# Patient Record
Sex: MALE | Race: WHITE | Employment: OTHER | URBAN - METROPOLITAN AREA
[De-identification: names, ages, dates, MRNs, and addresses within clinical notes are randomized per-mention and may not be internally consistent; named-entity substitution may affect disease eponyms.]

---

## 2017-12-19 RX ORDER — DIPHENHYDRAMINE HYDROCHLORIDE 50 MG/ML
INJECTION INTRAMUSCULAR; INTRAVENOUS
Status: DISPENSED
Start: 2017-12-19 | End: 2017-12-20

## 2017-12-19 RX ORDER — SODIUM CHLORIDE 0.9 % (FLUSH) 0.9 %
SYRINGE (ML) INJECTION
Status: DISPENSED
Start: 2017-12-19 | End: 2017-12-20

## 2018-03-03 PROBLEM — J15.9 COMMUNITY ACQUIRED BACTERIAL PNEUMONIA: Status: ACTIVE | Noted: 2018-03-03

## 2018-03-06 ENCOUNTER — HOSPITAL ENCOUNTER (INPATIENT)
Dept: PEDIATRICS UNIT | Age: 53
LOS: 7 days | Discharge: HOME | DRG: 871 | End: 2018-03-13
Attending: EMERGENCY MEDICINE | Admitting: HOSPITALIST
Payer: MEDICARE

## 2018-03-06 DIAGNOSIS — J18.9 COMMUNITY ACQUIRED PNEUMONIA, UNSPECIFIED LATERALITY: Primary | ICD-10-CM

## 2018-03-06 PROBLEM — J16.0 CAP (COMMUNITY ACQUIRED PNEUMONIA) DUE TO CHLAMYDIA SPECIES: Status: ACTIVE | Noted: 2018-03-06

## 2018-03-06 LAB
ACETAMINOPHEN LEVEL: <15 MCG/ML (ref 10–30)
ALBUMIN SERPL-MCNC: 3.6 G/DL (ref 3.5–5.2)
ALP BLD-CCNC: 102 U/L (ref 40–129)
ALT SERPL-CCNC: 31 U/L (ref 0–40)
AMPHETAMINE SCREEN, URINE: NOT DETECTED
ANION GAP SERPL CALCULATED.3IONS-SCNC: 14 MMOL/L (ref 7–16)
AST SERPL-CCNC: 34 U/L (ref 0–39)
BARBITURATE SCREEN URINE: NOT DETECTED
BASOPHILS ABSOLUTE: 0 E9/L (ref 0–0.2)
BASOPHILS RELATIVE PERCENT: 0.2 % (ref 0–2)
BENZODIAZEPINE SCREEN, URINE: NOT DETECTED
BILIRUB SERPL-MCNC: 0.3 MG/DL (ref 0–1.2)
BUN BLDV-MCNC: 13 MG/DL (ref 6–20)
CALCIUM SERPL-MCNC: 9 MG/DL (ref 8.6–10.2)
CANNABINOID SCREEN URINE: NOT DETECTED
CHLORIDE BLD-SCNC: 102 MMOL/L (ref 98–107)
CK MB: 2.4 NG/ML (ref 0–7.7)
CO2: 22 MMOL/L (ref 22–29)
COCAINE METABOLITE SCREEN URINE: NOT DETECTED
CREAT SERPL-MCNC: 0.8 MG/DL (ref 0.7–1.2)
EOSINOPHILS ABSOLUTE: 0 E9/L (ref 0.05–0.5)
EOSINOPHILS RELATIVE PERCENT: 0 % (ref 0–6)
ETHANOL: <10 MG/DL (ref 0–0.08)
FILM ARRAY ADENOVIRUS: NORMAL
FILM ARRAY BORDETELLA PERTUSSIS: NORMAL
FILM ARRAY CHLAMYDOPHILIA PNEUMONIAE: NORMAL
FILM ARRAY CORONAVIRUS 229E: NORMAL
FILM ARRAY CORONAVIRUS HKU1: NORMAL
FILM ARRAY CORONAVIRUS NL63: NORMAL
FILM ARRAY CORONAVIRUS OC43: NORMAL
FILM ARRAY INFLUENZA A VIRUS 09H1: NORMAL
FILM ARRAY INFLUENZA A VIRUS H1: NORMAL
FILM ARRAY INFLUENZA A VIRUS H3: NORMAL
FILM ARRAY INFLUENZA A VIRUS: NORMAL
FILM ARRAY INFLUENZA B: NORMAL
FILM ARRAY METAPNEUMOVIRUS: NORMAL
FILM ARRAY MYCOPLASMA PNEUMONIAE: NORMAL
FILM ARRAY PARAINFLUENZA VIRUS 1: NORMAL
FILM ARRAY PARAINFLUENZA VIRUS 2: NORMAL
FILM ARRAY PARAINFLUENZA VIRUS 3: NORMAL
FILM ARRAY PARAINFLUENZA VIRUS 4: NORMAL
FILM ARRAY RESPIRATORY SYNCITIAL VIRUS: NORMAL
FILM ARRAY RHINOVIRUS/ENTEROVIRUS: NORMAL
GFR AFRICAN AMERICAN: >60
GFR NON-AFRICAN AMERICAN: >60 ML/MIN/1.73
GLUCOSE BLD-MCNC: 112 MG/DL (ref 74–109)
HCT VFR BLD CALC: 39.3 % (ref 37–54)
HEMOGLOBIN: 13.6 G/DL (ref 12.5–16.5)
LACTIC ACID: 1.1 MMOL/L (ref 0.5–2.2)
LACTIC ACID: 1.5 MMOL/L (ref 0.5–2.2)
LIPASE: 15 U/L (ref 13–60)
LYMPHOCYTES ABSOLUTE: 2.18 E9/L (ref 1.5–4)
LYMPHOCYTES RELATIVE PERCENT: 10.4 % (ref 20–42)
MCH RBC QN AUTO: 32.2 PG (ref 26–35)
MCHC RBC AUTO-ENTMCNC: 34.6 % (ref 32–34.5)
MCV RBC AUTO: 92.9 FL (ref 80–99.9)
METER GLUCOSE: 101 MG/DL (ref 70–110)
METHADONE SCREEN, URINE: NOT DETECTED
MONOCYTES ABSOLUTE: 1.53 E9/L (ref 0.1–0.95)
MONOCYTES RELATIVE PERCENT: 7 % (ref 2–12)
NEUTROPHILS ABSOLUTE: 17.88 E9/L (ref 1.8–7.3)
NEUTROPHILS RELATIVE PERCENT: 81.7 % (ref 43–80)
OPIATE SCREEN URINE: NOT DETECTED
PDW BLD-RTO: 14.1 FL (ref 11.5–15)
PHENCYCLIDINE SCREEN URINE: NOT DETECTED
PLATELET # BLD: 214 E9/L (ref 130–450)
PMV BLD AUTO: 10.7 FL (ref 7–12)
POLYCHROMASIA: ABNORMAL
POTASSIUM SERPL-SCNC: 3.6 MMOL/L (ref 3.5–5)
PROPOXYPHENE SCREEN: NOT DETECTED
RBC # BLD: 4.23 E12/L (ref 3.8–5.8)
SALICYLATE, SERUM: <0.3 MG/DL (ref 0–30)
SODIUM BLD-SCNC: 138 MMOL/L (ref 132–146)
TOTAL CK: 94 U/L (ref 20–200)
TOTAL PROTEIN: 8 G/DL (ref 6.4–8.3)
TRICYCLIC ANTIDEPRESSANTS SCREEN SERUM: NEGATIVE NG/ML
TROPONIN: <0.01 NG/ML (ref 0–0.03)
TROPONIN: <0.01 NG/ML (ref 0–0.03)
WBC # BLD: 21.8 E9/L (ref 4.5–11.5)

## 2018-03-06 PROCEDURE — 9990 CHARGE CONVERSION

## 2018-03-06 PROCEDURE — 94640 AIRWAY INHALATION TREATMENT: CPT

## 2018-03-06 PROCEDURE — 93005 ELECTROCARDIOGRAM TRACING: CPT

## 2018-03-06 PROCEDURE — 83605 ASSAY OF LACTIC ACID: CPT

## 2018-03-06 PROCEDURE — 94761 N-INVAS EAR/PLS OXIMETRY MLT: CPT

## 2018-03-06 PROCEDURE — 71275 CT ANGIOGRAPHY CHEST: CPT

## 2018-03-06 PROCEDURE — 82553 CREATINE MB FRACTION: CPT

## 2018-03-06 PROCEDURE — 87502 INFLUENZA DNA AMP PROBE: CPT

## 2018-03-06 PROCEDURE — 74177 CT ABD & PELVIS W/CONTRAST: CPT

## 2018-03-06 PROCEDURE — 83690 ASSAY OF LIPASE: CPT

## 2018-03-06 PROCEDURE — 80307 DRUG TEST PRSMV CHEM ANLYZR: CPT

## 2018-03-06 PROCEDURE — 82962 GLUCOSE BLOOD TEST: CPT

## 2018-03-06 PROCEDURE — 80053 COMPREHEN METABOLIC PANEL: CPT

## 2018-03-06 PROCEDURE — 85025 COMPLETE CBC W/AUTO DIFF WBC: CPT

## 2018-03-06 PROCEDURE — 87450 CHARGE CONVERSION: CPT

## 2018-03-06 PROCEDURE — 96374 THER/PROPH/DIAG INJ IV PUSH: CPT

## 2018-03-06 PROCEDURE — 87486 CHLMYD PNEUM DNA AMP PROBE: CPT

## 2018-03-06 PROCEDURE — 82550 ASSAY OF CK (CPK): CPT

## 2018-03-06 PROCEDURE — 96375 TX/PRO/DX INJ NEW DRUG ADDON: CPT

## 2018-03-06 PROCEDURE — 87798 DETECT AGENT NOS DNA AMP: CPT

## 2018-03-06 PROCEDURE — 87581 M.PNEUMON DNA AMP PROBE: CPT

## 2018-03-06 PROCEDURE — 71045 X-RAY EXAM CHEST 1 VIEW: CPT

## 2018-03-06 PROCEDURE — 36415 COLL VENOUS BLD VENIPUNCTURE: CPT

## 2018-03-06 PROCEDURE — 87503 INFLUENZA DNA AMP PROB ADDL: CPT

## 2018-03-06 PROCEDURE — 87040 BLOOD CULTURE FOR BACTERIA: CPT

## 2018-03-06 PROCEDURE — 84484 ASSAY OF TROPONIN QUANT: CPT

## 2018-03-06 PROCEDURE — 99285 EMERGENCY DEPT VISIT HI MDM: CPT

## 2018-03-06 PROCEDURE — 99223 1ST HOSP IP/OBS HIGH 75: CPT | Performed by: INTERNAL MEDICINE

## 2018-03-06 PROCEDURE — 86703 HIV-1/HIV-2 1 RESULT ANTBDY: CPT

## 2018-03-06 RX ORDER — HYDROCHLOROTHIAZIDE 12.5 MG/1
25 TABLET ORAL DAILY
Status: DISCONTINUED | OUTPATIENT
Start: 2018-03-06 | End: 2018-03-13 | Stop reason: HOSPADM

## 2018-03-06 RX ORDER — AMLODIPINE BESYLATE 10 MG/1
10 TABLET ORAL DAILY
Status: DISCONTINUED | OUTPATIENT
Start: 2018-03-06 | End: 2018-03-13 | Stop reason: HOSPADM

## 2018-03-06 RX ORDER — MIDAZOLAM HYDROCHLORIDE 1 MG/ML
INJECTION INTRAMUSCULAR; INTRAVENOUS
Status: DISPENSED
Start: 2018-03-06 | End: 2018-03-07

## 2018-03-06 RX ORDER — TRAMADOL HYDROCHLORIDE 50 MG/1
50 TABLET ORAL EVERY 6 HOURS PRN
Status: DISCONTINUED | OUTPATIENT
Start: 2018-03-06 | End: 2018-03-13 | Stop reason: HOSPADM

## 2018-03-06 RX ORDER — FINASTERIDE 5 MG/1
5 TABLET, FILM COATED ORAL DAILY
Status: DISCONTINUED | OUTPATIENT
Start: 2018-03-06 | End: 2018-03-13 | Stop reason: HOSPADM

## 2018-03-06 RX ORDER — GABAPENTIN 300 MG/1
300 CAPSULE ORAL 3 TIMES DAILY
Status: DISCONTINUED | OUTPATIENT
Start: 2018-03-06 | End: 2018-03-13 | Stop reason: HOSPADM

## 2018-03-06 RX ORDER — KETOROLAC TROMETHAMINE 10 MG/1
15 TABLET, FILM COATED ORAL EVERY 6 HOURS PRN
Status: DISCONTINUED | OUTPATIENT
Start: 2018-03-06 | End: 2018-03-06

## 2018-03-06 RX ORDER — KETOROLAC TROMETHAMINE 30 MG/ML
30 INJECTION, SOLUTION INTRAMUSCULAR; INTRAVENOUS ONCE
Status: COMPLETED | OUTPATIENT
Start: 2018-03-06 | End: 2018-03-06

## 2018-03-06 RX ORDER — LABETALOL HYDROCHLORIDE 5 MG/ML
10 INJECTION, SOLUTION INTRAVENOUS EVERY 6 HOURS PRN
Status: DISCONTINUED | OUTPATIENT
Start: 2018-03-06 | End: 2018-03-13 | Stop reason: HOSPADM

## 2018-03-06 RX ORDER — LINEZOLID 600 MG/1
600 TABLET, FILM COATED ORAL EVERY 12 HOURS SCHEDULED
Status: DISCONTINUED | OUTPATIENT
Start: 2018-03-06 | End: 2018-03-13 | Stop reason: HOSPADM

## 2018-03-06 RX ORDER — KETOROLAC TROMETHAMINE 30 MG/ML
15 INJECTION, SOLUTION INTRAMUSCULAR; INTRAVENOUS EVERY 6 HOURS PRN
Status: DISPENSED | OUTPATIENT
Start: 2018-03-06 | End: 2018-03-11

## 2018-03-06 RX ORDER — IPRATROPIUM BROMIDE AND ALBUTEROL SULFATE 2.5; .5 MG/3ML; MG/3ML
1 SOLUTION RESPIRATORY (INHALATION)
Status: DISCONTINUED | OUTPATIENT
Start: 2018-03-06 | End: 2018-03-13 | Stop reason: HOSPADM

## 2018-03-06 RX ORDER — ONDANSETRON 2 MG/ML
4 INJECTION INTRAMUSCULAR; INTRAVENOUS EVERY 6 HOURS PRN
Status: DISCONTINUED | OUTPATIENT
Start: 2018-03-06 | End: 2018-03-13 | Stop reason: HOSPADM

## 2018-03-06 RX ORDER — ASPIRIN 325 MG
325 TABLET ORAL ONCE
Status: COMPLETED | OUTPATIENT
Start: 2018-03-06 | End: 2018-03-06

## 2018-03-06 RX ORDER — BUDESONIDE AND FORMOTEROL FUMARATE DIHYDRATE 160; 4.5 UG/1; UG/1
2 AEROSOL RESPIRATORY (INHALATION) 2 TIMES DAILY
Status: DISCONTINUED | OUTPATIENT
Start: 2018-03-06 | End: 2018-03-06

## 2018-03-06 RX ORDER — LISINOPRIL 10 MG/1
40 TABLET ORAL DAILY
Status: DISCONTINUED | OUTPATIENT
Start: 2018-03-06 | End: 2018-03-13 | Stop reason: HOSPADM

## 2018-03-06 RX ORDER — LEVOFLOXACIN 5 MG/ML
750 INJECTION, SOLUTION INTRAVENOUS ONCE
Status: DISCONTINUED | OUTPATIENT
Start: 2018-03-06 | End: 2018-03-06

## 2018-03-06 RX ORDER — FENTANYL CITRATE 50 UG/ML
80 INJECTION, SOLUTION INTRAMUSCULAR; INTRAVENOUS ONCE
Status: COMPLETED | OUTPATIENT
Start: 2018-03-06 | End: 2018-03-06

## 2018-03-06 RX ORDER — NICOTINE 21 MG/24HR
1 PATCH, TRANSDERMAL 24 HOURS TRANSDERMAL DAILY
Status: DISCONTINUED | OUTPATIENT
Start: 2018-03-06 | End: 2018-03-13 | Stop reason: HOSPADM

## 2018-03-06 RX ORDER — ALBUTEROL SULFATE 2.5 MG/3ML
2.5 SOLUTION RESPIRATORY (INHALATION)
Status: DISCONTINUED | OUTPATIENT
Start: 2018-03-06 | End: 2018-03-06

## 2018-03-06 RX ORDER — SODIUM CHLORIDE 0.9 % (FLUSH) 0.9 %
10 SYRINGE (ML) INJECTION PRN
Status: DISCONTINUED | OUTPATIENT
Start: 2018-03-06 | End: 2018-03-13 | Stop reason: HOSPADM

## 2018-03-06 RX ORDER — 0.9 % SODIUM CHLORIDE 0.9 %
1000 INTRAVENOUS SOLUTION INTRAVENOUS ONCE
Status: COMPLETED | OUTPATIENT
Start: 2018-03-06 | End: 2018-03-06

## 2018-03-06 RX ORDER — SODIUM CHLORIDE 0.9 % (FLUSH) 0.9 %
10 SYRINGE (ML) INJECTION EVERY 12 HOURS SCHEDULED
Status: DISCONTINUED | OUTPATIENT
Start: 2018-03-06 | End: 2018-03-13 | Stop reason: HOSPADM

## 2018-03-06 RX ORDER — ACETAMINOPHEN 325 MG/1
650 TABLET ORAL EVERY 4 HOURS PRN
Status: DISCONTINUED | OUTPATIENT
Start: 2018-03-06 | End: 2018-03-13 | Stop reason: HOSPADM

## 2018-03-06 RX ORDER — SODIUM CHLORIDE, SODIUM LACTATE, POTASSIUM CHLORIDE, AND CALCIUM CHLORIDE .6; .31; .03; .02 G/100ML; G/100ML; G/100ML; G/100ML
500 INJECTION, SOLUTION INTRAVENOUS ONCE
Status: COMPLETED | OUTPATIENT
Start: 2018-03-06 | End: 2018-03-06

## 2018-03-06 RX ORDER — ASPIRIN 325 MG
TABLET ORAL
Status: COMPLETED
Start: 2018-03-06 | End: 2018-03-06

## 2018-03-06 RX ORDER — FLUTICASONE FUROATE AND VILANTEROL 200; 25 UG/1; UG/1
1 POWDER RESPIRATORY (INHALATION) DAILY
Status: DISCONTINUED | OUTPATIENT
Start: 2018-03-06 | End: 2018-03-07

## 2018-03-06 RX ORDER — SODIUM CHLORIDE 0.9 % (FLUSH) 0.9 %
10 SYRINGE (ML) INJECTION PRN
Status: COMPLETED | OUTPATIENT
Start: 2018-03-06 | End: 2018-03-06

## 2018-03-06 RX ORDER — KETOROLAC TROMETHAMINE 30 MG/ML
30 INJECTION, SOLUTION INTRAMUSCULAR; INTRAVENOUS EVERY 6 HOURS PRN
Status: DISCONTINUED | OUTPATIENT
Start: 2018-03-06 | End: 2018-03-06 | Stop reason: SDUPTHER

## 2018-03-06 RX ORDER — FAMOTIDINE 20 MG/1
40 TABLET, FILM COATED ORAL EVERY EVENING
Status: DISCONTINUED | OUTPATIENT
Start: 2018-03-06 | End: 2018-03-13 | Stop reason: HOSPADM

## 2018-03-06 RX ADMIN — ENOXAPARIN SODIUM 40 MG: 40 INJECTION SUBCUTANEOUS at 10:31

## 2018-03-06 RX ADMIN — IPRATROPIUM BROMIDE AND ALBUTEROL SULFATE 1 AMPULE: 2.5; .5 SOLUTION RESPIRATORY (INHALATION) at 07:30

## 2018-03-06 RX ADMIN — Medication 10 ML: at 17:25

## 2018-03-06 RX ADMIN — LINEZOLID 600 MG: 600 TABLET, FILM COATED ORAL at 22:20

## 2018-03-06 RX ADMIN — HYDROCHLOROTHIAZIDE 25 MG: 12.5 TABLET ORAL at 11:27

## 2018-03-06 RX ADMIN — FINASTERIDE 5 MG: 5 TABLET, FILM COATED ORAL at 11:27

## 2018-03-06 RX ADMIN — Medication 325 MG: at 16:45

## 2018-03-06 RX ADMIN — TRAMADOL HYDROCHLORIDE 50 MG: 50 TABLET ORAL at 16:06

## 2018-03-06 RX ADMIN — Medication 10 ML: at 17:09

## 2018-03-06 RX ADMIN — Medication 10 ML: at 05:36

## 2018-03-06 RX ADMIN — ACETAMINOPHEN 650 MG: 325 TABLET, FILM COATED ORAL at 15:52

## 2018-03-06 RX ADMIN — KETOROLAC TROMETHAMINE 30 MG: 30 INJECTION, SOLUTION INTRAMUSCULAR; INTRAVENOUS at 03:59

## 2018-03-06 RX ADMIN — GABAPENTIN 300 MG: 300 CAPSULE ORAL at 17:34

## 2018-03-06 RX ADMIN — IPRATROPIUM BROMIDE AND ALBUTEROL SULFATE 1 AMPULE: 2.5; .5 SOLUTION RESPIRATORY (INHALATION) at 20:56

## 2018-03-06 RX ADMIN — KETOROLAC TROMETHAMINE 30 MG: 30 INJECTION, SOLUTION INTRAMUSCULAR; INTRAVENOUS at 10:31

## 2018-03-06 RX ADMIN — FAMOTIDINE 40 MG: 20 TABLET ORAL at 17:33

## 2018-03-06 RX ADMIN — SODIUM CHLORIDE, SODIUM LACTATE, POTASSIUM CHLORIDE, AND CALCIUM CHLORIDE 500 ML: .6; .31; .03; .02 INJECTION, SOLUTION INTRAVENOUS at 17:25

## 2018-03-06 RX ADMIN — FENTANYL CITRATE 80 MCG: 50 INJECTION, SOLUTION INTRAMUSCULAR; INTRAVENOUS at 03:59

## 2018-03-06 RX ADMIN — Medication 1000 ML: at 06:26

## 2018-03-06 RX ADMIN — LISINOPRIL 40 MG: 10 TABLET ORAL at 10:31

## 2018-03-06 RX ADMIN — GABAPENTIN 300 MG: 300 CAPSULE ORAL at 23:11

## 2018-03-06 RX ADMIN — LABETALOL HYDROCHLORIDE 10 MG: 5 INJECTION, SOLUTION INTRAVENOUS at 17:07

## 2018-03-06 RX ADMIN — AMLODIPINE BESYLATE 10 MG: 10 TABLET ORAL at 10:30

## 2018-03-06 RX ADMIN — Medication 10 ML: at 10:30

## 2018-03-06 RX ADMIN — KETOROLAC TROMETHAMINE 15 MG: 10 TABLET, FILM COATED ORAL at 17:28

## 2018-03-06 RX ADMIN — GABAPENTIN 300 MG: 300 CAPSULE ORAL at 10:30

## 2018-03-06 RX ADMIN — IPRATROPIUM BROMIDE AND ALBUTEROL SULFATE 1 AMPULE: 2.5; .5 SOLUTION RESPIRATORY (INHALATION) at 12:55

## 2018-03-06 ASSESSMENT — PAIN DESCRIPTION - LOCATION
LOCATION: RIB CAGE
LOCATION: RIB CAGE
LOCATION: CHEST
LOCATION: RIB CAGE

## 2018-03-06 ASSESSMENT — PAIN SCALES - GENERAL
PAINLEVEL_OUTOF10: 10
PAINLEVEL_OUTOF10: 10
PAINLEVEL_OUTOF10: 0
PAINLEVEL_OUTOF10: 10

## 2018-03-06 ASSESSMENT — PAIN DESCRIPTION - ORIENTATION
ORIENTATION: LEFT

## 2018-03-06 ASSESSMENT — PAIN DESCRIPTION - FREQUENCY
FREQUENCY: CONTINUOUS

## 2018-03-06 ASSESSMENT — PAIN DESCRIPTION - DESCRIPTORS
DESCRIPTORS: SHOOTING;SHARP;DISCOMFORT
DESCRIPTORS: SHARP;SHOOTING
DESCRIPTORS: CONSTANT

## 2018-03-06 ASSESSMENT — ENCOUNTER SYMPTOMS
WHEEZING: 0
ABDOMINAL PAIN: 0
SHORTNESS OF BREATH: 1
VOMITING: 0
NAUSEA: 0
COUGH: 0
EYE PAIN: 0
BACK PAIN: 0
DIARRHEA: 0
SORE THROAT: 0
EYE DISCHARGE: 0
EYE REDNESS: 0
SINUS PRESSURE: 0

## 2018-03-06 ASSESSMENT — PAIN DESCRIPTION - PAIN TYPE
TYPE: ACUTE PAIN

## 2018-03-06 ASSESSMENT — PAIN DESCRIPTION - ONSET
ONSET: ON-GOING

## 2018-03-06 NOTE — H&P
MG tablet Take 1 tablet by mouth daily 18   Pratik Nick, DO   ranitidine (ZANTAC) 300 MG tablet Take 1 tablet by mouth nightly 18   [facility] , DO   SYMBICORT 160-4.5 MCG/ACT AERO Inhale 2 puffs into the lungs 2 times daily 10/24/17   Pratik Nick, DO   Fluticasone Furoate-Vilanterol (BREO ELLIPTA) 200-25 MCG/INH AEPB Inhale 1 puff into the lungs daily 17   Pratik Colonia, DO   albuterol sulfate HFA (PROVENTIL HFA) 108 (90 Base) MCG/ACT inhaler Inhale 2 puffs into the lungs every 4 hours as needed for Wheezing 17  [facility] , DO   hydrochlorothiazide (MICROZIDE) 12.5 MG capsule Take 1 capsule by mouth daily 17   Pratik Nick, DO   lisinopril (PRINIVIL;ZESTRIL) 40 MG tablet take 1 tablet by mouth once daily 17   Pratik Nick, DO   nicotine (NICODERM CQ) 21 MG/24HR Place 1 patch onto the skin daily 16   Cayla Johnson NP       Allergies:  Patient has no known allergies. Social History:      The patient currently lives home     TOBACCO:   reports that he has been smoking Cigarettes. He has a 38.00 pack-year smoking history. He has never used smokeless tobacco.  ETOH:   reports that he drinks alcohol. Family History:       Reviewed in detail and negative for DM, CAD, Cancer, CVA. Positive as follows:        Problem Relation Age of Onset    Arthritis Mother     Other Mother      glucoma    Heart Disease Father     High Blood Pressure Brother     Other Brother      sleep apnea    High Blood Pressure Brother     Other Brother      sleep apnea    High Blood Pressure Brother     Other Brother      sleep apnea    High Blood Pressure Brother     Other Brother      sleep apnea    Cancer Brother      esophogeal -  at 62       REVIEW OF SYSTEMS:   Pertinent positives as noted in the HPI. All other systems reviewed and negative.     PHYSICAL EXAM:    BP (!) 180/128 Comment: RONALD AMES notified  Pulse 101   Temp 97.8 °F (36.6 °C) (Oral) Resp 22   Ht 5' 7\" (1.702 m)   Wt 198 lb (89.8 kg)   SpO2 96%   BMI 31.01 kg/m²     General appearance:  Appears stated age and cooperative. tachypneic   HEENT:  Normal cephalic, atraumatic without obvious deformity. Pupils equal, round, and reactive to light. Extra ocular muscles intact. Conjunctivae/corneas clear. Neck: Supple, with full range of motion. No jugular venous distention. Trachea midline. Respiratory:  Normal respiratory effort. Wheezes noted scattered   Cardiovascular:  Regular rate and rhythm with normal S1/S2 without murmurs, rubs or gallops. Abdomen: Soft, non-tender, non-distended with normal bowel sounds. Musculoskeletal:  No clubbing, cyanosis or edema bilaterally. Full range of motion without deformity. Skin: Skin color, texture, turgor normal.    Neurologic:  Neurovascularly intact without any focal sensory/motor deficits. Psychiatric:  Alert and oriented, thought content appropriate, normal insight  Capillary Refill: Brisk,< 3 seconds   Peripheral Pulses: +2 palpable, equal bilaterally       CXR:    Impression   Since examination dated 03/03/2018, interval development   of patchy bilateral lower lobe airspace opacities, left worse than   right. Recommend clinical correlation. EKG:  SR     Labs:     Recent Labs      03/04/18   1115  03/06/18   0350   WBC  20.2*  21.8*   HGB  12.4*  13.6   HCT  34.5*  39.3   PLT  176  214     Recent Labs      03/04/18   1115  03/06/18   0350   NA  139  138   K  3.7  3.6   CL  104  102   CO2  17*  22   BUN  18  13   CREATININE  0.9  0.8   CALCIUM  8.7  9.0     Recent Labs      03/06/18   0350   AST  34   ALT  31   BILITOT  0.3   ALKPHOS  102     No results for input(s): INR in the last 72 hours.   Recent Labs      03/03/18   1313  03/06/18   0350   TROPONINI  <0.01  <0.01       Urinalysis:      Lab Results   Component Value Date    NITRU Negative 03/03/2018    WBCUA 0-1 02/10/2016    BACTERIA NONE 02/10/2016    RBCUA NONE 02/10/2016    BLOODU Negative 03/03/2018    SPECGRAV <=1.005 03/03/2018    GLUCOSEU Negative 03/03/2018         ASSESSMENT:    Active Hospital Problems    Diagnosis Date Noted    CAP (community acquired pneumonia) due to Chlamydia species [J16.0] 03/06/2018     Worsening bilateral pneumonia  - Patient was recently admitted and discharged one day ago from [facility] after being treated with Augmentin and Unasyn for bilateral pneumonia and suspected aspiration. He was also previously treated with Tamiflu for positive influenza prior to pneumonia. - Chest x-ray and CT scan of the chest showed worsening pneumonia today in the emergency room    Pleural effusion   - noted on left side     Left-sided chest pain- likely musculoskeletal and pleuritic pain  - Patient complains of left pain under nipple line to mid axillary line with palpation, movement, cough and deep breathing. He states that his pain was relieved with fentanyl and Toradol  - negative trop     Possible Sepsis   - increased pneumonia, tachycardia, elevated WBC- but on steroids- and tachypnea   - lactic acid 1.5     HTN  - uncontrolled   - BP meds resumed     Hx of illicit drug use   - cocaine and Marijuana regularly last year; states he quit     Alcohol abuse   - 6-12 pack every other day per patient   - no DTs noted       PLAN:    Admit to tele   Consult ID and pulmonology   IV antibiotics   Urine for legionella and strep pneumo   Resume home meds   Duonebs   IV Toradol for pain for now   Heating pad to left side ? Musculoskeletal pain in addition to pleuritic pain       DVT Prophylaxis: lovenox   Diet: DIET CARDIAC;  Code Status: Full Code    PT/OT Eval Status: na     Dispo - tele      Bianca Bob CNP    Thank you Oliverio Mleara DO for the opportunity to be involved in this patient's care.  If you have any questions or concerns please feel free to contact me at [phone]

## 2018-03-06 NOTE — CONSULTS
mL IVPB (mini-bag)  3.375 g Intravenous Q8H Norma Mauro CNP        ketorolac (TORADOL) injection 30 mg  30 mg Intravenous Q6H PRN Norma Mauro CNP         Current Outpatient Prescriptions   Medication Sig Dispense Refill    azithromycin (ZITHROMAX) 250 MG tablet Take 1 tablet by mouth daily for 5 days 5 tablet 0    cyclobenzaprine (FLEXERIL) 10 MG tablet Take 1 tablet by mouth 3 times daily as needed for Muscle spasms 9 tablet 0    predniSONE (DELTASONE) 20 MG tablet Take 2 tablets by mouth daily for 5 days 10 tablet 0    amoxicillin-clavulanate (AUGMENTIN) 875-125 MG per tablet Take 1 tablet by mouth every 12 hours for 5 days 10 tablet 0    tamsulosin (FLOMAX) 0.4 MG capsule Take 0.4 mg by mouth daily      ibuprofen (ADVIL;MOTRIN) 800 MG tablet Take 1 tablet by mouth every 6 hours as needed for Pain 30 tablet 0    amLODIPine (NORVASC) 10 MG tablet Take 1 tablet by mouth daily 30 tablet 2    gabapentin (NEURONTIN) 300 MG capsule Take 1 capsule by mouth 3 times daily for 30 days. (Patient taking differently: Take 300 mg by mouth 3 times daily as needed .) 90 capsule 5    finasteride (PROSCAR) 5 MG tablet Take 1 tablet by mouth daily 30 tablet 5    ranitidine (ZANTAC) 300 MG tablet Take 1 tablet by mouth nightly 30 tablet 5    Fluticasone Furoate-Vilanterol (BREO ELLIPTA) 200-25 MCG/INH AEPB Inhale 1 puff into the lungs daily 1 each 5    albuterol sulfate HFA (PROVENTIL HFA) 108 (90 Base) MCG/ACT inhaler Inhale 2 puffs into the lungs every 4 hours as needed for Wheezing 1 Inhaler 1    hydrochlorothiazide (MICROZIDE) 12.5 MG capsule Take 1 capsule by mouth daily 30 capsule 5    lisinopril (PRINIVIL;ZESTRIL) 40 MG tablet take 1 tablet by mouth once daily 30 tablet 2    nicotine (NICODERM CQ) 21 MG/24HR Place 1 patch onto the skin daily 14 patch 0       Allergies:  Patient has no known allergies.     Social History:      Social History     Social History    Marital status: Legally

## 2018-03-06 NOTE — ED PROVIDER NOTES
68-year-old male presenting to the emergency Department with primary complaint of chest pain and shortness of breath. Patient states symptoms began last Friday onset was gradual. He was seen and evaluated at the emergency department and [facility] where he was admitted and diagnosed with bilateral pneumonia. The patient was discharged with antibiotics and steroids, and Flexeril and instructions follow-up with his PCP today. He presented to this department emergency department stating that his pain has gotten worse and he still feels like he cannot breathe. He describes the pain as sharp in nature located in his left lower chest and abdomen as well as midepigastric area. The pain is constant, better with nothing and worsened with smoking. He denies fevers, chills, nausea, vomiting. He recently tested positive for influenza B on 2/25/2018. Admits to tobacco use, as well as alcohol use. The history is provided by the patient. Chest Pain   Pain location:  L chest  Pain quality: sharp    Pain radiates to:  Mid back  Pain severity:  Severe  Onset quality:  Gradual  Duration:  3 days  Timing:  Constant  Progression:  Waxing and waning  Chronicity:  New  Context: breathing    Relieved by:  Nothing  Worsened by:  Deep breathing  Ineffective treatments: SOB. Associated symptoms: shortness of breath    Associated symptoms: no abdominal pain, no back pain, no cough, no fever, no headache, no nausea, no vomiting and no weakness    Risk factors: male sex and smoking        Review of Systems   Constitutional: Negative for chills and fever. HENT: Negative for ear pain, sinus pressure and sore throat. Eyes: Negative for pain, discharge and redness. Respiratory: Positive for shortness of breath. Negative for cough and wheezing. Cardiovascular: Positive for chest pain. Gastrointestinal: Negative for abdominal pain, diarrhea, nausea and vomiting. Genitourinary: Negative for dysuria and frequency. Musculoskeletal: Negative for arthralgias and back pain. Skin: Negative for rash and wound. Neurological: Negative for weakness and headaches. Hematological: Negative for adenopathy. All other systems reviewed and are negative. Physical Exam   Constitutional: He is oriented to person, place, and time. He appears well-developed and well-nourished. Patient is sitting upright at time of evaluation, he is breathing shallow and appears to be in a considerable amount of discomfort. HENT:   Head: Normocephalic and atraumatic. Eyes: Pupils are equal, round, and reactive to light. Neck: Normal range of motion. Neck supple. Cardiovascular: Regular rhythm and normal heart sounds. No murmur heard. Tachycardia   Pulmonary/Chest: Effort normal and breath sounds normal. No respiratory distress. He has no wheezes. He has no rales. Respirations are rapid and shallow   Abdominal: Soft. Bowel sounds are normal. There is tenderness. There is no rebound and no guarding. Tenderness to palpation in the left upper quadrant   Musculoskeletal: He exhibits no edema. Neurological: He is alert and oriented to person, place, and time. No cranial nerve deficit. Coordination normal.   Skin: Skin is warm and dry. Nursing note and vitals reviewed. Procedures    MDM  Number of Diagnoses or Management Options  Community acquired pneumonia, unspecified laterality:   Diagnosis management comments: 68-year-old male presenting with chest pain, shortness of breath being Friday. Evaluated at [facility] emergency department and discharged with diagnosis of bilateral pneumonia. Patient was given antibiotics and steroids, and Flexeril. He was discharged today from the [facility]. He returns stating he still doesn't feel like he can't catch his breath and his pain is still severe. CTA of chest, and CT of abdomen and pelvis to evaluate for pain possible PE. Cardiac workup.  Fentanyl for 146 mmol/L    Potassium 3.6 3.5 - 5.0 mmol/L    Chloride 102 98 - 107 mmol/L    CO2 22 22 - 29 mmol/L    Anion Gap 14 7 - 16 mmol/L    Glucose 112 (H) 74 - 109 mg/dL    BUN 13 6 - 20 mg/dL    CREATININE 0.8 0.7 - 1.2 mg/dL    GFR Non-African American >60 >=60 mL/min/1.73    GFR African American >60     Calcium 9.0 8.6 - 10.2 mg/dL    Total Protein 8.0 6.4 - 8.3 g/dL    Alb 3.6 3.5 - 5.2 g/dL    Total Bilirubin 0.3 0.0 - 1.2 mg/dL    Alkaline Phosphatase 102 40 - 129 U/L    ALT 31 0 - 40 U/L    AST 34 0 - 39 U/L   Troponin   Result Value Ref Range    Troponin <0.01 0.00 - 0.03 ng/mL   Lipase   Result Value Ref Range    Lipase 15 13 - 60 U/L   Serum Drug Screen   Result Value Ref Range    Ethanol Lvl <10 mg/dL    Acetaminophen Level <15.0 10.0 - 16.2 mcg/mL    Salicylate, Serum <8.6 0.0 - 30.0 mg/dL    TCA Scrn NEGATIVE Cutoff:300 ng/mL       RADIOLOGY:  Xr Chest Standard (2 Vw)    Result Date: 2018  Patient MRN:  [id number] : 1965 Age: 45 years Gender: Male Order Date:  2018 4:55 PM EXAM: XR CHEST (2 VW) NUMBER OF IMAGES:  2 INDICATION:  cough  COMPARISON: 2016 TECHNIQUE: Frontal and lateral views of the chest. FINDINGS: Cardiomediastinal silhouette and pulmonary vasculature are normal. No focal opacity, pleural effusion or pneumothorax seen. Degenerative changes noted in the spine. No acute cardiopulmonary abnormality     Xr Shoulder Right (min 2 Views)    Result Date: 2018  Reading location:  100 CLINICAL STATEMENT: Right shoulder pain. COMPARISON: August 15, 2016. FINDINGS: Three views of the right shoulder reveal mild cortical irregularity at the insertion of the rotator cuff tendon secondary to chronic insertional stress of the rotator cuff tendon but this is unchanged since the  examination. The acromioclavicular and glenohumeral joints are intact. No calcific tendinitis or bursitis. No acute or significant abnormality in the right shoulder girdle.     Ct Head Wo

## 2018-03-06 NOTE — CONSULTS
[facility]  Division of Pulmonary, Jennifer Beckham               Patient: Bhavin Fuentes  MRN: [id number]  : 1965      Date of Admission: .3/6/2018  6:02 AM    Consulting Physician:Dr Samuels          Reason for Consultation:  CC : Chest pain ,SOB  HPI:   Bhavin Fuentes is a 45y.o. year old  With history of COPD  And smoking as he started smoking at the early age , came in to [facility] emergency with chief complaint of left-sided chest pain along with shortness of breath that has been going on since Friday      the patient stated that he is coughing but not able to bring any sputum , he cannot take deep breath as his chest pain will get worse     The patient was admitted to [facility] 3/3/2018 and diagnosed with bilateral pneumonia he was giving Unasyn at that time and treated with Tamiflu for influenza as well , he went home but he did not feel well and then he came back to [facility] yesterday night   he had a CT scan of the chest , that shows bilateral infiltrate and worsening haziness opacity in the lower lobes both lungs as well as the lingula and right middle lobe which could be suggestive of pneumonia    Also has history of obstructive sleep apnea but he never did the CPAP.     PAST MEDICAL HISTORY:   Past Medical History:   Diagnosis Date    Alcohol abuse     COPD (chronic obstructive pulmonary disease) ([facility] [address]     History of cocaine use     Hyperlipidemia     Hypertension     Marijuana use     quit 2017     alberto        PAST SURGICAL HISTORY:   Past Surgical History:   Procedure Laterality Date    CERVICAL FUSION  11/18/15    cervical laminectomy & fusion c4-c6, with rods & screws    POLYSOMNOGRAPHY  2018    AHI=29.8    WRIST SURGERY         FAMILY HISTORY:   Family History   Problem Relation Age of Onset    Arthritis Mother     Other Mother      glucoma    Heart Disease °F (36.9 °C) (Oral)   02/25/18 100.7 °F (38.2 °C) (Oral)     TMAX:  BP Readings from Last 3 Encounters:   03/06/18 (!) 168/112   03/05/18 (!) 174/106   02/25/18 (!) 162/91     Pulse Readings from Last 3 Encounters:   03/06/18 106   03/05/18 86   02/25/18 102           INTAKE/OUTPUTS:  I/O last 3 completed shifts:   In: 10 [I.V.:10]  Out: -     Intake/Output Summary (Last 24 hours) at 03/06/18 1500  Last data filed at 03/06/18 1230   Gross per 24 hour   Intake               10 ml   Output              950 ml   Net             -940 ml       General Appearance: alert and oriented to person, place and time, well-developed and   well-nourished, in no acute distress   Eyes: pupils equal, round, and reactive to light, extraocular eye movements intact, conjunctivae normal and sclera anicteric   Neck: neck supple and non tender without mass, no thyromegaly, no thyroid nodules and no cervical adenopathy   Pulmonary/Chest: rhonchi bilaterally decreased breath sound in the left side  Cardiovascular: normal rate, regular rhythm, normal S1 and S2, no murmurs, rubs, clicks or gallops, distal pulses intact, no carotid bruits, no murmurs, no gallops, no carotid bruits and no JVD   Abdomen: obese, soft, non-tender, non-distended, normal bowel sounds, no masses or organomegaly   Extremities: no edema no cyanosis  Musculoskeletal: normal range of motion, no joint swelling, deformity or tenderness   Neurologic: reflexes normal and symmetric, no cranial nerve deficit noted    LABS/IMAGING:    CBC:  Lab Results   Component Value Date    WBC 21.8 (H) 03/06/2018    HGB 13.6 03/06/2018    HCT 39.3 03/06/2018    MCV 92.9 03/06/2018     03/06/2018    LYMPHOPCT 10.4 (L) 03/06/2018    RBC 4.23 03/06/2018    MCH 32.2 03/06/2018    MCHC 34.6 (H) 03/06/2018    RDW 14.1 03/06/2018    NEUTOPHILPCT 81.7 (H) 03/06/2018    MONOPCT 7.0 03/06/2018    BASOPCT 0.2 03/06/2018    NEUTROABS 17.88 (H) 03/06/2018    LYMPHSABS 2.18 03/06/2018    MONOSABS (community acquired pneumonia) due to Chlamydia species               ASSESSMENT:  1.)  Community acquired pneumonia  2.)  COPD , unknown which was class  3.)  Obstructive sleep apnea  4.) recent history of  Flu  5.) nicotine dependence      PLAN:  *- patient was seen by ID he was started on Zyvox and Zosyn  *- I agree with HIV test , if positive we will check CD4  *- if clinical condition does not improve in the next 24 hours I will proceed with bronchoscopy /BAL  *- Legionella, strep pneumo, sputum culture   Viral panel has been negative  *_ sed rate , CRP, pro calcitonin   Start bronchodilator DuoNeb 1 unit q.4 hours   we need PFT when more stable to assist with COPD   Respiratory panel came back negative   Consider small dose of steroids in 24 hours if his breath or chest pain continue     Recommend that the patient also has workup for his chest pain by primary team to make sure it's not coronary artery disease hidden by his pneumonia      Thank you Dr Arturo Alvarado for allowing me to participate in the care of this pleasant patient , should you have any questions ,please do not hesitate to contact me    NOTE: This report was transcribed using voice recognition software. Every effort was made to ensure accuracy; however, inadvertent computerized transcription errors may be present.      [address] [facility]

## 2018-03-06 NOTE — PAYOR INFORMATION
Patient Maisha Garg:  [de-identified]  Primary AUTH/CERT:  [id number]  [facility] Name:   [facility]  Primary Insurance Plan Name:  Lalit Vasquez [facility]  Primary Insurance Group Number:  [id number]  Primary Insurance Plan Type: C  Primary Insurance Policy Number:  [id number]    Secondary AUTH/CERT:    [address] [facility] Name:   [address] [facility]  Secondary Insurance Plan Name:  [id number]  Secondary Insurance Group Number:    Secondary Insurance Plan Type: X  Secondary Insurance Policy Number:  [id number]

## 2018-03-07 LAB
ANION GAP SERPL CALCULATED.3IONS-SCNC: 13 MMOL/L (ref 7–16)
BUN BLDV-MCNC: 14 MG/DL (ref 6–20)
CALCIUM SERPL-MCNC: 8.4 MG/DL (ref 8.6–10.2)
CHLORIDE BLD-SCNC: 94 MMOL/L (ref 98–107)
CO2: 24 MMOL/L (ref 22–29)
CREAT SERPL-MCNC: 0.9 MG/DL (ref 0.7–1.2)
EKG ATRIAL RATE: 133 BPM
EKG P AXIS: 29 DEGREES
EKG P-R INTERVAL: 116 MS
EKG Q-T INTERVAL: 296 MS
EKG QRS DURATION: 82 MS
EKG QTC CALCULATION (BAZETT): 440 MS
EKG R AXIS: 0 DEGREES
EKG T AXIS: 5 DEGREES
EKG VENTRICULAR RATE: 133 BPM
GFR AFRICAN AMERICAN: >60
GFR NON-AFRICAN AMERICAN: >60 ML/MIN/1.73
GLUCOSE BLD-MCNC: 96 MG/DL (ref 74–109)
HCT VFR BLD CALC: 38.2 % (ref 37–54)
HEMOGLOBIN: 13.3 G/DL (ref 12.5–16.5)
HIV-1 AND HIV-2 ANTIBODIES: NORMAL
LACTIC ACID: 1 MMOL/L (ref 0.5–2.2)
MCH RBC QN AUTO: 32 PG (ref 26–35)
MCHC RBC AUTO-ENTMCNC: 34.8 % (ref 32–34.5)
MCV RBC AUTO: 92 FL (ref 80–99.9)
PDW BLD-RTO: 13.5 FL (ref 11.5–15)
PLATELET # BLD: 234 E9/L (ref 130–450)
PMV BLD AUTO: 10.2 FL (ref 7–12)
POTASSIUM REFLEX MAGNESIUM: 4.1 MMOL/L (ref 3.5–5)
RBC # BLD: 4.15 E12/L (ref 3.8–5.8)
SODIUM BLD-SCNC: 131 MMOL/L (ref 132–146)
WBC # BLD: 19.2 E9/L (ref 4.5–11.5)

## 2018-03-07 PROCEDURE — 87450 CHARGE CONVERSION: CPT

## 2018-03-07 PROCEDURE — 85027 COMPLETE CBC AUTOMATED: CPT

## 2018-03-07 PROCEDURE — 87186 SC STD MICRODIL/AGAR DIL: CPT

## 2018-03-07 PROCEDURE — 9990 CHARGE CONVERSION

## 2018-03-07 PROCEDURE — 87070 CULTURE OTHR SPECIMN AEROBIC: CPT

## 2018-03-07 PROCEDURE — 87205 SMEAR GRAM STAIN: CPT

## 2018-03-07 PROCEDURE — 94640 AIRWAY INHALATION TREATMENT: CPT

## 2018-03-07 PROCEDURE — 36415 COLL VENOUS BLD VENIPUNCTURE: CPT

## 2018-03-07 PROCEDURE — 80048 BASIC METABOLIC PNL TOTAL CA: CPT

## 2018-03-07 PROCEDURE — 83605 ASSAY OF LACTIC ACID: CPT

## 2018-03-07 PROCEDURE — 99233 SBSQ HOSP IP/OBS HIGH 50: CPT | Performed by: INTERNAL MEDICINE

## 2018-03-07 RX ORDER — PREDNISONE 20 MG/1
40 TABLET ORAL DAILY
Status: DISCONTINUED | OUTPATIENT
Start: 2018-03-07 | End: 2018-03-10

## 2018-03-07 RX ADMIN — IPRATROPIUM BROMIDE AND ALBUTEROL SULFATE 1 AMPULE: 2.5; .5 SOLUTION RESPIRATORY (INHALATION) at 16:57

## 2018-03-07 RX ADMIN — ACETAMINOPHEN 650 MG: 325 TABLET, FILM COATED ORAL at 04:28

## 2018-03-07 RX ADMIN — Medication 10 ML: at 19:47

## 2018-03-07 RX ADMIN — LINEZOLID 600 MG: 600 TABLET, FILM COATED ORAL at 20:39

## 2018-03-07 RX ADMIN — HYDROCHLOROTHIAZIDE 25 MG: 12.5 TABLET ORAL at 08:32

## 2018-03-07 RX ADMIN — ACETAMINOPHEN 650 MG: 325 TABLET, FILM COATED ORAL at 16:00

## 2018-03-07 RX ADMIN — FAMOTIDINE 40 MG: 20 TABLET ORAL at 18:16

## 2018-03-07 RX ADMIN — KETOROLAC TROMETHAMINE 15 MG: 30 INJECTION, SOLUTION INTRAMUSCULAR at 19:48

## 2018-03-07 RX ADMIN — KETOROLAC TROMETHAMINE 15 MG: 30 INJECTION, SOLUTION INTRAMUSCULAR at 13:27

## 2018-03-07 RX ADMIN — KETOROLAC TROMETHAMINE 15 MG: 30 INJECTION, SOLUTION INTRAMUSCULAR at 06:28

## 2018-03-07 RX ADMIN — LISINOPRIL 40 MG: 10 TABLET ORAL at 08:34

## 2018-03-07 RX ADMIN — LINEZOLID 600 MG: 600 TABLET, FILM COATED ORAL at 08:32

## 2018-03-07 RX ADMIN — FINASTERIDE 5 MG: 5 TABLET, FILM COATED ORAL at 08:32

## 2018-03-07 RX ADMIN — IPRATROPIUM BROMIDE AND ALBUTEROL SULFATE 1 AMPULE: 2.5; .5 SOLUTION RESPIRATORY (INHALATION) at 12:24

## 2018-03-07 RX ADMIN — ACETAMINOPHEN 650 MG: 325 TABLET, FILM COATED ORAL at 20:39

## 2018-03-07 RX ADMIN — ENOXAPARIN SODIUM 40 MG: 40 INJECTION SUBCUTANEOUS at 11:06

## 2018-03-07 RX ADMIN — ACETAMINOPHEN 650 MG: 325 TABLET, FILM COATED ORAL at 10:18

## 2018-03-07 RX ADMIN — GABAPENTIN 300 MG: 300 CAPSULE ORAL at 14:13

## 2018-03-07 RX ADMIN — GABAPENTIN 300 MG: 300 CAPSULE ORAL at 20:39

## 2018-03-07 RX ADMIN — KETOROLAC TROMETHAMINE 15 MG: 30 INJECTION, SOLUTION INTRAMUSCULAR at 00:03

## 2018-03-07 RX ADMIN — PREDNISONE 40 MG: 20 TABLET ORAL at 11:45

## 2018-03-07 RX ADMIN — GABAPENTIN 300 MG: 300 CAPSULE ORAL at 08:32

## 2018-03-07 RX ADMIN — IPRATROPIUM BROMIDE AND ALBUTEROL SULFATE 1 AMPULE: 2.5; .5 SOLUTION RESPIRATORY (INHALATION) at 22:12

## 2018-03-07 RX ADMIN — IPRATROPIUM BROMIDE AND ALBUTEROL SULFATE 1 AMPULE: 2.5; .5 SOLUTION RESPIRATORY (INHALATION) at 08:43

## 2018-03-07 RX ADMIN — Medication 10 ML: at 08:33

## 2018-03-07 RX ADMIN — AMLODIPINE BESYLATE 10 MG: 10 TABLET ORAL at 08:32

## 2018-03-07 ASSESSMENT — PAIN DESCRIPTION - ONSET
ONSET: ON-GOING
ONSET: ON-GOING
ONSET: AWAKENED FROM SLEEP
ONSET: ON-GOING

## 2018-03-07 ASSESSMENT — PAIN DESCRIPTION - PAIN TYPE
TYPE: ACUTE PAIN

## 2018-03-07 ASSESSMENT — PAIN DESCRIPTION - ORIENTATION
ORIENTATION: LEFT
ORIENTATION: LEFT
ORIENTATION: RIGHT
ORIENTATION: LEFT
ORIENTATION: RIGHT;LEFT

## 2018-03-07 ASSESSMENT — PAIN DESCRIPTION - FREQUENCY
FREQUENCY: CONTINUOUS
FREQUENCY: INTERMITTENT
FREQUENCY: CONTINUOUS

## 2018-03-07 ASSESSMENT — PAIN DESCRIPTION - DESCRIPTORS
DESCRIPTORS: ACHING;DISCOMFORT;SHARP
DESCRIPTORS: SHARP
DESCRIPTORS: HEADACHE
DESCRIPTORS: SHARP
DESCRIPTORS: SHARP

## 2018-03-07 ASSESSMENT — PAIN SCALES - GENERAL
PAINLEVEL_OUTOF10: 3
PAINLEVEL_OUTOF10: 10
PAINLEVEL_OUTOF10: 0
PAINLEVEL_OUTOF10: 8
PAINLEVEL_OUTOF10: 8
PAINLEVEL_OUTOF10: 6
PAINLEVEL_OUTOF10: 6
PAINLEVEL_OUTOF10: 3
PAINLEVEL_OUTOF10: 7
PAINLEVEL_OUTOF10: 7
PAINLEVEL_OUTOF10: 6
PAINLEVEL_OUTOF10: 3

## 2018-03-07 ASSESSMENT — PAIN DESCRIPTION - LOCATION
LOCATION: CHEST;RIB CAGE
LOCATION: HEAD
LOCATION: RIB CAGE
LOCATION: CHEST;RIB CAGE
LOCATION: RIB CAGE
LOCATION: CHEST;RIB CAGE
LOCATION: RIB CAGE

## 2018-03-07 ASSESSMENT — PAIN DESCRIPTION - PROGRESSION
CLINICAL_PROGRESSION: GRADUALLY IMPROVING

## 2018-03-07 NOTE — PROGRESS NOTES
in the left side  Cardiovascular: normal rate, regular rhythm, normal S1 and S2, no murmurs, rubs, clicks or gallops, distal pulses intact, no carotid bruits, no murmurs, no gallops, no carotid bruits and no JVD   Abdomen: obese, soft, non-tender, non-distended, normal bowel sounds, no masses or organomegaly   Extremities: no edema no cyanosis  Musculoskeletal: normal range of motion, no joint swelling, deformity or tenderness   Neurologic: reflexes normal and symmetric, no cranial nerve deficit noted    LABS/IMAGING:    CBC:  Lab Results   Component Value Date    WBC 19.2 (H) 03/07/2018    HGB 13.3 03/07/2018    HCT 38.2 03/07/2018    MCV 92.0 03/07/2018     03/07/2018    LYMPHOPCT 10.4 (L) 03/06/2018    RBC 4.15 03/07/2018    MCH 32.0 03/07/2018    MCHC 34.8 (H) 03/07/2018    RDW 13.5 03/07/2018    NEUTOPHILPCT 81.7 (H) 03/06/2018    MONOPCT 7.0 03/06/2018    BASOPCT 0.2 03/06/2018    NEUTROABS 17.88 (H) 03/06/2018    LYMPHSABS 2.18 03/06/2018    MONOSABS 1.53 (H) 03/06/2018    EOSABS 0.00 (L) 03/06/2018    BASOSABS 0.00 03/06/2018       Recent Labs      03/07/18   0459  03/06/18   0350  03/04/18   1115   WBC  19.2*  21.8*  20.2*   HGB  13.3  13.6  12.4*   HCT  38.2  39.3  34.5*   MCV  92.0  92.9  91.0   PLT  234  214  176       BMP:   Recent Labs      03/04/18   1115  03/06/18   0350  03/07/18   0459   NA  139  138  131*   K  3.7  3.6  4.1   CL  104  102  94*   CO2  17*  22  24   BUN  18  13  14   CREATININE  0.9  0.8  0.9       MG:   Lab Results   Component Value Date    MG 1.9 03/03/2018     Ca/Phos:   Lab Results   Component Value Date    CALCIUM 8.4 (L) 03/07/2018    PHOS 4.6 (H) 02/07/2016     Amylase: No results found for: AMYLASE  Lipase:   Lab Results   Component Value Date    LIPASE 15 03/06/2018     LIVER PROFILE:   Recent Labs      03/06/18   0350   AST  34   ALT  31   LIPASE  15   BILITOT  0.3   ALKPHOS  102       PT/INR: No results for input(s): PROTIME, INR in the last 72 hours.   APTT: No results for input(s): APTT in the last 72 hours. Cardiac Enzymes:  Lab Results   Component Value Date    CKTOTAL 94 03/06/2018    CKMB 2.4 03/06/2018    TROPONINI <0.01 03/06/2018       Hgb A1C:   Lab Results   Component Value Date    LABA1C 5.0 01/02/2018     No results found for: EAG  WANDA: No results found for: WANDA  ESR:   Lab Results   Component Value Date    SEDRATE 120 (H) 02/12/2016     CRP:   Lab Results   Component Value Date    CRP 14.5 (H) 02/12/2016     D Dimer:   Lab Results   Component Value Date    DDIMER 234 03/03/2018       Thyroid Studies:  Lab Results   Component Value Date    TSH 2.430 01/02/2018           MICROBIOLOGY:      EKG: reviewed     CXR: reviewed        CT Chest: 2.   Worsening infiltrate bilateral mostly in the left lower lobe and right middle lobe    PROBLEM LIST:  Patient Active Problem List   Diagnosis    CTS (carpal tunnel syndrome)    Cervical arthritis ([address]    Essential hypertension    Hyperlipidemia    INOCENCIA on CPAP    Community acquired bacterial pneumonia    CAP (community acquired pneumonia) due to Chlamydia species               ASSESSMENT:  1.)  Community acquired pneumonia  2.)  COPD , unknown which was class  3.)  Obstructive sleep apnea  4.) recent history of  Flu  5.) nicotine dependence      PLAN:  *- patient was seen by ID he was started on Zyvox and Zosyn  *- pending HIV test , if positive we will check CD4  *- if clinical condition does not improve in the next 24 hours I will proceed with bronchoscopy /BAL  *- Legionella, strep pneumo, sputum culture   Viral panel has been negative  *_ wbc better    Start bronchodilator DuoNeb 1 unit q.4 hours   we need PFT when more stable to assist with COPD   Respiratory panel came back negative    Need follow up CT as he has mediastinal adenopathy and make sure they resolved after treating pneumonia   Start  small dose of steroids in 24 hours if his breath or chest pain continue             Martín Abreu

## 2018-03-08 LAB
ANION GAP SERPL CALCULATED.3IONS-SCNC: 13 MMOL/L (ref 7–16)
BUN BLDV-MCNC: 18 MG/DL (ref 6–20)
CALCIUM SERPL-MCNC: 8.9 MG/DL (ref 8.6–10.2)
CHLORIDE BLD-SCNC: 100 MMOL/L (ref 98–107)
CO2: 23 MMOL/L (ref 22–29)
CREAT SERPL-MCNC: 0.9 MG/DL (ref 0.7–1.2)
GFR AFRICAN AMERICAN: >60
GFR NON-AFRICAN AMERICAN: >60 ML/MIN/1.73
GLUCOSE BLD-MCNC: 97 MG/DL (ref 74–109)
HCT VFR BLD CALC: 36.7 % (ref 37–54)
HEMOGLOBIN: 13 G/DL (ref 12.5–16.5)
L. PNEUMOPHILA SEROGP 1 UR AG: NORMAL
MCH RBC QN AUTO: 32.9 PG (ref 26–35)
MCHC RBC AUTO-ENTMCNC: 35.4 % (ref 32–34.5)
MCV RBC AUTO: 92.9 FL (ref 80–99.9)
PDW BLD-RTO: 13.2 FL (ref 11.5–15)
PLATELET # BLD: 243 E9/L (ref 130–450)
PMV BLD AUTO: 10.2 FL (ref 7–12)
POTASSIUM SERPL-SCNC: 4 MMOL/L (ref 3.5–5)
RBC # BLD: 3.95 E12/L (ref 3.8–5.8)
SODIUM BLD-SCNC: 136 MMOL/L (ref 132–146)
STREP PNEUMONIAE ANTIGEN, URINE: NORMAL
WBC # BLD: 19.5 E9/L (ref 4.5–11.5)

## 2018-03-08 PROCEDURE — 36415 COLL VENOUS BLD VENIPUNCTURE: CPT

## 2018-03-08 PROCEDURE — 94640 AIRWAY INHALATION TREATMENT: CPT

## 2018-03-08 PROCEDURE — 85027 COMPLETE CBC AUTOMATED: CPT

## 2018-03-08 PROCEDURE — 99233 SBSQ HOSP IP/OBS HIGH 50: CPT | Performed by: INTERNAL MEDICINE

## 2018-03-08 PROCEDURE — 9990 CHARGE CONVERSION

## 2018-03-08 PROCEDURE — A9500 TC99M SESTAMIBI: HCPCS

## 2018-03-08 PROCEDURE — 80048 BASIC METABOLIC PNL TOTAL CA: CPT

## 2018-03-08 PROCEDURE — 78452 HT MUSCLE IMAGE SPECT MULT: CPT

## 2018-03-08 RX ADMIN — IPRATROPIUM BROMIDE AND ALBUTEROL SULFATE 1 AMPULE: 2.5; .5 SOLUTION RESPIRATORY (INHALATION) at 19:47

## 2018-03-08 RX ADMIN — LINEZOLID 600 MG: 600 TABLET, FILM COATED ORAL at 11:27

## 2018-03-08 RX ADMIN — FINASTERIDE 5 MG: 5 TABLET, FILM COATED ORAL at 11:28

## 2018-03-08 RX ADMIN — LINEZOLID 600 MG: 600 TABLET, FILM COATED ORAL at 20:46

## 2018-03-08 RX ADMIN — GABAPENTIN 300 MG: 300 CAPSULE ORAL at 14:21

## 2018-03-08 RX ADMIN — KETOROLAC TROMETHAMINE 15 MG: 30 INJECTION, SOLUTION INTRAMUSCULAR at 06:23

## 2018-03-08 RX ADMIN — IPRATROPIUM BROMIDE AND ALBUTEROL SULFATE 1 AMPULE: 2.5; .5 SOLUTION RESPIRATORY (INHALATION) at 13:51

## 2018-03-08 RX ADMIN — GABAPENTIN 300 MG: 300 CAPSULE ORAL at 20:46

## 2018-03-08 RX ADMIN — KETOROLAC TROMETHAMINE 15 MG: 30 INJECTION, SOLUTION INTRAMUSCULAR at 14:19

## 2018-03-08 RX ADMIN — LISINOPRIL 40 MG: 10 TABLET ORAL at 11:27

## 2018-03-08 RX ADMIN — ACETAMINOPHEN 650 MG: 325 TABLET, FILM COATED ORAL at 04:37

## 2018-03-08 RX ADMIN — HYDROCHLOROTHIAZIDE 25 MG: 12.5 TABLET ORAL at 11:28

## 2018-03-08 RX ADMIN — TRAMADOL HYDROCHLORIDE 50 MG: 50 TABLET ORAL at 20:46

## 2018-03-08 RX ADMIN — AMLODIPINE BESYLATE 10 MG: 10 TABLET ORAL at 11:28

## 2018-03-08 RX ADMIN — Medication 11.8 MILLICURIE: at 08:08

## 2018-03-08 RX ADMIN — TRAMADOL HYDROCHLORIDE 50 MG: 50 TABLET ORAL at 12:59

## 2018-03-08 RX ADMIN — PREDNISONE 40 MG: 20 TABLET ORAL at 11:27

## 2018-03-08 RX ADMIN — Medication 10 ML: at 20:46

## 2018-03-08 RX ADMIN — FAMOTIDINE 40 MG: 20 TABLET ORAL at 18:45

## 2018-03-08 ASSESSMENT — PAIN DESCRIPTION - FREQUENCY
FREQUENCY: CONTINUOUS
FREQUENCY: CONTINUOUS

## 2018-03-08 ASSESSMENT — PAIN SCALES - GENERAL
PAINLEVEL_OUTOF10: 9
PAINLEVEL_OUTOF10: 3
PAINLEVEL_OUTOF10: 4
PAINLEVEL_OUTOF10: 3
PAINLEVEL_OUTOF10: 6
PAINLEVEL_OUTOF10: 7
PAINLEVEL_OUTOF10: 5
PAINLEVEL_OUTOF10: 10

## 2018-03-08 ASSESSMENT — PAIN DESCRIPTION - ONSET
ONSET: ON-GOING
ONSET: ON-GOING

## 2018-03-08 ASSESSMENT — PAIN DESCRIPTION - PROGRESSION
CLINICAL_PROGRESSION: GRADUALLY IMPROVING

## 2018-03-08 ASSESSMENT — PAIN DESCRIPTION - PAIN TYPE
TYPE: ACUTE PAIN

## 2018-03-08 ASSESSMENT — PAIN DESCRIPTION - DESCRIPTORS
DESCRIPTORS: ACHING;DISCOMFORT;NAGGING
DESCRIPTORS: ACHING;DISCOMFORT;NAGGING

## 2018-03-08 ASSESSMENT — PAIN DESCRIPTION - ORIENTATION
ORIENTATION: LEFT
ORIENTATION: LEFT

## 2018-03-08 ASSESSMENT — PAIN DESCRIPTION - LOCATION
LOCATION: CHEST;RIB CAGE
LOCATION: RIB CAGE
LOCATION: RIB CAGE
LOCATION: CHEST;RIB CAGE

## 2018-03-08 NOTE — PROGRESS NOTES
oral thrush   Neck:   Supple, no lymphadenopathy   Back:     no CVA tenderness   Lungs:     Bilateral wheeze and crackles    Heart:    Regular rate and rhythm, no murmur, rub or gallop   Abdomen:     Soft, non tender, bowel sounds present    Extremities:   No edema, no cyanosis ,no open wound,no erythema, no     tenderness   Pulses:   Dorsalis pedis palpable    Skin:   no rashes or lesions          CBC with Differential:      Lab Results   Component Value Date    WBC 19.2 03/07/2018    RBC 4.15 03/07/2018    HGB 13.3 03/07/2018    HCT 38.2 03/07/2018     03/07/2018    MCV 92.0 03/07/2018    MCH 32.0 03/07/2018    MCHC 34.8 03/07/2018    RDW 13.5 03/07/2018    METASPCT 2 02/10/2016    LYMPHOPCT 10.4 03/06/2018    MONOPCT 7.0 03/06/2018    MYELOPCT 1 02/10/2016    BASOPCT 0.2 03/06/2018    MONOSABS 1.53 03/06/2018    LYMPHSABS 2.18 03/06/2018    EOSABS 0.00 03/06/2018    BASOSABS 0.00 03/06/2018       CMP:    Lab Results   Component Value Date     03/07/2018    K 4.1 03/07/2018    CL 94 03/07/2018    CO2 24 03/07/2018    BUN 14 03/07/2018    CREATININE 0.9 03/07/2018    GFRAA >60 03/07/2018    LABGLOM >60 03/07/2018    GLUCOSE 96 03/07/2018    PROT 8.0 03/06/2018    LABALBU 3.6 03/06/2018    CALCIUM 8.4 03/07/2018    BILITOT 0.3 03/06/2018    ALKPHOS 102 03/06/2018    AST 34 03/06/2018    ALT 31 03/06/2018       Hepatic Function Panel:    Lab Results   Component Value Date    ALKPHOS 102 03/06/2018    ALT 31 03/06/2018    AST 34 03/06/2018    PROT 8.0 03/06/2018    BILITOT 0.3 03/06/2018    LABALBU 3.6 03/06/2018     Blood cx - neg    Specimen Source: Sputum Expectorated     CULTURE, RESPIRATORY Oral Pharyngeal Veena absent (A)    Smear, Respiratory --    Group 5: >25 PMN's/LPF and <10 Epithelial cells/LPF   Abundant Polymorphonuclear leukocytes   Rare Epithelial cells   Rare yeast     Organism Staphylococcus aureus (A)    CULTURE, RESPIRATORY --    Rare growth   Sensitivity to follow    Narrative:

## 2018-03-08 NOTE — PROGRESS NOTES
Hospitalist Progress Note      PCP: Chirag Boyce DO    Date of Admission: 3/6/2018    Chief Complaint: chest pain     Hospital Course: Admitted for CP following dc on 3/5 from [place] for pneumonia after influenza B. CP seems musculoskeletal in nature, pulm consulted for small effusion. ID consulted for antibiotic management. Ordered stress test for today to rule out cardiac etiology. Subjective: Continues to complain of chest wall pain. Medications:  Reviewed    Infusion Medications   Scheduled Medications    mometasone-formoterol  2 puff Inhalation BID    predniSONE  40 mg Oral Daily    ipratropium-albuterol  1 ampule Inhalation Q4H WA    lisinopril  40 mg Oral Daily    amLODIPine  10 mg Oral Daily    finasteride  5 mg Oral Daily    gabapentin  300 mg Oral TID    hydrochlorothiazide  25 mg Oral Daily    nicotine  1 patch Transdermal Daily    famotidine  40 mg Oral QPM    sodium chloride flush  10 mL Intravenous 2 times per day    enoxaparin  40 mg Subcutaneous Daily    linezolid  600 mg Oral 2 times per day     PRN Meds: technetium sestamibi, regadenoson, sodium chloride flush, acetaminophen, magnesium hydroxide, ondansetron, traMADol, labetalol, ketorolac      Intake/Output Summary (Last 24 hours) at 03/08/18 1540  Last data filed at 03/08/18 0417   Gross per 24 hour   Intake              480 ml   Output             1850 ml   Net            -1370 ml       Exam:    BP (!) 162/110   Pulse 100   Temp 99.9 °F (37.7 °C) (Temporal)   Resp 22   Ht 5' 7\" (1.702 m)   Wt 204 lb 8 oz (92.8 kg)   SpO2 98%   BMI 32.03 kg/m²     General appearance:  Appears stated age and cooperative. tachypneic   HEENT:  Normal cephalic, atraumatic without obvious deformity. Pupils equal, round, and reactive to light. Extra ocular muscles intact. Conjunctivae/corneas clear. Neck: Supple, with full range of motion. No jugular venous distention. Trachea midline.   Respiratory:  Normal respiratory Status: Full Code    PT/OT Eval Status: na    Dispo - inpatient     Ashish Nieves MD

## 2018-03-09 LAB
ANION GAP SERPL CALCULATED.3IONS-SCNC: 14 MMOL/L (ref 7–16)
BUN BLDV-MCNC: 22 MG/DL (ref 6–20)
CALCIUM SERPL-MCNC: 9.5 MG/DL (ref 8.6–10.2)
CHLORIDE BLD-SCNC: 96 MMOL/L (ref 98–107)
CO2: 25 MMOL/L (ref 22–29)
CREAT SERPL-MCNC: 0.9 MG/DL (ref 0.7–1.2)
CULTURE, RESPIRATORY: ABNORMAL
GFR AFRICAN AMERICAN: >60
GFR NON-AFRICAN AMERICAN: >60 ML/MIN/1.73
GLUCOSE BLD-MCNC: 109 MG/DL (ref 74–109)
HCT VFR BLD CALC: 37.8 % (ref 37–54)
HEMOGLOBIN: 12.8 G/DL (ref 12.5–16.5)
LV EF: 55 %
LVEF MODALITY: NORMAL
MCH RBC QN AUTO: 31.9 PG (ref 26–35)
MCHC RBC AUTO-ENTMCNC: 33.9 % (ref 32–34.5)
MCV RBC AUTO: 94.3 FL (ref 80–99.9)
ORGANISM: ABNORMAL
ORGANISM: ABNORMAL
PDW BLD-RTO: 13.3 FL (ref 11.5–15)
PLATELET # BLD: 222 E9/L (ref 130–450)
PMV BLD AUTO: 10.4 FL (ref 7–12)
POTASSIUM SERPL-SCNC: 4.2 MMOL/L (ref 3.5–5)
RBC # BLD: 4.01 E12/L (ref 3.8–5.8)
SMEAR, RESPIRATORY: ABNORMAL
SODIUM BLD-SCNC: 135 MMOL/L (ref 132–146)
WBC # BLD: 17.7 E9/L (ref 4.5–11.5)

## 2018-03-09 PROCEDURE — 71045 X-RAY EXAM CHEST 1 VIEW: CPT

## 2018-03-09 PROCEDURE — 93017 CV STRESS TEST TRACING ONLY: CPT

## 2018-03-09 PROCEDURE — 85027 COMPLETE CBC AUTOMATED: CPT

## 2018-03-09 PROCEDURE — 32555 ASPIRATE PLEURA W/ IMAGING: CPT

## 2018-03-09 PROCEDURE — 9990 CHARGE CONVERSION

## 2018-03-09 PROCEDURE — 94640 AIRWAY INHALATION TREATMENT: CPT

## 2018-03-09 PROCEDURE — 32555 ASPIRATE PLEURA W/ IMAGING: CPT | Performed by: INTERNAL MEDICINE

## 2018-03-09 PROCEDURE — 36415 COLL VENOUS BLD VENIPUNCTURE: CPT

## 2018-03-09 PROCEDURE — 80048 BASIC METABOLIC PNL TOTAL CA: CPT

## 2018-03-09 RX ADMIN — ACETAMINOPHEN 650 MG: 325 TABLET, FILM COATED ORAL at 18:17

## 2018-03-09 RX ADMIN — Medication 10 ML: at 13:21

## 2018-03-09 RX ADMIN — Medication 35 MILLICURIE: at 10:09

## 2018-03-09 RX ADMIN — FAMOTIDINE 40 MG: 20 TABLET ORAL at 18:17

## 2018-03-09 RX ADMIN — GABAPENTIN 300 MG: 300 CAPSULE ORAL at 13:17

## 2018-03-09 RX ADMIN — PREDNISONE 40 MG: 20 TABLET ORAL at 13:21

## 2018-03-09 RX ADMIN — IPRATROPIUM BROMIDE AND ALBUTEROL SULFATE 1 AMPULE: 2.5; .5 SOLUTION RESPIRATORY (INHALATION) at 12:55

## 2018-03-09 RX ADMIN — KETOROLAC TROMETHAMINE 15 MG: 30 INJECTION, SOLUTION INTRAMUSCULAR at 01:03

## 2018-03-09 RX ADMIN — IPRATROPIUM BROMIDE AND ALBUTEROL SULFATE 1 AMPULE: 2.5; .5 SOLUTION RESPIRATORY (INHALATION) at 20:57

## 2018-03-09 RX ADMIN — LINEZOLID 600 MG: 600 TABLET, FILM COATED ORAL at 13:17

## 2018-03-09 RX ADMIN — IPRATROPIUM BROMIDE AND ALBUTEROL SULFATE 1 AMPULE: 2.5; .5 SOLUTION RESPIRATORY (INHALATION) at 16:33

## 2018-03-09 RX ADMIN — HYDROCHLOROTHIAZIDE 25 MG: 12.5 TABLET ORAL at 13:17

## 2018-03-09 RX ADMIN — Medication 10 ML: at 20:37

## 2018-03-09 RX ADMIN — GABAPENTIN 300 MG: 300 CAPSULE ORAL at 20:37

## 2018-03-09 RX ADMIN — FINASTERIDE 5 MG: 5 TABLET, FILM COATED ORAL at 13:17

## 2018-03-09 RX ADMIN — LISINOPRIL 40 MG: 10 TABLET ORAL at 13:16

## 2018-03-09 RX ADMIN — TRAMADOL HYDROCHLORIDE 50 MG: 50 TABLET ORAL at 20:47

## 2018-03-09 RX ADMIN — ACETAMINOPHEN 650 MG: 325 TABLET, FILM COATED ORAL at 08:00

## 2018-03-09 RX ADMIN — LINEZOLID 600 MG: 600 TABLET, FILM COATED ORAL at 20:39

## 2018-03-09 RX ADMIN — AMLODIPINE BESYLATE 10 MG: 10 TABLET ORAL at 13:17

## 2018-03-09 ASSESSMENT — PAIN DESCRIPTION - ONSET: ONSET: ON-GOING

## 2018-03-09 ASSESSMENT — PAIN DESCRIPTION - PAIN TYPE
TYPE: ACUTE PAIN

## 2018-03-09 ASSESSMENT — PAIN DESCRIPTION - LOCATION
LOCATION: HEAD
LOCATION: HEAD
LOCATION: RIB CAGE
LOCATION: HEAD
LOCATION: HEAD

## 2018-03-09 ASSESSMENT — PAIN SCALES - GENERAL
PAINLEVEL_OUTOF10: 0
PAINLEVEL_OUTOF10: 6
PAINLEVEL_OUTOF10: 0
PAINLEVEL_OUTOF10: 10
PAINLEVEL_OUTOF10: 10
PAINLEVEL_OUTOF10: 6
PAINLEVEL_OUTOF10: 6
PAINLEVEL_OUTOF10: 10
PAINLEVEL_OUTOF10: 6

## 2018-03-09 ASSESSMENT — PAIN DESCRIPTION - FREQUENCY
FREQUENCY: CONTINUOUS
FREQUENCY: INTERMITTENT

## 2018-03-09 ASSESSMENT — PAIN DESCRIPTION - ORIENTATION: ORIENTATION: LEFT

## 2018-03-09 ASSESSMENT — PAIN DESCRIPTION - DESCRIPTORS
DESCRIPTORS: DISCOMFORT
DESCRIPTORS: SHARP;ACHING;DISCOMFORT
DESCRIPTORS: ACHING;SHARP

## 2018-03-09 ASSESSMENT — PAIN DESCRIPTION - PROGRESSION
CLINICAL_PROGRESSION: GRADUALLY IMPROVING
CLINICAL_PROGRESSION: GRADUALLY WORSENING
CLINICAL_PROGRESSION: GRADUALLY IMPROVING
CLINICAL_PROGRESSION: GRADUALLY IMPROVING

## 2018-03-09 NOTE — PROGRESS NOTES
lymphadenopathy   Back:     no CVA tenderness   Lungs:     Bilateral wheeze- less    Heart:    Regular rate and rhythm, no murmur, rub or gallop   Abdomen:     Soft, non tender, bowel sounds present    Extremities:   No edema, no cyanosis ,no open wound,no erythema, no     tenderness   Pulses:   Dorsalis pedis palpable    Skin:   no rashes or lesions          CBC with Differential:      Lab Results   Component Value Date    WBC 19.5 03/08/2018    RBC 3.95 03/08/2018    HGB 13.0 03/08/2018    HCT 36.7 03/08/2018     03/08/2018    MCV 92.9 03/08/2018    MCH 32.9 03/08/2018    MCHC 35.4 03/08/2018    RDW 13.2 03/08/2018    METASPCT 2 02/10/2016    LYMPHOPCT 10.4 03/06/2018    MONOPCT 7.0 03/06/2018    MYELOPCT 1 02/10/2016    BASOPCT 0.2 03/06/2018    MONOSABS 1.53 03/06/2018    LYMPHSABS 2.18 03/06/2018    EOSABS 0.00 03/06/2018    BASOSABS 0.00 03/06/2018       CMP:    Lab Results   Component Value Date     03/08/2018    K 4.0 03/08/2018    K 4.1 03/07/2018     03/08/2018    CO2 23 03/08/2018    BUN 18 03/08/2018    CREATININE 0.9 03/08/2018    GFRAA >60 03/08/2018    LABGLOM >60 03/08/2018    GLUCOSE 97 03/08/2018    PROT 8.0 03/06/2018    LABALBU 3.6 03/06/2018    CALCIUM 8.9 03/08/2018    BILITOT 0.3 03/06/2018    ALKPHOS 102 03/06/2018    AST 34 03/06/2018    ALT 31 03/06/2018       Hepatic Function Panel:    Lab Results   Component Value Date    ALKPHOS 102 03/06/2018    ALT 31 03/06/2018    AST 34 03/06/2018    PROT 8.0 03/06/2018    BILITOT 0.3 03/06/2018    LABALBU 3.6 03/06/2018     Blood cx - neg    Specimen Source: Sputum Expectorated     CULTURE, RESPIRATORY Oral Pharyngeal Veena absent (A)    Smear, Respiratory --    Group 5: >25 PMN's/LPF and <10 Epithelial cells/LPF   Abundant Polymorphonuclear leukocytes   Rare Epithelial cells   Rare yeast     Organism Staphylococcus aureus (A)    CULTURE, RESPIRATORY --    Rare growth   Sensitivity to follow    Narrative:             2/25/2018  5:16 PM - [facility], [facility] Incoming Lab Results      Component Results      Component Value Ref Range & Units Status Collected Lab   Influenza A by PCR Not Detected  Not Detected Final 02/25/2018  4:42 PM [address] [place] [address] Lab   Influenza B by PCR DETECTED   Not Detected Final 02/25/2018  4:42 PM [address] [facility] [place]   Testing Performed By          HIV test negative      Radiology :     Chest X ray     CTA scan of chest -     1.  There is no evidence for pulmonary embolic disease.    2.  Worsening bilateral infiltrates versus atelectasis    3.  New small left pleural effusion     CT abdomen and pelvis -       No acute findings.        IMPRESSION:      1. Post influenza pneumonia ( Staph aureus )   2 . Leukocytosis   3. COPDE      RECOMMENDATIONS:       1. Zyvox 600 mg po q 12 hrs   2. Breathing treatment, nicotine patch   3.  CBC with diff            7:43 AM      3/9/2018

## 2018-03-09 NOTE — PROGRESS NOTES
Lexiscan Stress Test:    Lexiscan stress completed. No chest pain, no ischemia or arrhythmia on ECG. Nuclear SPECT images pending.        Electronically signed by Tatiana Sarmiento MD on 3/9/2018 at 11:22 AM

## 2018-03-09 NOTE — PLAN OF CARE
Problem: Pain:  Goal: Pain level will decrease  Pain level will decrease   Outcome: Ongoing    Goal: Control of acute pain  Control of acute pain   Outcome: Ongoing      Problem: Falls - Risk of  Goal: Absence of falls  Outcome: Met This Shift      Problem: Ineffective Breathing Pattern  Goal: Ability to maintain adequate oxygenation will improve  Ability to maintain adequate oxygenation will improve     Outcome: Met This Shift    Goal: Able to breathe comfortably  Able to breathe comfortably     Outcome: Met This Shift      Problem:  Activity:  Goal: Ability to tolerate increased activity will improve  Ability to tolerate increased activity will improve  Outcome: Met This Shift

## 2018-03-09 NOTE — PROGRESS NOTES
regular rhythm, normal S1 and S2, no murmurs, rubs, clicks or gallops, distal pulses intact, no carotid bruits, no murmurs, no gallops, no carotid bruits and no JVD   Abdomen: obese, soft, non-tender, non-distended, normal bowel sounds, no masses or organomegaly   Extremities: no edema no cyanosis  Musculoskeletal: normal range of motion, no joint swelling, deformity or tenderness   Neurologic: reflexes normal and symmetric, no cranial nerve deficit noted    LABS/IMAGING:    CBC:  Lab Results   Component Value Date    WBC 17.7 (H) 03/09/2018    HGB 12.8 03/09/2018    HCT 37.8 03/09/2018    MCV 94.3 03/09/2018     03/09/2018    LYMPHOPCT 10.4 (L) 03/06/2018    RBC 4.01 03/09/2018    MCH 31.9 03/09/2018    MCHC 33.9 03/09/2018    RDW 13.3 03/09/2018    NEUTOPHILPCT 81.7 (H) 03/06/2018    MONOPCT 7.0 03/06/2018    BASOPCT 0.2 03/06/2018    NEUTROABS 17.88 (H) 03/06/2018    LYMPHSABS 2.18 03/06/2018    MONOSABS 1.53 (H) 03/06/2018    EOSABS 0.00 (L) 03/06/2018    BASOSABS 0.00 03/06/2018       Recent Labs      03/09/18   0700  03/08/18   1050  03/07/18   0459   WBC  17.7*  19.5*  19.2*   HGB  12.8  13.0  13.3   HCT  37.8  36.7*  38.2   MCV  94.3  92.9  92.0   PLT  222  243  234       BMP:   Recent Labs      03/07/18   0459  03/08/18   1050  03/09/18   0700   NA  131*  136  135   K  4.1  4.0  4.2   CL  94*  100  96*   CO2  24  23  25   BUN  14  18  22*   CREATININE  0.9  0.9  0.9       MG:   Lab Results   Component Value Date    MG 1.9 03/03/2018     Ca/Phos:   Lab Results   Component Value Date    CALCIUM 9.5 03/09/2018    PHOS 4.6 (H) 02/07/2016     Amylase: No results found for: AMYLASE  Lipase:   Lab Results   Component Value Date    LIPASE 15 03/06/2018     LIVER PROFILE:   No results for input(s): AST, ALT, LIPASE, BILIDIR, BILITOT, ALKPHOS in the last 72 hours. Invalid input(s): AMYLASE,  ALB    PT/INR: No results for input(s): PROTIME, INR in the last 72 hours. APTT: No results for input(s):  APTT in

## 2018-03-09 NOTE — PROGRESS NOTES
hours. Cardiac Enzymes:  Lab Results   Component Value Date    CKTOTAL 94 03/06/2018    CKMB 2.4 03/06/2018    TROPONINI <0.01 03/06/2018       Hgb A1C:   Lab Results   Component Value Date    LABA1C 5.0 01/02/2018     No results found for: EAG  WANDA: No results found for: WANDA  ESR:   Lab Results   Component Value Date    SEDRATE 120 (H) 02/12/2016     CRP:   Lab Results   Component Value Date    CRP 14.5 (H) 02/12/2016     D Dimer:   Lab Results   Component Value Date    DDIMER 234 03/03/2018       Thyroid Studies:  Lab Results   Component Value Date    TSH 2.430 01/02/2018           MICROBIOLOGY:      EKG: reviewed     CXR: reviewed        CT Chest: 2.   Worsening infiltrate bilateral mostly in the left lower lobe and right middle lobe    PROBLEM LIST:  Patient Active Problem List   Diagnosis    CTS (carpal tunnel syndrome)    [facility]ervical arthritis ([facility] [address]    Essential hypertension    Hyperlipidemia    INOCENCIA on CPAP    Community acquired bacterial pneumonia    CAP (community acquired pneumonia) due to Chlamydia species               ASSESSMENT:  1.)  Staph Aureus pneumonia  2.)  COPD , unknown which was class  3.)  Obstructive sleep apnea  4.) recent history of  Flu  5.) nicotine dependence      PLAN:  *- patient was seen by ID he was started on Zyvox and     Zosyn  *-  HIV test , if positive we will check CD4  *-Since we have growth staph ,I think we will hold on Bronch ,but I will look with ultrasound to check for pleural fluid and make sure no empyema/parapneumonic effusion  *- Legionella, strep pneumo negative , sputum culture staph aureus   Viral panel has been negative   Start bronchodilator DuoNeb 1 unit q.4 hours    Need follow up CT as he has mediastinal adenopathy and make sure they resolved after treating pneumonia   Start  small dose of steroids in 24 hours if his breath or chest pain continue             Martín Perez  Pulmonary/Critical care [address] [facility]

## 2018-03-09 NOTE — CARE COORDINATION
MET WITH PATIENT IN ROOM, INTRODUCED SELF AND ROLE. PATIENT PLANS TO DISCHARGE HOME AND STATES NO NEEDS AT THIS TIME.

## 2018-03-09 NOTE — PROGRESS NOTES
After Midnight Exceptions are: Sips with Meds  Code Status: Full Code    PT/OT Eval Status: na    Dispo - inpatient     Deanne Woods MD

## 2018-03-10 LAB
ANION GAP SERPL CALCULATED.3IONS-SCNC: 21 MMOL/L (ref 7–16)
BUN BLDV-MCNC: 24 MG/DL (ref 6–20)
CALCIUM SERPL-MCNC: 9.2 MG/DL (ref 8.6–10.2)
CHLORIDE BLD-SCNC: 99 MMOL/L (ref 98–107)
CO2: 21 MMOL/L (ref 22–29)
CREAT SERPL-MCNC: 0.9 MG/DL (ref 0.7–1.2)
GFR AFRICAN AMERICAN: >60
GFR NON-AFRICAN AMERICAN: >60 ML/MIN/1.73
GLUCOSE BLD-MCNC: 108 MG/DL (ref 74–109)
HCT VFR BLD CALC: 36.9 % (ref 37–54)
HEMOGLOBIN: 12.3 G/DL (ref 12.5–16.5)
MCH RBC QN AUTO: 31.3 PG (ref 26–35)
MCHC RBC AUTO-ENTMCNC: 33.3 % (ref 32–34.5)
MCV RBC AUTO: 93.9 FL (ref 80–99.9)
PDW BLD-RTO: 13.1 FL (ref 11.5–15)
PLATELET # BLD: 274 E9/L (ref 130–450)
PMV BLD AUTO: 10.5 FL (ref 7–12)
POTASSIUM SERPL-SCNC: 3.9 MMOL/L (ref 3.5–5)
RBC # BLD: 3.93 E12/L (ref 3.8–5.8)
SODIUM BLD-SCNC: 141 MMOL/L (ref 132–146)
WBC # BLD: 15.1 E9/L (ref 4.5–11.5)

## 2018-03-10 PROCEDURE — 6370000000 HC RX 637 (ALT 250 FOR IP): Performed by: INTERNAL MEDICINE

## 2018-03-10 PROCEDURE — 2700000000 HC OXYGEN THERAPY PER DAY

## 2018-03-10 PROCEDURE — 99233 SBSQ HOSP IP/OBS HIGH 50: CPT | Performed by: INTERNAL MEDICINE

## 2018-03-10 PROCEDURE — 6370000000 HC RX 637 (ALT 250 FOR IP): Performed by: NURSE PRACTITIONER

## 2018-03-10 PROCEDURE — 2500000003 HC RX 250 WO HCPCS: Performed by: INTERNAL MEDICINE

## 2018-03-10 PROCEDURE — 94640 AIRWAY INHALATION TREATMENT: CPT

## 2018-03-10 PROCEDURE — 2060000000 HC ICU INTERMEDIATE R&B

## 2018-03-10 PROCEDURE — 6360000002 HC RX W HCPCS: Performed by: INTERNAL MEDICINE

## 2018-03-10 PROCEDURE — 2580000003 HC RX 258: Performed by: NURSE PRACTITIONER

## 2018-03-10 PROCEDURE — 6360000002 HC RX W HCPCS: Performed by: NURSE PRACTITIONER

## 2018-03-10 RX ORDER — PREDNISONE 20 MG/1
20 TABLET ORAL DAILY
Status: DISCONTINUED | OUTPATIENT
Start: 2018-03-11 | End: 2018-03-11

## 2018-03-10 RX ORDER — ACETAMINOPHEN 325 MG/1
TABLET ORAL
Status: DISPENSED
Start: 2018-03-10 | End: 2018-03-10

## 2018-03-10 RX ADMIN — FINASTERIDE 5 MG: 5 TABLET, FILM COATED ORAL at 08:43

## 2018-03-10 RX ADMIN — PREDNISONE 40 MG: 20 TABLET ORAL at 08:43

## 2018-03-10 RX ADMIN — KETOROLAC TROMETHAMINE 15 MG: 30 INJECTION, SOLUTION INTRAMUSCULAR at 23:39

## 2018-03-10 RX ADMIN — Medication 10 ML: at 20:28

## 2018-03-10 RX ADMIN — FAMOTIDINE 40 MG: 20 TABLET ORAL at 19:34

## 2018-03-10 RX ADMIN — ACETAMINOPHEN 650 MG: 325 TABLET, FILM COATED ORAL at 03:00

## 2018-03-10 RX ADMIN — LABETALOL HYDROCHLORIDE 10 MG: 5 INJECTION, SOLUTION INTRAVENOUS at 23:39

## 2018-03-10 RX ADMIN — LINEZOLID 600 MG: 600 TABLET, FILM COATED ORAL at 20:28

## 2018-03-10 RX ADMIN — ENOXAPARIN SODIUM 40 MG: 40 INJECTION SUBCUTANEOUS at 08:44

## 2018-03-10 RX ADMIN — AMLODIPINE BESYLATE 10 MG: 10 TABLET ORAL at 08:43

## 2018-03-10 RX ADMIN — GABAPENTIN 300 MG: 300 CAPSULE ORAL at 14:00

## 2018-03-10 RX ADMIN — IPRATROPIUM BROMIDE AND ALBUTEROL SULFATE 1 AMPULE: 2.5; .5 SOLUTION RESPIRATORY (INHALATION) at 13:14

## 2018-03-10 RX ADMIN — GABAPENTIN 300 MG: 300 CAPSULE ORAL at 20:27

## 2018-03-10 RX ADMIN — Medication 10 ML: at 17:05

## 2018-03-10 RX ADMIN — HYDROCHLOROTHIAZIDE 25 MG: 12.5 TABLET ORAL at 08:43

## 2018-03-10 RX ADMIN — LINEZOLID 600 MG: 600 TABLET, FILM COATED ORAL at 08:43

## 2018-03-10 RX ADMIN — Medication 30 ML: at 17:02

## 2018-03-10 RX ADMIN — Medication 10 ML: at 08:44

## 2018-03-10 RX ADMIN — LISINOPRIL 40 MG: 10 TABLET ORAL at 08:43

## 2018-03-10 RX ADMIN — Medication 10 ML: at 23:40

## 2018-03-10 RX ADMIN — IPRATROPIUM BROMIDE AND ALBUTEROL SULFATE 1 AMPULE: 2.5; .5 SOLUTION RESPIRATORY (INHALATION) at 10:05

## 2018-03-10 RX ADMIN — IPRATROPIUM BROMIDE AND ALBUTEROL SULFATE 1 AMPULE: 2.5; .5 SOLUTION RESPIRATORY (INHALATION) at 16:39

## 2018-03-10 RX ADMIN — KETOROLAC TROMETHAMINE 15 MG: 30 INJECTION, SOLUTION INTRAMUSCULAR at 17:05

## 2018-03-10 RX ADMIN — MAGNESIUM HYDROXIDE 30 ML: 400 SUSPENSION ORAL at 12:43

## 2018-03-10 RX ADMIN — GABAPENTIN 300 MG: 300 CAPSULE ORAL at 08:43

## 2018-03-10 ASSESSMENT — PAIN SCALES - GENERAL
PAINLEVEL_OUTOF10: 4
PAINLEVEL_OUTOF10: 8
PAINLEVEL_OUTOF10: 5
PAINLEVEL_OUTOF10: 7
PAINLEVEL_OUTOF10: 3
PAINLEVEL_OUTOF10: 3
PAINLEVEL_OUTOF10: 8
PAINLEVEL_OUTOF10: 3
PAINLEVEL_OUTOF10: 2

## 2018-03-10 ASSESSMENT — PAIN DESCRIPTION - PAIN TYPE
TYPE: ACUTE PAIN
TYPE: CHRONIC PAIN
TYPE: CHRONIC PAIN

## 2018-03-10 ASSESSMENT — PAIN DESCRIPTION - LOCATION: LOCATION: BACK

## 2018-03-10 ASSESSMENT — PAIN DESCRIPTION - ORIENTATION: ORIENTATION: LEFT

## 2018-03-10 NOTE — PROGRESS NOTES
[facility]  Division of Pulmonary, Critical Care Medicine  Pulmonary [address] [facility]         Patient: Toamsa Laureano  MRN: [id number]  : 1965    Date of Admission: .3/6/2018  6:02 AM    Consulting Physician:Dr Samuels        Reason for Consultation:  CC : Chest pain ,SOB      SUBJECTIVE:     Doing well today   States that his SOB and chest pain has improved  For thoracentesis,diagnostic   No fever or chills ,no chest pain   Grow staph       PHYSICAL EXAMINATION:     VITAL SIGNS:  BP (!) 142/86 Comment: manual  Pulse 82   Temp 98 °F (36.7 °C) (Oral)   Resp 18   Ht 5' 7\" (1.702 m)   Wt 202 lb 9 oz (91.9 kg)   SpO2 96%   BMI 31.73 kg/m²   Wt Readings from Last 3 Encounters:   03/10/18 202 lb 9 oz (91.9 kg)   18 216 lb 1.6 oz (98 kg)   18 198 lb (89.8 kg)     Temp Readings from Last 3 Encounters:   03/10/18 98 °F (36.7 °C) (Oral)   18 98.4 °F (36.9 °C) (Oral)   18 100.7 °F (38.2 °C) (Oral)     TMAX:  BP Readings from Last 3 Encounters:   03/10/18 (!) 142/86   18 (!) 174/106   18 (!) 162/91     Pulse Readings from Last 3 Encounters:   03/10/18 82   18 86   18 102           INTAKE/OUTPUTS:  I/O last 3 completed shifts:   In: 190 [P.O.:180; I.V.:10]  Out: -     Intake/Output Summary (Last 24 hours) at 03/10/18 1414  Last data filed at 03/10/18 1407   Gross per 24 hour   Intake              670 ml   Output                0 ml   Net              670 ml       General Appearance: alert and oriented to person, place and time, well-developed and   well-nourished, in no acute distress   Eyes: pupils equal, round, and reactive to light, extraocular eye movements intact, conjunctivae normal and sclera anicteric   Neck: neck supple and non tender without mass, no thyromegaly, no thyroid nodules and no cervical adenopathy   Pulmonary/Chest: rhonchi bilaterally decreased breath sound in the left side  Cardiovascular: normal rate,

## 2018-03-10 NOTE — PROGRESS NOTES
Talked to Mark Ellington in Lab about LDH ,  They will run here now as I do not want the sentout one in case we can do it here  Fluid seem exudate   If LDH is high,will need pigtail pleural cathter

## 2018-03-10 NOTE — PLAN OF CARE
Problem: Pain:  Goal: Pain level will decrease  Pain level will decrease   Outcome: Not Met This Shift      Problem: Falls - Risk of  Goal: Absence of falls  Outcome: Met This Shift      Problem: Ineffective Breathing Pattern  Goal: Ability to maintain adequate oxygenation will improve  Ability to maintain adequate oxygenation will improve     Outcome: Not Met This Shift

## 2018-03-11 LAB
ANION GAP SERPL CALCULATED.3IONS-SCNC: 15 MMOL/L (ref 7–16)
BLOOD CULTURE, ROUTINE: NORMAL
BUN BLDV-MCNC: 24 MG/DL (ref 6–20)
C DIFFICILE TOXIN, EIA: NORMAL
CALCIUM SERPL-MCNC: 9.2 MG/DL (ref 8.6–10.2)
CHLORIDE BLD-SCNC: 98 MMOL/L (ref 98–107)
CO2: 23 MMOL/L (ref 22–29)
CREAT SERPL-MCNC: 0.9 MG/DL (ref 0.7–1.2)
CULTURE, BLOOD 2: NORMAL
GFR AFRICAN AMERICAN: >60
GFR NON-AFRICAN AMERICAN: >60 ML/MIN/1.73
GLUCOSE BLD-MCNC: 90 MG/DL (ref 74–109)
HCT VFR BLD CALC: 38 % (ref 37–54)
HEMOGLOBIN: 13.2 G/DL (ref 12.5–16.5)
MCH RBC QN AUTO: 32.1 PG (ref 26–35)
MCHC RBC AUTO-ENTMCNC: 34.7 % (ref 32–34.5)
MCV RBC AUTO: 92.5 FL (ref 80–99.9)
PDW BLD-RTO: 13.2 FL (ref 11.5–15)
PLATELET # BLD: 311 E9/L (ref 130–450)
PMV BLD AUTO: 9.8 FL (ref 7–12)
POTASSIUM SERPL-SCNC: 4 MMOL/L (ref 3.5–5)
RBC # BLD: 4.11 E12/L (ref 3.8–5.8)
SODIUM BLD-SCNC: 136 MMOL/L (ref 132–146)
WBC # BLD: 15.1 E9/L (ref 4.5–11.5)

## 2018-03-11 PROCEDURE — 87324 CLOSTRIDIUM AG IA: CPT

## 2018-03-11 PROCEDURE — 6370000000 HC RX 637 (ALT 250 FOR IP): Performed by: INTERNAL MEDICINE

## 2018-03-11 PROCEDURE — 6360000002 HC RX W HCPCS: Performed by: NURSE PRACTITIONER

## 2018-03-11 PROCEDURE — 36415 COLL VENOUS BLD VENIPUNCTURE: CPT

## 2018-03-11 PROCEDURE — 99232 SBSQ HOSP IP/OBS MODERATE 35: CPT | Performed by: INTERNAL MEDICINE

## 2018-03-11 PROCEDURE — 6360000002 HC RX W HCPCS: Performed by: INTERNAL MEDICINE

## 2018-03-11 PROCEDURE — 2060000000 HC ICU INTERMEDIATE R&B

## 2018-03-11 PROCEDURE — 80048 BASIC METABOLIC PNL TOTAL CA: CPT

## 2018-03-11 PROCEDURE — 94640 AIRWAY INHALATION TREATMENT: CPT

## 2018-03-11 PROCEDURE — 6370000000 HC RX 637 (ALT 250 FOR IP): Performed by: NURSE PRACTITIONER

## 2018-03-11 PROCEDURE — 2580000003 HC RX 258: Performed by: NURSE PRACTITIONER

## 2018-03-11 PROCEDURE — 85027 COMPLETE CBC AUTOMATED: CPT

## 2018-03-11 RX ORDER — PREDNISONE 10 MG/1
10 TABLET ORAL DAILY
Status: DISCONTINUED | OUTPATIENT
Start: 2018-03-12 | End: 2018-03-13 | Stop reason: HOSPADM

## 2018-03-11 RX ORDER — FAMOTIDINE 20 MG/1
40 TABLET, FILM COATED ORAL NIGHTLY
Status: DISCONTINUED | OUTPATIENT
Start: 2018-03-11 | End: 2018-03-11 | Stop reason: CLARIF

## 2018-03-11 RX ORDER — LINEZOLID 600 MG/1
600 TABLET, FILM COATED ORAL EVERY 12 HOURS SCHEDULED
Qty: 18 TABLET | Refills: 0 | Status: SHIPPED | OUTPATIENT
Start: 2018-03-11 | End: 2018-03-20

## 2018-03-11 RX ADMIN — Medication 10 ML: at 09:07

## 2018-03-11 RX ADMIN — ENOXAPARIN SODIUM 40 MG: 40 INJECTION SUBCUTANEOUS at 07:58

## 2018-03-11 RX ADMIN — LISINOPRIL 40 MG: 10 TABLET ORAL at 07:58

## 2018-03-11 RX ADMIN — KETOROLAC TROMETHAMINE 15 MG: 30 INJECTION, SOLUTION INTRAMUSCULAR at 09:06

## 2018-03-11 RX ADMIN — IPRATROPIUM BROMIDE AND ALBUTEROL SULFATE 1 AMPULE: 2.5; .5 SOLUTION RESPIRATORY (INHALATION) at 19:34

## 2018-03-11 RX ADMIN — FINASTERIDE 5 MG: 5 TABLET, FILM COATED ORAL at 07:58

## 2018-03-11 RX ADMIN — AMLODIPINE BESYLATE 10 MG: 10 TABLET ORAL at 07:59

## 2018-03-11 RX ADMIN — IPRATROPIUM BROMIDE AND ALBUTEROL SULFATE 1 AMPULE: 2.5; .5 SOLUTION RESPIRATORY (INHALATION) at 09:50

## 2018-03-11 RX ADMIN — PREDNISONE 20 MG: 20 TABLET ORAL at 07:59

## 2018-03-11 RX ADMIN — LINEZOLID 600 MG: 600 TABLET, FILM COATED ORAL at 20:42

## 2018-03-11 RX ADMIN — GABAPENTIN 300 MG: 300 CAPSULE ORAL at 14:58

## 2018-03-11 RX ADMIN — GABAPENTIN 300 MG: 300 CAPSULE ORAL at 20:42

## 2018-03-11 RX ADMIN — Medication 10 ML: at 07:59

## 2018-03-11 RX ADMIN — Medication 30 ML: at 17:22

## 2018-03-11 RX ADMIN — Medication 10 ML: at 20:43

## 2018-03-11 RX ADMIN — LINEZOLID 600 MG: 600 TABLET, FILM COATED ORAL at 07:59

## 2018-03-11 RX ADMIN — FAMOTIDINE 40 MG: 20 TABLET ORAL at 17:25

## 2018-03-11 RX ADMIN — KETOROLAC TROMETHAMINE 15 MG: 30 INJECTION, SOLUTION INTRAMUSCULAR at 15:57

## 2018-03-11 RX ADMIN — HYDROCHLOROTHIAZIDE 25 MG: 12.5 TABLET ORAL at 07:58

## 2018-03-11 RX ADMIN — GABAPENTIN 300 MG: 300 CAPSULE ORAL at 07:59

## 2018-03-11 ASSESSMENT — PAIN DESCRIPTION - LOCATION
LOCATION: BACK
LOCATION: RIB CAGE
LOCATION: HEAD

## 2018-03-11 ASSESSMENT — PAIN SCALES - GENERAL
PAINLEVEL_OUTOF10: 7
PAINLEVEL_OUTOF10: 4
PAINLEVEL_OUTOF10: 5
PAINLEVEL_OUTOF10: 7

## 2018-03-11 ASSESSMENT — PAIN DESCRIPTION - PAIN TYPE
TYPE: ACUTE PAIN

## 2018-03-11 ASSESSMENT — PAIN DESCRIPTION - DESCRIPTORS
DESCRIPTORS: ACHING;CRAMPING;PRESSURE
DESCRIPTORS: ACHING;CONSTANT;DISCOMFORT

## 2018-03-11 ASSESSMENT — PAIN DESCRIPTION - ORIENTATION
ORIENTATION: LEFT
ORIENTATION: LEFT

## 2018-03-11 NOTE — PROGRESS NOTES
Hospitalist Progress Note      PCP: Nemesio Ch DO    Date of Admission: 3/6/2018    Chief Complaint: chest pain     Hospital Course: Admitted for CP following dc on 3/5 from [facility] for pneumonia after influenza B. CP seems musculoskeletal in nature, pulm consulted for small effusion. ID consulted for antibiotic management. Ordered stress test for today to rule out cardiac etiology. Subjective: Continues to complain of chest wall pain    Medications:  Reviewed    Infusion Medications   Scheduled Medications    [START ON 3/12/2018] predniSONE  10 mg Oral Daily    mometasone-formoterol  2 puff Inhalation BID    ipratropium-albuterol  1 ampule Inhalation Q4H WA    lisinopril  40 mg Oral Daily    amLODIPine  10 mg Oral Daily    finasteride  5 mg Oral Daily    gabapentin  300 mg Oral TID    hydrochlorothiazide  25 mg Oral Daily    nicotine  1 patch Transdermal Daily    famotidine  40 mg Oral QPM    sodium chloride flush  10 mL Intravenous 2 times per day    enoxaparin  40 mg Subcutaneous Daily    linezolid  600 mg Oral 2 times per day     PRN Meds: sodium chloride flush, acetaminophen, magnesium hydroxide, ondansetron, traMADol, labetalol, ketorolac      Intake/Output Summary (Last 24 hours) at 03/11/18 1834  Last data filed at 03/11/18 1407   Gross per 24 hour   Intake             1060 ml   Output                0 ml   Net             1060 ml       Exam:    BP (!) 142/90   Pulse 105   Temp 98.9 °F (37.2 °C) (Temporal)   Resp 18   Ht 5' 7\" (1.702 m)   Wt 202 lb 9 oz (91.9 kg)   SpO2 97%   BMI 31.73 kg/m²     General appearance:  Appears stated age and cooperative. tachypneic   HEENT:  Normal cephalic, atraumatic without obvious deformity. Pupils equal, round, and reactive to light. Extra ocular muscles intact. Conjunctivae/corneas clear. Neck: Supple, with full range of motion. No jugular venous distention. Trachea midline. Respiratory:  Normal respiratory effort.  Wheezes noted Prophylaxis: lovenox   Diet: DIET CARDIAC;  Code Status: Full Code    PT/OT Eval Status: na    Dispo - inpatient     Simba Mirza MD

## 2018-03-11 NOTE — PROGRESS NOTES
Lungs:     Scattered wheeze    Heart:    Regular rate and rhythm, no murmur, rub or gallop   Abdomen:     Soft, non tender, bowel sounds present    Extremities:   No edema, no cyanosis ,no open wound,no erythema, no     tenderness   Pulses:   Dorsalis pedis palpable    Skin:   no rashes or lesions          CBC with Differential:      Lab Results   Component Value Date    WBC 15.1 03/11/2018    RBC 4.11 03/11/2018    HGB 13.2 03/11/2018    HCT 38.0 03/11/2018     03/11/2018    MCV 92.5 03/11/2018    MCH 32.1 03/11/2018    MCHC 34.7 03/11/2018    RDW 13.2 03/11/2018    METASPCT 2 02/10/2016    LYMPHOPCT 10.4 03/06/2018    MONOPCT 7.0 03/06/2018    MYELOPCT 1 02/10/2016    BASOPCT 0.2 03/06/2018    MONOSABS 1.53 03/06/2018    LYMPHSABS 2.18 03/06/2018    EOSABS 0.00 03/06/2018    BASOSABS 0.00 03/06/2018       CMP:    Lab Results   Component Value Date     03/11/2018    K 4.0 03/11/2018    K 4.1 03/07/2018    CL 98 03/11/2018    CO2 23 03/11/2018    BUN 24 03/11/2018    CREATININE 0.9 03/11/2018    GFRAA >60 03/11/2018    LABGLOM >60 03/11/2018    GLUCOSE 90 03/11/2018    PROT 8.0 03/06/2018    LABALBU 3.6 03/06/2018    CALCIUM 9.2 03/11/2018    BILITOT 0.3 03/06/2018    ALKPHOS 102 03/06/2018    AST 34 03/06/2018    ALT 31 03/06/2018       Hepatic Function Panel:    Lab Results   Component Value Date    ALKPHOS 102 03/06/2018    ALT 31 03/06/2018    AST 34 03/06/2018    PROT 8.0 03/06/2018    BILITOT 0.3 03/06/2018    LABALBU 3.6 03/06/2018     Blood cx - neg      Culture & Susceptibility     STAPHYLOCOCCUS AUREUS     Antibiotic Interpretation MAUREEN Unit   clindamycin Sensitive =^0.25 mcg/mL   doxycycline Sensitive <=^0.5 mcg/mL   erythromycin Resistant >=^8 mcg/mL   gentamicin Sensitive <=^0.5 mcg/mL   oxacillin Resistant >=^4 mcg/mL   tigecycline Sensitive <=^0.12 mcg/mL   trimethoprim-sulfamethoxazole Sensitive <=^10 mcg/mL   vancomycin Sensitive <=^0.5 mcg/mL         Lab and Collection          HIV

## 2018-03-12 ENCOUNTER — APPOINTMENT (OUTPATIENT)
Dept: ULTRASOUND IMAGING | Age: 53
DRG: 871 | End: 2018-03-12
Payer: MEDICARE

## 2018-03-12 ENCOUNTER — APPOINTMENT (OUTPATIENT)
Dept: CT IMAGING | Age: 53
DRG: 871 | End: 2018-03-12
Payer: MEDICARE

## 2018-03-12 ENCOUNTER — APPOINTMENT (OUTPATIENT)
Dept: GENERAL RADIOLOGY | Age: 53
DRG: 871 | End: 2018-03-12
Payer: MEDICARE

## 2018-03-12 LAB
ANION GAP SERPL CALCULATED.3IONS-SCNC: 18 MMOL/L (ref 7–16)
BUN BLDV-MCNC: 24 MG/DL (ref 6–20)
CALCIUM SERPL-MCNC: 8.8 MG/DL (ref 8.6–10.2)
CHLORIDE BLD-SCNC: 100 MMOL/L (ref 98–107)
CO2: 22 MMOL/L (ref 22–29)
CREAT SERPL-MCNC: 1 MG/DL (ref 0.7–1.2)
GFR AFRICAN AMERICAN: >60
GFR NON-AFRICAN AMERICAN: >60 ML/MIN/1.73
GLUCOSE BLD-MCNC: 93 MG/DL (ref 74–109)
HCT VFR BLD CALC: 37.4 % (ref 37–54)
HEMOGLOBIN: 12.8 G/DL (ref 12.5–16.5)
MCH RBC QN AUTO: 32 PG (ref 26–35)
MCHC RBC AUTO-ENTMCNC: 34.2 % (ref 32–34.5)
MCV RBC AUTO: 93.5 FL (ref 80–99.9)
PDW BLD-RTO: 13 FL (ref 11.5–15)
PLATELET # BLD: 307 E9/L (ref 130–450)
PMV BLD AUTO: 9.5 FL (ref 7–12)
POTASSIUM SERPL-SCNC: 4.1 MMOL/L (ref 3.5–5)
RBC # BLD: 4 E12/L (ref 3.8–5.8)
SODIUM BLD-SCNC: 140 MMOL/L (ref 132–146)
WBC # BLD: 14.7 E9/L (ref 4.5–11.5)

## 2018-03-12 PROCEDURE — 36415 COLL VENOUS BLD VENIPUNCTURE: CPT

## 2018-03-12 PROCEDURE — 80048 BASIC METABOLIC PNL TOTAL CA: CPT

## 2018-03-12 PROCEDURE — 94640 AIRWAY INHALATION TREATMENT: CPT

## 2018-03-12 PROCEDURE — 6360000002 HC RX W HCPCS: Performed by: NURSE PRACTITIONER

## 2018-03-12 PROCEDURE — 0W9B3ZZ DRAINAGE OF LEFT PLEURAL CAVITY, PERCUTANEOUS APPROACH: ICD-10-PCS | Performed by: RADIOLOGY

## 2018-03-12 PROCEDURE — 32555 ASPIRATE PLEURA W/ IMAGING: CPT

## 2018-03-12 PROCEDURE — 71045 X-RAY EXAM CHEST 1 VIEW: CPT

## 2018-03-12 PROCEDURE — 2060000000 HC ICU INTERMEDIATE R&B

## 2018-03-12 PROCEDURE — 2580000003 HC RX 258: Performed by: NURSE PRACTITIONER

## 2018-03-12 PROCEDURE — 6370000000 HC RX 637 (ALT 250 FOR IP): Performed by: NURSE PRACTITIONER

## 2018-03-12 PROCEDURE — 99232 SBSQ HOSP IP/OBS MODERATE 35: CPT | Performed by: INTERNAL MEDICINE

## 2018-03-12 PROCEDURE — 85027 COMPLETE CBC AUTOMATED: CPT

## 2018-03-12 PROCEDURE — 2500000003 HC RX 250 WO HCPCS: Performed by: INTERNAL MEDICINE

## 2018-03-12 PROCEDURE — 6370000000 HC RX 637 (ALT 250 FOR IP): Performed by: FAMILY MEDICINE

## 2018-03-12 PROCEDURE — 6370000000 HC RX 637 (ALT 250 FOR IP): Performed by: INTERNAL MEDICINE

## 2018-03-12 PROCEDURE — 71250 CT THORAX DX C-: CPT

## 2018-03-12 RX ORDER — IBUPROFEN 200 MG
600 TABLET ORAL EVERY 6 HOURS PRN
Status: COMPLETED | OUTPATIENT
Start: 2018-03-12 | End: 2018-03-13

## 2018-03-12 RX ADMIN — ACETAMINOPHEN 650 MG: 325 TABLET, FILM COATED ORAL at 10:34

## 2018-03-12 RX ADMIN — GABAPENTIN 300 MG: 300 CAPSULE ORAL at 20:19

## 2018-03-12 RX ADMIN — AMLODIPINE BESYLATE 10 MG: 10 TABLET ORAL at 09:29

## 2018-03-12 RX ADMIN — LINEZOLID 600 MG: 600 TABLET, FILM COATED ORAL at 09:29

## 2018-03-12 RX ADMIN — FINASTERIDE 5 MG: 5 TABLET, FILM COATED ORAL at 09:30

## 2018-03-12 RX ADMIN — IPRATROPIUM BROMIDE AND ALBUTEROL SULFATE 1 AMPULE: 2.5; .5 SOLUTION RESPIRATORY (INHALATION) at 05:13

## 2018-03-12 RX ADMIN — LABETALOL HYDROCHLORIDE 10 MG: 5 INJECTION, SOLUTION INTRAVENOUS at 20:20

## 2018-03-12 RX ADMIN — GABAPENTIN 300 MG: 300 CAPSULE ORAL at 14:50

## 2018-03-12 RX ADMIN — IPRATROPIUM BROMIDE AND ALBUTEROL SULFATE 1 AMPULE: 2.5; .5 SOLUTION RESPIRATORY (INHALATION) at 20:30

## 2018-03-12 RX ADMIN — Medication 10 ML: at 20:20

## 2018-03-12 RX ADMIN — IBUPROFEN 600 MG: 200 TABLET, FILM COATED ORAL at 20:19

## 2018-03-12 RX ADMIN — ENOXAPARIN SODIUM 40 MG: 40 INJECTION SUBCUTANEOUS at 10:35

## 2018-03-12 RX ADMIN — GABAPENTIN 300 MG: 300 CAPSULE ORAL at 09:30

## 2018-03-12 RX ADMIN — IPRATROPIUM BROMIDE AND ALBUTEROL SULFATE 1 AMPULE: 2.5; .5 SOLUTION RESPIRATORY (INHALATION) at 09:45

## 2018-03-12 RX ADMIN — IPRATROPIUM BROMIDE AND ALBUTEROL SULFATE 1 AMPULE: 2.5; .5 SOLUTION RESPIRATORY (INHALATION) at 13:57

## 2018-03-12 RX ADMIN — LINEZOLID 600 MG: 600 TABLET, FILM COATED ORAL at 20:19

## 2018-03-12 RX ADMIN — HYDROCHLOROTHIAZIDE 25 MG: 12.5 TABLET ORAL at 09:30

## 2018-03-12 RX ADMIN — FAMOTIDINE 40 MG: 20 TABLET ORAL at 05:25

## 2018-03-12 RX ADMIN — FAMOTIDINE 40 MG: 20 TABLET ORAL at 18:43

## 2018-03-12 RX ADMIN — Medication 10 ML: at 09:33

## 2018-03-12 RX ADMIN — PREDNISONE 10 MG: 10 TABLET ORAL at 09:29

## 2018-03-12 RX ADMIN — LISINOPRIL 40 MG: 10 TABLET ORAL at 09:29

## 2018-03-12 RX ADMIN — ACETAMINOPHEN 650 MG: 325 TABLET, FILM COATED ORAL at 00:12

## 2018-03-12 RX ADMIN — MAGNESIUM HYDROXIDE 30 ML: 400 SUSPENSION ORAL at 18:44

## 2018-03-12 ASSESSMENT — PAIN SCALES - GENERAL
PAINLEVEL_OUTOF10: 4
PAINLEVEL_OUTOF10: 6
PAINLEVEL_OUTOF10: 6
PAINLEVEL_OUTOF10: 0
PAINLEVEL_OUTOF10: 7
PAINLEVEL_OUTOF10: 7

## 2018-03-12 ASSESSMENT — PAIN DESCRIPTION - ORIENTATION: ORIENTATION: LEFT

## 2018-03-12 ASSESSMENT — PAIN DESCRIPTION - LOCATION: LOCATION: RIB CAGE

## 2018-03-12 ASSESSMENT — PAIN DESCRIPTION - DESCRIPTORS: DESCRIPTORS: ACHING;CRAMPING;DISCOMFORT

## 2018-03-12 ASSESSMENT — PAIN DESCRIPTION - PAIN TYPE: TYPE: ACUTE PAIN

## 2018-03-12 NOTE — CARE COORDINATION
Called placed to [facility] pharmacy [phone] regarding co-pay for Zyvox. Per pharmacist  [facility], co pay will be $1.25.

## 2018-03-12 NOTE — PROGRESS NOTES
Final report was electronically signed by Johanny Quick MD on 6 Mar 2018 6:16 AM EDT. CTA Chest W WO Contrast   Final Result     1. There is no evidence for pulmonary embolic disease. 2.  Worsening bilateral infiltrates versus atelectasis     3. New small left pleural effusion         This Final report was electronically signed by Johanny Quick MD on 6 Mar 2018 6:14 AM EDT. Pharmacological Stress Test    (Results Pending)   XR CHEST PORTABLE    (Results Pending)             Active Hospital Problems    Diagnosis Date Noted    CAP (community acquired pneumonia) due to Chlamydia species [J16.0] 03/06/2018       Assessment  1. Sepsis  resolved   2. Staph aureus Pneumonia  multilobar consolidation post influenza infection  3. Acute COPD exacerbation  4. Atypical chest pain  stress test was negative for pharmacologically-induced perfusion defect  5. Pleural effusion  6. Hypertension  7. INOCENCIA  8.   Tobacco abuse    Plan:  Continue with Zyvox by infectious diseases consultant  Scheduled for chest ultrasound, CT chest today  Cont with Prednisone, LABA, Bronchodilators  On HCTZ, Norvasc, Lisinopril,    DVT Prophylaxis: Lovenox   Diet: DIET CARDIAC;  Code Status: Full Code    PT/OT Eval Status: N/A    Dispo - home once stable    Ibeth Godfrey MD 3/12/2018 10:20 AM

## 2018-03-13 VITALS
DIASTOLIC BLOOD PRESSURE: 92 MMHG | HEIGHT: 67 IN | RESPIRATION RATE: 20 BRPM | HEART RATE: 88 BPM | BODY MASS INDEX: 31.55 KG/M2 | SYSTOLIC BLOOD PRESSURE: 134 MMHG | OXYGEN SATURATION: 94 % | TEMPERATURE: 98 F | WEIGHT: 201 LBS

## 2018-03-13 LAB
ANION GAP SERPL CALCULATED.3IONS-SCNC: 15 MMOL/L (ref 7–16)
BUN BLDV-MCNC: 23 MG/DL (ref 6–20)
CALCIUM SERPL-MCNC: 9.2 MG/DL (ref 8.6–10.2)
CHLORIDE BLD-SCNC: 96 MMOL/L (ref 98–107)
CO2: 24 MMOL/L (ref 22–29)
CREAT SERPL-MCNC: 0.9 MG/DL (ref 0.7–1.2)
GFR AFRICAN AMERICAN: >60
GFR NON-AFRICAN AMERICAN: >60 ML/MIN/1.73
GLUCOSE BLD-MCNC: 113 MG/DL (ref 74–109)
HCT VFR BLD CALC: 40.3 % (ref 37–54)
HEMOGLOBIN: 13.4 G/DL (ref 12.5–16.5)
MCH RBC QN AUTO: 31.3 PG (ref 26–35)
MCHC RBC AUTO-ENTMCNC: 33.3 % (ref 32–34.5)
MCV RBC AUTO: 94.2 FL (ref 80–99.9)
PDW BLD-RTO: 13.1 FL (ref 11.5–15)
PLATELET # BLD: 345 E9/L (ref 130–450)
PMV BLD AUTO: 8.8 FL (ref 7–12)
POTASSIUM SERPL-SCNC: 4 MMOL/L (ref 3.5–5)
RBC # BLD: 4.28 E12/L (ref 3.8–5.8)
SODIUM BLD-SCNC: 135 MMOL/L (ref 132–146)
WBC # BLD: 14.3 E9/L (ref 4.5–11.5)

## 2018-03-13 PROCEDURE — 6370000000 HC RX 637 (ALT 250 FOR IP): Performed by: FAMILY MEDICINE

## 2018-03-13 PROCEDURE — 85027 COMPLETE CBC AUTOMATED: CPT

## 2018-03-13 PROCEDURE — 6370000000 HC RX 637 (ALT 250 FOR IP): Performed by: INTERNAL MEDICINE

## 2018-03-13 PROCEDURE — 2580000003 HC RX 258: Performed by: NURSE PRACTITIONER

## 2018-03-13 PROCEDURE — 6360000002 HC RX W HCPCS: Performed by: NURSE PRACTITIONER

## 2018-03-13 PROCEDURE — 94640 AIRWAY INHALATION TREATMENT: CPT

## 2018-03-13 PROCEDURE — 36415 COLL VENOUS BLD VENIPUNCTURE: CPT

## 2018-03-13 PROCEDURE — 6370000000 HC RX 637 (ALT 250 FOR IP): Performed by: NURSE PRACTITIONER

## 2018-03-13 PROCEDURE — 80048 BASIC METABOLIC PNL TOTAL CA: CPT

## 2018-03-13 RX ORDER — METHYLPREDNISOLONE 4 MG/1
TABLET ORAL
Qty: 1 KIT | Refills: 0 | Status: SHIPPED | OUTPATIENT
Start: 2018-03-13 | End: 2018-03-19

## 2018-03-13 RX ADMIN — LISINOPRIL 40 MG: 10 TABLET ORAL at 08:46

## 2018-03-13 RX ADMIN — FINASTERIDE 5 MG: 5 TABLET, FILM COATED ORAL at 08:46

## 2018-03-13 RX ADMIN — LINEZOLID 600 MG: 600 TABLET, FILM COATED ORAL at 08:46

## 2018-03-13 RX ADMIN — IPRATROPIUM BROMIDE AND ALBUTEROL SULFATE 1 AMPULE: 2.5; .5 SOLUTION RESPIRATORY (INHALATION) at 08:18

## 2018-03-13 RX ADMIN — HYDROCHLOROTHIAZIDE 25 MG: 12.5 TABLET ORAL at 08:46

## 2018-03-13 RX ADMIN — GABAPENTIN 300 MG: 300 CAPSULE ORAL at 08:46

## 2018-03-13 RX ADMIN — IPRATROPIUM BROMIDE AND ALBUTEROL SULFATE 1 AMPULE: 2.5; .5 SOLUTION RESPIRATORY (INHALATION) at 12:37

## 2018-03-13 RX ADMIN — Medication 10 ML: at 08:47

## 2018-03-13 RX ADMIN — AMLODIPINE BESYLATE 10 MG: 10 TABLET ORAL at 08:46

## 2018-03-13 RX ADMIN — ENOXAPARIN SODIUM 40 MG: 40 INJECTION SUBCUTANEOUS at 08:47

## 2018-03-13 RX ADMIN — PREDNISONE 10 MG: 10 TABLET ORAL at 08:47

## 2018-03-13 RX ADMIN — IBUPROFEN 600 MG: 200 TABLET, FILM COATED ORAL at 11:29

## 2018-03-13 ASSESSMENT — PAIN SCALES - GENERAL
PAINLEVEL_OUTOF10: 4
PAINLEVEL_OUTOF10: 0
PAINLEVEL_OUTOF10: 0
PAINLEVEL_OUTOF10: 6
PAINLEVEL_OUTOF10: 0
PAINLEVEL_OUTOF10: 4
PAINLEVEL_OUTOF10: 0

## 2018-03-13 NOTE — DISCHARGE SUMMARY
13.4 03/13/2018    HCT 40.3 03/13/2018     03/13/2018       Renal:    Lab Results   Component Value Date     03/13/2018    K 4.0 03/13/2018    K 4.1 03/07/2018    CL 96 03/13/2018    CO2 24 03/13/2018    BUN 23 03/13/2018    CREATININE 0.9 03/13/2018    CALCIUM 9.2 03/13/2018    PHOS 4.6 02/07/2016       Discharge Medications:     Current Discharge Medication List      START taking these medications    Details   methylPREDNISolone (MEDROL, ИВАН,) 4 MG tablet By mouth.   Qty: 1 kit, Refills: 0      linezolid (ZYVOX) 600 MG tablet Take 1 tablet by mouth every 12 hours for 9 days  Qty: 18 tablet, Refills: 0         CONTINUE these medications which have NOT CHANGED    Details   tamsulosin (FLOMAX) 0.4 MG capsule Take 0.4 mg by mouth daily      ibuprofen (ADVIL;MOTRIN) 800 MG tablet Take 1 tablet by mouth every 6 hours as needed for Pain  Qty: 30 tablet, Refills: 0      amLODIPine (NORVASC) 10 MG tablet Take 1 tablet by mouth daily  Qty: 30 tablet, Refills: 2    Associated Diagnoses: Benign essential HTN      finasteride (PROSCAR) 5 MG tablet Take 1 tablet by mouth daily  Qty: 30 tablet, Refills: 5    Associated Diagnoses: Benign prostatic hyperplasia, unspecified whether lower urinary tract symptoms present      ranitidine (ZANTAC) 300 MG tablet Take 1 tablet by mouth nightly  Qty: 30 tablet, Refills: 5    Associated Diagnoses: Gastroesophageal reflux disease without esophagitis      Fluticasone Furoate-Vilanterol (BREO ELLIPTA) 200-25 MCG/INH AEPB Inhale 1 puff into the lungs daily  Qty: 1 each, Refills: 5    Associated Diagnoses: Gastroesophageal reflux disease without esophagitis      albuterol sulfate HFA (PROVENTIL HFA) 108 (90 Base) MCG/ACT inhaler Inhale 2 puffs into the lungs every 4 hours as needed for Wheezing  Qty: 1 Inhaler, Refills: 1    Associated Diagnoses: Mild intermittent asthma without complication      hydrochlorothiazide (MICROZIDE) 12.5 MG capsule Take 1 capsule by mouth daily  Qty: 30

## 2018-03-13 NOTE — PROGRESS NOTES
C difficile Toxin, EIA --    Result: C Difficile Toxins A and B not detected         Radiology :     Chest X ray     CTA scan of chest -     1.  There is no evidence for pulmonary embolic disease.    2.  Worsening bilateral infiltrates versus atelectasis    3.  New small left pleural effusion     CT abdomen and pelvis -       No acute findings.        IMPRESSION:      1. Post influenza pneumonia ( MRSA )   2 . Leukocytosis   3. COPDE      RECOMMENDATIONS:       1. Zyvox 600 mg po q 12 hrs ~ 8 days   2.  Follow up in 7-10 days        1:35 PM      3/13/2018

## 2018-03-13 NOTE — CARE COORDINATION
MET WITH PATIENT BRIEFLY IN ROOM, PLAN IS HOME TODAY. NO HHC NEEDED. ZYVOX WAS ORDERED AT [facility], PATIENT AWARE AND CM CALLED TO CHECK COST.  NO OTHER NEEDS NOTED AT THIS TIME.

## 2018-03-14 ENCOUNTER — TELEPHONE (OUTPATIENT)
Dept: FAMILY MEDICINE CLINIC | Age: 53
End: 2018-03-14

## 2018-03-14 DIAGNOSIS — I10 BENIGN ESSENTIAL HTN: ICD-10-CM

## 2018-03-14 DIAGNOSIS — I10 ESSENTIAL HYPERTENSION: ICD-10-CM

## 2018-03-14 RX ORDER — NICOTINE 21 MG/24HR
1 PATCH, TRANSDERMAL 24 HOURS TRANSDERMAL DAILY
Qty: 14 PATCH | Refills: 0 | Status: SHIPPED | OUTPATIENT
Start: 2018-03-14 | End: 2018-03-15 | Stop reason: CLARIF

## 2018-03-14 RX ORDER — LISINOPRIL 40 MG/1
TABLET ORAL
Qty: 30 TABLET | Refills: 2 | Status: SHIPPED | OUTPATIENT
Start: 2018-03-14 | End: 2018-07-22

## 2018-03-14 RX ORDER — AMLODIPINE BESYLATE 10 MG/1
10 TABLET ORAL DAILY
Qty: 30 TABLET | Refills: 2 | Status: SHIPPED | OUTPATIENT
Start: 2018-03-14 | End: 2018-05-09 | Stop reason: SDUPTHER

## 2018-03-14 NOTE — TELEPHONE ENCOUNTER
I just received a fax from [facility] today stating that pt would prefer to do an APAP in the home. A referral form for auto titration in home form through a [employer] company that is accepted by Medicare was signed and faxed back to [facility] [address] so they can proceed with APAP. I called pt and let him know we never heard about this as the fax I received came over just today at 11:11 am. I told pt form was filled out and faxed back to [facility].

## 2018-03-14 NOTE — TELEPHONE ENCOUNTER
Patient called into office stating that he called [facility] and that he was told they are waiting on prior authorization for the CPAP machine. Pt is requesting status of the prior auth. Patient can be reached at [phone]. OK to leave a message. ( Amaya Durand,  P.S. Fabio Tobin says Hi, we need to ride.  This weather has to disappear. )

## 2018-03-15 ENCOUNTER — OFFICE VISIT (OUTPATIENT)
Dept: FAMILY MEDICINE CLINIC | Age: 53
End: 2018-03-15
Payer: MEDICARE

## 2018-03-15 VITALS
HEIGHT: 67 IN | RESPIRATION RATE: 16 BRPM | WEIGHT: 201 LBS | BODY MASS INDEX: 31.55 KG/M2 | HEART RATE: 96 BPM | TEMPERATURE: 98.5 F | OXYGEN SATURATION: 97 %

## 2018-03-15 DIAGNOSIS — R35.0 BENIGN PROSTATIC HYPERPLASIA WITH URINARY FREQUENCY: ICD-10-CM

## 2018-03-15 DIAGNOSIS — M47.812 CERVICAL ARTHRITIS: ICD-10-CM

## 2018-03-15 DIAGNOSIS — J15.9 COMMUNITY ACQUIRED BACTERIAL PNEUMONIA: Primary | ICD-10-CM

## 2018-03-15 DIAGNOSIS — N40.1 BENIGN PROSTATIC HYPERPLASIA WITH URINARY FREQUENCY: ICD-10-CM

## 2018-03-15 DIAGNOSIS — F17.211 CIGARETTE NICOTINE DEPENDENCE IN REMISSION: ICD-10-CM

## 2018-03-15 DIAGNOSIS — K21.9 GASTROESOPHAGEAL REFLUX DISEASE WITHOUT ESOPHAGITIS: ICD-10-CM

## 2018-03-15 PROCEDURE — G8427 DOCREV CUR MEDS BY ELIG CLIN: HCPCS | Performed by: FAMILY MEDICINE

## 2018-03-15 PROCEDURE — G8482 FLU IMMUNIZE ORDER/ADMIN: HCPCS | Performed by: FAMILY MEDICINE

## 2018-03-15 PROCEDURE — 99214 OFFICE O/P EST MOD 30 MIN: CPT | Performed by: FAMILY MEDICINE

## 2018-03-15 PROCEDURE — 3017F COLORECTAL CA SCREEN DOC REV: CPT | Performed by: FAMILY MEDICINE

## 2018-03-15 PROCEDURE — G8417 CALC BMI ABV UP PARAM F/U: HCPCS | Performed by: FAMILY MEDICINE

## 2018-03-15 PROCEDURE — 1111F DSCHRG MED/CURRENT MED MERGE: CPT | Performed by: FAMILY MEDICINE

## 2018-03-15 PROCEDURE — 4004F PT TOBACCO SCREEN RCVD TLK: CPT | Performed by: FAMILY MEDICINE

## 2018-03-15 RX ORDER — TAMSULOSIN HYDROCHLORIDE 0.4 MG/1
0.4 CAPSULE ORAL DAILY
Qty: 30 CAPSULE | Refills: 5 | Status: SHIPPED | OUTPATIENT
Start: 2018-03-15 | End: 2019-04-15

## 2018-03-15 RX ORDER — GABAPENTIN 300 MG/1
300 CAPSULE ORAL 3 TIMES DAILY
COMMUNITY
End: 2018-04-06 | Stop reason: SDUPTHER

## 2018-03-15 RX ORDER — TRAMADOL HYDROCHLORIDE 50 MG/1
TABLET ORAL
Refills: 0 | COMMUNITY
Start: 2018-03-13 | End: 2018-04-06 | Stop reason: SDUPTHER

## 2018-03-15 RX ORDER — ALBUTEROL SULFATE 2.5 MG/3ML
2.5 SOLUTION RESPIRATORY (INHALATION) EVERY 6 HOURS PRN
Qty: 120 EACH | Refills: 5 | Status: SHIPPED | OUTPATIENT
Start: 2018-03-15 | End: 2019-04-15

## 2018-03-15 RX ORDER — FLUTICASONE FUROATE AND VILANTEROL 200; 25 UG/1; UG/1
1 POWDER RESPIRATORY (INHALATION) DAILY
Qty: 1 EACH | Refills: 5 | Status: SHIPPED | OUTPATIENT
Start: 2018-03-15 | End: 2019-04-15

## 2018-03-15 ASSESSMENT — ENCOUNTER SYMPTOMS
COLOR CHANGE: 0
DIARRHEA: 0
RHINORRHEA: 1
ANAL BLEEDING: 0
VOMITING: 0
TROUBLE SWALLOWING: 0
ABDOMINAL PAIN: 0
COUGH: 1
BLOOD IN STOOL: 0
CONSTIPATION: 0
PHOTOPHOBIA: 0
VOICE CHANGE: 0
ABDOMINAL DISTENTION: 0
FACIAL SWELLING: 0
APNEA: 0
EYE REDNESS: 0
CHOKING: 0
SHORTNESS OF BREATH: 0
EYE PAIN: 0
WHEEZING: 1
CHEST TIGHTNESS: 0
EYE DISCHARGE: 0
SINUS PRESSURE: 0
STRIDOR: 0
RECTAL PAIN: 0
SORE THROAT: 0
BACK PAIN: 0
EYE ITCHING: 0
NAUSEA: 0

## 2018-03-15 ASSESSMENT — PATIENT HEALTH QUESTIONNAIRE - PHQ9
SUM OF ALL RESPONSES TO PHQ QUESTIONS 1-9: 0
SUM OF ALL RESPONSES TO PHQ9 QUESTIONS 1 & 2: 0
2. FEELING DOWN, DEPRESSED OR HOPELESS: 0
2. FEELING DOWN, DEPRESSED OR HOPELESS: 0
SUM OF ALL RESPONSES TO PHQ9 QUESTIONS 1 & 2: 0
1. LITTLE INTEREST OR PLEASURE IN DOING THINGS: 0
SUM OF ALL RESPONSES TO PHQ QUESTIONS 1-9: 0
SUM OF ALL RESPONSES TO PHQ QUESTIONS 1-9: 0
SUM OF ALL RESPONSES TO PHQ9 QUESTIONS 1 & 2: 0
SUM OF ALL RESPONSES TO PHQ9 QUESTIONS 1 & 2: 0
2. FEELING DOWN, DEPRESSED OR HOPELESS: 0
1. LITTLE INTEREST OR PLEASURE IN DOING THINGS: 0
2. FEELING DOWN, DEPRESSED OR HOPELESS: 0
SUM OF ALL RESPONSES TO PHQ QUESTIONS 1-9: 0

## 2018-03-15 NOTE — PROGRESS NOTES
Hematological: Negative for adenopathy. Does not bruise/bleed easily. Psychiatric/Behavioral: Negative for agitation, behavioral problems, confusion, decreased concentration, dysphoric mood, hallucinations, self-injury, sleep disturbance and suicidal ideas. The patient is not nervous/anxious and is not hyperactive. Past Medical History:   Diagnosis Date    Alcohol abuse     COPD (chronic obstructive pulmonary disease) ([address]     History of cocaine use     Hyperlipidemia     Hypertension     Marijuana use     quit 2017     alberto        Social History   Substance Use Topics    Smoking status: Former Smoker     Packs/day: 1.00     Years: 38.00     Types: Cigarettes     Quit date: 3/3/2018    Smokeless tobacco: Never Used      Comment: using electric cigarette, no alcohol or smoking 24 hours preop    Alcohol use 0.0 oz/week      Comment: 6-12 beers every other day       Family History   Problem Relation Age of Onset    Arthritis Mother     Other Mother      glucoma    Heart Disease Father     High Blood Pressure Brother     Other Brother      sleep apnea    High Blood Pressure Brother     Other Brother      sleep apnea    High Blood Pressure Brother     Other Brother      sleep apnea    High Blood Pressure Brother     Other Brother      sleep apnea    Cancer Brother      esophogeal -  at 62       Current Outpatient Prescriptions   Medication Sig Dispense Refill    gabapentin (NEURONTIN) 300 MG capsule Take 300 mg by mouth 3 times daily.       tamsulosin (FLOMAX) 0.4 MG capsule Take 1 capsule by mouth daily 30 capsule 5    Fluticasone Furoate-Vilanterol (BREO ELLIPTA) 200-25 MCG/INH AEPB Inhale 1 puff into the lungs daily 1 each 5    albuterol (PROVENTIL) (2.5 MG/3ML) 0.083% nebulizer solution Take 3 mLs by nebulization every 6 hours as needed for Wheezing 120 each 5    amLODIPine (NORVASC) 10 MG tablet Take 1 tablet by mouth daily 30 tablet 2    lisinopril (PRINIVIL;ZESTRIL) present. Cardiovascular: Normal rate, regular rhythm, normal heart sounds and intact distal pulses. Exam reveals no gallop and no friction rub. No murmur heard. Pulmonary/Chest: Effort normal. No stridor. No respiratory distress. He has wheezes. He has no rales. He exhibits no tenderness. Abdominal: Soft. Bowel sounds are normal. He exhibits no distension and no mass. There is no tenderness. There is no rebound and no guarding. Genitourinary:   Genitourinary Comments: Deferred by patient   Musculoskeletal: Normal range of motion. He exhibits no edema or tenderness. Lymphadenopathy:     He has no cervical adenopathy. Neurological: He is alert and oriented to person, place, and time. He has normal reflexes. No cranial nerve deficit. He exhibits normal muscle tone. Coordination normal.   Skin: Skin is warm and dry. No rash noted. He is not diaphoretic. No erythema. No pallor. Psychiatric: He has a normal mood and affect. His behavior is normal. Judgment and thought content normal.   Nursing note and vitals reviewed. Assessment / Plan:      Jakub Mcgill was seen today for follow-up from hospital and nicotine dependence. Diagnoses and all orders for this visit:    Community acquired bacterial pneumonia  -     Internal Referral to Smoking Cessation Program  -     albuterol (PROVENTIL) (2.5 MG/3ML) 0.083% nebulizer solution; Take 3 mLs by nebulization every 6 hours as needed for Wheezing    Cigarette nicotine dependence in remission  -     Internal Referral to Smoking Cessation Program  -     albuterol (PROVENTIL) (2.5 MG/3ML) 0.083% nebulizer solution; Take 3 mLs by nebulization every 6 hours as needed for Wheezing    Benign prostatic hyperplasia with urinary frequency  -     [facility]- Dana Small MD ([facility])  -     tamsulosin (FLOMAX) 0.4 MG capsule; Take 1 capsule by mouth daily  -     albuterol (PROVENTIL) (2.5 MG/3ML) 0.083% nebulizer solution;  Take 3 mLs by nebulization every 6 hours as

## 2018-03-17 LAB
EKG ATRIAL RATE: 115 BPM
EKG P AXIS: 31 DEGREES
EKG P-R INTERVAL: 112 MS
EKG Q-T INTERVAL: 324 MS
EKG QRS DURATION: 80 MS
EKG QTC CALCULATION (BAZETT): 448 MS
EKG R AXIS: -2 DEGREES
EKG T AXIS: 12 DEGREES
EKG VENTRICULAR RATE: 115 BPM

## 2018-04-06 DIAGNOSIS — R35.0 BENIGN PROSTATIC HYPERPLASIA WITH URINARY FREQUENCY: ICD-10-CM

## 2018-04-06 DIAGNOSIS — K21.9 GASTROESOPHAGEAL REFLUX DISEASE WITHOUT ESOPHAGITIS: ICD-10-CM

## 2018-04-06 DIAGNOSIS — J15.9 COMMUNITY ACQUIRED BACTERIAL PNEUMONIA: ICD-10-CM

## 2018-04-06 DIAGNOSIS — N40.1 BENIGN PROSTATIC HYPERPLASIA WITH URINARY FREQUENCY: ICD-10-CM

## 2018-04-06 DIAGNOSIS — F17.211 CIGARETTE NICOTINE DEPENDENCE IN REMISSION: ICD-10-CM

## 2018-04-06 RX ORDER — TRAMADOL HYDROCHLORIDE 50 MG/1
50 TABLET ORAL EVERY 6 HOURS PRN
Qty: 90 TABLET | Refills: 0 | Status: SHIPPED | OUTPATIENT
Start: 2018-04-06 | End: 2018-05-11 | Stop reason: SDUPTHER

## 2018-04-06 RX ORDER — GABAPENTIN 300 MG/1
300 CAPSULE ORAL 3 TIMES DAILY
Qty: 90 CAPSULE | Refills: 1 | Status: SHIPPED | OUTPATIENT
Start: 2018-04-06 | End: 2018-05-09 | Stop reason: SDUPTHER

## 2018-05-09 DIAGNOSIS — F17.211 CIGARETTE NICOTINE DEPENDENCE IN REMISSION: ICD-10-CM

## 2018-05-09 DIAGNOSIS — J15.9 COMMUNITY ACQUIRED BACTERIAL PNEUMONIA: ICD-10-CM

## 2018-05-09 DIAGNOSIS — I10 BENIGN ESSENTIAL HTN: ICD-10-CM

## 2018-05-09 DIAGNOSIS — R35.0 BENIGN PROSTATIC HYPERPLASIA WITH URINARY FREQUENCY: ICD-10-CM

## 2018-05-09 DIAGNOSIS — K21.9 GASTROESOPHAGEAL REFLUX DISEASE WITHOUT ESOPHAGITIS: ICD-10-CM

## 2018-05-09 DIAGNOSIS — N40.1 BENIGN PROSTATIC HYPERPLASIA WITH URINARY FREQUENCY: ICD-10-CM

## 2018-05-09 RX ORDER — GABAPENTIN 300 MG/1
300 CAPSULE ORAL 3 TIMES DAILY
Qty: 90 CAPSULE | Refills: 1 | Status: ON HOLD | OUTPATIENT
Start: 2018-05-09 | End: 2019-10-02 | Stop reason: SDUPTHER

## 2018-05-09 RX ORDER — AMLODIPINE BESYLATE 10 MG/1
10 TABLET ORAL DAILY
Qty: 30 TABLET | Refills: 2 | Status: SHIPPED | OUTPATIENT
Start: 2018-05-09 | End: 2018-07-22

## 2018-05-09 RX ORDER — HYDROCHLOROTHIAZIDE 12.5 MG/1
12.5 CAPSULE, GELATIN COATED ORAL DAILY
Qty: 30 CAPSULE | Refills: 5 | Status: SHIPPED | OUTPATIENT
Start: 2018-05-09 | End: 2018-07-22

## 2018-05-11 DIAGNOSIS — R35.0 BENIGN PROSTATIC HYPERPLASIA WITH URINARY FREQUENCY: ICD-10-CM

## 2018-05-11 DIAGNOSIS — N40.1 BENIGN PROSTATIC HYPERPLASIA WITH URINARY FREQUENCY: ICD-10-CM

## 2018-05-11 DIAGNOSIS — F17.211 CIGARETTE NICOTINE DEPENDENCE IN REMISSION: ICD-10-CM

## 2018-05-11 DIAGNOSIS — K21.9 GASTROESOPHAGEAL REFLUX DISEASE WITHOUT ESOPHAGITIS: ICD-10-CM

## 2018-05-11 DIAGNOSIS — J15.9 COMMUNITY ACQUIRED BACTERIAL PNEUMONIA: ICD-10-CM

## 2018-05-11 RX ORDER — TRAMADOL HYDROCHLORIDE 50 MG/1
50 TABLET ORAL EVERY 6 HOURS PRN
Qty: 90 TABLET | Refills: 0 | Status: SHIPPED | OUTPATIENT
Start: 2018-05-11 | End: 2018-06-15 | Stop reason: SDUPTHER

## 2018-05-24 ENCOUNTER — TELEPHONE (OUTPATIENT)
Dept: PULMONOLOGY | Age: 53
End: 2018-05-24

## 2018-05-24 DIAGNOSIS — J16.0 CAP (COMMUNITY ACQUIRED PNEUMONIA) DUE TO CHLAMYDIA SPECIES: Primary | ICD-10-CM

## 2018-05-25 ENCOUNTER — HOSPITAL ENCOUNTER (OUTPATIENT)
Dept: GENERAL RADIOLOGY | Age: 53
Discharge: HOME | End: 2018-05-27
Payer: MEDICARE

## 2018-05-25 ENCOUNTER — HOSPITAL ENCOUNTER (OUTPATIENT)
Age: 53
Discharge: HOME | End: 2018-05-27
Payer: MEDICARE

## 2018-05-25 ENCOUNTER — OFFICE VISIT (OUTPATIENT)
Dept: PULMONOLOGY | Age: 53
End: 2018-05-25
Payer: MEDICARE

## 2018-05-25 VITALS
TEMPERATURE: 98.2 F | WEIGHT: 206 LBS | OXYGEN SATURATION: 95 % | BODY MASS INDEX: 32.33 KG/M2 | HEART RATE: 80 BPM | SYSTOLIC BLOOD PRESSURE: 180 MMHG | HEIGHT: 67 IN | DIASTOLIC BLOOD PRESSURE: 92 MMHG

## 2018-05-25 DIAGNOSIS — Z99.89 OSA ON CPAP: ICD-10-CM

## 2018-05-25 DIAGNOSIS — G47.33 OSA ON CPAP: ICD-10-CM

## 2018-05-25 DIAGNOSIS — J16.0 CAP (COMMUNITY ACQUIRED PNEUMONIA) DUE TO CHLAMYDIA SPECIES: ICD-10-CM

## 2018-05-25 DIAGNOSIS — J16.0 CAP (COMMUNITY ACQUIRED PNEUMONIA) DUE TO CHLAMYDIA SPECIES: Primary | ICD-10-CM

## 2018-05-25 LAB
DLCO %PRED: NORMAL
DLCO/VA %PRED: NORMAL
DLCO: NORMAL ML/MMHG SEC
FEF 25-75% PRE PRED: 120
FEF 25-75%-PRE: 3.68
FEV1 %PRED-PRE: 88
FEV1/FVC PRED: 104
FEV1/FVC: 83 %
FEV1: 3.03 LITERS
FEV3 PRE: 3.41
FVC %PRED-PRE: 84
FVC: 3.66 LITERS
MEP: NORMAL
MIP: NORMAL
PEF %PRED-PRE: NORMAL
PEF-PRE: 576.9 L/SEC
TLC %PRED: NORMAL
TLC: NORMAL LITERS

## 2018-05-25 PROCEDURE — 99203 OFFICE O/P NEW LOW 30 MIN: CPT | Performed by: INTERNAL MEDICINE

## 2018-05-25 PROCEDURE — 3017F COLORECTAL CA SCREEN DOC REV: CPT | Performed by: INTERNAL MEDICINE

## 2018-05-25 PROCEDURE — 4004F PT TOBACCO SCREEN RCVD TLK: CPT | Performed by: INTERNAL MEDICINE

## 2018-05-25 PROCEDURE — 99214 OFFICE O/P EST MOD 30 MIN: CPT | Performed by: INTERNAL MEDICINE

## 2018-05-25 PROCEDURE — G8417 CALC BMI ABV UP PARAM F/U: HCPCS | Performed by: INTERNAL MEDICINE

## 2018-05-25 PROCEDURE — 71046 X-RAY EXAM CHEST 2 VIEWS: CPT

## 2018-05-25 PROCEDURE — 94010 BREATHING CAPACITY TEST: CPT | Performed by: INTERNAL MEDICINE

## 2018-05-25 PROCEDURE — G8427 DOCREV CUR MEDS BY ELIG CLIN: HCPCS | Performed by: INTERNAL MEDICINE

## 2018-05-25 ASSESSMENT — PULMONARY FUNCTION TESTS
FVC: 3.66
FEV1: 3.03
FEV1/FVC_PREDICTED: 104
FEV1/FVC: 83
FEV1_PERCENT_PREDICTED_PRE: 88
FVC_PERCENT_PREDICTED_PRE: 84

## 2018-06-15 ENCOUNTER — OFFICE VISIT (OUTPATIENT)
Dept: FAMILY MEDICINE CLINIC | Age: 53
End: 2018-06-15
Payer: MEDICARE

## 2018-06-15 VITALS
WEIGHT: 204 LBS | DIASTOLIC BLOOD PRESSURE: 88 MMHG | HEART RATE: 76 BPM | SYSTOLIC BLOOD PRESSURE: 138 MMHG | BODY MASS INDEX: 31.95 KG/M2 | OXYGEN SATURATION: 96 %

## 2018-06-15 DIAGNOSIS — K21.9 GASTROESOPHAGEAL REFLUX DISEASE WITHOUT ESOPHAGITIS: ICD-10-CM

## 2018-06-15 DIAGNOSIS — J15.9 COMMUNITY ACQUIRED BACTERIAL PNEUMONIA: ICD-10-CM

## 2018-06-15 DIAGNOSIS — F17.211 CIGARETTE NICOTINE DEPENDENCE IN REMISSION: ICD-10-CM

## 2018-06-15 DIAGNOSIS — R35.0 BENIGN PROSTATIC HYPERPLASIA WITH URINARY FREQUENCY: ICD-10-CM

## 2018-06-15 DIAGNOSIS — N40.1 BENIGN PROSTATIC HYPERPLASIA WITH URINARY FREQUENCY: ICD-10-CM

## 2018-06-15 PROCEDURE — 1036F TOBACCO NON-USER: CPT | Performed by: FAMILY MEDICINE

## 2018-06-15 PROCEDURE — 3017F COLORECTAL CA SCREEN DOC REV: CPT | Performed by: FAMILY MEDICINE

## 2018-06-15 PROCEDURE — 99213 OFFICE O/P EST LOW 20 MIN: CPT | Performed by: FAMILY MEDICINE

## 2018-06-15 PROCEDURE — G8417 CALC BMI ABV UP PARAM F/U: HCPCS | Performed by: FAMILY MEDICINE

## 2018-06-15 PROCEDURE — G8427 DOCREV CUR MEDS BY ELIG CLIN: HCPCS | Performed by: FAMILY MEDICINE

## 2018-06-15 RX ORDER — TRAMADOL HYDROCHLORIDE 50 MG/1
50 TABLET ORAL EVERY 6 HOURS PRN
Qty: 90 TABLET | Refills: 2 | Status: SHIPPED | OUTPATIENT
Start: 2018-06-15 | End: 2018-07-15

## 2018-06-15 ASSESSMENT — ENCOUNTER SYMPTOMS
PHOTOPHOBIA: 0
APNEA: 0
TROUBLE SWALLOWING: 0
CHEST TIGHTNESS: 0
EYE DISCHARGE: 0
CHOKING: 0
BLOOD IN STOOL: 0
SINUS PRESSURE: 0
VOICE CHANGE: 0
STRIDOR: 0
EYE REDNESS: 0
EYE PAIN: 0
FACIAL SWELLING: 0
COLOR CHANGE: 0
ABDOMINAL DISTENTION: 0
EYE ITCHING: 0
ANAL BLEEDING: 0
HEARTBURN: 1
BACK PAIN: 0
RECTAL PAIN: 0

## 2018-07-22 ENCOUNTER — APPOINTMENT (OUTPATIENT)
Dept: GENERAL RADIOLOGY | Age: 53
End: 2018-07-22
Payer: MEDICARE

## 2018-07-22 ENCOUNTER — HOSPITAL ENCOUNTER (EMERGENCY)
Age: 53
Discharge: HOME | End: 2018-07-22
Payer: MEDICARE

## 2018-07-22 VITALS
SYSTOLIC BLOOD PRESSURE: 170 MMHG | WEIGHT: 204 LBS | HEART RATE: 86 BPM | HEIGHT: 67 IN | RESPIRATION RATE: 16 BRPM | TEMPERATURE: 97.5 F | OXYGEN SATURATION: 97 % | BODY MASS INDEX: 32.02 KG/M2 | DIASTOLIC BLOOD PRESSURE: 100 MMHG

## 2018-07-22 DIAGNOSIS — I10 BENIGN ESSENTIAL HTN: ICD-10-CM

## 2018-07-22 DIAGNOSIS — S93.402A SPRAIN OF LEFT ANKLE, UNSPECIFIED LIGAMENT, INITIAL ENCOUNTER: ICD-10-CM

## 2018-07-22 DIAGNOSIS — R03.0 ELEVATED BLOOD PRESSURE READING: Primary | ICD-10-CM

## 2018-07-22 DIAGNOSIS — Z91.14 NON COMPLIANCE W MEDICATION REGIMEN: ICD-10-CM

## 2018-07-22 PROCEDURE — 96372 THER/PROPH/DIAG INJ SC/IM: CPT

## 2018-07-22 PROCEDURE — 99283 EMERGENCY DEPT VISIT LOW MDM: CPT

## 2018-07-22 PROCEDURE — 73610 X-RAY EXAM OF ANKLE: CPT

## 2018-07-22 PROCEDURE — 73590 X-RAY EXAM OF LOWER LEG: CPT

## 2018-07-22 PROCEDURE — 6370000000 HC RX 637 (ALT 250 FOR IP): Performed by: PHYSICIAN ASSISTANT

## 2018-07-22 PROCEDURE — 6360000002 HC RX W HCPCS: Performed by: PHYSICIAN ASSISTANT

## 2018-07-22 RX ORDER — CLONIDINE HYDROCHLORIDE 0.1 MG/1
0.2 TABLET ORAL ONCE
Status: COMPLETED | OUTPATIENT
Start: 2018-07-22 | End: 2018-07-22

## 2018-07-22 RX ORDER — TRAMADOL HYDROCHLORIDE 50 MG/1
50 TABLET ORAL ONCE
Status: COMPLETED | OUTPATIENT
Start: 2018-07-22 | End: 2018-07-22

## 2018-07-22 RX ORDER — HYDROCHLOROTHIAZIDE 12.5 MG/1
12.5 CAPSULE, GELATIN COATED ORAL DAILY
Qty: 30 CAPSULE | Refills: 3 | Status: ON HOLD | OUTPATIENT
Start: 2018-07-22 | End: 2019-08-23 | Stop reason: HOSPADM

## 2018-07-22 RX ORDER — AMLODIPINE BESYLATE 10 MG/1
10 TABLET ORAL DAILY
Qty: 30 TABLET | Refills: 0 | Status: ON HOLD | OUTPATIENT
Start: 2018-07-22 | End: 2019-02-23 | Stop reason: HOSPADM

## 2018-07-22 RX ORDER — LISINOPRIL 40 MG/1
40 TABLET ORAL DAILY
Qty: 30 TABLET | Refills: 0 | Status: ON HOLD | OUTPATIENT
Start: 2018-07-22 | End: 2020-11-05 | Stop reason: HOSPADM

## 2018-07-22 RX ORDER — FUROSEMIDE 20 MG/1
20 TABLET ORAL ONCE
Status: COMPLETED | OUTPATIENT
Start: 2018-07-22 | End: 2018-07-22

## 2018-07-22 RX ORDER — HYDRALAZINE HYDROCHLORIDE 20 MG/ML
10 INJECTION INTRAMUSCULAR; INTRAVENOUS ONCE
Status: COMPLETED | OUTPATIENT
Start: 2018-07-22 | End: 2018-07-22

## 2018-07-22 RX ADMIN — FUROSEMIDE 20 MG: 20 TABLET ORAL at 19:35

## 2018-07-22 RX ADMIN — TRAMADOL HYDROCHLORIDE 50 MG: 50 TABLET, FILM COATED ORAL at 18:24

## 2018-07-22 RX ADMIN — HYDRALAZINE HYDROCHLORIDE 10 MG: 20 INJECTION INTRAMUSCULAR; INTRAVENOUS at 19:34

## 2018-07-22 RX ADMIN — CLONIDINE HYDROCHLORIDE 0.2 MG: 0.1 TABLET ORAL at 18:24

## 2018-07-22 ASSESSMENT — PAIN DESCRIPTION - LOCATION
LOCATION: ANKLE
LOCATION: ANKLE

## 2018-07-22 ASSESSMENT — PAIN DESCRIPTION - PAIN TYPE
TYPE: ACUTE PAIN
TYPE: ACUTE PAIN

## 2018-07-22 ASSESSMENT — PAIN DESCRIPTION - ORIENTATION
ORIENTATION: LEFT
ORIENTATION: LEFT

## 2018-07-22 ASSESSMENT — PAIN DESCRIPTION - DESCRIPTORS: DESCRIPTORS: ACHING

## 2018-07-22 ASSESSMENT — PAIN SCALES - GENERAL
PAINLEVEL_OUTOF10: 3
PAINLEVEL_OUTOF10: 7
PAINLEVEL_OUTOF10: 8

## 2018-07-22 ASSESSMENT — PAIN DESCRIPTION - FREQUENCY: FREQUENCY: CONTINUOUS

## 2018-07-22 NOTE — ED PROVIDER NOTES
Independent:      HPI:  7/22/18, Time: 6:14PM.       Amira Ellis is a 52 y.o. male presenting to the ED for evaluation of left ankle and lower leg pain, beginning around 4am today. The patient was walking down the stairs at home when he twisted his left ankle. He had difficult swelling to his left ankle which has since improved slightly. He is able to ambulate, but with much discomfort. He did not strike his head. The complaint has been persistent, moderate to severe in severity, and worsened by movement and local palpation as well as ambulation. He was also noted to have an elevated blood pressure on exam. He states he has been out of his blood pressure for a little while. He states he was unable to afford his medications. He is in the process of changing family physicians. He denies any chest pain or SOB. ROS:   Pertinent positives and negatives are stated within the HPI, all other systems reviewed and are negative.    --------------------------------------------- PAST HISTORY ---------------------------------------------  Past Medical History:  has a past medical history of Alcohol abuse; COPD (chronic obstructive pulmonary disease) ([facility] [address] History of cocaine use; Hyperlipidemia; Hypertension; Marijuana use; and alberto. Past Surgical History:  has a past surgical history that includes Wrist surgery; cervical fusion (11/18/15); and polysomnography (01/29/2018). Social History:  reports that he quit smoking about 4 months ago. His smoking use included Cigarettes. He has a 38.00 pack-year smoking history. He has never used smokeless tobacco. He reports that he drinks alcohol. He reports that he uses drugs, including Marijuana and Cocaine. Family History: family history includes Arthritis in his mother; Cancer in his brother; Heart Disease in his father; High Blood Pressure in his brother, brother, brother, and brother; Other in his brother, brother, brother, brother, and mother.      The patients home medications have been reviewed. Allergies: Patient has no known allergies. -------------------------------------------------- RESULTS -------------------------------------------------  All laboratory and radiology results have been personally reviewed by myself   LABS:  No results found for this visit on 07/22/18. RADIOLOGY:  Interpreted by Radiologist.  XR TIBIA FIBULA LEFT (2 VIEWS)   Final Result   Probable ankle sprain. No evidence of lower leg fracture or   displacement. XR ANKLE LEFT (MIN 3 VIEWS)   Final Result   No evidence of fracture or displacement. Probable ankle sprain.          ------------------------- NURSING NOTES AND VITALS REVIEWED ---------------------------   The nursing notes within the ED encounter and vital signs as below have been reviewed. BP (!) 170/100   Pulse 86   Temp 97.5 °F (36.4 °C)   Resp 16   Ht 5' 7\" (1.702 m)   Wt 204 lb (92.5 kg)   SpO2 97%   BMI 31.95 kg/m²   Oxygen Saturation Interpretation: Normal    ---------------------------------------------------PHYSICAL EXAM--------------------------------------      Constitutional/General: Alert and oriented x3, well appearing, non toxic in NAD  Head: NC/AT  Eyes: PERRL, EOMI  Ears and Nose: no discharge or bleeding. Mouth: Oropharynx clear, handling secretions, no trismus  Neck: Supple, full ROM, non tender. Pulmonary: Lungs clear to auscultation bilaterally, no wheezes, rales, or rhonchi. Not in respiratory distress  Cardiovascular:  Regular rate and rhythm. . 2+ distal pulses  Extremities: Moves all extremities x 4. Local soft tissue swelling noted to left medial and lateral ankle, mild bruising noted along left inferior lateral ankle. No local tenderness to dorsal aspect of left foot. Distal pulses DP and PT intact and strong. Achilles intact and nontender. Range of motion of left knee, no obvious swelling or local tenderness.  Mild local tenderness along mid to lower aspect of the anterior left tibial region. Warm and well perfused. Skin: warm and dry without rash  Neurologic: GCS 15,  Psych: Normal Affect    ------------------------------ ED COURSE/MEDICAL DECISION MAKING----------------------  Medications   cloNIDine (CATAPRES) tablet 0.2 mg (0.2 mg Oral Given 7/22/18 1824)   traMADol (ULTRAM) tablet 50 mg (50 mg Oral Given 7/22/18 1824)   hydrALAZINE (APRESOLINE) injection 10 mg (10 mg Intramuscular Given 7/22/18 1934)   furosemide (LASIX) tablet 20 mg (20 mg Oral Given 7/22/18 1935)       Medical Decision Making:    Patient to ER with complaints of left ankle pain after twisted at home around 4AM.    Patient advised of need to check x-rays of his left ankle. Patient given dose of oral tramadol as well as dose of clonidine for his blood pressure as he has been out of his medications. Recheck blood pressure done by myself was 180/130 manually to his right arm. IM hydralazine and oral dose of Lasix is given. We will observe patient and recheck blood pressure. He reports no current complaints of chest discomfort or severe headache. Patient observed in ER. Patient BP improved to 170/100. Patient Advised that I will allow him to be discharged home. I will refill his prescriptions for his blood pressure medications until he is able to get into his new PCP. He was advised to rest with his ankle and to ice and elevate it and take off the air cast and do some gentle range of motion's. He is aware that I cannot provide him with any pain medication for home as the tramadol is a controlled substance and he is okay with that. He will keep his appointment as scheduled. He is advised that he would develop any worsening signs of severe worsening headache of his life, chest pain or any worsening changes to return to ER immediately. Counseling:    The emergency provider has spoken with the patient and discussed todays results, in addition to providing specific details for the plan of care and counseling

## 2018-08-13 ENCOUNTER — HOSPITAL ENCOUNTER (EMERGENCY)
Age: 53
Discharge: HOME | End: 2018-08-13
Attending: EMERGENCY MEDICINE
Payer: MEDICARE

## 2018-08-13 VITALS
BODY MASS INDEX: 31.55 KG/M2 | DIASTOLIC BLOOD PRESSURE: 87 MMHG | WEIGHT: 201 LBS | HEIGHT: 67 IN | RESPIRATION RATE: 14 BRPM | SYSTOLIC BLOOD PRESSURE: 154 MMHG | HEART RATE: 111 BPM | OXYGEN SATURATION: 94 % | TEMPERATURE: 98.1 F

## 2018-08-13 DIAGNOSIS — M71.062 ABSCESS OF BURSA OF LEFT KNEE: Primary | ICD-10-CM

## 2018-08-13 DIAGNOSIS — M71.061 ABSCESS OF BURSA OF RIGHT KNEE: ICD-10-CM

## 2018-08-13 PROCEDURE — 6370000000 HC RX 637 (ALT 250 FOR IP): Performed by: EMERGENCY MEDICINE

## 2018-08-13 PROCEDURE — 90471 IMMUNIZATION ADMIN: CPT | Performed by: EMERGENCY MEDICINE

## 2018-08-13 PROCEDURE — 6360000002 HC RX W HCPCS: Performed by: EMERGENCY MEDICINE

## 2018-08-13 PROCEDURE — 27301 DRAIN THIGH/KNEE LESION: CPT

## 2018-08-13 PROCEDURE — 90715 TDAP VACCINE 7 YRS/> IM: CPT | Performed by: EMERGENCY MEDICINE

## 2018-08-13 PROCEDURE — 99282 EMERGENCY DEPT VISIT SF MDM: CPT

## 2018-08-13 RX ORDER — OXYCODONE HYDROCHLORIDE AND ACETAMINOPHEN 5; 325 MG/1; MG/1
1 TABLET ORAL EVERY 4 HOURS PRN
Qty: 6 TABLET | Refills: 0 | Status: SHIPPED | OUTPATIENT
Start: 2018-08-13 | End: 2018-08-15

## 2018-08-13 RX ORDER — IBUPROFEN 800 MG/1
800 TABLET ORAL EVERY 8 HOURS PRN
Qty: 21 TABLET | Refills: 0 | Status: SHIPPED | OUTPATIENT
Start: 2018-08-13 | End: 2019-04-15

## 2018-08-13 RX ORDER — LIDOCAINE HYDROCHLORIDE 10 MG/ML
5 INJECTION, SOLUTION INFILTRATION; PERINEURAL ONCE
Status: DISCONTINUED | OUTPATIENT
Start: 2018-08-13 | End: 2018-08-13 | Stop reason: HOSPADM

## 2018-08-13 RX ORDER — CEPHALEXIN 500 MG/1
500 CAPSULE ORAL ONCE
Status: COMPLETED | OUTPATIENT
Start: 2018-08-13 | End: 2018-08-13

## 2018-08-13 RX ORDER — CEPHALEXIN 500 MG/1
500 CAPSULE ORAL 4 TIMES DAILY
Qty: 40 CAPSULE | Refills: 0 | Status: SHIPPED | OUTPATIENT
Start: 2018-08-13 | End: 2018-08-23

## 2018-08-13 RX ADMIN — TETANUS TOXOID, REDUCED DIPHTHERIA TOXOID AND ACELLULAR PERTUSSIS VACCINE, ADSORBED 0.5 ML: 5; 2.5; 8; 8; 2.5 SUSPENSION INTRAMUSCULAR at 13:02

## 2018-08-13 RX ADMIN — CEPHALEXIN 500 MG: 500 CAPSULE ORAL at 13:01

## 2018-08-13 ASSESSMENT — ENCOUNTER SYMPTOMS
BACK PAIN: 0
SHORTNESS OF BREATH: 0
ABDOMINAL PAIN: 0
VOMITING: 0
DIARRHEA: 0
EYE DISCHARGE: 0
EYE REDNESS: 0
NAUSEA: 0
EYE PAIN: 0
WHEEZING: 0
SINUS PRESSURE: 0
SORE THROAT: 0
COUGH: 0

## 2018-08-13 ASSESSMENT — PAIN SCALES - GENERAL: PAINLEVEL_OUTOF10: 10

## 2018-08-13 ASSESSMENT — PAIN DESCRIPTION - LOCATION: LOCATION: KNEE

## 2018-08-13 ASSESSMENT — PAIN DESCRIPTION - ORIENTATION: ORIENTATION: RIGHT;LEFT

## 2018-08-13 ASSESSMENT — PAIN DESCRIPTION - PAIN TYPE: TYPE: ACUTE PAIN

## 2018-08-13 NOTE — ED PROVIDER NOTES
52-year-old male presents emergency Department with bilateral knee pain and swelling. 2 days ago the patient was working on his roof wearing a thin pair of jeans. He had his knees planted on for many hours while he was working. She woke up next morning with bilateral knee pain and swelling and had some yellow drainage from the knees. He states his continued did not get better and is concerned because of the swelling and drainage. He denies fevers chills nausea vomiting or any other infectious symptoms      The history is provided by the patient. Abscess   Location:  Leg  Leg abscess location:  L knee and R knee  Abscess quality: draining, fluctuance, induration, painful and redness    Red streaking: no    Duration:  2 days  Progression:  Worsening  Pain details:     Quality:  Hot    Severity:  Moderate    Timing:  Constant    Progression:  Waxing and waning  Chronicity:  New  Relieved by:  Nothing  Worsened by:  Nothing  Ineffective treatments:  None tried  Associated symptoms: no fever, no headaches, no nausea and no vomiting        Review of Systems   Constitutional: Negative for chills and fever. HENT: Negative for ear pain, sinus pressure and sore throat. Eyes: Negative for pain, discharge and redness. Respiratory: Negative for cough, shortness of breath and wheezing. Cardiovascular: Negative for chest pain. Gastrointestinal: Negative for abdominal pain, diarrhea, nausea and vomiting. Genitourinary: Negative for dysuria and frequency. Musculoskeletal: Negative for arthralgias and back pain. Skin: Positive for wound. Negative for rash. Neurological: Negative for weakness and headaches. Hematological: Negative for adenopathy. All other systems reviewed and are negative. Physical Exam   Constitutional: He is oriented to person, place, and time. He appears well-developed and well-nourished. He appears distressed. HENT:   Head: Normocephalic and atraumatic.    Eyes: Pupils are equal, round, and reactive to light. EOM are normal.   Neck: Normal range of motion. Neck supple. Cardiovascular: Normal rate, regular rhythm and normal heart sounds. Pulmonary/Chest: Effort normal and breath sounds normal.   Abdominal: Soft. Bowel sounds are normal.   Musculoskeletal:        Legs:  Patient able to bend bilateral knees without any difficulty other than superficial pain at the site of abscesses   Neurological: He is alert and oriented to person, place, and time. Procedures    [facility]  --------------------------------------------- PAST HISTORY ---------------------------------------------  Past Medical History:  has a past medical history of Alcohol abuse; COPD (chronic obstructive pulmonary disease) ([facility] [address] History of cocaine use; Hyperlipidemia; Hypertension; Marijuana use; and alberto. Past Surgical History:  has a past surgical history that includes Wrist surgery; cervical fusion (11/18/15); and polysomnography (01/29/2018). Social History:  reports that he has been smoking Cigarettes. He has a 57.00 pack-year smoking history. He has never used smokeless tobacco. He reports that he drinks alcohol. He reports that he uses drugs, including Marijuana. Family History: family history includes Arthritis in his mother; Cancer in his brother; Heart Disease in his father; High Blood Pressure in his brother, brother, brother, and brother; Other in his brother, brother, brother, brother, and mother. The patients home medications have been reviewed. Allergies: Patient has no known allergies. -------------------------------------------------- RESULTS -------------------------------------------------  Labs:  No results found for this visit on 08/13/18.     Radiology:  No orders to display       ------------------------- NURSING NOTES AND VITALS REVIEWED ---------------------------  Date / Time Roomed:  8/13/2018 12:04 PM  ED Bed Assignment:  17/17    The nursing notes within the ED

## 2019-02-20 ENCOUNTER — HOSPITAL ENCOUNTER (INPATIENT)
Age: 54
LOS: 3 days | Discharge: HOME | DRG: 193 | End: 2019-02-23
Attending: EMERGENCY MEDICINE | Admitting: GENERAL PRACTICE
Payer: MEDICARE

## 2019-02-20 ENCOUNTER — APPOINTMENT (OUTPATIENT)
Dept: GENERAL RADIOLOGY | Age: 54
DRG: 193 | End: 2019-02-20
Payer: MEDICARE

## 2019-02-20 ENCOUNTER — APPOINTMENT (OUTPATIENT)
Dept: CT IMAGING | Age: 54
DRG: 193 | End: 2019-02-20
Payer: MEDICARE

## 2019-02-20 DIAGNOSIS — J10.1 INFLUENZA A: Primary | ICD-10-CM

## 2019-02-20 DIAGNOSIS — I10 ESSENTIAL HYPERTENSION: ICD-10-CM

## 2019-02-20 DIAGNOSIS — F14.10 COCAINE ABUSE (HCC): ICD-10-CM

## 2019-02-20 DIAGNOSIS — R93.89 ABNORMAL CT SCAN, CHEST: ICD-10-CM

## 2019-02-20 DIAGNOSIS — E86.0 DEHYDRATION: ICD-10-CM

## 2019-02-20 DIAGNOSIS — J18.9 PNEUMONIA DUE TO ORGANISM: ICD-10-CM

## 2019-02-20 LAB
ACETAMINOPHEN LEVEL: <5 MCG/ML (ref 10–30)
ALBUMIN SERPL-MCNC: 4.2 G/DL (ref 3.5–5.2)
ALP BLD-CCNC: 65 U/L (ref 40–129)
ALT SERPL-CCNC: 18 U/L (ref 0–40)
AMPHETAMINE SCREEN, URINE: NOT DETECTED
ANION GAP SERPL CALCULATED.3IONS-SCNC: 13 MMOL/L (ref 7–16)
AST SERPL-CCNC: 30 U/L (ref 0–39)
B.E.: -2 MMOL/L (ref -3–3)
BACTERIA: NORMAL /HPF
BARBITURATE SCREEN URINE: NOT DETECTED
BASOPHILS ABSOLUTE: 0.03 E9/L (ref 0–0.2)
BASOPHILS RELATIVE PERCENT: 0.4 % (ref 0–2)
BENZODIAZEPINE SCREEN, URINE: NOT DETECTED
BILIRUB SERPL-MCNC: 0.4 MG/DL (ref 0–1.2)
BILIRUBIN URINE: NEGATIVE
BLOOD, URINE: ABNORMAL
BUN BLDV-MCNC: 14 MG/DL (ref 6–20)
CALCIUM SERPL-MCNC: 8.6 MG/DL (ref 8.6–10.2)
CANNABINOID SCREEN URINE: NOT DETECTED
CHLORIDE BLD-SCNC: 98 MMOL/L (ref 98–107)
CHP ED QC CHECK: NORMAL
CLARITY: CLEAR
CO2: 22 MMOL/L (ref 22–29)
COCAINE METABOLITE SCREEN URINE: POSITIVE
COHB: 1.1 % (ref 0–1.5)
COLOR: YELLOW
CREAT SERPL-MCNC: 1.5 MG/DL (ref 0.7–1.2)
CRITICAL: ABNORMAL
DATE ANALYZED: ABNORMAL
DATE OF COLLECTION: ABNORMAL
EOSINOPHILS ABSOLUTE: 0.03 E9/L (ref 0.05–0.5)
EOSINOPHILS RELATIVE PERCENT: 0.4 % (ref 0–6)
ETHANOL: <10 MG/DL (ref 0–0.08)
GFR AFRICAN AMERICAN: 59
GFR NON-AFRICAN AMERICAN: 48 ML/MIN/1.73
GLUCOSE BLD-MCNC: 98 MG/DL
GLUCOSE BLD-MCNC: 99 MG/DL (ref 74–99)
GLUCOSE URINE: NEGATIVE MG/DL
HCO3: 18.7 MMOL/L (ref 22–26)
HCT VFR BLD CALC: 43.2 % (ref 37–54)
HEMOGLOBIN: 14.7 G/DL (ref 12.5–16.5)
HHB: 6.2 % (ref 0–5)
IMMATURE GRANULOCYTES #: 0.02 E9/L
IMMATURE GRANULOCYTES %: 0.3 % (ref 0–5)
INFLUENZA A BY PCR: DETECTED
INFLUENZA B BY PCR: NOT DETECTED
KETONES, URINE: NEGATIVE MG/DL
LAB: ABNORMAL
LACTIC ACID: 1.2 MMOL/L (ref 0.5–2.2)
LEUKOCYTE ESTERASE, URINE: NEGATIVE
LYMPHOCYTES ABSOLUTE: 0.69 E9/L (ref 1.5–4)
LYMPHOCYTES RELATIVE PERCENT: 9.6 % (ref 20–42)
Lab: ABNORMAL
MCH RBC QN AUTO: 32.1 PG (ref 26–35)
MCHC RBC AUTO-ENTMCNC: 34 % (ref 32–34.5)
MCV RBC AUTO: 94.3 FL (ref 80–99.9)
METER GLUCOSE: 98 MG/DL (ref 74–99)
METHADONE SCREEN, URINE: NOT DETECTED
METHB: 0.2 % (ref 0–1.5)
MODE: ABNORMAL
MONOCYTES ABSOLUTE: 0.88 E9/L (ref 0.1–0.95)
MONOCYTES RELATIVE PERCENT: 12.2 % (ref 2–12)
NEUTROPHILS ABSOLUTE: 5.55 E9/L (ref 1.8–7.3)
NEUTROPHILS RELATIVE PERCENT: 77.1 % (ref 43–80)
NITRITE, URINE: NEGATIVE
O2 CONTENT: 20.5 ML/DL
O2 SATURATION: 93.7 % (ref 92–98.5)
O2HB: 92.5 % (ref 94–97)
OPERATOR ID: 3186
OPIATE SCREEN URINE: NOT DETECTED
PATIENT TEMP: 37 C
PCO2: 23.7 MMHG (ref 35–45)
PDW BLD-RTO: 13.5 FL (ref 11.5–15)
PH BLOOD GAS: 7.52 (ref 7.35–7.45)
PH UA: 7 (ref 5–9)
PHENCYCLIDINE SCREEN URINE: NOT DETECTED
PLATELET # BLD: 181 E9/L (ref 130–450)
PMV BLD AUTO: 9.8 FL (ref 7–12)
PO2: 62 MMHG (ref 60–100)
POTASSIUM SERPL-SCNC: 3.9 MMOL/L (ref 3.5–5)
PROPOXYPHENE SCREEN: NOT DETECTED
PROTEIN UA: 30 MG/DL
RBC # BLD: 4.58 E12/L (ref 3.8–5.8)
RBC UA: NORMAL /HPF (ref 0–2)
SALICYLATE, SERUM: <0.3 MG/DL (ref 0–30)
SODIUM BLD-SCNC: 133 MMOL/L (ref 132–146)
SOURCE, BLOOD GAS: ABNORMAL
SPECIFIC GRAVITY UA: 1.01 (ref 1–1.03)
STREP GRP A PCR: NEGATIVE
THB: 15.8 G/DL (ref 11.5–16.5)
TIME ANALYZED: 2036
TOTAL PROTEIN: 7.9 G/DL (ref 6.4–8.3)
TRICYCLIC ANTIDEPRESSANTS SCREEN SERUM: NEGATIVE NG/ML
UROBILINOGEN, URINE: 0.2 E.U./DL
WBC # BLD: 7.2 E9/L (ref 4.5–11.5)
WBC UA: NORMAL /HPF (ref 0–5)

## 2019-02-20 PROCEDURE — G0480 DRUG TEST DEF 1-7 CLASSES: HCPCS

## 2019-02-20 PROCEDURE — 84145 PROCALCITONIN (PCT): CPT

## 2019-02-20 PROCEDURE — 87581 M.PNEUMON DNA AMP PROBE: CPT

## 2019-02-20 PROCEDURE — 80307 DRUG TEST PRSMV CHEM ANLYZR: CPT

## 2019-02-20 PROCEDURE — 96375 TX/PRO/DX INJ NEW DRUG ADDON: CPT

## 2019-02-20 PROCEDURE — 6370000000 HC RX 637 (ALT 250 FOR IP): Performed by: GENERAL PRACTICE

## 2019-02-20 PROCEDURE — 87088 URINE BACTERIA CULTURE: CPT

## 2019-02-20 PROCEDURE — 81001 URINALYSIS AUTO W/SCOPE: CPT

## 2019-02-20 PROCEDURE — 6360000002 HC RX W HCPCS: Performed by: GENERAL PRACTICE

## 2019-02-20 PROCEDURE — 87502 INFLUENZA DNA AMP PROBE: CPT

## 2019-02-20 PROCEDURE — 85025 COMPLETE CBC W/AUTO DIFF WBC: CPT

## 2019-02-20 PROCEDURE — 87798 DETECT AGENT NOS DNA AMP: CPT

## 2019-02-20 PROCEDURE — 6370000000 HC RX 637 (ALT 250 FOR IP): Performed by: EMERGENCY MEDICINE

## 2019-02-20 PROCEDURE — 94664 DEMO&/EVAL PT USE INHALER: CPT

## 2019-02-20 PROCEDURE — 2000000000 HC ICU R&B

## 2019-02-20 PROCEDURE — 82962 GLUCOSE BLOOD TEST: CPT

## 2019-02-20 PROCEDURE — 96374 THER/PROPH/DIAG INJ IV PUSH: CPT

## 2019-02-20 PROCEDURE — 87633 RESP VIRUS 12-25 TARGETS: CPT

## 2019-02-20 PROCEDURE — 80053 COMPREHEN METABOLIC PANEL: CPT

## 2019-02-20 PROCEDURE — 71045 X-RAY EXAM CHEST 1 VIEW: CPT

## 2019-02-20 PROCEDURE — 36600 WITHDRAWAL OF ARTERIAL BLOOD: CPT

## 2019-02-20 PROCEDURE — 99284 EMERGENCY DEPT VISIT MOD MDM: CPT

## 2019-02-20 PROCEDURE — 2500000003 HC RX 250 WO HCPCS: Performed by: PHYSICIAN ASSISTANT

## 2019-02-20 PROCEDURE — 87040 BLOOD CULTURE FOR BACTERIA: CPT

## 2019-02-20 PROCEDURE — 83605 ASSAY OF LACTIC ACID: CPT

## 2019-02-20 PROCEDURE — 87486 CHLMYD PNEUM DNA AMP PROBE: CPT

## 2019-02-20 PROCEDURE — 2580000003 HC RX 258: Performed by: PHYSICIAN ASSISTANT

## 2019-02-20 PROCEDURE — 2580000003 HC RX 258: Performed by: GENERAL PRACTICE

## 2019-02-20 PROCEDURE — 6370000000 HC RX 637 (ALT 250 FOR IP): Performed by: PHYSICIAN ASSISTANT

## 2019-02-20 PROCEDURE — 6360000002 HC RX W HCPCS: Performed by: PHYSICIAN ASSISTANT

## 2019-02-20 PROCEDURE — 94640 AIRWAY INHALATION TREATMENT: CPT

## 2019-02-20 PROCEDURE — 87880 STREP A ASSAY W/OPTIC: CPT

## 2019-02-20 PROCEDURE — 71260 CT THORAX DX C+: CPT

## 2019-02-20 PROCEDURE — 82805 BLOOD GASES W/O2 SATURATION: CPT

## 2019-02-20 PROCEDURE — 70450 CT HEAD/BRAIN W/O DYE: CPT

## 2019-02-20 PROCEDURE — 36415 COLL VENOUS BLD VENIPUNCTURE: CPT

## 2019-02-20 PROCEDURE — 6360000002 HC RX W HCPCS: Performed by: INTERNAL MEDICINE

## 2019-02-20 PROCEDURE — 2580000003 HC RX 258: Performed by: RADIOLOGY

## 2019-02-20 PROCEDURE — 6360000004 HC RX CONTRAST MEDICATION: Performed by: RADIOLOGY

## 2019-02-20 RX ORDER — ACETAMINOPHEN 325 MG/1
650 TABLET ORAL EVERY 4 HOURS PRN
Status: DISCONTINUED | OUTPATIENT
Start: 2019-02-20 | End: 2019-02-21

## 2019-02-20 RX ORDER — FAMOTIDINE 20 MG/1
40 TABLET, FILM COATED ORAL NIGHTLY
Status: DISCONTINUED | OUTPATIENT
Start: 2019-02-20 | End: 2019-02-23 | Stop reason: HOSPADM

## 2019-02-20 RX ORDER — HYDRALAZINE HYDROCHLORIDE 10 MG/1
10 TABLET, FILM COATED ORAL ONCE
Status: DISCONTINUED | OUTPATIENT
Start: 2019-02-20 | End: 2019-02-20

## 2019-02-20 RX ORDER — IBUPROFEN 800 MG/1
800 TABLET ORAL ONCE
Status: COMPLETED | OUTPATIENT
Start: 2019-02-20 | End: 2019-02-20

## 2019-02-20 RX ORDER — KETOROLAC TROMETHAMINE 30 MG/ML
30 INJECTION, SOLUTION INTRAMUSCULAR; INTRAVENOUS ONCE
Status: COMPLETED | OUTPATIENT
Start: 2019-02-20 | End: 2019-02-20

## 2019-02-20 RX ORDER — OSELTAMIVIR PHOSPHATE 75 MG/1
75 CAPSULE ORAL ONCE
Status: COMPLETED | OUTPATIENT
Start: 2019-02-20 | End: 2019-02-20

## 2019-02-20 RX ORDER — LORAZEPAM 1 MG/1
1 TABLET ORAL EVERY 4 HOURS PRN
Status: DISCONTINUED | OUTPATIENT
Start: 2019-02-20 | End: 2019-02-23 | Stop reason: HOSPADM

## 2019-02-20 RX ORDER — LISINOPRIL 20 MG/1
40 TABLET ORAL DAILY
Status: DISCONTINUED | OUTPATIENT
Start: 2019-02-20 | End: 2019-02-21

## 2019-02-20 RX ORDER — IPRATROPIUM BROMIDE AND ALBUTEROL SULFATE 2.5; .5 MG/3ML; MG/3ML
1 SOLUTION RESPIRATORY (INHALATION) EVERY 4 HOURS
Status: DISCONTINUED | OUTPATIENT
Start: 2019-02-20 | End: 2019-02-23 | Stop reason: HOSPADM

## 2019-02-20 RX ORDER — DOXYCYCLINE HYCLATE 100 MG/1
100 CAPSULE ORAL EVERY 12 HOURS SCHEDULED
Status: DISCONTINUED | OUTPATIENT
Start: 2019-02-20 | End: 2019-02-20

## 2019-02-20 RX ORDER — TAMSULOSIN HYDROCHLORIDE 0.4 MG/1
0.4 CAPSULE ORAL DAILY
Status: DISCONTINUED | OUTPATIENT
Start: 2019-02-20 | End: 2019-02-23 | Stop reason: HOSPADM

## 2019-02-20 RX ORDER — ALBUTEROL SULFATE 2.5 MG/3ML
2.5 SOLUTION RESPIRATORY (INHALATION) EVERY 6 HOURS PRN
Status: DISCONTINUED | OUTPATIENT
Start: 2019-02-20 | End: 2019-02-23 | Stop reason: HOSPADM

## 2019-02-20 RX ORDER — HYDRALAZINE HYDROCHLORIDE 20 MG/ML
10 INJECTION INTRAMUSCULAR; INTRAVENOUS ONCE
Status: COMPLETED | OUTPATIENT
Start: 2019-02-20 | End: 2019-02-20

## 2019-02-20 RX ORDER — GABAPENTIN 300 MG/1
300 CAPSULE ORAL 3 TIMES DAILY
Status: DISCONTINUED | OUTPATIENT
Start: 2019-02-20 | End: 2019-02-21

## 2019-02-20 RX ORDER — SODIUM CHLORIDE 9 MG/ML
INJECTION, SOLUTION INTRAVENOUS CONTINUOUS
Status: DISCONTINUED | OUTPATIENT
Start: 2019-02-20 | End: 2019-02-21

## 2019-02-20 RX ORDER — BUDESONIDE 0.25 MG/2ML
1000 INHALANT ORAL 2 TIMES DAILY
Status: DISCONTINUED | OUTPATIENT
Start: 2019-02-21 | End: 2019-02-23 | Stop reason: HOSPADM

## 2019-02-20 RX ORDER — RANITIDINE 150 MG/1
300 TABLET ORAL NIGHTLY
Status: DISCONTINUED | OUTPATIENT
Start: 2019-02-20 | End: 2019-02-20 | Stop reason: CLARIF

## 2019-02-20 RX ORDER — 0.9 % SODIUM CHLORIDE 0.9 %
30 INTRAVENOUS SOLUTION INTRAVENOUS ONCE
Status: COMPLETED | OUTPATIENT
Start: 2019-02-20 | End: 2019-02-20

## 2019-02-20 RX ORDER — LEVOFLOXACIN 5 MG/ML
500 INJECTION, SOLUTION INTRAVENOUS EVERY 24 HOURS
Status: DISCONTINUED | OUTPATIENT
Start: 2019-02-20 | End: 2019-02-23 | Stop reason: HOSPADM

## 2019-02-20 RX ORDER — FINASTERIDE 5 MG/1
5 TABLET, FILM COATED ORAL DAILY
Status: DISCONTINUED | OUTPATIENT
Start: 2019-02-20 | End: 2019-02-23 | Stop reason: HOSPADM

## 2019-02-20 RX ORDER — FORMOTEROL FUMARATE 20 UG/2ML
20 SOLUTION RESPIRATORY (INHALATION) 2 TIMES DAILY
Status: DISCONTINUED | OUTPATIENT
Start: 2019-02-20 | End: 2019-02-23 | Stop reason: HOSPADM

## 2019-02-20 RX ORDER — HYDROCHLOROTHIAZIDE 12.5 MG/1
12.5 TABLET ORAL DAILY
Status: DISCONTINUED | OUTPATIENT
Start: 2019-02-20 | End: 2019-02-21

## 2019-02-20 RX ORDER — ACETAMINOPHEN 500 MG
1000 TABLET ORAL ONCE
Status: COMPLETED | OUTPATIENT
Start: 2019-02-20 | End: 2019-02-20

## 2019-02-20 RX ORDER — AMLODIPINE BESYLATE 10 MG/1
10 TABLET ORAL DAILY
Status: DISCONTINUED | OUTPATIENT
Start: 2019-02-20 | End: 2019-02-21

## 2019-02-20 RX ORDER — OSELTAMIVIR PHOSPHATE 75 MG/1
75 CAPSULE ORAL 2 TIMES DAILY
Status: DISCONTINUED | OUTPATIENT
Start: 2019-02-20 | End: 2019-02-23 | Stop reason: HOSPADM

## 2019-02-20 RX ORDER — SODIUM CHLORIDE 0.9 % (FLUSH) 0.9 %
10 SYRINGE (ML) INJECTION PRN
Status: DISCONTINUED | OUTPATIENT
Start: 2019-02-20 | End: 2019-02-23 | Stop reason: HOSPADM

## 2019-02-20 RX ORDER — BUDESONIDE 0.25 MG/2ML
250 INHALANT ORAL 2 TIMES DAILY
Status: DISCONTINUED | OUTPATIENT
Start: 2019-02-20 | End: 2019-02-20

## 2019-02-20 RX ADMIN — DOXYCYCLINE HYCLATE 100 MG: 100 CAPSULE ORAL at 21:14

## 2019-02-20 RX ADMIN — ENOXAPARIN SODIUM 40 MG: 40 INJECTION SUBCUTANEOUS at 18:51

## 2019-02-20 RX ADMIN — ACETAMINOPHEN 650 MG: 325 TABLET ORAL at 19:36

## 2019-02-20 RX ADMIN — GABAPENTIN 300 MG: 300 CAPSULE ORAL at 21:14

## 2019-02-20 RX ADMIN — HYDRALAZINE HYDROCHLORIDE 10 MG: 20 INJECTION INTRAMUSCULAR; INTRAVENOUS at 17:39

## 2019-02-20 RX ADMIN — FAMOTIDINE 40 MG: 20 TABLET, FILM COATED ORAL at 21:14

## 2019-02-20 RX ADMIN — AMLODIPINE BESYLATE 10 MG: 10 TABLET ORAL at 18:51

## 2019-02-20 RX ADMIN — KETOROLAC TROMETHAMINE 30 MG: 30 INJECTION, SOLUTION INTRAMUSCULAR; INTRAVENOUS at 18:30

## 2019-02-20 RX ADMIN — IBUPROFEN 800 MG: 800 TABLET, FILM COATED ORAL at 12:22

## 2019-02-20 RX ADMIN — LEVOFLOXACIN 500 MG: 5 INJECTION, SOLUTION INTRAVENOUS at 21:53

## 2019-02-20 RX ADMIN — OSELTAMIVIR PHOSPHATE 75 MG: 75 CAPSULE ORAL at 14:30

## 2019-02-20 RX ADMIN — FINASTERIDE 5 MG: 5 TABLET, FILM COATED ORAL at 18:51

## 2019-02-20 RX ADMIN — LISINOPRIL 40 MG: 20 TABLET ORAL at 18:51

## 2019-02-20 RX ADMIN — Medication 10 ML: at 11:22

## 2019-02-20 RX ADMIN — SODIUM CHLORIDE 2517 ML: 9 INJECTION, SOLUTION INTRAVENOUS at 09:57

## 2019-02-20 RX ADMIN — DOXYCYCLINE 100 MG: 100 INJECTION, POWDER, LYOPHILIZED, FOR SOLUTION INTRAVENOUS at 14:14

## 2019-02-20 RX ADMIN — ACETAMINOPHEN 1000 MG: 500 TABLET ORAL at 09:45

## 2019-02-20 RX ADMIN — LORAZEPAM 1 MG: 1 TABLET ORAL at 17:39

## 2019-02-20 RX ADMIN — BUDESONIDE 250 MCG: 0.25 SUSPENSION RESPIRATORY (INHALATION) at 20:32

## 2019-02-20 RX ADMIN — CEFTRIAXONE 1 G: 1 INJECTION, POWDER, FOR SOLUTION INTRAMUSCULAR; INTRAVENOUS at 13:00

## 2019-02-20 RX ADMIN — IOPAMIDOL 80 ML: 755 INJECTION, SOLUTION INTRAVENOUS at 11:22

## 2019-02-20 RX ADMIN — HYDRALAZINE HYDROCHLORIDE 10 MG: 20 INJECTION INTRAMUSCULAR; INTRAVENOUS at 09:54

## 2019-02-20 RX ADMIN — FORMOTEROL FUMARATE DIHYDRATE 20 MCG: 20 SOLUTION RESPIRATORY (INHALATION) at 20:32

## 2019-02-20 RX ADMIN — SODIUM CHLORIDE: 9 INJECTION, SOLUTION INTRAVENOUS at 17:39

## 2019-02-20 RX ADMIN — HYDROCHLOROTHIAZIDE 12.5 MG: 12.5 TABLET ORAL at 18:51

## 2019-02-20 RX ADMIN — OSELTAMIVIR PHOSPHATE 75 MG: 75 CAPSULE ORAL at 21:15

## 2019-02-20 RX ADMIN — TAMSULOSIN HYDROCHLORIDE 0.4 MG: 0.4 CAPSULE ORAL at 18:51

## 2019-02-20 ASSESSMENT — PAIN SCALES - GENERAL
PAINLEVEL_OUTOF10: 0

## 2019-02-21 LAB
ANION GAP SERPL CALCULATED.3IONS-SCNC: 10 MMOL/L (ref 7–16)
BASOPHILS ABSOLUTE: 0.01 E9/L (ref 0–0.2)
BASOPHILS RELATIVE PERCENT: 0.2 % (ref 0–2)
BUN BLDV-MCNC: 14 MG/DL (ref 6–20)
CALCIUM SERPL-MCNC: 8 MG/DL (ref 8.6–10.2)
CHLORIDE BLD-SCNC: 103 MMOL/L (ref 98–107)
CO2: 22 MMOL/L (ref 22–29)
CREAT SERPL-MCNC: 1.3 MG/DL (ref 0.7–1.2)
EOSINOPHILS ABSOLUTE: 0.01 E9/L (ref 0.05–0.5)
EOSINOPHILS RELATIVE PERCENT: 0.2 % (ref 0–6)
FILM ARRAY ADENOVIRUS: ABNORMAL
FILM ARRAY BORDETELLA PERTUSSIS: ABNORMAL
FILM ARRAY CHLAMYDOPHILIA PNEUMONIAE: ABNORMAL
FILM ARRAY CORONAVIRUS 229E: ABNORMAL
FILM ARRAY CORONAVIRUS HKU1: ABNORMAL
FILM ARRAY CORONAVIRUS NL63: ABNORMAL
FILM ARRAY CORONAVIRUS OC43: ABNORMAL
FILM ARRAY INFLUENZA A VIRUS H3: ABNORMAL
FILM ARRAY INFLUENZA B: ABNORMAL
FILM ARRAY METAPNEUMOVIRUS: ABNORMAL
FILM ARRAY MYCOPLASMA PNEUMONIAE: ABNORMAL
FILM ARRAY PARAINFLUENZA VIRUS 1: ABNORMAL
FILM ARRAY PARAINFLUENZA VIRUS 2: ABNORMAL
FILM ARRAY PARAINFLUENZA VIRUS 3: ABNORMAL
FILM ARRAY PARAINFLUENZA VIRUS 4: ABNORMAL
FILM ARRAY RESPIRATORY SYNCITIAL VIRUS: ABNORMAL
FILM ARRAY RHINOVIRUS/ENTEROVIRUS: ABNORMAL
GFR AFRICAN AMERICAN: >60
GFR NON-AFRICAN AMERICAN: 57 ML/MIN/1.73
GLUCOSE BLD-MCNC: 110 MG/DL (ref 74–99)
HCT VFR BLD CALC: 40.8 % (ref 37–54)
HEMOGLOBIN: 13.6 G/DL (ref 12.5–16.5)
IMMATURE GRANULOCYTES #: 0.02 E9/L
IMMATURE GRANULOCYTES %: 0.4 % (ref 0–5)
LYMPHOCYTES ABSOLUTE: 0.92 E9/L (ref 1.5–4)
LYMPHOCYTES RELATIVE PERCENT: 16.9 % (ref 20–42)
MCH RBC QN AUTO: 31.7 PG (ref 26–35)
MCHC RBC AUTO-ENTMCNC: 33.3 % (ref 32–34.5)
MCV RBC AUTO: 95.1 FL (ref 80–99.9)
MONOCYTES ABSOLUTE: 0.62 E9/L (ref 0.1–0.95)
MONOCYTES RELATIVE PERCENT: 11.4 % (ref 2–12)
NEUTROPHILS ABSOLUTE: 3.86 E9/L (ref 1.8–7.3)
NEUTROPHILS RELATIVE PERCENT: 70.9 % (ref 43–80)
ORGANISM: ABNORMAL
PDW BLD-RTO: 13.4 FL (ref 11.5–15)
PLATELET # BLD: 155 E9/L (ref 130–450)
PMV BLD AUTO: 10.2 FL (ref 7–12)
POTASSIUM SERPL-SCNC: 3.3 MMOL/L (ref 3.5–5)
PROCALCITONIN: 0.09 NG/ML (ref 0–0.08)
RBC # BLD: 4.29 E12/L (ref 3.8–5.8)
SODIUM BLD-SCNC: 135 MMOL/L (ref 132–146)
WBC # BLD: 5.4 E9/L (ref 4.5–11.5)

## 2019-02-21 PROCEDURE — 85025 COMPLETE CBC W/AUTO DIFF WBC: CPT

## 2019-02-21 PROCEDURE — 6360000002 HC RX W HCPCS: Performed by: INTERNAL MEDICINE

## 2019-02-21 PROCEDURE — 2060000000 HC ICU INTERMEDIATE R&B

## 2019-02-21 PROCEDURE — 6370000000 HC RX 637 (ALT 250 FOR IP): Performed by: GENERAL PRACTICE

## 2019-02-21 PROCEDURE — 2700000000 HC OXYGEN THERAPY PER DAY

## 2019-02-21 PROCEDURE — 87081 CULTURE SCREEN ONLY: CPT

## 2019-02-21 PROCEDURE — 6360000002 HC RX W HCPCS: Performed by: GENERAL PRACTICE

## 2019-02-21 PROCEDURE — 6370000000 HC RX 637 (ALT 250 FOR IP): Performed by: INTERNAL MEDICINE

## 2019-02-21 PROCEDURE — 36415 COLL VENOUS BLD VENIPUNCTURE: CPT

## 2019-02-21 PROCEDURE — 80048 BASIC METABOLIC PNL TOTAL CA: CPT

## 2019-02-21 PROCEDURE — 94640 AIRWAY INHALATION TREATMENT: CPT

## 2019-02-21 RX ORDER — AMLODIPINE BESYLATE 5 MG/1
5 TABLET ORAL DAILY
Status: DISCONTINUED | OUTPATIENT
Start: 2019-02-21 | End: 2019-02-23

## 2019-02-21 RX ORDER — NICOTINE 21 MG/24HR
1 PATCH, TRANSDERMAL 24 HOURS TRANSDERMAL DAILY
Status: DISCONTINUED | OUTPATIENT
Start: 2019-02-21 | End: 2019-02-23 | Stop reason: HOSPADM

## 2019-02-21 RX ORDER — POTASSIUM CHLORIDE 20 MEQ/1
40 TABLET, EXTENDED RELEASE ORAL ONCE
Status: COMPLETED | OUTPATIENT
Start: 2019-02-21 | End: 2019-02-21

## 2019-02-21 RX ORDER — ACETAMINOPHEN 325 MG/1
650 TABLET ORAL EVERY 4 HOURS PRN
Status: DISCONTINUED | OUTPATIENT
Start: 2019-02-21 | End: 2019-02-23 | Stop reason: HOSPADM

## 2019-02-21 RX ORDER — METHYLPREDNISOLONE SODIUM SUCCINATE 40 MG/ML
40 INJECTION, POWDER, LYOPHILIZED, FOR SOLUTION INTRAMUSCULAR; INTRAVENOUS EVERY 12 HOURS
Status: DISCONTINUED | OUTPATIENT
Start: 2019-02-21 | End: 2019-02-23 | Stop reason: HOSPADM

## 2019-02-21 RX ADMIN — FINASTERIDE 5 MG: 5 TABLET, FILM COATED ORAL at 11:00

## 2019-02-21 RX ADMIN — ACETAMINOPHEN 650 MG: 325 TABLET ORAL at 03:23

## 2019-02-21 RX ADMIN — METHYLPREDNISOLONE SODIUM SUCCINATE 40 MG: 40 INJECTION, POWDER, FOR SOLUTION INTRAMUSCULAR; INTRAVENOUS at 22:24

## 2019-02-21 RX ADMIN — LEVOFLOXACIN 500 MG: 5 INJECTION, SOLUTION INTRAVENOUS at 22:24

## 2019-02-21 RX ADMIN — METHYLPREDNISOLONE SODIUM SUCCINATE 40 MG: 40 INJECTION, POWDER, FOR SOLUTION INTRAMUSCULAR; INTRAVENOUS at 11:01

## 2019-02-21 RX ADMIN — FORMOTEROL FUMARATE DIHYDRATE 20 MCG: 20 SOLUTION RESPIRATORY (INHALATION) at 19:14

## 2019-02-21 RX ADMIN — BUDESONIDE 1000 MCG: 0.25 SUSPENSION RESPIRATORY (INHALATION) at 07:28

## 2019-02-21 RX ADMIN — ENOXAPARIN SODIUM 40 MG: 40 INJECTION SUBCUTANEOUS at 20:22

## 2019-02-21 RX ADMIN — FORMOTEROL FUMARATE DIHYDRATE 20 MCG: 20 SOLUTION RESPIRATORY (INHALATION) at 07:28

## 2019-02-21 RX ADMIN — OSELTAMIVIR PHOSPHATE 75 MG: 75 CAPSULE ORAL at 09:19

## 2019-02-21 RX ADMIN — POTASSIUM CHLORIDE 40 MEQ: 20 TABLET, EXTENDED RELEASE ORAL at 09:27

## 2019-02-21 RX ADMIN — ACETAMINOPHEN 650 MG: 325 TABLET ORAL at 22:34

## 2019-02-21 RX ADMIN — IPRATROPIUM BROMIDE AND ALBUTEROL SULFATE 1 AMPULE: .5; 3 SOLUTION RESPIRATORY (INHALATION) at 19:14

## 2019-02-21 RX ADMIN — BUDESONIDE 1000 MCG: 0.25 SUSPENSION RESPIRATORY (INHALATION) at 19:17

## 2019-02-21 RX ADMIN — LORAZEPAM 1 MG: 1 TABLET ORAL at 03:23

## 2019-02-21 RX ADMIN — AMLODIPINE BESYLATE 5 MG: 5 TABLET ORAL at 10:59

## 2019-02-21 RX ADMIN — IPRATROPIUM BROMIDE AND ALBUTEROL SULFATE 1 AMPULE: .5; 3 SOLUTION RESPIRATORY (INHALATION) at 11:33

## 2019-02-21 RX ADMIN — ACETAMINOPHEN 650 MG: 325 TABLET ORAL at 10:53

## 2019-02-21 RX ADMIN — OSELTAMIVIR PHOSPHATE 75 MG: 75 CAPSULE ORAL at 20:22

## 2019-02-21 RX ADMIN — FAMOTIDINE 40 MG: 20 TABLET, FILM COATED ORAL at 20:22

## 2019-02-21 RX ADMIN — TAMSULOSIN HYDROCHLORIDE 0.4 MG: 0.4 CAPSULE ORAL at 11:01

## 2019-02-21 ASSESSMENT — PAIN DESCRIPTION - FREQUENCY: FREQUENCY: CONTINUOUS

## 2019-02-21 ASSESSMENT — PAIN DESCRIPTION - LOCATION: LOCATION: HEAD

## 2019-02-21 ASSESSMENT — PAIN SCALES - GENERAL
PAINLEVEL_OUTOF10: 0
PAINLEVEL_OUTOF10: 3
PAINLEVEL_OUTOF10: 0
PAINLEVEL_OUTOF10: 0
PAINLEVEL_OUTOF10: 5
PAINLEVEL_OUTOF10: 0
PAINLEVEL_OUTOF10: 0

## 2019-02-21 ASSESSMENT — PAIN DESCRIPTION - ONSET: ONSET: GRADUAL

## 2019-02-21 ASSESSMENT — PAIN DESCRIPTION - PROGRESSION: CLINICAL_PROGRESSION: GRADUALLY WORSENING

## 2019-02-21 ASSESSMENT — PAIN DESCRIPTION - DESCRIPTORS: DESCRIPTORS: CONSTANT;DISCOMFORT;HEADACHE

## 2019-02-21 ASSESSMENT — PAIN DESCRIPTION - ORIENTATION: ORIENTATION: ANTERIOR

## 2019-02-21 ASSESSMENT — PAIN - FUNCTIONAL ASSESSMENT: PAIN_FUNCTIONAL_ASSESSMENT: ACTIVITIES ARE NOT PREVENTED

## 2019-02-21 ASSESSMENT — PAIN DESCRIPTION - PAIN TYPE: TYPE: ACUTE PAIN

## 2019-02-22 LAB
ORGANISM: ABNORMAL
URINE CULTURE, ROUTINE: NORMAL

## 2019-02-22 PROCEDURE — 6360000002 HC RX W HCPCS: Performed by: INTERNAL MEDICINE

## 2019-02-22 PROCEDURE — 6370000000 HC RX 637 (ALT 250 FOR IP): Performed by: GENERAL PRACTICE

## 2019-02-22 PROCEDURE — 6360000002 HC RX W HCPCS: Performed by: GENERAL PRACTICE

## 2019-02-22 PROCEDURE — 6370000000 HC RX 637 (ALT 250 FOR IP): Performed by: INTERNAL MEDICINE

## 2019-02-22 PROCEDURE — 94640 AIRWAY INHALATION TREATMENT: CPT

## 2019-02-22 PROCEDURE — 2700000000 HC OXYGEN THERAPY PER DAY

## 2019-02-22 PROCEDURE — 2060000000 HC ICU INTERMEDIATE R&B

## 2019-02-22 PROCEDURE — 2580000003 HC RX 258: Performed by: RADIOLOGY

## 2019-02-22 RX ORDER — DOXYCYCLINE HYCLATE 100 MG/1
100 CAPSULE ORAL EVERY 12 HOURS SCHEDULED
Status: DISCONTINUED | OUTPATIENT
Start: 2019-02-22 | End: 2019-02-23 | Stop reason: HOSPADM

## 2019-02-22 RX ADMIN — IPRATROPIUM BROMIDE AND ALBUTEROL SULFATE 1 AMPULE: .5; 3 SOLUTION RESPIRATORY (INHALATION) at 12:25

## 2019-02-22 RX ADMIN — Medication 10 ML: at 11:01

## 2019-02-22 RX ADMIN — FAMOTIDINE 40 MG: 20 TABLET, FILM COATED ORAL at 21:34

## 2019-02-22 RX ADMIN — METHYLPREDNISOLONE SODIUM SUCCINATE 40 MG: 40 INJECTION, POWDER, FOR SOLUTION INTRAMUSCULAR; INTRAVENOUS at 23:17

## 2019-02-22 RX ADMIN — AMLODIPINE BESYLATE 5 MG: 5 TABLET ORAL at 09:01

## 2019-02-22 RX ADMIN — LEVOFLOXACIN 500 MG: 5 INJECTION, SOLUTION INTRAVENOUS at 22:18

## 2019-02-22 RX ADMIN — FORMOTEROL FUMARATE DIHYDRATE 20 MCG: 20 SOLUTION RESPIRATORY (INHALATION) at 08:28

## 2019-02-22 RX ADMIN — IPRATROPIUM BROMIDE AND ALBUTEROL SULFATE 1 AMPULE: .5; 3 SOLUTION RESPIRATORY (INHALATION) at 08:27

## 2019-02-22 RX ADMIN — FORMOTEROL FUMARATE DIHYDRATE 20 MCG: 20 SOLUTION RESPIRATORY (INHALATION) at 20:17

## 2019-02-22 RX ADMIN — OSELTAMIVIR PHOSPHATE 75 MG: 75 CAPSULE ORAL at 21:25

## 2019-02-22 RX ADMIN — OSELTAMIVIR PHOSPHATE 75 MG: 75 CAPSULE ORAL at 09:01

## 2019-02-22 RX ADMIN — IPRATROPIUM BROMIDE AND ALBUTEROL SULFATE 1 AMPULE: .5; 3 SOLUTION RESPIRATORY (INHALATION) at 20:17

## 2019-02-22 RX ADMIN — ENOXAPARIN SODIUM 40 MG: 40 INJECTION SUBCUTANEOUS at 18:37

## 2019-02-22 RX ADMIN — DOXYCYCLINE HYCLATE 100 MG: 100 CAPSULE ORAL at 22:19

## 2019-02-22 RX ADMIN — BUDESONIDE 1000 MCG: 0.25 SUSPENSION RESPIRATORY (INHALATION) at 20:19

## 2019-02-22 RX ADMIN — TAMSULOSIN HYDROCHLORIDE 0.4 MG: 0.4 CAPSULE ORAL at 09:00

## 2019-02-22 RX ADMIN — METHYLPREDNISOLONE SODIUM SUCCINATE 40 MG: 40 INJECTION, POWDER, FOR SOLUTION INTRAMUSCULAR; INTRAVENOUS at 11:00

## 2019-02-22 RX ADMIN — FINASTERIDE 5 MG: 5 TABLET, FILM COATED ORAL at 09:01

## 2019-02-22 RX ADMIN — BUDESONIDE 1000 MCG: 0.25 SUSPENSION RESPIRATORY (INHALATION) at 08:28

## 2019-02-22 RX ADMIN — LORAZEPAM 1 MG: 1 TABLET ORAL at 21:34

## 2019-02-22 RX ADMIN — Medication 10 ML: at 09:00

## 2019-02-22 ASSESSMENT — PAIN SCALES - GENERAL
PAINLEVEL_OUTOF10: 0

## 2019-02-23 VITALS
RESPIRATION RATE: 20 BRPM | DIASTOLIC BLOOD PRESSURE: 99 MMHG | TEMPERATURE: 97.9 F | OXYGEN SATURATION: 98 % | HEART RATE: 91 BPM | HEIGHT: 67 IN | SYSTOLIC BLOOD PRESSURE: 177 MMHG | BODY MASS INDEX: 31.19 KG/M2 | WEIGHT: 198.7 LBS

## 2019-02-23 PROCEDURE — 6360000002 HC RX W HCPCS: Performed by: INTERNAL MEDICINE

## 2019-02-23 PROCEDURE — 6360000002 HC RX W HCPCS: Performed by: GENERAL PRACTICE

## 2019-02-23 PROCEDURE — 6370000000 HC RX 637 (ALT 250 FOR IP): Performed by: GENERAL PRACTICE

## 2019-02-23 PROCEDURE — 94640 AIRWAY INHALATION TREATMENT: CPT

## 2019-02-23 PROCEDURE — 6370000000 HC RX 637 (ALT 250 FOR IP): Performed by: INTERNAL MEDICINE

## 2019-02-23 RX ORDER — OSELTAMIVIR PHOSPHATE 75 MG/1
75 CAPSULE ORAL 2 TIMES DAILY
Qty: 4 CAPSULE | Refills: 0 | Status: SHIPPED | OUTPATIENT
Start: 2019-02-23 | End: 2019-02-25

## 2019-02-23 RX ORDER — PREDNISONE 10 MG/1
TABLET ORAL
Qty: 18 TABLET | Refills: 0 | Status: SHIPPED | OUTPATIENT
Start: 2019-02-23 | End: 2019-04-15

## 2019-02-23 RX ORDER — DOXYCYCLINE HYCLATE 100 MG/1
100 CAPSULE ORAL EVERY 12 HOURS SCHEDULED
Qty: 20 CAPSULE | Refills: 0 | Status: SHIPPED | OUTPATIENT
Start: 2019-02-23 | End: 2019-03-05

## 2019-02-23 RX ORDER — NICOTINE 21 MG/24HR
1 PATCH, TRANSDERMAL 24 HOURS TRANSDERMAL DAILY
Qty: 30 PATCH | Refills: 3 | Status: SHIPPED | OUTPATIENT
Start: 2019-02-24 | End: 2019-04-15

## 2019-02-23 RX ADMIN — FORMOTEROL FUMARATE DIHYDRATE 20 MCG: 20 SOLUTION RESPIRATORY (INHALATION) at 07:28

## 2019-02-23 RX ADMIN — FINASTERIDE 5 MG: 5 TABLET, FILM COATED ORAL at 09:41

## 2019-02-23 RX ADMIN — AMLODIPINE BESYLATE 5 MG: 5 TABLET ORAL at 09:41

## 2019-02-23 RX ADMIN — BUDESONIDE 1000 MCG: 0.25 SUSPENSION RESPIRATORY (INHALATION) at 07:28

## 2019-02-23 RX ADMIN — OSELTAMIVIR PHOSPHATE 75 MG: 75 CAPSULE ORAL at 09:41

## 2019-02-23 RX ADMIN — IPRATROPIUM BROMIDE AND ALBUTEROL SULFATE 1 AMPULE: .5; 3 SOLUTION RESPIRATORY (INHALATION) at 07:27

## 2019-02-23 RX ADMIN — DOXYCYCLINE HYCLATE 100 MG: 100 CAPSULE ORAL at 09:41

## 2019-02-23 RX ADMIN — IPRATROPIUM BROMIDE AND ALBUTEROL SULFATE 1 AMPULE: .5; 3 SOLUTION RESPIRATORY (INHALATION) at 11:19

## 2019-02-23 RX ADMIN — LORAZEPAM 1 MG: 1 TABLET ORAL at 01:53

## 2019-02-23 RX ADMIN — TAMSULOSIN HYDROCHLORIDE 0.4 MG: 0.4 CAPSULE ORAL at 09:41

## 2019-02-23 ASSESSMENT — PAIN SCALES - GENERAL: PAINLEVEL_OUTOF10: 0

## 2019-02-25 LAB
BLOOD CULTURE, ROUTINE: NORMAL
CULTURE, BLOOD 2: NORMAL

## 2019-02-27 LAB — COCAINE, CONFIRM, URINE: >1000 NG/ML

## 2019-03-02 LAB
EKG ATRIAL RATE: 118 BPM
EKG P AXIS: 44 DEGREES
EKG P-R INTERVAL: 116 MS
EKG Q-T INTERVAL: 352 MS
EKG QRS DURATION: 80 MS
EKG QTC CALCULATION (BAZETT): 493 MS
EKG R AXIS: 16 DEGREES
EKG T AXIS: 58 DEGREES
EKG VENTRICULAR RATE: 118 BPM

## 2019-03-22 PROBLEM — J10.1 INFLUENZA A: Status: RESOLVED | Noted: 2019-02-20 | Resolved: 2019-03-22

## 2019-04-15 ENCOUNTER — APPOINTMENT (OUTPATIENT)
Dept: GENERAL RADIOLOGY | Age: 54
DRG: 190 | End: 2019-04-15
Payer: MEDICARE

## 2019-04-15 ENCOUNTER — HOSPITAL ENCOUNTER (INPATIENT)
Age: 54
LOS: 7 days | Discharge: HOME | DRG: 190 | End: 2019-04-23
Attending: EMERGENCY MEDICINE | Admitting: GENERAL PRACTICE
Payer: MEDICARE

## 2019-04-15 DIAGNOSIS — N40.1 BENIGN PROSTATIC HYPERPLASIA WITH URINARY FREQUENCY: ICD-10-CM

## 2019-04-15 DIAGNOSIS — J15.9 COMMUNITY ACQUIRED BACTERIAL PNEUMONIA: ICD-10-CM

## 2019-04-15 DIAGNOSIS — J96.01 ACUTE RESPIRATORY FAILURE WITH HYPOXIA (HCC): Primary | ICD-10-CM

## 2019-04-15 DIAGNOSIS — N28.9 RENAL INSUFFICIENCY: ICD-10-CM

## 2019-04-15 DIAGNOSIS — K21.9 GASTROESOPHAGEAL REFLUX DISEASE WITHOUT ESOPHAGITIS: ICD-10-CM

## 2019-04-15 DIAGNOSIS — R35.0 BENIGN PROSTATIC HYPERPLASIA WITH URINARY FREQUENCY: ICD-10-CM

## 2019-04-15 DIAGNOSIS — I10 HYPERTENSION, UNSPECIFIED TYPE: ICD-10-CM

## 2019-04-15 DIAGNOSIS — F17.211 CIGARETTE NICOTINE DEPENDENCE IN REMISSION: ICD-10-CM

## 2019-04-15 LAB
ALBUMIN SERPL-MCNC: 4 G/DL (ref 3.5–5.2)
ALP BLD-CCNC: 74 U/L (ref 40–129)
ALT SERPL-CCNC: 19 U/L (ref 0–40)
ANION GAP SERPL CALCULATED.3IONS-SCNC: 11 MMOL/L (ref 7–16)
AST SERPL-CCNC: 22 U/L (ref 0–39)
BASOPHILS ABSOLUTE: 0.07 E9/L (ref 0–0.2)
BASOPHILS RELATIVE PERCENT: 0.7 % (ref 0–2)
BILIRUB SERPL-MCNC: 0.2 MG/DL (ref 0–1.2)
BUN BLDV-MCNC: 23 MG/DL (ref 6–20)
CALCIUM SERPL-MCNC: 9.3 MG/DL (ref 8.6–10.2)
CHLORIDE BLD-SCNC: 104 MMOL/L (ref 98–107)
CO2: 24 MMOL/L (ref 22–29)
CREAT SERPL-MCNC: 1.7 MG/DL (ref 0.7–1.2)
EOSINOPHILS ABSOLUTE: 0.34 E9/L (ref 0.05–0.5)
EOSINOPHILS RELATIVE PERCENT: 3.5 % (ref 0–6)
GFR AFRICAN AMERICAN: 51
GFR NON-AFRICAN AMERICAN: 42 ML/MIN/1.73
GLUCOSE BLD-MCNC: 101 MG/DL (ref 74–99)
HCT VFR BLD CALC: 42.2 % (ref 37–54)
HEMOGLOBIN: 14.5 G/DL (ref 12.5–16.5)
IMMATURE GRANULOCYTES #: 0.05 E9/L
IMMATURE GRANULOCYTES %: 0.5 % (ref 0–5)
INFLUENZA A BY PCR: NOT DETECTED
INFLUENZA B BY PCR: NOT DETECTED
LACTIC ACID: 1.3 MMOL/L (ref 0.5–2.2)
LYMPHOCYTES ABSOLUTE: 2.71 E9/L (ref 1.5–4)
LYMPHOCYTES RELATIVE PERCENT: 28.2 % (ref 20–42)
MAGNESIUM: 2.1 MG/DL (ref 1.6–2.6)
MCH RBC QN AUTO: 32.7 PG (ref 26–35)
MCHC RBC AUTO-ENTMCNC: 34.4 % (ref 32–34.5)
MCV RBC AUTO: 95 FL (ref 80–99.9)
MONOCYTES ABSOLUTE: 0.98 E9/L (ref 0.1–0.95)
MONOCYTES RELATIVE PERCENT: 10.2 % (ref 2–12)
NEUTROPHILS ABSOLUTE: 5.45 E9/L (ref 1.8–7.3)
NEUTROPHILS RELATIVE PERCENT: 56.9 % (ref 43–80)
PDW BLD-RTO: 13.8 FL (ref 11.5–15)
PLATELET # BLD: 275 E9/L (ref 130–450)
PMV BLD AUTO: 10.5 FL (ref 7–12)
POTASSIUM SERPL-SCNC: 3.9 MMOL/L (ref 3.5–5)
RBC # BLD: 4.44 E12/L (ref 3.8–5.8)
SODIUM BLD-SCNC: 139 MMOL/L (ref 132–146)
TOTAL PROTEIN: 7.4 G/DL (ref 6.4–8.3)
TROPONIN: <0.01 NG/ML (ref 0–0.03)
WBC # BLD: 9.6 E9/L (ref 4.5–11.5)

## 2019-04-15 PROCEDURE — 87502 INFLUENZA DNA AMP PROBE: CPT

## 2019-04-15 PROCEDURE — 36415 COLL VENOUS BLD VENIPUNCTURE: CPT

## 2019-04-15 PROCEDURE — 71045 X-RAY EXAM CHEST 1 VIEW: CPT

## 2019-04-15 PROCEDURE — 87040 BLOOD CULTURE FOR BACTERIA: CPT

## 2019-04-15 PROCEDURE — 80053 COMPREHEN METABOLIC PANEL: CPT

## 2019-04-15 PROCEDURE — 83735 ASSAY OF MAGNESIUM: CPT

## 2019-04-15 PROCEDURE — 94664 DEMO&/EVAL PT USE INHALER: CPT

## 2019-04-15 PROCEDURE — 99285 EMERGENCY DEPT VISIT HI MDM: CPT

## 2019-04-15 PROCEDURE — 83605 ASSAY OF LACTIC ACID: CPT

## 2019-04-15 PROCEDURE — 96374 THER/PROPH/DIAG INJ IV PUSH: CPT

## 2019-04-15 PROCEDURE — 6370000000 HC RX 637 (ALT 250 FOR IP): Performed by: EMERGENCY MEDICINE

## 2019-04-15 PROCEDURE — 94640 AIRWAY INHALATION TREATMENT: CPT

## 2019-04-15 PROCEDURE — 87798 DETECT AGENT NOS DNA AMP: CPT

## 2019-04-15 PROCEDURE — 93005 ELECTROCARDIOGRAM TRACING: CPT | Performed by: EMERGENCY MEDICINE

## 2019-04-15 PROCEDURE — 87633 RESP VIRUS 12-25 TARGETS: CPT

## 2019-04-15 PROCEDURE — 87581 M.PNEUMON DNA AMP PROBE: CPT

## 2019-04-15 PROCEDURE — 84484 ASSAY OF TROPONIN QUANT: CPT

## 2019-04-15 PROCEDURE — 85025 COMPLETE CBC W/AUTO DIFF WBC: CPT

## 2019-04-15 PROCEDURE — 83880 ASSAY OF NATRIURETIC PEPTIDE: CPT

## 2019-04-15 PROCEDURE — 6360000002 HC RX W HCPCS: Performed by: EMERGENCY MEDICINE

## 2019-04-15 PROCEDURE — 87486 CHLMYD PNEUM DNA AMP PROBE: CPT

## 2019-04-15 RX ORDER — METOPROLOL SUCCINATE 25 MG/1
1 TABLET, EXTENDED RELEASE ORAL DAILY
Refills: 0 | Status: ON HOLD | COMMUNITY
Start: 2019-03-23 | End: 2019-04-23 | Stop reason: HOSPADM

## 2019-04-15 RX ORDER — TRAMADOL HYDROCHLORIDE 50 MG/1
1 TABLET ORAL 4 TIMES DAILY
Refills: 0 | Status: ON HOLD | COMMUNITY
Start: 2019-04-08 | End: 2019-10-01

## 2019-04-15 RX ORDER — IPRATROPIUM BROMIDE AND ALBUTEROL SULFATE 2.5; .5 MG/3ML; MG/3ML
3 SOLUTION RESPIRATORY (INHALATION) ONCE
Status: COMPLETED | OUTPATIENT
Start: 2019-04-15 | End: 2019-04-15

## 2019-04-15 RX ORDER — METHYLPREDNISOLONE SODIUM SUCCINATE 125 MG/2ML
125 INJECTION, POWDER, LYOPHILIZED, FOR SOLUTION INTRAMUSCULAR; INTRAVENOUS ONCE
Status: COMPLETED | OUTPATIENT
Start: 2019-04-15 | End: 2019-04-15

## 2019-04-15 RX ADMIN — METHYLPREDNISOLONE SODIUM SUCCINATE 125 MG: 125 INJECTION, POWDER, FOR SOLUTION INTRAMUSCULAR; INTRAVENOUS at 21:19

## 2019-04-15 RX ADMIN — IPRATROPIUM BROMIDE AND ALBUTEROL SULFATE 3 AMPULE: .5; 3 SOLUTION RESPIRATORY (INHALATION) at 21:39

## 2019-04-15 ASSESSMENT — ENCOUNTER SYMPTOMS
COUGH: 1
NAUSEA: 0
ABDOMINAL PAIN: 0
BACK PAIN: 0
SHORTNESS OF BREATH: 1

## 2019-04-16 PROBLEM — J44.1 COPD EXACERBATION (HCC): Status: ACTIVE | Noted: 2019-04-16

## 2019-04-16 PROBLEM — J96.01 ACUTE RESPIRATORY FAILURE WITH HYPOXIA (HCC): Status: ACTIVE | Noted: 2019-04-16

## 2019-04-16 LAB
ALBUMIN SERPL-MCNC: 4.1 G/DL (ref 3.5–5.2)
ALP BLD-CCNC: 65 U/L (ref 40–129)
ALT SERPL-CCNC: 18 U/L (ref 0–40)
ANION GAP SERPL CALCULATED.3IONS-SCNC: 15 MMOL/L (ref 7–16)
AST SERPL-CCNC: 18 U/L (ref 0–39)
BILIRUB SERPL-MCNC: 0.2 MG/DL (ref 0–1.2)
BUN BLDV-MCNC: 28 MG/DL (ref 6–20)
CALCIUM SERPL-MCNC: 9.7 MG/DL (ref 8.6–10.2)
CHLORIDE BLD-SCNC: 104 MMOL/L (ref 98–107)
CO2: 19 MMOL/L (ref 22–29)
CREAT SERPL-MCNC: 1.3 MG/DL (ref 0.7–1.2)
D DIMER: <200 NG/ML DDU
GFR AFRICAN AMERICAN: >60
GFR NON-AFRICAN AMERICAN: 57 ML/MIN/1.73
GLUCOSE BLD-MCNC: 127 MG/DL (ref 74–99)
HCT VFR BLD CALC: 42.3 % (ref 37–54)
HEMOGLOBIN: 14.6 G/DL (ref 12.5–16.5)
LV EF: 57 %
LVEF MODALITY: NORMAL
MCH RBC QN AUTO: 32.6 PG (ref 26–35)
MCHC RBC AUTO-ENTMCNC: 34.5 % (ref 32–34.5)
MCV RBC AUTO: 94.4 FL (ref 80–99.9)
PDW BLD-RTO: 13.9 FL (ref 11.5–15)
PLATELET # BLD: 297 E9/L (ref 130–450)
PMV BLD AUTO: 10.2 FL (ref 7–12)
POTASSIUM SERPL-SCNC: 4 MMOL/L (ref 3.5–5)
PRO-BNP: 4547 PG/ML (ref 0–125)
PROCALCITONIN: 0.05 NG/ML (ref 0–0.08)
RBC # BLD: 4.48 E12/L (ref 3.8–5.8)
SODIUM BLD-SCNC: 138 MMOL/L (ref 132–146)
STREP GRP A PCR: NEGATIVE
TOTAL PROTEIN: 7.8 G/DL (ref 6.4–8.3)
TROPONIN: <0.01 NG/ML (ref 0–0.03)
WBC # BLD: 16.1 E9/L (ref 4.5–11.5)

## 2019-04-16 PROCEDURE — 93306 TTE W/DOPPLER COMPLETE: CPT

## 2019-04-16 PROCEDURE — 84145 PROCALCITONIN (PCT): CPT

## 2019-04-16 PROCEDURE — 94640 AIRWAY INHALATION TREATMENT: CPT

## 2019-04-16 PROCEDURE — 6370000000 HC RX 637 (ALT 250 FOR IP): Performed by: GENERAL PRACTICE

## 2019-04-16 PROCEDURE — 6360000002 HC RX W HCPCS: Performed by: EMERGENCY MEDICINE

## 2019-04-16 PROCEDURE — 6360000002 HC RX W HCPCS: Performed by: GENERAL PRACTICE

## 2019-04-16 PROCEDURE — 87880 STREP A ASSAY W/OPTIC: CPT

## 2019-04-16 PROCEDURE — 36415 COLL VENOUS BLD VENIPUNCTURE: CPT

## 2019-04-16 PROCEDURE — 1200000000 HC SEMI PRIVATE

## 2019-04-16 PROCEDURE — 85378 FIBRIN DEGRADE SEMIQUANT: CPT

## 2019-04-16 PROCEDURE — 85027 COMPLETE CBC AUTOMATED: CPT

## 2019-04-16 PROCEDURE — 84484 ASSAY OF TROPONIN QUANT: CPT

## 2019-04-16 PROCEDURE — 80053 COMPREHEN METABOLIC PANEL: CPT

## 2019-04-16 PROCEDURE — 94664 DEMO&/EVAL PT USE INHALER: CPT

## 2019-04-16 RX ORDER — NICOTINE 21 MG/24HR
1 PATCH, TRANSDERMAL 24 HOURS TRANSDERMAL DAILY
Status: DISCONTINUED | OUTPATIENT
Start: 2019-04-16 | End: 2019-04-23 | Stop reason: HOSPADM

## 2019-04-16 RX ORDER — GABAPENTIN 300 MG/1
300 CAPSULE ORAL 3 TIMES DAILY
Status: DISCONTINUED | OUTPATIENT
Start: 2019-04-16 | End: 2019-04-23 | Stop reason: HOSPADM

## 2019-04-16 RX ORDER — FUROSEMIDE 10 MG/ML
40 INJECTION INTRAMUSCULAR; INTRAVENOUS DAILY
Status: DISCONTINUED | OUTPATIENT
Start: 2019-04-16 | End: 2019-04-19

## 2019-04-16 RX ORDER — METOPROLOL SUCCINATE 50 MG/1
50 TABLET, EXTENDED RELEASE ORAL DAILY
Status: DISCONTINUED | OUTPATIENT
Start: 2019-04-17 | End: 2019-04-23 | Stop reason: HOSPADM

## 2019-04-16 RX ORDER — DOXYCYCLINE HYCLATE 100 MG/1
100 CAPSULE ORAL EVERY 12 HOURS SCHEDULED
Status: DISCONTINUED | OUTPATIENT
Start: 2019-04-16 | End: 2019-04-23

## 2019-04-16 RX ORDER — METOPROLOL SUCCINATE 25 MG/1
25 TABLET, EXTENDED RELEASE ORAL DAILY
Status: DISCONTINUED | OUTPATIENT
Start: 2019-04-16 | End: 2019-04-16

## 2019-04-16 RX ORDER — FLUTICASONE FUROATE AND VILANTEROL 200; 25 UG/1; UG/1
1 POWDER RESPIRATORY (INHALATION) DAILY
Status: DISCONTINUED | OUTPATIENT
Start: 2019-04-16 | End: 2019-04-16 | Stop reason: CLARIF

## 2019-04-16 RX ORDER — HYDRALAZINE HYDROCHLORIDE 20 MG/ML
10 INJECTION INTRAMUSCULAR; INTRAVENOUS ONCE
Status: COMPLETED | OUTPATIENT
Start: 2019-04-16 | End: 2019-04-16

## 2019-04-16 RX ORDER — FAMOTIDINE 20 MG/1
40 TABLET, FILM COATED ORAL EVERY EVENING
Status: DISCONTINUED | OUTPATIENT
Start: 2019-04-16 | End: 2019-04-23

## 2019-04-16 RX ORDER — FUROSEMIDE 10 MG/ML
20 INJECTION INTRAMUSCULAR; INTRAVENOUS ONCE
Status: COMPLETED | OUTPATIENT
Start: 2019-04-16 | End: 2019-04-16

## 2019-04-16 RX ORDER — ALBUTEROL SULFATE 2.5 MG/3ML
2.5 SOLUTION RESPIRATORY (INHALATION) EVERY 6 HOURS PRN
Status: DISCONTINUED | OUTPATIENT
Start: 2019-04-16 | End: 2019-04-23 | Stop reason: HOSPADM

## 2019-04-16 RX ORDER — FINASTERIDE 5 MG/1
5 TABLET, FILM COATED ORAL DAILY
Status: DISCONTINUED | OUTPATIENT
Start: 2019-04-16 | End: 2019-04-23 | Stop reason: HOSPADM

## 2019-04-16 RX ORDER — DIPHENHYDRAMINE HCL 25 MG
25 TABLET ORAL NIGHTLY PRN
Status: DISCONTINUED | OUTPATIENT
Start: 2019-04-16 | End: 2019-04-23 | Stop reason: HOSPADM

## 2019-04-16 RX ORDER — METHYLPREDNISOLONE SODIUM SUCCINATE 40 MG/ML
40 INJECTION, POWDER, LYOPHILIZED, FOR SOLUTION INTRAMUSCULAR; INTRAVENOUS EVERY 8 HOURS
Status: DISCONTINUED | OUTPATIENT
Start: 2019-04-16 | End: 2019-04-21

## 2019-04-16 RX ORDER — TRAMADOL HYDROCHLORIDE 50 MG/1
50 TABLET ORAL 4 TIMES DAILY
Status: DISCONTINUED | OUTPATIENT
Start: 2019-04-16 | End: 2019-04-23 | Stop reason: HOSPADM

## 2019-04-16 RX ORDER — LISINOPRIL 20 MG/1
40 TABLET ORAL DAILY
Status: DISCONTINUED | OUTPATIENT
Start: 2019-04-16 | End: 2019-04-23 | Stop reason: HOSPADM

## 2019-04-16 RX ADMIN — HYDRALAZINE HYDROCHLORIDE 10 MG: 20 INJECTION INTRAMUSCULAR; INTRAVENOUS at 22:02

## 2019-04-16 RX ADMIN — FUROSEMIDE 40 MG: 10 INJECTION, SOLUTION INTRAMUSCULAR; INTRAVENOUS at 14:05

## 2019-04-16 RX ADMIN — ALBUTEROL SULFATE 2.5 MG: 2.5 SOLUTION RESPIRATORY (INHALATION) at 20:42

## 2019-04-16 RX ADMIN — LISINOPRIL 40 MG: 20 TABLET ORAL at 14:14

## 2019-04-16 RX ADMIN — FAMOTIDINE 40 MG: 20 TABLET ORAL at 17:48

## 2019-04-16 RX ADMIN — METOPROLOL SUCCINATE 25 MG: 25 TABLET, EXTENDED RELEASE ORAL at 14:14

## 2019-04-16 RX ADMIN — MOMETASONE FUROATE AND FORMOTEROL FUMARATE DIHYDRATE 2 PUFF: 200; 5 AEROSOL RESPIRATORY (INHALATION) at 20:42

## 2019-04-16 RX ADMIN — GABAPENTIN 300 MG: 300 CAPSULE ORAL at 14:14

## 2019-04-16 RX ADMIN — HYDRALAZINE HYDROCHLORIDE 10 MG: 20 INJECTION INTRAMUSCULAR; INTRAVENOUS at 02:19

## 2019-04-16 RX ADMIN — GABAPENTIN 300 MG: 300 CAPSULE ORAL at 22:02

## 2019-04-16 RX ADMIN — METHYLPREDNISOLONE SODIUM SUCCINATE 40 MG: 40 INJECTION, POWDER, LYOPHILIZED, FOR SOLUTION INTRAMUSCULAR; INTRAVENOUS at 18:30

## 2019-04-16 RX ADMIN — DOXYCYCLINE HYCLATE 100 MG: 100 CAPSULE ORAL at 22:02

## 2019-04-16 RX ADMIN — TRAMADOL HYDROCHLORIDE 50 MG: 50 TABLET, FILM COATED ORAL at 17:48

## 2019-04-16 RX ADMIN — TRAMADOL HYDROCHLORIDE 50 MG: 50 TABLET, FILM COATED ORAL at 22:02

## 2019-04-16 RX ADMIN — FUROSEMIDE 20 MG: 10 INJECTION, SOLUTION INTRAVENOUS at 01:13

## 2019-04-16 ASSESSMENT — PAIN DESCRIPTION - LOCATION
LOCATION: HEAD
LOCATION: HEAD

## 2019-04-16 ASSESSMENT — PAIN DESCRIPTION - FREQUENCY
FREQUENCY: INTERMITTENT
FREQUENCY: INTERMITTENT

## 2019-04-16 ASSESSMENT — PAIN DESCRIPTION - PAIN TYPE
TYPE: ACUTE PAIN
TYPE: ACUTE PAIN

## 2019-04-16 ASSESSMENT — PAIN - FUNCTIONAL ASSESSMENT: PAIN_FUNCTIONAL_ASSESSMENT: PREVENTS OR INTERFERES SOME ACTIVE ACTIVITIES AND ADLS

## 2019-04-16 ASSESSMENT — PAIN SCALES - GENERAL
PAINLEVEL_OUTOF10: 0
PAINLEVEL_OUTOF10: 9
PAINLEVEL_OUTOF10: 0
PAINLEVEL_OUTOF10: 5

## 2019-04-16 ASSESSMENT — PAIN DESCRIPTION - DESCRIPTORS
DESCRIPTORS: HEADACHE;DISCOMFORT;DULL
DESCRIPTORS: HEADACHE

## 2019-04-16 NOTE — ED NOTES
Pulse ox was 100% on room air at rest.   Ambulated patient on room air approx 100'. Oxygen saturation was 85 - 97% on room air while ambulating; pt's SPO2 desaturation was only during coughing episodes. Recovery pulse ox was 94% on room air at rest upon return to bed. O2 at 2L applied due to pt SOB with HORNE and coughing. SPO2 100% at rest at this time on O2 at 2L via NC.        Caitlin Escamilla RN  04/16/19 [id number]

## 2019-04-16 NOTE — PROGRESS NOTES
Patient seen feels sob. Breathing very shallow. bnp up troponin negative. Echo pending.  Pulmonary and cv consult pending

## 2019-04-16 NOTE — ED NOTES
FLAVIA faxed to [facility] ; receipt confirmed with Fanta and fax confirmation.      Guerline Clarke RN  04/16/19 0748

## 2019-04-16 NOTE — ED NOTES
Assumed care of patient. Pt lying in bed in no apparent distress. C/o \"stomachache\". No visitors at bedside.      Cali Sanchez RN  04/15/19 [id number]

## 2019-04-16 NOTE — CARE COORDINATION
Social Work / Discharge Planning : SW met with patient and explained role as discharge planner/ transition of care. Patient resides  in an apartment with his family. Patient has a cane. Patient currently on 02 and does NOT have at home. Patient also does NOT have a nebulizer. Patient stated he was approved through [facility] a year ago for a C-pap but he never heard anything. Patient admits to smoking but only 5 cigarettes a day. PCP is Dr. Jayson Merritt and pharmacy is [facility] on [address] [place] [address] . SW to follow for respiratory needs at discharge : home 02 and nebulizer. SW to follow.  Electronically signed by PETRA Razo on 4/16/19 at 3:05 PM

## 2019-04-16 NOTE — ED PROVIDER NOTES
This is a 75-year-old male with a past medical history of COPD, hypertension and hyperlipidemia who presents to the ED for evaluation of shortness of breath. Patient remarks that for the past 2 week she's been having increasing shortness of breath and a productive cough. He states he has not been on any recent steroids or antibiotics. The patient states that he does not use home oxygen and notes that her shortness of breath is worse when he exerts himself. He denies any leg pain leg swelling recent travel recent surgeries. Patient has no fevers chills or cough. He remarks that he still smokes cigarettes. The history is provided by the patient. No  was used. Review of Systems   Constitutional: Positive for activity change. Negative for fever. HENT: Negative for congestion. Eyes: Negative for visual disturbance. Respiratory: Positive for cough and shortness of breath. Cardiovascular: Positive for chest pain. Gastrointestinal: Negative for abdominal pain and nausea. Endocrine: Negative for polyuria. Genitourinary: Negative for dysuria. Musculoskeletal: Negative for back pain. Skin: Negative for rash. Allergic/Immunologic: Negative for immunocompromised state. Neurological: Negative for headaches. Hematological: Does not bruise/bleed easily. Psychiatric/Behavioral: Negative for confusion. Physical Exam   Constitutional: He is oriented to person, place, and time. He appears well-developed and well-nourished. No distress. HENT:   Head: Normocephalic and atraumatic. Mouth/Throat: Oropharynx is clear and moist.   Eyes: Pupils are equal, round, and reactive to light. EOM are normal.   Neck: Normal range of motion. Neck supple. No JVD present. Cardiovascular: Normal rate and regular rhythm. Pulmonary/Chest:   Poor air movement, wheezing at bases, no signs of respiratory distress   Abdominal: Soft. He exhibits no mass. There is no tenderness.  There is no rebound and no guarding. Musculoskeletal: He exhibits no edema. Neurological: He is alert and oriented to person, place, and time. No cranial nerve deficit. Skin: Skin is warm and dry. Psychiatric: He has a normal mood and affect. His behavior is normal.   Nursing note and vitals reviewed. Procedures    MDM  Number of Diagnoses or Management Options  Acute respiratory failure with hypoxia [facility]):   Renal insufficiency:   Diagnosis management comments: This is a 49-year-old male who presented to the ED for evaluation of shortness of breath and cough been ongoing for the past 2 weeks. Here in the ED the patient of course breath sounds bilaterally and was treated with DuoNeb's and steroids which did seem to improve his oxygenation. Patient also was toxic and was placed on nasal cannula as he stated he does not use oxygen at home. The patient was found to have likely overload on chest x-ray and elevated proBNP. She was treated with Lasix here in the ED I discussed the case with the patient's primary care provider Dr. Nagi Boss who agreed to admit the patient. While I did not order a CTA of the chest to rule out PE, it may be plausible to get a V/Q given his hypoxia which was unavaible here at night.              --------------------------------------------- PAST HISTORY ---------------------------------------------  Past Medical History:  has a past medical history of Alcohol abuse, COPD (chronic obstructive pulmonary disease) ([facility] [address] History of cocaine use, Hyperlipidemia, Hypertension, Marijuana use, and alberto. Past Surgical History:  has a past surgical history that includes Wrist surgery; cervical fusion (11/18/15); and polysomnography (01/29/2018). Social History:  reports that he has been smoking cigarettes. He has a 57.00 pack-year smoking history. He has never used smokeless tobacco. He reports that he drinks alcohol. He reports that he has current or past drug history.  Drugs: Marijuana and Cocaine. Family History: family history includes Arthritis in his mother; Cancer in his brother; Heart Disease in his father; High Blood Pressure in his brother, brother, brother, and brother; Other in his brother, brother, brother, brother, and mother. The patients home medications have been reviewed. Allergies: Patient has no known allergies.     -------------------------------------------------- RESULTS -------------------------------------------------  Labs:  Results for orders placed or performed during the hospital encounter of 04/15/19   Rapid influenza A/B antigens   Result Value Ref Range    Influenza A by PCR Not Detected Not Detected    Influenza B by PCR Not Detected Not Detected   Comprehensive Metabolic Panel   Result Value Ref Range    Sodium 139 132 - 146 mmol/L    Potassium 3.9 3.5 - 5.0 mmol/L    Chloride 104 98 - 107 mmol/L    CO2 24 22 - 29 mmol/L    Anion Gap 11 7 - 16 mmol/L    Glucose 101 (H) 74 - 99 mg/dL    BUN 23 (H) 6 - 20 mg/dL    CREATININE 1.7 (H) 0.7 - 1.2 mg/dL    GFR Non-African American 42 >=60 mL/min/1.73    GFR African American 51     Calcium 9.3 8.6 - 10.2 mg/dL    Total Protein 7.4 6.4 - 8.3 g/dL    Alb 4.0 3.5 - 5.2 g/dL    Total Bilirubin 0.2 0.0 - 1.2 mg/dL    Alkaline Phosphatase 74 40 - 129 U/L    ALT 19 0 - 40 U/L    AST 22 0 - 39 U/L   CBC Auto Differential   Result Value Ref Range    WBC 9.6 4.5 - 11.5 E9/L    RBC 4.44 3.80 - 5.80 E12/L    Hemoglobin 14.5 12.5 - 16.5 g/dL    Hematocrit 42.2 37.0 - 54.0 %    MCV 95.0 80.0 - 99.9 fL    MCH 32.7 26.0 - 35.0 pg    MCHC 34.4 32.0 - 34.5 %    RDW 13.8 11.5 - 15.0 fL    Platelets 953 382 - 874 E9/L    MPV 10.5 7.0 - 12.0 fL    Neutrophils % 56.9 43.0 - 80.0 %    Immature Granulocytes % 0.5 0.0 - 5.0 %    Lymphocytes % 28.2 20.0 - 42.0 %    Monocytes % 10.2 2.0 - 12.0 %    Eosinophils % 3.5 0.0 - 6.0 %    Basophils % 0.7 0.0 - 2.0 %    Neutrophils # 5.45 1.80 - 7.30 E9/L    Immature Granulocytes # 0.05 E9/L polysomnography (01/29/2018). Social History:  reports that he has been smoking cigarettes. He has a 57.00 pack-year smoking history. He has never used smokeless tobacco. He reports that he drinks alcohol. He reports that he has current or past drug history. Drugs: Marijuana and Cocaine. Family History: family history includes Arthritis in his mother; Cancer in his brother; Heart Disease in his father; High Blood Pressure in his brother, brother, brother, and brother; Other in his brother, brother, brother, brother, and mother. The patients home medications have been reviewed. Allergies: Patient has no known allergies.     -------------------------------------------------- RESULTS -------------------------------------------------    LABS:  Results for orders placed or performed during the hospital encounter of 04/15/19   Rapid influenza A/B antigens   Result Value Ref Range    Influenza A by PCR Not Detected Not Detected    Influenza B by PCR Not Detected Not Detected   Comprehensive Metabolic Panel   Result Value Ref Range    Sodium 139 132 - 146 mmol/L    Potassium 3.9 3.5 - 5.0 mmol/L    Chloride 104 98 - 107 mmol/L    CO2 24 22 - 29 mmol/L    Anion Gap 11 7 - 16 mmol/L    Glucose 101 (H) 74 - 99 mg/dL    BUN 23 (H) 6 - 20 mg/dL    CREATININE 1.7 (H) 0.7 - 1.2 mg/dL    GFR Non-African American 42 >=60 mL/min/1.73    GFR African American 51     Calcium 9.3 8.6 - 10.2 mg/dL    Total Protein 7.4 6.4 - 8.3 g/dL    Alb 4.0 3.5 - 5.2 g/dL    Total Bilirubin 0.2 0.0 - 1.2 mg/dL    Alkaline Phosphatase 74 40 - 129 U/L    ALT 19 0 - 40 U/L    AST 22 0 - 39 U/L   CBC Auto Differential   Result Value Ref Range    WBC 9.6 4.5 - 11.5 E9/L    RBC 4.44 3.80 - 5.80 E12/L    Hemoglobin 14.5 12.5 - 16.5 g/dL    Hematocrit 42.2 37.0 - 54.0 %    MCV 95.0 80.0 - 99.9 fL    MCH 32.7 26.0 - 35.0 pg    MCHC 34.4 32.0 - 34.5 %    RDW 13.8 11.5 - 15.0 fL    Platelets 262 986 - 737 E9/L    MPV 10.5 7.0 - 12.0 fL Neutrophils % 56.9 43.0 - 80.0 %    Immature Granulocytes % 0.5 0.0 - 5.0 %    Lymphocytes % 28.2 20.0 - 42.0 %    Monocytes % 10.2 2.0 - 12.0 %    Eosinophils % 3.5 0.0 - 6.0 %    Basophils % 0.7 0.0 - 2.0 %    Neutrophils # 5.45 1.80 - 7.30 E9/L    Immature Granulocytes # 0.05 E9/L    Lymphocytes # 2.71 1.50 - 4.00 E9/L    Monocytes # 0.98 (H) 0.10 - 0.95 E9/L    Eosinophils # 0.34 0.05 - 0.50 E9/L    Basophils # 0.07 0.00 - 0.20 E9/L   Troponin   Result Value Ref Range    Troponin <0.01 0.00 - 0.03 ng/mL   Lactic Acid, Plasma   Result Value Ref Range    Lactic Acid 1.3 0.5 - 2.2 mmol/L   Magnesium   Result Value Ref Range    Magnesium 2.1 1.6 - 2.6 mg/dL   Brain Natriuretic Peptide   Result Value Ref Range    Pro-BNP 4,547 (H) 0 - 125 pg/mL   EKG 12 Lead   Result Value Ref Range    Ventricular Rate 88 BPM    Atrial Rate 88 BPM    P-R Interval 132 ms    QRS Duration 88 ms    Q-T Interval 436 ms    QTc Calculation (Bazett) 527 ms    P Axis 24 degrees    R Axis 4 degrees    T Axis 16 degrees       RADIOLOGY:  XR CHEST PORTABLE   Final Result   Indistinct bronchovascular prominence in the lungs likely represents   pulmonary edema, and much less likely prominent airway inflammatory   change. There is no evidence of septal edema or effusion in the lower chest.   There is no evidence of consolidative pneumonia.              ------------------------- NURSING NOTES AND VITALS REVIEWED ---------------------------  Date / Time Roomed:  4/15/2019  8:17 PM  ED Bed Assignment:  [id number]/[id number]    The nursing notes within the ED encounter and vital signs as below have been reviewed.      Patient Vitals for the past 24 hrs:   BP Temp Temp src Pulse Resp SpO2 Height Weight   04/16/19 0353 -- -- -- -- -- -- -- 196 lb 1.6 oz (89 kg)   04/16/19 0330 (!) 158/102 -- -- -- -- -- -- --   04/16/19 0146 (!) 190/126 97.3 °F (36.3 °C) Oral 102 20 94 % -- --   04/16/19 0125 (!) 182/100 97.7 °F (36.5 °C) Oral 108 20 100 % -- --   04/16/19 Interpretation. EKG: This EKG is signed and interpreted by me.     Rate: 88  Rhythm: Sinus  Interpretation: prolonged QT interval  Comparison: changes compared to previous EKG       Sameera Lozano DO  Resident  04/16/19 0688

## 2019-04-16 NOTE — PROGRESS NOTES
[facility] Quality Flow/Interdisciplinary Rounds Progress Note        Quality Flow Rounds held on April 16, 2019    Disciplines Attending:  Bedside Nurse, ,  and Nursing Unit Leadership    Heather Pond was admitted on 4/15/2019  8:17 PM    Anticipated Discharge Date:  Expected Discharge Date: 04/19/19    Disposition:    Erick Score:  Erick Scale Score: 23    Readmission Risk              Risk of Unplanned Readmission:        9           Discussed patient goal for the day, patient clinical progression, and barriers to discharge.   The following Goal(s) of the Day/Commitment(s) have been identified:  Other  need orders       Venessa Lundborg  April 16, 2019

## 2019-04-17 ENCOUNTER — APPOINTMENT (OUTPATIENT)
Dept: GENERAL RADIOLOGY | Age: 54
DRG: 190 | End: 2019-04-17
Payer: MEDICARE

## 2019-04-17 LAB
EKG ATRIAL RATE: 88 BPM
EKG P AXIS: 24 DEGREES
EKG P-R INTERVAL: 132 MS
EKG Q-T INTERVAL: 436 MS
EKG QRS DURATION: 88 MS
EKG QTC CALCULATION (BAZETT): 527 MS
EKG R AXIS: 4 DEGREES
EKG T AXIS: 16 DEGREES
EKG VENTRICULAR RATE: 88 BPM
FILM ARRAY ADENOVIRUS: NORMAL
FILM ARRAY BORDETELLA PERTUSSIS: NORMAL
FILM ARRAY CHLAMYDOPHILIA PNEUMONIAE: NORMAL
FILM ARRAY CORONAVIRUS 229E: NORMAL
FILM ARRAY CORONAVIRUS HKU1: NORMAL
FILM ARRAY CORONAVIRUS NL63: NORMAL
FILM ARRAY CORONAVIRUS OC43: NORMAL
FILM ARRAY INFLUENZA A VIRUS 09H1: NORMAL
FILM ARRAY INFLUENZA A VIRUS H1: NORMAL
FILM ARRAY INFLUENZA A VIRUS H3: NORMAL
FILM ARRAY INFLUENZA A VIRUS: NORMAL
FILM ARRAY INFLUENZA B: NORMAL
FILM ARRAY METAPNEUMOVIRUS: NORMAL
FILM ARRAY MYCOPLASMA PNEUMONIAE: NORMAL
FILM ARRAY PARAINFLUENZA VIRUS 1: NORMAL
FILM ARRAY PARAINFLUENZA VIRUS 2: NORMAL
FILM ARRAY PARAINFLUENZA VIRUS 3: NORMAL
FILM ARRAY PARAINFLUENZA VIRUS 4: NORMAL
FILM ARRAY RESPIRATORY SYNCITIAL VIRUS: NORMAL
FILM ARRAY RHINOVIRUS/ENTEROVIRUS: NORMAL
L. PNEUMOPHILA SEROGP 1 UR AG: NORMAL
SEDIMENTATION RATE, ERYTHROCYTE: 16 MM/HR (ref 0–15)
STREP PNEUMONIAE ANTIGEN, URINE: NORMAL

## 2019-04-17 PROCEDURE — 6370000000 HC RX 637 (ALT 250 FOR IP): Performed by: INTERNAL MEDICINE

## 2019-04-17 PROCEDURE — 85651 RBC SED RATE NONAUTOMATED: CPT

## 2019-04-17 PROCEDURE — 6360000002 HC RX W HCPCS: Performed by: INTERNAL MEDICINE

## 2019-04-17 PROCEDURE — 94640 AIRWAY INHALATION TREATMENT: CPT

## 2019-04-17 PROCEDURE — 1200000000 HC SEMI PRIVATE

## 2019-04-17 PROCEDURE — 6370000000 HC RX 637 (ALT 250 FOR IP): Performed by: GENERAL PRACTICE

## 2019-04-17 PROCEDURE — 36415 COLL VENOUS BLD VENIPUNCTURE: CPT

## 2019-04-17 PROCEDURE — 2580000003 HC RX 258

## 2019-04-17 PROCEDURE — 6360000002 HC RX W HCPCS: Performed by: GENERAL PRACTICE

## 2019-04-17 PROCEDURE — 99406 BEHAV CHNG SMOKING 3-10 MIN: CPT

## 2019-04-17 PROCEDURE — 71046 X-RAY EXAM CHEST 2 VIEWS: CPT

## 2019-04-17 PROCEDURE — 87450 HC DIRECT STREP B ANTIGEN: CPT

## 2019-04-17 RX ORDER — SPIRONOLACTONE 25 MG/1
25 TABLET ORAL DAILY
Status: DISCONTINUED | OUTPATIENT
Start: 2019-04-17 | End: 2019-04-23 | Stop reason: HOSPADM

## 2019-04-17 RX ORDER — FORMOTEROL FUMARATE 20 UG/2ML
20 SOLUTION RESPIRATORY (INHALATION) 2 TIMES DAILY
Status: DISCONTINUED | OUTPATIENT
Start: 2019-04-17 | End: 2019-04-23

## 2019-04-17 RX ORDER — SODIUM CHLORIDE 0.9 % (FLUSH) 0.9 %
SYRINGE (ML) INJECTION
Status: COMPLETED
Start: 2019-04-17 | End: 2019-04-17

## 2019-04-17 RX ORDER — BUDESONIDE 0.5 MG/2ML
1000 INHALANT ORAL 2 TIMES DAILY
Status: DISCONTINUED | OUTPATIENT
Start: 2019-04-17 | End: 2019-04-21

## 2019-04-17 RX ORDER — IPRATROPIUM BROMIDE AND ALBUTEROL SULFATE 2.5; .5 MG/3ML; MG/3ML
1 SOLUTION RESPIRATORY (INHALATION) EVERY 4 HOURS
Status: DISCONTINUED | OUTPATIENT
Start: 2019-04-17 | End: 2019-04-23 | Stop reason: HOSPADM

## 2019-04-17 RX ADMIN — DOXYCYCLINE HYCLATE 100 MG: 100 CAPSULE ORAL at 08:43

## 2019-04-17 RX ADMIN — IPRATROPIUM BROMIDE AND ALBUTEROL SULFATE 1 AMPULE: .5; 3 SOLUTION RESPIRATORY (INHALATION) at 15:23

## 2019-04-17 RX ADMIN — ALBUTEROL SULFATE 2.5 MG: 2.5 SOLUTION RESPIRATORY (INHALATION) at 01:26

## 2019-04-17 RX ADMIN — TRAMADOL HYDROCHLORIDE 50 MG: 50 TABLET, FILM COATED ORAL at 17:18

## 2019-04-17 RX ADMIN — GABAPENTIN 300 MG: 300 CAPSULE ORAL at 21:59

## 2019-04-17 RX ADMIN — METHYLPREDNISOLONE SODIUM SUCCINATE 40 MG: 40 INJECTION, POWDER, LYOPHILIZED, FOR SOLUTION INTRAMUSCULAR; INTRAVENOUS at 03:18

## 2019-04-17 RX ADMIN — FAMOTIDINE 40 MG: 20 TABLET ORAL at 17:18

## 2019-04-17 RX ADMIN — TRAMADOL HYDROCHLORIDE 50 MG: 50 TABLET, FILM COATED ORAL at 08:43

## 2019-04-17 RX ADMIN — FUROSEMIDE 40 MG: 10 INJECTION, SOLUTION INTRAMUSCULAR; INTRAVENOUS at 08:43

## 2019-04-17 RX ADMIN — LISINOPRIL 40 MG: 20 TABLET ORAL at 08:43

## 2019-04-17 RX ADMIN — DOXYCYCLINE HYCLATE 100 MG: 100 CAPSULE ORAL at 21:59

## 2019-04-17 RX ADMIN — TRAMADOL HYDROCHLORIDE 50 MG: 50 TABLET, FILM COATED ORAL at 21:59

## 2019-04-17 RX ADMIN — Medication 10 ML: at 10:56

## 2019-04-17 RX ADMIN — GABAPENTIN 300 MG: 300 CAPSULE ORAL at 08:43

## 2019-04-17 RX ADMIN — MOMETASONE FUROATE AND FORMOTEROL FUMARATE DIHYDRATE 2 PUFF: 200; 5 AEROSOL RESPIRATORY (INHALATION) at 08:23

## 2019-04-17 RX ADMIN — METHYLPREDNISOLONE SODIUM SUCCINATE 40 MG: 40 INJECTION, POWDER, LYOPHILIZED, FOR SOLUTION INTRAMUSCULAR; INTRAVENOUS at 10:56

## 2019-04-17 RX ADMIN — FORMOTEROL FUMARATE DIHYDRATE 20 MCG: 20 SOLUTION RESPIRATORY (INHALATION) at 19:01

## 2019-04-17 RX ADMIN — METOPROLOL SUCCINATE 50 MG: 50 TABLET, EXTENDED RELEASE ORAL at 08:43

## 2019-04-17 RX ADMIN — SPIRONOLACTONE 25 MG: 25 TABLET ORAL at 10:56

## 2019-04-17 RX ADMIN — TRAMADOL HYDROCHLORIDE 50 MG: 50 TABLET, FILM COATED ORAL at 13:24

## 2019-04-17 RX ADMIN — GABAPENTIN 300 MG: 300 CAPSULE ORAL at 13:24

## 2019-04-17 RX ADMIN — ALBUTEROL SULFATE 2.5 MG: 2.5 SOLUTION RESPIRATORY (INHALATION) at 12:30

## 2019-04-17 RX ADMIN — FINASTERIDE 5 MG: 5 TABLET, FILM COATED ORAL at 08:43

## 2019-04-17 RX ADMIN — BUDESONIDE 1000 MCG: 0.5 SUSPENSION RESPIRATORY (INHALATION) at 19:01

## 2019-04-17 RX ADMIN — IPRATROPIUM BROMIDE AND ALBUTEROL SULFATE 1 AMPULE: .5; 3 SOLUTION RESPIRATORY (INHALATION) at 19:01

## 2019-04-17 RX ADMIN — METHYLPREDNISOLONE SODIUM SUCCINATE 40 MG: 40 INJECTION, POWDER, LYOPHILIZED, FOR SOLUTION INTRAMUSCULAR; INTRAVENOUS at 17:19

## 2019-04-17 RX ADMIN — ALBUTEROL SULFATE 2.5 MG: 2.5 SOLUTION RESPIRATORY (INHALATION) at 08:25

## 2019-04-17 ASSESSMENT — PAIN SCALES - GENERAL
PAINLEVEL_OUTOF10: 7
PAINLEVEL_OUTOF10: 5
PAINLEVEL_OUTOF10: 4
PAINLEVEL_OUTOF10: 10
PAINLEVEL_OUTOF10: 0
PAINLEVEL_OUTOF10: 8
PAINLEVEL_OUTOF10: 4
PAINLEVEL_OUTOF10: 9

## 2019-04-17 ASSESSMENT — PAIN DESCRIPTION - PROGRESSION: CLINICAL_PROGRESSION: GRADUALLY WORSENING

## 2019-04-17 ASSESSMENT — PAIN DESCRIPTION - PAIN TYPE
TYPE: CHRONIC PAIN
TYPE: ACUTE PAIN

## 2019-04-17 ASSESSMENT — PAIN - FUNCTIONAL ASSESSMENT
PAIN_FUNCTIONAL_ASSESSMENT: ACTIVITIES ARE NOT PREVENTED
PAIN_FUNCTIONAL_ASSESSMENT: ACTIVITIES ARE NOT PREVENTED

## 2019-04-17 ASSESSMENT — PAIN DESCRIPTION - ORIENTATION
ORIENTATION: ANTERIOR
ORIENTATION: MID

## 2019-04-17 ASSESSMENT — PAIN DESCRIPTION - DESCRIPTORS
DESCRIPTORS: ACHING;HEADACHE;DULL
DESCRIPTORS: HEADACHE
DESCRIPTORS: HEADACHE;ACHING;DULL

## 2019-04-17 ASSESSMENT — PAIN DESCRIPTION - LOCATION
LOCATION: HEAD
LOCATION: NECK

## 2019-04-17 ASSESSMENT — PAIN DESCRIPTION - ONSET: ONSET: ON-GOING

## 2019-04-17 ASSESSMENT — PAIN DESCRIPTION - FREQUENCY
FREQUENCY: INTERMITTENT
FREQUENCY: CONTINUOUS

## 2019-04-17 NOTE — PROGRESS NOTES
Dr Zak Sarabia office notified of consult again per Dr. Sims Links request. Bayron Bear with Thadde[facility]ERT[facility] 4/17/2019 9:44 AM

## 2019-04-17 NOTE — H&P
[address] [address]                              HISTORY AND PHYSICAL    PATIENT NAME: Fabrizio Carmona                  :        1965  MED REC NO:   [id number]                            ROOM:       9192  ACCOUNT NO:   [de-identified]                           ADMIT DATE: 04/15/2019  PROVIDER:     Storm Carter DO    HISTORY OF PRESENT ILLNESS:  This is a 52-year-old white male who  presented to the Emergency Department with a history and chief complaint  of shortness of breath, going on for about two weeks. He does have  history of COPD. He has been coughing. The cough is productive. His  sputum has changed. He is short of breath on minimal exertion. He  denies any chest pain, hemoptysis, or pleurisy. There is no orthopnea  or swelling of his legs. He is a heavy smoker, continues to do so  despite numerous measures to get him to quit. ALLERGIES:  He has no known allergies. PAST MEDICAL HISTORY:  Significant for community-acquired pneumonia,  chlamydia, cervical spine arthritis, carpal tunnel syndrome, essential  hypertension, hyperlipidemia, obstructive sleep apnea, on CPAP, and  acute respiratory failure. MEDICATIONS:  His current outpatient medication list includes tramadol,  metoprolol, lisinopril, hydrochlorothiazide, gabapentin, and albuterol. He has been diagnosed also with hepatitis C and is currently being  worked up for active hep C with viral loads and to be visited by  Gastroenterology for same. PREVIOUS SURGICAL HISTORY:  Wrist surgery, cervical fusion, and  polysomnography. FAMILY MEDICAL HISTORY:  Noted and discussed. REVIEW OF SYSTEMS:  Negative for vertigo, cephalgia, visual  disturbances, difficulty hearing, ringing in the ears, bloody noses,  difficulty swallowing, hoarseness in his voice.   He does not describe  any chest pain or angina, myocardial infarction, to get a little bit better. He  is going to be admitted for Pulmonary and Cardiovascular consultation as  his ProBNP was elevated. Chest x-ray was interpreted as cardiomegaly  with patchy infiltrates bilaterally, either consistent with fluid or  pneumonia. Influenza panel was negative. We are going to go ahead and  check strep, urine antigens, do a procalcitonin, start him on some  antibiotics, steroids, and short-acting beta-agonist.  Further orders  will be written for as the clinical course dictates.         Emma Cash DO    D: 04/16/2019 20:02:19       T: 04/16/2019 20:04:04     [email or web address]  Job#: [id number]     Doc#: [id number]    CC:

## 2019-04-17 NOTE — CONSULTS
Pulmonary Consultation    Admit Date: 4/15/2019  Requesting Physician: Laura Sanchez DO    CC:  · COPD Exacerbation  HPI:  · This is a 52years old male with history of COPD and active tobacco use comes in to the hospital with worsening shortness of breath and cough that started about two weeks ago. The patient states that he is bringing up clear phlegm with the cough and he is short of breath at rest that gets worst with exertion. The patient also has history of INOCENCIA and should be on cPAP but has not had one. The patient states that he is tired all day at work and very sleepy. The patient also states that he snorted cocaine the past weekend. The patient states he has been getting fevers but no chills and also chest pain. The patient describes chest pain as pressure pain. · The patient had a CXR that showed findings consistent with pulmonary edema but no consolidations. Respiratory panel including influenza came back negative. Although patient was admitted to hospital a few months ago with influenza. The patient is admitted due to COPD exacerbation. PMH:    Past Medical History:   Diagnosis Date    Alcohol abuse     COPD [facility]ch[facility] obstructive pulmonary disease) ([facility] [address]     History of cocaine use     Hyperlipidemia     Hypertension     Marijuana use     quit November 2017     inocencia      PSH:     Past Surgical History:   Procedure Laterality Date    CERVICAL FUSION  11/18/15    cervical laminectomy & fusion c4-c6, with rods & screws    POLYSOMNOGRAPHY  01/29/2018    AHI=29.8    WRIST SURGERY         Review of Systems:      · Constitutional: As noted in the HPI. Reports fevers Denies chills. · Eyes: No visual changes or diplopia. No scleral icterus. · ENT: reports headaches, Denies hearing loss or vertigo. No nasal congestion, or sore throat. · Cardiovascular: Reports chest pain, dyspnea on exertion, No palpitations. · Respiratory: No cough, SOB, pleuritic chest pain, or wheezing.  No 0801  Last data filed at 2019 0542  Gross per 24 hour   Intake 480 ml   Output 1850 ml   Net -1370 ml     CURRENT PULSE OXIMETRY:  SpO2: 96 %  24HR PULSE OXIMETRY RANGE:  SpO2  Av.5 %  Min: 95 %  Max: 96 %    EXAM:  General: No distress. Alert. Eyes: PERRL. No sclera icterus. No conjunctival injection. ENT: No discharge. Pharynx clear. Neck: Trachea midline. Normal thyroid. No jvd, no hjr. Resp: No wheezing. No accessory muscle use. No rales. No rhonchi. CV: Regular rate. Regular rhythm. No murmur No rub. Abd: Non-tender. Non-distended. No masses. No organmegaly. Normal bowel sounds. Skin: Warm and dry. No nodule on exposed extremities. No rash on exposed extremities. Lymph: No cervical LAD. No supraclavicular LAD. Ext: No joint deformity. No clubbing. No cyanosis. No edema  Neuro: Awake. Follows commands. Positive pupils/gag/corneals. Normal pain response. Lab Results:  CBC:   Recent Labs     04/15/19  2113 04/16/19  1408   WBC 9.6 16.1*   HGB 14.5 14.6   HCT 42.2 42.3   MCV 95.0 94.4    297       BMP:  Recent Labs     04/15/19  2113 04/16/19  1408    138   K 3.9 4.0    104   CO2 24 19*   BUN 23* 28*   CREATININE 1.7* 1.3*    ALB:3,BILIDIR:3,BILITOT:3,ALKPHOS:3)@    PT/INR: No results for input(s): PROTIME, INR in the last 72 hours. Cultures:  Sputum: not available  Blood: not available    ABG:   No results for input(s): PH, PO2, PCO2, HCO3, BE, O2SAT, METHB, O2HB, COHB, O2CON, HHB, THB in the last 72 hours. Films:     XR CHEST PORTABLE   Final Result   Indistinct bronchovascular prominence in the lungs likely represents   pulmonary edema, and much less likely prominent airway inflammatory   change. There is no evidence of septal edema or effusion in the lower chest.   There is no evidence of consolidative pneumonia. .        Assessment:  1. Acute Respiratory Failure with Hypoxia. 2. COPD Exacerbation  3. Active tobacco use  4.  Chest pain: Seems musculoskeletal.      Plan:  1. Treated for an exacerbation of COPD        Thanks for letting us see this patient in consultation. Please contact us with any questions. Office [phone] or after hours through [employer], x [phone]. Please note that voice recognition technology was used in the preparation of this note and make therefore it may contain inadvertent transcription errors    Ryland Urbina M.D., F.C.C.P.     [facility], [address] [place] [address], [address] [place] [address]

## 2019-04-17 NOTE — PROGRESS NOTES
P Quality Flow/Interdisciplinary Rounds Progress Note        Quality Flow Rounds held on April 17, 2019    Disciplines Attending:  Bedside Nurse, ,  and Nursing Unit Leadership    Elidia Li was admitted on 4/15/2019  8:17 PM    Anticipated Discharge Date:  Expected Discharge Date: 04/19/19    Disposition:    Erick Score:  Erick Scale Score: 22    Readmission Risk              Risk of Unplanned Readmission:        13           Discussed patient goal for the day, patient clinical progression, and barriers to discharge.   The following Goal(s) of the Day/Commitment(s) have been identified:  monitor I/O      Leann Barker  April 17, 2019

## 2019-04-18 ENCOUNTER — APPOINTMENT (OUTPATIENT)
Dept: ULTRASOUND IMAGING | Age: 54
DRG: 190 | End: 2019-04-18
Payer: MEDICARE

## 2019-04-18 LAB
ALBUMIN SERPL-MCNC: 4.2 G/DL (ref 3.5–5.2)
ALP BLD-CCNC: 62 U/L (ref 40–129)
ALT SERPL-CCNC: 14 U/L (ref 0–40)
ANION GAP SERPL CALCULATED.3IONS-SCNC: 15 MMOL/L (ref 7–16)
AST SERPL-CCNC: 12 U/L (ref 0–39)
BILIRUB SERPL-MCNC: <0.2 MG/DL (ref 0–1.2)
BUN BLDV-MCNC: 24 MG/DL (ref 6–20)
CALCIUM SERPL-MCNC: 9.2 MG/DL (ref 8.6–10.2)
CEA: 3 NG/ML (ref 0–5.2)
CHLORIDE BLD-SCNC: 101 MMOL/L (ref 98–107)
CO2: 23 MMOL/L (ref 22–29)
CREAT SERPL-MCNC: 1.2 MG/DL (ref 0.7–1.2)
GFR AFRICAN AMERICAN: >60
GFR NON-AFRICAN AMERICAN: >60 ML/MIN/1.73
GLUCOSE BLD-MCNC: 132 MG/DL (ref 74–99)
HCT VFR BLD CALC: 43 % (ref 37–54)
HEMOGLOBIN: 14.4 G/DL (ref 12.5–16.5)
INR BLD: 0.9
IRON SATURATION: 29 % (ref 20–55)
IRON: 107 MCG/DL (ref 59–158)
MCH RBC QN AUTO: 32.7 PG (ref 26–35)
MCHC RBC AUTO-ENTMCNC: 33.5 % (ref 32–34.5)
MCV RBC AUTO: 97.5 FL (ref 80–99.9)
PDW BLD-RTO: 14.5 FL (ref 11.5–15)
PLATELET # BLD: 304 E9/L (ref 130–450)
PMV BLD AUTO: 9.9 FL (ref 7–12)
POTASSIUM SERPL-SCNC: 4.3 MMOL/L (ref 3.5–5)
PROTHROMBIN TIME: 10.6 SEC (ref 9.3–12.4)
RBC # BLD: 4.41 E12/L (ref 3.8–5.8)
SODIUM BLD-SCNC: 139 MMOL/L (ref 132–146)
T4 TOTAL: 6 MCG/DL (ref 4.5–11.7)
TOTAL IRON BINDING CAPACITY: 375 MCG/DL (ref 250–450)
TOTAL PROTEIN: 7.5 G/DL (ref 6.4–8.3)
TSH SERPL DL<=0.05 MIU/L-ACNC: 0.81 UIU/ML (ref 0.27–4.2)
WBC # BLD: 19.2 E9/L (ref 4.5–11.5)

## 2019-04-18 PROCEDURE — 86255 FLUORESCENT ANTIBODY SCREEN: CPT

## 2019-04-18 PROCEDURE — 82787 IGG 1 2 3 OR 4 EACH: CPT

## 2019-04-18 PROCEDURE — 36415 COLL VENOUS BLD VENIPUNCTURE: CPT

## 2019-04-18 PROCEDURE — 80053 COMPREHEN METABOLIC PANEL: CPT

## 2019-04-18 PROCEDURE — 86331 IMMUNODIFFUSION OUCHTERLONY: CPT

## 2019-04-18 PROCEDURE — 2700000000 HC OXYGEN THERAPY PER DAY

## 2019-04-18 PROCEDURE — 6360000002 HC RX W HCPCS: Performed by: INTERNAL MEDICINE

## 2019-04-18 PROCEDURE — 86606 ASPERGILLUS ANTIBODY: CPT

## 2019-04-18 PROCEDURE — 82378 CARCINOEMBRYONIC ANTIGEN: CPT

## 2019-04-18 PROCEDURE — 6370000000 HC RX 637 (ALT 250 FOR IP): Performed by: GENERAL PRACTICE

## 2019-04-18 PROCEDURE — 82105 ALPHA-FETOPROTEIN SERUM: CPT

## 2019-04-18 PROCEDURE — 83540 ASSAY OF IRON: CPT

## 2019-04-18 PROCEDURE — 76705 ECHO EXAM OF ABDOMEN: CPT

## 2019-04-18 PROCEDURE — 82784 ASSAY IGA/IGD/IGG/IGM EACH: CPT

## 2019-04-18 PROCEDURE — 2580000003 HC RX 258: Performed by: GENERAL PRACTICE

## 2019-04-18 PROCEDURE — 86038 ANTINUCLEAR ANTIBODIES: CPT

## 2019-04-18 PROCEDURE — 94640 AIRWAY INHALATION TREATMENT: CPT

## 2019-04-18 PROCEDURE — 6360000002 HC RX W HCPCS: Performed by: GENERAL PRACTICE

## 2019-04-18 PROCEDURE — 6370000000 HC RX 637 (ALT 250 FOR IP): Performed by: INTERNAL MEDICINE

## 2019-04-18 PROCEDURE — 1200000000 HC SEMI PRIVATE

## 2019-04-18 PROCEDURE — 82103 ALPHA-1-ANTITRYPSIN TOTAL: CPT

## 2019-04-18 PROCEDURE — 85027 COMPLETE CBC AUTOMATED: CPT

## 2019-04-18 PROCEDURE — 85610 PROTHROMBIN TIME: CPT

## 2019-04-18 PROCEDURE — 80074 ACUTE HEPATITIS PANEL: CPT

## 2019-04-18 PROCEDURE — 84443 ASSAY THYROID STIM HORMONE: CPT

## 2019-04-18 PROCEDURE — 83550 IRON BINDING TEST: CPT

## 2019-04-18 PROCEDURE — 84436 ASSAY OF TOTAL THYROXINE: CPT

## 2019-04-18 RX ORDER — HYDRALAZINE HYDROCHLORIDE 20 MG/ML
10 INJECTION INTRAMUSCULAR; INTRAVENOUS ONCE
Status: COMPLETED | OUTPATIENT
Start: 2019-04-18 | End: 2019-04-18

## 2019-04-18 RX ORDER — SODIUM CHLORIDE 0.9 % (FLUSH) 0.9 %
10 SYRINGE (ML) INJECTION PRN
Status: DISCONTINUED | OUTPATIENT
Start: 2019-04-18 | End: 2019-04-23 | Stop reason: HOSPADM

## 2019-04-18 RX ORDER — SODIUM CHLORIDE 0.9 % (FLUSH) 0.9 %
10 SYRINGE (ML) INJECTION 2 TIMES DAILY
Status: DISCONTINUED | OUTPATIENT
Start: 2019-04-18 | End: 2019-04-23 | Stop reason: HOSPADM

## 2019-04-18 RX ORDER — ACETAMINOPHEN 325 MG/1
650 TABLET ORAL EVERY 6 HOURS PRN
Status: DISCONTINUED | OUTPATIENT
Start: 2019-04-18 | End: 2019-04-23 | Stop reason: HOSPADM

## 2019-04-18 RX ORDER — PANTOPRAZOLE SODIUM 40 MG/1
40 TABLET, DELAYED RELEASE ORAL ONCE
Status: COMPLETED | OUTPATIENT
Start: 2019-04-18 | End: 2019-04-18

## 2019-04-18 RX ORDER — MAGNESIUM HYDROXIDE/ALUMINUM HYDROXICE/SIMETHICONE 120; 1200; 1200 MG/30ML; MG/30ML; MG/30ML
30 SUSPENSION ORAL ONCE
Status: COMPLETED | OUTPATIENT
Start: 2019-04-18 | End: 2019-04-18

## 2019-04-18 RX ADMIN — IPRATROPIUM BROMIDE AND ALBUTEROL SULFATE 1 AMPULE: .5; 3 SOLUTION RESPIRATORY (INHALATION) at 15:35

## 2019-04-18 RX ADMIN — ACETAMINOPHEN 650 MG: 325 TABLET ORAL at 21:08

## 2019-04-18 RX ADMIN — GABAPENTIN 300 MG: 300 CAPSULE ORAL at 08:35

## 2019-04-18 RX ADMIN — Medication 10 ML: at 17:24

## 2019-04-18 RX ADMIN — FUROSEMIDE 40 MG: 10 INJECTION, SOLUTION INTRAMUSCULAR; INTRAVENOUS at 08:35

## 2019-04-18 RX ADMIN — Medication 10 ML: at 20:01

## 2019-04-18 RX ADMIN — IPRATROPIUM BROMIDE AND ALBUTEROL SULFATE 1 AMPULE: .5; 3 SOLUTION RESPIRATORY (INHALATION) at 23:33

## 2019-04-18 RX ADMIN — METHYLPREDNISOLONE SODIUM SUCCINATE 40 MG: 40 INJECTION, POWDER, LYOPHILIZED, FOR SOLUTION INTRAMUSCULAR; INTRAVENOUS at 10:48

## 2019-04-18 RX ADMIN — FORMOTEROL FUMARATE DIHYDRATE 20 MCG: 20 SOLUTION RESPIRATORY (INHALATION) at 08:52

## 2019-04-18 RX ADMIN — PANTOPRAZOLE SODIUM 40 MG: 40 TABLET, DELAYED RELEASE ORAL at 21:08

## 2019-04-18 RX ADMIN — IPRATROPIUM BROMIDE AND ALBUTEROL SULFATE 1 AMPULE: .5; 3 SOLUTION RESPIRATORY (INHALATION) at 05:09

## 2019-04-18 RX ADMIN — IPRATROPIUM BROMIDE AND ALBUTEROL SULFATE 1 AMPULE: .5; 3 SOLUTION RESPIRATORY (INHALATION) at 20:45

## 2019-04-18 RX ADMIN — DOXYCYCLINE HYCLATE 100 MG: 100 CAPSULE ORAL at 08:35

## 2019-04-18 RX ADMIN — TRAMADOL HYDROCHLORIDE 50 MG: 50 TABLET, FILM COATED ORAL at 08:35

## 2019-04-18 RX ADMIN — HYDRALAZINE HYDROCHLORIDE 10 MG: 20 INJECTION INTRAMUSCULAR; INTRAVENOUS at 21:09

## 2019-04-18 RX ADMIN — LISINOPRIL 40 MG: 20 TABLET ORAL at 08:35

## 2019-04-18 RX ADMIN — FORMOTEROL FUMARATE DIHYDRATE 20 MCG: 20 SOLUTION RESPIRATORY (INHALATION) at 20:45

## 2019-04-18 RX ADMIN — METOPROLOL SUCCINATE 50 MG: 50 TABLET, EXTENDED RELEASE ORAL at 08:35

## 2019-04-18 RX ADMIN — Medication 10 ML: at 21:10

## 2019-04-18 RX ADMIN — TRAMADOL HYDROCHLORIDE 50 MG: 50 TABLET, FILM COATED ORAL at 20:00

## 2019-04-18 RX ADMIN — DOXYCYCLINE HYCLATE 100 MG: 100 CAPSULE ORAL at 20:00

## 2019-04-18 RX ADMIN — Medication 10 ML: at 02:28

## 2019-04-18 RX ADMIN — FAMOTIDINE 40 MG: 20 TABLET ORAL at 17:24

## 2019-04-18 RX ADMIN — GABAPENTIN 300 MG: 300 CAPSULE ORAL at 20:00

## 2019-04-18 RX ADMIN — SPIRONOLACTONE 25 MG: 25 TABLET ORAL at 08:35

## 2019-04-18 RX ADMIN — IPRATROPIUM BROMIDE AND ALBUTEROL SULFATE 1 AMPULE: .5; 3 SOLUTION RESPIRATORY (INHALATION) at 12:35

## 2019-04-18 RX ADMIN — ALUMINUM HYDROXIDE, MAGNESIUM HYDROXIDE, AND SIMETHICONE 30 ML: 200; 200; 20 SUSPENSION ORAL at 21:08

## 2019-04-18 RX ADMIN — TRAMADOL HYDROCHLORIDE 50 MG: 50 TABLET, FILM COATED ORAL at 17:24

## 2019-04-18 RX ADMIN — BUDESONIDE 1000 MCG: 0.5 SUSPENSION RESPIRATORY (INHALATION) at 08:52

## 2019-04-18 RX ADMIN — BUDESONIDE 1000 MCG: 0.5 SUSPENSION RESPIRATORY (INHALATION) at 20:45

## 2019-04-18 RX ADMIN — IPRATROPIUM BROMIDE AND ALBUTEROL SULFATE 1 AMPULE: .5; 3 SOLUTION RESPIRATORY (INHALATION) at 08:52

## 2019-04-18 RX ADMIN — IPRATROPIUM BROMIDE AND ALBUTEROL SULFATE 1 AMPULE: .5; 3 SOLUTION RESPIRATORY (INHALATION) at 01:04

## 2019-04-18 RX ADMIN — TRAMADOL HYDROCHLORIDE 50 MG: 50 TABLET, FILM COATED ORAL at 13:15

## 2019-04-18 RX ADMIN — GABAPENTIN 300 MG: 300 CAPSULE ORAL at 13:15

## 2019-04-18 RX ADMIN — METHYLPREDNISOLONE SODIUM SUCCINATE 40 MG: 40 INJECTION, POWDER, LYOPHILIZED, FOR SOLUTION INTRAMUSCULAR; INTRAVENOUS at 02:28

## 2019-04-18 RX ADMIN — METHYLPREDNISOLONE SODIUM SUCCINATE 40 MG: 40 INJECTION, POWDER, LYOPHILIZED, FOR SOLUTION INTRAMUSCULAR; INTRAVENOUS at 17:24

## 2019-04-18 RX ADMIN — FINASTERIDE 5 MG: 5 TABLET, FILM COATED ORAL at 08:35

## 2019-04-18 ASSESSMENT — PAIN SCALES - GENERAL
PAINLEVEL_OUTOF10: 7
PAINLEVEL_OUTOF10: 8
PAINLEVEL_OUTOF10: 8
PAINLEVEL_OUTOF10: 7
PAINLEVEL_OUTOF10: 7
PAINLEVEL_OUTOF10: 4
PAINLEVEL_OUTOF10: 8
PAINLEVEL_OUTOF10: 6
PAINLEVEL_OUTOF10: 4

## 2019-04-18 ASSESSMENT — PAIN DESCRIPTION - ONSET
ONSET: ON-GOING

## 2019-04-18 ASSESSMENT — PAIN DESCRIPTION - PROGRESSION
CLINICAL_PROGRESSION: NOT CHANGED

## 2019-04-18 ASSESSMENT — PAIN DESCRIPTION - PAIN TYPE
TYPE: ACUTE PAIN
TYPE: ACUTE PAIN
TYPE: CHRONIC PAIN
TYPE: CHRONIC PAIN

## 2019-04-18 ASSESSMENT — PAIN DESCRIPTION - LOCATION
LOCATION: NECK
LOCATION: HEAD
LOCATION: NECK
LOCATION: HEAD

## 2019-04-18 ASSESSMENT — PAIN DESCRIPTION - DESCRIPTORS
DESCRIPTORS: ACHING;DISCOMFORT;THROBBING
DESCRIPTORS: ACHING;DISCOMFORT;THROBBING
DESCRIPTORS: ACHING;DISCOMFORT;CONSTANT
DESCRIPTORS: ACHING;CONSTANT;DISCOMFORT

## 2019-04-18 ASSESSMENT — PAIN - FUNCTIONAL ASSESSMENT
PAIN_FUNCTIONAL_ASSESSMENT: ACTIVITIES ARE NOT PREVENTED

## 2019-04-18 ASSESSMENT — PAIN DESCRIPTION - FREQUENCY
FREQUENCY: CONTINUOUS

## 2019-04-18 ASSESSMENT — PAIN DESCRIPTION - ORIENTATION
ORIENTATION: ANTERIOR
ORIENTATION: ANTERIOR

## 2019-04-18 NOTE — PROGRESS NOTES
muscle use. No rales. No wheezing. No rhonchi. CV: Regular rate. Regular rhythm. No murmur or rub. Abd: Non-tender. Non-distended. No masses. No organomegaly. Normal bowel sounds. Skin: Warm and dry. No nodule on exposed extremities. No rash on exposed extremities. Ext: No cyanosis, clubbing, edema  Lymph: No cervical LAD. No supraclavicular LAD. M/S: No cyanosis. No joint deformity. No clubbing. Neuro: Awake. Follows commands. Positive pupils/gag/corneals. Normal pain response. Results:  CBC:   Recent Labs     04/15/19  2113 04/16/19  1408   WBC 9.6 16.1*   HGB 14.5 14.6   HCT 42.2 42.3   MCV 95.0 94.4    297     BMP:   Recent Labs     04/15/19  2113 04/16/19  1408    138   K 3.9 4.0    104   CO2 24 19*   BUN 23* 28*   CREATININE 1.7* 1.3*     LIVER PROFILE:   Recent Labs     04/15/19  2113 04/16/19  1408   AST 22 18   ALT 19 18   BILITOT 0.2 0.2   ALKPHOS 74 65     PT/INR: No results for input(s): PROTIME, INR in the last 72 hours. APTT: No results for input(s): APTT in the last 72 hours. Pathology:  1. N/A      Microbiology:  1. None    Recent ABG:   No results for input(s): PH, PO2, PCO2, HCO3, BE, O2SAT, METHB, O2HB, COHB, O2CON, HHB, THB in the last 72 hours. Recent Films:  XR CHEST STANDARD (2 VW)   Final Result   1. Bibasilar airspace disease concerning for infiltrate/pneumonia. 2. Stable, enlarged cardiomediastinal silhouette with thoracic aortic   vascular calcifications. XR CHEST PORTABLE   Final Result   Indistinct bronchovascular prominence in the lungs likely represents   pulmonary edema, and much less likely prominent airway inflammatory   change. There is no evidence of septal edema or effusion in the lower chest.   There is no evidence of consolidative pneumonia. Assessment:  1. Acute Respiratory Failure with Hypoxia.    2. Possible underlying coronary artery disease especially in view of history of drug abuse and family

## 2019-04-18 NOTE — CONSULTS
Consults   Gastroenterology Consult Note   Meghan RANDALL NP-C with Satinder Castanon M.D. Consult Note        Date of Service: 4/18/2019  Reason for Consult: Hepatitis C  Requesting Physician: Lizette Pendleton    CHIEF COMPLAINT:  Shortness of breath    History Obtained From:  patient, electronic medical record    HISTORY OF PRESENT ILLNESS:       Radha Ordaz is a 52 y.o. male with significant past medical history of marijuana use, INOCENCIA, HTN, HLD, HX of cocaine use, COPD, and alcohol use admitted via ED for shortness of breath. Pt reports to worsening shortness of breath over the last several weeks. 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 He was inpatient several months ago for \"pneumonia\". Is nauseated at times, but denies any vomiting episodes. Reports the nausea comes and goes, nothing constant. Report he uses Zofran at times for the nausea, effective \"maybe once a week\". Denies any abdominal pain. Normal bowel pattern for him \"every other day\" described as \"large, formed, brown\" stool. States he drinks milk for aid with BMS. Denies melena, hematochezia, or hematemesis. Has a good appetite d/t \"all the steroids they have me on, I could eat a horse\". Denies any recent weight loss. Has been around daughter & granddaughter whom recently have been sick with \"cold symptoms\". Reports to being jaundiced in the [de-identified], felt that may when he was diagnosed with the Hepatitis C. Last EGD last year with Dr. Michelle Henry, \"my esophagus was stretched, and showed redness\". Has never had a colonoscopy. Patient is unfamiliar with his [facility]. Admission labs BUN 23, creat 1.7, , BNP 4547, abso mono 0.98. Consultation for Hepatitis C. CXR: Indistinct bronchovascular prominence in the lungs likely represents pulmonary edema, and much less likely prominent airway inflammatory change.  There is no evidence of septal edema or effusion in the lower chest. There is no evidence of consolidative pneumonia. Currently, pt reports the breathing is \"just as bad\". States he is tolerating his diet, and last BM was yesterday described as \"normal\". Labs today WBC 19.2.     Past Medical History:        Diagnosis Date    Alcohol abuse     COPD (chronic obstructive pulmonary disease) ([facility] [address]     History of cocaine use     Hyperlipidemia     Hypertension     Marijuana use     quit November 2017     alberto      Past Surgical History:        Procedure Laterality Date    CERVICAL FUSION  11/18/15    cervical laminectomy & fusion c4-c6, with rods & screws    POLYSOMNOGRAPHY  01/29/2018    AHI=29.8    WRIST SURGERY       Current Medications:    Current Facility-Administered Medications: sodium chloride flush 0.9 % injection 10 mL, 10 mL, Intravenous, BID  sodium chloride flush 0.9 % injection 10 mL, 10 mL, Intravenous, PRN  spironolactone (ALDACTONE) tablet 25 mg, 25 mg, Oral, Daily  ipratropium-albuterol (DUONEB) nebulizer solution 1 ampule, 1 ampule, Inhalation, Q4H  formoterol (PERFOROMIST) nebulizer solution 20 mcg, 20 mcg, Nebulization, BID  budesonide (PULMICORT) nebulizer suspension 1,000 mcg, 1,000 mcg, Nebulization, BID  diphenhydrAMINE (BENADRYL) tablet 25 mg, 25 mg, Oral, Nightly PRN  albuterol (PROVENTIL) nebulizer solution 2.5 mg, 2.5 mg, Nebulization, Q6H PRN  finasteride (PROSCAR) tablet 5 mg, 5 mg, Oral, Daily  gabapentin (NEURONTIN) capsule 300 mg, 300 mg, Oral, TID  lisinopril (PRINIVIL;ZESTRIL) tablet 40 mg, 40 mg, Oral, Daily  nicotine (NICODERM CQ) 21 MG/24HR 1 patch, 1 patch, Transdermal, Daily  famotidine (PEPCID) tablet 40 mg, 40 mg, Oral, QPM  traMADol (ULTRAM) tablet 50 mg, 50 mg, Oral, 4x Daily  perflutren lipid microspheres (DEFINITY) injection 1.65 mg, 1.5 mL, Intravenous, ONCE PRN  furosemide (LASIX) injection 40 mg, 40 mg, Intravenous, Daily  metoprolol succinate (TOPROL XL) extended release tablet 50 mg, 50 mg, Oral, Daily  methylPREDNISolone sodium (SOLU-MEDROL) injection 40 mg, 40 mg, Intravenous, Q8H  doxycycline hyclate (VIBRAMYCIN) capsule 100 mg, 100 mg, Oral, 2 times per day    Allergies:  Patient has no known allergies. Social History:    Tobacco:  Pt reports 2 PPD for 40+ years, trying to cut back/ quit most recent. States he did start vaping. Alcohol:  Pt reports \"plastered\" on the weekends. 7-8 shots whiskey/ 24 cans beer  Illicit Drugs: Pt reports used 3 lines of cocaine several weekends ago; IV drug use in the [de-identified]    Family History:   Family History   Problem Relation Age of Onset    Arthritis Mother     Other Mother         glucoma    Heart Disease Father     High Blood Pressure Brother     Other Brother         sleep apnea    High Blood Pressure Brother     Other Brother         sleep apnea    High Blood Pressure Brother     Other Brother         sleep apnea    High Blood Pressure Brother     Other Brother         sleep apnea    Cancer Brother         gitaogeal -  at 62     Did not update; patient reports estrangement from family members    REVIEW OF SYSTEMS:    Aside from what was mentioned in the PMH and HPI, essentially unremarkable, all others negative. PHYSICAL EXAM:      Vitals:    BP (!) 164/103   Pulse 88   Temp 98.4 °F (36.9 °C) (Oral)   Resp 16   Ht 5' 7\" (1.702 m)   Wt 200 lb 8 oz (90.9 kg)   SpO2 94%   BMI 31.40 kg/m²       CONSTITUTIONAL:  awake, alert, cooperative, no apparent distress, and appears older than stated age  EYES:  pupils equal, round and reactive to light, sclera anicteric and conjunctiva normal  ENT:  normocephalic, oral pharynx with dry mucous membranes  NECK:  supple   LUNGS:   Clear/ diminshed to auscultation bilaterally.  O2 per NC  CARDIOVASCULAR:   regular rate and rhythm, no murmur noted; 2+ pulses; trace BLE edema  ABDOMEN:   normal bowel sounds, softly distended, non-tender, no masses palpated, no hepatosplenomegally  MUSCULOSKELETAL:  full range of motion noted  motor strength is 5 out of 5 all extremities bilaterally  NEUROLOGIC:  Mental Status Exam:  Level of Alertness:   awake  Orientation:   person, place, time  Motor Exam:  Motor exam is symmetrical 5 out of 5 all extremities bilaterally  SKIN:  normal skin color, texture, turgor    DATA:    CBC with Differential:    Lab Results   Component Value Date    WBC 19.2 04/18/2019    RBC 4.41 04/18/2019    HGB 14.4 04/18/2019    HCT 43.0 04/18/2019     04/18/2019    MCV 97.5 04/18/2019    MCH 32.7 04/18/2019    MCHC 33.5 04/18/2019    RDW 14.5 04/18/2019    METASPCT 2 02/10/2016    LYMPHOPCT 28.2 04/15/2019    MONOPCT 10.2 04/15/2019    MYELOPCT 1 02/10/2016    BASOPCT 0.7 04/15/2019    MONOSABS 0.98 04/15/2019    LYMPHSABS 2.71 04/15/2019    EOSABS 0.34 04/15/2019    BASOSABS 0.07 04/15/2019     CMP:    Lab Results   Component Value Date     04/18/2019    K 4.3 04/18/2019    K 4.1 03/07/2018     04/18/2019    CO2 23 04/18/2019    BUN 24 04/18/2019    CREATININE 1.2 04/18/2019    GFRAA >60 04/18/2019    LABGLOM >60 04/18/2019    GLUCOSE 132 04/18/2019    PROT 7.5 04/18/2019    LABALBU 4.2 04/18/2019    CALCIUM 9.2 04/18/2019    BILITOT <0.2 04/18/2019    ALKPHOS 62 04/18/2019    AST 12 04/18/2019    ALT 14 04/18/2019     Hepatic Function Panel:    Lab Results   Component Value Date    ALKPHOS 62 04/18/2019    ALT 14 04/18/2019    AST 12 04/18/2019    PROT 7.5 04/18/2019    BILITOT <0.2 04/18/2019    LABALBU 4.2 04/18/2019     PT/INR:    Lab Results   Component Value Date    PROTIME 10.6 04/18/2019    INR 0.9 04/18/2019     PTT:    Lab Results   Component Value Date    APTT 22.7 03/03/2018   [APTT}  Last 3 Troponin:    Lab Results   Component Value Date    TROPONINI <0.01 04/16/2019    TROPONINI <0.01 04/15/2019    TROPONINI <0.01 03/06/2018     TSH:    Lab Results   Component Value Date    TSH 0.809 04/18/2019     VITAMIN B12:   Lab Results   Component Value Date    KBYUIKIL49 456 2017     FOLATE:    Lab Results   Component Value Date    FOLATE 13.8 2017     No components found for: CHLPL    Lab Results   Component Value Date    TRIG 217 (H) 2018    TRIG 172 (H) 2016    TRIG 251 (H) 2016       Lab Results   Component Value Date    HDL 58 2018    HDL 55 2016    HDL 11 2016       Lab Results   Component Value Date    LDLCALC 144 (H) 2018    LDLCALC 146 (H) 2016    LDLCALC 84 2016       Lab Results   Component Value Date    LABVLDL 43 2018    LABVLDL 34 2016    LABVLDL 50 2016        Xr Chest Portable    Result Date: 4/15/2019  Patient MRN: [id number] : 1965 Age:  52 years Gender: Male Order Date: 4/15/2019 8:30 PM Exam: XR CHEST PORTABLE Number of Images: 1 view Indication:  Shortness of breath Comparison: Chest CT study 2019 and anterior upright chest of the same day Findings: The lungs are symmetrically expanded, and show hazy increased vascularity and diffuse interstitial density throughout the chest, consistent with edema. Cardiovascular shadows are normal in appearance. There is no evidence of cardiac enlargement or decompensation. Skeletal structures show no evidence of acute pathology. At the upper margin of the study, fixation hardware is noted in the posterior cervical spine. Overlying EKG leads are present. Indistinct bronchovascular prominence in the lungs likely represents pulmonary edema, and much less likely prominent airway inflammatory change. There is no evidence of septal edema or effusion in the lower chest. There is no evidence of consolidative pneumonia.       IMPRESSION:    · Hepatitis C  · + drug use; +marijuana, cocaine  · Cough   · Black mold exposure- defer to Pulmonary  · COPD- defer to Pulmonary  · Nausea    · ETOH abuse    RECOMMENDATIONS:      · Liver serology today  · CBC, CMP today & daily  · PT/INR today & daily  · ABD US assess liver  · OK to resume diet after US  · Continue Aldactone as ordered  · Pepcid 40 mg daily  · Continue to monitor DTs  · Continue antibiotics per primary service  · Continue to monitor CBC, CMP, Pt/INR daily  · Will follow    Thank you very much for your consultation. We will follow closely with you.     Discussed with Dr. Ethan Nyhan developed by Dr. Oren Obrien, NP-C 4/18/2019 1:49 PM for Dr. Norah Pabon

## 2019-04-18 NOTE — PROGRESS NOTES
Patient seen feels lousy. Pressure in chest and cough. cxr same. Echo okay. May need bronch.  Will discuss with pulmonary

## 2019-04-18 NOTE — PROGRESS NOTES
Attempted to notify Dr Liborio Bañuelos of pt development of inverted T-wave. Office is not open at this time. Answering service did not answer for 3 minutes. Will try again.

## 2019-04-19 LAB
ALBUMIN SERPL-MCNC: 4.1 G/DL (ref 3.5–5.2)
ALP BLD-CCNC: 76 U/L (ref 40–129)
ALT SERPL-CCNC: 17 U/L (ref 0–40)
AMMONIA: 31.5 UMOL/L (ref 16–60)
ANION GAP SERPL CALCULATED.3IONS-SCNC: 13 MMOL/L (ref 7–16)
ANTI-MITOCHONDRIAL AB, IFA: NEGATIVE
ANTI-NUCLEAR ANTIBODY (ANA): NEGATIVE
AST SERPL-CCNC: 18 U/L (ref 0–39)
BILIRUB SERPL-MCNC: <0.2 MG/DL (ref 0–1.2)
BUN BLDV-MCNC: 31 MG/DL (ref 6–20)
CALCIUM SERPL-MCNC: 9.2 MG/DL (ref 8.6–10.2)
CHLORIDE BLD-SCNC: 104 MMOL/L (ref 98–107)
CO2: 24 MMOL/L (ref 22–29)
CREAT SERPL-MCNC: 1.2 MG/DL (ref 0.7–1.2)
GFR AFRICAN AMERICAN: >60
GFR NON-AFRICAN AMERICAN: >60 ML/MIN/1.73
GLUCOSE BLD-MCNC: 130 MG/DL (ref 74–99)
HAV IGM SER IA-ACNC: ABNORMAL
HCT VFR BLD CALC: 43.8 % (ref 37–54)
HEMOGLOBIN: 14.6 G/DL (ref 12.5–16.5)
HEPATITIS B CORE IGM ANTIBODY: ABNORMAL
HEPATITIS B SURFACE ANTIGEN INTERPRETATION: ABNORMAL
HEPATITIS C ANTIBODY INTERPRETATION: REACTIVE
IGG: 1312 MG/DL (ref 700–1600)
IGM: 53 MG/DL (ref 40–230)
INR BLD: 0.9
MCH RBC QN AUTO: 32.7 PG (ref 26–35)
MCHC RBC AUTO-ENTMCNC: 33.3 % (ref 32–34.5)
MCV RBC AUTO: 98 FL (ref 80–99.9)
PDW BLD-RTO: 14.3 FL (ref 11.5–15)
PLATELET # BLD: 305 E9/L (ref 130–450)
PMV BLD AUTO: 10.3 FL (ref 7–12)
POTASSIUM SERPL-SCNC: 4.4 MMOL/L (ref 3.5–5)
PROTHROMBIN TIME: 10.5 SEC (ref 9.3–12.4)
RBC # BLD: 4.47 E12/L (ref 3.8–5.8)
SMOOTH MUSCLE ANTIBODY: NEGATIVE
SODIUM BLD-SCNC: 141 MMOL/L (ref 132–146)
TOTAL PROTEIN: 7.4 G/DL (ref 6.4–8.3)
WBC # BLD: 17.6 E9/L (ref 4.5–11.5)

## 2019-04-19 PROCEDURE — 85027 COMPLETE CBC AUTOMATED: CPT

## 2019-04-19 PROCEDURE — 6370000000 HC RX 637 (ALT 250 FOR IP): Performed by: INTERNAL MEDICINE

## 2019-04-19 PROCEDURE — 36415 COLL VENOUS BLD VENIPUNCTURE: CPT

## 2019-04-19 PROCEDURE — 6370000000 HC RX 637 (ALT 250 FOR IP): Performed by: GENERAL PRACTICE

## 2019-04-19 PROCEDURE — 86331 IMMUNODIFFUSION OUCHTERLONY: CPT

## 2019-04-19 PROCEDURE — 86606 ASPERGILLUS ANTIBODY: CPT

## 2019-04-19 PROCEDURE — 6360000002 HC RX W HCPCS: Performed by: GENERAL PRACTICE

## 2019-04-19 PROCEDURE — 6360000002 HC RX W HCPCS: Performed by: INTERNAL MEDICINE

## 2019-04-19 PROCEDURE — 94640 AIRWAY INHALATION TREATMENT: CPT

## 2019-04-19 PROCEDURE — 1200000000 HC SEMI PRIVATE

## 2019-04-19 PROCEDURE — 2580000003 HC RX 258: Performed by: GENERAL PRACTICE

## 2019-04-19 PROCEDURE — 80053 COMPREHEN METABOLIC PANEL: CPT

## 2019-04-19 PROCEDURE — 85610 PROTHROMBIN TIME: CPT

## 2019-04-19 PROCEDURE — 82140 ASSAY OF AMMONIA: CPT

## 2019-04-19 RX ORDER — AMLODIPINE BESYLATE 10 MG/1
10 TABLET ORAL DAILY
Status: DISCONTINUED | OUTPATIENT
Start: 2019-04-19 | End: 2019-04-23 | Stop reason: HOSPADM

## 2019-04-19 RX ORDER — TORSEMIDE 10 MG/1
10 TABLET ORAL
Status: DISCONTINUED | OUTPATIENT
Start: 2019-04-19 | End: 2019-04-23 | Stop reason: HOSPADM

## 2019-04-19 RX ADMIN — LISINOPRIL 40 MG: 20 TABLET ORAL at 08:50

## 2019-04-19 RX ADMIN — Medication 10 ML: at 08:50

## 2019-04-19 RX ADMIN — FAMOTIDINE 40 MG: 20 TABLET ORAL at 17:16

## 2019-04-19 RX ADMIN — FUROSEMIDE 40 MG: 10 INJECTION, SOLUTION INTRAMUSCULAR; INTRAVENOUS at 08:49

## 2019-04-19 RX ADMIN — METHYLPREDNISOLONE SODIUM SUCCINATE 40 MG: 40 INJECTION, POWDER, LYOPHILIZED, FOR SOLUTION INTRAMUSCULAR; INTRAVENOUS at 02:51

## 2019-04-19 RX ADMIN — GABAPENTIN 300 MG: 300 CAPSULE ORAL at 20:45

## 2019-04-19 RX ADMIN — FINASTERIDE 5 MG: 5 TABLET, FILM COATED ORAL at 08:50

## 2019-04-19 RX ADMIN — TRAMADOL HYDROCHLORIDE 50 MG: 50 TABLET, FILM COATED ORAL at 20:45

## 2019-04-19 RX ADMIN — TRAMADOL HYDROCHLORIDE 50 MG: 50 TABLET, FILM COATED ORAL at 13:27

## 2019-04-19 RX ADMIN — METHYLPREDNISOLONE SODIUM SUCCINATE 40 MG: 40 INJECTION, POWDER, LYOPHILIZED, FOR SOLUTION INTRAMUSCULAR; INTRAVENOUS at 17:16

## 2019-04-19 RX ADMIN — IPRATROPIUM BROMIDE AND ALBUTEROL SULFATE 1 AMPULE: .5; 3 SOLUTION RESPIRATORY (INHALATION) at 12:42

## 2019-04-19 RX ADMIN — DOXYCYCLINE HYCLATE 100 MG: 100 CAPSULE ORAL at 08:50

## 2019-04-19 RX ADMIN — DOXYCYCLINE HYCLATE 100 MG: 100 CAPSULE ORAL at 20:45

## 2019-04-19 RX ADMIN — METOPROLOL SUCCINATE 50 MG: 50 TABLET, EXTENDED RELEASE ORAL at 08:49

## 2019-04-19 RX ADMIN — IPRATROPIUM BROMIDE AND ALBUTEROL SULFATE 1 AMPULE: .5; 3 SOLUTION RESPIRATORY (INHALATION) at 05:00

## 2019-04-19 RX ADMIN — SPIRONOLACTONE 25 MG: 25 TABLET ORAL at 08:49

## 2019-04-19 RX ADMIN — IPRATROPIUM BROMIDE AND ALBUTEROL SULFATE 1 AMPULE: .5; 3 SOLUTION RESPIRATORY (INHALATION) at 19:47

## 2019-04-19 RX ADMIN — BUDESONIDE 1000 MCG: 0.5 SUSPENSION RESPIRATORY (INHALATION) at 19:47

## 2019-04-19 RX ADMIN — GABAPENTIN 300 MG: 300 CAPSULE ORAL at 08:50

## 2019-04-19 RX ADMIN — BUDESONIDE 1000 MCG: 0.5 SUSPENSION RESPIRATORY (INHALATION) at 09:37

## 2019-04-19 RX ADMIN — FORMOTEROL FUMARATE DIHYDRATE 20 MCG: 20 SOLUTION RESPIRATORY (INHALATION) at 19:47

## 2019-04-19 RX ADMIN — AMLODIPINE BESYLATE 10 MG: 10 TABLET ORAL at 10:25

## 2019-04-19 RX ADMIN — TRAMADOL HYDROCHLORIDE 50 MG: 50 TABLET, FILM COATED ORAL at 17:16

## 2019-04-19 RX ADMIN — FORMOTEROL FUMARATE DIHYDRATE 20 MCG: 20 SOLUTION RESPIRATORY (INHALATION) at 09:37

## 2019-04-19 RX ADMIN — TRAMADOL HYDROCHLORIDE 50 MG: 50 TABLET, FILM COATED ORAL at 08:49

## 2019-04-19 RX ADMIN — METHYLPREDNISOLONE SODIUM SUCCINATE 40 MG: 40 INJECTION, POWDER, LYOPHILIZED, FOR SOLUTION INTRAMUSCULAR; INTRAVENOUS at 08:49

## 2019-04-19 RX ADMIN — IPRATROPIUM BROMIDE AND ALBUTEROL SULFATE 1 AMPULE: .5; 3 SOLUTION RESPIRATORY (INHALATION) at 09:37

## 2019-04-19 RX ADMIN — IPRATROPIUM BROMIDE AND ALBUTEROL SULFATE 1 AMPULE: .5; 3 SOLUTION RESPIRATORY (INHALATION) at 15:41

## 2019-04-19 RX ADMIN — GABAPENTIN 300 MG: 300 CAPSULE ORAL at 13:27

## 2019-04-19 RX ADMIN — Medication 10 ML: at 20:45

## 2019-04-19 ASSESSMENT — PAIN SCALES - GENERAL
PAINLEVEL_OUTOF10: 10
PAINLEVEL_OUTOF10: 9
PAINLEVEL_OUTOF10: 10
PAINLEVEL_OUTOF10: 8

## 2019-04-19 ASSESSMENT — PAIN DESCRIPTION - FREQUENCY: FREQUENCY: INTERMITTENT

## 2019-04-19 ASSESSMENT — PAIN DESCRIPTION - LOCATION
LOCATION: BACK;HEAD
LOCATION: HEAD
LOCATION: BACK;HEAD

## 2019-04-19 ASSESSMENT — PAIN DESCRIPTION - ORIENTATION: ORIENTATION: MID

## 2019-04-19 ASSESSMENT — PAIN DESCRIPTION - DESCRIPTORS
DESCRIPTORS: ACHING;DISCOMFORT;SORE
DESCRIPTORS: ACHING;CONSTANT;DISCOMFORT
DESCRIPTORS: ACHING

## 2019-04-19 ASSESSMENT — PAIN DESCRIPTION - PAIN TYPE
TYPE: ACUTE PAIN

## 2019-04-19 ASSESSMENT — PAIN - FUNCTIONAL ASSESSMENT: PAIN_FUNCTIONAL_ASSESSMENT: PREVENTS OR INTERFERES SOME ACTIVE ACTIVITIES AND ADLS

## 2019-04-19 NOTE — PROGRESS NOTES
PROGRESS NOTE    Patient Presents with/Seen in Consultation For      *Reason for Consult: Hepatitis C     CHIEF COMPLAINT:  Shortness of breath    Subjective:     Patient seen Nanette Blower in bed, having chest discomfort. Nursing aware of discomfort. States he is having a echo tomorrow. States he tolerated his breakfast with no difficulties. Denies any nausea or vomiting. Reports to mild indigestion. Had large brown BM yesterday, nothing today. +flatus. POC reviewed with patient, all questions answered. Review of Systems  Aside from what was mentioned in the PMH and HPI, essentially unremarkable, all others negative. Objective:     Patient Vitals for the past 8 hrs:   BP Temp Temp src Pulse Resp SpO2 Weight   04/19/19 0942 -- -- -- -- 16 95 % --   04/19/19 0941 -- -- -- -- 16 95 % --   04/19/19 0939 (!) 178/120 -- -- -- -- -- --   04/19/19 0846 (!) 171/106 98.1 °F (36.7 °C) Oral 88 16 92 % --   04/19/19 0409 -- -- -- -- -- -- 199 lb 14.4 oz (90.7 kg)       General appearance: alert, awake, laying in bed, and cooperative  Eyes: conjunctivae/corneas clear. PERRL.   Lungs: clear to auscultation bilaterally  Heart: regular rate and rhythm, no murmur, 2+ pulses; trace BLE edema  Abdomen: soft, mid- abdominal tenderness to palpitation, no guarding or rebound; bowel sounds normal; no masses,  no organomegaly  Extremities: trace BLE  Pulses: 2+ and symmetric  Skin: Skin color dusky, texture dry, turgor normal.   Neurologic: Grossly normal      torsemide (DEMADEX) tablet 10 mg Once per day on Mon Wed Fri   amLODIPine (NORVASC) tablet 10 mg Daily   sodium chloride flush 0.9 % injection 10 mL BID   sodium chloride flush 0.9 % injection 10 mL PRN   acetaminophen (TYLENOL) tablet 650 mg Q6H PRN   spironolactone (ALDACTONE) tablet 25 mg Daily   ipratropium-albuterol (DUONEB) nebulizer solution 1 ampule Q4H   formoterol (PERFOROMIST) nebulizer solution 20 mcg BID   budesonide (PULMICORT) nebulizer suspension 1,000 mcg BID diphenhydrAMINE (BENADRYL) tablet 25 mg Nightly PRN   albuterol (PROVENTIL) nebulizer solution 2.5 mg Q6H PRN   finasteride (PROSCAR) tablet 5 mg Daily   gabapentin (NEURONTIN) capsule 300 mg TID   lisinopril (PRINIVIL;ZESTRIL) tablet 40 mg Daily   nicotine (NICODERM CQ) 21 MG/24HR 1 patch Daily   famotidine (PEPCID) tablet 40 mg QPM   traMADol (ULTRAM) tablet 50 mg 4x Daily   perflutren lipid microspheres (DEFINITY) injection 1.65 mg ONCE PRN   metoprolol succinate (TOPROL XL) extended release tablet 50 mg Daily   methylPREDNISolone sodium (SOLU-MEDROL) injection 40 mg Q8H   doxycycline hyclate (VIBRAMYCIN) capsule 100 mg 2 times per day        Data Review  CBC: Lab Results   Component Value Date    WBC 17.6 04/19/2019    RBC 4.47 04/19/2019    HGB 14.6 04/19/2019    HCT 43.8 04/19/2019    MCV 98.0 04/19/2019    MCH 32.7 04/19/2019    MCHC 33.3 04/19/2019    RDW 14.3 04/19/2019     04/19/2019    MPV 10.3 04/19/2019     CMP:  Lab Results   Component Value Date     04/19/2019    K 4.4 04/19/2019    K 4.1 03/07/2018     04/19/2019    CO2 24 04/19/2019    BUN 31 04/19/2019    CREATININE 1.2 04/19/2019    GFRAA >60 04/19/2019    LABGLOM >60 04/19/2019    GLUCOSE 130 04/19/2019    PROT 7.4 04/19/2019    LABALBU 4.1 04/19/2019    CALCIUM 9.2 04/19/2019    BILITOT <0.2 04/19/2019    ALKPHOS 76 04/19/2019    AST 18 04/19/2019    ALT 17 04/19/2019     Hepatic Function Panel:  Lab Results   Component Value Date    ALKPHOS 76 04/19/2019    ALT 17 04/19/2019    AST 18 04/19/2019    PROT 7.4 04/19/2019    BILITOT <0.2 04/19/2019    LABALBU 4.1 04/19/2019     No components found for: CHLPL  Lab Results   Component Value Date    TRIG 217 (H) 01/02/2018    TRIG 172 (H) 12/12/2016    TRIG 251 (H) 02/07/2016     Lab Results   Component Value Date    HDL 58 01/02/2018    HDL 55 12/12/2016    HDL 11 02/07/2016     Lab Results   Component Value Date    LDLCALC 144 (H) 01/02/2018    LDLCALC 146 (H) 12/12/2016

## 2019-04-19 NOTE — PROGRESS NOTES
Patient seen still with elevated bp and chest pain. And sob.  Likely will need stress test for evaluation of chest pain

## 2019-04-19 NOTE — PROGRESS NOTES
[facility] Quality Flow/Interdisciplinary Rounds Progress Note        Quality Flow Rounds held on April 19, 2019    Disciplines Attending:  Bedside Nurse, ,  and Nursing Unit Leadership    Gio Doe was admitted on 4/15/2019  8:17 PM    Anticipated Discharge Date:  Expected Discharge Date: 04/19/19    Disposition:    Erick Score:  Erick Scale Score: 23    Readmission Risk              Risk of Unplanned Readmission:        13           Discussed patient goal for the day, patient clinical progression, and barriers to discharge.   The following Goal(s) of the Day/Commitment(s) have been identified:  steroids      Dary Legions  April 19, 2019

## 2019-04-19 NOTE — PROGRESS NOTES
Pulmonary Progress Note    Admit Date: 4/15/2019  Hospital day                               PCP: Kimmy Pozo DO    Chief Complaint (s):  Patient Active Problem List   Diagnosis    CTS (carpal tunnel syndrome)    Cervical arthritis    Essential hypertension    Hyperlipidemia    INOCENCIA on CPAP    Community acquired bacterial pneumonia    CAP (community acquired pneumonia) due to Chlamydia species    Acute respiratory failure with hypoxia ([facility] [address]    COPD exacerbation ([facility] [address]       Subjective:  · Still with chest pressure. Vitals:  VITALS:  BP (!) 157/103   Pulse 88   Temp 98.1 °F (36.7 °C) (Oral)   Resp 16   Ht 5' 7\" (1.702 m)   Wt 199 lb 14.4 oz (90.7 kg)   SpO2 95%   BMI 31.31 kg/m²     24HR INTAKE/OUTPUT:      Intake/Output Summary (Last 24 hours) at 2019 1347  Last data filed at 2019 1344  Gross per 24 hour   Intake 1020 ml   Output 2375 ml   Net -1355 ml       24HR PULSE OXIMETRY RANGE:    SpO2  Av %  Min: 92 %  Max: 97 %    Medications:  IV:      Scheduled Meds:   torsemide  10 mg Oral Once per day on     amLODIPine  10 mg Oral Daily    sodium chloride flush  10 mL Intravenous BID    spironolactone  25 mg Oral Daily    ipratropium-albuterol  1 ampule Inhalation Q4H    formoterol  20 mcg Nebulization BID    budesonide  1,000 mcg Nebulization BID    finasteride  5 mg Oral Daily    gabapentin  300 mg Oral TID    lisinopril  40 mg Oral Daily    nicotine  1 patch Transdermal Daily    famotidine  40 mg Oral QPM    traMADol  50 mg Oral 4x Daily    metoprolol succinate  50 mg Oral Daily    methylPREDNISolone  40 mg Intravenous Q8H    doxycycline hyclate  100 mg Oral 2 times per day       Diet:   DIET LOW SODIUM 2 GM; Low Fat     EXAM:  General: No distress. Alert. Eyes: PERRL. No sclera icterus. No conjunctival injection. ENT: No discharge. Pharynx clear. Neck: Trachea midline. Normal thyroid. Resp: No accessory muscle use. No rales.  No wheezing. No rhonchi. CV: Regular rate. Regular rhythm. No murmur or rub. Abd: Non-tender. Non-distended. No masses. No organomegaly. Normal bowel sounds. Skin: Warm and dry. No nodule on exposed extremities. No rash on exposed extremities. Ext: No cyanosis, clubbing, edema  Lymph: No cervical LAD. No supraclavicular LAD. M/S: No cyanosis. No joint deformity. No clubbing. Neuro: Awake. Follows commands. Positive pupils/gag/corneals. Normal pain response. Results:  CBC:   Recent Labs     04/16/19  1408 04/18/19  1304 04/19/19  0415   WBC 16.1* 19.2* 17.6*   HGB 14.6 14.4 14.6   HCT 42.3 43.0 43.8   MCV 94.4 97.5 98.0    304 305     BMP:   Recent Labs     04/16/19  1408 04/18/19  1304 04/19/19  0415    139 141   K 4.0 4.3 4.4    101 104   CO2 19* 23 24   BUN 28* 24* 31*   CREATININE 1.3* 1.2 1.2     LIVER PROFILE:   Recent Labs     04/16/19  1408 04/18/19  1304 04/19/19  0415   AST 18 12 18   ALT 18 14 17   BILITOT 0.2 <0.2 <0.2   ALKPHOS 65 62 76     PT/INR:   Recent Labs     04/18/19  1304 04/19/19  0415   PROTIME 10.6 10.5   INR 0.9 0.9     APTT: No results for input(s): APTT in the last 72 hours. Pathology:  1. N/A      Microbiology:  1. None    Recent ABG:   No results for input(s): PH, PO2, PCO2, HCO3, BE, O2SAT, METHB, O2HB, COHB, O2CON, HHB, THB in the last 72 hours. Recent Films:  US ABDOMEN LIMITED   Final Result   Negative right upper quadrant sonogram.   .                              XR CHEST STANDARD (2 VW)   Final Result   1. Bibasilar airspace disease concerning for infiltrate/pneumonia. 2. Stable, enlarged cardiomediastinal silhouette with thoracic aortic   vascular calcifications. XR CHEST PORTABLE   Final Result   Indistinct bronchovascular prominence in the lungs likely represents   pulmonary edema, and much less likely prominent airway inflammatory   change.       There is no evidence of septal edema or effusion in the lower chest.   There is

## 2019-04-20 LAB
ALBUMIN SERPL-MCNC: 3.9 G/DL (ref 3.5–5.2)
ALP BLD-CCNC: 79 U/L (ref 40–129)
ALT SERPL-CCNC: 48 U/L (ref 0–40)
AMMONIA: 39.5 UMOL/L (ref 16–60)
ANION GAP SERPL CALCULATED.3IONS-SCNC: 14 MMOL/L (ref 7–16)
AST SERPL-CCNC: 58 U/L (ref 0–39)
BILIRUB SERPL-MCNC: 1.2 MG/DL (ref 0–1.2)
BUN BLDV-MCNC: 35 MG/DL (ref 6–20)
CALCIUM SERPL-MCNC: 8.9 MG/DL (ref 8.6–10.2)
CHLORIDE BLD-SCNC: 101 MMOL/L (ref 98–107)
CO2: 22 MMOL/L (ref 22–29)
CREAT SERPL-MCNC: 1.1 MG/DL (ref 0.7–1.2)
GFR AFRICAN AMERICAN: >60
GFR NON-AFRICAN AMERICAN: >60 ML/MIN/1.73
GLUCOSE BLD-MCNC: 177 MG/DL (ref 74–99)
HCT VFR BLD CALC: 43.1 % (ref 37–54)
HEMOGLOBIN: 14.3 G/DL (ref 12.5–16.5)
INR BLD: 0.9
MCH RBC QN AUTO: 32.4 PG (ref 26–35)
MCHC RBC AUTO-ENTMCNC: 33.2 % (ref 32–34.5)
MCV RBC AUTO: 97.7 FL (ref 80–99.9)
PDW BLD-RTO: 14.2 FL (ref 11.5–15)
PLATELET # BLD: 234 E9/L (ref 130–450)
PMV BLD AUTO: 10.8 FL (ref 7–12)
POTASSIUM SERPL-SCNC: 4 MMOL/L (ref 3.5–5)
PROTHROMBIN TIME: 10.6 SEC (ref 9.3–12.4)
RBC # BLD: 4.41 E12/L (ref 3.8–5.8)
SODIUM BLD-SCNC: 137 MMOL/L (ref 132–146)
TOTAL PROTEIN: 7.1 G/DL (ref 6.4–8.3)
WBC # BLD: 16.8 E9/L (ref 4.5–11.5)

## 2019-04-20 PROCEDURE — 80053 COMPREHEN METABOLIC PANEL: CPT

## 2019-04-20 PROCEDURE — 6360000002 HC RX W HCPCS: Performed by: INTERNAL MEDICINE

## 2019-04-20 PROCEDURE — 6370000000 HC RX 637 (ALT 250 FOR IP): Performed by: GENERAL PRACTICE

## 2019-04-20 PROCEDURE — 85027 COMPLETE CBC AUTOMATED: CPT

## 2019-04-20 PROCEDURE — 6370000000 HC RX 637 (ALT 250 FOR IP): Performed by: INTERNAL MEDICINE

## 2019-04-20 PROCEDURE — 82140 ASSAY OF AMMONIA: CPT

## 2019-04-20 PROCEDURE — 1200000000 HC SEMI PRIVATE

## 2019-04-20 PROCEDURE — 94640 AIRWAY INHALATION TREATMENT: CPT

## 2019-04-20 PROCEDURE — 94760 N-INVAS EAR/PLS OXIMETRY 1: CPT

## 2019-04-20 PROCEDURE — 6360000002 HC RX W HCPCS: Performed by: GENERAL PRACTICE

## 2019-04-20 PROCEDURE — 36415 COLL VENOUS BLD VENIPUNCTURE: CPT

## 2019-04-20 PROCEDURE — 85610 PROTHROMBIN TIME: CPT

## 2019-04-20 PROCEDURE — 2580000003 HC RX 258: Performed by: GENERAL PRACTICE

## 2019-04-20 RX ORDER — CLONIDINE HYDROCHLORIDE 0.1 MG/1
0.1 TABLET ORAL 2 TIMES DAILY
Status: DISCONTINUED | OUTPATIENT
Start: 2019-04-20 | End: 2019-04-21

## 2019-04-20 RX ADMIN — FINASTERIDE 5 MG: 5 TABLET, FILM COATED ORAL at 08:18

## 2019-04-20 RX ADMIN — TRAMADOL HYDROCHLORIDE 50 MG: 50 TABLET, FILM COATED ORAL at 17:30

## 2019-04-20 RX ADMIN — TRAMADOL HYDROCHLORIDE 50 MG: 50 TABLET, FILM COATED ORAL at 09:09

## 2019-04-20 RX ADMIN — IPRATROPIUM BROMIDE AND ALBUTEROL SULFATE 1 AMPULE: .5; 3 SOLUTION RESPIRATORY (INHALATION) at 20:01

## 2019-04-20 RX ADMIN — IPRATROPIUM BROMIDE AND ALBUTEROL SULFATE 1 AMPULE: .5; 3 SOLUTION RESPIRATORY (INHALATION) at 04:10

## 2019-04-20 RX ADMIN — LISINOPRIL 40 MG: 20 TABLET ORAL at 08:17

## 2019-04-20 RX ADMIN — FORMOTEROL FUMARATE DIHYDRATE 20 MCG: 20 SOLUTION RESPIRATORY (INHALATION) at 09:38

## 2019-04-20 RX ADMIN — BUDESONIDE 1000 MCG: 0.5 SUSPENSION RESPIRATORY (INHALATION) at 09:38

## 2019-04-20 RX ADMIN — IPRATROPIUM BROMIDE AND ALBUTEROL SULFATE 1 AMPULE: .5; 3 SOLUTION RESPIRATORY (INHALATION) at 16:08

## 2019-04-20 RX ADMIN — AMLODIPINE BESYLATE 10 MG: 10 TABLET ORAL at 08:18

## 2019-04-20 RX ADMIN — TRAMADOL HYDROCHLORIDE 50 MG: 50 TABLET, FILM COATED ORAL at 13:30

## 2019-04-20 RX ADMIN — IPRATROPIUM BROMIDE AND ALBUTEROL SULFATE 1 AMPULE: .5; 3 SOLUTION RESPIRATORY (INHALATION) at 12:58

## 2019-04-20 RX ADMIN — DOXYCYCLINE HYCLATE 100 MG: 100 CAPSULE ORAL at 09:09

## 2019-04-20 RX ADMIN — FORMOTEROL FUMARATE DIHYDRATE 20 MCG: 20 SOLUTION RESPIRATORY (INHALATION) at 20:01

## 2019-04-20 RX ADMIN — Medication 10 ML: at 08:18

## 2019-04-20 RX ADMIN — METOPROLOL SUCCINATE 50 MG: 50 TABLET, EXTENDED RELEASE ORAL at 08:18

## 2019-04-20 RX ADMIN — GABAPENTIN 300 MG: 300 CAPSULE ORAL at 20:37

## 2019-04-20 RX ADMIN — SPIRONOLACTONE 25 MG: 25 TABLET ORAL at 08:17

## 2019-04-20 RX ADMIN — BUDESONIDE 1000 MCG: 0.5 SUSPENSION RESPIRATORY (INHALATION) at 20:01

## 2019-04-20 RX ADMIN — FAMOTIDINE 40 MG: 20 TABLET ORAL at 17:46

## 2019-04-20 RX ADMIN — IPRATROPIUM BROMIDE AND ALBUTEROL SULFATE 1 AMPULE: .5; 3 SOLUTION RESPIRATORY (INHALATION) at 00:00

## 2019-04-20 RX ADMIN — BENZOCAINE AND MENTHOL, UNSPECIFIED FORM 1 LOZENGE: 15; 20 LOZENGE ORAL at 18:13

## 2019-04-20 RX ADMIN — TRAMADOL HYDROCHLORIDE 50 MG: 50 TABLET, FILM COATED ORAL at 20:37

## 2019-04-20 RX ADMIN — GABAPENTIN 300 MG: 300 CAPSULE ORAL at 14:30

## 2019-04-20 RX ADMIN — Medication 10 ML: at 20:38

## 2019-04-20 RX ADMIN — METHYLPREDNISOLONE SODIUM SUCCINATE 40 MG: 40 INJECTION, POWDER, LYOPHILIZED, FOR SOLUTION INTRAMUSCULAR; INTRAVENOUS at 17:46

## 2019-04-20 RX ADMIN — GABAPENTIN 300 MG: 300 CAPSULE ORAL at 09:09

## 2019-04-20 RX ADMIN — CLONIDINE HYDROCHLORIDE 0.1 MG: 0.1 TABLET ORAL at 21:11

## 2019-04-20 RX ADMIN — DOXYCYCLINE HYCLATE 100 MG: 100 CAPSULE ORAL at 20:37

## 2019-04-20 RX ADMIN — METHYLPREDNISOLONE SODIUM SUCCINATE 40 MG: 40 INJECTION, POWDER, LYOPHILIZED, FOR SOLUTION INTRAMUSCULAR; INTRAVENOUS at 03:27

## 2019-04-20 RX ADMIN — IPRATROPIUM BROMIDE AND ALBUTEROL SULFATE 1 AMPULE: .5; 3 SOLUTION RESPIRATORY (INHALATION) at 09:38

## 2019-04-20 RX ADMIN — METHYLPREDNISOLONE SODIUM SUCCINATE 40 MG: 40 INJECTION, POWDER, LYOPHILIZED, FOR SOLUTION INTRAMUSCULAR; INTRAVENOUS at 09:46

## 2019-04-20 ASSESSMENT — PAIN SCALES - GENERAL
PAINLEVEL_OUTOF10: 0
PAINLEVEL_OUTOF10: 8
PAINLEVEL_OUTOF10: 0
PAINLEVEL_OUTOF10: 4
PAINLEVEL_OUTOF10: 0
PAINLEVEL_OUTOF10: 8
PAINLEVEL_OUTOF10: 0

## 2019-04-20 ASSESSMENT — PAIN DESCRIPTION - ORIENTATION: ORIENTATION: RIGHT

## 2019-04-20 ASSESSMENT — PAIN - FUNCTIONAL ASSESSMENT: PAIN_FUNCTIONAL_ASSESSMENT: PREVENTS OR INTERFERES SOME ACTIVE ACTIVITIES AND ADLS

## 2019-04-20 ASSESSMENT — PAIN DESCRIPTION - DESCRIPTORS: DESCRIPTORS: SHOOTING;ACHING

## 2019-04-20 ASSESSMENT — PAIN DESCRIPTION - PAIN TYPE: TYPE: ACUTE PAIN

## 2019-04-20 ASSESSMENT — PAIN DESCRIPTION - LOCATION: LOCATION: ARM

## 2019-04-20 ASSESSMENT — PAIN DESCRIPTION - PROGRESSION: CLINICAL_PROGRESSION: GRADUALLY WORSENING

## 2019-04-20 ASSESSMENT — PAIN DESCRIPTION - FREQUENCY: FREQUENCY: INTERMITTENT

## 2019-04-20 NOTE — PROGRESS NOTES
lisinopril (PRINIVIL;ZESTRIL) tablet 40 mg Daily   nicotine (NICODERM CQ) 21 MG/24HR 1 patch Daily   famotidine (PEPCID) tablet 40 mg QPM   traMADol (ULTRAM) tablet 50 mg 4x Daily   perflutren lipid microspheres (DEFINITY) injection 1.65 mg ONCE PRN   metoprolol succinate (TOPROL XL) extended release tablet 50 mg Daily   methylPREDNISolone sodium (SOLU-MEDROL) injection 40 mg Q8H   doxycycline hyclate (VIBRAMYCIN) capsule 100 mg 2 times per day        Data Review  CBC:   Lab Results   Component Value Date    WBC 16.8 04/20/2019    RBC 4.41 04/20/2019    HGB 14.3 04/20/2019    HCT 43.1 04/20/2019    MCV 97.7 04/20/2019    MCH 32.4 04/20/2019    MCHC 33.2 04/20/2019    RDW 14.2 04/20/2019     04/20/2019    MPV 10.8 04/20/2019     CMP:    Lab Results   Component Value Date     04/20/2019    K 4.0 04/20/2019    K 4.1 03/07/2018     04/20/2019    CO2 22 04/20/2019    BUN 35 04/20/2019    CREATININE 1.1 04/20/2019    GFRAA >60 04/20/2019    LABGLOM >60 04/20/2019    GLUCOSE 177 04/20/2019    PROT 7.1 04/20/2019    LABALBU 3.9 04/20/2019    CALCIUM 8.9 04/20/2019    BILITOT 1.2 04/20/2019    ALKPHOS 79 04/20/2019    AST 58 04/20/2019    ALT 48 04/20/2019     Hepatic Function Panel:    Lab Results   Component Value Date    ALKPHOS 79 04/20/2019    ALT 48 04/20/2019    AST 58 04/20/2019    PROT 7.1 04/20/2019    BILITOT 1.2 04/20/2019    LABALBU 3.9 04/20/2019     No components found for: CHLPL    Lab Results   Component Value Date    TRIG 217 (H) 01/02/2018    TRIG 172 (H) 12/12/2016    TRIG 251 (H) 02/07/2016       Lab Results   Component Value Date    HDL 58 01/02/2018    HDL 55 12/12/2016    HDL 11 02/07/2016       Lab Results   Component Value Date    LDLCALC 144 (H) 01/02/2018    LDLCALC 146 (H) 12/12/2016    LDLCALC 84 02/07/2016       Lab Results   Component Value Date    LABVLDL 43 01/02/2018    LABVLDL 34 12/12/2016    LABVLDL 50 02/07/2016      PT/INR:    Lab Results   Component Value Date PROTIME 10.6 04/20/2019    INR 0.9 04/20/2019     IRON:    Lab Results   Component Value Date    IRON 107 04/18/2019         Assessment:     Active Problems:  · Hepatitis C  · + drug use; +marijuana, cocaine  · Cough   · Black mold exposure- defer to Pulmonary  · COPD- defer to Pulmonary  · Nausea    · ETOH abuse  · Leukocytosis     Plan:     · Liver serology- negative thus far; others still pending  · Continue Aldactone as ordered  · Pepcid 40 mg daily  · Low fat diet, as tolerated   · Alcohol & drug abstinence- patient agrees  · Continue antibiotics per primary service  · OP FU in 1 month  · Will sign off, call if needed      Discussed with Dr. Enriqueta Fletcher per Dr. Arlene Hernadez, NP-C 4/20/2019 11:45 AM For Dr. Zuleyka Sepulveda

## 2019-04-21 ENCOUNTER — APPOINTMENT (OUTPATIENT)
Dept: ULTRASOUND IMAGING | Age: 54
DRG: 190 | End: 2019-04-21
Payer: MEDICARE

## 2019-04-21 ENCOUNTER — APPOINTMENT (OUTPATIENT)
Dept: NUCLEAR MEDICINE | Age: 54
DRG: 190 | End: 2019-04-21
Payer: MEDICARE

## 2019-04-21 LAB
AFP-TUMOR MARKER: 2 NG/ML (ref 0–9)
ALBUMIN SERPL-MCNC: 3.7 G/DL (ref 3.5–5.2)
ALP BLD-CCNC: 67 U/L (ref 40–129)
ALPHA-1 ANTITRYPSIN: 155 MG/DL (ref 90–200)
ALT SERPL-CCNC: 96 U/L (ref 0–40)
AMMONIA: 39.1 UMOL/L (ref 16–60)
ANION GAP SERPL CALCULATED.3IONS-SCNC: 11 MMOL/L (ref 7–16)
AST SERPL-CCNC: 80 U/L (ref 0–39)
BILIRUB SERPL-MCNC: 0.2 MG/DL (ref 0–1.2)
BLOOD CULTURE, ROUTINE: NORMAL
BUN BLDV-MCNC: 34 MG/DL (ref 6–20)
CALCIUM SERPL-MCNC: 8.8 MG/DL (ref 8.6–10.2)
CHLORIDE BLD-SCNC: 101 MMOL/L (ref 98–107)
CO2: 24 MMOL/L (ref 22–29)
CREAT SERPL-MCNC: 1 MG/DL (ref 0.7–1.2)
CULTURE, BLOOD 2: NORMAL
GFR AFRICAN AMERICAN: >60
GFR NON-AFRICAN AMERICAN: >60 ML/MIN/1.73
GLUCOSE BLD-MCNC: 118 MG/DL (ref 74–99)
HCT VFR BLD CALC: 41.2 % (ref 37–54)
HEMOGLOBIN: 13.7 G/DL (ref 12.5–16.5)
IGG 1: 963 MG/DL (ref 240–1118)
IGG 2: 151 MG/DL (ref 124–549)
IGG 3: 71 MG/DL (ref 21–134)
IGG 4: 78 MG/DL (ref 1–123)
INR BLD: 0.9
LV EF: 45 %
LVEF MODALITY: NORMAL
MCH RBC QN AUTO: 32.3 PG (ref 26–35)
MCHC RBC AUTO-ENTMCNC: 33.3 % (ref 32–34.5)
MCV RBC AUTO: 97.2 FL (ref 80–99.9)
PDW BLD-RTO: 13.9 FL (ref 11.5–15)
PLATELET # BLD: 224 E9/L (ref 130–450)
PMV BLD AUTO: 10.8 FL (ref 7–12)
POTASSIUM SERPL-SCNC: 4.9 MMOL/L (ref 3.5–5)
PROTHROMBIN TIME: 10.7 SEC (ref 9.3–12.4)
RBC # BLD: 4.24 E12/L (ref 3.8–5.8)
SODIUM BLD-SCNC: 136 MMOL/L (ref 132–146)
TOTAL PROTEIN: 6.6 G/DL (ref 6.4–8.3)
WBC # BLD: 15.9 E9/L (ref 4.5–11.5)

## 2019-04-21 PROCEDURE — 6370000000 HC RX 637 (ALT 250 FOR IP): Performed by: GENERAL PRACTICE

## 2019-04-21 PROCEDURE — 1200000000 HC SEMI PRIVATE

## 2019-04-21 PROCEDURE — A9500 TC99M SESTAMIBI: HCPCS | Performed by: RADIOLOGY

## 2019-04-21 PROCEDURE — 6370000000 HC RX 637 (ALT 250 FOR IP): Performed by: INTERNAL MEDICINE

## 2019-04-21 PROCEDURE — 2580000003 HC RX 258: Performed by: GENERAL PRACTICE

## 2019-04-21 PROCEDURE — 78452 HT MUSCLE IMAGE SPECT MULT: CPT

## 2019-04-21 PROCEDURE — 80053 COMPREHEN METABOLIC PANEL: CPT

## 2019-04-21 PROCEDURE — 85027 COMPLETE CBC AUTOMATED: CPT

## 2019-04-21 PROCEDURE — 36415 COLL VENOUS BLD VENIPUNCTURE: CPT

## 2019-04-21 PROCEDURE — 6360000002 HC RX W HCPCS: Performed by: INTERNAL MEDICINE

## 2019-04-21 PROCEDURE — 85610 PROTHROMBIN TIME: CPT

## 2019-04-21 PROCEDURE — 82384 ASSAY THREE CATECHOLAMINES: CPT

## 2019-04-21 PROCEDURE — 3430000000 HC RX DIAGNOSTIC RADIOPHARMACEUTICAL: Performed by: RADIOLOGY

## 2019-04-21 PROCEDURE — 94640 AIRWAY INHALATION TREATMENT: CPT

## 2019-04-21 PROCEDURE — 76770 US EXAM ABDO BACK WALL COMP: CPT

## 2019-04-21 PROCEDURE — 6360000002 HC RX W HCPCS: Performed by: GENERAL PRACTICE

## 2019-04-21 PROCEDURE — 82140 ASSAY OF AMMONIA: CPT

## 2019-04-21 PROCEDURE — 93975 VASCULAR STUDY: CPT

## 2019-04-21 PROCEDURE — 93017 CV STRESS TEST TRACING ONLY: CPT

## 2019-04-21 RX ORDER — CLONIDINE HYDROCHLORIDE 0.1 MG/1
0.1 TABLET ORAL ONCE
Status: COMPLETED | OUTPATIENT
Start: 2019-04-21 | End: 2019-04-21

## 2019-04-21 RX ORDER — HYDRALAZINE HYDROCHLORIDE 25 MG/1
25 TABLET, FILM COATED ORAL EVERY 8 HOURS SCHEDULED
Status: DISCONTINUED | OUTPATIENT
Start: 2019-04-21 | End: 2019-04-23 | Stop reason: HOSPADM

## 2019-04-21 RX ORDER — CLONIDINE HYDROCHLORIDE 0.1 MG/1
0.1 TABLET ORAL 3 TIMES DAILY
Status: DISCONTINUED | OUTPATIENT
Start: 2019-04-21 | End: 2019-04-23 | Stop reason: HOSPADM

## 2019-04-21 RX ADMIN — TRAMADOL HYDROCHLORIDE 50 MG: 50 TABLET, FILM COATED ORAL at 21:28

## 2019-04-21 RX ADMIN — GABAPENTIN 300 MG: 300 CAPSULE ORAL at 21:28

## 2019-04-21 RX ADMIN — FORMOTEROL FUMARATE DIHYDRATE 20 MCG: 20 SOLUTION RESPIRATORY (INHALATION) at 19:47

## 2019-04-21 RX ADMIN — TRAMADOL HYDROCHLORIDE 50 MG: 50 TABLET, FILM COATED ORAL at 12:51

## 2019-04-21 RX ADMIN — Medication 10 ML: at 10:58

## 2019-04-21 RX ADMIN — GABAPENTIN 300 MG: 300 CAPSULE ORAL at 14:35

## 2019-04-21 RX ADMIN — SPIRONOLACTONE 25 MG: 25 TABLET ORAL at 10:57

## 2019-04-21 RX ADMIN — ACETAMINOPHEN 650 MG: 325 TABLET ORAL at 11:04

## 2019-04-21 RX ADMIN — FINASTERIDE 5 MG: 5 TABLET, FILM COATED ORAL at 10:56

## 2019-04-21 RX ADMIN — CLONIDINE HYDROCHLORIDE 0.1 MG: 0.1 TABLET ORAL at 14:35

## 2019-04-21 RX ADMIN — HYDRALAZINE HYDROCHLORIDE 25 MG: 25 TABLET, FILM COATED ORAL at 22:25

## 2019-04-21 RX ADMIN — FAMOTIDINE 40 MG: 20 TABLET ORAL at 17:11

## 2019-04-21 RX ADMIN — IPRATROPIUM BROMIDE AND ALBUTEROL SULFATE 1 AMPULE: .5; 3 SOLUTION RESPIRATORY (INHALATION) at 00:22

## 2019-04-21 RX ADMIN — Medication 10 ML: at 21:29

## 2019-04-21 RX ADMIN — CLONIDINE HYDROCHLORIDE 0.1 MG: 0.1 TABLET ORAL at 21:28

## 2019-04-21 RX ADMIN — Medication 10 MILLICURIE: at 08:03

## 2019-04-21 RX ADMIN — LISINOPRIL 40 MG: 20 TABLET ORAL at 10:57

## 2019-04-21 RX ADMIN — DOXYCYCLINE HYCLATE 100 MG: 100 CAPSULE ORAL at 21:28

## 2019-04-21 RX ADMIN — REGADENOSON 0.4 MG: 0.08 INJECTION, SOLUTION INTRAVENOUS at 09:25

## 2019-04-21 RX ADMIN — METHYLPREDNISOLONE SODIUM SUCCINATE 40 MG: 40 INJECTION, POWDER, LYOPHILIZED, FOR SOLUTION INTRAMUSCULAR; INTRAVENOUS at 10:57

## 2019-04-21 RX ADMIN — DOXYCYCLINE HYCLATE 100 MG: 100 CAPSULE ORAL at 10:58

## 2019-04-21 RX ADMIN — IPRATROPIUM BROMIDE AND ALBUTEROL SULFATE 1 AMPULE: .5; 3 SOLUTION RESPIRATORY (INHALATION) at 12:33

## 2019-04-21 RX ADMIN — CLONIDINE HYDROCHLORIDE 0.1 MG: 0.1 TABLET ORAL at 00:33

## 2019-04-21 RX ADMIN — IPRATROPIUM BROMIDE AND ALBUTEROL SULFATE 1 AMPULE: .5; 3 SOLUTION RESPIRATORY (INHALATION) at 16:17

## 2019-04-21 RX ADMIN — HYDRALAZINE HYDROCHLORIDE 25 MG: 25 TABLET, FILM COATED ORAL at 14:35

## 2019-04-21 RX ADMIN — IPRATROPIUM BROMIDE AND ALBUTEROL SULFATE 1 AMPULE: .5; 3 SOLUTION RESPIRATORY (INHALATION) at 19:47

## 2019-04-21 RX ADMIN — AMLODIPINE BESYLATE 10 MG: 10 TABLET ORAL at 10:56

## 2019-04-21 RX ADMIN — Medication 30 MILLICURIE: at 08:04

## 2019-04-21 RX ADMIN — BENZOCAINE AND MENTHOL, UNSPECIFIED FORM 1 LOZENGE: 15; 20 LOZENGE ORAL at 19:16

## 2019-04-21 RX ADMIN — METHYLPREDNISOLONE SODIUM SUCCINATE 40 MG: 40 INJECTION, POWDER, LYOPHILIZED, FOR SOLUTION INTRAMUSCULAR; INTRAVENOUS at 02:40

## 2019-04-21 ASSESSMENT — PAIN SCALES - GENERAL
PAINLEVEL_OUTOF10: 7
PAINLEVEL_OUTOF10: 8
PAINLEVEL_OUTOF10: 7
PAINLEVEL_OUTOF10: 0
PAINLEVEL_OUTOF10: 7
PAINLEVEL_OUTOF10: 0
PAINLEVEL_OUTOF10: 5

## 2019-04-21 ASSESSMENT — PAIN DESCRIPTION - ONSET: ONSET: ON-GOING

## 2019-04-21 ASSESSMENT — PAIN DESCRIPTION - PAIN TYPE
TYPE: ACUTE PAIN

## 2019-04-21 ASSESSMENT — PAIN DESCRIPTION - DESCRIPTORS
DESCRIPTORS: HEADACHE
DESCRIPTORS: BURNING;SHOOTING
DESCRIPTORS: HEADACHE

## 2019-04-21 ASSESSMENT — PAIN DESCRIPTION - LOCATION
LOCATION: ARM
LOCATION: HEAD
LOCATION: HEAD

## 2019-04-21 ASSESSMENT — PAIN DESCRIPTION - FREQUENCY: FREQUENCY: INTERMITTENT

## 2019-04-21 ASSESSMENT — PAIN DESCRIPTION - ORIENTATION: ORIENTATION: RIGHT

## 2019-04-21 ASSESSMENT — PAIN DESCRIPTION - PROGRESSION: CLINICAL_PROGRESSION: GRADUALLY WORSENING

## 2019-04-21 NOTE — PROGRESS NOTES
Trachea midline. Normal thyroid. Resp: No accessory muscle use. No rales. No wheezing. No rhonchi. CV: Regular rate. Regular rhythm. No murmur or rub. Abd: Non-tender. Non-distended. No masses. No organomegaly. Normal bowel sounds. Skin: Warm and dry. No nodule on exposed extremities. No rash on exposed extremities. Ext: No cyanosis, clubbing, edema  Lymph: No cervical LAD. No supraclavicular LAD. M/S: No cyanosis. No joint deformity. No clubbing. Neuro: Awake. Follows commands. Positive pupils/gag/corneals. Normal pain response. Results:  CBC:   Recent Labs     04/19/19 0415 04/20/19 0349 04/21/19  0403   WBC 17.6* 16.8* 15.9*   HGB 14.6 14.3 13.7   HCT 43.8 43.1 41.2   MCV 98.0 97.7 97.2    234 224     BMP:   Recent Labs     04/19/19 0415 04/20/19 0349 04/21/19  0403    137 136   K 4.4 4.0 4.9    101 101   CO2 24 22 24   BUN 31* 35* 34*   CREATININE 1.2 1.1 1.0     LIVER PROFILE:   Recent Labs     04/19/19 0415 04/20/19 0349 04/21/19  0403   AST 18 58* 80*   ALT 17 48* 96*   BILITOT <0.2 1.2 0.2   ALKPHOS 76 79 67     PT/INR:   Recent Labs     04/19/19 0415 04/20/19 0349 04/21/19  0403   PROTIME 10.5 10.6 10.7   INR 0.9 0.9 0.9     APTT: No results for input(s): APTT in the last 72 hours. Pathology:  1. N/A      Microbiology:  1. None    Recent ABG:   No results for input(s): PH, PO2, PCO2, HCO3, BE, O2SAT, METHB, O2HB, COHB, O2CON, HHB, THB in the last 72 hours. Recent Films:  NM Cardiac Stress Test Nuclear Imaging   Final Result   1. No reversible perfusion defect. 2. Ejection fraction is 45 %. 3. Mild global hypokinesis. No dyskinetic segment. 4. Findings suggest mild dilated cardiomyopathy. US ABDOMEN LIMITED   Final Result   Negative right upper quadrant sonogram.   .                              XR CHEST STANDARD (2 VW)   Final Result   1. Bibasilar airspace disease concerning for infiltrate/pneumonia.    2. Stable, enlarged

## 2019-04-21 NOTE — PROCEDURES
Procedure: [facility] stress test     Ordering physician: Nolan Fuller  Referring physician: Soy Maza    Indication Chest Pain   Pretest evaluation no chest pain, no shortness of breath  Resting EKG showed: sinus rhythm with nonspecific T wave changes     Protocol: Patient was given 0.4mg of Lexiscan followed by Cardiolite injection    Heart rate response:   Resting heart rate: 82 BPM   Stress heart rate: 98 BPM     Blood pressure response:   Resting blood pressure:153/109 mmHg   Stress blood pressure: 158/106 mmHg     Symptoms and signs: shortness of breath     EKG changes:  Resting EKG: nonspecific T wave changes  Stress EKG : nonspecific T wave changes    Impression:   Clinical: nonischemic   EKG: nonspecific T wave changes    Cardiolite injected and nuclear images are pending

## 2019-04-21 NOTE — PROGRESS NOTES
Patient is seen in follow-up for chest pain    Subjective:     Mr. Ricardo Valadez  had chest pain radiating to right arm yesterday, shortness of breath is at baseline, denies any lightheadedness or dizziness  Sitting up in bed no apparent distress    ROS:  CONSTITUTIONAL:  negative for  fevers, chills  HEENT:  negative for earaches, nasal congestion and epistaxis  RESPIRATORY:  negative for  dry cough, cough with sputum,wheezing and hemoptysis  GASTROINTESTINAL:  negative for nausea, vomiting  MUSCULOSKELETAL:  negative for  myalgias, arthralgias  NEUROLOGICAL:  negative for visual disturbance, dysphagia    Medication side effects: None    Scheduled Meds:   cloNIDine  0.1 mg Oral BID    torsemide  10 mg Oral Once per day on Mon Wed Fri    amLODIPine  10 mg Oral Daily    sodium chloride flush  10 mL Intravenous BID    spironolactone  25 mg Oral Daily    ipratropium-albuterol  1 ampule Inhalation Q4H    formoterol  20 mcg Nebulization BID    budesonide  1,000 mcg Nebulization BID    finasteride  5 mg Oral Daily    gabapentin  300 mg Oral TID    lisinopril  40 mg Oral Daily    nicotine  1 patch Transdermal Daily    famotidine  40 mg Oral QPM    traMADol  50 mg Oral 4x Daily    metoprolol succinate  50 mg Oral Daily    methylPREDNISolone  40 mg Intravenous Q8H    doxycycline hyclate  100 mg Oral 2 times per day     Continuous Infusions:  PRN Meds:benzocaine-Menthol, regadenoson, sodium chloride flush, acetaminophen, diphenhydrAMINE, albuterol, perflutren lipid microspheres      Objective:      Physical Exam:   BP (!) 158/96   Pulse 93   Temp 98.3 °F (36.8 °C) (Oral)   Resp 18   Ht 5' 7\" (1.702 m)   Wt 206 lb 6.4 oz (93.6 kg)   SpO2 94%   BMI 32.33 kg/m²   CONSTITUTIONAL:  awake, alert, cooperative, mild apparent distress, and appears stated age  HEAD:  normocepalic, without obvious abnormality, atraumatic  NECK:  Supple, symmetrical, trachea midline, no adenopathy, thyroid symmetric, not enlarged and no tenderness, skin normal  LUNGS:  Mildly increased work of breathing, good air exchange, no rales, or wheezing  CARDIOVASCULAR:  Normal apical impulse, regular rate and rhythm, normal S1 and S2, no S3 or S4, and no murmur noted, no edema, no JVD, no carotid bruit. ABDOMEN:  Soft, nontender, no masses, no hepatomegaly, no splenomegaly, BS+  MUSCULOSKELETAL:  No clubbing no cyanosis. there is no redness, warmth, or swelling of the joints  full range of motion noted  NEUROLOGIC:  Alert, awake,oriented x3  SKIN:  no bruising or bleeding, normal skin color, texture, turgor and no redness, warmth, or swelling    Cardiographics  I personally reviewed the telemetry monitor strips with the following interpretation: Sinus rhythm    Echocardiogram: 4/16/2019,Summary   Left ventricle grossly normal in size.   Mild left ventricular concentric hypertrophy noted.   Normal LV segmental wall motion.   Estimated left ventricular ejection fraction is 57±5%.   <50% criteria for diastolic dysfunction.   The LAESV Index is <34ml/m2.   Normal right ventricular size and function   Physiologic and/or trace tricuspid regurgitation.  RVSP is 26 mmHg.   There is a small loculated pericardial effusion.   Technically fair quality study.   No comparison study available.   Suggest clinical correlation. Imaging  US ABDOMEN LIMITED   Final Result   Negative right upper quadrant sonogram.   .                              XR CHEST STANDARD (2 VW)   Final Result   1. Bibasilar airspace disease concerning for infiltrate/pneumonia. 2. Stable, enlarged cardiomediastinal silhouette with thoracic aortic   vascular calcifications. XR CHEST PORTABLE   Final Result   Indistinct bronchovascular prominence in the lungs likely represents   pulmonary edema, and much less likely prominent airway inflammatory   change. There is no evidence of septal edema or effusion in the lower chest.   There is no evidence of consolidative pneumonia.       NM Cardiac Stress Test Nuclear Imaging    (Results Pending)       Lab Review   Lab Results   Component Value Date     04/21/2019    K 4.9 04/21/2019    K 4.1 03/07/2018     04/21/2019    CO2 24 04/21/2019    BUN 34 04/21/2019    CREATININE 1.0 04/21/2019    GLUCOSE 118 04/21/2019    CALCIUM 8.8 04/21/2019     Lab Results   Component Value Date    WBC 15.9 04/21/2019    HGB 13.7 04/21/2019    HCT 41.2 04/21/2019    MCV 97.2 04/21/2019     04/21/2019     I have personally reviewed the laboratory, cardiac diagnostic and radiographic testing as outlined above:    Assessment:     1. Chest pain: Atypical, nonspecific EKG changes, significant family history for early CAD, for stress test today. 2. Acute hypoxic respiratory failure: Etiology?, Will follow with pulmonary  3. Hypertension: Not controlled, we will adjust medications  4. History of hyperlipidemia  5. Tobacco abuse: Patient was counseled regarding smoking cessation  6. Family history for early CAD: Father had his 1st heart attack in his mid 45s, he lost 2 brothers for heart disease in their late 46s      Recommendations:     1. Lexiscan stress test  2. Clonidine 0.1 mg every 8 hours  3. Continue the rest of medications  4. Further cardiac recommendations will be forthcoming pending his clinical course and diagnostic tests findings  Discussed with patient    Dr. Paz Mendez covering Dr. Leann Schumacher  Electronically signed by Viji Deluca MD on 4/21/2019 at 9:24 AM  NOTE: This report was transcribed using voice recognition software.  Every effort was made to ensure accuracy; however, inadvertent computerized transcription errors may be present

## 2019-04-22 LAB
ALBUMIN SERPL-MCNC: 3.6 G/DL (ref 3.5–5.2)
ALP BLD-CCNC: 74 U/L (ref 40–129)
ALT SERPL-CCNC: 81 U/L (ref 0–40)
AMMONIA: 36.1 UMOL/L (ref 16–60)
ANION GAP SERPL CALCULATED.3IONS-SCNC: 15 MMOL/L (ref 7–16)
AST SERPL-CCNC: 35 U/L (ref 0–39)
BILIRUB SERPL-MCNC: <0.2 MG/DL (ref 0–1.2)
BUN BLDV-MCNC: 38 MG/DL (ref 6–20)
CALCIUM SERPL-MCNC: 8.7 MG/DL (ref 8.6–10.2)
CHLORIDE BLD-SCNC: 97 MMOL/L (ref 98–107)
CO2: 22 MMOL/L (ref 22–29)
CREAT SERPL-MCNC: 1.1 MG/DL (ref 0.7–1.2)
GFR AFRICAN AMERICAN: >60
GFR NON-AFRICAN AMERICAN: >60 ML/MIN/1.73
GLUCOSE BLD-MCNC: 118 MG/DL (ref 74–99)
HCT VFR BLD CALC: 41.3 % (ref 37–54)
HEMOGLOBIN: 13.7 G/DL (ref 12.5–16.5)
INR BLD: 0.9
MCH RBC QN AUTO: 32.2 PG (ref 26–35)
MCHC RBC AUTO-ENTMCNC: 33.2 % (ref 32–34.5)
MCV RBC AUTO: 97.2 FL (ref 80–99.9)
PDW BLD-RTO: 14 FL (ref 11.5–15)
PLATELET # BLD: 250 E9/L (ref 130–450)
PMV BLD AUTO: 10.5 FL (ref 7–12)
POTASSIUM SERPL-SCNC: 4.7 MMOL/L (ref 3.5–5)
PROTHROMBIN TIME: 10.3 SEC (ref 9.3–12.4)
RBC # BLD: 4.25 E12/L (ref 3.8–5.8)
SODIUM BLD-SCNC: 134 MMOL/L (ref 132–146)
TOTAL PROTEIN: 6.5 G/DL (ref 6.4–8.3)
WBC # BLD: 19 E9/L (ref 4.5–11.5)

## 2019-04-22 PROCEDURE — 6370000000 HC RX 637 (ALT 250 FOR IP): Performed by: INTERNAL MEDICINE

## 2019-04-22 PROCEDURE — 2580000003 HC RX 258: Performed by: GENERAL PRACTICE

## 2019-04-22 PROCEDURE — 85610 PROTHROMBIN TIME: CPT

## 2019-04-22 PROCEDURE — 85027 COMPLETE CBC AUTOMATED: CPT

## 2019-04-22 PROCEDURE — 6370000000 HC RX 637 (ALT 250 FOR IP): Performed by: GENERAL PRACTICE

## 2019-04-22 PROCEDURE — 6360000002 HC RX W HCPCS: Performed by: INTERNAL MEDICINE

## 2019-04-22 PROCEDURE — 1200000000 HC SEMI PRIVATE

## 2019-04-22 PROCEDURE — 80053 COMPREHEN METABOLIC PANEL: CPT

## 2019-04-22 PROCEDURE — 36415 COLL VENOUS BLD VENIPUNCTURE: CPT

## 2019-04-22 PROCEDURE — 94640 AIRWAY INHALATION TREATMENT: CPT

## 2019-04-22 PROCEDURE — 82140 ASSAY OF AMMONIA: CPT

## 2019-04-22 RX ORDER — PANTOPRAZOLE SODIUM 40 MG/1
40 TABLET, DELAYED RELEASE ORAL ONCE
Status: COMPLETED | OUTPATIENT
Start: 2019-04-22 | End: 2019-04-22

## 2019-04-22 RX ORDER — MAGNESIUM HYDROXIDE/ALUMINUM HYDROXICE/SIMETHICONE 120; 1200; 1200 MG/30ML; MG/30ML; MG/30ML
30 SUSPENSION ORAL ONCE
Status: COMPLETED | OUTPATIENT
Start: 2019-04-22 | End: 2019-04-22

## 2019-04-22 RX ADMIN — AMLODIPINE BESYLATE 10 MG: 10 TABLET ORAL at 08:24

## 2019-04-22 RX ADMIN — Medication 10 ML: at 08:25

## 2019-04-22 RX ADMIN — IPRATROPIUM BROMIDE AND ALBUTEROL SULFATE 1 AMPULE: .5; 3 SOLUTION RESPIRATORY (INHALATION) at 15:43

## 2019-04-22 RX ADMIN — HYDRALAZINE HYDROCHLORIDE 25 MG: 25 TABLET, FILM COATED ORAL at 13:41

## 2019-04-22 RX ADMIN — SPIRONOLACTONE 25 MG: 25 TABLET ORAL at 08:24

## 2019-04-22 RX ADMIN — TRAMADOL HYDROCHLORIDE 50 MG: 50 TABLET, FILM COATED ORAL at 17:41

## 2019-04-22 RX ADMIN — FINASTERIDE 5 MG: 5 TABLET, FILM COATED ORAL at 08:24

## 2019-04-22 RX ADMIN — CLONIDINE HYDROCHLORIDE 0.1 MG: 0.1 TABLET ORAL at 08:24

## 2019-04-22 RX ADMIN — DOXYCYCLINE HYCLATE 100 MG: 100 CAPSULE ORAL at 08:23

## 2019-04-22 RX ADMIN — PANTOPRAZOLE SODIUM 40 MG: 40 TABLET, DELAYED RELEASE ORAL at 22:57

## 2019-04-22 RX ADMIN — GABAPENTIN 300 MG: 300 CAPSULE ORAL at 13:41

## 2019-04-22 RX ADMIN — Medication 10 ML: at 21:17

## 2019-04-22 RX ADMIN — IPRATROPIUM BROMIDE AND ALBUTEROL SULFATE 1 AMPULE: .5; 3 SOLUTION RESPIRATORY (INHALATION) at 11:57

## 2019-04-22 RX ADMIN — HYDRALAZINE HYDROCHLORIDE 25 MG: 25 TABLET, FILM COATED ORAL at 06:05

## 2019-04-22 RX ADMIN — HYDRALAZINE HYDROCHLORIDE 25 MG: 25 TABLET, FILM COATED ORAL at 21:17

## 2019-04-22 RX ADMIN — METOPROLOL SUCCINATE 50 MG: 50 TABLET, EXTENDED RELEASE ORAL at 08:24

## 2019-04-22 RX ADMIN — FORMOTEROL FUMARATE DIHYDRATE 20 MCG: 20 SOLUTION RESPIRATORY (INHALATION) at 19:37

## 2019-04-22 RX ADMIN — IPRATROPIUM BROMIDE AND ALBUTEROL SULFATE 1 AMPULE: .5; 3 SOLUTION RESPIRATORY (INHALATION) at 08:01

## 2019-04-22 RX ADMIN — IPRATROPIUM BROMIDE AND ALBUTEROL SULFATE 1 AMPULE: .5; 3 SOLUTION RESPIRATORY (INHALATION) at 19:37

## 2019-04-22 RX ADMIN — CLONIDINE HYDROCHLORIDE 0.1 MG: 0.1 TABLET ORAL at 21:17

## 2019-04-22 RX ADMIN — GABAPENTIN 300 MG: 300 CAPSULE ORAL at 08:24

## 2019-04-22 RX ADMIN — GABAPENTIN 300 MG: 300 CAPSULE ORAL at 21:17

## 2019-04-22 RX ADMIN — FAMOTIDINE 40 MG: 20 TABLET ORAL at 17:41

## 2019-04-22 RX ADMIN — CLONIDINE HYDROCHLORIDE 0.1 MG: 0.1 TABLET ORAL at 13:41

## 2019-04-22 RX ADMIN — ALUMINUM HYDROXIDE, MAGNESIUM HYDROXIDE, AND SIMETHICONE 30 ML: 200; 200; 20 SUSPENSION ORAL at 22:57

## 2019-04-22 RX ADMIN — TRAMADOL HYDROCHLORIDE 50 MG: 50 TABLET, FILM COATED ORAL at 08:23

## 2019-04-22 RX ADMIN — LISINOPRIL 40 MG: 20 TABLET ORAL at 08:23

## 2019-04-22 RX ADMIN — BENZOCAINE AND MENTHOL, UNSPECIFIED FORM 1 LOZENGE: 15; 20 LOZENGE ORAL at 21:17

## 2019-04-22 RX ADMIN — DOXYCYCLINE HYCLATE 100 MG: 100 CAPSULE ORAL at 21:17

## 2019-04-22 RX ADMIN — TRAMADOL HYDROCHLORIDE 50 MG: 50 TABLET, FILM COATED ORAL at 21:17

## 2019-04-22 RX ADMIN — TORSEMIDE 10 MG: 10 TABLET ORAL at 08:24

## 2019-04-22 RX ADMIN — FORMOTEROL FUMARATE DIHYDRATE 20 MCG: 20 SOLUTION RESPIRATORY (INHALATION) at 08:01

## 2019-04-22 RX ADMIN — TRAMADOL HYDROCHLORIDE 50 MG: 50 TABLET, FILM COATED ORAL at 13:41

## 2019-04-22 ASSESSMENT — PAIN SCALES - GENERAL
PAINLEVEL_OUTOF10: 8
PAINLEVEL_OUTOF10: 7
PAINLEVEL_OUTOF10: 8
PAINLEVEL_OUTOF10: 9
PAINLEVEL_OUTOF10: 0

## 2019-04-22 ASSESSMENT — PAIN DESCRIPTION - FREQUENCY: FREQUENCY: INTERMITTENT

## 2019-04-22 ASSESSMENT — PAIN DESCRIPTION - ORIENTATION
ORIENTATION: RIGHT;LEFT

## 2019-04-22 ASSESSMENT — PAIN DESCRIPTION - PAIN TYPE
TYPE: CHRONIC PAIN

## 2019-04-22 ASSESSMENT — PAIN DESCRIPTION - LOCATION
LOCATION: LEG
LOCATION: LEG
LOCATION: LEG;THROAT
LOCATION: LEG;HEAD

## 2019-04-22 ASSESSMENT — PAIN DESCRIPTION - DESCRIPTORS
DESCRIPTORS: ACHING;DISCOMFORT

## 2019-04-22 ASSESSMENT — PAIN DESCRIPTION - ONSET: ONSET: GRADUAL

## 2019-04-22 ASSESSMENT — PAIN DESCRIPTION - PROGRESSION: CLINICAL_PROGRESSION: GRADUALLY WORSENING

## 2019-04-22 ASSESSMENT — PAIN - FUNCTIONAL ASSESSMENT: PAIN_FUNCTIONAL_ASSESSMENT: PREVENTS OR INTERFERES SOME ACTIVE ACTIVITIES AND ADLS

## 2019-04-22 NOTE — CARE COORDINATION
Met with patient at bedside, introduced myself and explained my role as RN case manager. Discussed patient's plan or care and treatment plan (consults, medications, labs, testing/procedures, discharge plans, etc.). Pt verbalized understanding. Pt stated that he is feeling better, however is nervous about his breathing and becoming short off breath due to his COPD upon discharge. Educated patient on the process of testing and qualifying for home oxygen. Pt verbalized understanding. Pt denies any further questions or concerns. Informed patient that case management and social work will continue to follow to assist with the transition of care.

## 2019-04-22 NOTE — PROGRESS NOTES
Patient seen. Ultrasound for neelima negative. bp better. Await urine for metanephrines. Still with sob. Lungs clear.  Wbc elevated no fever

## 2019-04-22 NOTE — PROGRESS NOTES
Pulmonary Progress Note    Admit Date: 4/15/2019  Hospital day                               PCP: Yuliya Reed DO    Chief Complaint (s):  Patient Active Problem List   Diagnosis    CTS (carpal tunnel syndrome)    Cervical arthritis    Essential hypertension    Hyperlipidemia    INOCENCIA on CPAP    Community acquired bacterial pneumonia    CAP (community acquired pneumonia) due to Chlamydia species    Acute respiratory failure with hypoxia ([facility] [address]    COPD exacerbation (HCC)       Subjective:  · Sitting up, watching TV this a.m. Vitals:  VITALS:  BP (!) 145/94   Pulse 91   Temp 97.6 °F (36.4 °C) (Oral)   Resp 18   Ht 5' 7\" (1.702 m)   Wt 208 lb 11.2 oz (94.7 kg)   SpO2 96%   BMI 32.69 kg/m²     24HR INTAKE/OUTPUT:      Intake/Output Summary (Last 24 hours) at 2019 1155  Last data filed at 2019 1151  Gross per 24 hour   Intake 560 ml   Output 3775 ml   Net -3215 ml       24HR PULSE OXIMETRY RANGE:    SpO2  Av.6 %  Min: 95 %  Max: 100 %    Medications:  IV:      Scheduled Meds:   cloNIDine  0.1 mg Oral TID    hydrALAZINE  25 mg Oral 3 times per day    torsemide  10 mg Oral Once per day on     amLODIPine  10 mg Oral Daily    sodium chloride flush  10 mL Intravenous BID    spironolactone  25 mg Oral Daily    ipratropium-albuterol  1 ampule Inhalation Q4H    formoterol  20 mcg Nebulization BID    finasteride  5 mg Oral Daily    gabapentin  300 mg Oral TID    lisinopril  40 mg Oral Daily    nicotine  1 patch Transdermal Daily    famotidine  40 mg Oral QPM    traMADol  50 mg Oral 4x Daily    metoprolol succinate  50 mg Oral Daily    doxycycline hyclate  100 mg Oral 2 times per day       Diet:   DIET LOW SODIUM 2 GM; Low Fat     EXAM:  General: No distress. Alert. Eyes: PERRL. No sclera icterus. No conjunctival injection. ENT: No discharge. Pharynx clear. Neck: Trachea midline. Normal thyroid. Resp: No accessory muscle use. No rales. No wheezing. No rhonchi. CV: Regular rate. Regular rhythm. No murmur or rub. Abd: Non-tender. Non-distended. No masses. No organomegaly. Normal bowel sounds. Skin: Warm and dry. No nodule on exposed extremities. No rash on exposed extremities. Ext: No cyanosis, clubbing, edema  Lymph: No cervical LAD. No supraclavicular LAD. M/S: No cyanosis. No joint deformity. No clubbing. Neuro: Awake. Follows commands. Positive pupils/gag/corneals. Normal pain response. Results:  CBC:   Recent Labs     04/20/19 0349 04/21/19  0403 04/22/19  0355   WBC 16.8* 15.9* 19.0*   HGB 14.3 13.7 13.7   HCT 43.1 41.2 41.3   MCV 97.7 97.2 97.2    224 250     BMP:   Recent Labs     04/20/19 0349 04/21/19  0403 04/22/19  0355    136 134   K 4.0 4.9 4.7    101 97*   CO2 22 24 22   BUN 35* 34* 38*   CREATININE 1.1 1.0 1.1     LIVER PROFILE:   Recent Labs     04/20/19 0349 04/21/19  0403 04/22/19  0355   AST 58* 80* 35   ALT 48* 96* 81*   BILITOT 1.2 0.2 <0.2   ALKPHOS 79 67 74     PT/INR:   Recent Labs     04/20/19 0349 04/21/19  0403 04/22/19  0355   PROTIME 10.6 10.7 10.3   INR 0.9 0.9 0.9     APTT: No results for input(s): APTT in the last 72 hours. Pathology:  1. N/A      Microbiology:  1. None    Recent ABG:   No results for input(s): PH, PO2, PCO2, HCO3, BE, O2SAT, METHB, O2HB, COHB, O2CON, HHB, THB in the last 72 hours. Recent Films:  US RETROPERITONEAL REGAN   Final Result   1. No evidence for hemodynamically significant renal artery stenosis. US DUP ABD PEL RETRO SCROT COMPLETE   Final Result   1. No evidence for hemodynamically significant renal artery stenosis. NM Cardiac Stress Test Nuclear Imaging   Final Result   1. No reversible perfusion defect. 2. Ejection fraction is 45 %. 3. Mild global hypokinesis. No dyskinetic segment. 4. Findings suggest mild dilated cardiomyopathy. US ABDOMEN LIMITED   Final Result   Negative right upper quadrant sonogram.   . XR CHEST STANDARD (2 VW)   Final Result   1. Bibasilar airspace disease concerning for infiltrate/pneumonia. 2. Stable, enlarged cardiomediastinal silhouette with thoracic aortic   vascular calcifications. XR CHEST PORTABLE   Final Result   Indistinct bronchovascular prominence in the lungs likely represents   pulmonary edema, and much less likely prominent airway inflammatory   change. There is no evidence of septal edema or effusion in the lower chest.   There is no evidence of consolidative pneumonia. Assessment:  1. Acute Respiratory Failure with Hypoxia. 2. Possible underlying coronary artery disease especially in view of history of drug abuse and family history  3. COPD Exacerbation  4. Active tobacco use        Plan:  1. Treated for an exacerbation of COPD  2. Await stress test results  3. Check hypersensitivity pneumonitis profile given recent black mold exposure  4. Await workup of hypertension.           Care reviewed with the staff and the patient's family as available. Please note that voice recognition technology was used in the preparation of this note and make therefore it may contain inadvertent transcription errors. Rk Lozoya M.D., F.C.C.P.     [facility], [address] [place] [address], [address] [place] [address]

## 2019-04-22 NOTE — PROGRESS NOTES
Spoke with Dr. Marcia Correa regarding Dr. Alix Bosworth and Dr. Kings doll for discharge. Dr. Marcia Correa would like to keep pt overnight and discharge tomorrow.

## 2019-04-22 NOTE — PROGRESS NOTES
P Quality Flow/Interdisciplinary Rounds Progress Note        Quality Flow Rounds held on April 22, 2019    Disciplines Attending:  Bedside Nurse, ,  and Nursing Unit Leadership    Fawn Mays was admitted on 4/15/2019  8:17 PM    Anticipated Discharge Date:  Expected Discharge Date: 04/19/19    Disposition:    Erick Score:  Erick Scale Score: 22    Readmission Risk              Risk of Unplanned Readmission:        13           Discussed patient goal for the day, patient clinical progression, and barriers to discharge.   The following Goal(s) of the Day/Commitment(s) have been identified:  24 hour urine      Xochitl Zayas  April 22, 2019

## 2019-04-22 NOTE — PROGRESS NOTES
Pulmonary Progress Note    Admit Date: 4/15/2019  Hospital day                               PCP: Shani North DO    Chief Complaint (s):  Patient Active Problem List   Diagnosis    CTS (carpal tunnel syndrome)    Cervical arthritis    Essential hypertension    Hyperlipidemia    INOCENCIA on CPAP    Community acquired bacterial pneumonia    CAP (community acquired pneumonia) due to Chlamydia species    Acute respiratory failure with hypoxia ([facility] [address]    COPD exacerbation (HCC)       Subjective:  · Sitting on side of bed with moist harsh cough noted       Vitals:  VITALS:  BP (!) 145/94   Pulse 91   Temp 97.6 °F (36.4 °C) (Oral)   Resp 18   Ht 5' 7\" (1.702 m)   Wt 208 lb 11.2 oz (94.7 kg)   SpO2 96%   BMI 32.69 kg/m²     24HR INTAKE/OUTPUT:      Intake/Output Summary (Last 24 hours) at 2019 1127  Last data filed at 2019 4575  Gross per 24 hour   Intake 560 ml   Output 2175 ml   Net -1615 ml       24HR PULSE OXIMETRY RANGE:    SpO2  Av.6 %  Min: 95 %  Max: 100 %    Medications:  IV:      Scheduled Meds:   cloNIDine  0.1 mg Oral TID    hydrALAZINE  25 mg Oral 3 times per day    torsemide  10 mg Oral Once per day on     amLODIPine  10 mg Oral Daily    sodium chloride flush  10 mL Intravenous BID    spironolactone  25 mg Oral Daily    ipratropium-albuterol  1 ampule Inhalation Q4H    formoterol  20 mcg Nebulization BID    finasteride  5 mg Oral Daily    gabapentin  300 mg Oral TID    lisinopril  40 mg Oral Daily    nicotine  1 patch Transdermal Daily    famotidine  40 mg Oral QPM    traMADol  50 mg Oral 4x Daily    metoprolol succinate  50 mg Oral Daily    doxycycline hyclate  100 mg Oral 2 times per day       Diet:   DIET LOW SODIUM 2 GM; Low Fat     EXAM:  General: No distress. Alert. Eyes: PERRL. No sclera icterus. No conjunctival injection. ENT: No discharge. Pharynx clear. Neck: Trachea midline. Normal thyroid. Resp: No accessory muscle use.  No rales. No wheezing. No rhonchi. CV: Regular rate. Regular rhythm. No murmur or rub. Abd: Non-tender. Non-distended. No masses. No organomegaly. Normal bowel sounds. Skin: Warm and dry. No nodule on exposed extremities. No rash on exposed extremities. Ext: No cyanosis, clubbing, edema  Lymph: No cervical LAD. No supraclavicular LAD. M/S: No cyanosis. No joint deformity. No clubbing. Neuro: Awake. Follows commands. Positive pupils/gag/corneals. Normal pain response. Results:  CBC:   Recent Labs     04/20/19 0349 04/21/19  0403 04/22/19  0355   WBC 16.8* 15.9* 19.0*   HGB 14.3 13.7 13.7   HCT 43.1 41.2 41.3   MCV 97.7 97.2 97.2    224 250     BMP:   Recent Labs     04/20/19 0349 04/21/19  0403 04/22/19  0355    136 134   K 4.0 4.9 4.7    101 97*   CO2 22 24 22   BUN 35* 34* 38*   CREATININE 1.1 1.0 1.1     LIVER PROFILE:   Recent Labs     04/20/19 0349 04/21/19  0403 04/22/19  0355   AST 58* 80* 35   ALT 48* 96* 81*   BILITOT 1.2 0.2 <0.2   ALKPHOS 79 67 74     PT/INR:   Recent Labs     04/20/19 0349 04/21/19  0403 04/22/19  0355   PROTIME 10.6 10.7 10.3   INR 0.9 0.9 0.9     APTT: No results for input(s): APTT in the last 72 hours. Pathology:  1. N/A      Microbiology:  1. None    Recent ABG:   No results for input(s): PH, PO2, PCO2, HCO3, BE, O2SAT, METHB, O2HB, COHB, O2CON, HHB, THB in the last 72 hours. Recent Films:  US RETROPERITONEAL REGAN   Final Result   1. No evidence for hemodynamically significant renal artery stenosis. US DUP ABD PEL RETRO SCROT COMPLETE   Final Result   1. No evidence for hemodynamically significant renal artery stenosis. NM Cardiac Stress Test Nuclear Imaging   Final Result   1. No reversible perfusion defect. 2. Ejection fraction is 45 %. 3. Mild global hypokinesis. No dyskinetic segment. 4. Findings suggest mild dilated cardiomyopathy.          US ABDOMEN LIMITED   Final Result   Negative right upper quadrant sonogram.   .                              XR CHEST STANDARD (2 VW)   Final Result   1. Bibasilar airspace disease concerning for infiltrate/pneumonia. 2. Stable, enlarged cardiomediastinal silhouette with thoracic aortic   vascular calcifications. XR CHEST PORTABLE   Final Result   Indistinct bronchovascular prominence in the lungs likely represents   pulmonary edema, and much less likely prominent airway inflammatory   change. There is no evidence of septal edema or effusion in the lower chest.   There is no evidence of consolidative pneumonia. Assessment:  1. Acute Respiratory Failure with Hypoxia. 2. Possible underlying coronary artery disease especially in view of history of drug abuse and family history  3. COPD Exacerbation  4. Active tobacco use          Plan:  1. Okay to discharge soon    Care reviewed with the staff and the patient's family as available. Please note that voice recognition technology was used in the preparation of this note and make therefore it may contain inadvertent transcription errors. Ugo Rosenbaum M.D., F.C.C.P.     [facility], [address] [place] [address], [address], [facility]

## 2019-04-23 VITALS
WEIGHT: 202.5 LBS | HEART RATE: 64 BPM | BODY MASS INDEX: 31.78 KG/M2 | HEIGHT: 67 IN | TEMPERATURE: 97.3 F | RESPIRATION RATE: 16 BRPM | SYSTOLIC BLOOD PRESSURE: 132 MMHG | DIASTOLIC BLOOD PRESSURE: 78 MMHG | OXYGEN SATURATION: 96 %

## 2019-04-23 LAB
ALBUMIN SERPL-MCNC: 3.6 G/DL (ref 3.5–5.2)
ALP BLD-CCNC: 77 U/L (ref 40–129)
ALT SERPL-CCNC: 165 U/L (ref 0–40)
AMMONIA: 40.5 UMOL/L (ref 16–60)
ANION GAP SERPL CALCULATED.3IONS-SCNC: 10 MMOL/L (ref 7–16)
AST SERPL-CCNC: 98 U/L (ref 0–39)
BILIRUB SERPL-MCNC: 0.3 MG/DL (ref 0–1.2)
BUN BLDV-MCNC: 37 MG/DL (ref 6–20)
CALCIUM SERPL-MCNC: 8.7 MG/DL (ref 8.6–10.2)
CHLORIDE BLD-SCNC: 97 MMOL/L (ref 98–107)
CO2: 27 MMOL/L (ref 22–29)
CREAT SERPL-MCNC: 1.1 MG/DL (ref 0.7–1.2)
GFR AFRICAN AMERICAN: >60
GFR NON-AFRICAN AMERICAN: >60 ML/MIN/1.73
GLUCOSE BLD-MCNC: 84 MG/DL (ref 74–99)
HCT VFR BLD CALC: 44.2 % (ref 37–54)
HEMOGLOBIN: 14.6 G/DL (ref 12.5–16.5)
INR BLD: 0.9
MCH RBC QN AUTO: 32.2 PG (ref 26–35)
MCHC RBC AUTO-ENTMCNC: 33 % (ref 32–34.5)
MCV RBC AUTO: 97.4 FL (ref 80–99.9)
PDW BLD-RTO: 14 FL (ref 11.5–15)
PLATELET # BLD: 263 E9/L (ref 130–450)
PMV BLD AUTO: 10.4 FL (ref 7–12)
POTASSIUM SERPL-SCNC: 4.8 MMOL/L (ref 3.5–5)
PROTHROMBIN TIME: 10.1 SEC (ref 9.3–12.4)
RBC # BLD: 4.54 E12/L (ref 3.8–5.8)
SODIUM BLD-SCNC: 134 MMOL/L (ref 132–146)
TOTAL PROTEIN: 6.4 G/DL (ref 6.4–8.3)
WBC # BLD: 19.1 E9/L (ref 4.5–11.5)

## 2019-04-23 PROCEDURE — 6370000000 HC RX 637 (ALT 250 FOR IP): Performed by: INTERNAL MEDICINE

## 2019-04-23 PROCEDURE — 94640 AIRWAY INHALATION TREATMENT: CPT

## 2019-04-23 PROCEDURE — 2580000003 HC RX 258: Performed by: GENERAL PRACTICE

## 2019-04-23 PROCEDURE — 6360000002 HC RX W HCPCS: Performed by: INTERNAL MEDICINE

## 2019-04-23 PROCEDURE — 85027 COMPLETE CBC AUTOMATED: CPT

## 2019-04-23 PROCEDURE — 6370000000 HC RX 637 (ALT 250 FOR IP): Performed by: GENERAL PRACTICE

## 2019-04-23 PROCEDURE — 82140 ASSAY OF AMMONIA: CPT

## 2019-04-23 PROCEDURE — 85610 PROTHROMBIN TIME: CPT

## 2019-04-23 PROCEDURE — 80053 COMPREHEN METABOLIC PANEL: CPT

## 2019-04-23 PROCEDURE — 36415 COLL VENOUS BLD VENIPUNCTURE: CPT

## 2019-04-23 RX ORDER — TORSEMIDE 10 MG/1
10 TABLET ORAL
Qty: 30 TABLET | Refills: 3 | Status: ON HOLD | OUTPATIENT
Start: 2019-04-24 | End: 2019-08-18

## 2019-04-23 RX ORDER — AMLODIPINE BESYLATE 10 MG/1
10 TABLET ORAL DAILY
Qty: 30 TABLET | Refills: 3 | Status: ON HOLD | OUTPATIENT
Start: 2019-04-24 | End: 2020-11-20 | Stop reason: HOSPADM

## 2019-04-23 RX ORDER — RANITIDINE 300 MG/1
300 TABLET ORAL NIGHTLY
Qty: 30 TABLET | Refills: 5 | Status: ON HOLD | OUTPATIENT
Start: 2019-04-23 | End: 2020-02-05 | Stop reason: ALTCHOICE

## 2019-04-23 RX ORDER — FLUTICASONE FUROATE AND VILANTEROL 200; 25 UG/1; UG/1
1 POWDER RESPIRATORY (INHALATION) DAILY
Qty: 1 EACH | Refills: 5 | Status: ON HOLD | OUTPATIENT
Start: 2019-04-23 | End: 2019-10-04 | Stop reason: SDUPTHER

## 2019-04-23 RX ORDER — CLONIDINE HYDROCHLORIDE 0.1 MG/1
0.1 TABLET ORAL 3 TIMES DAILY
Qty: 60 TABLET | Refills: 3 | Status: ON HOLD | OUTPATIENT
Start: 2019-04-23 | End: 2020-11-05 | Stop reason: HOSPADM

## 2019-04-23 RX ORDER — SPIRONOLACTONE 25 MG/1
25 TABLET ORAL DAILY
Qty: 30 TABLET | Refills: 3 | Status: ON HOLD | OUTPATIENT
Start: 2019-04-24 | End: 2019-08-18

## 2019-04-23 RX ORDER — FINASTERIDE 5 MG/1
5 TABLET, FILM COATED ORAL DAILY
Qty: 30 TABLET | Refills: 3 | Status: ON HOLD | OUTPATIENT
Start: 2019-04-24 | End: 2019-08-23 | Stop reason: HOSPADM

## 2019-04-23 RX ORDER — HYDRALAZINE HYDROCHLORIDE 25 MG/1
25 TABLET, FILM COATED ORAL EVERY 8 HOURS SCHEDULED
Qty: 90 TABLET | Refills: 3 | Status: ON HOLD | OUTPATIENT
Start: 2019-04-23 | End: 2019-10-02

## 2019-04-23 RX ORDER — IPRATROPIUM BROMIDE AND ALBUTEROL SULFATE 2.5; .5 MG/3ML; MG/3ML
3 SOLUTION RESPIRATORY (INHALATION) EVERY 4 HOURS
Qty: 360 ML | Refills: 2 | Status: ON HOLD | OUTPATIENT
Start: 2019-04-23 | End: 2019-10-04 | Stop reason: SDUPTHER

## 2019-04-23 RX ORDER — NICOTINE 21 MG/24HR
1 PATCH, TRANSDERMAL 24 HOURS TRANSDERMAL DAILY
Qty: 30 PATCH | Refills: 3 | Status: ON HOLD | OUTPATIENT
Start: 2019-04-24 | End: 2019-08-23 | Stop reason: HOSPADM

## 2019-04-23 RX ORDER — PANTOPRAZOLE SODIUM 40 MG/1
40 TABLET, DELAYED RELEASE ORAL DAILY
Status: DISCONTINUED | OUTPATIENT
Start: 2019-04-23 | End: 2019-04-23 | Stop reason: HOSPADM

## 2019-04-23 RX ORDER — METOPROLOL SUCCINATE 50 MG/1
50 TABLET, EXTENDED RELEASE ORAL DAILY
Qty: 30 TABLET | Refills: 3 | Status: ON HOLD | OUTPATIENT
Start: 2019-04-24 | End: 2019-08-23 | Stop reason: HOSPADM

## 2019-04-23 RX ORDER — ALBUTEROL SULFATE 2.5 MG/3ML
2.5 SOLUTION RESPIRATORY (INHALATION) EVERY 6 HOURS PRN
Qty: 120 EACH | Refills: 5 | Status: ON HOLD | OUTPATIENT
Start: 2019-04-23 | End: 2019-10-02

## 2019-04-23 RX ADMIN — FORMOTEROL FUMARATE DIHYDRATE 20 MCG: 20 SOLUTION RESPIRATORY (INHALATION) at 08:27

## 2019-04-23 RX ADMIN — GABAPENTIN 300 MG: 300 CAPSULE ORAL at 08:38

## 2019-04-23 RX ADMIN — Medication 10 ML: at 08:38

## 2019-04-23 RX ADMIN — CLONIDINE HYDROCHLORIDE 0.1 MG: 0.1 TABLET ORAL at 08:37

## 2019-04-23 RX ADMIN — METOPROLOL SUCCINATE 50 MG: 50 TABLET, EXTENDED RELEASE ORAL at 08:38

## 2019-04-23 RX ADMIN — SPIRONOLACTONE 25 MG: 25 TABLET ORAL at 08:38

## 2019-04-23 RX ADMIN — HYDRALAZINE HYDROCHLORIDE 25 MG: 25 TABLET, FILM COATED ORAL at 06:00

## 2019-04-23 RX ADMIN — DOXYCYCLINE HYCLATE 100 MG: 100 CAPSULE ORAL at 08:37

## 2019-04-23 RX ADMIN — IPRATROPIUM BROMIDE AND ALBUTEROL SULFATE 1 AMPULE: .5; 3 SOLUTION RESPIRATORY (INHALATION) at 08:27

## 2019-04-23 RX ADMIN — TRAMADOL HYDROCHLORIDE 50 MG: 50 TABLET, FILM COATED ORAL at 08:37

## 2019-04-23 RX ADMIN — LISINOPRIL 40 MG: 20 TABLET ORAL at 08:37

## 2019-04-23 RX ADMIN — FINASTERIDE 5 MG: 5 TABLET, FILM COATED ORAL at 08:38

## 2019-04-23 RX ADMIN — AMLODIPINE BESYLATE 10 MG: 10 TABLET ORAL at 08:38

## 2019-04-23 ASSESSMENT — PAIN DESCRIPTION - PROGRESSION: CLINICAL_PROGRESSION: GRADUALLY WORSENING

## 2019-04-23 ASSESSMENT — PAIN DESCRIPTION - ONSET: ONSET: GRADUAL

## 2019-04-23 ASSESSMENT — PAIN DESCRIPTION - LOCATION: LOCATION: LEG

## 2019-04-23 ASSESSMENT — PAIN - FUNCTIONAL ASSESSMENT: PAIN_FUNCTIONAL_ASSESSMENT: ACTIVITIES ARE NOT PREVENTED

## 2019-04-23 ASSESSMENT — PAIN SCALES - GENERAL: PAINLEVEL_OUTOF10: 2

## 2019-04-23 ASSESSMENT — PAIN DESCRIPTION - ORIENTATION: ORIENTATION: RIGHT;LEFT;LOWER

## 2019-04-23 ASSESSMENT — PAIN DESCRIPTION - FREQUENCY: FREQUENCY: INTERMITTENT

## 2019-04-23 ASSESSMENT — PAIN DESCRIPTION - DESCRIPTORS: DESCRIPTORS: ACHING;DISCOMFORT;HEAVINESS

## 2019-04-23 ASSESSMENT — PAIN DESCRIPTION - PAIN TYPE: TYPE: CHRONIC PAIN

## 2019-04-23 NOTE — PROGRESS NOTES
PROGRESS NOTE       PATIENT PROBLEM LIST:  Active Problems:    Acute respiratory failure with hypoxia (HCC)    COPD exacerbation (HCC)  Resolved Problems:    * No resolved hospital problems. *      SUBJECTIVE:  Heather Pond states he feels somewhat better but now is suffering from recurrent severe acid reflux symptoms. His breathing has improved and less chest discomfort. REVIEW OF SYSTEMS:  General ROS: positive for - fatigue, malaise,  weight loss  Psychological ROS: positive for - anxiety. Ophthalmic ROS: negative for - decreased vision or visual distortion. ENT ROS: negative  Allergy and Immunology ROS: negative  Hematological and Lymphatic ROS: negative  Endocrine: no heat or cold intolerance and no polyphagia, polydipsia, or polyuria  Respiratory ROS: positive for - cough, pleuritic pain and shortness of breath  Cardiovascular ROS: positive for - chest pain, dyspnea on exertion and shortness of breath. Gastrointestinal ROS: no abdominal pain, change in bowel habits, or black or bloody stools  Genito-Urinary ROS: no nocturia, dysuria, trouble voiding, frequency or hematuria  Musculoskeletal ROS: negative for- myalgias, arthralgias, or claudication  Neurological ROS: no TIA or stroke symptoms otherwise no significant change in symptoms or problems since yesterday as documented in previous progress notes.     SCHEDULED MEDICATIONS:   nystatin  5 mL Oral 4x Daily    pantoprazole  40 mg Oral Daily    cloNIDine  0.1 mg Oral TID    hydrALAZINE  25 mg Oral 3 times per day    torsemide  10 mg Oral Once per day on Mon Wed Fri    amLODIPine  10 mg Oral Daily    sodium chloride flush  10 mL Intravenous BID    spironolactone  25 mg Oral Daily    ipratropium-albuterol  1 ampule Inhalation Q4H    formoterol  20 mcg Nebulization BID    finasteride  5 mg Oral Daily    gabapentin  300 mg Oral TID    lisinopril  40 mg Oral Daily    nicotine  1 patch Transdermal Daily    traMADol  50 mg Oral 4x Daily    metoprolol succinate  50 mg Oral Daily    doxycycline hyclate  100 mg Oral 2 times per day       VITAL SIGNS:                                                                                                                          /78   Pulse 64   Temp 97.3 °F (36.3 °C) (Axillary)   Resp 16   Ht 5' 7\" (1.702 m)   Wt 202 lb 8 oz (91.9 kg)   SpO2 96%   BMI 31.72 kg/m²   Patient Vitals for the past 96 hrs (Last 3 readings):   Weight   04/23/19 0023 202 lb 8 oz (91.9 kg)   04/22/19 0354 208 lb 11.2 oz (94.7 kg)   04/21/19 0403 206 lb 6.4 oz (93.6 kg)     OBJECTIVE:    HEENT: PERRL, EOM  Intact; sclera non-icteric, conjunctiva pink. Carotids are brisk in upstroke with normal contour. No carotid bruits. Normal jugular venous pulsation at 45°. No palpable cervical nor supraclavicular nodes. Thyroid not palpable. Trachea midline. Chest: Even excursion  Lungs: CTA B, no expiratory wheezes or rhonchi, no decreased tactile fremitus without inspiratory rales. Heart: Regular  rhythm; S1 > S2, no gallop or murmur. No clicks, rub, palpable thrills   or heaves. PMI nondisplaced, 5th intercostal space MCL. Abdomen: Soft, nontender, nondistended,  moderately protuberant, no masses or organomegaly. Bowel sounds active. Extremities: Without clubbing, cyanosis or edema. Pulses present 3+ upper extermities bilaterally; present 1+ DP and present 1+ PT bilaterally.      Data:   Scheduled Meds: Reviewed  Continuous Infusions:     Intake/Output Summary (Last 24 hours) at 4/23/2019 1115  Last data filed at 4/23/2019 0618  Gross per 24 hour   Intake 960 ml   Output 4050 ml   Net -3090 ml     CBC:   Recent Labs     04/21/19  0403 04/22/19  0355 04/23/19  0410   WBC 15.9* 19.0* 19.1*   HGB 13.7 13.7 14.6   HCT 41.2 41.3 44.2    250 263     BMP:  Recent Labs     04/21/19  0403 04/22/19  0355 04/23/19  0410    134 134   K 4.9 4.7 4.8    97* 97*   CO2 24 22 27   BUN 34* 38* 37*   CREATININE 1.0 1.1 1.1   LABGLOM >60 >60 >60     ABGs:   Lab Results   Component Value Date    PH 7.516 2019    PO2 62.0 2019    PCO2 23.7 2019     INR:   Recent Labs     19  0403 19  0355 19  0410   INR 0.9 0.9 0.9     PRO-BNP:   Lab Results   Component Value Date    PROBNP 4,547 (H) 04/15/2019    PROBNP 547 (H) 2018      TSH:   Lab Results   Component Value Date    TSH 0.809 2019      Cardiac Injury Profile: No results for input(s): CKTOTAL, CKMB, TROPONINI in the last 72 hours. Lipid Profile:   Lab Results   Component Value Date    TRIG 217 2018    HDL 58 2018    LDLCALC 144 2018    CHOL 245 2018      Hemoglobin A1C: No components found for: HGBA1C     RAD:   Xr Chest Standard (2 Vw)    Result Date: 2019  Patient MRN: [id number] : 1965 Age:  52 years Gender: Male Order Date: 2019 9:45 AM Exam: XR CHEST (2 VW) Number of Views: 2 Indication:   Shortness of breath and cough Comparison: 4/15/2019 chest x-ray. CT chest 2019. Findings: There is a stable, enlarged cardiomediastinal silhouette with bibasilar airspace disease with thoracic aortic vascular calcifications. No pneumothorax. .     1. Bibasilar airspace disease concerning for infiltrate/pneumonia. 2. Stable, enlarged cardiomediastinal silhouette with thoracic aortic vascular calcifications. Xr Chest Portable    Result Date: 4/15/2019  Patient MRN: [id number] : 1965 Age:  52 years Gender: Male Order Date: 4/15/2019 8:30 PM Exam: XR CHEST PORTABLE Number of Images: 1 view Indication:  Shortness of breath Comparison: Chest CT study 2019 and anterior upright chest of the same day Findings: The lungs are symmetrically expanded, and show hazy increased vascularity and diffuse interstitial density throughout the chest, consistent with edema. Cardiovascular shadows are normal in appearance. There is no evidence of cardiac enlargement or decompensation.  Skeletal structures show no evidence of acute pathology. At the upper margin of the study, fixation hardware is noted in the posterior cervical spine. Overlying EKG leads are present. Indistinct bronchovascular prominence in the lungs likely represents pulmonary edema, and much less likely prominent airway inflammatory change. There is no evidence of septal edema or effusion in the lower chest. There is no evidence of consolidative pneumonia. Us Abdomen Limited    Result Date: 2019  Patient MRN:  [id number] : 1965 Age: 52 years Gender: Male Order Date:  2019 12:45 PM EXAM: US ABDOMEN LIMITED NUMBER OF IMAGES:  62 INDICATION:  Assess liver Assess liver COMPARISON: None The pancreas appears unremarkable. The liver appears unremarkable. The proximal aorta and the proximal IVC appear   to be unremarkable. The right kidney appears to be unremarkable. The common duct is within normal limits; it measures 5 mm in caliber at the alcides hepatis level. The gallbladder wall appears unremarkable. The left kidney and spleen are not imaged  No definite ascites is seen     Negative right upper quadrant sonogram. .     Us Dup Abd Pel Retro Scrot Complete    Result Date: 2019  Patient MRN:  [id number] : 1965 Age: 52 years Gender: Male Order Date:  2019 10:00 PM EXAM: US DUP ABD PEL RETRO SCROT COMPLETE NUMBER OF IMAGES:  66 INDICATION: I10 Hypertension, unspecified type  COMPARISON: None The right kidney 11.1 x 4.6 x 5.9 cm The left kidney  10.6 x 6.0 x 5.2 cm There is no mass identified. There is no hydronephrosis identified. There is no calculus identified. The bladder is unremarkable. Velocity within the aorta measure 66.1 cm/sec. Velocities in the right renal artery measure 102.5 cm/sec,  renal aortic ratio is 1.6. Velocities within the left renal artery measure 94.8 cm/sec, renal aortic ratio on the left is 1.4.     1.  No evidence for hemodynamically significant renal artery stenosis.     Us Retroperitoneal Ry    Result Date: resolved hospital problems. *      RECOMMENDATIONS:  Will add pantoprazole as famotidine has not seemed to help his symptomatology presently. I'm still quite concerned that the likelihood of underlying coronary artery disease exists considering his family history, siblings and father. I have instructed him to follow-up closely with Dr. Mariama Moncada and have repeat electrolytes obtained as an outpatient. I will see him immediately upon his request.  He has also been counseled on smoking cessation and hopefully will follow-up at this point. I have spent more than 26 minutes face to face with Lidia Le and reviewing notes and laboratory data, with greater than 50% of this time instructing and counseling the patient face to face regarding my findings and recommendations and I have answered all questions as posed to me by  Moises Bonner. Tanja Aase, DO FACP,FACC,[facility]      NOTE:  This report was transcribed using voice recognition software.   Every effort was made to ensure accuracy; however, inadvertent computerized transcription errors may be present

## 2019-04-23 NOTE — PROGRESS NOTES
Pulmonary Progress Note    Admit Date: 4/15/2019  Hospital day                               PCP: Emily Jefferson DO    Chief Complaint (s):  Patient Active Problem List   Diagnosis    CTS (carpal tunnel syndrome)    Cervical arthritis    Essential hypertension    Hyperlipidemia    INOCENCIA on CPAP    Community acquired bacterial pneumonia    CAP (community acquired pneumonia) due to Chlamydia species    Acute respiratory failure with hypoxia ([facility] [address]    COPD exacerbation (HCC)       Subjective:  · Awake and alert, ready for discharge. Vitals:  VITALS:  /78   Pulse 64   Temp 97.3 °F (36.3 °C) (Axillary)   Resp 16   Ht 5' 7\" (1.702 m)   Wt 202 lb 8 oz (91.9 kg)   SpO2 96%   BMI 31.72 kg/m²     24HR INTAKE/OUTPUT:      Intake/Output Summary (Last 24 hours) at 2019 1215  Last data filed at 2019 7139  Gross per 24 hour   Intake 960 ml   Output 2450 ml   Net -1490 ml       24HR PULSE OXIMETRY RANGE:    SpO2  Av %  Min: 94 %  Max: 98 %    Medications:  IV:      Scheduled Meds:   nystatin  5 mL Oral 4x Daily    pantoprazole  40 mg Oral Daily    cloNIDine  0.1 mg Oral TID    hydrALAZINE  25 mg Oral 3 times per day    torsemide  10 mg Oral Once per day on     amLODIPine  10 mg Oral Daily    sodium chloride flush  10 mL Intravenous BID    spironolactone  25 mg Oral Daily    ipratropium-albuterol  1 ampule Inhalation Q4H    formoterol  20 mcg Nebulization BID    finasteride  5 mg Oral Daily    gabapentin  300 mg Oral TID    lisinopril  40 mg Oral Daily    nicotine  1 patch Transdermal Daily    traMADol  50 mg Oral 4x Daily    metoprolol succinate  50 mg Oral Daily    doxycycline hyclate  100 mg Oral 2 times per day       Diet:   DIET LOW SODIUM 2 GM; Low Fat     EXAM:  General: No distress. Alert. Eyes: PERRL. No sclera icterus. No conjunctival injection. ENT: No discharge. Pharynx clear. Neck: Trachea midline. Normal thyroid.   Resp: No accessory muscle use. No rales. No wheezing. No rhonchi. CV: Regular rate. Regular rhythm. No murmur or rub. Abd: Non-tender. Non-distended. No masses. No organomegaly. Normal bowel sounds. Skin: Warm and dry. No nodule on exposed extremities. No rash on exposed extremities. Ext: No cyanosis, clubbing, edema  Lymph: No cervical LAD. No supraclavicular LAD. M/S: No cyanosis. No joint deformity. No clubbing. Neuro: Awake. Follows commands. Positive pupils/gag/corneals. Normal pain response. Results:  CBC:   Recent Labs     04/21/19 0403 04/22/19  0355 04/23/19  0410   WBC 15.9* 19.0* 19.1*   HGB 13.7 13.7 14.6   HCT 41.2 41.3 44.2   MCV 97.2 97.2 97.4    250 263     BMP:   Recent Labs     04/21/19  0403 04/22/19  0355 04/23/19  0410    134 134   K 4.9 4.7 4.8    97* 97*   CO2 24 22 27   BUN 34* 38* 37*   CREATININE 1.0 1.1 1.1     LIVER PROFILE:   Recent Labs     04/21/19 0403 04/22/19  0355 04/23/19  0410   AST 80* 35 98*   ALT 96* 81* 165*   BILITOT 0.2 <0.2 0.3   ALKPHOS 67 74 77     PT/INR:   Recent Labs     04/21/19  0403 04/22/19  0355 04/23/19  0410   PROTIME 10.7 10.3 10.1   INR 0.9 0.9 0.9     APTT: No results for input(s): APTT in the last 72 hours. Pathology:  1. N/A      Microbiology:  1. None    Recent ABG:   No results for input(s): PH, PO2, PCO2, HCO3, BE, O2SAT, METHB, O2HB, COHB, O2CON, HHB, THB in the last 72 hours. Recent Films:  US RETROPERITONEAL REGAN   Final Result   1. No evidence for hemodynamically significant renal artery stenosis. US DUP ABD PEL RETRO SCROT COMPLETE   Final Result   1. No evidence for hemodynamically significant renal artery stenosis. NM Cardiac Stress Test Nuclear Imaging   Final Result   1. No reversible perfusion defect. 2. Ejection fraction is 45 %. 3. Mild global hypokinesis. No dyskinetic segment. 4. Findings suggest mild dilated cardiomyopathy.          US ABDOMEN LIMITED   Final Result   Negative right upper

## 2019-04-23 NOTE — PLAN OF CARE
Problem: Breathing Pattern - Ineffective:  Goal: Ability to achieve and maintain a regular respiratory rate will improve  Description  Ability to achieve and maintain a regular respiratory rate will improve  4/18/2019 0151 by Hernan Hutton RN  Outcome: Met This Shift  4/17/2019 1522 by Maranda Cano RN  Outcome: Met This Shift     Problem: Pain:  Description  Pain management should include both nonpharmacologic and pharmacologic interventions.   Goal: Pain level will decrease  Description  Pain level will decrease  Outcome: Met This Shift  Goal: Control of acute pain  Description  Control of acute pain  4/18/2019 0151 by Hernan Hutton RN  Outcome: Met This Shift  4/17/2019 1522 by Maranda Cano RN  Outcome: Met This Shift  Goal: Control of chronic pain  Description  Control of chronic pain  4/18/2019 0151 by Hernan Hutton RN  Outcome: Met This Shift  4/17/2019 1522 by Maranda Cano RN  Outcome: Met This Shift     Problem: Altered Mood, Manic Behavior:  Goal: Able to sleep  Description  Able to sleep  Outcome: Met This Shift  Goal: Able to verbalize decrease in frequency and intensity of racing thoughts  Description  Able to verbalize decrease in frequency and intensity of racing thoughts  Outcome: Met This Shift  Goal: Ability to disclose and discuss suicidal ideas will improve  Description  Ability to disclose and discuss suicidal ideas will improve  Outcome: Met This Shift  Goal: Absence of self-harm  Description  Absence of self-harm  Outcome: Met This Shift  Goal: Ability to achieve adequate nutritional intake will improve  Description  Ability to achieve adequate nutritional intake will improve  Outcome: Met This Shift  Goal: Ability to interact with others will improve  Description  Ability to interact with others will improve  Outcome: Met This Shift  Goal: Ability to demonstrate self-control will improve  Description  Ability to demonstrate self-control will
Problem: Breathing Pattern - Ineffective:  Goal: Ability to achieve and maintain a regular respiratory rate will improve  Description  Ability to achieve and maintain a regular respiratory rate will improve  4/20/2019 1555 by Jo-Ann Milligan RN  Outcome: Met This Shift  4/20/2019 0649 by Crystal Chou RN  Outcome: Ongoing     Problem: Pain:  Goal: Pain level will decrease  Description  Pain level will decrease  4/20/2019 1555 by Jo-Ann Milligan RN  Outcome: Met This Shift  4/20/2019 0649 by Crystal Chou RN  Outcome: Ongoing  Goal: Control of acute pain  Description  Control of acute pain  4/20/2019 1555 by Jo-Ann Milligan RN  Outcome: Met This Shift  4/20/2019 0649 by Crystal Chou RN  Outcome: Ongoing  Goal: Control of chronic pain  Description  Control of chronic pain  Outcome: Met This Shift     Problem: Falls - Risk of:  Goal: Will remain free from falls  Description  Will remain free from falls  4/20/2019 1555 by Jo-Ann Milligan RN  Outcome: Met This Shift  4/20/2019 0649 by Crystal Chou RN  Outcome: Ongoing  Goal: Absence of physical injury  Description  Absence of physical injury  Outcome: Met This Shift
Problem: Breathing Pattern - Ineffective:  Goal: Ability to achieve and maintain a regular respiratory rate will improve  Description  Ability to achieve and maintain a regular respiratory rate will improve  4/23/2019 0256 by Lynda Jennings RN  Outcome: Met This Shift  4/22/2019 1837 by Ha Galan RN  Outcome: Met This Shift     Problem: Pain:  Goal: Pain level will decrease  Description  Pain level will decrease  Outcome: Met This Shift  Goal: Control of acute pain  Description  Control of acute pain  Outcome: Met This Shift  Goal: Control of chronic pain  Description  Control of chronic pain  Outcome: Met This Shift     Problem: Falls - Risk of:  Goal: Will remain free from falls  Description  Will remain free from falls  4/23/2019 0256 by Lynda Jennings RN  Outcome: Met This Shift  4/22/2019 1837 by Ha Galan RN  Outcome: Met This Shift  Goal: Absence of physical injury  Description  Absence of physical injury  4/23/2019 0256 by Lynda Jennings RN  Outcome: Met This Shift  4/22/2019 1837 by Ha Galan RN  Outcome: Met This Shift     Problem:  Activity:  Goal: Ability to tolerate increased activity will improve  Description  Ability to tolerate increased activity will improve  4/23/2019 0256 by Lynda Jennings RN  Outcome: Met This Shift  4/22/2019 1837 by Ha Galan RN  Outcome: Met This Shift
Problem: Breathing Pattern - Ineffective:  Goal: Ability to achieve and maintain a regular respiratory rate will improve  Description  Ability to achieve and maintain a regular respiratory rate will improve  4/23/2019 1053 by Rip Aguilera RN  Outcome: Met This Shift  Respiratory rate WNL  4/23/2019 0256 by Jonas Gutierrez RN  Outcome: Met This Shift     Problem: Pain:  Goal: Pain level will decrease  Description  Pain level will decrease  4/23/2019 1053 by Rip Aguilera RN  Outcome: Met This Shift  Pt reports pain is tolerable, managed with ordered analgesics  4/23/2019 0256 by Jonas Gutierrez RN  Outcome: Met This Shift     Problem: Falls - Risk of:  Goal: Will remain free from falls  Description  Will remain free from falls  4/23/2019 1053 by Rip Aguilera RN  Outcome: Met This Shift  No fall  4/23/2019 0256 by Jonas Gutierrez RN  Outcome: Met This Shift
Problem: Breathing Pattern - Ineffective:  Goal: Ability to achieve and maintain a regular respiratory rate will improve  Description  Ability to achieve and maintain a regular respiratory rate will improve  Outcome: Met This Shift     Problem: Falls - Risk of:  Goal: Will remain free from falls  Description  Will remain free from falls  Outcome: Met This Shift  Goal: Absence of physical injury  Description  Absence of physical injury  Outcome: Met This Shift     Problem:  Activity:  Goal: Ability to tolerate increased activity will improve  Description  Ability to tolerate increased activity will improve  Outcome: Met This Shift
Problem: Breathing Pattern - Ineffective:  Goal: Ability to achieve and maintain a regular respiratory rate will improve  Description  Ability to achieve and maintain a regular respiratory rate will improve  Outcome: Met This Shift     Problem: Pain:  Goal: Control of acute pain  Description  Control of acute pain  Outcome: Met This Shift  Goal: Control of chronic pain  Description  Control of chronic pain  Outcome: Met This Shift
Problem: Breathing Pattern - Ineffective:  Goal: Ability to achieve and maintain a regular respiratory rate will improve  Description  Ability to achieve and maintain a regular respiratory rate will improve  Outcome: Met This Shift     Problem: Pain:  Goal: Pain level will decrease  Description  Pain level will decrease  4/22/2019 0146 by Guanaco Lau RN  Outcome: Met This Shift  4/21/2019 1842 by Obdulia Richmond RN  Outcome: Met This Shift     Problem: Falls - Risk of:  Goal: Will remain free from falls  Description  Will remain free from falls  4/22/2019 0146 by Guanaco Lau RN  Outcome: Met This Shift  4/21/2019 1842 by Obdulia Rihcmond RN  Outcome: Met This Shift  Goal: Absence of physical injury  Description  Absence of physical injury  4/21/2019 1842 by Obdulia Richmond RN  Outcome: Met This Shift     Problem: Falls - Risk of:  Goal: Absence of physical injury  Description  Absence of physical injury  4/21/2019 1842 by Obdulia Richmond RN  Outcome: Met This Shift     Problem:  Activity:  Goal: Ability to tolerate increased activity will improve  Description  Ability to tolerate increased activity will improve  Outcome: Met This Shift
Problem: Breathing Pattern - Ineffective:  Goal: Ability to achieve and maintain a regular respiratory rate will improve  Description  Ability to achieve and maintain a regular respiratory rate will improve  Outcome: Met This Shift     Problem: Pain:  Goal: Pain level will decrease  Description  Pain level will decrease  Outcome: Met This Shift     Problem: Altered Mood, Manic Behavior:  Goal: Able to sleep  Description  Able to sleep  Outcome: Met This Shift  Goal: Ability to interact with others will improve  Description  Ability to interact with others will improve  Outcome: Met This Shift  Goal: Mood stable  Description  Mood stable  Outcome: Met This Shift
Problem: Breathing Pattern - Ineffective:  Goal: Ability to achieve and maintain a regular respiratory rate will improve  Outcome: Ongoing     Problem: Pain:  Goal: Pain level will decrease  Outcome: Ongoing  Goal: Control of acute pain  Outcome: Ongoing     Problem: Falls - Risk of:  Goal: Will remain free from falls  Outcome: Ongoing
Problem: Falls - Risk of:  Goal: Will remain free from falls  Description  Will remain free from falls  4/20/2019 1555 by Lalit Cyr RN  Outcome: Met This Shift  Goal: Absence of physical injury  Description  Absence of physical injury  4/21/2019 0349 by Alejandrina Cleveland RN  Outcome: Met This Shift  4/20/2019 1555 by Lalit Cyr RN  Outcome: Met This Shift
Problem: Pain:  Goal: Pain level will decrease  Description  Pain level will decrease  Outcome: Met This Shift     Problem: Falls - Risk of:  Goal: Will remain free from falls  Description  Will remain free from falls  Outcome: Met This Shift  Goal: Absence of physical injury  Description  Absence of physical injury  Outcome: Met This Shift
goal  Outcome: Completed
to demonstrate self-control will improve  Description  Ability to demonstrate self-control will improve  4/18/2019 0151 by Megan Parham RN  Outcome: Met This Shift  Goal: Mood stable  Description  Mood stable  4/18/2019 0151 by Megan Parham RN  Outcome: Met This Shift  Goal: Patient specific goal  Description  Patient specific goal  4/18/2019 0151 by Megan Parham RN  Outcome: Met This Shift     Problem: Falls - Risk of:  Goal: Will remain free from falls  Description  Will remain free from falls  Outcome: Met This Shift  Goal: Absence of physical injury  Description  Absence of physical injury  Outcome: Met This Shift

## 2019-04-23 NOTE — PROGRESS NOTES
Dr Ishmael Cotter called per patient request for something for heartburn. Pt states \"it's killing me & I can't sleep because of it\". See new orders.

## 2019-04-23 NOTE — PROGRESS NOTES
4/23/2019  11:35 AM           Nutrition Therapy      Type and Reason for Visit: ZORAN Nutrition Re-Screen    Nutrition Screen:   · Have you recently lost weight without trying? - 0 to 1 pound (0 points)   · Have you been eating poorly because of a decreased appetite? - No (0 points)   · Malnutrition Screening Tool Score - 0    Dietitian Assessment of Nutrition Re-Screen: Pt Low/No Nutrition Risk-No significant wt changes. No nonhealing wounds. Pt eating % at meals.         Electronically signed by Saloni Rodriguez RD, CNSC, LD on 4/23/19 at 11:36 AM    Contact Number: [phone]

## 2019-04-23 NOTE — PROGRESS NOTES
P Quality Flow/Interdisciplinary Rounds Progress Note        Quality Flow Rounds held on April 23, 2019    Disciplines Attending:  Bedside Nurse, ,  and Nursing Unit Leadership    Keon Jacobs was admitted on 4/15/2019  8:17 PM    Anticipated Discharge Date:  Expected Discharge Date: 04/24/19    Disposition:    Erick Score:  Erick Scale Score: 22    Readmission Risk              Risk of Unplanned Readmission:        13           Discussed patient goal for the day, patient clinical progression, and barriers to discharge.   The following Goal(s) of the Day/Commitment(s) have been identified:  discharge      Kartik Gold  April 23, 2019

## 2019-04-23 NOTE — CARE COORDINATION
Social Work:    Referral called to [facility]ME for a nebulizer. They initially accepted the referral but later phoned back advising that they do not contract with the insurance. Marcelina Eubanks had already discharge home. Social work made a referral to [employer] at [facility].       Electronically signed by PETRA Kinsey on 4/23/2019 at 4:12 PM

## 2019-04-23 NOTE — PROGRESS NOTES
PROGRESS NOTE       PATIENT PROBLEM LIST:  Active Problems:    Acute respiratory failure with hypoxia (HCC)    COPD exacerbation (HCC)  Resolved Problems:    * No resolved hospital problems. *      SUBJECTIVE:  Bonny Jc states he feels somewhat better but now is suffering from recurrent severe acid reflux symptoms. His breathing has improved and less chest discomfort. REVIEW OF SYSTEMS:  General ROS: positive for - fatigue, malaise,  weight loss  Psychological ROS: positive for - anxiety. Ophthalmic ROS: negative for - decreased vision or visual distortion. ENT ROS: negative  Allergy and Immunology ROS: negative  Hematological and Lymphatic ROS: negative  Endocrine: no heat or cold intolerance and no polyphagia, polydipsia, or polyuria  Respiratory ROS: positive for - cough, pleuritic pain and shortness of breath  Cardiovascular ROS: positive for - chest pain, dyspnea on exertion and shortness of breath. Gastrointestinal ROS: no abdominal pain, change in bowel habits, or black or bloody stools  Genito-Urinary ROS: no nocturia, dysuria, trouble voiding, frequency or hematuria  Musculoskeletal ROS: negative for- myalgias, arthralgias, or claudication  Neurological ROS: no TIA or stroke symptoms otherwise no significant change in symptoms or problems since yesterday as documented in previous progress notes.     SCHEDULED MEDICATIONS:   nystatin  5 mL Oral 4x Daily    pantoprazole  40 mg Oral Daily    cloNIDine  0.1 mg Oral TID    hydrALAZINE  25 mg Oral 3 times per day    torsemide  10 mg Oral Once per day on Mon Wed Fri    amLODIPine  10 mg Oral Daily    sodium chloride flush  10 mL Intravenous BID    spironolactone  25 mg Oral Daily    ipratropium-albuterol  1 ampule Inhalation Q4H    formoterol  20 mcg Nebulization BID    finasteride  5 mg Oral Daily    gabapentin  300 mg Oral TID    lisinopril  40 mg Oral Daily    nicotine  1 patch Transdermal Daily    traMADol  50 mg Oral 4x Daily    metoprolol succinate  50 mg Oral Daily    doxycycline hyclate  100 mg Oral 2 times per day       VITAL SIGNS:                                                                                                                          /78   Pulse 64   Temp 97.3 °F (36.3 °C) (Axillary)   Resp 16   Ht 5' 7\" (1.702 m)   Wt 202 lb 8 oz (91.9 kg)   SpO2 96%   BMI 31.72 kg/m²   Patient Vitals for the past 96 hrs (Last 3 readings):   Weight   04/23/19 0023 202 lb 8 oz (91.9 kg)   04/22/19 0354 208 lb 11.2 oz (94.7 kg)   04/21/19 0403 206 lb 6.4 oz (93.6 kg)     OBJECTIVE:    HEENT: PERRL, EOM  Intact; sclera non-icteric, conjunctiva pink. Carotids are brisk in upstroke with normal contour. No carotid bruits. Normal jugular venous pulsation at 45°. No palpable cervical nor supraclavicular nodes. Thyroid not palpable. Trachea midline. Chest: Even excursion  Lungs: CTA B, no expiratory wheezes or rhonchi, no decreased tactile fremitus without inspiratory rales. Heart: Regular  rhythm; S1 > S2, no gallop or murmur. No clicks, rub, palpable thrills   or heaves. PMI nondisplaced, 5th intercostal space MCL. Abdomen: Soft, nontender, nondistended,  moderately protuberant, no masses or organomegaly. Bowel sounds active. Extremities: Without clubbing, cyanosis or edema. Pulses present 3+ upper extermities bilaterally; present 1+ DP and present 1+ PT bilaterally.      Data:   Scheduled Meds: Reviewed  Continuous Infusions:     Intake/Output Summary (Last 24 hours) at 4/23/2019 1114  Last data filed at 4/23/2019 0618  Gross per 24 hour   Intake 960 ml   Output 4050 ml   Net -3090 ml     CBC:   Recent Labs     04/21/19  0403 04/22/19  0355 04/23/19  0410   WBC 15.9* 19.0* 19.1*   HGB 13.7 13.7 14.6   HCT 41.2 41.3 44.2    250 263     BMP:  Recent Labs     04/21/19  0403 04/22/19  0355 04/23/19  0410    134 134   K 4.9 4.7 4.8    97* 97*   CO2 24 22 27   BUN 34* 38* 37*   CREATININE 1.0 1.1 1.1   LABGLOM >60 >60 >60     ABGs:   Lab Results   Component Value Date    PH 7.516 2019    PO2 62.0 2019    PCO2 23.7 2019     INR:   Recent Labs     19  0403 19  0355 19  0410   INR 0.9 0.9 0.9     PRO-BNP:   Lab Results   Component Value Date    PROBNP 4,547 (H) 04/15/2019    PROBNP 547 (H) 2018      TSH:   Lab Results   Component Value Date    TSH 0.809 2019      Cardiac Injury Profile: No results for input(s): CKTOTAL, CKMB, TROPONINI in the last 72 hours. Lipid Profile:   Lab Results   Component Value Date    TRIG 217 2018    HDL 58 2018    LDLCALC 144 2018    CHOL 245 2018      Hemoglobin A1C: No components found for: HGBA1C     RAD:   Xr Chest Standard (2 Vw)    Result Date: 2019  Patient MRN: [id number] : 1965 Age:  52 years Gender: Male Order Date: 2019 9:45 AM Exam: XR CHEST (2 VW) Number of Views: 2 Indication:   Shortness of breath and cough Comparison: 4/15/2019 chest x-ray. CT chest 2019. Findings: There is a stable, enlarged cardiomediastinal silhouette with bibasilar airspace disease with thoracic aortic vascular calcifications. No pneumothorax. .     1. Bibasilar airspace disease concerning for infiltrate/pneumonia. 2. Stable, enlarged cardiomediastinal silhouette with thoracic aortic vascular calcifications. Xr Chest Portable    Result Date: 4/15/2019  Patient MRN: [id number] : 1965 Age:  52 years Gender: Male Order Date: 4/15/2019 8:30 PM Exam: XR CHEST PORTABLE Number of Images: 1 view Indication:  Shortness of breath Comparison: Chest CT study 2019 and anterior upright chest of the same day Findings: The lungs are symmetrically expanded, and show hazy increased vascularity and diffuse interstitial density throughout the chest, consistent with edema. Cardiovascular shadows are normal in appearance. There is no evidence of cardiac enlargement or decompensation.  Skeletal structures show no evidence of acute pathology. At the upper margin of the study, fixation hardware is noted in the posterior cervical spine. Overlying EKG leads are present. Indistinct bronchovascular prominence in the lungs likely represents pulmonary edema, and much less likely prominent airway inflammatory change. There is no evidence of septal edema or effusion in the lower chest. There is no evidence of consolidative pneumonia. Us Abdomen Limited    Result Date: 2019  Patient MRN:  [id number] : 1965 Age: 52 years Gender: Male Order Date:  2019 12:45 PM EXAM: US ABDOMEN LIMITED NUMBER OF IMAGES:  62 INDICATION:  Assess liver Assess liver COMPARISON: None The pancreas appears unremarkable. The liver appears unremarkable. The proximal aorta and the proximal IVC appear   to be unremarkable. The right kidney appears to be unremarkable. The common duct is within normal limits; it measures 5 mm in caliber at the alcides hepatis level. The gallbladder wall appears unremarkable. The left kidney and spleen are not imaged  No definite ascites is seen     Negative right upper quadrant sonogram. .     Us Dup Abd Pel Retro Scrot Complete    Result Date: 2019  Patient MRN:  [id number] : 1965 Age: 52 years Gender: Male Order Date:  2019 10:00 PM EXAM: US DUP ABD PEL RETRO SCROT COMPLETE NUMBER OF IMAGES:  66 INDICATION: I10 Hypertension, unspecified type  COMPARISON: None The right kidney 11.1 x 4.6 x 5.9 cm The left kidney  10.6 x 6.0 x 5.2 cm There is no mass identified. There is no hydronephrosis identified. There is no calculus identified. The bladder is unremarkable. Velocity within the aorta measure 66.1 cm/sec. Velocities in the right renal artery measure 102.5 cm/sec,  renal aortic ratio is 1.6. Velocities within the left renal artery measure 94.8 cm/sec, renal aortic ratio on the left is 1.4.     1.  No evidence for hemodynamically significant renal artery stenosis.     Us Retroperitoneal Ry    Result Date: 2019  Patient MRN:  [id number] : 1965 Age: 52 years Gender: Male Order Date:  2019 12:15 PM EXAM: US RETROPERITONEAL REGAN NUMBER OF IMAGES:  60 INDICATION:  renal artey stenosis  COMPARISON: None The right kidney 11.1 x 4.6 x 5.97 centimeter The left kidney  10.6 x 6.0 x 5.2 cm There is no mass identified. There is no hydronephrosis identified. There is no calculus identified. The bladder is unremarkable. Velocity within the aorta measure 66.1 cm/sec. Velocities in the right renal artery measure 102.5 cm/sec,  renal aortic ratio is 1.6. Velocities within the left renal artery measure 94.8 cm/sec, renal aortic ratio on the left is 1.4.     1.  No evidence for hemodynamically significant renal artery stenosis. Nm Cardiac Stress Test Nuclear Imaging    Result Date: 2019  Patient MRN:  [id number] : 1965 Age: 52 years Gender: Male Order Date:  2019 8:30 AM EXAM: NM MYOCARDIAL SPECT REST EXERCISE OR RX Number of Images: 10 views INDICATION:  Chest pain Reason for Exam?->Chest pain Procedure Type->Rx COMPARISON: 2018. TECHNIQUE: 10.3 mCi of Tc-99m MIBI was injected intravenously at rest and cardiac SPECT images were performed. In addition 30.5 mCi of Tc-99m MIBI was injected intravenously at maximum stress by using Lexiscan. Stress SPECT images and gated study were performed. FINDINGS: Perfusion images demonstrate no reversible perfusion defect. Wall motion is within normal limits. The end diastolic volume is 885 ml. The end systolic volume is 739 ml. The estimated ejection fraction is 45 %. (Previous estimated ejection fraction was 55%.)      1. No reversible perfusion defect. 2. Ejection fraction is 45 %. 3. Mild global hypokinesis. No dyskinetic segment. 4. Findings suggest mild dilated cardiomyopathy.        EKG: See Report  Echo: See Report      IMPRESSIONS:  Active Problems:    Acute respiratory failure with hypoxia (HCC)    COPD exacerbation (HCC)  Resolved Problems:    * No resolved hospital problems. *      RECOMMENDATIONS:  Will add pantoprazole as famotidine has not seemed to help his symptomatology presently. I'm still quite concerned that the likelihood of underlying coronary artery disease exists considering his family history, siblings and father. I have instructed him to follow-up closely with Dr. Everett Bender and have repeat electrolytes obtained as an outpatient. I will see him immediately upon his request.  He has also been counseled on smoking cessation and hopefully will follow-up at this point. I have spent more than 26 minutes face to face with Chayo Van and reviewing notes and laboratory data, with greater than 50% of this time instructing and counseling the patient face to face regarding my findings and recommendations and I have answered all questions as posed to me by Mr. Lore Franklin. Kyrie Seay, DO FACP,FACC,[facility]      NOTE:  This report was transcribed using voice recognition software.   Every effort was made to ensure accuracy; however, inadvertent computerized transcription errors may be present

## 2019-04-23 NOTE — PROGRESS NOTES
PROGRESS NOTE       PATIENT PROBLEM LIST:  Active Problems:    Acute respiratory failure with hypoxia (HCC)    COPD exacerbation (HCC)  Resolved Problems:    * No resolved hospital problems. *      SUBJECTIVE:  Kevin Vargas states he feels somewhat better but now is suffering from recurrent severe acid reflux symptoms. His breathing has improved and less chest discomfort. REVIEW OF SYSTEMS:  General ROS: positive for - fatigue, malaise,  weight loss  Psychological ROS: positive for - anxiety. Ophthalmic ROS: negative for - decreased vision or visual distortion. ENT ROS: negative  Allergy and Immunology ROS: negative  Hematological and Lymphatic ROS: negative  Endocrine: no heat or cold intolerance and no polyphagia, polydipsia, or polyuria  Respiratory ROS: positive for - cough, pleuritic pain and shortness of breath  Cardiovascular ROS: positive for - chest pain, dyspnea on exertion and shortness of breath. Gastrointestinal ROS: no abdominal pain, change in bowel habits, or black or bloody stools  Genito-Urinary ROS: no nocturia, dysuria, trouble voiding, frequency or hematuria  Musculoskeletal ROS: negative for- myalgias, arthralgias, or claudication  Neurological ROS: no TIA or stroke symptoms otherwise no significant change in symptoms or problems since yesterday as documented in previous progress notes.     SCHEDULED MEDICATIONS:   nystatin  5 mL Oral 4x Daily    cloNIDine  0.1 mg Oral TID    hydrALAZINE  25 mg Oral 3 times per day    torsemide  10 mg Oral Once per day on Mon Wed Fri    amLODIPine  10 mg Oral Daily    sodium chloride flush  10 mL Intravenous BID    spironolactone  25 mg Oral Daily    ipratropium-albuterol  1 ampule Inhalation Q4H    formoterol  20 mcg Nebulization BID    finasteride  5 mg Oral Daily    gabapentin  300 mg Oral TID    lisinopril  40 mg Oral Daily    nicotine  1 patch Transdermal Daily    famotidine  40 mg Oral QPM    traMADol  50 mg Oral 4x Daily    metoprolol succinate  50 mg Oral Daily    doxycycline hyclate  100 mg Oral 2 times per day       VITAL SIGNS:                                                                                                                          /78   Pulse 64   Temp 97.3 °F (36.3 °C) (Axillary)   Resp 16   Ht 5' 7\" (1.702 m)   Wt 202 lb 8 oz (91.9 kg)   SpO2 96%   BMI 31.72 kg/m²   Patient Vitals for the past 96 hrs (Last 3 readings):   Weight   04/23/19 0023 202 lb 8 oz (91.9 kg)   04/22/19 0354 208 lb 11.2 oz (94.7 kg)   04/21/19 0403 206 lb 6.4 oz (93.6 kg)     OBJECTIVE:    HEENT: PERRL, EOM  Intact; sclera non-icteric, conjunctiva pink. Carotids are brisk in upstroke with normal contour. No carotid bruits. Normal jugular venous pulsation at 45°. No palpable cervical nor supraclavicular nodes. Thyroid not palpable. Trachea midline. Chest: Even excursion  Lungs: CTA B, no expiratory wheezes or rhonchi, no decreased tactile fremitus without inspiratory rales. Heart: Regular  rhythm; S1 > S2, no gallop or murmur. No clicks, rub, palpable thrills   or heaves. PMI nondisplaced, 5th intercostal space MCL. Abdomen: Soft, nontender, nondistended,  moderately protuberant, no masses or organomegaly. Bowel sounds active. Extremities: Without clubbing, cyanosis or edema. Pulses present 3+ upper extermities bilaterally; present 1+ DP and present 1+ PT bilaterally.      Data:   Scheduled Meds: Reviewed  Continuous Infusions:     Intake/Output Summary (Last 24 hours) at 4/23/2019 1109  Last data filed at 4/23/2019 0618  Gross per 24 hour   Intake 960 ml   Output 4050 ml   Net -3090 ml     CBC:   Recent Labs     04/21/19  0403 04/22/19  0355 04/23/19  0410   WBC 15.9* 19.0* 19.1*   HGB 13.7 13.7 14.6   HCT 41.2 41.3 44.2    250 263     BMP:  Recent Labs     04/21/19  0403 04/22/19  0355 04/23/19  0410    134 134   K 4.9 4.7 4.8    97* 97*   CO2 24 22 27   BUN 34* 38* 37*   CREATININE 1.0 1.1 1.1   LABGLOM >60 >60 >60     ABGs:   Lab Results   Component Value Date    PH 7.516 2019    PO2 62.0 2019    PCO2 23.7 2019     INR:   Recent Labs     19  0403 19  0355 19  0410   INR 0.9 0.9 0.9     PRO-BNP:   Lab Results   Component Value Date    PROBNP 4,547 (H) 04/15/2019    PROBNP 547 (H) 2018      TSH:   Lab Results   Component Value Date    TSH 0.809 2019      Cardiac Injury Profile: No results for input(s): CKTOTAL, CKMB, TROPONINI in the last 72 hours. Lipid Profile:   Lab Results   Component Value Date    TRIG 217 2018    HDL 58 2018    LDLCALC 144 2018    CHOL 245 2018      Hemoglobin A1C: No components found for: HGBA1C     RAD:   Xr Chest Standard (2 Vw)    Result Date: 2019  Patient MRN: [id number] : 1965 Age:  52 years Gender: Male Order Date: 2019 9:45 AM Exam: XR CHEST (2 VW) Number of Views: 2 Indication:   Shortness of breath and cough Comparison: 4/15/2019 chest x-ray. CT chest 2019. Findings: There is a stable, enlarged cardiomediastinal silhouette with bibasilar airspace disease with thoracic aortic vascular calcifications. No pneumothorax. .     1. Bibasilar airspace disease concerning for infiltrate/pneumonia. 2. Stable, enlarged cardiomediastinal silhouette with thoracic aortic vascular calcifications. Xr Chest Portable    Result Date: 4/15/2019  Patient MRN: [id number] : 1965 Age:  52 years Gender: Male Order Date: 4/15/2019 8:30 PM Exam: XR CHEST PORTABLE Number of Images: 1 view Indication:  Shortness of breath Comparison: Chest CT study 2019 and anterior upright chest of the same day Findings: The lungs are symmetrically expanded, and show hazy increased vascularity and diffuse interstitial density throughout the chest, consistent with edema. Cardiovascular shadows are normal in appearance. There is no evidence of cardiac enlargement or decompensation.  Skeletal structures show no evidence of acute pathology. At the upper margin of the study, fixation hardware is noted in the posterior cervical spine. Overlying EKG leads are present. Indistinct bronchovascular prominence in the lungs likely represents pulmonary edema, and much less likely prominent airway inflammatory change. There is no evidence of septal edema or effusion in the lower chest. There is no evidence of consolidative pneumonia. Us Abdomen Limited    Result Date: 2019  Patient MRN:  [id number] : 1965 Age: 52 years Gender: Male Order Date:  2019 12:45 PM EXAM: US ABDOMEN LIMITED NUMBER OF IMAGES:  62 INDICATION:  Assess liver Assess liver COMPARISON: None The pancreas appears unremarkable. The liver appears unremarkable. The proximal aorta and the proximal IVC appear   to be unremarkable. The right kidney appears to be unremarkable. The common duct is within normal limits; it measures 5 mm in caliber at the alcides hepatis level. The gallbladder wall appears unremarkable. The left kidney and spleen are not imaged  No definite ascites is seen     Negative right upper quadrant sonogram. .     Us Dup Abd Pel Retro Scrot Complete    Result Date: 2019  Patient MRN:  [id number] : 1965 Age: 52 years Gender: Male Order Date:  2019 10:00 PM EXAM: US DUP ABD PEL RETRO SCROT COMPLETE NUMBER OF IMAGES:  66 INDICATION: I10 Hypertension, unspecified type  COMPARISON: None The right kidney 11.1 x 4.6 x 5.9 cm The left kidney  10.6 x 6.0 x 5.2 cm There is no mass identified. There is no hydronephrosis identified. There is no calculus identified. The bladder is unremarkable. Velocity within the aorta measure 66.1 cm/sec. Velocities in the right renal artery measure 102.5 cm/sec,  renal aortic ratio is 1.6. Velocities within the left renal artery measure 94.8 cm/sec, renal aortic ratio on the left is 1.4.     1.  No evidence for hemodynamically significant renal artery stenosis.     Us Retroperitoneal Ry    Result Date: 2019  Patient MRN:  [id number] : 1965 Age: 52 years Gender: Male Order Date:  2019 12:15 PM EXAM: US RETROPERITONEAL REGAN NUMBER OF IMAGES:  60 INDICATION:  renal artey stenosis  COMPARISON: None The right kidney 11.1 x 4.6 x 5.97 centimeter The left kidney  10.6 x 6.0 x 5.2 cm There is no mass identified. There is no hydronephrosis identified. There is no calculus identified. The bladder is unremarkable. Velocity within the aorta measure 66.1 cm/sec. Velocities in the right renal artery measure 102.5 cm/sec,  renal aortic ratio is 1.6. Velocities within the left renal artery measure 94.8 cm/sec, renal aortic ratio on the left is 1.4.     1.  No evidence for hemodynamically significant renal artery stenosis. Nm Cardiac Stress Test Nuclear Imaging    Result Date: 2019  Patient MRN:  [id number] : 1965 Age: 52 years Gender: Male Order Date:  2019 8:30 AM EXAM: NM MYOCARDIAL SPECT REST EXERCISE OR RX Number of Images: 10 views INDICATION:  Chest pain Reason for Exam?->Chest pain Procedure Type->Rx COMPARISON: 2018. TECHNIQUE: 10.3 mCi of Tc-99m MIBI was injected intravenously at rest and cardiac SPECT images were performed. In addition 30.5 mCi of Tc-99m MIBI was injected intravenously at maximum stress by using Lexiscan. Stress SPECT images and gated study were performed. FINDINGS: Perfusion images demonstrate no reversible perfusion defect. Wall motion is within normal limits. The end diastolic volume is 885 ml. The end systolic volume is 317 ml. The estimated ejection fraction is 45 %. (Previous estimated ejection fraction was 55%.)      1. No reversible perfusion defect. 2. Ejection fraction is 45 %. 3. Mild global hypokinesis. No dyskinetic segment. 4. Findings suggest mild dilated cardiomyopathy.        EKG: See Report  Echo: See Report      IMPRESSIONS:  Active Problems:    Acute respiratory failure with hypoxia (HCC)    COPD exacerbation (HCC)  Resolved Problems:    * No

## 2019-04-24 LAB
ASPERGILLUS FUMIGATUS #1: NORMAL
ASPERGILLUS FUMIGATUS #6: NORMAL
AUREOBASIDIUM PULLULANS: NORMAL
MICROPOLYSPORA FAENI: NORMAL
PIGEON SERUM ABS: NORMAL
THERMOACTINOMYCES VULGARIS #1: NORMAL

## 2019-04-26 LAB
ASPERGILLUS FUMIGATUS #1: NORMAL
ASPERGILLUS FUMIGATUS #6: NORMAL
AUREOBASIDIUM PULLULANS: NORMAL
CATE U INT: NORMAL
CREATININE 24 HOUR URINE: NORMAL MG/D (ref 800–2100)
CREATININE URINE: 35 MG/DL
DOPAMINE (G CRT): 177 UG/G CRT (ref 0–250)
DOPAMINE 24 HOUR URINE: NORMAL UG/D (ref 77–324)
DOPAMINE, (UG/L): 62 UG/L
EPINEPHRINE (G CRT): <3 UG/G CRT (ref 0–20)
EPINEPHRINE 24 HOUR URINE: NORMAL UG/D (ref 1–7)
EPINEPHRINE, (UG/L): <1 UG/L
HOURS COLLECTED: 24 HR
MICROPOLYSPORA FAENI: NORMAL
NOREPINEPH (G CRT): 23 UG/G CRT (ref 0–45)
NOREPINEPHRINE 24 HOUR URINE: NORMAL UG/D (ref 16–71)
NOREPINEPHRINE, (UG/L): 8 UG/L
PIGEON SERUM ABS: NORMAL
THERMOACTINOMYCES VULGARIS #1: NORMAL
URINE TOTAL VOLUME: 5100 ML

## 2019-05-29 NOTE — DISCHARGE SUMMARY
essential hypertension. He  was also seen by Dr. Tanna Pantoja besides Dr. Yanet Celestin and stress testing was  performed and failed to demonstrate any ischemic changes. He has a  known history of hepatitis C, so we went ahead and had Gastroenterology  see him and they made arrangements for him to be checked as an  outpatient, thought possibly that his atypical chest pain may be related  to GERD and placed him on some pantoprazole. He has been encouraged to  quit smoking. He was able to be tapered off of the IV steroids and the  antibiotics were subsequently stopped as well. He had some mild kidney  injury when he came in with a BUN of 23 and creatinine of 1.7. It  started to trend back down to normal.  Procalcitonin level was normal at  0.05. Blood glucose slightly elevated. Alpha-1 antitrypsin normal.   Liver function tests normal.  Hypersensitivity pneumonitis panel seemed  to be negative. The alpha-fetoprotein markers negative. CEA levels  negative. WANDA, antimitochondrial and smooth muscle antibodies negative. Immunoglobulins normal.  Hepatitis C reactive. White count up most of  the time during his admission, but most likely related to steroids. He  did have a little bump in his liver enzymes while he was here, but  nothing of major significance. Overall, he improved and he was stable  enough to be discharged. He is to quit smoking, take his medications as  prescribed, followup with Pulmonary, and also followup with Cardiology. He has a strong family history of underlying coronary artery disease and  if his symptoms should be worsened by any exacerbation of any source, he  is to have a heart catheterization and he understands that. Overall his  condition improved. His prognosis is better. We are going to go ahead  and let him go and follow him up as an outpatient.         Diego Dao DO    D: 05/28/2019 17:00:37       T: 05/28/2019 17:06:32     [id number]  Job#: [id number]     Doc#: [id number]    CC:

## 2019-08-17 ENCOUNTER — APPOINTMENT (OUTPATIENT)
Dept: CT IMAGING | Age: 54
DRG: 194 | End: 2019-08-17
Payer: MEDICARE

## 2019-08-17 ENCOUNTER — HOSPITAL ENCOUNTER (INPATIENT)
Age: 54
LOS: 4 days | Discharge: HOME | DRG: 194 | End: 2019-08-23
Attending: EMERGENCY MEDICINE | Admitting: GENERAL PRACTICE
Payer: MEDICARE

## 2019-08-17 ENCOUNTER — APPOINTMENT (OUTPATIENT)
Dept: GENERAL RADIOLOGY | Age: 54
DRG: 194 | End: 2019-08-17
Payer: MEDICARE

## 2019-08-17 DIAGNOSIS — R07.2 PRECORDIAL CHEST PAIN: Primary | ICD-10-CM

## 2019-08-17 DIAGNOSIS — N40.1 BENIGN PROSTATIC HYPERPLASIA WITH URINARY FREQUENCY: ICD-10-CM

## 2019-08-17 DIAGNOSIS — R94.31 ABNORMAL EKG: ICD-10-CM

## 2019-08-17 DIAGNOSIS — F17.211 CIGARETTE NICOTINE DEPENDENCE IN REMISSION: ICD-10-CM

## 2019-08-17 DIAGNOSIS — F14.91 HISTORY OF COCAINE USE: ICD-10-CM

## 2019-08-17 DIAGNOSIS — J15.9 COMMUNITY ACQUIRED BACTERIAL PNEUMONIA: ICD-10-CM

## 2019-08-17 DIAGNOSIS — J18.9 PNEUMONIA DUE TO ORGANISM: ICD-10-CM

## 2019-08-17 DIAGNOSIS — R03.0 ELEVATED BLOOD PRESSURE READING: ICD-10-CM

## 2019-08-17 DIAGNOSIS — K21.9 GASTROESOPHAGEAL REFLUX DISEASE WITHOUT ESOPHAGITIS: ICD-10-CM

## 2019-08-17 DIAGNOSIS — I50.23 ACUTE ON CHRONIC SYSTOLIC CONGESTIVE HEART FAILURE (HCC): ICD-10-CM

## 2019-08-17 DIAGNOSIS — R35.0 BENIGN PROSTATIC HYPERPLASIA WITH URINARY FREQUENCY: ICD-10-CM

## 2019-08-17 DIAGNOSIS — J44.1 COPD EXACERBATION (HCC): ICD-10-CM

## 2019-08-17 LAB
ALBUMIN SERPL-MCNC: 3.9 G/DL (ref 3.5–5.2)
ALP BLD-CCNC: 68 U/L (ref 40–129)
ALT SERPL-CCNC: 16 U/L (ref 0–40)
ANION GAP SERPL CALCULATED.3IONS-SCNC: 12 MMOL/L (ref 7–16)
AST SERPL-CCNC: 24 U/L (ref 0–39)
BASOPHILS ABSOLUTE: 0.05 E9/L (ref 0–0.2)
BASOPHILS RELATIVE PERCENT: 0.4 % (ref 0–2)
BILIRUB SERPL-MCNC: 0.4 MG/DL (ref 0–1.2)
BILIRUBIN DIRECT: <0.2 MG/DL (ref 0–0.3)
BILIRUBIN, INDIRECT: NORMAL MG/DL (ref 0–1)
BUN BLDV-MCNC: 20 MG/DL (ref 6–20)
CALCIUM SERPL-MCNC: 9 MG/DL (ref 8.6–10.2)
CHLORIDE BLD-SCNC: 103 MMOL/L (ref 98–107)
CO2: 19 MMOL/L (ref 22–29)
CREAT SERPL-MCNC: 1.4 MG/DL (ref 0.7–1.2)
D DIMER: 292 NG/ML DDU
EOSINOPHILS ABSOLUTE: 0.07 E9/L (ref 0.05–0.5)
EOSINOPHILS RELATIVE PERCENT: 0.6 % (ref 0–6)
GFR AFRICAN AMERICAN: >60
GFR NON-AFRICAN AMERICAN: 52 ML/MIN/1.73
GLUCOSE BLD-MCNC: 115 MG/DL (ref 74–99)
HCT VFR BLD CALC: 42.4 % (ref 37–54)
HEMOGLOBIN: 15 G/DL (ref 12.5–16.5)
IMMATURE GRANULOCYTES #: 0.04 E9/L
IMMATURE GRANULOCYTES %: 0.3 % (ref 0–5)
LACTIC ACID: 1.2 MMOL/L (ref 0.5–2.2)
LYMPHOCYTES ABSOLUTE: 1.36 E9/L (ref 1.5–4)
LYMPHOCYTES RELATIVE PERCENT: 11.7 % (ref 20–42)
MCH RBC QN AUTO: 32.7 PG (ref 26–35)
MCHC RBC AUTO-ENTMCNC: 35.4 % (ref 32–34.5)
MCV RBC AUTO: 92.4 FL (ref 80–99.9)
MONOCYTES ABSOLUTE: 0.92 E9/L (ref 0.1–0.95)
MONOCYTES RELATIVE PERCENT: 7.9 % (ref 2–12)
NEUTROPHILS ABSOLUTE: 9.2 E9/L (ref 1.8–7.3)
NEUTROPHILS RELATIVE PERCENT: 79.1 % (ref 43–80)
PDW BLD-RTO: 12.5 FL (ref 11.5–15)
PLATELET # BLD: 226 E9/L (ref 130–450)
PMV BLD AUTO: 10 FL (ref 7–12)
POTASSIUM SERPL-SCNC: 4.2 MMOL/L (ref 3.5–5)
PRO-BNP: 8989 PG/ML (ref 0–125)
RBC # BLD: 4.59 E12/L (ref 3.8–5.8)
REASON FOR REJECTION: NORMAL
REJECTED TEST: NORMAL
SODIUM BLD-SCNC: 134 MMOL/L (ref 132–146)
TOTAL PROTEIN: 7.4 G/DL (ref 6.4–8.3)
TROPONIN: <0.01 NG/ML (ref 0–0.03)
WBC # BLD: 11.6 E9/L (ref 4.5–11.5)

## 2019-08-17 PROCEDURE — 6370000000 HC RX 637 (ALT 250 FOR IP): Performed by: EMERGENCY MEDICINE

## 2019-08-17 PROCEDURE — 94664 DEMO&/EVAL PT USE INHALER: CPT

## 2019-08-17 PROCEDURE — 6360000004 HC RX CONTRAST MEDICATION: Performed by: RADIOLOGY

## 2019-08-17 PROCEDURE — 85025 COMPLETE CBC W/AUTO DIFF WBC: CPT

## 2019-08-17 PROCEDURE — G0378 HOSPITAL OBSERVATION PER HR: HCPCS

## 2019-08-17 PROCEDURE — 85378 FIBRIN DEGRADE SEMIQUANT: CPT

## 2019-08-17 PROCEDURE — 80048 BASIC METABOLIC PNL TOTAL CA: CPT

## 2019-08-17 PROCEDURE — 6360000002 HC RX W HCPCS: Performed by: EMERGENCY MEDICINE

## 2019-08-17 PROCEDURE — 83605 ASSAY OF LACTIC ACID: CPT

## 2019-08-17 PROCEDURE — 87040 BLOOD CULTURE FOR BACTERIA: CPT

## 2019-08-17 PROCEDURE — 80076 HEPATIC FUNCTION PANEL: CPT

## 2019-08-17 PROCEDURE — 2500000003 HC RX 250 WO HCPCS: Performed by: EMERGENCY MEDICINE

## 2019-08-17 PROCEDURE — 2580000003 HC RX 258: Performed by: EMERGENCY MEDICINE

## 2019-08-17 PROCEDURE — 96365 THER/PROPH/DIAG IV INF INIT: CPT

## 2019-08-17 PROCEDURE — 71045 X-RAY EXAM CHEST 1 VIEW: CPT

## 2019-08-17 PROCEDURE — 71275 CT ANGIOGRAPHY CHEST: CPT

## 2019-08-17 PROCEDURE — 84484 ASSAY OF TROPONIN QUANT: CPT

## 2019-08-17 PROCEDURE — 96375 TX/PRO/DX INJ NEW DRUG ADDON: CPT

## 2019-08-17 PROCEDURE — 93005 ELECTROCARDIOGRAM TRACING: CPT | Performed by: EMERGENCY MEDICINE

## 2019-08-17 PROCEDURE — 99285 EMERGENCY DEPT VISIT HI MDM: CPT

## 2019-08-17 PROCEDURE — 83880 ASSAY OF NATRIURETIC PEPTIDE: CPT

## 2019-08-17 PROCEDURE — 84145 PROCALCITONIN (PCT): CPT

## 2019-08-17 PROCEDURE — 96367 TX/PROPH/DG ADDL SEQ IV INF: CPT

## 2019-08-17 RX ORDER — MORPHINE SULFATE 4 MG/ML
4 INJECTION, SOLUTION INTRAMUSCULAR; INTRAVENOUS ONCE
Status: COMPLETED | OUTPATIENT
Start: 2019-08-17 | End: 2019-08-17

## 2019-08-17 RX ORDER — LORAZEPAM 2 MG/ML
1 INJECTION INTRAMUSCULAR ONCE
Status: COMPLETED | OUTPATIENT
Start: 2019-08-17 | End: 2019-08-17

## 2019-08-17 RX ORDER — METHYLPREDNISOLONE SODIUM SUCCINATE 125 MG/2ML
125 INJECTION, POWDER, LYOPHILIZED, FOR SOLUTION INTRAMUSCULAR; INTRAVENOUS ONCE
Status: COMPLETED | OUTPATIENT
Start: 2019-08-17 | End: 2019-08-17

## 2019-08-17 RX ORDER — ASPIRIN 81 MG/1
324 TABLET, CHEWABLE ORAL ONCE
Status: COMPLETED | OUTPATIENT
Start: 2019-08-17 | End: 2019-08-17

## 2019-08-17 RX ORDER — ONDANSETRON 2 MG/ML
4 INJECTION INTRAMUSCULAR; INTRAVENOUS ONCE
Status: COMPLETED | OUTPATIENT
Start: 2019-08-17 | End: 2019-08-17

## 2019-08-17 RX ORDER — IPRATROPIUM BROMIDE AND ALBUTEROL SULFATE 2.5; .5 MG/3ML; MG/3ML
1 SOLUTION RESPIRATORY (INHALATION) ONCE
Status: COMPLETED | OUTPATIENT
Start: 2019-08-17 | End: 2019-08-17

## 2019-08-17 RX ORDER — 0.9 % SODIUM CHLORIDE 0.9 %
500 INTRAVENOUS SOLUTION INTRAVENOUS ONCE
Status: COMPLETED | OUTPATIENT
Start: 2019-08-17 | End: 2019-08-17

## 2019-08-17 RX ADMIN — METHYLPREDNISOLONE SODIUM SUCCINATE 125 MG: 125 INJECTION, POWDER, FOR SOLUTION INTRAMUSCULAR; INTRAVENOUS at 20:25

## 2019-08-17 RX ADMIN — ASPIRIN 81 MG 324 MG: 81 TABLET ORAL at 20:42

## 2019-08-17 RX ADMIN — CEFTRIAXONE 1 G: 1 INJECTION, POWDER, FOR SOLUTION INTRAMUSCULAR; INTRAVENOUS at 21:43

## 2019-08-17 RX ADMIN — DOXYCYCLINE 100 MG: 100 INJECTION, POWDER, LYOPHILIZED, FOR SOLUTION INTRAVENOUS at 22:10

## 2019-08-17 RX ADMIN — IPRATROPIUM BROMIDE AND ALBUTEROL SULFATE 1 AMPULE: .5; 3 SOLUTION RESPIRATORY (INHALATION) at 20:29

## 2019-08-17 RX ADMIN — ONDANSETRON 4 MG: 2 INJECTION INTRAMUSCULAR; INTRAVENOUS at 21:42

## 2019-08-17 RX ADMIN — SODIUM CHLORIDE 500 ML: 9 INJECTION, SOLUTION INTRAVENOUS at 20:28

## 2019-08-17 RX ADMIN — MORPHINE SULFATE 4 MG: 4 INJECTION, SOLUTION INTRAMUSCULAR; INTRAVENOUS at 21:42

## 2019-08-17 RX ADMIN — IOPAMIDOL 80 ML: 755 INJECTION, SOLUTION INTRAVENOUS at 21:25

## 2019-08-17 RX ADMIN — LORAZEPAM 1 MG: 2 INJECTION INTRAMUSCULAR; INTRAVENOUS at 22:32

## 2019-08-17 ASSESSMENT — PAIN SCALES - GENERAL
PAINLEVEL_OUTOF10: 9
PAINLEVEL_OUTOF10: 5
PAINLEVEL_OUTOF10: 0

## 2019-08-17 ASSESSMENT — HEART SCORE: ECG: 1

## 2019-08-17 ASSESSMENT — PAIN DESCRIPTION - LOCATION: LOCATION: HEAD

## 2019-08-17 ASSESSMENT — PAIN DESCRIPTION - PAIN TYPE: TYPE: ACUTE PAIN

## 2019-08-18 LAB
AMPHETAMINE SCREEN, URINE: NOT DETECTED
BARBITURATE SCREEN URINE: NOT DETECTED
BENZODIAZEPINE SCREEN, URINE: NOT DETECTED
CANNABINOID SCREEN URINE: NOT DETECTED
COCAINE METABOLITE SCREEN URINE: POSITIVE
EKG ATRIAL RATE: 113 BPM
EKG P AXIS: 48 DEGREES
EKG P-R INTERVAL: 120 MS
EKG Q-T INTERVAL: 378 MS
EKG QRS DURATION: 86 MS
EKG QTC CALCULATION (BAZETT): 518 MS
EKG R AXIS: 19 DEGREES
EKG T AXIS: 77 DEGREES
EKG VENTRICULAR RATE: 113 BPM
FILM ARRAY ADENOVIRUS: NORMAL
FILM ARRAY BORDETELLA PERTUSSIS: NORMAL
FILM ARRAY CHLAMYDOPHILIA PNEUMONIAE: NORMAL
FILM ARRAY CORONAVIRUS 229E: NORMAL
FILM ARRAY CORONAVIRUS HKU1: NORMAL
FILM ARRAY CORONAVIRUS NL63: NORMAL
FILM ARRAY CORONAVIRUS OC43: NORMAL
FILM ARRAY INFLUENZA A VIRUS 09H1: NORMAL
FILM ARRAY INFLUENZA A VIRUS H1: NORMAL
FILM ARRAY INFLUENZA A VIRUS H3: NORMAL
FILM ARRAY INFLUENZA A VIRUS: NORMAL
FILM ARRAY INFLUENZA B: NORMAL
FILM ARRAY METAPNEUMOVIRUS: NORMAL
FILM ARRAY MYCOPLASMA PNEUMONIAE: NORMAL
FILM ARRAY PARAINFLUENZA VIRUS 1: NORMAL
FILM ARRAY PARAINFLUENZA VIRUS 2: NORMAL
FILM ARRAY PARAINFLUENZA VIRUS 3: NORMAL
FILM ARRAY PARAINFLUENZA VIRUS 4: NORMAL
FILM ARRAY RESPIRATORY SYNCITIAL VIRUS: NORMAL
FILM ARRAY RHINOVIRUS/ENTEROVIRUS: NORMAL
Lab: ABNORMAL
METHADONE SCREEN, URINE: NOT DETECTED
OPIATE SCREEN URINE: POSITIVE
PHENCYCLIDINE SCREEN URINE: NOT DETECTED
PROCALCITONIN: 0.22 NG/ML (ref 0–0.08)
PROPOXYPHENE SCREEN: NOT DETECTED
TROPONIN: <0.01 NG/ML (ref 0–0.03)
TROPONIN: <0.01 NG/ML (ref 0–0.03)

## 2019-08-18 PROCEDURE — 2580000003 HC RX 258: Performed by: GENERAL PRACTICE

## 2019-08-18 PROCEDURE — 87798 DETECT AGENT NOS DNA AMP: CPT

## 2019-08-18 PROCEDURE — 84484 ASSAY OF TROPONIN QUANT: CPT

## 2019-08-18 PROCEDURE — 80307 DRUG TEST PRSMV CHEM ANLYZR: CPT

## 2019-08-18 PROCEDURE — 87450 HC DIRECT STREP B ANTIGEN: CPT

## 2019-08-18 PROCEDURE — 87633 RESP VIRUS 12-25 TARGETS: CPT

## 2019-08-18 PROCEDURE — G0378 HOSPITAL OBSERVATION PER HR: HCPCS

## 2019-08-18 PROCEDURE — 6360000002 HC RX W HCPCS: Performed by: GENERAL PRACTICE

## 2019-08-18 PROCEDURE — G0480 DRUG TEST DEF 1-7 CLASSES: HCPCS

## 2019-08-18 PROCEDURE — 96366 THER/PROPH/DIAG IV INF ADDON: CPT

## 2019-08-18 PROCEDURE — 6370000000 HC RX 637 (ALT 250 FOR IP): Performed by: GENERAL PRACTICE

## 2019-08-18 PROCEDURE — 93010 ELECTROCARDIOGRAM REPORT: CPT | Performed by: INTERNAL MEDICINE

## 2019-08-18 PROCEDURE — 94640 AIRWAY INHALATION TREATMENT: CPT

## 2019-08-18 PROCEDURE — 87486 CHLMYD PNEUM DNA AMP PROBE: CPT

## 2019-08-18 PROCEDURE — 96367 TX/PROPH/DG ADDL SEQ IV INF: CPT

## 2019-08-18 PROCEDURE — 36415 COLL VENOUS BLD VENIPUNCTURE: CPT

## 2019-08-18 PROCEDURE — 96372 THER/PROPH/DIAG INJ SC/IM: CPT

## 2019-08-18 PROCEDURE — 6370000000 HC RX 637 (ALT 250 FOR IP): Performed by: INTERNAL MEDICINE

## 2019-08-18 PROCEDURE — 2700000000 HC OXYGEN THERAPY PER DAY

## 2019-08-18 PROCEDURE — 87581 M.PNEUMON DNA AMP PROBE: CPT

## 2019-08-18 PROCEDURE — 6360000002 HC RX W HCPCS: Performed by: INTERNAL MEDICINE

## 2019-08-18 RX ORDER — LISINOPRIL 20 MG/1
40 TABLET ORAL DAILY
Status: DISCONTINUED | OUTPATIENT
Start: 2019-08-18 | End: 2019-08-24 | Stop reason: HOSPADM

## 2019-08-18 RX ORDER — NICOTINE 21 MG/24HR
1 PATCH, TRANSDERMAL 24 HOURS TRANSDERMAL DAILY
Status: DISCONTINUED | OUTPATIENT
Start: 2019-08-18 | End: 2019-08-24 | Stop reason: HOSPADM

## 2019-08-18 RX ORDER — SODIUM CHLORIDE 0.9 % (FLUSH) 0.9 %
10 SYRINGE (ML) INJECTION EVERY 12 HOURS SCHEDULED
Status: DISCONTINUED | OUTPATIENT
Start: 2019-08-18 | End: 2019-08-24 | Stop reason: HOSPADM

## 2019-08-18 RX ORDER — GABAPENTIN 300 MG/1
300 CAPSULE ORAL 3 TIMES DAILY
Status: DISCONTINUED | OUTPATIENT
Start: 2019-08-18 | End: 2019-08-24 | Stop reason: HOSPADM

## 2019-08-18 RX ORDER — SODIUM CHLORIDE 0.9 % (FLUSH) 0.9 %
10 SYRINGE (ML) INJECTION PRN
Status: DISCONTINUED | OUTPATIENT
Start: 2019-08-18 | End: 2019-08-24 | Stop reason: HOSPADM

## 2019-08-18 RX ORDER — SODIUM CHLORIDE 9 MG/ML
INJECTION, SOLUTION INTRAVENOUS CONTINUOUS
Status: DISCONTINUED | OUTPATIENT
Start: 2019-08-18 | End: 2019-08-24 | Stop reason: HOSPADM

## 2019-08-18 RX ORDER — IPRATROPIUM BROMIDE AND ALBUTEROL SULFATE 2.5; .5 MG/3ML; MG/3ML
3 SOLUTION RESPIRATORY (INHALATION) EVERY 4 HOURS
Status: DISCONTINUED | OUTPATIENT
Start: 2019-08-18 | End: 2019-08-24 | Stop reason: HOSPADM

## 2019-08-18 RX ORDER — TORSEMIDE 10 MG/1
10 TABLET ORAL
Status: DISCONTINUED | OUTPATIENT
Start: 2019-08-19 | End: 2019-08-24 | Stop reason: HOSPADM

## 2019-08-18 RX ORDER — GUAIFENESIN/DEXTROMETHORPHAN 100-10MG/5
5 SYRUP ORAL EVERY 6 HOURS PRN
Status: DISCONTINUED | OUTPATIENT
Start: 2019-08-18 | End: 2019-08-24 | Stop reason: HOSPADM

## 2019-08-18 RX ORDER — LEVOFLOXACIN 5 MG/ML
500 INJECTION, SOLUTION INTRAVENOUS EVERY 24 HOURS
Status: DISCONTINUED | OUTPATIENT
Start: 2019-08-18 | End: 2019-08-24 | Stop reason: HOSPADM

## 2019-08-18 RX ORDER — ACETAMINOPHEN 325 MG/1
650 TABLET ORAL EVERY 4 HOURS PRN
Status: DISCONTINUED | OUTPATIENT
Start: 2019-08-18 | End: 2019-08-24 | Stop reason: HOSPADM

## 2019-08-18 RX ORDER — FAMOTIDINE 20 MG/1
40 TABLET, FILM COATED ORAL NIGHTLY
Status: DISCONTINUED | OUTPATIENT
Start: 2019-08-18 | End: 2019-08-24 | Stop reason: HOSPADM

## 2019-08-18 RX ORDER — HYDROCHLOROTHIAZIDE 12.5 MG/1
12.5 TABLET ORAL DAILY
Status: DISCONTINUED | OUTPATIENT
Start: 2019-08-18 | End: 2019-08-24 | Stop reason: HOSPADM

## 2019-08-18 RX ORDER — SPIRONOLACTONE 25 MG/1
25 TABLET ORAL DAILY
Status: DISCONTINUED | OUTPATIENT
Start: 2019-08-18 | End: 2019-08-24 | Stop reason: HOSPADM

## 2019-08-18 RX ORDER — HYDRALAZINE HYDROCHLORIDE 25 MG/1
25 TABLET, FILM COATED ORAL EVERY 8 HOURS SCHEDULED
Status: DISCONTINUED | OUTPATIENT
Start: 2019-08-18 | End: 2019-08-24 | Stop reason: HOSPADM

## 2019-08-18 RX ORDER — RANITIDINE 150 MG/1
300 TABLET ORAL NIGHTLY
Status: DISCONTINUED | OUTPATIENT
Start: 2019-08-18 | End: 2019-08-18 | Stop reason: CLARIF

## 2019-08-18 RX ORDER — CLONIDINE HYDROCHLORIDE 0.1 MG/1
0.1 TABLET ORAL 3 TIMES DAILY
Status: DISCONTINUED | OUTPATIENT
Start: 2019-08-18 | End: 2019-08-24 | Stop reason: HOSPADM

## 2019-08-18 RX ORDER — BUDESONIDE 0.5 MG/2ML
500 INHALANT ORAL 2 TIMES DAILY
Status: DISCONTINUED | OUTPATIENT
Start: 2019-08-18 | End: 2019-08-24 | Stop reason: HOSPADM

## 2019-08-18 RX ORDER — METOPROLOL SUCCINATE 50 MG/1
50 TABLET, EXTENDED RELEASE ORAL DAILY
Status: DISCONTINUED | OUTPATIENT
Start: 2019-08-18 | End: 2019-08-22

## 2019-08-18 RX ORDER — AMLODIPINE BESYLATE 10 MG/1
10 TABLET ORAL DAILY
Status: DISCONTINUED | OUTPATIENT
Start: 2019-08-18 | End: 2019-08-24 | Stop reason: HOSPADM

## 2019-08-18 RX ORDER — FINASTERIDE 5 MG/1
5 TABLET, FILM COATED ORAL DAILY
Status: DISCONTINUED | OUTPATIENT
Start: 2019-08-18 | End: 2019-08-24 | Stop reason: HOSPADM

## 2019-08-18 RX ORDER — FORMOTEROL FUMARATE 20 UG/2ML
20 SOLUTION RESPIRATORY (INHALATION) 2 TIMES DAILY
Status: DISCONTINUED | OUTPATIENT
Start: 2019-08-18 | End: 2019-08-24 | Stop reason: HOSPADM

## 2019-08-18 RX ORDER — BUDESONIDE 0.25 MG/2ML
250 INHALANT ORAL 2 TIMES DAILY
Status: DISCONTINUED | OUTPATIENT
Start: 2019-08-18 | End: 2019-08-18

## 2019-08-18 RX ORDER — TRAMADOL HYDROCHLORIDE 50 MG/1
50 TABLET ORAL EVERY 6 HOURS PRN
Status: DISCONTINUED | OUTPATIENT
Start: 2019-08-18 | End: 2019-08-24 | Stop reason: HOSPADM

## 2019-08-18 RX ADMIN — HYDRALAZINE HYDROCHLORIDE 25 MG: 25 TABLET, FILM COATED ORAL at 21:23

## 2019-08-18 RX ADMIN — CEFTRIAXONE 1 G: 1 INJECTION, POWDER, FOR SOLUTION INTRAMUSCULAR; INTRAVENOUS at 21:23

## 2019-08-18 RX ADMIN — IPRATROPIUM BROMIDE AND ALBUTEROL SULFATE 3 ML: .5; 3 SOLUTION RESPIRATORY (INHALATION) at 07:59

## 2019-08-18 RX ADMIN — LEVOFLOXACIN 500 MG: 5 INJECTION, SOLUTION INTRAVENOUS at 11:16

## 2019-08-18 RX ADMIN — METOPROLOL SUCCINATE 50 MG: 50 TABLET, EXTENDED RELEASE ORAL at 08:36

## 2019-08-18 RX ADMIN — IPRATROPIUM BROMIDE AND ALBUTEROL SULFATE 3 ML: .5; 3 SOLUTION RESPIRATORY (INHALATION) at 11:27

## 2019-08-18 RX ADMIN — ENOXAPARIN SODIUM 40 MG: 40 INJECTION SUBCUTANEOUS at 08:36

## 2019-08-18 RX ADMIN — CLONIDINE HYDROCHLORIDE 0.1 MG: 0.1 TABLET ORAL at 08:36

## 2019-08-18 RX ADMIN — LISINOPRIL 40 MG: 20 TABLET ORAL at 08:36

## 2019-08-18 RX ADMIN — SALINE NASAL SPRAY 1 SPRAY: 1.5 SOLUTION NASAL at 17:15

## 2019-08-18 RX ADMIN — BUDESONIDE 250 MCG: 0.25 SUSPENSION RESPIRATORY (INHALATION) at 08:00

## 2019-08-18 RX ADMIN — SODIUM CHLORIDE: 9 INJECTION, SOLUTION INTRAVENOUS at 08:21

## 2019-08-18 RX ADMIN — GABAPENTIN 300 MG: 300 CAPSULE ORAL at 21:23

## 2019-08-18 RX ADMIN — FORMOTEROL FUMARATE DIHYDRATE 20 MCG: 20 SOLUTION RESPIRATORY (INHALATION) at 07:59

## 2019-08-18 RX ADMIN — AMLODIPINE BESYLATE 10 MG: 10 TABLET ORAL at 08:36

## 2019-08-18 RX ADMIN — HYDRALAZINE HYDROCHLORIDE 25 MG: 25 TABLET, FILM COATED ORAL at 13:57

## 2019-08-18 RX ADMIN — CLONIDINE HYDROCHLORIDE 0.1 MG: 0.1 TABLET ORAL at 13:57

## 2019-08-18 RX ADMIN — HYDROCHLOROTHIAZIDE 12.5 MG: 12.5 TABLET ORAL at 08:36

## 2019-08-18 RX ADMIN — SALINE NASAL SPRAY 1 SPRAY: 1.5 SOLUTION NASAL at 11:15

## 2019-08-18 RX ADMIN — GABAPENTIN 300 MG: 300 CAPSULE ORAL at 13:57

## 2019-08-18 RX ADMIN — IPRATROPIUM BROMIDE AND ALBUTEROL SULFATE 3 ML: .5; 3 SOLUTION RESPIRATORY (INHALATION) at 15:47

## 2019-08-18 RX ADMIN — FAMOTIDINE 40 MG: 20 TABLET ORAL at 21:23

## 2019-08-18 RX ADMIN — HYDRALAZINE HYDROCHLORIDE 25 MG: 25 TABLET, FILM COATED ORAL at 08:20

## 2019-08-18 RX ADMIN — FINASTERIDE 5 MG: 5 TABLET, FILM COATED ORAL at 11:15

## 2019-08-18 RX ADMIN — IPRATROPIUM BROMIDE AND ALBUTEROL SULFATE 3 ML: .5; 3 SOLUTION RESPIRATORY (INHALATION) at 20:05

## 2019-08-18 RX ADMIN — BUDESONIDE 500 MCG: 0.5 SUSPENSION RESPIRATORY (INHALATION) at 20:05

## 2019-08-18 RX ADMIN — FORMOTEROL FUMARATE DIHYDRATE 20 MCG: 20 SOLUTION RESPIRATORY (INHALATION) at 20:05

## 2019-08-18 RX ADMIN — Medication 10 ML: at 08:21

## 2019-08-18 RX ADMIN — GABAPENTIN 300 MG: 300 CAPSULE ORAL at 08:36

## 2019-08-18 ASSESSMENT — PAIN DESCRIPTION - FREQUENCY: FREQUENCY: INTERMITTENT

## 2019-08-18 ASSESSMENT — PAIN SCALES - GENERAL
PAINLEVEL_OUTOF10: 6
PAINLEVEL_OUTOF10: 0
PAINLEVEL_OUTOF10: 0
PAINLEVEL_OUTOF10: 5

## 2019-08-18 ASSESSMENT — PAIN DESCRIPTION - DESCRIPTORS: DESCRIPTORS: ACHING

## 2019-08-18 ASSESSMENT — PAIN DESCRIPTION - ONSET: ONSET: GRADUAL

## 2019-08-18 ASSESSMENT — PAIN DESCRIPTION - PROGRESSION: CLINICAL_PROGRESSION: NOT CHANGED

## 2019-08-18 ASSESSMENT — PAIN DESCRIPTION - PAIN TYPE: TYPE: CHRONIC PAIN

## 2019-08-18 ASSESSMENT — PAIN DESCRIPTION - LOCATION: LOCATION: ABDOMEN

## 2019-08-18 ASSESSMENT — PAIN - FUNCTIONAL ASSESSMENT: PAIN_FUNCTIONAL_ASSESSMENT: ACTIVITIES ARE NOT PREVENTED

## 2019-08-18 ASSESSMENT — PAIN DESCRIPTION - ORIENTATION: ORIENTATION: LOWER

## 2019-08-18 NOTE — PROGRESS NOTES
[facility] Quality Flow/Interdisciplinary Rounds Progress Note        Quality Flow Rounds held on August 18, 2019    Disciplines Attending:  Bedside Nurse, ,  and Nursing Unit Leadership    Carol Harris was admitted on 8/17/2019  7:59 PM    Anticipated Discharge Date:  Expected Discharge Date: 08/20/19    Disposition:    Erick Score:  Erick Scale Score: 19    Readmission Risk              Risk of Unplanned Readmission:        17           Discussed patient goal for the day, patient clinical progression, and barriers to discharge. The following Goal(s) of the Day/Commitment(s) have been identified:  await pulm plan.       Star Doctor  August 18, 2019

## 2019-08-18 NOTE — CONSULTS
[facility]  [address], [address]  [facility], [address] [place] [address]    Pulmonary Consultation      Reason for Consult:  Sob and hemoptysis    Requesting Physician:  Michelet Arteaga DO    CHIEF COMPLAINT:  Sob and hemoptysis    History Obtained From:  patient    HISTORY OF PRESENT ILLNESS:                The patient is a 62 y.o. male with significant past medical history of COPD who presents with increased sob and hemoptysis for the past 3 weeks. Claims had been gradually getting worse, mentions running out of medications for the past 2-3 weeks (ssues with mail order and getting through pharmacy, mentions a red and blue MDI, suppose to be on Breo as per home medication list). Mentions occ blood admixed with sputum or specks/streaks, (?) increasing frequency and amount, last coughed up blood this morning he said. Denies nose bleeds or oxygen use as out-pt.     Past Medical History:        Diagnosis Date    Alcohol abuse     COPD (chronic obstructive pulmonary disease) ([facility] [address]     History of cocaine use     Hyperlipidemia     Hypertension     Marijuana use     quit November 2017     alberto        Past Surgical History:        Procedure Laterality Date    CERVICAL FUSION  11/18/15    cervical laminectomy & fusion c4-c6, with rods & screws    POLYSOMNOGRAPHY  01/29/2018    AHI=29.8    WRIST SURGERY         Current Medications:    Current Facility-Administered Medications: sodium chloride flush 0.9 % injection 10 mL, 10 mL, Intravenous, 2 times per day  sodium chloride flush 0.9 % injection 10 mL, 10 mL, Intravenous, PRN  acetaminophen (TYLENOL) tablet 650 mg, 650 mg, Oral, Q4H PRN  amLODIPine (NORVASC) tablet 10 mg, 10 mg, Oral, Daily  cloNIDine (CATAPRES) tablet 0.1 mg, 0.1 mg, Oral, TID  finasteride (PROSCAR) tablet 5 mg, 5 mg, Oral, Daily  gabapentin (NEURONTIN) capsule 300 mg, 300 mg, Oral, TID  hydrochlorothiazide (HYDRODIURIL) tablet 12.5 mg, 12.5 mg, REVIEW OF SYSTEMS:    RESPIRATORY:  Sob and hemoptysis  Remainder of complete ROS is negative. PHYSICAL EXAM:      Vitals:    BP (!) 150/108   Pulse 77   Temp 97.8 °F (36.6 °C) (Oral)   Resp 18   Ht 5' 7\" (1.702 m)   Wt 198 lb (89.8 kg)   SpO2 90%   BMI 31.01 kg/m²     EYES:  Lids and lashes normal, pupils equal, round and reactive to light, extra ocular muscles intact, sclera clear, conjunctiva normal  ENT:  Normocephalic, without obvious abnormality, atraumatic, sinuses nontender on palpation, external ears without lesions, oral pharynx with moist mucus membranes, tonsils without erythema or exudates, gums normal and good dentition. NECK:  Supple, symmetrical, trachea midline, no adenopathy, thyroid symmetric, not enlarged and no tenderness, skin normal  LUNGS:  Bilateral ronchi and wheezing with cough  CARDIOVASCULAR:  Normal apical impulse, regular rate and rhythm, normal S1 and S2, no S3 or S4, and no murmur noted  ABDOMEN:  No scars, normal bowel sounds, soft, non-distended, non-tender, no masses palpated, no hepatosplenomegally  MUSCULOSKELETAL:  There is no redness, warmth, or swelling of the joints. Full range of motion noted. Motor strength is 5 out of 5 all extremities bilaterally. Tone is normal.  NEUROLOGIC:  Awake, alert, oriented to name, place and time. Cranial nerves II-XII are grossly intact. DATA:    CBC:   Recent Labs     08/17/19 2010   WBC 11.6*   HGB 15.0   HCT 42.4   MCV 92.4          BMP:  Recent Labs     08/17/19 2010      K 4.2      CO2 19*   BUN 20   CREATININE 1.4*    ALB:3,BILIDIR:3,BILITOT:3,ALKPHOS:3)@    PT/INR: No results for input(s): PROTIME, INR in the last 72 hours. ABG:   No results for input(s): PH, PO2, PCO2, HCO3, BE, O2SAT, METHB, O2HB, COHB, O2CON, HHB, THB in the last 72 hours.           Radiology Review:  CTA chest reviewed with (-) PE, patchy opacity right upper lobe, left lower lobe peripheral nodule looking

## 2019-08-18 NOTE — PLAN OF CARE
Problem: Cardiac Output - Decreased:  Goal: Hemodynamic stability will improve  Description  Hemodynamic stability will improve  Outcome: Met This Shift     Problem: Pain:  Goal: Pain level will decrease  Description  Pain level will decrease  Outcome: Met This Shift  Goal: Control of acute pain  Description  Control of acute pain  Outcome: Met This Shift     Problem: Breathing Pattern - Ineffective:  Goal: Ability to achieve and maintain a regular respiratory rate will improve  Description  Ability to achieve and maintain a regular respiratory rate will improve  Outcome: Met This Shift

## 2019-08-18 NOTE — ED PROVIDER NOTES
HPI:  8/17/19, Time: 8:17 PM        Sina Moralez is a 62 y.o. male presenting to the ED for shortness of breath, chest pains, yellow sputum production mixed with blood, beginning 2 days ago. The complaint has been intermittent, severe in severity, and worsened by moderate exertion, cough. Patient has history of coronary disease in the past per his report he does also have a history of ongoing intermittent cocaine use and also tobacco use. He also has history of COPD states he ran out of his DuoNeb and has had shortness of breath with sputum production varying from yellow to green with some blood-tinged sputum also reported. He said chest heaviness and tightness which she states is related to his breathing; no radiation of chest discomfort to the neck/jaw nor to the left arm no sharp stabbing character pain no radiation to the back. No syncope no near syncopal episodes associated. No nausea/vomiting, no diaphoresis no increase in leg swelling nor any calf pain reported. Review of Systems:   Pertinent positives and negatives are stated within HPI, all other systems reviewed and are negative.    --------------------------------------------- PAST HISTORY ---------------------------------------------  Past Medical History:  has a past medical history of Alcohol abuse, COPD (chronic obstructive pulmonary disease) ([facility] [address] History of cocaine use, Hyperlipidemia, Hypertension, Marijuana use, and alberto. Past Surgical History:  has a past surgical history that includes Wrist surgery; cervical fusion (11/18/15); and polysomnography (01/29/2018). Social History:  reports that he has been smoking cigarettes. He has a 57.00 pack-year smoking history. He has never used smokeless tobacco. He reports that he drinks alcohol. He reports that he has current or past drug history. Drugs: Marijuana and Cocaine.     Family History: family history includes Arthritis in his mother; Cancer in his brother; Heart Disease in his father; High Blood Pressure in his brother, brother, brother, and brother; Other in his brother, brother, brother, brother, and mother. The patients home medications have been reviewed. Allergies: Patient has no known allergies.     -------------------------------------------------- RESULTS -------------------------------------------------  All laboratory and radiology results have been personally reviewed by myself   LABS:  Results for orders placed or performed during the hospital encounter of 08/17/19   CBC Auto Differential   Result Value Ref Range    WBC 11.6 (H) 4.5 - 11.5 E9/L    RBC 4.59 3.80 - 5.80 E12/L    Hemoglobin 15.0 12.5 - 16.5 g/dL    Hematocrit 42.4 37.0 - 54.0 %    MCV 92.4 80.0 - 99.9 fL    MCH 32.7 26.0 - 35.0 pg    MCHC 35.4 (H) 32.0 - 34.5 %    RDW 12.5 11.5 - 15.0 fL    Platelets 107 229 - 842 E9/L    MPV 10.0 7.0 - 12.0 fL    Neutrophils % 79.1 43.0 - 80.0 %    Immature Granulocytes % 0.3 0.0 - 5.0 %    Lymphocytes % 11.7 (L) 20.0 - 42.0 %    Monocytes % 7.9 2.0 - 12.0 %    Eosinophils % 0.6 0.0 - 6.0 %    Basophils % 0.4 0.0 - 2.0 %    Neutrophils # 9.20 (H) 1.80 - 7.30 E9/L    Immature Granulocytes # 0.04 E9/L    Lymphocytes # 1.36 (L) 1.50 - 4.00 E9/L    Monocytes # 0.92 0.10 - 0.95 E9/L    Eosinophils # 0.07 0.05 - 0.50 E9/L    Basophils # 0.05 0.00 - 0.20 R6/V   Basic Metabolic Panel   Result Value Ref Range    Sodium 134 132 - 146 mmol/L    Potassium 4.2 3.5 - 5.0 mmol/L    Chloride 103 98 - 107 mmol/L    CO2 19 (L) 22 - 29 mmol/L    Anion Gap 12 7 - 16 mmol/L    Glucose 115 (H) 74 - 99 mg/dL    BUN 20 6 - 20 mg/dL    CREATININE 1.4 (H) 0.7 - 1.2 mg/dL    GFR Non-African American 52 >=60 mL/min/1.73    GFR African American >60     Calcium 9.0 8.6 - 10.2 mg/dL   Hepatic Function Panel   Result Value Ref Range    Total Protein 7.4 6.4 - 8.3 g/dL    Alb 3.9 3.5 - 5.2 g/dL    Alkaline Phosphatase 68 40 - 129 U/L    ALT 16 0 - 40 U/L    AST 24 0 - 39 U/L    Total Bilirubin 0.4 0.0 - 1.2 mg/dL    Bilirubin, Direct <0.2 0.0 - 0.3 mg/dL    Bilirubin, Indirect see below 0.0 - 1.0 mg/dL   Brain Natriuretic Peptide   Result Value Ref Range    Pro-BNP 8,989 (H) 0 - 125 pg/mL   Troponin   Result Value Ref Range    Troponin <0.01 0.00 - 0.03 ng/mL   Lactic Acid, Plasma   Result Value Ref Range    Lactic Acid 1.2 0.5 - 2.2 mmol/L   SPECIMEN REJECTION   Result Value Ref Range    Rejected Test DIMER     Reason for Rejection see below    D-dimer, quantitative   Result Value Ref Range    D-Dimer, Quant 292 ng/mL DDU   EKG 12 Lead   Result Value Ref Range    Ventricular Rate 113 BPM    Atrial Rate 113 BPM    P-R Interval 120 ms    QRS Duration 86 ms    Q-T Interval 378 ms    QTc Calculation (Bazett) 518 ms    P Axis 48 degrees    R Axis 19 degrees    T Axis 77 degrees       RADIOLOGY:  Interpreted by Radiologist.  CTA PULMONARY W CONTRAST   Final Result      1. No evidence of acute pulmonary embolic disease. 2. Right upper lobe pneumonia. 3. 12 mm pleural-based nodule at the left lower lobe. Consider   correlation with PET/CT. 4. Mediastinal and bilateral hilar adenopathy of indeterminate   etiology. If this fails to resolve spontaneously, tissue sampling   should be considered. XR CHEST PORTABLE   Final Result   Patchy right upper lobe/apical infiltrate concerning for developing   pneumonia. Surveillance is recommended. ------------------------- NURSING NOTES AND VITALS REVIEWED ---------------------------    The nursing notes within the ED encounter and vital signs as below have been reviewed.    BP (!) 158/101   Pulse 114   Temp 99 °F (37.2 °C) (Oral)   Resp 20   Ht 5' 7\" (1.702 m)   Wt 196 lb (88.9 kg)   SpO2 95%   BMI 30.70 kg/m²   Oxygen Saturation Interpretation: Normal    ---------------------------------------------------PHYSICAL EXAM--------------------------------------    Constitutional/General: Alert and oriented x3, well appearing, non toxic in moderate distress  Head: Normocephalic and atraumatic  Eyes: PERRL, EOMI  Mouth: Oropharynx clear, handling secretions, no trismus  Neck: Supple, full ROM, no JVD. Trachea midline  Pulmonary: Lungs --+expiratory wheezes, + rales R Lung fields, inspiratory rhonchi. Mild respiratory distress  Cardiovascular: Tachycardic rate and rhythm, no murmurs, gallops, or rubs. 2+ distal pulses; PMI nondisplaced  Abdomen: Soft, non tender, non distended, no organomegaly, no masses, no rebound tenderness no guarding no rigidity. Normal bowel sounds  Extremities: Moves all extremities x 4. Warm and well perfused; no calf tenderness, no cords.   No clinical signs of DVT  Skin: warm and dry without rash  Neurologic: GCS 15, cranial nerves II through XII grossly intact with no focal deficits no meningeal signs  Psych: Normal Affect      ------------------------------ ED COURSE/MEDICAL DECISION MAKING----------------------  Medications   doxycycline (VIBRAMYCIN) 100 mg in dextrose 5 % 100 mL IVPB (100 mg Intravenous New Bag 8/17/19 2210)   0.9 % sodium chloride bolus (0 mLs Intravenous Stopped 8/17/19 2205)   methylPREDNISolone sodium (SOLU-MEDROL) injection 125 mg (125 mg Intravenous Given 8/17/19 2025)   ipratropium-albuterol (DUONEB) nebulizer solution 1 ampule (1 ampule Inhalation Given 8/17/19 2029)   aspirin chewable tablet 324 mg (324 mg Oral Given 8/17/19 2042)   morphine sulfate (PF) injection 4 mg (4 mg Intravenous Given 8/17/19 2142)   ondansetron (ZOFRAN) injection 4 mg (4 mg Intravenous Given 8/17/19 2142)   cefTRIAXone (ROCEPHIN) 1 g in dextrose 5 % 50 mL IVPB (vial-mate) (0 g Intravenous Stopped 8/17/19 2211)   iopamidol (ISOVUE-370) 76 % injection 80 mL (80 mLs Intravenous Given 8/17/19 2125)   LORazepam (ATIVAN) injection 1 mg (1 mg Intravenous Given 8/17/19 2232)     ED COURSE:     Medical Decision Making:   Differential Diagnoses:  MI, PE, Pneumonia, SIRS, Sepsis, Pneumothorax, Chest Wall Strain, GERD, COPD Exacerbation, Aortic Dissection, to name a few. Pt's HEART Score = 6 points, Moderate-High Risk Score  Pt's Well's Score for PE = 1.5 points, Low Risk Score;  Pt's D-dimer was elevated slightly but a CTA Pulmonary study was felt indicated due to patient's tachycardia but did not show any pulmonary emboli nor Aortic Dissection. CTA study confirmed patient's RUL pneumonia; no signs of pneumothorax nor other pulmonary pathology. EKG #1:  Interpreted by emergency department physician unless otherwise noted. Time:  19:54    Rate: 115  Rhythm: Sinus. Interpretation: nonspecific ST and T waves findings, sinus tachycardia. TWave inversions Lateral leads. Changed from 4/15/19 EKG    SIRS CRITERIA:    Temp >38 C (100.4 F) or <36 C (96.8 F)   NO     Heart Rate >90   YES  +1     Resp. Rate >20 or PaCO2 < 32 mmHg   YES  +1     Altered Mental Status   NO     WBC <4K or >12K  or >10% Bands   NO     Total:   2   * Two or more above criteria met? Yes*  * Infection Source determined? Yes*    SEPTIC SHOCK CRITERIA:  SBP <90 or 40 reduction from baseline      despite adequate fluid resuscitation. No    MODS CRITERIA:  Altered organ function in an acutely ill  person that requires intervention. Yes  Systems affected:    Renal    LACTIC ACID    Initial                Normal  Follow - up if initial abnormal             Not indicated  FLUIDS  Saline 30 ml/kg given              No  (Septic SHOCK or lactic > 4)                        No (BNP > 8, 900)    CENTRAL LINE INSERTED? No         3-Hour Sepsis Re-examination  8/17/19   8:54 PM          Vital Signs:   Vitals:    08/17/19 1947 08/17/19 1957 08/17/19 2105   BP:  (!) 158/103 (!) 158/101   Pulse: 130 115 114   Resp:  22 20   Temp:  99 °F (37.2 °C)    TempSrc:  Oral    SpO2: 97% 95% 95%   Weight:  196 lb (88.9 kg)    Height:  5' 7\" (1.702 m)      Card/Pulm:  Rhythm: tachycardic rate. Heart Sounds: no murmurs, gallops, or rubs.  unlabored breathing, rales and wheezing. Capillary Refill: normal.  Radial Pulse:  present 2+. Skin:  Dry. Counseling: The emergency provider has spoken with the patient and discussed todays results, in addition to providing specific details for the plan of care and counseling regarding the diagnosis and prognosis. Questions are answered at this time and they are agreeable with the plan. Consults:  Spoke w/ Dr. Angelia Lopez @ 22:04 and he accepted the patient ADM to Telemetry    --------------------------------- IMPRESSION AND DISPOSITION ---------------------------------    IMPRESSION  1. Precordial chest pain    2. COPD exacerbation ([address]    3. Pneumonia due to organism    4. Acute on chronic systolic congestive heart failure (HCC)    5. Elevated blood pressure reading    6. History of cocaine use    7. Abnormal EKG        DISPOSITION  Disposition: [facility]  Patient condition is stable      NOTE: This report was transcribed using voice recognition software.  Every effort was made to ensure accuracy; however, inadvertent computerized transcription errors may be present                 Mikaela Jay MD  08/17/19 5750

## 2019-08-19 LAB
L. PNEUMOPHILA SEROGP 1 UR AG: NORMAL
STREP PNEUMONIAE ANTIGEN, URINE: NORMAL

## 2019-08-19 PROCEDURE — 6370000000 HC RX 637 (ALT 250 FOR IP): Performed by: GENERAL PRACTICE

## 2019-08-19 PROCEDURE — 2580000003 HC RX 258: Performed by: GENERAL PRACTICE

## 2019-08-19 PROCEDURE — 6360000002 HC RX W HCPCS: Performed by: GENERAL PRACTICE

## 2019-08-19 PROCEDURE — 94640 AIRWAY INHALATION TREATMENT: CPT

## 2019-08-19 PROCEDURE — 94760 N-INVAS EAR/PLS OXIMETRY 1: CPT

## 2019-08-19 PROCEDURE — 6360000002 HC RX W HCPCS: Performed by: INTERNAL MEDICINE

## 2019-08-19 PROCEDURE — 2060000000 HC ICU INTERMEDIATE R&B

## 2019-08-19 PROCEDURE — 2700000000 HC OXYGEN THERAPY PER DAY

## 2019-08-19 RX ORDER — DOCUSATE SODIUM 100 MG/1
100 CAPSULE, LIQUID FILLED ORAL DAILY
Status: DISCONTINUED | OUTPATIENT
Start: 2019-08-19 | End: 2019-08-24 | Stop reason: HOSPADM

## 2019-08-19 RX ADMIN — LISINOPRIL 40 MG: 20 TABLET ORAL at 08:34

## 2019-08-19 RX ADMIN — SALINE NASAL SPRAY 1 SPRAY: 1.5 SOLUTION NASAL at 15:34

## 2019-08-19 RX ADMIN — AMLODIPINE BESYLATE 10 MG: 10 TABLET ORAL at 08:35

## 2019-08-19 RX ADMIN — BUDESONIDE 500 MCG: 0.5 SUSPENSION RESPIRATORY (INHALATION) at 21:13

## 2019-08-19 RX ADMIN — GABAPENTIN 300 MG: 300 CAPSULE ORAL at 21:39

## 2019-08-19 RX ADMIN — HYDRALAZINE HYDROCHLORIDE 25 MG: 25 TABLET, FILM COATED ORAL at 21:39

## 2019-08-19 RX ADMIN — Medication 10 ML: at 20:33

## 2019-08-19 RX ADMIN — METOPROLOL SUCCINATE 50 MG: 50 TABLET, EXTENDED RELEASE ORAL at 08:34

## 2019-08-19 RX ADMIN — IPRATROPIUM BROMIDE AND ALBUTEROL SULFATE 3 ML: .5; 3 SOLUTION RESPIRATORY (INHALATION) at 03:02

## 2019-08-19 RX ADMIN — SPIRONOLACTONE 25 MG: 25 TABLET ORAL at 08:38

## 2019-08-19 RX ADMIN — SODIUM CHLORIDE: 9 INJECTION, SOLUTION INTRAVENOUS at 05:09

## 2019-08-19 RX ADMIN — IPRATROPIUM BROMIDE AND ALBUTEROL SULFATE 3 ML: .5; 3 SOLUTION RESPIRATORY (INHALATION) at 08:45

## 2019-08-19 RX ADMIN — HYDRALAZINE HYDROCHLORIDE 25 MG: 25 TABLET, FILM COATED ORAL at 15:33

## 2019-08-19 RX ADMIN — TRAMADOL HYDROCHLORIDE 50 MG: 50 TABLET, FILM COATED ORAL at 20:31

## 2019-08-19 RX ADMIN — FAMOTIDINE 40 MG: 20 TABLET ORAL at 21:39

## 2019-08-19 RX ADMIN — HYDROCHLOROTHIAZIDE 12.5 MG: 12.5 TABLET ORAL at 08:34

## 2019-08-19 RX ADMIN — ENOXAPARIN SODIUM 40 MG: 40 INJECTION SUBCUTANEOUS at 08:33

## 2019-08-19 RX ADMIN — FINASTERIDE 5 MG: 5 TABLET, FILM COATED ORAL at 08:34

## 2019-08-19 RX ADMIN — IPRATROPIUM BROMIDE AND ALBUTEROL SULFATE 3 ML: .5; 3 SOLUTION RESPIRATORY (INHALATION) at 21:13

## 2019-08-19 RX ADMIN — GABAPENTIN 300 MG: 300 CAPSULE ORAL at 15:33

## 2019-08-19 RX ADMIN — GABAPENTIN 300 MG: 300 CAPSULE ORAL at 08:34

## 2019-08-19 RX ADMIN — CLONIDINE HYDROCHLORIDE 0.1 MG: 0.1 TABLET ORAL at 15:33

## 2019-08-19 RX ADMIN — CEFTRIAXONE 1 G: 1 INJECTION, POWDER, FOR SOLUTION INTRAMUSCULAR; INTRAVENOUS at 21:43

## 2019-08-19 RX ADMIN — HYDRALAZINE HYDROCHLORIDE 25 MG: 25 TABLET, FILM COATED ORAL at 06:40

## 2019-08-19 RX ADMIN — CLONIDINE HYDROCHLORIDE 0.1 MG: 0.1 TABLET ORAL at 21:39

## 2019-08-19 RX ADMIN — CLONIDINE HYDROCHLORIDE 0.1 MG: 0.1 TABLET ORAL at 08:34

## 2019-08-19 RX ADMIN — LEVOFLOXACIN 500 MG: 5 INJECTION, SOLUTION INTRAVENOUS at 11:46

## 2019-08-19 RX ADMIN — IPRATROPIUM BROMIDE AND ALBUTEROL SULFATE 3 ML: .5; 3 SOLUTION RESPIRATORY (INHALATION) at 17:53

## 2019-08-19 RX ADMIN — SALINE NASAL SPRAY 1 SPRAY: 1.5 SOLUTION NASAL at 08:33

## 2019-08-19 RX ADMIN — IPRATROPIUM BROMIDE AND ALBUTEROL SULFATE 3 ML: .5; 3 SOLUTION RESPIRATORY (INHALATION) at 11:25

## 2019-08-19 RX ADMIN — BUDESONIDE 500 MCG: 0.5 SUSPENSION RESPIRATORY (INHALATION) at 08:45

## 2019-08-19 RX ADMIN — TORSEMIDE 10 MG: 10 TABLET ORAL at 08:34

## 2019-08-19 RX ADMIN — SODIUM CHLORIDE: 9 INJECTION, SOLUTION INTRAVENOUS at 21:39

## 2019-08-19 RX ADMIN — FORMOTEROL FUMARATE DIHYDRATE 20 MCG: 20 SOLUTION RESPIRATORY (INHALATION) at 21:13

## 2019-08-19 RX ADMIN — TRAMADOL HYDROCHLORIDE 50 MG: 50 TABLET, FILM COATED ORAL at 08:43

## 2019-08-19 RX ADMIN — FORMOTEROL FUMARATE DIHYDRATE 20 MCG: 20 SOLUTION RESPIRATORY (INHALATION) at 08:45

## 2019-08-19 ASSESSMENT — PAIN DESCRIPTION - ORIENTATION
ORIENTATION: MID
ORIENTATION: LOWER

## 2019-08-19 ASSESSMENT — PAIN DESCRIPTION - PAIN TYPE
TYPE: ACUTE PAIN
TYPE: CHRONIC PAIN

## 2019-08-19 ASSESSMENT — PAIN SCALES - GENERAL
PAINLEVEL_OUTOF10: 0
PAINLEVEL_OUTOF10: 8
PAINLEVEL_OUTOF10: 0
PAINLEVEL_OUTOF10: 7

## 2019-08-19 ASSESSMENT — PAIN DESCRIPTION - LOCATION
LOCATION: ABDOMEN
LOCATION: CHEST

## 2019-08-19 ASSESSMENT — PAIN DESCRIPTION - DESCRIPTORS
DESCRIPTORS: ACHING;DISCOMFORT
DESCRIPTORS: ACHING;CONSTANT;DISCOMFORT

## 2019-08-19 ASSESSMENT — PAIN - FUNCTIONAL ASSESSMENT: PAIN_FUNCTIONAL_ASSESSMENT: ACTIVITIES ARE NOT PREVENTED

## 2019-08-19 ASSESSMENT — PAIN DESCRIPTION - FREQUENCY: FREQUENCY: INTERMITTENT

## 2019-08-19 NOTE — PROGRESS NOTES
Pulmonary Progress Note    Admit Date: 2019  Hospital day                               PCP: Tammy Rainey DO    Chief Complaint (s):  Patient Active Problem List   Diagnosis    CTS (carpal tunnel syndrome)    Cervical arthritis    Essential hypertension    Hyperlipidemia    INOCENCIA on CPAP    Community acquired bacterial pneumonia    CAP (community acquired pneumonia) due to Chlamydia species    Acute respiratory failure with hypoxia ([facility] [address]    COPD exacerbation ([facility] [address]    Precordial chest pain       Subjective:  · Awake and alert, still with some hemoptysis. Procalcitonin is noted to be elevated. Besides the cocaine, its likelihood of bacterial infection is causing his hemoptysis.       Vitals:  VITALS:  BP (!) 144/102   Pulse 74   Temp 97.7 °F (36.5 °C) (Oral)   Resp 22   Ht 5' 7\" (1.702 m)   Wt 198 lb (89.8 kg)   SpO2 98%   BMI 31.01 kg/m²     24HR INTAKE/OUTPUT:      Intake/Output Summary (Last 24 hours) at 2019 1250  Last data filed at 2019 1123  Gross per 24 hour   Intake 1515 ml   Output 1000 ml   Net 515 ml       24HR PULSE OXIMETRY RANGE:    SpO2  Av.4 %  Min: 94 %  Max: 98 %    Medications:  IV:   sodium chloride 75 mL/hr at 19 4225       Scheduled Meds:   docusate sodium  100 mg Oral Daily    sodium chloride flush  10 mL Intravenous 2 times per day    amLODIPine  10 mg Oral Daily    cloNIDine  0.1 mg Oral TID    finasteride  5 mg Oral Daily    gabapentin  300 mg Oral TID    hydrochlorothiazide  12.5 mg Oral Daily    ipratropium-albuterol  3 mL Inhalation Q4H    lisinopril  40 mg Oral Daily    metoprolol succinate  50 mg Oral Daily    nicotine  1 patch Transdermal Daily    spironolactone  25 mg Oral Daily    torsemide  10 mg Oral Once per day on     hydrALAZINE  25 mg Oral 3 times per day    enoxaparin  40 mg Subcutaneous Daily    cefTRIAXone (ROCEPHIN) IV  1 g Intravenous Q24H    formoterol  20 mcg Nebulization BID    famotidine  40 mg Oral Nightly    budesonide  500 mcg Nebulization BID    levofloxacin  500 mg Intravenous Q24H    sodium chloride  1 spray Each Nostril BID       Diet:   DIET CARDIAC;     EXAM:  General: No distress. Alert. Eyes: PERRL. No sclera icterus. No conjunctival injection. ENT: No discharge. Pharynx clear. Neck: Trachea midline. Normal thyroid. Resp: No accessory muscle use. No rales. Bilateral wheezing. Scattered rhonchi. CV: Regular rate. Regular rhythm. No murmur or rub. Abd: Non-tender. Non-distended. No masses. No organomegaly. Normal bowel sounds. Skin: Warm and dry. No nodule on exposed extremities. No rash on exposed extremities. Ext: No cyanosis, clubbing, edema  Lymph: No cervical LAD. No supraclavicular LAD. M/S: No cyanosis. No joint deformity. No clubbing. Neuro: Awake. Follows commands. Positive pupils/gag/corneals. Normal pain response. Results:  CBC:   Recent Labs     08/17/19 2010   WBC 11.6*   HGB 15.0   HCT 42.4   MCV 92.4        BMP:   Recent Labs     08/17/19 2010      K 4.2      CO2 19*   BUN 20   CREATININE 1.4*     LIVER PROFILE:   Recent Labs     08/17/19 2010   AST 24   ALT 16   BILIDIR <0.2   BILITOT 0.4   ALKPHOS 68     PT/INR: No results for input(s): PROTIME, INR in the last 72 hours. APTT: No results for input(s): APTT in the last 72 hours. Pathology:  1. N/A      Microbiology:  1. None    Recent ABG:   No results for input(s): PH, PO2, PCO2, HCO3, BE, O2SAT, METHB, O2HB, COHB, O2CON, HHB, THB in the last 72 hours. Recent Films:  CTA PULMONARY W CONTRAST   Final Result      1. No evidence of acute pulmonary embolic disease. 2. Right upper lobe pneumonia. 3. 12 mm pleural-based nodule at the left lower lobe. Consider   correlation with PET/CT. 4. Mediastinal and bilateral hilar adenopathy of indeterminate   etiology. If this fails to resolve spontaneously, tissue sampling   should be considered.          XR

## 2019-08-19 NOTE — H&P
[id number] [address]                              HISTORY AND PHYSICAL    PATIENT NAME: Annette Roberts                  :        1965  MED REC NO:   [id number]                            ROOM:       [id number]  ACCOUNT NO:   [de-identified]                           ADMIT DATE: 2019  PROVIDER:     Beatriz Burnett DO    HISTORY OF PRESENT ILLNESS:  This is a 28-year-old white male, who  presented to the Emergency Department with a history and chief complaint  of cough, extreme shortness of breath, hemoptysis, and difficulty  breathing. He states it has been ongoing for a couple of days. Seems  to be getting worse, finding it very difficult to get much of a breath  at all. The patient does have a known history of coronary artery  disease and ongoing tobacco abuse as well as COPD and to complicate  matters worse, he has hepatitis C, frequently uses cocaine and opioids. A long discussion was held with him about the dangers of this practice  and how it can ultimately lead to his death if he does not stop. He  will be admitted after having a CT of his chest demonstrated right upper  lobe pneumonia for definitive treatment and Pulmonary consultation. MEDICATIONS:  Recent and current medications included albuterol,  gabapentin, hydrochlorothiazide, Zestril, tramadol, Zantac, nicotine  patch, Proscar, amlodipine, metoprolol, Apresoline, Catapres, DuoNebs,  Breo Ellipta, and albuterol. PAST MEDICAL HISTORY:  Significant for acute respiratory failure with  hypoxia, community acquired pneumonia, cervical spine degenerative joint  disease and degenerative disk disease, COPD exacerbation, carpal tunnel  syndrome, essential hypertension, hyperlipidemia, obstructive sleep  apnea, and hepatitis C and polysubstance abuse.     PAST SURGICAL HISTORY:  Consistent with _____ surgery, cervical fusion,  and polysomnography. SOCIAL HISTORY:  The patient is a smoker, quite heavy. Denies alcohol. Admits to cocaine and opioid use. FAMILY MEDICAL HISTORY:  Was noted and discussed. REVIEW OF SYSTEMS:  Negative for cephalgia, vertigo, ringing in the  ears, hearing loss, difficulty swallowing, hoarseness in his voice, or  bloody noses. He has some intermittent chest pains, palpitations, and  rapid heart rate. Positive for shortness of breath. Positive for  cough. Positive for hemoptysis. Positive for productive sputum. No  night sweats or chills. There is no nausea, vomiting, diarrhea,  constipation, or blood in the stool. He does have known hepatitis C. There is no urgency, frequency, dysuria, or hematuria. The  musculoskeletal systems is positive for chronic myofascial pain syndrome  and degenerative joint and degenerative disc disease. Skin is without  rash, eruptions, urticaria, or pruritus. He has multiple tattoos. Psychiatric system positive for anxiety and negative for depression. Neurologic system negative for CVA, seizures, tremors, tics, or TIAs. PHYSICAL EXAMINATION:  GENERAL:  Exam shows this to be a 26-year-old white male in moderate  distress with the above complaints. HEENT:  Head, eyes, ears, nose, and throat examination shows the head to  be normocephalic and atraumatic. Pupils are equal and reactive to light  and accommodation. Extraocular eye muscles are intact. Tympanic  membranes are clear. Ear canals are patent. Oral mucosa is pink and  moist.  NECK:  Veins are flat and nondistended. HEART:  Had a regular rate and rhythm. It is somewhat tachycardic. No  murmur, gallops, or friction rubs. LUNGS:  Showed diminished breath sounds, increased end-expiratory  wheezes, and increased end-expiratory phase of expiration. Rhonchi and  rales in right upper lung field. ABDOMEN:  Soft, nontender, and nondistended. Bowel sounds are present  in all four quadrants.   EXTERNAL GENITALIA:  Intact. MUSCULOSKELETAL:  Peripheral pulses are intact. Legs without edema. BACK:  Spine shows physiologic curve. SKIN:  Warm and dry. ASSESSMENT AND PLAN:  Initial impression at this time is acute hypoxic  respiratory failure, secondary to right upper lobe pneumonia, etiology  to be determined. Pulmonology consult asked for. Elevated proBNP,  doubt it is due to congestive heart failure. Cardiac enzymes otherwise  negative. The patient will be started on antibiotic after appropriate  cultures have been taken along with short and long-acting beta-agonists. Further orders will be written for him as his clinical course dictates.         Itz Berry DO    D: 08/19/2019 8:40:16       T: 08/19/2019 8:43:34     [email or web address]  Job#: [id number]     Doc#: [id number]    CC:

## 2019-08-19 NOTE — CARE COORDINATION
Social work:     Discussed order for auto titration for needed CPAP vs BPAP with Dr. Jazzmine Arciniega who agreed and signed the order. Social work faxed the order back to Meghan at [id number] [facility] sleep lab and advised that they will forward to the DME which will in turn contact the patient directly.   Social work still following for possible [address].     Electronically signed by PETRA Tinajero on 8/19/2019 at 12:46 PM

## 2019-08-19 NOTE — PROGRESS NOTES
P Quality Flow/Interdisciplinary Rounds Progress Note        Quality Flow Rounds held on August 19, 2019    Disciplines Attending:  Bedside Nurse, ,  and Nursing Unit Leadership    Meaghan Montanez was admitted on 8/17/2019  7:59 PM    Anticipated Discharge Date:  Expected Discharge Date: 08/20/19    Disposition:    Erick Score:  Erick Scale Score: 19    Readmission Risk              Risk of Unplanned Readmission:        17           Discussed patient goal for the day, patient clinical progression, and barriers to discharge.   The following Goal(s) of the Day/Commitment(s) have been identified:  check pulmonary plan, wean oxygen as pt tolerates, IV ATB, IVF      [facility]  August 19, 2019

## 2019-08-19 NOTE — CARE COORDINATION
Met with patient. Discussed change in status from observation to inpatient and POC. IM letter signed. Previously screened by SW. Questions answered. Plan is home. Will follow.

## 2019-08-19 NOTE — CARE COORDINATION
Social Work:    Social service met with Carley Sandoval, explained social work role, and discussed discharge planning. Carley Sandoval is on disability due to complications/surgery with on is neck. He resides in a split level home with a couple friends and has been seeking housing for himself. Carley Sandoval advised social work that he is in need a CPAP/BPAP (?) stating he feels he has narcolepsy. Carley Sandoval states that he had a sleep study under Dr. Rich Nava in [facility] a couple years ago but never was given the DME. Carley Sandoval does have a nebulizer but no home 02. He acknowledged having an addiction to alcohol for many years and has gone into [employer] in-patient rehab approximately 5 years ago and still attends regular meetings. Carley Sandoval has hopes of obtaining medication for HEP C and therefore, acknowledged not drinking x 6 wks. Carley Sandoval acknowledge the use of opiates & cocaine, as shown on lab-work, but denied this behavior as being his normal everyday routine, instead explaining that he used it to relieve pain so he could earn extra money working on occasion. We discussed possible other rehab options and Carley Sandoval states he has actively gone to meetings since [place]. Social work will provide resources for housing & addiction, should Carley Sandoval have a need for more support. Carley Sandoval is receptive to resources. Social work called [employer] ([phone]) and spoke with Meghan who was able to locate [facility] sleep study which was completed 1-31-18. Meghan faxed over an order for an auto  that Dr. Alexsander Bridges can sign if he prefers to follow the care of the CPAP/BIPAP (?). Meghan believes that patient's insurance will still honor this study but will require auto titration. Social work to follow up for possible home 02, CPAP/BIPAP(?), possible HHC.        Electronically signed by PETRA Aldana on 8/19/2019 at 11:23 AM

## 2019-08-20 PROCEDURE — 6360000002 HC RX W HCPCS: Performed by: INTERNAL MEDICINE

## 2019-08-20 PROCEDURE — 2580000003 HC RX 258: Performed by: GENERAL PRACTICE

## 2019-08-20 PROCEDURE — 6360000002 HC RX W HCPCS: Performed by: GENERAL PRACTICE

## 2019-08-20 PROCEDURE — 6370000000 HC RX 637 (ALT 250 FOR IP): Performed by: GENERAL PRACTICE

## 2019-08-20 PROCEDURE — 87070 CULTURE OTHR SPECIMN AEROBIC: CPT

## 2019-08-20 PROCEDURE — 2060000000 HC ICU INTERMEDIATE R&B

## 2019-08-20 PROCEDURE — 94640 AIRWAY INHALATION TREATMENT: CPT

## 2019-08-20 RX ADMIN — HYDRALAZINE HYDROCHLORIDE 25 MG: 25 TABLET, FILM COATED ORAL at 05:58

## 2019-08-20 RX ADMIN — HYDRALAZINE HYDROCHLORIDE 25 MG: 25 TABLET, FILM COATED ORAL at 14:27

## 2019-08-20 RX ADMIN — IPRATROPIUM BROMIDE AND ALBUTEROL SULFATE 3 ML: .5; 3 SOLUTION RESPIRATORY (INHALATION) at 20:26

## 2019-08-20 RX ADMIN — TRAMADOL HYDROCHLORIDE 50 MG: 50 TABLET, FILM COATED ORAL at 14:30

## 2019-08-20 RX ADMIN — SPIRONOLACTONE 25 MG: 25 TABLET ORAL at 08:37

## 2019-08-20 RX ADMIN — FAMOTIDINE 40 MG: 20 TABLET ORAL at 20:45

## 2019-08-20 RX ADMIN — HYDROCHLOROTHIAZIDE 12.5 MG: 12.5 TABLET ORAL at 08:36

## 2019-08-20 RX ADMIN — BUDESONIDE 500 MCG: 0.5 SUSPENSION RESPIRATORY (INHALATION) at 20:28

## 2019-08-20 RX ADMIN — BUDESONIDE 500 MCG: 0.5 SUSPENSION RESPIRATORY (INHALATION) at 08:59

## 2019-08-20 RX ADMIN — FINASTERIDE 5 MG: 5 TABLET, FILM COATED ORAL at 08:37

## 2019-08-20 RX ADMIN — HYDRALAZINE HYDROCHLORIDE 25 MG: 25 TABLET, FILM COATED ORAL at 22:36

## 2019-08-20 RX ADMIN — IPRATROPIUM BROMIDE AND ALBUTEROL SULFATE 3 ML: .5; 3 SOLUTION RESPIRATORY (INHALATION) at 12:24

## 2019-08-20 RX ADMIN — LISINOPRIL 40 MG: 20 TABLET ORAL at 08:37

## 2019-08-20 RX ADMIN — TRAMADOL HYDROCHLORIDE 50 MG: 50 TABLET, FILM COATED ORAL at 06:02

## 2019-08-20 RX ADMIN — TRAMADOL HYDROCHLORIDE 50 MG: 50 TABLET, FILM COATED ORAL at 20:48

## 2019-08-20 RX ADMIN — LEVOFLOXACIN 500 MG: 5 INJECTION, SOLUTION INTRAVENOUS at 11:55

## 2019-08-20 RX ADMIN — CLONIDINE HYDROCHLORIDE 0.1 MG: 0.1 TABLET ORAL at 14:27

## 2019-08-20 RX ADMIN — AMLODIPINE BESYLATE 10 MG: 10 TABLET ORAL at 08:36

## 2019-08-20 RX ADMIN — FORMOTEROL FUMARATE DIHYDRATE 20 MCG: 20 SOLUTION RESPIRATORY (INHALATION) at 20:26

## 2019-08-20 RX ADMIN — CLONIDINE HYDROCHLORIDE 0.1 MG: 0.1 TABLET ORAL at 20:45

## 2019-08-20 RX ADMIN — IPRATROPIUM BROMIDE AND ALBUTEROL SULFATE 3 ML: .5; 3 SOLUTION RESPIRATORY (INHALATION) at 03:43

## 2019-08-20 RX ADMIN — CEFTRIAXONE 1 G: 1 INJECTION, POWDER, FOR SOLUTION INTRAMUSCULAR; INTRAVENOUS at 22:36

## 2019-08-20 RX ADMIN — ENOXAPARIN SODIUM 40 MG: 40 INJECTION SUBCUTANEOUS at 08:36

## 2019-08-20 RX ADMIN — CLONIDINE HYDROCHLORIDE 0.1 MG: 0.1 TABLET ORAL at 08:37

## 2019-08-20 RX ADMIN — GABAPENTIN 300 MG: 300 CAPSULE ORAL at 20:45

## 2019-08-20 RX ADMIN — IPRATROPIUM BROMIDE AND ALBUTEROL SULFATE 3 ML: .5; 3 SOLUTION RESPIRATORY (INHALATION) at 08:59

## 2019-08-20 RX ADMIN — IPRATROPIUM BROMIDE AND ALBUTEROL SULFATE 3 ML: .5; 3 SOLUTION RESPIRATORY (INHALATION) at 16:20

## 2019-08-20 RX ADMIN — SALINE NASAL SPRAY 1 SPRAY: 1.5 SOLUTION NASAL at 08:37

## 2019-08-20 RX ADMIN — FORMOTEROL FUMARATE DIHYDRATE 20 MCG: 20 SOLUTION RESPIRATORY (INHALATION) at 08:59

## 2019-08-20 RX ADMIN — GUAIFENESIN AND DEXTROMETHORPHAN 5 ML: 100; 10 SYRUP ORAL at 06:02

## 2019-08-20 RX ADMIN — SALINE NASAL SPRAY 1 SPRAY: 1.5 SOLUTION NASAL at 18:47

## 2019-08-20 RX ADMIN — IPRATROPIUM BROMIDE AND ALBUTEROL SULFATE 3 ML: .5; 3 SOLUTION RESPIRATORY (INHALATION) at 23:57

## 2019-08-20 RX ADMIN — GABAPENTIN 300 MG: 300 CAPSULE ORAL at 14:27

## 2019-08-20 RX ADMIN — SODIUM CHLORIDE: 9 INJECTION, SOLUTION INTRAVENOUS at 11:55

## 2019-08-20 RX ADMIN — GABAPENTIN 300 MG: 300 CAPSULE ORAL at 08:37

## 2019-08-20 RX ADMIN — IPRATROPIUM BROMIDE AND ALBUTEROL SULFATE 3 ML: .5; 3 SOLUTION RESPIRATORY (INHALATION) at 00:38

## 2019-08-20 RX ADMIN — METOPROLOL SUCCINATE 50 MG: 50 TABLET, EXTENDED RELEASE ORAL at 08:36

## 2019-08-20 ASSESSMENT — PAIN DESCRIPTION - PROGRESSION
CLINICAL_PROGRESSION: NOT CHANGED
CLINICAL_PROGRESSION: GRADUALLY WORSENING

## 2019-08-20 ASSESSMENT — PAIN - FUNCTIONAL ASSESSMENT
PAIN_FUNCTIONAL_ASSESSMENT: PREVENTS OR INTERFERES SOME ACTIVE ACTIVITIES AND ADLS
PAIN_FUNCTIONAL_ASSESSMENT: ACTIVITIES ARE NOT PREVENTED

## 2019-08-20 ASSESSMENT — PAIN DESCRIPTION - LOCATION
LOCATION: ABDOMEN;CHEST

## 2019-08-20 ASSESSMENT — PAIN DESCRIPTION - ORIENTATION
ORIENTATION: MID
ORIENTATION: MID

## 2019-08-20 ASSESSMENT — PAIN DESCRIPTION - DESCRIPTORS
DESCRIPTORS: ACHING;DISCOMFORT

## 2019-08-20 ASSESSMENT — PAIN DESCRIPTION - PAIN TYPE
TYPE: CHRONIC PAIN

## 2019-08-20 ASSESSMENT — PAIN SCALES - GENERAL
PAINLEVEL_OUTOF10: 7
PAINLEVEL_OUTOF10: 5
PAINLEVEL_OUTOF10: 8
PAINLEVEL_OUTOF10: 7
PAINLEVEL_OUTOF10: 5
PAINLEVEL_OUTOF10: 0

## 2019-08-20 ASSESSMENT — PAIN DESCRIPTION - FREQUENCY
FREQUENCY: INTERMITTENT
FREQUENCY: INTERMITTENT

## 2019-08-20 ASSESSMENT — PAIN DESCRIPTION - ONSET
ONSET: GRADUAL
ONSET: ON-GOING

## 2019-08-20 NOTE — PROGRESS NOTES
Nurse to nurse report called to 6W. Patient notified of inpatient room assignment to room 604. Transport arranged.

## 2019-08-20 NOTE — PROGRESS NOTES
[facility] Quality Flow/Interdisciplinary Rounds Progress Note        Quality Flow Rounds held on August 20, 2019    Disciplines Attending:  Bedside Nurse, ,  and Nursing Unit Leadership    Luis Carlos Huddleston was admitted on 8/17/2019  7:59 PM    Anticipated Discharge Date:  Expected Discharge Date: 08/20/19    Disposition:    Erick Score:  Erick Scale Score: 20    Readmission Risk              Risk of Unplanned Readmission:        17           Discussed patient goal for the day, patient clinical progression, and barriers to discharge.   The following Goal(s) of the Day/Commitment(s) have been identified:  Check length of IV richard Almonte  August 20, 2019

## 2019-08-20 NOTE — CARE COORDINATION
8/20/2019  Social Work Discharge Planning:  SW discussed DME preferences for possible home o2, CPAP/BIPAP needs. Pt has no preference for provider. SW made a referral to Jennifer Garcia with  DME.  Electronically signed by PETRA Swann on 8/20/2019 at 1:52 PM

## 2019-08-20 NOTE — PROGRESS NOTES
Patient seen still coughing with chest pain. Hemoptysis present. May need bronch.  Continue abs  For noew with aerosols and 02

## 2019-08-20 NOTE — PROGRESS NOTES
Pulmonary Progress Note    Admit Date: 2019  Hospital day                               PCP: Gigi Eckert DO    Chief Complaint (s):  Patient Active Problem List   Diagnosis    CTS (carpal tunnel syndrome)    Cervical arthritis    Essential hypertension    Hyperlipidemia    INOCNECIA on CPAP    Community acquired bacterial pneumonia    CAP (community acquired pneumonia) due to Chlamydia species    Acute respiratory failure with hypoxia ([facility]    COPD exacerbation ([facility]    Precordial chest pain       Subjective:  · Still having trouble. Somewhat short of breath this p.m. No further fevers.     Vitals:  VITALS:  /83   Pulse 76   Temp 98 °F (36.7 °C) (Oral)   Resp 20   Ht 5' 7\" (1.702 m)   Wt 201 lb 8 oz (91.4 kg)   SpO2 98%   BMI 31.56 kg/m²     24HR INTAKE/OUTPUT:      Intake/Output Summary (Last 24 hours) at 2019 1622  Last data filed at 2019 0834  Gross per 24 hour   Intake 180 ml   Output 2400 ml   Net -2220 ml       24HR PULSE OXIMETRY RANGE:    SpO2  Av.7 %  Min: 95 %  Max: 98 %    Medications:  IV:   sodium chloride 75 mL/hr at 19 1155       Scheduled Meds:   docusate sodium  100 mg Oral Daily    sodium chloride flush  10 mL Intravenous 2 times per day    amLODIPine  10 mg Oral Daily    cloNIDine  0.1 mg Oral TID    finasteride  5 mg Oral Daily    gabapentin  300 mg Oral TID    hydrochlorothiazide  12.5 mg Oral Daily    ipratropium-albuterol  3 mL Inhalation Q4H    lisinopril  40 mg Oral Daily    metoprolol succinate  50 mg Oral Daily    nicotine  1 patch Transdermal Daily    spironolactone  25 mg Oral Daily    torsemide  10 mg Oral Once per day on     hydrALAZINE  25 mg Oral 3 times per day    enoxaparin  40 mg Subcutaneous Daily    cefTRIAXone (ROCEPHIN) IV  1 g Intravenous Q24H    formoterol  20 mcg Nebulization BID    famotidine  40 mg Oral Nightly    budesonide  500 mcg Nebulization BID    levofloxacin  500 mg Intravenous Q24H    sodium chloride  1 spray Each Nostril BID       Diet:   DIET CARDIAC;     EXAM:  General: No distress. Alert. Eyes: PERRL. No sclera icterus. No conjunctival injection. ENT: No discharge. Pharynx clear. Neck: Trachea midline. Normal thyroid. Resp: No accessory muscle use. No rales. Bilateral wheezing. Scattered rhonchi. CV: Regular rate. Regular rhythm. No murmur or rub. Abd: Non-tender. Non-distended. No masses. No organomegaly. Normal bowel sounds. Skin: Warm and dry. No nodule on exposed extremities. No rash on exposed extremities. Ext: No cyanosis, clubbing, edema  Lymph: No cervical LAD. No supraclavicular LAD. M/S: No cyanosis. No joint deformity. No clubbing. Neuro: Awake. Follows commands. Positive pupils/gag/corneals. Normal pain response. Results:  CBC:   Recent Labs     08/17/19 2010   WBC 11.6*   HGB 15.0   HCT 42.4   MCV 92.4        BMP:   Recent Labs     08/17/19 2010      K 4.2      CO2 19*   BUN 20   CREATININE 1.4*     LIVER PROFILE:   Recent Labs     08/17/19 2010   AST 24   ALT 16   BILIDIR <0.2   BILITOT 0.4   ALKPHOS 68     PT/INR: No results for input(s): PROTIME, INR in the last 72 hours. APTT: No results for input(s): APTT in the last 72 hours. Pathology:  1. N/A      Microbiology:  1. None    Recent ABG:   No results for input(s): PH, PO2, PCO2, HCO3, BE, O2SAT, METHB, O2HB, COHB, O2CON, HHB, THB in the last 72 hours. Recent Films:  CTA PULMONARY W CONTRAST   Final Result      1. No evidence of acute pulmonary embolic disease. 2. Right upper lobe pneumonia. 3. 12 mm pleural-based nodule at the left lower lobe. Consider   correlation with PET/CT. 4. Mediastinal and bilateral hilar adenopathy of indeterminate   etiology. If this fails to resolve spontaneously, tissue sampling   should be considered.          XR CHEST PORTABLE   Final Result   Patchy right upper lobe/apical infiltrate concerning for developing   pneumonia. Surveillance is recommended. Assessment:  1. Right upper lobe pneumonia with reactive adenopathy  2. Left lower lung nodule: Unchanged as seen above    Plan:  1. Continue Levaquin  2. Continue aerosol therapies  3. Repeat procalcitonin and recheck chest film  4. Bronchoscopy if any worsening    Care reviewed with the staff and the patient's family as available. Please note that voice recognition technology was used in the preparation of this note and make therefore it may contain inadvertent transcription errors. Deana Shepard M.D., F.C.C.P.     [facility], [address] [place] [address], [address] [place] [address]

## 2019-08-21 ENCOUNTER — APPOINTMENT (OUTPATIENT)
Dept: GENERAL RADIOLOGY | Age: 54
DRG: 194 | End: 2019-08-21
Payer: MEDICARE

## 2019-08-21 LAB — PROCALCITONIN: 0.07 NG/ML (ref 0–0.08)

## 2019-08-21 PROCEDURE — 2060000000 HC ICU INTERMEDIATE R&B

## 2019-08-21 PROCEDURE — 6360000002 HC RX W HCPCS: Performed by: INTERNAL MEDICINE

## 2019-08-21 PROCEDURE — 94640 AIRWAY INHALATION TREATMENT: CPT

## 2019-08-21 PROCEDURE — 6360000002 HC RX W HCPCS: Performed by: GENERAL PRACTICE

## 2019-08-21 PROCEDURE — 36415 COLL VENOUS BLD VENIPUNCTURE: CPT

## 2019-08-21 PROCEDURE — 71046 X-RAY EXAM CHEST 2 VIEWS: CPT

## 2019-08-21 PROCEDURE — 84145 PROCALCITONIN (PCT): CPT

## 2019-08-21 PROCEDURE — 6370000000 HC RX 637 (ALT 250 FOR IP): Performed by: GENERAL PRACTICE

## 2019-08-21 PROCEDURE — 2580000003 HC RX 258: Performed by: GENERAL PRACTICE

## 2019-08-21 RX ADMIN — HYDROCHLOROTHIAZIDE 12.5 MG: 12.5 TABLET ORAL at 10:17

## 2019-08-21 RX ADMIN — Medication 10 ML: at 21:31

## 2019-08-21 RX ADMIN — FORMOTEROL FUMARATE DIHYDRATE 20 MCG: 20 SOLUTION RESPIRATORY (INHALATION) at 20:46

## 2019-08-21 RX ADMIN — FINASTERIDE 5 MG: 5 TABLET, FILM COATED ORAL at 10:19

## 2019-08-21 RX ADMIN — TORSEMIDE 10 MG: 10 TABLET ORAL at 10:17

## 2019-08-21 RX ADMIN — SPIRONOLACTONE 25 MG: 25 TABLET ORAL at 10:17

## 2019-08-21 RX ADMIN — ENOXAPARIN SODIUM 40 MG: 40 INJECTION SUBCUTANEOUS at 10:18

## 2019-08-21 RX ADMIN — IPRATROPIUM BROMIDE AND ALBUTEROL SULFATE 3 ML: .5; 3 SOLUTION RESPIRATORY (INHALATION) at 12:58

## 2019-08-21 RX ADMIN — BUDESONIDE 500 MCG: 0.5 SUSPENSION RESPIRATORY (INHALATION) at 20:45

## 2019-08-21 RX ADMIN — GABAPENTIN 300 MG: 300 CAPSULE ORAL at 10:17

## 2019-08-21 RX ADMIN — SODIUM CHLORIDE: 9 INJECTION, SOLUTION INTRAVENOUS at 17:11

## 2019-08-21 RX ADMIN — SALINE NASAL SPRAY 1 SPRAY: 1.5 SOLUTION NASAL at 10:17

## 2019-08-21 RX ADMIN — FAMOTIDINE 40 MG: 20 TABLET ORAL at 21:30

## 2019-08-21 RX ADMIN — AMLODIPINE BESYLATE 10 MG: 10 TABLET ORAL at 10:18

## 2019-08-21 RX ADMIN — IPRATROPIUM BROMIDE AND ALBUTEROL SULFATE 3 ML: .5; 3 SOLUTION RESPIRATORY (INHALATION) at 03:48

## 2019-08-21 RX ADMIN — HYDRALAZINE HYDROCHLORIDE 25 MG: 25 TABLET, FILM COATED ORAL at 14:36

## 2019-08-21 RX ADMIN — CLONIDINE HYDROCHLORIDE 0.1 MG: 0.1 TABLET ORAL at 10:17

## 2019-08-21 RX ADMIN — TRAMADOL HYDROCHLORIDE 50 MG: 50 TABLET, FILM COATED ORAL at 17:13

## 2019-08-21 RX ADMIN — LISINOPRIL 40 MG: 20 TABLET ORAL at 10:17

## 2019-08-21 RX ADMIN — IPRATROPIUM BROMIDE AND ALBUTEROL SULFATE 3 ML: .5; 3 SOLUTION RESPIRATORY (INHALATION) at 16:26

## 2019-08-21 RX ADMIN — CEFTRIAXONE 1 G: 1 INJECTION, POWDER, FOR SOLUTION INTRAMUSCULAR; INTRAVENOUS at 21:33

## 2019-08-21 RX ADMIN — CLONIDINE HYDROCHLORIDE 0.1 MG: 0.1 TABLET ORAL at 21:33

## 2019-08-21 RX ADMIN — DOCUSATE SODIUM 100 MG: 100 CAPSULE, LIQUID FILLED ORAL at 10:17

## 2019-08-21 RX ADMIN — HYDRALAZINE HYDROCHLORIDE 25 MG: 25 TABLET, FILM COATED ORAL at 06:12

## 2019-08-21 RX ADMIN — BUDESONIDE 500 MCG: 0.5 SUSPENSION RESPIRATORY (INHALATION) at 09:27

## 2019-08-21 RX ADMIN — IPRATROPIUM BROMIDE AND ALBUTEROL SULFATE 3 ML: .5; 3 SOLUTION RESPIRATORY (INHALATION) at 20:46

## 2019-08-21 RX ADMIN — IPRATROPIUM BROMIDE AND ALBUTEROL SULFATE 3 ML: .5; 3 SOLUTION RESPIRATORY (INHALATION) at 09:28

## 2019-08-21 RX ADMIN — TRAMADOL HYDROCHLORIDE 50 MG: 50 TABLET, FILM COATED ORAL at 10:37

## 2019-08-21 RX ADMIN — HYDRALAZINE HYDROCHLORIDE 25 MG: 25 TABLET, FILM COATED ORAL at 21:30

## 2019-08-21 RX ADMIN — Medication 10 ML: at 10:19

## 2019-08-21 RX ADMIN — GABAPENTIN 300 MG: 300 CAPSULE ORAL at 21:30

## 2019-08-21 RX ADMIN — SALINE NASAL SPRAY 1 SPRAY: 1.5 SOLUTION NASAL at 17:11

## 2019-08-21 RX ADMIN — FORMOTEROL FUMARATE DIHYDRATE 20 MCG: 20 SOLUTION RESPIRATORY (INHALATION) at 09:28

## 2019-08-21 RX ADMIN — METOPROLOL SUCCINATE 50 MG: 50 TABLET, EXTENDED RELEASE ORAL at 10:18

## 2019-08-21 RX ADMIN — GABAPENTIN 300 MG: 300 CAPSULE ORAL at 14:34

## 2019-08-21 RX ADMIN — LEVOFLOXACIN 500 MG: 5 INJECTION, SOLUTION INTRAVENOUS at 10:19

## 2019-08-21 RX ADMIN — CLONIDINE HYDROCHLORIDE 0.1 MG: 0.1 TABLET ORAL at 14:33

## 2019-08-21 ASSESSMENT — PAIN SCALES - GENERAL
PAINLEVEL_OUTOF10: 8
PAINLEVEL_OUTOF10: 2
PAINLEVEL_OUTOF10: 0
PAINLEVEL_OUTOF10: 8
PAINLEVEL_OUTOF10: 0

## 2019-08-21 ASSESSMENT — PAIN DESCRIPTION - PROGRESSION
CLINICAL_PROGRESSION: GRADUALLY WORSENING

## 2019-08-21 ASSESSMENT — PAIN DESCRIPTION - DESCRIPTORS
DESCRIPTORS: ACHING;DISCOMFORT
DESCRIPTORS: ACHING;DISCOMFORT

## 2019-08-21 ASSESSMENT — PAIN DESCRIPTION - ONSET
ONSET: ON-GOING
ONSET: GRADUAL

## 2019-08-21 ASSESSMENT — PAIN DESCRIPTION - LOCATION
LOCATION: BACK
LOCATION: BACK

## 2019-08-21 ASSESSMENT — PAIN DESCRIPTION - PAIN TYPE
TYPE: CHRONIC PAIN
TYPE: CHRONIC PAIN

## 2019-08-21 ASSESSMENT — PAIN DESCRIPTION - ORIENTATION
ORIENTATION: LOWER
ORIENTATION: LOWER

## 2019-08-21 ASSESSMENT — PAIN - FUNCTIONAL ASSESSMENT
PAIN_FUNCTIONAL_ASSESSMENT: PREVENTS OR INTERFERES SOME ACTIVE ACTIVITIES AND ADLS
PAIN_FUNCTIONAL_ASSESSMENT: PREVENTS OR INTERFERES SOME ACTIVE ACTIVITIES AND ADLS

## 2019-08-21 ASSESSMENT — PAIN DESCRIPTION - FREQUENCY
FREQUENCY: INTERMITTENT
FREQUENCY: INTERMITTENT

## 2019-08-21 NOTE — PROGRESS NOTES
P Quality Flow/Interdisciplinary Rounds Progress Note        Quality Flow Rounds held on August 21, 2019    Disciplines Attending:  Bedside Nurse, ,  and Nursing Unit Leadership    Chaparro Torres was admitted on 8/17/2019  7:59 PM    Anticipated Discharge Date:  Expected Discharge Date: 08/22/19    Disposition:    Erick Score:  Erick Scale Score: 20    Readmission Risk              Risk of Unplanned Readmission:        16           Discussed patient goal for the day, patient clinical progression, and barriers to discharge. The following Goal(s) of the Day/Commitment(s) have been identified:  Check Pulmonary plan, may need Bronch-transition to PO ATB's.       Stephany Rodriguez  August 21, 2019

## 2019-08-21 NOTE — PROGRESS NOTES
Pulmonary Progress Note    Admit Date: 2019  Hospital day                               PCP: Zaki Farley DO    Chief Complaint (s):  Patient Active Problem List   Diagnosis    CTS (carpal tunnel syndrome)    Cervical arthritis    Essential hypertension    Hyperlipidemia    INOCENCIA on CPAP    Community acquired bacterial pneumonia    CAP (community acquired pneumonia) due to Chlamydia species    Acute respiratory failure with hypoxia ([address]    COPD exacerbation (H[facility])    Precordial chest pain       Subjective:  · Chest radiograph with ongoing infiltrate in the right upper lung. It appears grossly unchanged. Procalcitonin is trending downward however.     Vitals:  VITALS:  BP (!) 140/98   Pulse 83   Temp 97.8 °F (36.6 °C) (Oral)   Resp 18   Ht 5' 7\" (1.702 m)   Wt 199 lb 11.2 oz (90.6 kg)   SpO2 95%   BMI 31.28 kg/m²     24HR INTAKE/OUTPUT:      Intake/Output Summary (Last 24 hours) at 2019 1425  Last data filed at 2019 1151  Gross per 24 hour   Intake 640 ml   Output --   Net 640 ml       24HR PULSE OXIMETRY RANGE:    SpO2  Av.8 %  Min: 95 %  Max: 99 %    Medications:  IV:   sodium chloride 75 mL/hr at 19 1155       Scheduled Meds:   docusate sodium  100 mg Oral Daily    sodium chloride flush  10 mL Intravenous 2 times per day    amLODIPine  10 mg Oral Daily    cloNIDine  0.1 mg Oral TID    finasteride  5 mg Oral Daily    gabapentin  300 mg Oral TID    hydrochlorothiazide  12.5 mg Oral Daily    ipratropium-albuterol  3 mL Inhalation Q4H    lisinopril  40 mg Oral Daily    metoprolol succinate  50 mg Oral Daily    nicotine  1 patch Transdermal Daily    spironolactone  25 mg Oral Daily    torsemide  10 mg Oral Once per day on     hydrALAZINE  25 mg Oral 3 times per day    enoxaparin  40 mg Subcutaneous Daily    cefTRIAXone (ROCEPHIN) IV  1 g Intravenous Q24H    formoterol  20 mcg Nebulization BID    famotidine  40 mg Oral Nightly    budesonide  500 mcg Nebulization BID    levofloxacin  500 mg Intravenous Q24H    sodium chloride  1 spray Each Nostril BID       Diet:   DIET CARDIAC;     EXAM:  General: No distress. Alert. Eyes: PERRL. No sclera icterus. No conjunctival injection. ENT: No discharge. Pharynx clear. Neck: Trachea midline. Normal thyroid. Resp: No accessory muscle use. No rales. Bilateral wheezing. Scattered rhonchi. CV: Regular rate. Regular rhythm. No murmur or rub. Abd: Non-tender. Non-distended. No masses. No organomegaly. Normal bowel sounds. Skin: Warm and dry. No nodule on exposed extremities. No rash on exposed extremities. Ext: No cyanosis, clubbing, edema  Lymph: No cervical LAD. No supraclavicular LAD. M/S: No cyanosis. No joint deformity. No clubbing. Neuro: Awake. Follows commands. Positive pupils/gag/corneals. Normal pain response. Results:  CBC:   No results for input(s): WBC, HGB, HCT, MCV, PLT in the last 72 hours. BMP:   No results for input(s): NA, K, CL, CO2, PHOS, BUN, CREATININE in the last 72 hours. Invalid input(s): CA  LIVER PROFILE:   No results for input(s): AST, ALT, LIPASE, BILIDIR, BILITOT, ALKPHOS in the last 72 hours. Invalid input(s): AMYLASE,  ALB  PT/INR: No results for input(s): PROTIME, INR in the last 72 hours. APTT: No results for input(s): APTT in the last 72 hours. Pathology:  1. N/A      Microbiology:  1. None    Recent ABG:   No results for input(s): PH, PO2, PCO2, HCO3, BE, O2SAT, METHB, O2HB, COHB, O2CON, HHB, THB in the last 72 hours. Recent Films:  XR CHEST STANDARD (2 VW)   Final Result   Infiltrate right upper lobe is unchanged. Follow-up to complete   resolution is recommended               CTA PULMONARY W CONTRAST   Final Result      1. No evidence of acute pulmonary embolic disease. 2. Right upper lobe pneumonia. 3. 12 mm pleural-based nodule at the left lower lobe. Consider   correlation with PET/CT.     4. Mediastinal and bilateral hilar adenopathy of indeterminate   etiology. If this fails to resolve spontaneously, tissue sampling   should be considered. XR CHEST PORTABLE   Final Result   Patchy right upper lobe/apical infiltrate concerning for developing   pneumonia. Surveillance is recommended. Assessment:  1. Right upper lobe pneumonia with reactive adenopathy  2. Left lower lung nodule: Unchanged as seen above    Plan:  1. Continue Levaquin  2. Continue aerosol therapies  3. Hold off on bronchoscopy for now    Care reviewed with the staff and the patient's family as available. Please note that voice recognition technology was used in the preparation of this note and make therefore it may contain inadvertent transcription errors. Clearance GREGORY Marr., F.C.C.P.     [facility], [address] [facility], [address] [place] [address]

## 2019-08-21 NOTE — PROGRESS NOTES
Patient seen doing about the same. Cough , wheeze ans sob. Down for repeat cxr to follow pneumonia. Labs stable . 02 sats jane.  continue with current treatment

## 2019-08-21 NOTE — PROGRESS NOTES
SpO2 at rest on room air 99%. SpO2 with ambulation on room air 95%.     Electronically signed by Reg Worley RN on 8/21/2019 at 12:03 PM

## 2019-08-22 LAB
ALBUMIN SERPL-MCNC: 3.7 G/DL (ref 3.5–5.2)
ALP BLD-CCNC: 66 U/L (ref 40–129)
ALT SERPL-CCNC: 22 U/L (ref 0–40)
AMYLASE: 59 U/L (ref 20–100)
ANION GAP SERPL CALCULATED.3IONS-SCNC: 12 MMOL/L (ref 7–16)
AST SERPL-CCNC: 24 U/L (ref 0–39)
BASOPHILS ABSOLUTE: 0.04 E9/L (ref 0–0.2)
BASOPHILS RELATIVE PERCENT: 0.4 % (ref 0–2)
BILIRUB SERPL-MCNC: <0.2 MG/DL (ref 0–1.2)
BUN BLDV-MCNC: 18 MG/DL (ref 6–20)
CALCIUM SERPL-MCNC: 9.2 MG/DL (ref 8.6–10.2)
CHLORIDE BLD-SCNC: 104 MMOL/L (ref 98–107)
CO2: 24 MMOL/L (ref 22–29)
COCAINE, CONFIRM, URINE: >1000 NG/ML
CREAT SERPL-MCNC: 1.1 MG/DL (ref 0.7–1.2)
EOSINOPHILS ABSOLUTE: 0.21 E9/L (ref 0.05–0.5)
EOSINOPHILS RELATIVE PERCENT: 2.3 % (ref 0–6)
GFR AFRICAN AMERICAN: >60
GFR NON-AFRICAN AMERICAN: >60 ML/MIN/1.73
GLUCOSE BLD-MCNC: 101 MG/DL (ref 74–99)
HCT VFR BLD CALC: 38.2 % (ref 37–54)
HEMOGLOBIN: 13.3 G/DL (ref 12.5–16.5)
IMMATURE GRANULOCYTES #: 0.11 E9/L
IMMATURE GRANULOCYTES %: 1.2 % (ref 0–5)
INR BLD: 1
LIPASE: 14 U/L (ref 13–60)
LYMPHOCYTES ABSOLUTE: 2.01 E9/L (ref 1.5–4)
LYMPHOCYTES RELATIVE PERCENT: 21.7 % (ref 20–42)
MCH RBC QN AUTO: 32.8 PG (ref 26–35)
MCHC RBC AUTO-ENTMCNC: 34.8 % (ref 32–34.5)
MCV RBC AUTO: 94.1 FL (ref 80–99.9)
MONOCYTES ABSOLUTE: 1.03 E9/L (ref 0.1–0.95)
MONOCYTES RELATIVE PERCENT: 11.1 % (ref 2–12)
NEUTROPHILS ABSOLUTE: 5.87 E9/L (ref 1.8–7.3)
NEUTROPHILS RELATIVE PERCENT: 63.3 % (ref 43–80)
PDW BLD-RTO: 12.3 FL (ref 11.5–15)
PLATELET # BLD: 240 E9/L (ref 130–450)
PMV BLD AUTO: 10.1 FL (ref 7–12)
POTASSIUM SERPL-SCNC: 4 MMOL/L (ref 3.5–5)
PROTHROMBIN TIME: 11.2 SEC (ref 9.3–12.4)
RBC # BLD: 4.06 E12/L (ref 3.8–5.8)
SODIUM BLD-SCNC: 140 MMOL/L (ref 132–146)
TOTAL PROTEIN: 6.9 G/DL (ref 6.4–8.3)
WBC # BLD: 9.3 E9/L (ref 4.5–11.5)

## 2019-08-22 PROCEDURE — 36415 COLL VENOUS BLD VENIPUNCTURE: CPT

## 2019-08-22 PROCEDURE — 87522 HEPATITIS C REVRS TRNSCRPJ: CPT

## 2019-08-22 PROCEDURE — 85610 PROTHROMBIN TIME: CPT

## 2019-08-22 PROCEDURE — 6370000000 HC RX 637 (ALT 250 FOR IP): Performed by: GENERAL PRACTICE

## 2019-08-22 PROCEDURE — 6360000002 HC RX W HCPCS: Performed by: GENERAL PRACTICE

## 2019-08-22 PROCEDURE — 80053 COMPREHEN METABOLIC PANEL: CPT

## 2019-08-22 PROCEDURE — 83690 ASSAY OF LIPASE: CPT

## 2019-08-22 PROCEDURE — 94664 DEMO&/EVAL PT USE INHALER: CPT

## 2019-08-22 PROCEDURE — 82150 ASSAY OF AMYLASE: CPT

## 2019-08-22 PROCEDURE — 85025 COMPLETE CBC W/AUTO DIFF WBC: CPT

## 2019-08-22 PROCEDURE — 2580000003 HC RX 258: Performed by: GENERAL PRACTICE

## 2019-08-22 PROCEDURE — 94640 AIRWAY INHALATION TREATMENT: CPT

## 2019-08-22 PROCEDURE — 6360000002 HC RX W HCPCS: Performed by: INTERNAL MEDICINE

## 2019-08-22 PROCEDURE — 2060000000 HC ICU INTERMEDIATE R&B

## 2019-08-22 RX ORDER — METOPROLOL SUCCINATE 100 MG/1
100 TABLET, EXTENDED RELEASE ORAL DAILY
Status: DISCONTINUED | OUTPATIENT
Start: 2019-08-23 | End: 2019-08-24 | Stop reason: HOSPADM

## 2019-08-22 RX ADMIN — FORMOTEROL FUMARATE DIHYDRATE 20 MCG: 20 SOLUTION RESPIRATORY (INHALATION) at 20:19

## 2019-08-22 RX ADMIN — GABAPENTIN 300 MG: 300 CAPSULE ORAL at 20:45

## 2019-08-22 RX ADMIN — FORMOTEROL FUMARATE DIHYDRATE 20 MCG: 20 SOLUTION RESPIRATORY (INHALATION) at 08:53

## 2019-08-22 RX ADMIN — IPRATROPIUM BROMIDE AND ALBUTEROL SULFATE 3 ML: .5; 3 SOLUTION RESPIRATORY (INHALATION) at 08:53

## 2019-08-22 RX ADMIN — ENOXAPARIN SODIUM 40 MG: 40 INJECTION SUBCUTANEOUS at 09:39

## 2019-08-22 RX ADMIN — TRAMADOL HYDROCHLORIDE 50 MG: 50 TABLET, FILM COATED ORAL at 14:00

## 2019-08-22 RX ADMIN — FAMOTIDINE 40 MG: 20 TABLET ORAL at 20:45

## 2019-08-22 RX ADMIN — LISINOPRIL 40 MG: 20 TABLET ORAL at 09:38

## 2019-08-22 RX ADMIN — SODIUM CHLORIDE: 9 INJECTION, SOLUTION INTRAVENOUS at 20:47

## 2019-08-22 RX ADMIN — AMLODIPINE BESYLATE 10 MG: 10 TABLET ORAL at 09:38

## 2019-08-22 RX ADMIN — BUDESONIDE 500 MCG: 0.5 SUSPENSION RESPIRATORY (INHALATION) at 20:19

## 2019-08-22 RX ADMIN — GABAPENTIN 300 MG: 300 CAPSULE ORAL at 09:39

## 2019-08-22 RX ADMIN — CLONIDINE HYDROCHLORIDE 0.1 MG: 0.1 TABLET ORAL at 20:45

## 2019-08-22 RX ADMIN — BUDESONIDE 500 MCG: 0.5 SUSPENSION RESPIRATORY (INHALATION) at 08:52

## 2019-08-22 RX ADMIN — HYDRALAZINE HYDROCHLORIDE 25 MG: 25 TABLET, FILM COATED ORAL at 07:07

## 2019-08-22 RX ADMIN — LEVOFLOXACIN 500 MG: 5 INJECTION, SOLUTION INTRAVENOUS at 11:43

## 2019-08-22 RX ADMIN — CLONIDINE HYDROCHLORIDE 0.1 MG: 0.1 TABLET ORAL at 14:00

## 2019-08-22 RX ADMIN — SALINE NASAL SPRAY 1 SPRAY: 1.5 SOLUTION NASAL at 16:53

## 2019-08-22 RX ADMIN — HYDRALAZINE HYDROCHLORIDE 25 MG: 25 TABLET, FILM COATED ORAL at 14:00

## 2019-08-22 RX ADMIN — METOPROLOL SUCCINATE 50 MG: 50 TABLET, EXTENDED RELEASE ORAL at 09:39

## 2019-08-22 RX ADMIN — SPIRONOLACTONE 25 MG: 25 TABLET ORAL at 09:38

## 2019-08-22 RX ADMIN — TRAMADOL HYDROCHLORIDE 50 MG: 50 TABLET, FILM COATED ORAL at 20:45

## 2019-08-22 RX ADMIN — Medication 10 ML: at 09:42

## 2019-08-22 RX ADMIN — HYDROCHLOROTHIAZIDE 12.5 MG: 12.5 TABLET ORAL at 09:38

## 2019-08-22 RX ADMIN — SALINE NASAL SPRAY 1 SPRAY: 1.5 SOLUTION NASAL at 09:32

## 2019-08-22 RX ADMIN — IPRATROPIUM BROMIDE AND ALBUTEROL SULFATE 3 ML: .5; 3 SOLUTION RESPIRATORY (INHALATION) at 20:19

## 2019-08-22 RX ADMIN — DOCUSATE SODIUM 100 MG: 100 CAPSULE, LIQUID FILLED ORAL at 09:38

## 2019-08-22 RX ADMIN — FINASTERIDE 5 MG: 5 TABLET, FILM COATED ORAL at 09:39

## 2019-08-22 RX ADMIN — IPRATROPIUM BROMIDE AND ALBUTEROL SULFATE 3 ML: .5; 3 SOLUTION RESPIRATORY (INHALATION) at 12:32

## 2019-08-22 RX ADMIN — HYDRALAZINE HYDROCHLORIDE 25 MG: 25 TABLET, FILM COATED ORAL at 20:45

## 2019-08-22 RX ADMIN — CLONIDINE HYDROCHLORIDE 0.1 MG: 0.1 TABLET ORAL at 09:38

## 2019-08-22 RX ADMIN — GABAPENTIN 300 MG: 300 CAPSULE ORAL at 14:00

## 2019-08-22 RX ADMIN — IPRATROPIUM BROMIDE AND ALBUTEROL SULFATE 3 ML: .5; 3 SOLUTION RESPIRATORY (INHALATION) at 16:09

## 2019-08-22 RX ADMIN — TRAMADOL HYDROCHLORIDE 50 MG: 50 TABLET, FILM COATED ORAL at 06:54

## 2019-08-22 ASSESSMENT — PAIN SCALES - GENERAL
PAINLEVEL_OUTOF10: 2
PAINLEVEL_OUTOF10: 1
PAINLEVEL_OUTOF10: 7
PAINLEVEL_OUTOF10: 7
PAINLEVEL_OUTOF10: 4
PAINLEVEL_OUTOF10: 8
PAINLEVEL_OUTOF10: 4

## 2019-08-22 ASSESSMENT — PAIN DESCRIPTION - PROGRESSION
CLINICAL_PROGRESSION: GRADUALLY WORSENING
CLINICAL_PROGRESSION: NOT CHANGED
CLINICAL_PROGRESSION: GRADUALLY WORSENING

## 2019-08-22 ASSESSMENT — PAIN DESCRIPTION - ONSET
ONSET: GRADUAL
ONSET: ON-GOING
ONSET: GRADUAL

## 2019-08-22 ASSESSMENT — PAIN DESCRIPTION - PAIN TYPE
TYPE: ACUTE PAIN

## 2019-08-22 ASSESSMENT — PAIN - FUNCTIONAL ASSESSMENT
PAIN_FUNCTIONAL_ASSESSMENT: ACTIVITIES ARE NOT PREVENTED

## 2019-08-22 ASSESSMENT — PAIN DESCRIPTION - DESCRIPTORS
DESCRIPTORS: SORE;ACHING;DISCOMFORT
DESCRIPTORS: ACHING;DISCOMFORT;SORE
DESCRIPTORS: STABBING

## 2019-08-22 ASSESSMENT — PAIN DESCRIPTION - LOCATION
LOCATION: ABDOMEN
LOCATION: CHEST;ABDOMEN
LOCATION: ABDOMEN;CHEST

## 2019-08-22 ASSESSMENT — PAIN DESCRIPTION - FREQUENCY
FREQUENCY: INTERMITTENT

## 2019-08-22 ASSESSMENT — PAIN DESCRIPTION - ORIENTATION
ORIENTATION: MID

## 2019-08-22 NOTE — PROGRESS NOTES
[facility] Quality Flow/Interdisciplinary Rounds Progress Note        Quality Flow Rounds held on August 22, 2019    Disciplines Attending:  Bedside Nurse, ,  and Nursing Unit Leadership    Dharmesh Suazo was admitted on 8/17/2019  7:59 PM    Anticipated Discharge Date:  Expected Discharge Date: 08/22/19    Disposition:    Erick Score:  Erick Scale Score: 21    Readmission Risk              Risk of Unplanned Readmission:        16           Discussed patient goal for the day, patient clinical progression, and barriers to discharge. The following Goal(s) of the Day/Commitment(s) have been identified:  Discharge.       Ami Case  August 22, 2019

## 2019-08-22 NOTE — CONSULTS
Gastroenterology Consult Note   Jalil Gonzales [facility] with Odell Jensen M.D. Consult Note        Date of Service: 8/22/2019  Reason for Consult: hep c  Requesting Physician: Dr Freeman Varma:  Shortness of breath, chest pain, productive cough, and hemoptysis    History Obtained From:  patient, electronic medical record    HISTORY OF PRESENT ILLNESS:       Aramis Spaulding is a 62 y.o. male with significant past medical history of Hepatitis C, Drug Abuse, Alcohol Abuse, INOCENCIA, HTN, HLD, and COPD admitted via ED 8/17/19 for shortness of breath, chest pain, productive cough with yellow sputum, and hemoptysis. Pt reports he had severe shortness of breath for 2 days with chest tightness, yellow sputum, and hemoptysis. Pt admitted, pulmonary consulted and following. Pt states he had his insurance changed and is inquiring about hep c tx. Pt reports intermittent cramping pain unrelated to food or defecation that lasts seconds up to 5 minutes. Pt states he has had loss of appetite and 4# wt loss in the past month, unintentional. Pt states he has daily brown BMs but once in a while mild constipation. Pt denies n/v, diarrhea, hematochezia, melena, or hematemesis. Last EGD last year with Dr. María Elena Jolley, \"my esophagus was stretched, and showed redness\". Pt states he last used Cocaine last Thursday, he admits to using Adderall to help his fatigue. Pt states he is alcohol free x 1 month. He states he quit all tobacco (cigarettes and vaping) 1 month ago. Admission labs: CO2 19; Cr 1.4; ; procalcitonin 0.22; Pro-BNP 8.989; WBC 11.6; MCHC 35.4; lymph 11.7%; abs neut 9.20; abs lymph 1.36. Tox + opiates and propoxyphene. Consultation for hep c. Pt is  known to Dr. Roger Munoz, last seen inpatient 4/2019, he was lost to follow up (2 no-show appts). Currently, pt reports he has diffuse abd pain aching in nature rated 8/10 with palpation. Pt denies n/v. D/W pt will plan to tx hep c outpatient once tox screen negative.  Pt reports BM yesterday, brown. Labs today: MCHC 34.8; abs mono 1.03.     Past Medical History:        Diagnosis Date    Alcohol abuse     COPD (chronic obstructive pulmonary disease) ([facility] [address]     History of cocaine use     Hyperlipidemia     Hypertension     Marijuana use     quit November 2017     alberto      Past Surgical History:        Procedure Laterality Date    CERVICAL FUSION  11/18/15    cervical laminectomy & fusion c4-c6, with rods & screws    POLYSOMNOGRAPHY  01/29/2018    AHI=29.8    WRIST SURGERY       Current Medications:    Current Facility-Administered Medications: [START ON 8/23/2019] metoprolol succinate (TOPROL XL) extended release tablet 100 mg, 100 mg, Oral, Daily  docusate sodium (COLACE) capsule 100 mg, 100 mg, Oral, Daily  sodium chloride flush 0.9 % injection 10 mL, 10 mL, Intravenous, 2 times per day  sodium chloride flush 0.9 % injection 10 mL, 10 mL, Intravenous, PRN  acetaminophen (TYLENOL) tablet 650 mg, 650 mg, Oral, Q4H PRN  amLODIPine (NORVASC) tablet 10 mg, 10 mg, Oral, Daily  cloNIDine (CATAPRES) tablet 0.1 mg, 0.1 mg, Oral, TID  finasteride (PROSCAR) tablet 5 mg, 5 mg, Oral, Daily  gabapentin (NEURONTIN) capsule 300 mg, 300 mg, Oral, TID  hydrochlorothiazide (HYDRODIURIL) tablet 12.5 mg, 12.5 mg, Oral, Daily  ipratropium-albuterol (DUONEB) nebulizer solution 3 mL, 3 mL, Inhalation, Q4H  lisinopril (PRINIVIL;ZESTRIL) tablet 40 mg, 40 mg, Oral, Daily  nicotine (NICODERM CQ) 21 MG/24HR 1 patch, 1 patch, Transdermal, Daily  spironolactone (ALDACTONE) tablet 25 mg, 25 mg, Oral, Daily  torsemide (DEMADEX) tablet 10 mg, 10 mg, Oral, Once per day on Mon Wed Fri  traMADol (ULTRAM) tablet 50 mg, 50 mg, Oral, Q6H PRN  hydrALAZINE (APRESOLINE) tablet 25 mg, 25 mg, Oral, 3 times per day  0.9 % sodium chloride infusion, , Intravenous, Continuous  enoxaparin (LOVENOX) injection 40 mg, 40 mg, Subcutaneous, Daily  guaiFENesin-dextromethorphan (ROBITUSSIN DM) 100-10 MG/5ML syrup 5 mL, 5 mL, Oral, Q6H PRN  formoterol (PERFOROMIST) nebulizer solution 20 mcg, 20 mcg, Nebulization, BID  famotidine (PEPCID) tablet 40 mg, 40 mg, Oral, Nightly  budesonide (PULMICORT) nebulizer suspension 500 mcg, 500 mcg, Nebulization, BID  levofloxacin (LEVAQUIN) 500 MG/100ML infusion 500 mg, 500 mg, Intravenous, Q24H  sodium chloride (OCEAN, BABY AYR) 0.65 % nasal spray 1 spray, 1 spray, Each Nostril, BID    Allergies:  Patient has no known allergies. Social History:    Tobacco:  Pt reports 2 PPD for 40+ years, he used vape e cigarettes  but quit all x 1 month. Alcohol:  Pt reports \"plastered\" on the weekends. 7-8 shots whiskey/ 24 cans beer generally for yrs, none x 1 month. Illicit Drugs: Pt reports use cocaine and adderall for his fatigue, last used last Thurs; IV drug use in the [de-identified]    Family History:   Family History   Problem Relation Age of Onset    Arthritis Mother     Other Mother         glucoma    Heart Disease Father     High Blood Pressure Brother     Other Brother         sleep apnea    High Blood Pressure Brother     Other Brother         sleep apnea    High Blood Pressure Brother     Other Brother         sleep apnea    High Blood Pressure Brother     Other Brother         sleep apnea    Cancer Brother         esophogeal -  at 62       REVIEW OF SYSTEMS:    Aside from what was mentioned in the PMH and HPI, essentially unremarkable, all others negative.     PHYSICAL EXAM:      Vitals:    BP (!) 146/100   Pulse 95   Temp 98.5 °F (36.9 °C) (Oral)   Resp 18   Ht 5' 7\" (1.702 m)   Wt 198 lb 4.8 oz (89.9 kg)   SpO2 98%   BMI 31.06 kg/m²       CONSTITUTIONAL:  awake, alert, cooperative, fatigue appearing, and appears stated age  EYES:  pupils equal, round and reactive to light, sclera anicteric and conjunctiva normal  ENT:  normocephalic, oral pharynx with moist mucous membranes  NECK:  supple   HEMATOLOGIC/LYMPHATICS:  no cervical or supraclavicular lymphadenopathy noted  LUNGS:  Diminished with diffuse fine wheezing to auscultation bilaterally.   CARDIOVASCULAR:  regular rate and rhythm, no murmur noted; 2+ pulses; no edema  ABDOMEN:  Normal bowel sounds, softly distended, diffusely tender to palpation  RUQ with + rebound, no guarding, no masses palpated, no hepatosplenomegaly  MUSCULOSKELETAL:  full range of motion noted  motor strength is 5 out of 5 all extremities bilaterally  NEUROLOGIC:  Mental Status Exam:  Level of Alertness:   awake  Orientation:   person, place, time  Motor Exam:  Motor exam is symmetrical 5 out of 5 all extremities bilaterally  SKIN:  normal skin color, texture, turgor    DATA:    CBC with Differential:    Lab Results   Component Value Date    WBC 11.6 08/17/2019    RBC 4.59 08/17/2019    HGB 15.0 08/17/2019    HCT 42.4 08/17/2019     08/17/2019    MCV 92.4 08/17/2019    MCH 32.7 08/17/2019    MCHC 35.4 08/17/2019    RDW 12.5 08/17/2019    METASPCT 2 02/10/2016    LYMPHOPCT 11.7 08/17/2019    MONOPCT 7.9 08/17/2019    MYELOPCT 1 02/10/2016    BASOPCT 0.4 08/17/2019    MONOSABS 0.92 08/17/2019    LYMPHSABS 1.36 08/17/2019    EOSABS 0.07 08/17/2019    BASOSABS 0.05 08/17/2019     CMP:    Lab Results   Component Value Date     08/17/2019    K 4.2 08/17/2019    K 4.1 03/07/2018     08/17/2019    CO2 19 08/17/2019    BUN 20 08/17/2019    CREATININE 1.4 08/17/2019    GFRAA >60 08/17/2019    LABGLOM 52 08/17/2019    GLUCOSE 115 08/17/2019    PROT 7.4 08/17/2019    LABALBU 3.9 08/17/2019    CALCIUM 9.0 08/17/2019    BILITOT 0.4 08/17/2019    ALKPHOS 68 08/17/2019    AST 24 08/17/2019    ALT 16 08/17/2019     Hepatic Function Panel:    Lab Results   Component Value Date    ALKPHOS 68 08/17/2019    ALT 16 08/17/2019    AST 24 08/17/2019    PROT 7.4 08/17/2019    BILITOT 0.4 08/17/2019    BILIDIR <0.2 08/17/2019    IBILI see below 08/17/2019    LABALBU 3.9 08/17/2019     PT/INR:    Lab Results   Component Value Date    PROTIME 10.1 04/23/2019    INR 0.9 04/23/2019 PTT:    Lab Results   Component Value Date    APTT 22.7 2018   [APTT}  Last 3 Troponin:    Lab Results   Component Value Date    TROPONINI <0.01 2019    TROPONINI <0.01 2019    TROPONINI <0.01 2019     TSH:    Lab Results   Component Value Date    TSH 0.809 2019     VITAMIN B12:   Lab Results   Component Value Date    RRRQFQRH51 522 2017     FOLATE:    Lab Results   Component Value Date    FOLATE 13.8 2017     IRON:    Lab Results   Component Value Date    IRON 107 2019     Iron Saturation:    Lab Results   Component Value Date    LABIRON 29 2019     TIBC:    Lab Results   Component Value Date    TIBC 375 2019     FERRITIN:  No results found for: FERRITIN  HIV:  No results found for: HIV  WANDA:    Lab Results   Component Value Date    WANDA NEGATIVE 2019     No components found for: CHLPL  Lab Results   Component Value Date    TRIG 217 (H) 2018    TRIG 172 (H) 2016    TRIG 251 (H) 2016     Lab Results   Component Value Date    HDL 58 2018    HDL 55 2016    HDL 11 2016     Lab Results   Component Value Date    LDLCALC 144 (H) 2018    LDLCALC 146 (H) 2016    LDLCALC 84 2016     Lab Results   Component Value Date    LABVLDL 43 2018    LABVLDL 34 2016    LABVLDL 50 2016        Xr Chest Standard (2 Vw)    Result Date: 2019  Patient MRN:  [id number] : 1965 Age: 62 years Gender: Male Order Date:  2019 12:00 AM EXAM: XR CHEST (2 VW) INDICATION:  lung infiltrate lung infiltrate COMPARISON: 2019 FINDINGS:  There is some infiltrative changes in the right upper lobe medially, unchanged. Heart size is normal. Left lung is clear. There are no effusions. Infiltrate right upper lobe is unchanged.  Follow-up to complete resolution is recommended     Xr Chest Portable    Result Date: 2019  Patient MRN:  [id number] : 1965 Age: 62 years Gender: Male Order Date: 2019 8:00 PM EXAM: XR CHEST PORTABLE one image INDICATION:  short of breath short of breath COMPARISON: 2019 FINDINGS: Heart size is in upper limits of normal. There is strandy perihilar densities concerning for bronchitis. There is minimal patchy infiltrates in the right upper lobe concerning for developing pneumonia. The remainder of lungs are clear. Patchy right upper lobe/apical infiltrate concerning for developing pneumonia. Surveillance is recommended. Cta Pulmonary W Contrast    Result Date: 2019  Patient MRN:  [id number] : 1965 Age: 62 years Gender: Male Order Date:  2019 9:00 PM EXAM: CTA PULMONARY W CONTRAST NUMBER OF IMAGES:  816 INDICATION:  hemoptysis, Evaluate for PE  COMPARISON: 2018 TECHNIQUE: Contiguous spiral images were obtained in the axial plane, following the administration of intravenous contrast using CT angiographic protocol. Sagittal and coronal images were reconstructed from the axial plane acquisition. Additional MIP reconstructions were presented to aid in the interpretation of this study. Contrast dose: 80 ml Isovue 370 intravenously injected. Low-dose CT  acquisition technique included one of following options; 1 . Automated exposure control, 2. Adjustment of MA and or KV according to patient's size or 3. Use of iterative reconstruction. FINDINGS: LUNGS: No evidence of acute pulmonary embolic disease. There is pneumonic infiltrate at the right upper lobe and there is a small rounded opacity abutting the pleura at the left lower lobe posteriorly inferiorly and this is of indeterminate etiology measuring in long axis diameter approximately 12 mm. See series 301 image #45. No pleural effusion or pneumothorax is seen. HEART: Enlarged. AORTA: Unremarkable.  MEDIASTINUM: There is adenopathy in the mediastinum which is rather bulky in the right paratracheal and azygos regions as well as in the subcarinal space and there is some hilar adenopathy right greater than left as well. UPPER ABDOMEN: Unremarkable. OTHER: None. 1. No evidence of acute pulmonary embolic disease. 2. Right upper lobe pneumonia. 3. 12 mm pleural-based nodule at the left lower lobe. Consider correlation with PET/CT. 4. Mediastinal and bilateral hilar adenopathy of indeterminate etiology. If this fails to resolve spontaneously, tissue sampling should be considered. IMPRESSION:  · Hepatitis C  · Abdominal pain, diffuse  · PSDA, currently using Adderall and Cocaine, last used a wk ago  · Right upper lobe pneumonia - pulmonary following  · Left lower lung nodule - pulmonary following  · COPD- defer to Pulmonary  · Hx ETOH abuse - quit 1 mo ago    RECOMMENDATIONS:    · STAT CT Abd/Pelvis   · CMP, CBC/diff, antoine, lip, INR now  · Monitor LFTs daily  · Hep c tx to be addressed outpatient once drug cessation and tox screen clean    · ABX per Pulmonary  · Defer comorbidities to others  · Continue to monitor    Thank you very much for your consultation. We will follow closely with you.     Discussed with Dr. Matthew Craft developed by Dr. Laureen Loyd [id number], FNP-BC 8/22/2019 2:25 PM for Dr. Maryse Schaefer

## 2019-08-22 NOTE — PROGRESS NOTES
Pulmonary Progress Note    Admit Date: 2019  Hospital day                               PCP: Gigi Eckert DO    Chief Complaint (s):  Patient Active Problem List   Diagnosis    CTS (carpal tunnel syndrome)    Cervical arthritis    Essential hypertension    Hyperlipidemia    INOCENCIA on CPAP    Community acquired bacterial pneumonia    CAP (community acquired pneumonia) due to Chlamydia species    Acute respiratory failure with hypoxia ([facility] [address]    COPD exacerbation ([facility] [address]    Precordial chest pain       Subjective:  Camille Valenzuela continues to be breathless with any exertion and continues to be significantly bronchospastic. As stated, the chest radiograph is shown no significant clearing though his procalcitonin is trending downward.     Vitals:  VITALS:  BP (!) 146/100   Pulse 95   Temp 98.5 °F (36.9 °C) (Oral)   Resp 18   Ht 5' 7\" (1.702 m)   Wt 198 lb 4.8 oz (89.9 kg)   SpO2 98%   BMI 31.06 kg/m²     24HR INTAKE/OUTPUT:      Intake/Output Summary (Last 24 hours) at 2019 1134  Last data filed at 2019 0817  Gross per 24 hour   Intake 2495 ml   Output --   Net 2495 ml       24HR PULSE OXIMETRY RANGE:    SpO2  Av.3 %  Min: 95 %  Max: 98 %    Medications:  IV:   sodium chloride 75 mL/hr at 19 1711       Scheduled Meds:   [START ON 2019] metoprolol succinate  100 mg Oral Daily    docusate sodium  100 mg Oral Daily    sodium chloride flush  10 mL Intravenous 2 times per day    amLODIPine  10 mg Oral Daily    cloNIDine  0.1 mg Oral TID    finasteride  5 mg Oral Daily    gabapentin  300 mg Oral TID    hydrochlorothiazide  12.5 mg Oral Daily    ipratropium-albuterol  3 mL Inhalation Q4H    lisinopril  40 mg Oral Daily    nicotine  1 patch Transdermal Daily    spironolactone  25 mg Oral Daily    torsemide  10 mg Oral Once per day on     hydrALAZINE  25 mg Oral 3 times per day    enoxaparin  40 mg Subcutaneous Daily    cefTRIAXone (ROCEPHIN) IV  1 g Intravenous Q24H    formoterol  20 mcg Nebulization BID    famotidine  40 mg Oral Nightly    budesonide  500 mcg Nebulization BID    levofloxacin  500 mg Intravenous Q24H    sodium chloride  1 spray Each Nostril BID       Diet:   DIET CARDIAC;     EXAM:  General: No distress. Alert. Eyes: PERRL. No sclera icterus. No conjunctival injection. ENT: No discharge. Pharynx clear. Neck: Trachea midline. Normal thyroid. Resp: No accessory muscle use. No rales. Bilateral wheezing. Scattered rhonchi. CV: Regular rate. Regular rhythm. No murmur or rub. Abd: Non-tender. Non-distended. No masses. No organomegaly. Normal bowel sounds. Skin: Warm and dry. No nodule on exposed extremities. No rash on exposed extremities. Ext: No cyanosis, clubbing, edema  Lymph: No cervical LAD. No supraclavicular LAD. M/S: No cyanosis. No joint deformity. No clubbing. Neuro: Awake. Follows commands. Positive pupils/gag/corneals. Normal pain response. Results:  CBC:   No results for input(s): WBC, HGB, HCT, MCV, PLT in the last 72 hours. BMP:   No results for input(s): NA, K, CL, CO2, PHOS, BUN, CREATININE in the last 72 hours. Invalid input(s): CA  LIVER PROFILE:   No results for input(s): AST, ALT, LIPASE, BILIDIR, BILITOT, ALKPHOS in the last 72 hours. Invalid input(s): AMYLASE,  ALB  PT/INR: No results for input(s): PROTIME, INR in the last 72 hours. APTT: No results for input(s): APTT in the last 72 hours. Pathology:  1. N/A      Microbiology:  1. None    Recent ABG:   No results for input(s): PH, PO2, PCO2, HCO3, BE, O2SAT, METHB, O2HB, COHB, O2CON, HHB, THB in the last 72 hours. Recent Films:  XR CHEST STANDARD (2 VW)   Final Result   Infiltrate right upper lobe is unchanged. Follow-up to complete   resolution is recommended               CTA PULMONARY W CONTRAST   Final Result      1. No evidence of acute pulmonary embolic disease. 2. Right upper lobe pneumonia.    3. 12 mm pleural-based nodule at the left lower lobe. Consider   correlation with PET/CT. 4. Mediastinal and bilateral hilar adenopathy of indeterminate   etiology. If this fails to resolve spontaneously, tissue sampling   should be considered. XR CHEST PORTABLE   Final Result   Patchy right upper lobe/apical infiltrate concerning for developing   pneumonia. Surveillance is recommended. Assessment:  1. Right upper lobe pneumonia with reactive adenopathy  2. Left lower lung nodule: Unchanged as seen above    Plan:  1. Continue Levaquin  2. Continue aerosol therapies  3. Stop Rocephin  4. Trial of IPPB  5. Likely discharge tomorrow. Care reviewed with the staff and the patient's family as available. Please note that voice recognition technology was used in the preparation of this note and make therefore it may contain inadvertent transcription errors. Nithin Venegas M.D., F.C.C.P.     [facility], [address] [facility], [address] [place] [address]

## 2019-08-22 NOTE — CARE COORDINATION
Social work / Discharge Planning:       Per QFR, the patient is now on room air.    Electronically signed by PETRA Rendon on 8/22/2019 at 9:24 AM

## 2019-08-23 ENCOUNTER — APPOINTMENT (OUTPATIENT)
Dept: CT IMAGING | Age: 54
DRG: 194 | End: 2019-08-23
Payer: MEDICARE

## 2019-08-23 ENCOUNTER — APPOINTMENT (OUTPATIENT)
Dept: GENERAL RADIOLOGY | Age: 54
DRG: 194 | End: 2019-08-23
Payer: MEDICARE

## 2019-08-23 VITALS
TEMPERATURE: 99 F | HEIGHT: 67 IN | SYSTOLIC BLOOD PRESSURE: 120 MMHG | RESPIRATION RATE: 18 BRPM | OXYGEN SATURATION: 95 % | HEART RATE: 76 BPM | WEIGHT: 200.1 LBS | BODY MASS INDEX: 31.4 KG/M2 | DIASTOLIC BLOOD PRESSURE: 84 MMHG

## 2019-08-23 LAB
6AM URINE: <10 NG/ML
ALBUMIN SERPL-MCNC: 3.5 G/DL (ref 3.5–5.2)
ALP BLD-CCNC: 66 U/L (ref 40–129)
ALT SERPL-CCNC: 23 U/L (ref 0–40)
ANION GAP SERPL CALCULATED.3IONS-SCNC: 11 MMOL/L (ref 7–16)
AST SERPL-CCNC: 26 U/L (ref 0–39)
BASOPHILS ABSOLUTE: 0.04 E9/L (ref 0–0.2)
BASOPHILS RELATIVE PERCENT: 0.4 % (ref 0–2)
BILIRUB SERPL-MCNC: <0.2 MG/DL (ref 0–1.2)
BLOOD CULTURE, ROUTINE: NORMAL
BUN BLDV-MCNC: 21 MG/DL (ref 6–20)
CALCIUM SERPL-MCNC: 9.4 MG/DL (ref 8.6–10.2)
CHLORIDE BLD-SCNC: 103 MMOL/L (ref 98–107)
CO2: 23 MMOL/L (ref 22–29)
CODEINE, URINE: <20 NG/ML
CREAT SERPL-MCNC: 1.1 MG/DL (ref 0.7–1.2)
CULTURE, BLOOD 2: NORMAL
EOSINOPHILS ABSOLUTE: 0.2 E9/L (ref 0.05–0.5)
EOSINOPHILS RELATIVE PERCENT: 2.2 % (ref 0–6)
GFR AFRICAN AMERICAN: >60
GFR NON-AFRICAN AMERICAN: >60 ML/MIN/1.73
GLUCOSE BLD-MCNC: 100 MG/DL (ref 74–99)
HCT VFR BLD CALC: 38.7 % (ref 37–54)
HEMOGLOBIN: 13.2 G/DL (ref 12.5–16.5)
HYDROCODONE, URINE: <20 NG/ML
HYDROMORPHONE, URINE: <20 NG/ML
IMMATURE GRANULOCYTES #: 0.18 E9/L
IMMATURE GRANULOCYTES %: 1.9 % (ref 0–5)
INR BLD: 1
LYMPHOCYTES ABSOLUTE: 1.91 E9/L (ref 1.5–4)
LYMPHOCYTES RELATIVE PERCENT: 20.5 % (ref 20–42)
MCH RBC QN AUTO: 32.1 PG (ref 26–35)
MCHC RBC AUTO-ENTMCNC: 34.1 % (ref 32–34.5)
MCV RBC AUTO: 94.2 FL (ref 80–99.9)
MONOCYTES ABSOLUTE: 0.93 E9/L (ref 0.1–0.95)
MONOCYTES RELATIVE PERCENT: 10 % (ref 2–12)
MORPHINE URINE: <20 NG/ML
NEUTROPHILS ABSOLUTE: 6.04 E9/L (ref 1.8–7.3)
NEUTROPHILS RELATIVE PERCENT: 65 % (ref 43–80)
NORHYDROCODONE, URINE: <20 NG/ML
NOROXYCODONE, URINE: <20 NG/ML
NOROXYMORPHONE, URINE: <20 NG/ML
OXYCODONE, URINE CONFIRMATION: <20 NG/ML
OXYMORPHONE, URINE: <20 NG/ML
PDW BLD-RTO: 12.2 FL (ref 11.5–15)
PLATELET # BLD: 240 E9/L (ref 130–450)
PMV BLD AUTO: 9.9 FL (ref 7–12)
POTASSIUM SERPL-SCNC: 4.1 MMOL/L (ref 3.5–5)
PROTHROMBIN TIME: 11 SEC (ref 9.3–12.4)
RBC # BLD: 4.11 E12/L (ref 3.8–5.8)
SODIUM BLD-SCNC: 137 MMOL/L (ref 132–146)
TOTAL PROTEIN: 6.9 G/DL (ref 6.4–8.3)
WBC # BLD: 9.3 E9/L (ref 4.5–11.5)

## 2019-08-23 PROCEDURE — 36415 COLL VENOUS BLD VENIPUNCTURE: CPT

## 2019-08-23 PROCEDURE — 85610 PROTHROMBIN TIME: CPT

## 2019-08-23 PROCEDURE — 6370000000 HC RX 637 (ALT 250 FOR IP): Performed by: GENERAL PRACTICE

## 2019-08-23 PROCEDURE — 6360000002 HC RX W HCPCS: Performed by: GENERAL PRACTICE

## 2019-08-23 PROCEDURE — 6360000002 HC RX W HCPCS: Performed by: INTERNAL MEDICINE

## 2019-08-23 PROCEDURE — 85025 COMPLETE CBC W/AUTO DIFF WBC: CPT

## 2019-08-23 PROCEDURE — 2580000003 HC RX 258: Performed by: RADIOLOGY

## 2019-08-23 PROCEDURE — 94640 AIRWAY INHALATION TREATMENT: CPT

## 2019-08-23 PROCEDURE — 2580000003 HC RX 258: Performed by: GENERAL PRACTICE

## 2019-08-23 PROCEDURE — 94667 MNPJ CHEST WALL 1ST: CPT

## 2019-08-23 PROCEDURE — 80053 COMPREHEN METABOLIC PANEL: CPT

## 2019-08-23 PROCEDURE — 6360000004 HC RX CONTRAST MEDICATION: Performed by: RADIOLOGY

## 2019-08-23 PROCEDURE — 71046 X-RAY EXAM CHEST 2 VIEWS: CPT

## 2019-08-23 PROCEDURE — 74178 CT ABD&PLV WO CNTR FLWD CNTR: CPT

## 2019-08-23 RX ORDER — PSEUDOEPHEDRINE HCL 30 MG
100 TABLET ORAL DAILY
Qty: 1 CAPSULE | Refills: 0
Start: 2019-08-24 | End: 2019-08-23

## 2019-08-23 RX ORDER — FINASTERIDE 5 MG/1
5 TABLET, FILM COATED ORAL DAILY
Qty: 30 TABLET | Refills: 3 | Status: SHIPPED | OUTPATIENT
Start: 2019-08-24

## 2019-08-23 RX ORDER — GUAIFENESIN/DEXTROMETHORPHAN 100-10MG/5
5 SYRUP ORAL EVERY 6 HOURS PRN
Qty: 120 ML | Refills: 0
Start: 2019-08-23 | End: 2019-08-23

## 2019-08-23 RX ORDER — PSEUDOEPHEDRINE HCL 30 MG
100 TABLET ORAL DAILY
Qty: 1 CAPSULE | Refills: 0 | Status: ON HOLD | OUTPATIENT
Start: 2019-08-24 | End: 2019-10-02 | Stop reason: SDUPTHER

## 2019-08-23 RX ORDER — GUAIFENESIN/DEXTROMETHORPHAN 100-10MG/5
5 SYRUP ORAL EVERY 6 HOURS PRN
Qty: 120 ML | Refills: 0 | Status: SHIPPED | OUTPATIENT
Start: 2019-08-23 | End: 2019-09-02

## 2019-08-23 RX ORDER — METOPROLOL SUCCINATE 100 MG/1
100 TABLET, EXTENDED RELEASE ORAL DAILY
Qty: 30 TABLET | Refills: 3 | Status: SHIPPED | OUTPATIENT
Start: 2019-08-24

## 2019-08-23 RX ORDER — LEVOFLOXACIN 500 MG/1
500 TABLET, FILM COATED ORAL DAILY
Qty: 10 TABLET | Refills: 0 | Status: SHIPPED | OUTPATIENT
Start: 2019-08-23 | End: 2019-09-02

## 2019-08-23 RX ORDER — FINASTERIDE 5 MG/1
5 TABLET, FILM COATED ORAL DAILY
Qty: 30 TABLET | Refills: 3
Start: 2019-08-24 | End: 2019-08-23

## 2019-08-23 RX ORDER — LEVOFLOXACIN 500 MG/1
500 TABLET, FILM COATED ORAL DAILY
Qty: 10 TABLET | Refills: 0
Start: 2019-08-23 | End: 2019-08-23 | Stop reason: SDUPTHER

## 2019-08-23 RX ORDER — NICOTINE 21 MG/24HR
1 PATCH, TRANSDERMAL 24 HOURS TRANSDERMAL DAILY
Qty: 30 PATCH | Refills: 3 | Status: ON HOLD | OUTPATIENT
Start: 2019-08-24 | End: 2019-10-04 | Stop reason: HOSPADM

## 2019-08-23 RX ORDER — NICOTINE 21 MG/24HR
1 PATCH, TRANSDERMAL 24 HOURS TRANSDERMAL DAILY
Qty: 30 PATCH | Refills: 3
Start: 2019-08-24 | End: 2019-08-23

## 2019-08-23 RX ORDER — SODIUM CHLORIDE 0.9 % (FLUSH) 0.9 %
10 SYRINGE (ML) INJECTION PRN
Status: DISCONTINUED | OUTPATIENT
Start: 2019-08-23 | End: 2019-08-24 | Stop reason: HOSPADM

## 2019-08-23 RX ORDER — METOPROLOL SUCCINATE 100 MG/1
100 TABLET, EXTENDED RELEASE ORAL DAILY
Qty: 30 TABLET | Refills: 3 | Status: SHIPPED | OUTPATIENT
Start: 2019-08-24 | End: 2019-08-23

## 2019-08-23 RX ORDER — HYDROCHLOROTHIAZIDE 12.5 MG/1
12.5 TABLET ORAL DAILY
Qty: 30 TABLET | Refills: 3
Start: 2019-08-24 | End: 2019-08-23

## 2019-08-23 RX ORDER — HYDROCHLOROTHIAZIDE 12.5 MG/1
12.5 TABLET ORAL DAILY
Qty: 30 TABLET | Refills: 3 | Status: ON HOLD | OUTPATIENT
Start: 2019-08-24 | End: 2020-02-10 | Stop reason: HOSPADM

## 2019-08-23 RX ADMIN — FORMOTEROL FUMARATE DIHYDRATE 20 MCG: 20 SOLUTION RESPIRATORY (INHALATION) at 08:57

## 2019-08-23 RX ADMIN — IPRATROPIUM BROMIDE AND ALBUTEROL SULFATE 3 ML: .5; 3 SOLUTION RESPIRATORY (INHALATION) at 17:15

## 2019-08-23 RX ADMIN — CLONIDINE HYDROCHLORIDE 0.1 MG: 0.1 TABLET ORAL at 19:41

## 2019-08-23 RX ADMIN — FORMOTEROL FUMARATE DIHYDRATE 20 MCG: 20 SOLUTION RESPIRATORY (INHALATION) at 21:00

## 2019-08-23 RX ADMIN — DOCUSATE SODIUM 100 MG: 100 CAPSULE, LIQUID FILLED ORAL at 09:22

## 2019-08-23 RX ADMIN — AMLODIPINE BESYLATE 10 MG: 10 TABLET ORAL at 09:19

## 2019-08-23 RX ADMIN — LEVOFLOXACIN 500 MG: 5 INJECTION, SOLUTION INTRAVENOUS at 11:13

## 2019-08-23 RX ADMIN — GABAPENTIN 300 MG: 300 CAPSULE ORAL at 19:41

## 2019-08-23 RX ADMIN — IOHEXOL 50 ML: 240 INJECTION, SOLUTION INTRATHECAL; INTRAVASCULAR; INTRAVENOUS; ORAL at 10:10

## 2019-08-23 RX ADMIN — Medication 10 ML: at 10:11

## 2019-08-23 RX ADMIN — BUDESONIDE 500 MCG: 0.5 SUSPENSION RESPIRATORY (INHALATION) at 21:00

## 2019-08-23 RX ADMIN — GABAPENTIN 300 MG: 300 CAPSULE ORAL at 15:09

## 2019-08-23 RX ADMIN — BUDESONIDE 500 MCG: 0.5 SUSPENSION RESPIRATORY (INHALATION) at 08:57

## 2019-08-23 RX ADMIN — FINASTERIDE 5 MG: 5 TABLET, FILM COATED ORAL at 09:20

## 2019-08-23 RX ADMIN — LISINOPRIL 40 MG: 20 TABLET ORAL at 09:20

## 2019-08-23 RX ADMIN — METOPROLOL SUCCINATE 100 MG: 100 TABLET, EXTENDED RELEASE ORAL at 09:19

## 2019-08-23 RX ADMIN — GABAPENTIN 300 MG: 300 CAPSULE ORAL at 09:20

## 2019-08-23 RX ADMIN — HYDRALAZINE HYDROCHLORIDE 25 MG: 25 TABLET, FILM COATED ORAL at 15:10

## 2019-08-23 RX ADMIN — SALINE NASAL SPRAY 1 SPRAY: 1.5 SOLUTION NASAL at 11:15

## 2019-08-23 RX ADMIN — TRAMADOL HYDROCHLORIDE 50 MG: 50 TABLET, FILM COATED ORAL at 18:17

## 2019-08-23 RX ADMIN — IPRATROPIUM BROMIDE AND ALBUTEROL SULFATE 3 ML: .5; 3 SOLUTION RESPIRATORY (INHALATION) at 08:57

## 2019-08-23 RX ADMIN — HYDRALAZINE HYDROCHLORIDE 25 MG: 25 TABLET, FILM COATED ORAL at 06:06

## 2019-08-23 RX ADMIN — CLONIDINE HYDROCHLORIDE 0.1 MG: 0.1 TABLET ORAL at 09:20

## 2019-08-23 RX ADMIN — HYDRALAZINE HYDROCHLORIDE 25 MG: 25 TABLET, FILM COATED ORAL at 19:42

## 2019-08-23 RX ADMIN — TORSEMIDE 10 MG: 10 TABLET ORAL at 09:20

## 2019-08-23 RX ADMIN — SODIUM CHLORIDE: 9 INJECTION, SOLUTION INTRAVENOUS at 11:12

## 2019-08-23 RX ADMIN — TRAMADOL HYDROCHLORIDE 50 MG: 50 TABLET, FILM COATED ORAL at 11:15

## 2019-08-23 RX ADMIN — IPRATROPIUM BROMIDE AND ALBUTEROL SULFATE 3 ML: .5; 3 SOLUTION RESPIRATORY (INHALATION) at 04:32

## 2019-08-23 RX ADMIN — FAMOTIDINE 40 MG: 20 TABLET ORAL at 19:42

## 2019-08-23 RX ADMIN — IOPAMIDOL 110 ML: 755 INJECTION, SOLUTION INTRAVENOUS at 10:11

## 2019-08-23 RX ADMIN — HYDROCHLOROTHIAZIDE 12.5 MG: 12.5 TABLET ORAL at 09:20

## 2019-08-23 RX ADMIN — ENOXAPARIN SODIUM 40 MG: 40 INJECTION SUBCUTANEOUS at 09:28

## 2019-08-23 RX ADMIN — CLONIDINE HYDROCHLORIDE 0.1 MG: 0.1 TABLET ORAL at 14:00

## 2019-08-23 RX ADMIN — IPRATROPIUM BROMIDE AND ALBUTEROL SULFATE 3 ML: .5; 3 SOLUTION RESPIRATORY (INHALATION) at 21:00

## 2019-08-23 RX ADMIN — Medication 10 ML: at 09:32

## 2019-08-23 RX ADMIN — IPRATROPIUM BROMIDE AND ALBUTEROL SULFATE 3 ML: .5; 3 SOLUTION RESPIRATORY (INHALATION) at 12:19

## 2019-08-23 RX ADMIN — SPIRONOLACTONE 25 MG: 25 TABLET ORAL at 09:19

## 2019-08-23 RX ADMIN — SALINE NASAL SPRAY 1 SPRAY: 1.5 SOLUTION NASAL at 16:17

## 2019-08-23 ASSESSMENT — PAIN DESCRIPTION - ONSET: ONSET: ON-GOING

## 2019-08-23 ASSESSMENT — PAIN DESCRIPTION - FREQUENCY: FREQUENCY: INTERMITTENT

## 2019-08-23 ASSESSMENT — PAIN - FUNCTIONAL ASSESSMENT: PAIN_FUNCTIONAL_ASSESSMENT: ACTIVITIES ARE NOT PREVENTED

## 2019-08-23 ASSESSMENT — PAIN DESCRIPTION - PAIN TYPE: TYPE: ACUTE PAIN

## 2019-08-23 ASSESSMENT — PAIN DESCRIPTION - ORIENTATION: ORIENTATION: MID

## 2019-08-23 ASSESSMENT — PAIN DESCRIPTION - PROGRESSION
CLINICAL_PROGRESSION: NOT CHANGED
CLINICAL_PROGRESSION: NOT CHANGED

## 2019-08-23 ASSESSMENT — PAIN SCALES - GENERAL
PAINLEVEL_OUTOF10: 7
PAINLEVEL_OUTOF10: 8
PAINLEVEL_OUTOF10: 8

## 2019-08-23 ASSESSMENT — PAIN DESCRIPTION - LOCATION: LOCATION: CHEST

## 2019-08-23 ASSESSMENT — PAIN DESCRIPTION - DESCRIPTORS: DESCRIPTORS: STABBING

## 2019-08-23 NOTE — PROGRESS NOTES
Spoke with Dr. Rukhsana Awan to discharge-call placed to Dr. Nathaly Tafoya submit discharge.   Electronically signed by Brii Jama RN on 8/23/2019 at 4:06 PM

## 2019-08-23 NOTE — PROGRESS NOTES
Patient seen doing well from pneumonia standpoint. Ess cough and less sob. For ct of abdomen per gi. Out patient work up for hep c to be carried out.  Possible dc today

## 2019-08-23 NOTE — CARE COORDINATION
Social Work:    Social service met with Marisel Ramirez who is known to her from previous social work coverage on the 19th. Social work provided resources for help with housing, as well as [place] [phone] sleep study to notify him of Reynold's discharge home today and need for DME (BIPAP/CPAP?) set up. [facility] advised that they will contact Marisel Ramirez on his cell at [phone] to arrange the equipment.       Electronically signed by PETRA Cueto on 8/23/2019 at 3:43 PM

## 2019-08-23 NOTE — PROGRESS NOTES
Pulmonary Progress Note    Admit Date: 2019  Hospital day                               PCP: Tacho Alberts DO    Chief Complaint (s):  Patient Active Problem List   Diagnosis    CTS (carpal tunnel syndrome)    Cervical arthritis    Essential hypertension    Hyperlipidemia    INOCENCIA on CPAP    Community acquired bacterial pneumonia    CAP (community acquired pneumonia) due to Chlamydia species    Acute respiratory failure with hypoxia ([facility] [address]    COPD exacerbation ([facility] [address]    Precordial chest pain       Subjective:  · Much better today. Breathing easy.     Vitals:  VITALS:  /83   Pulse 77   Temp 97.7 °F (36.5 °C) (Oral)   Resp 20   Ht 5' 7\" (1.702 m)   Wt 200 lb 1.6 oz (90.8 kg)   SpO2 96%   BMI 31.34 kg/m²     24HR INTAKE/OUTPUT:      Intake/Output Summary (Last 24 hours) at 2019 1516  Last data filed at 2019 1448  Gross per 24 hour   Intake 640 ml   Output --   Net 640 ml       24HR PULSE OXIMETRY RANGE:    SpO2  Av %  Min: 96 %  Max: 96 %    Medications:  IV:   sodium chloride 75 mL/hr at 19 1112       Scheduled Meds:   metoprolol succinate  100 mg Oral Daily    docusate sodium  100 mg Oral Daily    sodium chloride flush  10 mL Intravenous 2 times per day    amLODIPine  10 mg Oral Daily    cloNIDine  0.1 mg Oral TID    finasteride  5 mg Oral Daily    gabapentin  300 mg Oral TID    hydrochlorothiazide  12.5 mg Oral Daily    ipratropium-albuterol  3 mL Inhalation Q4H    lisinopril  40 mg Oral Daily    nicotine  1 patch Transdermal Daily    spironolactone  25 mg Oral Daily    torsemide  10 mg Oral Once per day on     hydrALAZINE  25 mg Oral 3 times per day    enoxaparin  40 mg Subcutaneous Daily    formoterol  20 mcg Nebulization BID    famotidine  40 mg Oral Nightly    budesonide  500 mcg Nebulization BID    levofloxacin  500 mg Intravenous Q24H    sodium chloride  1 spray Each Nostril BID       Diet:   DIET CARDIAC; EXAM:  General: No distress. Alert. Eyes: PERRL. No sclera icterus. No conjunctival injection. ENT: No discharge. Pharynx clear. Neck: Trachea midline. Normal thyroid. Resp: No accessory muscle use. No rales. Bilateral wheezing. Scattered rhonchi. CV: Regular rate. Regular rhythm. No murmur or rub. Abd: Non-tender. Non-distended. No masses. No organomegaly. Normal bowel sounds. Skin: Warm and dry. No nodule on exposed extremities. No rash on exposed extremities. Ext: No cyanosis, clubbing, edema  Lymph: No cervical LAD. No supraclavicular LAD. M/S: No cyanosis. No joint deformity. No clubbing. Neuro: Awake. Follows commands. Positive pupils/gag/corneals. Normal pain response. Results:  CBC:   Recent Labs     08/22/19  1545 08/23/19  0330   WBC 9.3 9.3   HGB 13.3 13.2   HCT 38.2 38.7   MCV 94.1 94.2    240     BMP:   Recent Labs     08/22/19  1545 08/23/19  0330    137   K 4.0 4.1    103   CO2 24 23   BUN 18 21*   CREATININE 1.1 1.1     LIVER PROFILE:   Recent Labs     08/22/19  1545 08/23/19  0330   AST 24 26   ALT 22 23   LIPASE 14  --    BILITOT <0.2 <0.2   ALKPHOS 66 66     PT/INR:   Recent Labs     08/22/19  1545 08/23/19  0330   PROTIME 11.2 11.0   INR 1.0 1.0     APTT: No results for input(s): APTT in the last 72 hours. Pathology:  1. N/A      Microbiology:  1. None    Recent ABG:   No results for input(s): PH, PO2, PCO2, HCO3, BE, O2SAT, METHB, O2HB, COHB, O2CON, HHB, THB in the last 72 hours. Recent Films:  CT ABDOMEN PELVIS W WO CONTRAST Additional Contrast? Oral   Final Result   There is no acute inflammation or wall obstruction. There is   constipation. 1.1 cm pleural-based density in the left base probably scarring. Developing malignancy is less likely but surveillance is recommended   with a repeat CT scan in 8-10 weeks.             XR CHEST STANDARD (2 VW)   Final Result   Persistent focal infiltrate in the medial right upper lung

## 2019-08-23 NOTE — PROGRESS NOTES
PROGRESS NOTE    Patient Presents with/Seen in Consultation For      *Reason for Consult: hep c    CHIEF COMPLAINT:  Shortness of breath, chest pain, productive cough, and hemoptysis    Subjective:     Patient seen laying in bed, in NAD. Drinking contrast for CT today. Has been NPO. Reports to NPO for CT. With slight nausea, denies any vomiting. Soft brown stool yesterday, nothing today. +flatus. PIC reviewed with patient, all questions answered. Review of Systems  Aside from what was mentioned in the PMH and HPI, essentially unremarkable, all others negative. Objective:     Patient Vitals for the past 8 hrs:   BP Temp Temp src Pulse Resp Weight   08/23/19 0845 (!) 146/109 97.7 °F (36.5 °C) Oral 82 20 --   08/23/19 0606 (!) 158/111 -- -- -- -- --   08/23/19 0240 -- -- -- -- -- 200 lb 1.6 oz (90.8 kg)       General appearance: alert, awake, laying in bed, and cooperative, fatigue appearing  Eyes: conjunctivae/corneas clear. PERRL.   Lungs: diminished/ clear to auscultation bilaterally, with expiratory wheezing  Heart: regular rate and rhythm, no murmur, 2+ pulses; without edema  Abdomen: soft, slight- diffuse tenderness to palpitation, no guarding or rebound; bowel sounds normal; no masses  Extremities: extremities without edema  Pulses: 2+ and symmetric  Skin: Skin color dusky, dry texture, turgor normal.   Neurologic: Grossly normal      metoprolol succinate (TOPROL XL) extended release tablet 100 mg Daily   docusate sodium (COLACE) capsule 100 mg Daily   sodium chloride flush 0.9 % injection 10 mL 2 times per day   sodium chloride flush 0.9 % injection 10 mL PRN   acetaminophen (TYLENOL) tablet 650 mg Q4H PRN   amLODIPine (NORVASC) tablet 10 mg Daily   cloNIDine (CATAPRES) tablet 0.1 mg TID   finasteride (PROSCAR) tablet 5 mg Daily   gabapentin (NEURONTIN) capsule 300 mg TID   hydrochlorothiazide (HYDRODIURIL) tablet 12.5 mg Daily   ipratropium-albuterol (DUONEB) nebulizer solution 3 mL Q4H   lisinopril (PRINIVIL;ZESTRIL) tablet 40 mg Daily   nicotine (NICODERM CQ) 21 MG/24HR 1 patch Daily   spironolactone (ALDACTONE) tablet 25 mg Daily   torsemide (DEMADEX) tablet 10 mg Once per day on Mon Wed Fri   traMADol (ULTRAM) tablet 50 mg Q6H PRN   hydrALAZINE (APRESOLINE) tablet 25 mg 3 times per day   0.9 % sodium chloride infusion Continuous   enoxaparin (LOVENOX) injection 40 mg Daily   guaiFENesin-dextromethorphan (ROBITUSSIN DM) 100-10 MG/5ML syrup 5 mL Q6H PRN   formoterol (PERFOROMIST) nebulizer solution 20 mcg BID   famotidine (PEPCID) tablet 40 mg Nightly   budesonide (PULMICORT) nebulizer suspension 500 mcg BID   levofloxacin (LEVAQUIN) 500 MG/100ML infusion 500 mg Q24H   sodium chloride (OCEAN, BABY AYR) 0.65 % nasal spray 1 spray BID        Data Review  CBC: Lab Results   Component Value Date    WBC 9.3 08/23/2019    RBC 4.11 08/23/2019    HGB 13.2 08/23/2019    HCT 38.7 08/23/2019    MCV 94.2 08/23/2019    MCH 32.1 08/23/2019    MCHC 34.1 08/23/2019    RDW 12.2 08/23/2019     08/23/2019    MPV 9.9 08/23/2019     CMP:  Lab Results   Component Value Date     08/23/2019    K 4.1 08/23/2019    K 4.1 03/07/2018     08/23/2019    CO2 23 08/23/2019    BUN 21 08/23/2019    CREATININE 1.1 08/23/2019    GFRAA >60 08/23/2019    LABGLOM >60 08/23/2019    GLUCOSE 100 08/23/2019    PROT 6.9 08/23/2019    LABALBU 3.5 08/23/2019    CALCIUM 9.4 08/23/2019    BILITOT <0.2 08/23/2019    ALKPHOS 66 08/23/2019    AST 26 08/23/2019    ALT 23 08/23/2019     Hepatic Function Panel:  Lab Results   Component Value Date    ALKPHOS 66 08/23/2019    ALT 23 08/23/2019    AST 26 08/23/2019    PROT 6.9 08/23/2019    BILITOT <0.2 08/23/2019    BILIDIR <0.2 08/17/2019    IBILI see below 08/17/2019    LABALBU 3.5 08/23/2019     No components found for: CHLPL  Lab Results   Component Value Date    TRIG 217 (H) 01/02/2018    TRIG 172 (H) 12/12/2016    TRIG 251 (H) 02/07/2016     Lab Results   Component Value Date    HDL 58 01/02/2018    HDL 55 12/12/2016    HDL 11 02/07/2016     Lab Results   Component Value Date    LDLCALC 144 (H) 01/02/2018    LDLCALC 146 (H) 12/12/2016    LDLCALC 84 02/07/2016     Lab Results   Component Value Date    LABVLDL 43 01/02/2018    LABVLDL 34 12/12/2016    LABVLDL 50 02/07/2016      PT/INR:    Lab Results   Component Value Date    PROTIME 11.0 08/23/2019    INR 1.0 08/23/2019     IRON:    Lab Results   Component Value Date    IRON 107 04/18/2019       Assessment:     Active Problems:  ? Hepatitis C  ? Abdominal pain, diffuse  ? PSDA, currently using Adderall and Cocaine, last used a wk ago  ? Right upper lobe pneumonia - pulmonary following  ? Left lower lung nodule - pulmonary following  ? COPD- defer to Pulmonary  ? Hx ETOH abuse - quit 1 mo ago      Plan:     · CT Abd/Pelvis today  ? Monitor LFTs daily  ? Hep c tx to be addressed outpatient once drug cessation and tox screen clean    ? ABX per Pulmonary  ? Defer comorbidities to others  ?  Continue to monitor    Discussed with Dr. Maryam Gresham per Dr. Samra North, NP-C 8/23/2019 9:46 AM For Dr. Austin Tafoya

## 2019-08-23 NOTE — CARE COORDINATION
CM attempted to meet with pt @ 1410 & again at 1430. Pt later confirmed he was up to BR. Discussed CPAP & need for OP sleep study. Informed him we are waiting for pulmonology eval today.

## 2019-08-23 NOTE — PROGRESS NOTES
P Quality Flow/Interdisciplinary Rounds Progress Note        Quality Flow Rounds held on August 23, 2019    Disciplines Attending:  Bedside Nurse, ,  and Nursing Unit Leadership    Larry Roman was admitted on 8/17/2019  7:59 PM    Anticipated Discharge Date:  Expected Discharge Date: 08/22/19    Disposition:    Erick Score:  Erick Scale Score: 22    Readmission Risk              Risk of Unplanned Readmission:        19           Discussed patient goal for the day, patient clinical progression, and barriers to discharge. The following Goal(s) of the Day/Commitment(s) have been identified:  Check CT/Abd Pelvis-check Pulmonary plan-hopeful discharge vs. Transfer to Med-Surg.       Magdiel Bras  August 23, 2019

## 2019-08-23 NOTE — PROGRESS NOTES
Second call to Dr. Adron Gowers for discharge medication reconciliation.   Electronically signed by Magdiel Doyle RN on 8/23/2019 at 6:26 PM

## 2019-08-24 NOTE — PROGRESS NOTES
Call placed to Dr. Nathanael Brito regarding inability to reach Dr. Macarena Benitez after multiple attempts.

## 2019-08-25 NOTE — DISCHARGE SUMMARY
[id number] [address]                               DISCHARGE SUMMARY    PATIENT NAME: Gabriele García                  :        1965  MED REC NO:   [id number]                            ROOM:       0604  ACCOUNT NO:   [de-identified]                           ADMIT DATE: 2019  PROVIDER:     Rowdy Prado DO                  [address] DATE: 2019    DATE OF ADMISSION:  2019. DATE OF DISCHARGE:  2019. FINAL DIAGNOSES:  1. Right upper lobe pneumonia. 2.  COPD with hypoxia. 3.  Hemoptysis. 4.  Tobacco abuse. 5.  Polysubstance drug abuse. 6.  Hypertension. 7.  Hepatitis C.  8.  Gastroesophageal reflux disorder. 9.  Degenerative disk disease of cervical and lumbar spine. 10.  Chronic myofascial pain syndrome. HISTORY AND HOSPITAL COURSE:  This is a 68-year-old male with  significant medical history of COPD, who presented to the Emergency  Department with the increased shortness of breath and hemoptysis over  the last several days. Seems to be getting worse. He has not been able  to use his metered-dose inhalers, was on Breo at home, but has not  received any in a while because of mail-order backup. He states that he  has been spitting up some blood and coughing very hard, creating a lot  of chest pain. He is a heavy smoker of at least 60 pack-year. He has  been advised to quit on a number of occasions. He also has a problem  with polysubstance drug abuse including cocaine,, marijuana, and  opioids; all of which are present in his drug screen at this point. He  is quite hypertensive and fairly noncompliant with his medications. He  was afebrile throughout his admission. He had a procalcitonin level of  only 0.007. As previously mentioned, drug screen was positive for  opiates and cocaine. _____ chest pain, cardiac enzymes were obtained  and were found to be less than 0.1. ProBNP was elevated 8989. Liver  enzymes were actually normal, although he is known to have active  hepatitis C. Procalcitonin was up at 0.22.  CO2 was low at 19. White  count was elevated 11.6, slight left shift. Blood cultures were  obtained and failed to demonstrate any growth. Respiratory FilmArray  was obtained for a recent viral infection, none was detected. Legionella and strep and urine antigens were negative. He had a CTA of  his chest, which demonstrated right upper lobe pneumonia with  mediastinal bilateral hilar adenopathy of indeterminate etiology, most  likely suspected to be related to this recent pneumonia. EKG was  negative with the exception of sinus tachycardia. Subsequent repeat  chest x-rays again showed infiltrate in the right upper lobe. He seems  to be getting better with his pulmonary status. He asked if we would  have GI see him for his hepatitis and they did and CT abdomen and pelvis  was obtained; 1 cm pleural place density in the left base, probably  scar. They recommend follow up CT of his chest.  No intra-abdominal  pathology was noted. He was placed on Levaquin IV, maintained on all of  his blood pressure medications, although we did have to make an  adjustment; up his Toprol from 50 to 100. We maintained his  hydrochlorothiazide, Catapres, and hydralazine and amlodipine. He was  given long-acting and short-acting beta-agonists. He seemed to do okay  with this. He feels a lot better and he is kind of anxious to go home. He seemed to be stable at this point. We are going to discharge him and  he will have close follow up with me. He is again advised to absolutely  stop smoking and also stop his habit of drug abuse. He is going to need  close follow up for complete resolution of this right upper lobe  pneumonia and adenopathy. If hemoptysis continues, at have some point,  he is going to need bronchoscopy and Pulmonology has pointed this out to  him as well.   They will be following him as an outpatient as well. At  the time of discharge, his condition was improved, but guarded and he  will go home and we will see him next week in the office.         Amanda Pham DO    D: 08/24/2019 9:20:45       T: 08/24/2019 9:31:56     [email or web address]  Job#: [id number]     Doc#: [id number]    CC:

## 2019-08-26 LAB
HCV QNT BY NAAT IU/ML: ABNORMAL
HCV QNT BY NAAT LOG IU/ML: 7.35 LOG IU/ML
INTERPRETATION: DETECTED

## 2019-10-01 ENCOUNTER — APPOINTMENT (OUTPATIENT)
Dept: GENERAL RADIOLOGY | Age: 54
DRG: 291 | End: 2019-10-01
Payer: MEDICARE

## 2019-10-01 ENCOUNTER — HOSPITAL ENCOUNTER (INPATIENT)
Age: 54
LOS: 3 days | Discharge: HOME | DRG: 291 | End: 2019-10-04
Attending: EMERGENCY MEDICINE | Admitting: GENERAL PRACTICE
Payer: MEDICARE

## 2019-10-01 DIAGNOSIS — R35.0 BENIGN PROSTATIC HYPERPLASIA WITH URINARY FREQUENCY: ICD-10-CM

## 2019-10-01 DIAGNOSIS — J96.01 ACUTE RESPIRATORY FAILURE WITH HYPOXIA (HCC): Primary | ICD-10-CM

## 2019-10-01 DIAGNOSIS — N40.1 BENIGN PROSTATIC HYPERPLASIA WITH URINARY FREQUENCY: ICD-10-CM

## 2019-10-01 DIAGNOSIS — F17.211 CIGARETTE NICOTINE DEPENDENCE IN REMISSION: ICD-10-CM

## 2019-10-01 DIAGNOSIS — J15.9 COMMUNITY ACQUIRED BACTERIAL PNEUMONIA: ICD-10-CM

## 2019-10-01 DIAGNOSIS — K21.9 GASTROESOPHAGEAL REFLUX DISEASE WITHOUT ESOPHAGITIS: ICD-10-CM

## 2019-10-01 DIAGNOSIS — J18.9 HCAP (HEALTHCARE-ASSOCIATED PNEUMONIA): ICD-10-CM

## 2019-10-01 DIAGNOSIS — A41.9 SEPSIS, DUE TO UNSPECIFIED ORGANISM, UNSPECIFIED WHETHER ACUTE ORGAN DYSFUNCTION PRESENT (HCC): ICD-10-CM

## 2019-10-01 LAB
ALBUMIN SERPL-MCNC: 4 G/DL (ref 3.5–5.2)
ALP BLD-CCNC: 64 U/L (ref 40–129)
ALT SERPL-CCNC: 15 U/L (ref 0–40)
ANION GAP SERPL CALCULATED.3IONS-SCNC: 17 MMOL/L (ref 7–16)
AST SERPL-CCNC: 25 U/L (ref 0–39)
B.E.: -5.1 MMOL/L (ref -3–3)
BASOPHILS ABSOLUTE: 0.04 E9/L (ref 0–0.2)
BASOPHILS RELATIVE PERCENT: 0.3 % (ref 0–2)
BILIRUB SERPL-MCNC: 0.6 MG/DL (ref 0–1.2)
BUN BLDV-MCNC: 17 MG/DL (ref 6–20)
CALCIUM SERPL-MCNC: 9.1 MG/DL (ref 8.6–10.2)
CHLORIDE BLD-SCNC: 104 MMOL/L (ref 98–107)
CO2: 18 MMOL/L (ref 22–29)
COHB: 1.1 % (ref 0–1.5)
CREAT SERPL-MCNC: 1.1 MG/DL (ref 0.7–1.2)
CRITICAL: ABNORMAL
DATE ANALYZED: ABNORMAL
DATE OF COLLECTION: ABNORMAL
EKG ATRIAL RATE: 106 BPM
EKG P AXIS: 40 DEGREES
EKG P-R INTERVAL: 116 MS
EKG Q-T INTERVAL: 396 MS
EKG QRS DURATION: 86 MS
EKG QTC CALCULATION (BAZETT): 526 MS
EKG R AXIS: 9 DEGREES
EKG T AXIS: 38 DEGREES
EKG VENTRICULAR RATE: 106 BPM
EOSINOPHILS ABSOLUTE: 0.26 E9/L (ref 0.05–0.5)
EOSINOPHILS RELATIVE PERCENT: 2 % (ref 0–6)
FILM ARRAY ADENOVIRUS: NORMAL
FILM ARRAY BORDETELLA PERTUSSIS: NORMAL
FILM ARRAY CHLAMYDOPHILIA PNEUMONIAE: NORMAL
FILM ARRAY CORONAVIRUS 229E: NORMAL
FILM ARRAY CORONAVIRUS HKU1: NORMAL
FILM ARRAY CORONAVIRUS NL63: NORMAL
FILM ARRAY CORONAVIRUS OC43: NORMAL
FILM ARRAY INFLUENZA A VIRUS 09H1: NORMAL
FILM ARRAY INFLUENZA A VIRUS H1: NORMAL
FILM ARRAY INFLUENZA A VIRUS H3: NORMAL
FILM ARRAY INFLUENZA A VIRUS: NORMAL
FILM ARRAY INFLUENZA B: NORMAL
FILM ARRAY METAPNEUMOVIRUS: NORMAL
FILM ARRAY MYCOPLASMA PNEUMONIAE: NORMAL
FILM ARRAY PARAINFLUENZA VIRUS 1: NORMAL
FILM ARRAY PARAINFLUENZA VIRUS 2: NORMAL
FILM ARRAY PARAINFLUENZA VIRUS 3: NORMAL
FILM ARRAY PARAINFLUENZA VIRUS 4: NORMAL
FILM ARRAY RESPIRATORY SYNCITIAL VIRUS: NORMAL
FILM ARRAY RHINOVIRUS/ENTEROVIRUS: NORMAL
GFR AFRICAN AMERICAN: >60
GFR NON-AFRICAN AMERICAN: >60 ML/MIN/1.73
GLUCOSE BLD-MCNC: 108 MG/DL (ref 74–99)
HCO3: 17.2 MMOL/L (ref 22–26)
HCT VFR BLD CALC: 39.2 % (ref 37–54)
HEMOGLOBIN: 13.7 G/DL (ref 12.5–16.5)
HHB: 6.1 % (ref 0–5)
IMMATURE GRANULOCYTES #: 0.05 E9/L
IMMATURE GRANULOCYTES %: 0.4 % (ref 0–5)
LAB: ABNORMAL
LACTIC ACID: 1 MMOL/L (ref 0.5–2.2)
LYMPHOCYTES ABSOLUTE: 2.37 E9/L (ref 1.5–4)
LYMPHOCYTES RELATIVE PERCENT: 17.8 % (ref 20–42)
Lab: ABNORMAL
MCH RBC QN AUTO: 32.2 PG (ref 26–35)
MCHC RBC AUTO-ENTMCNC: 34.9 % (ref 32–34.5)
MCV RBC AUTO: 92 FL (ref 80–99.9)
METHB: 0.2 % (ref 0–1.5)
MODE: ABNORMAL
MONOCYTES ABSOLUTE: 1 E9/L (ref 0.1–0.95)
MONOCYTES RELATIVE PERCENT: 7.5 % (ref 2–12)
NEUTROPHILS ABSOLUTE: 9.56 E9/L (ref 1.8–7.3)
NEUTROPHILS RELATIVE PERCENT: 72 % (ref 43–80)
O2 CONTENT: 18.4 ML/DL
O2 SATURATION: 93.8 % (ref 92–98.5)
O2HB: 92.6 % (ref 94–97)
OPERATOR ID: 2860
PATIENT TEMP: 37 C
PCO2: 25.6 MMHG (ref 35–45)
PDW BLD-RTO: 14.1 FL (ref 11.5–15)
PH BLOOD GAS: 7.45 (ref 7.35–7.45)
PLATELET # BLD: 276 E9/L (ref 130–450)
PMV BLD AUTO: 9.7 FL (ref 7–12)
PO2: 69.4 MMHG (ref 60–100)
POTASSIUM REFLEX MAGNESIUM: 3.8 MMOL/L (ref 3.5–5)
POTASSIUM SERPL-SCNC: 3.52 MMOL/L (ref 3.3–5.1)
PROCALCITONIN: 0.05 NG/ML (ref 0–0.08)
RBC # BLD: 4.26 E12/L (ref 3.8–5.8)
SODIUM BLD-SCNC: 139 MMOL/L (ref 132–146)
SOURCE, BLOOD GAS: ABNORMAL
THB: 14.1 G/DL (ref 11.5–16.5)
TIME ANALYZED: 228
TOTAL PROTEIN: 7.8 G/DL (ref 6.4–8.3)
TROPONIN: <0.01 NG/ML (ref 0–0.03)
WBC # BLD: 13.3 E9/L (ref 4.5–11.5)

## 2019-10-01 PROCEDURE — 93010 ELECTROCARDIOGRAM REPORT: CPT | Performed by: INTERNAL MEDICINE

## 2019-10-01 PROCEDURE — 94664 DEMO&/EVAL PT USE INHALER: CPT

## 2019-10-01 PROCEDURE — 6360000002 HC RX W HCPCS: Performed by: EMERGENCY MEDICINE

## 2019-10-01 PROCEDURE — 6360000002 HC RX W HCPCS: Performed by: GENERAL PRACTICE

## 2019-10-01 PROCEDURE — 36415 COLL VENOUS BLD VENIPUNCTURE: CPT

## 2019-10-01 PROCEDURE — 96375 TX/PRO/DX INJ NEW DRUG ADDON: CPT

## 2019-10-01 PROCEDURE — 2580000003 HC RX 258: Performed by: EMERGENCY MEDICINE

## 2019-10-01 PROCEDURE — 84145 PROCALCITONIN (PCT): CPT

## 2019-10-01 PROCEDURE — 6370000000 HC RX 637 (ALT 250 FOR IP): Performed by: GENERAL PRACTICE

## 2019-10-01 PROCEDURE — 6370000000 HC RX 637 (ALT 250 FOR IP): Performed by: EMERGENCY MEDICINE

## 2019-10-01 PROCEDURE — 96365 THER/PROPH/DIAG IV INF INIT: CPT

## 2019-10-01 PROCEDURE — 2580000003 HC RX 258: Performed by: GENERAL PRACTICE

## 2019-10-01 PROCEDURE — 84132 ASSAY OF SERUM POTASSIUM: CPT

## 2019-10-01 PROCEDURE — 87633 RESP VIRUS 12-25 TARGETS: CPT

## 2019-10-01 PROCEDURE — 87581 M.PNEUMON DNA AMP PROBE: CPT

## 2019-10-01 PROCEDURE — 93005 ELECTROCARDIOGRAM TRACING: CPT | Performed by: EMERGENCY MEDICINE

## 2019-10-01 PROCEDURE — 84484 ASSAY OF TROPONIN QUANT: CPT

## 2019-10-01 PROCEDURE — 94640 AIRWAY INHALATION TREATMENT: CPT

## 2019-10-01 PROCEDURE — 6360000002 HC RX W HCPCS: Performed by: INTERNAL MEDICINE

## 2019-10-01 PROCEDURE — 83605 ASSAY OF LACTIC ACID: CPT

## 2019-10-01 PROCEDURE — 2700000000 HC OXYGEN THERAPY PER DAY

## 2019-10-01 PROCEDURE — 99285 EMERGENCY DEPT VISIT HI MDM: CPT

## 2019-10-01 PROCEDURE — 82805 BLOOD GASES W/O2 SATURATION: CPT

## 2019-10-01 PROCEDURE — 87040 BLOOD CULTURE FOR BACTERIA: CPT

## 2019-10-01 PROCEDURE — 87486 CHLMYD PNEUM DNA AMP PROBE: CPT

## 2019-10-01 PROCEDURE — 2140000000 HC CCU INTERMEDIATE R&B

## 2019-10-01 PROCEDURE — 85025 COMPLETE CBC W/AUTO DIFF WBC: CPT

## 2019-10-01 PROCEDURE — 71045 X-RAY EXAM CHEST 1 VIEW: CPT

## 2019-10-01 PROCEDURE — 87798 DETECT AGENT NOS DNA AMP: CPT

## 2019-10-01 PROCEDURE — 80053 COMPREHEN METABOLIC PANEL: CPT

## 2019-10-01 RX ORDER — IPRATROPIUM BROMIDE AND ALBUTEROL SULFATE 2.5; .5 MG/3ML; MG/3ML
1 SOLUTION RESPIRATORY (INHALATION) EVERY 4 HOURS PRN
Status: DISCONTINUED | OUTPATIENT
Start: 2019-10-01 | End: 2019-10-04 | Stop reason: HOSPADM

## 2019-10-01 RX ORDER — FAMOTIDINE 20 MG/1
40 TABLET, FILM COATED ORAL DAILY
Status: DISCONTINUED | OUTPATIENT
Start: 2019-10-01 | End: 2019-10-04 | Stop reason: HOSPADM

## 2019-10-01 RX ORDER — HYDROCHLOROTHIAZIDE 12.5 MG/1
12.5 TABLET ORAL DAILY
Status: DISCONTINUED | OUTPATIENT
Start: 2019-10-01 | End: 2019-10-04 | Stop reason: HOSPADM

## 2019-10-01 RX ORDER — SODIUM CHLORIDE 0.9 % (FLUSH) 0.9 %
10 SYRINGE (ML) INJECTION 2 TIMES DAILY
Status: DISCONTINUED | OUTPATIENT
Start: 2019-10-01 | End: 2019-10-04 | Stop reason: HOSPADM

## 2019-10-01 RX ORDER — HYDRALAZINE HYDROCHLORIDE 25 MG/1
25 TABLET, FILM COATED ORAL EVERY 8 HOURS SCHEDULED
Status: DISCONTINUED | OUTPATIENT
Start: 2019-10-01 | End: 2019-10-01

## 2019-10-01 RX ORDER — NICOTINE 21 MG/24HR
1 PATCH, TRANSDERMAL 24 HOURS TRANSDERMAL DAILY
Status: DISCONTINUED | OUTPATIENT
Start: 2019-10-01 | End: 2019-10-04 | Stop reason: HOSPADM

## 2019-10-01 RX ORDER — FINASTERIDE 5 MG/1
5 TABLET, FILM COATED ORAL DAILY
Status: DISCONTINUED | OUTPATIENT
Start: 2019-10-01 | End: 2019-10-04 | Stop reason: HOSPADM

## 2019-10-01 RX ORDER — ALBUTEROL SULFATE 90 UG/1
2 AEROSOL, METERED RESPIRATORY (INHALATION) EVERY 4 HOURS PRN
Status: DISCONTINUED | OUTPATIENT
Start: 2019-10-01 | End: 2019-10-01

## 2019-10-01 RX ORDER — METHYLPREDNISOLONE SODIUM SUCCINATE 40 MG/ML
40 INJECTION, POWDER, LYOPHILIZED, FOR SOLUTION INTRAMUSCULAR; INTRAVENOUS EVERY 8 HOURS
Status: COMPLETED | OUTPATIENT
Start: 2019-10-01 | End: 2019-10-02

## 2019-10-01 RX ORDER — LISINOPRIL 20 MG/1
40 TABLET ORAL DAILY
Status: DISCONTINUED | OUTPATIENT
Start: 2019-10-01 | End: 2019-10-04 | Stop reason: HOSPADM

## 2019-10-01 RX ORDER — DOCUSATE SODIUM 100 MG/1
100 CAPSULE, LIQUID FILLED ORAL DAILY
Status: DISCONTINUED | OUTPATIENT
Start: 2019-10-01 | End: 2019-10-04 | Stop reason: HOSPADM

## 2019-10-01 RX ORDER — CLONIDINE HYDROCHLORIDE 0.1 MG/1
0.1 TABLET ORAL 3 TIMES DAILY
Status: DISCONTINUED | OUTPATIENT
Start: 2019-10-01 | End: 2019-10-04 | Stop reason: HOSPADM

## 2019-10-01 RX ORDER — FORMOTEROL FUMARATE 20 UG/2ML
20 SOLUTION RESPIRATORY (INHALATION) 2 TIMES DAILY
Status: DISCONTINUED | OUTPATIENT
Start: 2019-10-01 | End: 2019-10-04 | Stop reason: HOSPADM

## 2019-10-01 RX ORDER — HYDRALAZINE HYDROCHLORIDE 25 MG/1
25 TABLET, FILM COATED ORAL EVERY 8 HOURS SCHEDULED
Status: DISCONTINUED | OUTPATIENT
Start: 2019-10-01 | End: 2019-10-04 | Stop reason: HOSPADM

## 2019-10-01 RX ORDER — HYDRALAZINE HYDROCHLORIDE 25 MG/1
25 TABLET, FILM COATED ORAL ONCE
Status: COMPLETED | OUTPATIENT
Start: 2019-10-01 | End: 2019-10-01

## 2019-10-01 RX ORDER — IPRATROPIUM BROMIDE AND ALBUTEROL SULFATE 2.5; .5 MG/3ML; MG/3ML
3 SOLUTION RESPIRATORY (INHALATION) ONCE
Status: COMPLETED | OUTPATIENT
Start: 2019-10-01 | End: 2019-10-01

## 2019-10-01 RX ORDER — METOPROLOL SUCCINATE 100 MG/1
100 TABLET, EXTENDED RELEASE ORAL DAILY
Status: DISCONTINUED | OUTPATIENT
Start: 2019-10-01 | End: 2019-10-04 | Stop reason: HOSPADM

## 2019-10-01 RX ORDER — AMLODIPINE BESYLATE 10 MG/1
10 TABLET ORAL DAILY
Status: DISCONTINUED | OUTPATIENT
Start: 2019-10-01 | End: 2019-10-04 | Stop reason: HOSPADM

## 2019-10-01 RX ORDER — METHYLPREDNISOLONE SODIUM SUCCINATE 125 MG/2ML
125 INJECTION, POWDER, LYOPHILIZED, FOR SOLUTION INTRAMUSCULAR; INTRAVENOUS ONCE
Status: COMPLETED | OUTPATIENT
Start: 2019-10-01 | End: 2019-10-01

## 2019-10-01 RX ORDER — CLONIDINE HYDROCHLORIDE 0.1 MG/1
0.1 TABLET ORAL ONCE
Status: COMPLETED | OUTPATIENT
Start: 2019-10-01 | End: 2019-10-01

## 2019-10-01 RX ORDER — BUDESONIDE 0.25 MG/2ML
500 INHALANT ORAL 2 TIMES DAILY
Status: DISCONTINUED | OUTPATIENT
Start: 2019-10-01 | End: 2019-10-04 | Stop reason: HOSPADM

## 2019-10-01 RX ORDER — GABAPENTIN 300 MG/1
300 CAPSULE ORAL 3 TIMES DAILY
Status: DISCONTINUED | OUTPATIENT
Start: 2019-10-01 | End: 2019-10-04 | Stop reason: HOSPADM

## 2019-10-01 RX ORDER — IPRATROPIUM BROMIDE AND ALBUTEROL SULFATE 2.5; .5 MG/3ML; MG/3ML
3 SOLUTION RESPIRATORY (INHALATION) EVERY 4 HOURS
Status: DISCONTINUED | OUTPATIENT
Start: 2019-10-01 | End: 2019-10-04 | Stop reason: HOSPADM

## 2019-10-01 RX ORDER — BUDESONIDE 0.25 MG/2ML
250 INHALANT ORAL 2 TIMES DAILY
Status: DISCONTINUED | OUTPATIENT
Start: 2019-10-01 | End: 2019-10-01

## 2019-10-01 RX ADMIN — CLONIDINE HYDROCHLORIDE 0.1 MG: 0.1 TABLET ORAL at 04:51

## 2019-10-01 RX ADMIN — METHYLPREDNISOLONE SODIUM SUCCINATE 40 MG: 40 INJECTION, POWDER, FOR SOLUTION INTRAMUSCULAR; INTRAVENOUS at 11:30

## 2019-10-01 RX ADMIN — HYDRALAZINE HYDROCHLORIDE 25 MG: 25 TABLET, FILM COATED ORAL at 04:51

## 2019-10-01 RX ADMIN — METHYLPREDNISOLONE SODIUM SUCCINATE 40 MG: 40 INJECTION, POWDER, FOR SOLUTION INTRAMUSCULAR; INTRAVENOUS at 20:26

## 2019-10-01 RX ADMIN — HYDRALAZINE HYDROCHLORIDE 25 MG: 25 TABLET, FILM COATED ORAL at 21:44

## 2019-10-01 RX ADMIN — IPRATROPIUM BROMIDE AND ALBUTEROL SULFATE 3 ML: .5; 3 SOLUTION RESPIRATORY (INHALATION) at 21:58

## 2019-10-01 RX ADMIN — CLONIDINE HYDROCHLORIDE 0.1 MG: 0.1 TABLET ORAL at 13:49

## 2019-10-01 RX ADMIN — GABAPENTIN 300 MG: 100 CAPSULE ORAL at 13:49

## 2019-10-01 RX ADMIN — LISINOPRIL 40 MG: 20 TABLET ORAL at 12:17

## 2019-10-01 RX ADMIN — IPRATROPIUM BROMIDE AND ALBUTEROL SULFATE 3 AMPULE: .5; 3 SOLUTION RESPIRATORY (INHALATION) at 01:50

## 2019-10-01 RX ADMIN — DOCUSATE SODIUM 100 MG: 100 CAPSULE, LIQUID FILLED ORAL at 11:30

## 2019-10-01 RX ADMIN — CLONIDINE HYDROCHLORIDE 0.1 MG: 0.1 TABLET ORAL at 20:26

## 2019-10-01 RX ADMIN — BUDESONIDE 500 MCG: 0.25 SUSPENSION RESPIRATORY (INHALATION) at 21:54

## 2019-10-01 RX ADMIN — PIPERACILLIN SODIUM AND TAZOBACTAM SODIUM 3.38 G: 3; .375 INJECTION, POWDER, LYOPHILIZED, FOR SOLUTION INTRAVENOUS at 03:36

## 2019-10-01 RX ADMIN — BUDESONIDE 250 MCG: 0.25 SUSPENSION RESPIRATORY (INHALATION) at 12:30

## 2019-10-01 RX ADMIN — Medication 1.5 G: at 04:50

## 2019-10-01 RX ADMIN — FORMOTEROL FUMARATE DIHYDRATE 20 MCG: 20 SOLUTION RESPIRATORY (INHALATION) at 12:30

## 2019-10-01 RX ADMIN — METHYLPREDNISOLONE SODIUM SUCCINATE 125 MG: 125 INJECTION, POWDER, FOR SOLUTION INTRAMUSCULAR; INTRAVENOUS at 02:15

## 2019-10-01 RX ADMIN — HYDROCHLOROTHIAZIDE 12.5 MG: 12.5 TABLET ORAL at 12:17

## 2019-10-01 RX ADMIN — FAMOTIDINE 40 MG: 20 TABLET ORAL at 11:30

## 2019-10-01 RX ADMIN — FINASTERIDE 5 MG: 5 TABLET, FILM COATED ORAL at 12:17

## 2019-10-01 RX ADMIN — Medication 10 ML: at 20:26

## 2019-10-01 RX ADMIN — Medication 10 ML: at 13:49

## 2019-10-01 RX ADMIN — IPRATROPIUM BROMIDE AND ALBUTEROL SULFATE 3 ML: .5; 3 SOLUTION RESPIRATORY (INHALATION) at 17:45

## 2019-10-01 RX ADMIN — METOPROLOL SUCCINATE 100 MG: 100 TABLET, EXTENDED RELEASE ORAL at 11:30

## 2019-10-01 RX ADMIN — HYDRALAZINE HYDROCHLORIDE 25 MG: 25 TABLET, FILM COATED ORAL at 13:49

## 2019-10-01 RX ADMIN — FORMOTEROL FUMARATE DIHYDRATE 20 MCG: 20 SOLUTION RESPIRATORY (INHALATION) at 21:50

## 2019-10-01 RX ADMIN — IPRATROPIUM BROMIDE AND ALBUTEROL SULFATE 3 ML: .5; 3 SOLUTION RESPIRATORY (INHALATION) at 12:30

## 2019-10-01 RX ADMIN — GABAPENTIN 300 MG: 100 CAPSULE ORAL at 20:27

## 2019-10-01 RX ADMIN — AMLODIPINE BESYLATE 10 MG: 10 TABLET ORAL at 12:17

## 2019-10-01 ASSESSMENT — PAIN SCALES - GENERAL
PAINLEVEL_OUTOF10: 10
PAINLEVEL_OUTOF10: 7
PAINLEVEL_OUTOF10: 0

## 2019-10-01 ASSESSMENT — PAIN DESCRIPTION - DESCRIPTORS
DESCRIPTORS: DISCOMFORT;SORE
DESCRIPTORS: DISCOMFORT

## 2019-10-01 ASSESSMENT — PAIN DESCRIPTION - PAIN TYPE
TYPE: ACUTE PAIN
TYPE: ACUTE PAIN

## 2019-10-01 ASSESSMENT — PAIN DESCRIPTION - LOCATION
LOCATION: FLANK
LOCATION: CHEST

## 2019-10-02 LAB — PRO-BNP: 5235 PG/ML (ref 0–125)

## 2019-10-02 PROCEDURE — 2580000003 HC RX 258: Performed by: GENERAL PRACTICE

## 2019-10-02 PROCEDURE — 6370000000 HC RX 637 (ALT 250 FOR IP): Performed by: GENERAL PRACTICE

## 2019-10-02 PROCEDURE — 2140000000 HC CCU INTERMEDIATE R&B

## 2019-10-02 PROCEDURE — 94640 AIRWAY INHALATION TREATMENT: CPT

## 2019-10-02 PROCEDURE — 6360000002 HC RX W HCPCS: Performed by: GENERAL PRACTICE

## 2019-10-02 PROCEDURE — 83880 ASSAY OF NATRIURETIC PEPTIDE: CPT

## 2019-10-02 PROCEDURE — 36415 COLL VENOUS BLD VENIPUNCTURE: CPT

## 2019-10-02 PROCEDURE — 6360000002 HC RX W HCPCS: Performed by: INTERNAL MEDICINE

## 2019-10-02 PROCEDURE — 2700000000 HC OXYGEN THERAPY PER DAY

## 2019-10-02 PROCEDURE — 2500000003 HC RX 250 WO HCPCS: Performed by: INTERNAL MEDICINE

## 2019-10-02 RX ORDER — GABAPENTIN 300 MG/1
300 CAPSULE ORAL 4 TIMES DAILY
COMMUNITY
End: 2020-11-23

## 2019-10-02 RX ORDER — GABAPENTIN 100 MG/1
CAPSULE ORAL
Status: DISPENSED
Start: 2019-10-02 | End: 2019-10-02

## 2019-10-02 RX ORDER — BUDESONIDE AND FORMOTEROL FUMARATE DIHYDRATE 80; 4.5 UG/1; UG/1
2 AEROSOL RESPIRATORY (INHALATION) 2 TIMES DAILY
Status: ON HOLD | COMMUNITY
End: 2019-10-04 | Stop reason: HOSPADM

## 2019-10-02 RX ORDER — DOCUSATE SODIUM 100 MG/1
100 CAPSULE, LIQUID FILLED ORAL DAILY PRN
Status: ON HOLD | COMMUNITY
End: 2020-10-31 | Stop reason: ALTCHOICE

## 2019-10-02 RX ORDER — METHYLPREDNISOLONE SODIUM SUCCINATE 40 MG/ML
40 INJECTION, POWDER, LYOPHILIZED, FOR SOLUTION INTRAMUSCULAR; INTRAVENOUS EVERY 12 HOURS
Status: DISCONTINUED | OUTPATIENT
Start: 2019-10-03 | End: 2019-10-04 | Stop reason: HOSPADM

## 2019-10-02 RX ORDER — BUMETANIDE 0.25 MG/ML
1 INJECTION, SOLUTION INTRAMUSCULAR; INTRAVENOUS ONCE
Status: COMPLETED | OUTPATIENT
Start: 2019-10-02 | End: 2019-10-02

## 2019-10-02 RX ADMIN — FAMOTIDINE 40 MG: 20 TABLET ORAL at 09:06

## 2019-10-02 RX ADMIN — IPRATROPIUM BROMIDE AND ALBUTEROL SULFATE 3 ML: .5; 3 SOLUTION RESPIRATORY (INHALATION) at 11:58

## 2019-10-02 RX ADMIN — CLONIDINE HYDROCHLORIDE 0.1 MG: 0.1 TABLET ORAL at 13:54

## 2019-10-02 RX ADMIN — HYDRALAZINE HYDROCHLORIDE 25 MG: 25 TABLET, FILM COATED ORAL at 05:41

## 2019-10-02 RX ADMIN — HYDRALAZINE HYDROCHLORIDE 25 MG: 25 TABLET, FILM COATED ORAL at 22:37

## 2019-10-02 RX ADMIN — BUDESONIDE 500 MCG: 0.25 SUSPENSION RESPIRATORY (INHALATION) at 21:43

## 2019-10-02 RX ADMIN — FINASTERIDE 5 MG: 5 TABLET, FILM COATED ORAL at 09:05

## 2019-10-02 RX ADMIN — IPRATROPIUM BROMIDE AND ALBUTEROL SULFATE 3 ML: .5; 3 SOLUTION RESPIRATORY (INHALATION) at 21:43

## 2019-10-02 RX ADMIN — DOCUSATE SODIUM 100 MG: 100 CAPSULE, LIQUID FILLED ORAL at 09:06

## 2019-10-02 RX ADMIN — METOPROLOL SUCCINATE 100 MG: 100 TABLET, EXTENDED RELEASE ORAL at 09:06

## 2019-10-02 RX ADMIN — FORMOTEROL FUMARATE DIHYDRATE 20 MCG: 20 SOLUTION RESPIRATORY (INHALATION) at 21:43

## 2019-10-02 RX ADMIN — HYDRALAZINE HYDROCHLORIDE 25 MG: 25 TABLET, FILM COATED ORAL at 13:55

## 2019-10-02 RX ADMIN — BUDESONIDE 500 MCG: 0.25 SUSPENSION RESPIRATORY (INHALATION) at 08:27

## 2019-10-02 RX ADMIN — METHYLPREDNISOLONE SODIUM SUCCINATE 40 MG: 40 INJECTION, POWDER, FOR SOLUTION INTRAMUSCULAR; INTRAVENOUS at 11:30

## 2019-10-02 RX ADMIN — AMLODIPINE BESYLATE 10 MG: 10 TABLET ORAL at 09:06

## 2019-10-02 RX ADMIN — LISINOPRIL 40 MG: 20 TABLET ORAL at 09:05

## 2019-10-02 RX ADMIN — FORMOTEROL FUMARATE DIHYDRATE 20 MCG: 20 SOLUTION RESPIRATORY (INHALATION) at 08:27

## 2019-10-02 RX ADMIN — METHYLPREDNISOLONE SODIUM SUCCINATE 40 MG: 40 INJECTION, POWDER, FOR SOLUTION INTRAMUSCULAR; INTRAVENOUS at 22:37

## 2019-10-02 RX ADMIN — Medication 10 ML: at 22:37

## 2019-10-02 RX ADMIN — GABAPENTIN 300 MG: 100 CAPSULE ORAL at 09:06

## 2019-10-02 RX ADMIN — GABAPENTIN 300 MG: 100 CAPSULE ORAL at 22:36

## 2019-10-02 RX ADMIN — BUMETANIDE 1 MG: 0.25 INJECTION INTRAMUSCULAR; INTRAVENOUS at 11:30

## 2019-10-02 RX ADMIN — CLONIDINE HYDROCHLORIDE 0.1 MG: 0.1 TABLET ORAL at 09:05

## 2019-10-02 RX ADMIN — HYDROCHLOROTHIAZIDE 12.5 MG: 12.5 TABLET ORAL at 09:05

## 2019-10-02 RX ADMIN — METHYLPREDNISOLONE SODIUM SUCCINATE 40 MG: 40 INJECTION, POWDER, FOR SOLUTION INTRAMUSCULAR; INTRAVENOUS at 03:42

## 2019-10-02 RX ADMIN — IPRATROPIUM BROMIDE AND ALBUTEROL SULFATE 3 ML: .5; 3 SOLUTION RESPIRATORY (INHALATION) at 03:36

## 2019-10-02 RX ADMIN — Medication 10 ML: at 09:06

## 2019-10-02 RX ADMIN — IPRATROPIUM BROMIDE AND ALBUTEROL SULFATE 3 ML: .5; 3 SOLUTION RESPIRATORY (INHALATION) at 16:01

## 2019-10-02 RX ADMIN — IPRATROPIUM BROMIDE AND ALBUTEROL SULFATE 3 ML: .5; 3 SOLUTION RESPIRATORY (INHALATION) at 08:27

## 2019-10-02 RX ADMIN — CLONIDINE HYDROCHLORIDE 0.1 MG: 0.1 TABLET ORAL at 22:37

## 2019-10-02 RX ADMIN — GABAPENTIN 300 MG: 100 CAPSULE ORAL at 13:55

## 2019-10-02 ASSESSMENT — PAIN DESCRIPTION - LOCATION: LOCATION: HEAD

## 2019-10-02 ASSESSMENT — PAIN SCALES - GENERAL
PAINLEVEL_OUTOF10: 0
PAINLEVEL_OUTOF10: 10
PAINLEVEL_OUTOF10: 0

## 2019-10-02 ASSESSMENT — PAIN DESCRIPTION - PAIN TYPE: TYPE: ACUTE PAIN

## 2019-10-03 ENCOUNTER — APPOINTMENT (OUTPATIENT)
Dept: GENERAL RADIOLOGY | Age: 54
DRG: 291 | End: 2019-10-03
Payer: MEDICARE

## 2019-10-03 PROCEDURE — 2580000003 HC RX 258: Performed by: GENERAL PRACTICE

## 2019-10-03 PROCEDURE — 94640 AIRWAY INHALATION TREATMENT: CPT

## 2019-10-03 PROCEDURE — 6360000002 HC RX W HCPCS: Performed by: GENERAL PRACTICE

## 2019-10-03 PROCEDURE — 6360000002 HC RX W HCPCS: Performed by: INTERNAL MEDICINE

## 2019-10-03 PROCEDURE — 71045 X-RAY EXAM CHEST 1 VIEW: CPT

## 2019-10-03 PROCEDURE — 2700000000 HC OXYGEN THERAPY PER DAY

## 2019-10-03 PROCEDURE — 6370000000 HC RX 637 (ALT 250 FOR IP): Performed by: GENERAL PRACTICE

## 2019-10-03 PROCEDURE — 71046 X-RAY EXAM CHEST 2 VIEWS: CPT

## 2019-10-03 PROCEDURE — 2140000000 HC CCU INTERMEDIATE R&B

## 2019-10-03 RX ORDER — FUROSEMIDE 10 MG/ML
40 INJECTION INTRAMUSCULAR; INTRAVENOUS DAILY
Status: DISCONTINUED | OUTPATIENT
Start: 2019-10-03 | End: 2019-10-04 | Stop reason: HOSPADM

## 2019-10-03 RX ADMIN — AMLODIPINE BESYLATE 10 MG: 10 TABLET ORAL at 09:55

## 2019-10-03 RX ADMIN — IPRATROPIUM BROMIDE AND ALBUTEROL SULFATE 3 ML: .5; 3 SOLUTION RESPIRATORY (INHALATION) at 02:35

## 2019-10-03 RX ADMIN — DOCUSATE SODIUM 100 MG: 100 CAPSULE, LIQUID FILLED ORAL at 09:55

## 2019-10-03 RX ADMIN — FUROSEMIDE 40 MG: 10 INJECTION, SOLUTION INTRAMUSCULAR; INTRAVENOUS at 12:34

## 2019-10-03 RX ADMIN — IPRATROPIUM BROMIDE AND ALBUTEROL SULFATE 3 ML: .5; 3 SOLUTION RESPIRATORY (INHALATION) at 06:16

## 2019-10-03 RX ADMIN — HYDRALAZINE HYDROCHLORIDE 25 MG: 25 TABLET, FILM COATED ORAL at 20:39

## 2019-10-03 RX ADMIN — FORMOTEROL FUMARATE DIHYDRATE 20 MCG: 20 SOLUTION RESPIRATORY (INHALATION) at 08:04

## 2019-10-03 RX ADMIN — Medication 10 ML: at 09:56

## 2019-10-03 RX ADMIN — IPRATROPIUM BROMIDE AND ALBUTEROL SULFATE 3 ML: .5; 3 SOLUTION RESPIRATORY (INHALATION) at 11:27

## 2019-10-03 RX ADMIN — GABAPENTIN 300 MG: 100 CAPSULE ORAL at 09:56

## 2019-10-03 RX ADMIN — METHYLPREDNISOLONE SODIUM SUCCINATE 40 MG: 40 INJECTION, POWDER, FOR SOLUTION INTRAMUSCULAR; INTRAVENOUS at 09:57

## 2019-10-03 RX ADMIN — GABAPENTIN 300 MG: 100 CAPSULE ORAL at 20:40

## 2019-10-03 RX ADMIN — FINASTERIDE 5 MG: 5 TABLET, FILM COATED ORAL at 09:55

## 2019-10-03 RX ADMIN — CLONIDINE HYDROCHLORIDE 0.1 MG: 0.1 TABLET ORAL at 20:39

## 2019-10-03 RX ADMIN — Medication 10 ML: at 20:43

## 2019-10-03 RX ADMIN — FORMOTEROL FUMARATE DIHYDRATE 20 MCG: 20 SOLUTION RESPIRATORY (INHALATION) at 19:48

## 2019-10-03 RX ADMIN — IPRATROPIUM BROMIDE AND ALBUTEROL SULFATE 3 ML: .5; 3 SOLUTION RESPIRATORY (INHALATION) at 15:38

## 2019-10-03 RX ADMIN — LISINOPRIL 40 MG: 20 TABLET ORAL at 09:55

## 2019-10-03 RX ADMIN — CLONIDINE HYDROCHLORIDE 0.1 MG: 0.1 TABLET ORAL at 09:55

## 2019-10-03 RX ADMIN — BUDESONIDE 500 MCG: 0.25 SUSPENSION RESPIRATORY (INHALATION) at 19:48

## 2019-10-03 RX ADMIN — CLONIDINE HYDROCHLORIDE 0.1 MG: 0.1 TABLET ORAL at 15:28

## 2019-10-03 RX ADMIN — METHYLPREDNISOLONE SODIUM SUCCINATE 40 MG: 40 INJECTION, POWDER, FOR SOLUTION INTRAMUSCULAR; INTRAVENOUS at 20:42

## 2019-10-03 RX ADMIN — METOPROLOL SUCCINATE 100 MG: 100 TABLET, EXTENDED RELEASE ORAL at 09:55

## 2019-10-03 RX ADMIN — HYDRALAZINE HYDROCHLORIDE 25 MG: 25 TABLET, FILM COATED ORAL at 05:38

## 2019-10-03 RX ADMIN — BUDESONIDE 500 MCG: 0.25 SUSPENSION RESPIRATORY (INHALATION) at 08:05

## 2019-10-03 RX ADMIN — HYDROCHLOROTHIAZIDE 12.5 MG: 12.5 TABLET ORAL at 09:55

## 2019-10-03 RX ADMIN — IPRATROPIUM BROMIDE AND ALBUTEROL SULFATE 3 ML: .5; 3 SOLUTION RESPIRATORY (INHALATION) at 19:48

## 2019-10-03 RX ADMIN — Medication 10 ML: at 12:34

## 2019-10-03 RX ADMIN — GABAPENTIN 300 MG: 100 CAPSULE ORAL at 15:30

## 2019-10-03 RX ADMIN — HYDRALAZINE HYDROCHLORIDE 25 MG: 25 TABLET, FILM COATED ORAL at 15:29

## 2019-10-03 RX ADMIN — FAMOTIDINE 40 MG: 20 TABLET ORAL at 09:55

## 2019-10-03 ASSESSMENT — PAIN DESCRIPTION - PAIN TYPE: TYPE: ACUTE PAIN

## 2019-10-03 ASSESSMENT — PAIN DESCRIPTION - ORIENTATION: ORIENTATION: ANTERIOR

## 2019-10-03 ASSESSMENT — PAIN SCALES - GENERAL
PAINLEVEL_OUTOF10: 0
PAINLEVEL_OUTOF10: 8

## 2019-10-03 ASSESSMENT — PAIN DESCRIPTION - LOCATION: LOCATION: HEAD

## 2019-10-04 VITALS
WEIGHT: 205 LBS | HEIGHT: 67 IN | DIASTOLIC BLOOD PRESSURE: 80 MMHG | RESPIRATION RATE: 20 BRPM | TEMPERATURE: 97.5 F | SYSTOLIC BLOOD PRESSURE: 156 MMHG | OXYGEN SATURATION: 97 % | HEART RATE: 88 BPM | BODY MASS INDEX: 32.18 KG/M2

## 2019-10-04 LAB
AMPHETAMINE SCREEN, URINE: NOT DETECTED
ANION GAP SERPL CALCULATED.3IONS-SCNC: 17 MMOL/L (ref 7–16)
BARBITURATE SCREEN URINE: NOT DETECTED
BENZODIAZEPINE SCREEN, URINE: NOT DETECTED
BUN BLDV-MCNC: 31 MG/DL (ref 6–20)
CALCIUM SERPL-MCNC: 8.8 MG/DL (ref 8.6–10.2)
CANNABINOID SCREEN URINE: NOT DETECTED
CHLORIDE BLD-SCNC: 102 MMOL/L (ref 98–107)
CO2: 20 MMOL/L (ref 22–29)
COCAINE METABOLITE SCREEN URINE: NOT DETECTED
CREAT SERPL-MCNC: 1.2 MG/DL (ref 0.7–1.2)
GFR AFRICAN AMERICAN: >60
GFR NON-AFRICAN AMERICAN: >60 ML/MIN/1.73
GLUCOSE BLD-MCNC: 180 MG/DL (ref 74–99)
Lab: NORMAL
METHADONE SCREEN, URINE: NOT DETECTED
OPIATE SCREEN URINE: NOT DETECTED
PHENCYCLIDINE SCREEN URINE: NOT DETECTED
POTASSIUM SERPL-SCNC: 3.6 MMOL/L (ref 3.5–5)
PROPOXYPHENE SCREEN: NOT DETECTED
SODIUM BLD-SCNC: 139 MMOL/L (ref 132–146)

## 2019-10-04 PROCEDURE — G0008 ADMIN INFLUENZA VIRUS VAC: HCPCS | Performed by: GENERAL PRACTICE

## 2019-10-04 PROCEDURE — 90686 IIV4 VACC NO PRSV 0.5 ML IM: CPT | Performed by: GENERAL PRACTICE

## 2019-10-04 PROCEDURE — 6360000002 HC RX W HCPCS: Performed by: INTERNAL MEDICINE

## 2019-10-04 PROCEDURE — 6370000000 HC RX 637 (ALT 250 FOR IP): Performed by: GENERAL PRACTICE

## 2019-10-04 PROCEDURE — 6360000002 HC RX W HCPCS: Performed by: GENERAL PRACTICE

## 2019-10-04 PROCEDURE — 36415 COLL VENOUS BLD VENIPUNCTURE: CPT

## 2019-10-04 PROCEDURE — 2580000003 HC RX 258: Performed by: GENERAL PRACTICE

## 2019-10-04 PROCEDURE — 94640 AIRWAY INHALATION TREATMENT: CPT

## 2019-10-04 PROCEDURE — 80048 BASIC METABOLIC PNL TOTAL CA: CPT

## 2019-10-04 PROCEDURE — 80307 DRUG TEST PRSMV CHEM ANLYZR: CPT

## 2019-10-04 RX ORDER — PREDNISONE 10 MG/1
TABLET ORAL
Qty: 30 TABLET | Refills: 0 | Status: ON HOLD | OUTPATIENT
Start: 2019-10-04 | End: 2020-02-05 | Stop reason: ALTCHOICE

## 2019-10-04 RX ORDER — ALBUTEROL SULFATE 2.5 MG/3ML
2.5 SOLUTION RESPIRATORY (INHALATION) EVERY 6 HOURS PRN
Qty: 120 EACH | Refills: 5 | Status: ON HOLD | OUTPATIENT
Start: 2019-10-04 | End: 2020-11-30 | Stop reason: HOSPADM

## 2019-10-04 RX ORDER — VARENICLINE TARTRATE 1 MG/1
1 TABLET, FILM COATED ORAL 2 TIMES DAILY
Qty: 60 TABLET | Refills: 1 | Status: ON HOLD | OUTPATIENT
Start: 2019-11-02 | End: 2020-10-31 | Stop reason: ALTCHOICE

## 2019-10-04 RX ORDER — IPRATROPIUM BROMIDE AND ALBUTEROL SULFATE 2.5; .5 MG/3ML; MG/3ML
3 SOLUTION RESPIRATORY (INHALATION) EVERY 4 HOURS PRN
Qty: 360 ML | Refills: 2 | Status: ON HOLD | OUTPATIENT
Start: 2019-10-04 | End: 2020-02-10 | Stop reason: HOSPADM

## 2019-10-04 RX ORDER — NICOTINE 21 MG/24HR
1 PATCH, TRANSDERMAL 24 HOURS TRANSDERMAL DAILY
Qty: 30 PATCH | Refills: 3 | Status: SHIPPED | OUTPATIENT
Start: 2019-10-05 | End: 2020-11-23

## 2019-10-04 RX ORDER — PREDNISONE 20 MG/1
40 TABLET ORAL DAILY
Status: DISCONTINUED | OUTPATIENT
Start: 2019-10-05 | End: 2019-10-04 | Stop reason: HOSPADM

## 2019-10-04 RX ORDER — HYDRALAZINE HYDROCHLORIDE 25 MG/1
25 TABLET, FILM COATED ORAL EVERY 8 HOURS SCHEDULED
Qty: 90 TABLET | Refills: 3 | Status: ON HOLD | OUTPATIENT
Start: 2019-10-04 | End: 2020-02-10 | Stop reason: HOSPADM

## 2019-10-04 RX ORDER — VARENICLINE TARTRATE 25 MG
KIT ORAL
Qty: 1 BOX | Refills: 0 | Status: ON HOLD | OUTPATIENT
Start: 2019-10-04 | End: 2020-10-31 | Stop reason: ALTCHOICE

## 2019-10-04 RX ORDER — FLUTICASONE FUROATE AND VILANTEROL 200; 25 UG/1; UG/1
1 POWDER RESPIRATORY (INHALATION) DAILY
Qty: 1 EACH | Refills: 1 | Status: ON HOLD | OUTPATIENT
Start: 2019-10-04 | End: 2020-02-10 | Stop reason: HOSPADM

## 2019-10-04 RX ADMIN — AMLODIPINE BESYLATE 10 MG: 10 TABLET ORAL at 09:05

## 2019-10-04 RX ADMIN — FAMOTIDINE 40 MG: 20 TABLET ORAL at 09:06

## 2019-10-04 RX ADMIN — LISINOPRIL 40 MG: 20 TABLET ORAL at 09:05

## 2019-10-04 RX ADMIN — IPRATROPIUM BROMIDE AND ALBUTEROL SULFATE 3 ML: .5; 3 SOLUTION RESPIRATORY (INHALATION) at 07:48

## 2019-10-04 RX ADMIN — FUROSEMIDE 40 MG: 10 INJECTION, SOLUTION INTRAMUSCULAR; INTRAVENOUS at 09:06

## 2019-10-04 RX ADMIN — CLONIDINE HYDROCHLORIDE 0.1 MG: 0.1 TABLET ORAL at 09:05

## 2019-10-04 RX ADMIN — HYDROCHLOROTHIAZIDE 12.5 MG: 12.5 TABLET ORAL at 09:05

## 2019-10-04 RX ADMIN — IPRATROPIUM BROMIDE AND ALBUTEROL SULFATE 3 ML: .5; 3 SOLUTION RESPIRATORY (INHALATION) at 13:15

## 2019-10-04 RX ADMIN — Medication 10 ML: at 09:07

## 2019-10-04 RX ADMIN — BUDESONIDE 500 MCG: 0.25 SUSPENSION RESPIRATORY (INHALATION) at 07:49

## 2019-10-04 RX ADMIN — GABAPENTIN 300 MG: 100 CAPSULE ORAL at 09:07

## 2019-10-04 RX ADMIN — METHYLPREDNISOLONE SODIUM SUCCINATE 40 MG: 40 INJECTION, POWDER, FOR SOLUTION INTRAMUSCULAR; INTRAVENOUS at 09:06

## 2019-10-04 RX ADMIN — DOCUSATE SODIUM 100 MG: 100 CAPSULE, LIQUID FILLED ORAL at 09:06

## 2019-10-04 RX ADMIN — FINASTERIDE 5 MG: 5 TABLET, FILM COATED ORAL at 09:05

## 2019-10-04 RX ADMIN — INFLUENZA A VIRUS A/BRISBANE/02/2018 IVR-190 (H1N1) ANTIGEN (PROPIOLACTONE INACTIVATED), INFLUENZA A VIRUS A/KANSAS/14/2017 X-327 (H3N2) ANTIGEN (PROPIOLACTONE INACTIVATED), INFLUENZA B VIRUS B/MARYLAND/15/2016 ANTIGEN (PROPIOLACTONE INACTIVATED), INFLUENZA B VIRUS B/PHUKET/3073/2013 BVR-1B ANTIGEN (PROPIOLACTONE INACTIVATED) 0.5 ML: 15; 15; 15; 15 INJECTION, SUSPENSION INTRAMUSCULAR at 13:42

## 2019-10-04 RX ADMIN — IPRATROPIUM BROMIDE AND ALBUTEROL SULFATE 3 ML: .5; 3 SOLUTION RESPIRATORY (INHALATION) at 00:27

## 2019-10-04 RX ADMIN — METOPROLOL SUCCINATE 100 MG: 100 TABLET, EXTENDED RELEASE ORAL at 09:06

## 2019-10-04 RX ADMIN — FORMOTEROL FUMARATE DIHYDRATE 20 MCG: 20 SOLUTION RESPIRATORY (INHALATION) at 07:48

## 2019-10-04 RX ADMIN — HYDRALAZINE HYDROCHLORIDE 25 MG: 25 TABLET, FILM COATED ORAL at 05:58

## 2019-10-04 RX ADMIN — IPRATROPIUM BROMIDE AND ALBUTEROL SULFATE 3 ML: .5; 3 SOLUTION RESPIRATORY (INHALATION) at 04:49

## 2019-10-04 ASSESSMENT — PAIN SCALES - GENERAL
PAINLEVEL_OUTOF10: 0

## 2019-10-06 LAB
BLOOD CULTURE, ROUTINE: NORMAL
CULTURE, BLOOD 2: NORMAL

## 2020-02-05 ENCOUNTER — HOSPITAL ENCOUNTER (INPATIENT)
Age: 55
LOS: 5 days | Discharge: HOME | DRG: 192 | End: 2020-02-10
Attending: EMERGENCY MEDICINE | Admitting: GENERAL PRACTICE
Payer: MEDICARE

## 2020-02-05 ENCOUNTER — APPOINTMENT (OUTPATIENT)
Dept: GENERAL RADIOLOGY | Age: 55
DRG: 192 | End: 2020-02-05
Payer: MEDICARE

## 2020-02-05 LAB
ALBUMIN SERPL-MCNC: 4.2 G/DL (ref 3.5–5.2)
ALP BLD-CCNC: 80 U/L (ref 40–129)
ALT SERPL-CCNC: 23 U/L (ref 0–40)
ANION GAP SERPL CALCULATED.3IONS-SCNC: 9 MMOL/L (ref 7–16)
AST SERPL-CCNC: 26 U/L (ref 0–39)
BASOPHILS ABSOLUTE: 0.07 E9/L (ref 0–0.2)
BASOPHILS RELATIVE PERCENT: 0.7 % (ref 0–2)
BILIRUB SERPL-MCNC: <0.2 MG/DL (ref 0–1.2)
BUN BLDV-MCNC: 15 MG/DL (ref 6–20)
CALCIUM SERPL-MCNC: 9.2 MG/DL (ref 8.6–10.2)
CHLORIDE BLD-SCNC: 105 MMOL/L (ref 98–107)
CO2: 23 MMOL/L (ref 22–29)
CREAT SERPL-MCNC: 1.5 MG/DL (ref 0.7–1.2)
EOSINOPHILS ABSOLUTE: 0.3 E9/L (ref 0.05–0.5)
EOSINOPHILS RELATIVE PERCENT: 3 % (ref 0–6)
GFR AFRICAN AMERICAN: 58
GFR NON-AFRICAN AMERICAN: 48 ML/MIN/1.73
GLUCOSE BLD-MCNC: 97 MG/DL (ref 74–99)
HCT VFR BLD CALC: 40.6 % (ref 37–54)
HEMOGLOBIN: 13.9 G/DL (ref 12.5–16.5)
IMMATURE GRANULOCYTES #: 0.03 E9/L
IMMATURE GRANULOCYTES %: 0.3 % (ref 0–5)
INFLUENZA A BY PCR: NOT DETECTED
INFLUENZA B BY PCR: NOT DETECTED
LYMPHOCYTES ABSOLUTE: 2.55 E9/L (ref 1.5–4)
LYMPHOCYTES RELATIVE PERCENT: 25.8 % (ref 20–42)
MCH RBC QN AUTO: 32.1 PG (ref 26–35)
MCHC RBC AUTO-ENTMCNC: 34.2 % (ref 32–34.5)
MCV RBC AUTO: 93.8 FL (ref 80–99.9)
MONOCYTES ABSOLUTE: 1.1 E9/L (ref 0.1–0.95)
MONOCYTES RELATIVE PERCENT: 11.1 % (ref 2–12)
NEUTROPHILS ABSOLUTE: 5.85 E9/L (ref 1.8–7.3)
NEUTROPHILS RELATIVE PERCENT: 59.1 % (ref 43–80)
PDW BLD-RTO: 13.2 FL (ref 11.5–15)
PLATELET # BLD: 250 E9/L (ref 130–450)
PMV BLD AUTO: 10.3 FL (ref 7–12)
POTASSIUM SERPL-SCNC: 3.7 MMOL/L (ref 3.5–5)
PRO-BNP: 1851 PG/ML (ref 0–125)
RBC # BLD: 4.33 E12/L (ref 3.8–5.8)
SODIUM BLD-SCNC: 137 MMOL/L (ref 132–146)
TOTAL PROTEIN: 7.5 G/DL (ref 6.4–8.3)
TROPONIN: <0.01 NG/ML (ref 0–0.03)
WBC # BLD: 9.9 E9/L (ref 4.5–11.5)

## 2020-02-05 PROCEDURE — 85025 COMPLETE CBC W/AUTO DIFF WBC: CPT

## 2020-02-05 PROCEDURE — 87502 INFLUENZA DNA AMP PROBE: CPT

## 2020-02-05 PROCEDURE — 84484 ASSAY OF TROPONIN QUANT: CPT

## 2020-02-05 PROCEDURE — 2060000000 HC ICU INTERMEDIATE R&B

## 2020-02-05 PROCEDURE — 96374 THER/PROPH/DIAG INJ IV PUSH: CPT

## 2020-02-05 PROCEDURE — 2580000003 HC RX 258: Performed by: GENERAL PRACTICE

## 2020-02-05 PROCEDURE — 80053 COMPREHEN METABOLIC PANEL: CPT

## 2020-02-05 PROCEDURE — 6370000000 HC RX 637 (ALT 250 FOR IP): Performed by: STUDENT IN AN ORGANIZED HEALTH CARE EDUCATION/TRAINING PROGRAM

## 2020-02-05 PROCEDURE — 96375 TX/PRO/DX INJ NEW DRUG ADDON: CPT

## 2020-02-05 PROCEDURE — 6360000002 HC RX W HCPCS: Performed by: STUDENT IN AN ORGANIZED HEALTH CARE EDUCATION/TRAINING PROGRAM

## 2020-02-05 PROCEDURE — 83880 ASSAY OF NATRIURETIC PEPTIDE: CPT

## 2020-02-05 PROCEDURE — 2580000003 HC RX 258

## 2020-02-05 PROCEDURE — 6370000000 HC RX 637 (ALT 250 FOR IP): Performed by: GENERAL PRACTICE

## 2020-02-05 PROCEDURE — 36415 COLL VENOUS BLD VENIPUNCTURE: CPT

## 2020-02-05 PROCEDURE — 99285 EMERGENCY DEPT VISIT HI MDM: CPT

## 2020-02-05 PROCEDURE — 71045 X-RAY EXAM CHEST 1 VIEW: CPT

## 2020-02-05 PROCEDURE — 93005 ELECTROCARDIOGRAM TRACING: CPT | Performed by: EMERGENCY MEDICINE

## 2020-02-05 RX ORDER — ASPIRIN 81 MG/1
324 TABLET, CHEWABLE ORAL ONCE
Status: COMPLETED | OUTPATIENT
Start: 2020-02-05 | End: 2020-02-05

## 2020-02-05 RX ORDER — SODIUM CHLORIDE 0.9 % (FLUSH) 0.9 %
SYRINGE (ML) INJECTION
Status: COMPLETED
Start: 2020-02-05 | End: 2020-02-05

## 2020-02-05 RX ORDER — IPRATROPIUM BROMIDE AND ALBUTEROL SULFATE 2.5; .5 MG/3ML; MG/3ML
3 SOLUTION RESPIRATORY (INHALATION) EVERY 4 HOURS PRN
Status: DISCONTINUED | OUTPATIENT
Start: 2020-02-05 | End: 2020-02-07

## 2020-02-05 RX ORDER — SODIUM CHLORIDE 0.9 % (FLUSH) 0.9 %
10 SYRINGE (ML) INJECTION PRN
Status: DISCONTINUED | OUTPATIENT
Start: 2020-02-05 | End: 2020-02-10 | Stop reason: HOSPADM

## 2020-02-05 RX ORDER — METOPROLOL SUCCINATE 100 MG/1
100 TABLET, EXTENDED RELEASE ORAL DAILY
Status: DISCONTINUED | OUTPATIENT
Start: 2020-02-06 | End: 2020-02-10 | Stop reason: HOSPADM

## 2020-02-05 RX ORDER — ONDANSETRON 2 MG/ML
4 INJECTION INTRAMUSCULAR; INTRAVENOUS EVERY 8 HOURS PRN
Status: DISCONTINUED | OUTPATIENT
Start: 2020-02-05 | End: 2020-02-10 | Stop reason: HOSPADM

## 2020-02-05 RX ORDER — FINASTERIDE 5 MG/1
5 TABLET, FILM COATED ORAL DAILY
Status: DISCONTINUED | OUTPATIENT
Start: 2020-02-06 | End: 2020-02-10 | Stop reason: HOSPADM

## 2020-02-05 RX ORDER — ACETAMINOPHEN 325 MG/1
650 TABLET ORAL EVERY 4 HOURS PRN
Status: DISCONTINUED | OUTPATIENT
Start: 2020-02-05 | End: 2020-02-10 | Stop reason: HOSPADM

## 2020-02-05 RX ORDER — HYDRALAZINE HYDROCHLORIDE 20 MG/ML
10 INJECTION INTRAMUSCULAR; INTRAVENOUS ONCE
Status: COMPLETED | OUTPATIENT
Start: 2020-02-05 | End: 2020-02-05

## 2020-02-05 RX ORDER — HYDROCODONE BITARTRATE AND ACETAMINOPHEN 5; 325 MG/1; MG/1
1 TABLET ORAL EVERY 4 HOURS PRN
Status: DISCONTINUED | OUTPATIENT
Start: 2020-02-05 | End: 2020-02-10 | Stop reason: HOSPADM

## 2020-02-05 RX ORDER — DOCUSATE SODIUM 100 MG/1
100 CAPSULE, LIQUID FILLED ORAL DAILY PRN
Status: DISCONTINUED | OUTPATIENT
Start: 2020-02-05 | End: 2020-02-10 | Stop reason: HOSPADM

## 2020-02-05 RX ORDER — VARENICLINE TARTRATE 1 MG/1
1 TABLET, FILM COATED ORAL 2 TIMES DAILY
Status: DISCONTINUED | OUTPATIENT
Start: 2020-02-06 | End: 2020-02-08

## 2020-02-05 RX ORDER — METHYLPREDNISOLONE SODIUM SUCCINATE 40 MG/ML
40 INJECTION, POWDER, LYOPHILIZED, FOR SOLUTION INTRAMUSCULAR; INTRAVENOUS EVERY 8 HOURS
Status: DISCONTINUED | OUTPATIENT
Start: 2020-02-06 | End: 2020-02-07

## 2020-02-05 RX ORDER — CLONIDINE HYDROCHLORIDE 0.1 MG/1
0.1 TABLET ORAL 3 TIMES DAILY
Status: DISCONTINUED | OUTPATIENT
Start: 2020-02-05 | End: 2020-02-10 | Stop reason: HOSPADM

## 2020-02-05 RX ORDER — HYDROCHLOROTHIAZIDE 12.5 MG/1
12.5 TABLET ORAL DAILY
Status: DISCONTINUED | OUTPATIENT
Start: 2020-02-06 | End: 2020-02-09

## 2020-02-05 RX ORDER — NICOTINE 21 MG/24HR
1 PATCH, TRANSDERMAL 24 HOURS TRANSDERMAL DAILY
Status: DISCONTINUED | OUTPATIENT
Start: 2020-02-05 | End: 2020-02-10 | Stop reason: HOSPADM

## 2020-02-05 RX ORDER — AMLODIPINE BESYLATE 10 MG/1
10 TABLET ORAL DAILY
Status: DISCONTINUED | OUTPATIENT
Start: 2020-02-06 | End: 2020-02-10 | Stop reason: HOSPADM

## 2020-02-05 RX ORDER — HYDROCODONE BITARTRATE AND ACETAMINOPHEN 5; 325 MG/1; MG/1
2 TABLET ORAL EVERY 4 HOURS PRN
Status: DISCONTINUED | OUTPATIENT
Start: 2020-02-05 | End: 2020-02-06

## 2020-02-05 RX ORDER — HYDRALAZINE HYDROCHLORIDE 25 MG/1
25 TABLET, FILM COATED ORAL EVERY 8 HOURS SCHEDULED
Status: DISCONTINUED | OUTPATIENT
Start: 2020-02-05 | End: 2020-02-06

## 2020-02-05 RX ORDER — NICOTINE 21 MG/24HR
1 PATCH, TRANSDERMAL 24 HOURS TRANSDERMAL DAILY
Status: DISCONTINUED | OUTPATIENT
Start: 2020-02-06 | End: 2020-02-05

## 2020-02-05 RX ORDER — SODIUM CHLORIDE 0.9 % (FLUSH) 0.9 %
10 SYRINGE (ML) INJECTION EVERY 12 HOURS SCHEDULED
Status: DISCONTINUED | OUTPATIENT
Start: 2020-02-05 | End: 2020-02-10 | Stop reason: HOSPADM

## 2020-02-05 RX ORDER — LISINOPRIL 20 MG/1
40 TABLET ORAL DAILY
Status: DISCONTINUED | OUTPATIENT
Start: 2020-02-06 | End: 2020-02-10 | Stop reason: HOSPADM

## 2020-02-05 RX ORDER — IPRATROPIUM BROMIDE AND ALBUTEROL SULFATE 2.5; .5 MG/3ML; MG/3ML
1 SOLUTION RESPIRATORY (INHALATION) ONCE
Status: COMPLETED | OUTPATIENT
Start: 2020-02-05 | End: 2020-02-05

## 2020-02-05 RX ORDER — METHYLPREDNISOLONE SODIUM SUCCINATE 125 MG/2ML
125 INJECTION, POWDER, LYOPHILIZED, FOR SOLUTION INTRAMUSCULAR; INTRAVENOUS ONCE
Status: COMPLETED | OUTPATIENT
Start: 2020-02-05 | End: 2020-02-05

## 2020-02-05 RX ORDER — SODIUM CHLORIDE 9 MG/ML
INJECTION, SOLUTION INTRAVENOUS CONTINUOUS
Status: DISCONTINUED | OUTPATIENT
Start: 2020-02-05 | End: 2020-02-09

## 2020-02-05 RX ORDER — GABAPENTIN 300 MG/1
300 CAPSULE ORAL 4 TIMES DAILY
Status: DISCONTINUED | OUTPATIENT
Start: 2020-02-05 | End: 2020-02-10 | Stop reason: HOSPADM

## 2020-02-05 RX ORDER — ALBUTEROL SULFATE 2.5 MG/3ML
2.5 SOLUTION RESPIRATORY (INHALATION) EVERY 6 HOURS PRN
Status: DISCONTINUED | OUTPATIENT
Start: 2020-02-05 | End: 2020-02-05 | Stop reason: SDUPTHER

## 2020-02-05 RX ORDER — MORPHINE SULFATE 4 MG/ML
8 INJECTION, SOLUTION INTRAMUSCULAR; INTRAVENOUS ONCE
Status: COMPLETED | OUTPATIENT
Start: 2020-02-05 | End: 2020-02-05

## 2020-02-05 RX ORDER — ALBUTEROL SULFATE 90 UG/1
2 AEROSOL, METERED RESPIRATORY (INHALATION) EVERY 4 HOURS PRN
Status: DISCONTINUED | OUTPATIENT
Start: 2020-02-05 | End: 2020-02-05 | Stop reason: SDUPTHER

## 2020-02-05 RX ADMIN — HYDRALAZINE HYDROCHLORIDE 10 MG: 20 INJECTION INTRAMUSCULAR; INTRAVENOUS at 19:24

## 2020-02-05 RX ADMIN — CLONIDINE HYDROCHLORIDE 0.1 MG: 0.1 TABLET ORAL at 23:20

## 2020-02-05 RX ADMIN — GABAPENTIN 300 MG: 300 CAPSULE ORAL at 23:20

## 2020-02-05 RX ADMIN — IPRATROPIUM BROMIDE AND ALBUTEROL SULFATE 1 AMPULE: .5; 3 SOLUTION RESPIRATORY (INHALATION) at 17:48

## 2020-02-05 RX ADMIN — METHYLPREDNISOLONE SODIUM SUCCINATE 125 MG: 125 INJECTION, POWDER, FOR SOLUTION INTRAMUSCULAR; INTRAVENOUS at 18:02

## 2020-02-05 RX ADMIN — HYDRALAZINE HYDROCHLORIDE 25 MG: 25 TABLET, FILM COATED ORAL at 23:20

## 2020-02-05 RX ADMIN — MORPHINE SULFATE 8 MG: 4 INJECTION, SOLUTION INTRAMUSCULAR; INTRAVENOUS at 21:15

## 2020-02-05 RX ADMIN — ASPIRIN 81 MG 324 MG: 81 TABLET ORAL at 18:02

## 2020-02-05 RX ADMIN — SODIUM CHLORIDE: 9 INJECTION, SOLUTION INTRAVENOUS at 23:33

## 2020-02-05 RX ADMIN — Medication 10 ML: at 23:21

## 2020-02-05 RX ADMIN — SODIUM CHLORIDE, PRESERVATIVE FREE: 5 INJECTION INTRAVENOUS at 19:24

## 2020-02-05 ASSESSMENT — PAIN SCALES - GENERAL
PAINLEVEL_OUTOF10: 0
PAINLEVEL_OUTOF10: 9
PAINLEVEL_OUTOF10: 6
PAINLEVEL_OUTOF10: 10

## 2020-02-05 ASSESSMENT — ENCOUNTER SYMPTOMS
ABDOMINAL PAIN: 0
SORE THROAT: 0
WHEEZING: 1
COLOR CHANGE: 0
RECTAL PAIN: 0
VOMITING: 0
VOICE CHANGE: 0
TROUBLE SWALLOWING: 0
BLOOD IN STOOL: 0
COUGH: 1
DIARRHEA: 0
SHORTNESS OF BREATH: 1
CHEST TIGHTNESS: 1
CONSTIPATION: 0
NAUSEA: 0
BACK PAIN: 0

## 2020-02-05 ASSESSMENT — PAIN DESCRIPTION - ORIENTATION
ORIENTATION: LEFT
ORIENTATION: RIGHT

## 2020-02-05 ASSESSMENT — PAIN DESCRIPTION - PAIN TYPE
TYPE: ACUTE PAIN
TYPE: ACUTE PAIN

## 2020-02-05 ASSESSMENT — PAIN DESCRIPTION - LOCATION
LOCATION: ARM
LOCATION: CHEST;ARM;SHOULDER;NECK

## 2020-02-05 ASSESSMENT — PAIN DESCRIPTION - DESCRIPTORS
DESCRIPTORS: CONSTANT;SHARP
DESCRIPTORS: ACHING;DULL

## 2020-02-05 ASSESSMENT — PAIN DESCRIPTION - ONSET
ONSET: SUDDEN
ONSET: SUDDEN

## 2020-02-05 ASSESSMENT — PAIN DESCRIPTION - FREQUENCY
FREQUENCY: CONTINUOUS
FREQUENCY: CONTINUOUS

## 2020-02-05 ASSESSMENT — PAIN - FUNCTIONAL ASSESSMENT: PAIN_FUNCTIONAL_ASSESSMENT: PREVENTS OR INTERFERES SOME ACTIVE ACTIVITIES AND ADLS

## 2020-02-05 NOTE — ED PROVIDER NOTES
The patient is a 57-year-old male with a history of COPD, CHF, chronic neck pain, hepatitis C who presents to the emergency department with complaint of shortness of breath, cough and wheezing with some right-sided chest pain over the last 2 to 3 days. The patient is a current smoker. He reports that his been coughing up nondescript sputum but no blood. He is tachypneic and speaks in short sentences. He is currently on supplemental oxygen due to reports that he was in the 80s on his O2 saturation. He has not normally on oxygen. He denies any trauma to his chest.  He denies any recent illnesses. Symptoms are constant, severe, nonradiating, worse with any exertion, not improved by anything. The history is provided by the patient. Review of Systems   Constitutional: Negative for activity change, appetite change, chills, diaphoresis, fatigue and fever. HENT: Negative for congestion, sore throat, trouble swallowing and voice change. Eyes: Negative for visual disturbance. Respiratory: Positive for cough, chest tightness, shortness of breath and wheezing. Cardiovascular: Positive for chest pain. Negative for palpitations and leg swelling. Gastrointestinal: Negative for abdominal pain, blood in stool, constipation, diarrhea, nausea, rectal pain and vomiting. Endocrine: Negative for polyuria. Genitourinary: Negative for decreased urine volume, difficulty urinating, dysuria, flank pain and hematuria. Musculoskeletal: Negative for arthralgias, back pain, joint swelling, myalgias, neck pain and neck stiffness. Skin: Negative for color change, pallor, rash and wound. Allergic/Immunologic: Negative for immunocompromised state. Neurological: Negative for dizziness, seizures, syncope, weakness, light-headedness and headaches. Hematological: Negative for adenopathy. Does not bruise/bleed easily. Psychiatric/Behavioral: Negative.          Physical Exam  Vitals signs and nursing note breathing status although still requiring supplemental oxygen.    [LS]      ED Course User Index  [LS] Mallory Merrill DO        ED Course as of Feb 05 2220   Wed Feb 05, 2020 2048 Discussed case with Dr. John Fu. He agrees to accept the patient for admission. Patient was reassessed and has some improvement in his breathing status although still requiring supplemental oxygen.    [LS]      ED Course User Index  [LS] Mallory Merrill DO       --------------------------------------------- PAST HISTORY ---------------------------------------------  Past Medical History:  has a past medical history of Alcohol abuse, COPD (chronic obstructive pulmonary disease) ([facility] [address] History of cocaine use, Hyperlipidemia, Hypertension, Marijuana use, and alberto. Past Surgical History:  has a past surgical history that includes Wrist surgery; cervical fusion (11/18/15); and polysomnography (01/29/2018). Social History:  reports that he has been smoking cigarettes. He started smoking about 40 years ago. He has a 60.00 pack-year smoking history. He has never used smokeless tobacco. He reports current alcohol use. He reports current drug use. Drugs: Marijuana, Cocaine, and Other-see comments. Family History: family history includes Arthritis in his mother; Cancer in his brother; Heart Disease in his father; High Blood Pressure in his brother, brother, brother, and brother; Other in his brother, brother, brother, brother, and mother. The patients home medications have been reviewed. Allergies: Patient has no known allergies.     -------------------------------------------------- RESULTS -------------------------------------------------    LABS:  Results for orders placed or performed during the hospital encounter of 02/05/20   CBC Auto Differential   Result Value Ref Range    WBC 9.9 4.5 - 11.5 E9/L    RBC 4.33 3.80 - 5.80 E12/L    Hemoglobin 13.9 12.5 - 16.5 g/dL    Hematocrit 40.6 37.0 - 54.0 %    MCV 93.8 80.0 - 99.9 fL MCH 32.1 26.0 - 35.0 pg    MCHC 34.2 32.0 - 34.5 %    RDW 13.2 11.5 - 15.0 fL    Platelets 563 732 - 815 E9/L    MPV 10.3 7.0 - 12.0 fL    Neutrophils % 59.1 43.0 - 80.0 %    Immature Granulocytes % 0.3 0.0 - 5.0 %    Lymphocytes % 25.8 20.0 - 42.0 %    Monocytes % 11.1 2.0 - 12.0 %    Eosinophils % 3.0 0.0 - 6.0 %    Basophils % 0.7 0.0 - 2.0 %    Neutrophils Absolute 5.85 1.80 - 7.30 E9/L    Immature Granulocytes # 0.03 E9/L    Lymphocytes Absolute 2.55 1.50 - 4.00 E9/L    Monocytes Absolute 1.10 (H) 0.10 - 0.95 E9/L    Eosinophils Absolute 0.30 0.05 - 0.50 E9/L    Basophils Absolute 0.07 0.00 - 0.20 E9/L   Comprehensive Metabolic Panel   Result Value Ref Range    Sodium 137 132 - 146 mmol/L    Potassium 3.7 3.5 - 5.0 mmol/L    Chloride 105 98 - 107 mmol/L    CO2 23 22 - 29 mmol/L    Anion Gap 9 7 - 16 mmol/L    Glucose 97 74 - 99 mg/dL    BUN 15 6 - 20 mg/dL    CREATININE 1.5 (H) 0.7 - 1.2 mg/dL    GFR Non-African American 48 >=60 mL/min/1.73    GFR African American 58     Calcium 9.2 8.6 - 10.2 mg/dL    Total Protein 7.5 6.4 - 8.3 g/dL    Alb 4.2 3.5 - 5.2 g/dL    Total Bilirubin <0.2 0.0 - 1.2 mg/dL    Alkaline Phosphatase 80 40 - 129 U/L    ALT 23 0 - 40 U/L    AST 26 0 - 39 U/L   Troponin   Result Value Ref Range    Troponin <0.01 0.00 - 0.03 ng/mL   Brain Natriuretic Peptide   Result Value Ref Range    Pro-BNP 1,851 (H) 0 - 125 pg/mL   EKG 12 Lead   Result Value Ref Range    Ventricular Rate 85 BPM    Atrial Rate 85 BPM    P-R Interval 132 ms    QRS Duration 86 ms    Q-T Interval 434 ms    QTc Calculation (Bazett) 516 ms    P Axis 21 degrees    R Axis 11 degrees    T Axis 44 degrees       RADIOLOGY:  XR CHEST PORTABLE   Final Result      1. Likely a central bronchitis.    2. No obvious focal pneumonia.                     ------------------------- NURSING NOTES AND VITALS REVIEWED ---------------------------  Date / Time Roomed:  2/5/2020  4:50 PM  ED Bed Assignment:  10/10    The nursing notes within the ED encounter and vital signs as below have been reviewed. Patient Vitals for the past 24 hrs:   BP Temp Temp src Pulse Resp SpO2 Height Weight   02/05/20 2200 -- 98 °F (36.7 °C) -- -- 18 -- -- --   02/05/20 2115 -- -- -- -- 18 -- -- --   02/05/20 2100 -- -- -- -- 18 -- -- --   02/05/20 2045 -- -- -- -- 18 -- -- --   02/05/20 2030 -- -- -- -- 20 -- -- --   02/05/20 2015 -- -- -- -- 20 -- -- --   02/05/20 1915 (!) 193/120 -- -- 82 -- 94 % -- --   02/05/20 1900 (!) 193/120 98.9 °F (37.2 °C) -- 92 20 95 % -- --   02/05/20 1845 (!) 191/122 -- -- 79 18 100 % -- --   02/05/20 1830 (!) 181/133 -- -- 60 18 100 % -- --   02/05/20 1815 (!) 198/124 -- -- 79 -- 95 % -- --   02/05/20 1808 -- -- -- -- 20 100 % -- --   02/05/20 1800 (!) 204/134 -- -- 82 20 91 % -- --   02/05/20 1748 -- -- -- -- 24 99 % -- --   02/05/20 1745 -- 98.9 °F (37.2 °C) -- -- 20 -- -- --   02/05/20 1730 -- -- -- -- 24 -- -- --   02/05/20 1715 -- -- -- 83 24 -- -- --   02/05/20 1651 (!) 208/136 -- Oral 84 18 97 % 5' 7\" (1.702 m) 220 lb (99.8 kg)       Oxygen Saturation Interpretation: Normal    ------------------------------------------ PROGRESS NOTES ------------------------------------------    Counseling:  I have spoken with the patient and discussed todays results, in addition to providing specific details for the plan of care and counseling regarding the diagnosis and prognosis. Their questions are answered at this time and they are agreeable with the plan of admission.    --------------------------------- ADDITIONAL PROVIDER NOTES ---------------------------------  Consultations:  Time: 2048. Spoke with Dr. Florence Patrick. Discussed case. They will admit the patient.   This patient's ED course included: a personal history and physicial examination, re-evaluation prior to disposition, multiple bedside re-evaluations, IV medications, cardiac monitoring, continuous pulse oximetry and complex medical decision making and emergency management    This patient has remained hemodynamically stable during their ED course. Diagnosis:  1. Acute respiratory failure with hypoxia ([address]    2. Hypertension, unspecified type    3. COPD with acute exacerbation ([address]    4. BEAR (acute kidney injury) ([address]    5. Radicular pain in right arm        Disposition:  Patient's disposition: Admit to telemetry  Patient's condition is stable.           Marva Grimes DO  Resident  02/05/20 1482

## 2020-02-05 NOTE — ED NOTES
Dr Willian Jamison advised of pt ongoing bp -orders to continue to monitor pt      Simone Patton RN  02/05/20 7476

## 2020-02-06 ENCOUNTER — APPOINTMENT (OUTPATIENT)
Dept: GENERAL RADIOLOGY | Age: 55
DRG: 192 | End: 2020-02-06
Payer: MEDICARE

## 2020-02-06 LAB
ALBUMIN SERPL-MCNC: 4 G/DL (ref 3.5–5.2)
ALBUMIN SERPL-MCNC: 4.1 G/DL (ref 3.5–5.2)
ALP BLD-CCNC: 69 U/L (ref 40–129)
ALP BLD-CCNC: 73 U/L (ref 40–129)
ALT SERPL-CCNC: 19 U/L (ref 0–40)
ALT SERPL-CCNC: 23 U/L (ref 0–40)
AMPHETAMINE SCREEN, URINE: NOT DETECTED
ANION GAP SERPL CALCULATED.3IONS-SCNC: 13 MMOL/L (ref 7–16)
ANION GAP SERPL CALCULATED.3IONS-SCNC: 9 MMOL/L (ref 7–16)
AST SERPL-CCNC: 18 U/L (ref 0–39)
AST SERPL-CCNC: 21 U/L (ref 0–39)
BARBITURATE SCREEN URINE: NOT DETECTED
BENZODIAZEPINE SCREEN, URINE: NOT DETECTED
BILIRUB SERPL-MCNC: 0.2 MG/DL (ref 0–1.2)
BILIRUB SERPL-MCNC: <0.2 MG/DL (ref 0–1.2)
BUN BLDV-MCNC: 15 MG/DL (ref 6–20)
BUN BLDV-MCNC: 18 MG/DL (ref 6–20)
C-REACTIVE PROTEIN: 1.4 MG/DL (ref 0–0.4)
CALCIUM SERPL-MCNC: 8.9 MG/DL (ref 8.6–10.2)
CALCIUM SERPL-MCNC: 9.2 MG/DL (ref 8.6–10.2)
CANNABINOID SCREEN URINE: NOT DETECTED
CHLORIDE BLD-SCNC: 101 MMOL/L (ref 98–107)
CHLORIDE BLD-SCNC: 105 MMOL/L (ref 98–107)
CO2: 19 MMOL/L (ref 22–29)
CO2: 19 MMOL/L (ref 22–29)
COCAINE METABOLITE SCREEN URINE: POSITIVE
CREAT SERPL-MCNC: 1.2 MG/DL (ref 0.7–1.2)
CREAT SERPL-MCNC: 1.3 MG/DL (ref 0.7–1.2)
EKG ATRIAL RATE: 85 BPM
EKG P AXIS: 21 DEGREES
EKG P-R INTERVAL: 132 MS
EKG Q-T INTERVAL: 434 MS
EKG QRS DURATION: 86 MS
EKG QTC CALCULATION (BAZETT): 516 MS
EKG R AXIS: 11 DEGREES
EKG T AXIS: 44 DEGREES
EKG VENTRICULAR RATE: 85 BPM
FENTANYL SCREEN, URINE: NOT DETECTED
GFR AFRICAN AMERICAN: >60
GFR AFRICAN AMERICAN: >60
GFR NON-AFRICAN AMERICAN: 57 ML/MIN/1.73
GFR NON-AFRICAN AMERICAN: >60 ML/MIN/1.73
GLUCOSE BLD-MCNC: 132 MG/DL (ref 74–99)
GLUCOSE BLD-MCNC: 160 MG/DL (ref 74–99)
HCT VFR BLD CALC: 40.6 % (ref 37–54)
HEMOGLOBIN: 13.9 G/DL (ref 12.5–16.5)
Lab: ABNORMAL
MCH RBC QN AUTO: 32 PG (ref 26–35)
MCHC RBC AUTO-ENTMCNC: 34.2 % (ref 32–34.5)
MCV RBC AUTO: 93.3 FL (ref 80–99.9)
METHADONE SCREEN, URINE: NOT DETECTED
OPIATE SCREEN URINE: POSITIVE
OXYCODONE URINE: NOT DETECTED
PDW BLD-RTO: 13.2 FL (ref 11.5–15)
PHENCYCLIDINE SCREEN URINE: NOT DETECTED
PLATELET # BLD: 244 E9/L (ref 130–450)
PMV BLD AUTO: 10.2 FL (ref 7–12)
POTASSIUM SERPL-SCNC: 3.9 MMOL/L (ref 3.5–5)
POTASSIUM SERPL-SCNC: 4.3 MMOL/L (ref 3.5–5)
PROCALCITONIN: 0.03 NG/ML (ref 0–0.08)
RBC # BLD: 4.35 E12/L (ref 3.8–5.8)
SEDIMENTATION RATE, ERYTHROCYTE: 13 MM/HR (ref 0–15)
SODIUM BLD-SCNC: 133 MMOL/L (ref 132–146)
SODIUM BLD-SCNC: 133 MMOL/L (ref 132–146)
TOTAL PROTEIN: 7.3 G/DL (ref 6.4–8.3)
TOTAL PROTEIN: 7.4 G/DL (ref 6.4–8.3)
WBC # BLD: 6.8 E9/L (ref 4.5–11.5)

## 2020-02-06 PROCEDURE — 6360000002 HC RX W HCPCS: Performed by: GENERAL PRACTICE

## 2020-02-06 PROCEDURE — 80307 DRUG TEST PRSMV CHEM ANLYZR: CPT

## 2020-02-06 PROCEDURE — 84145 PROCALCITONIN (PCT): CPT

## 2020-02-06 PROCEDURE — 2060000000 HC ICU INTERMEDIATE R&B

## 2020-02-06 PROCEDURE — 86140 C-REACTIVE PROTEIN: CPT

## 2020-02-06 PROCEDURE — 6370000000 HC RX 637 (ALT 250 FOR IP): Performed by: GENERAL PRACTICE

## 2020-02-06 PROCEDURE — 85027 COMPLETE CBC AUTOMATED: CPT

## 2020-02-06 PROCEDURE — 72114 X-RAY EXAM L-S SPINE BENDING: CPT

## 2020-02-06 PROCEDURE — 2700000000 HC OXYGEN THERAPY PER DAY

## 2020-02-06 PROCEDURE — 72050 X-RAY EXAM NECK SPINE 4/5VWS: CPT

## 2020-02-06 PROCEDURE — 93010 ELECTROCARDIOGRAM REPORT: CPT | Performed by: INTERNAL MEDICINE

## 2020-02-06 PROCEDURE — 80053 COMPREHEN METABOLIC PANEL: CPT

## 2020-02-06 PROCEDURE — 2580000003 HC RX 258: Performed by: GENERAL PRACTICE

## 2020-02-06 PROCEDURE — 36415 COLL VENOUS BLD VENIPUNCTURE: CPT

## 2020-02-06 PROCEDURE — 85651 RBC SED RATE NONAUTOMATED: CPT

## 2020-02-06 PROCEDURE — 2580000003 HC RX 258

## 2020-02-06 PROCEDURE — 94640 AIRWAY INHALATION TREATMENT: CPT

## 2020-02-06 RX ORDER — HYDRALAZINE HYDROCHLORIDE 50 MG/1
50 TABLET, FILM COATED ORAL EVERY 8 HOURS SCHEDULED
Status: DISCONTINUED | OUTPATIENT
Start: 2020-02-06 | End: 2020-02-09

## 2020-02-06 RX ORDER — METOPROLOL SUCCINATE 100 MG/1
TABLET, EXTENDED RELEASE ORAL
Status: DISPENSED
Start: 2020-02-06 | End: 2020-02-06

## 2020-02-06 RX ADMIN — IPRATROPIUM BROMIDE AND ALBUTEROL SULFATE 3 ML: .5; 3 SOLUTION RESPIRATORY (INHALATION) at 04:33

## 2020-02-06 RX ADMIN — GABAPENTIN 300 MG: 300 CAPSULE ORAL at 14:09

## 2020-02-06 RX ADMIN — WATER 10 ML: 1 INJECTION INTRAMUSCULAR; INTRAVENOUS; SUBCUTANEOUS at 09:32

## 2020-02-06 RX ADMIN — HYDRALAZINE HYDROCHLORIDE 50 MG: 50 TABLET, FILM COATED ORAL at 22:03

## 2020-02-06 RX ADMIN — CLONIDINE HYDROCHLORIDE 0.1 MG: 0.1 TABLET ORAL at 20:46

## 2020-02-06 RX ADMIN — HYDROCODONE BITARTRATE AND ACETAMINOPHEN 1 TABLET: 5; 325 TABLET ORAL at 16:36

## 2020-02-06 RX ADMIN — GABAPENTIN 300 MG: 300 CAPSULE ORAL at 17:34

## 2020-02-06 RX ADMIN — FINASTERIDE 5 MG: 5 TABLET, FILM COATED ORAL at 09:30

## 2020-02-06 RX ADMIN — Medication 10 ML: at 09:33

## 2020-02-06 RX ADMIN — GABAPENTIN 300 MG: 300 CAPSULE ORAL at 09:31

## 2020-02-06 RX ADMIN — CLONIDINE HYDROCHLORIDE 0.1 MG: 0.1 TABLET ORAL at 09:28

## 2020-02-06 RX ADMIN — AMLODIPINE BESYLATE 10 MG: 10 TABLET ORAL at 09:30

## 2020-02-06 RX ADMIN — CLONIDINE HYDROCHLORIDE 0.1 MG: 0.1 TABLET ORAL at 14:09

## 2020-02-06 RX ADMIN — METOPROLOL SUCCINATE 100 MG: 100 TABLET, EXTENDED RELEASE ORAL at 06:04

## 2020-02-06 RX ADMIN — IPRATROPIUM BROMIDE AND ALBUTEROL SULFATE 3 ML: .5; 3 SOLUTION RESPIRATORY (INHALATION) at 21:48

## 2020-02-06 RX ADMIN — ENOXAPARIN SODIUM 40 MG: 40 INJECTION SUBCUTANEOUS at 09:31

## 2020-02-06 RX ADMIN — HYDRALAZINE HYDROCHLORIDE 25 MG: 25 TABLET, FILM COATED ORAL at 06:03

## 2020-02-06 RX ADMIN — SODIUM CHLORIDE: 9 INJECTION, SOLUTION INTRAVENOUS at 16:43

## 2020-02-06 RX ADMIN — METHYLPREDNISOLONE SODIUM SUCCINATE 40 MG: 40 INJECTION, POWDER, LYOPHILIZED, FOR SOLUTION INTRAMUSCULAR; INTRAVENOUS at 17:35

## 2020-02-06 RX ADMIN — HYDROCHLOROTHIAZIDE 12.5 MG: 12.5 TABLET ORAL at 09:30

## 2020-02-06 RX ADMIN — HYDRALAZINE HYDROCHLORIDE 25 MG: 25 TABLET, FILM COATED ORAL at 14:09

## 2020-02-06 RX ADMIN — GABAPENTIN 300 MG: 300 CAPSULE ORAL at 20:46

## 2020-02-06 RX ADMIN — HYDROCODONE BITARTRATE AND ACETAMINOPHEN 2 TABLET: 5; 325 TABLET ORAL at 08:12

## 2020-02-06 RX ADMIN — LISINOPRIL 40 MG: 20 TABLET ORAL at 09:31

## 2020-02-06 RX ADMIN — METHYLPREDNISOLONE SODIUM SUCCINATE 40 MG: 40 INJECTION, POWDER, LYOPHILIZED, FOR SOLUTION INTRAMUSCULAR; INTRAVENOUS at 09:32

## 2020-02-06 RX ADMIN — WATER 10 ML: 1 INJECTION INTRAMUSCULAR; INTRAVENOUS; SUBCUTANEOUS at 17:35

## 2020-02-06 RX ADMIN — METHYLPREDNISOLONE SODIUM SUCCINATE 40 MG: 40 INJECTION, POWDER, LYOPHILIZED, FOR SOLUTION INTRAMUSCULAR; INTRAVENOUS at 02:00

## 2020-02-06 RX ADMIN — Medication 10 ML: at 20:44

## 2020-02-06 RX ADMIN — SODIUM CHLORIDE: 9 INJECTION, SOLUTION INTRAVENOUS at 06:05

## 2020-02-06 ASSESSMENT — PAIN SCALES - GENERAL
PAINLEVEL_OUTOF10: 8
PAINLEVEL_OUTOF10: 8
PAINLEVEL_OUTOF10: 0

## 2020-02-06 ASSESSMENT — PAIN DESCRIPTION - PAIN TYPE: TYPE: ACUTE PAIN

## 2020-02-06 ASSESSMENT — PAIN DESCRIPTION - FREQUENCY: FREQUENCY: CONTINUOUS

## 2020-02-06 ASSESSMENT — PAIN DESCRIPTION - ORIENTATION: ORIENTATION: RIGHT

## 2020-02-06 ASSESSMENT — PAIN DESCRIPTION - DESCRIPTORS: DESCRIPTORS: ACHING

## 2020-02-06 ASSESSMENT — PAIN DESCRIPTION - LOCATION: LOCATION: ARM

## 2020-02-06 NOTE — CARE COORDINATION
CM met with pt at bedside to discuss role, anticipated LOS & current plan of care. Reports living in [facility] (w/4 other people) since December when he moved back from [place]. CM discussed previous admission & need to have OP sleep study to obtain CPAP. States he moved & couldn't pay for the sleep study. States is able to get around at home, uses cane as needed. Does not have home O2, does have nebulizer & is not diabetic. Plan is to return to [facility] at discharge. PCP here for eval. Will continue to follow.

## 2020-02-06 NOTE — PROGRESS NOTES
Per P&T Committee and Medical Executive Committee approval, respiratory inhalers are nonformulary and interchanged to nebulized therapy in [facility] unless the prescriber rejects the interchange and orders the inhaler as dispense as written (JOO). JOO orders for inhalers will require the patient/caregiver to bring the nonformulary inhaler into the hospital from home. Please note that per P&T Committee and Medical Executive Committee approval, Pharmacy has discontinued the duplicate order for the following respiratory medications:  Albuterol nebulizer q6h prn and Albuterol Inhaler 2 puffs q4h prn. Please call the Pharmacy with any questions.

## 2020-02-06 NOTE — PROGRESS NOTES
[facility] Quality Flow/Interdisciplinary Rounds Progress Note        Quality Flow Rounds held on February 6, 2020    Disciplines Attending:  Bedside Nurse, ,  and Nursing Unit Leadership    Caio Rodriguez was admitted on 2/5/2020  4:50 PM    Anticipated Discharge Date:  Expected Discharge Date: 02/08/20    Disposition:    Erick Score:  Erick Scale Score: 20    Readmission Risk              Risk of Unplanned Readmission:        18           Discussed patient goal for the day, patient clinical progression, and barriers to discharge.   The following Goal(s) of the Day/Commitment(s) have been identified:  Wean off O2, monitor BP, check plan      Mary Stover  February 6, 2020

## 2020-02-06 NOTE — H&P
urine drug  screen, procalcitonin level for the possibility of bacterial component,  and go ahead and continue him on some IV steroids, aerosol treatments,  and obtain Pulmonary consultation. RECENT AND CURRENT MEDICATIONS:  As an outpatient included Proventil  HFA, nebulizer kit for aerosol treatments, Norvasc 10 daily, Catapres  0.1 three times a day, Proscar 5 daily, HydroDIURIL 12.5, metoprolol XL  100 daily, stool softeners, Neurontin 300 four times a day, Breo,  Nicoderm patch, Chantix, lisinopril 20 daily, and albuterol nebulizers  with ipratropium. PAST MEDICAL HISTORY:  Significant for community-acquired pneumonia due  to Chlamydia; cervical spine arthritis; degenerative disk disease,  status post fusion; community-acquired bacterial pneumonia; COPD  exacerbation; carpal tunnel syndrome; essential hypertension;  hyperlipidemia; obstructive sleep apnea; precordial chest pain; chronic  myofascial pain syndrome; degenerative disk disease, both of the  cervical and lumbar spine; prostatic hypertrophy. SOCIAL HISTORY:  The patient is a current everyday smoker, at least a  pack and a half a day. Denies alcohol, and has a history of using  opioids for chronic pain management. REVIEW OF SYSTEMS:  Negative for cephalgia, vertigo, ringing in the  ears, hearing loss, difficulty swallowing, hoarseness in his voice,  bloody noses, or dysphagia. He has chronic neck pain, pain into his  upper back, down both of his arms, radicular type of pain. Currently,  he has a vague chest discomfort, most likely associated with respiratory  symptoms, nothing sounds like angina, arrhythmias, CHF. No orthopnea. No paroxysmal nocturnal dyspnea. No edema. He has cough, wheezing, and  shortness of breath. No hemoptysis. No pleuritic pain. There is no  nausea, vomiting, diarrhea, or constipation. There is no blood in the  stool.   There is no urgency, frequency, dysuria, or hematuria, although  he does have prostatic on some aerosols and some steroids, O2 therapy, try to get  him back on his BiPAP, obtain Pulmonary consult. We will get some  images of his neck and his back and see how that are doing. He may need  to have consultation with Neurosurgery once he stabilizes from a  Pulmonary standpoint. At the time of admission, his condition is fair. His prognosis is guarded.         Jakub Verdin DO    D: 02/06/2020 14:57:14       T: 02/06/2020 15:05:19     [email or web address]  Job#: [id number]     Doc#: [id number]    CC:

## 2020-02-07 LAB
ADENOVIRUS BY PCR: NOT DETECTED
BORDETELLA PARAPERTUSSIS BY PCR: NOT DETECTED
BORDETELLA PERTUSSIS BY PCR: NOT DETECTED
CHLAMYDOPHILIA PNEUMONIAE BY PCR: NOT DETECTED
CORONAVIRUS 229E BY PCR: NOT DETECTED
CORONAVIRUS HKU1 BY PCR: NOT DETECTED
CORONAVIRUS NL63 BY PCR: NOT DETECTED
CORONAVIRUS OC43 BY PCR: NOT DETECTED
HUMAN METAPNEUMOVIRUS BY PCR: NOT DETECTED
HUMAN RHINOVIRUS/ENTEROVIRUS BY PCR: NOT DETECTED
INFLUENZA A BY PCR: NOT DETECTED
INFLUENZA B BY PCR: NOT DETECTED
MYCOPLASMA PNEUMONIAE BY PCR: NOT DETECTED
PARAINFLUENZA VIRUS 1 BY PCR: NOT DETECTED
PARAINFLUENZA VIRUS 2 BY PCR: NOT DETECTED
PARAINFLUENZA VIRUS 3 BY PCR: NOT DETECTED
PARAINFLUENZA VIRUS 4 BY PCR: NOT DETECTED
RESPIRATORY SYNCYTIAL VIRUS BY PCR: NOT DETECTED

## 2020-02-07 PROCEDURE — 2060000000 HC ICU INTERMEDIATE R&B

## 2020-02-07 PROCEDURE — 6360000002 HC RX W HCPCS: Performed by: GENERAL PRACTICE

## 2020-02-07 PROCEDURE — 6370000000 HC RX 637 (ALT 250 FOR IP): Performed by: INTERNAL MEDICINE

## 2020-02-07 PROCEDURE — 2580000003 HC RX 258

## 2020-02-07 PROCEDURE — 2700000000 HC OXYGEN THERAPY PER DAY

## 2020-02-07 PROCEDURE — 0100U HC RESPIRPTHGN MULT REV TRANS & AMP PRB TECH 21 TRGT: CPT

## 2020-02-07 PROCEDURE — 6370000000 HC RX 637 (ALT 250 FOR IP): Performed by: GENERAL PRACTICE

## 2020-02-07 PROCEDURE — 2580000003 HC RX 258: Performed by: GENERAL PRACTICE

## 2020-02-07 PROCEDURE — 94640 AIRWAY INHALATION TREATMENT: CPT

## 2020-02-07 PROCEDURE — 6360000002 HC RX W HCPCS: Performed by: INTERNAL MEDICINE

## 2020-02-07 RX ORDER — BUDESONIDE 0.5 MG/2ML
1000 INHALANT ORAL 2 TIMES DAILY
Status: DISCONTINUED | OUTPATIENT
Start: 2020-02-07 | End: 2020-02-10

## 2020-02-07 RX ORDER — METHYLPREDNISOLONE SODIUM SUCCINATE 40 MG/ML
40 INJECTION, POWDER, LYOPHILIZED, FOR SOLUTION INTRAMUSCULAR; INTRAVENOUS EVERY 12 HOURS
Status: DISCONTINUED | OUTPATIENT
Start: 2020-02-07 | End: 2020-02-09

## 2020-02-07 RX ORDER — IPRATROPIUM BROMIDE AND ALBUTEROL SULFATE 2.5; .5 MG/3ML; MG/3ML
1 SOLUTION RESPIRATORY (INHALATION) EVERY 4 HOURS
Status: DISCONTINUED | OUTPATIENT
Start: 2020-02-07 | End: 2020-02-10 | Stop reason: HOSPADM

## 2020-02-07 RX ORDER — ARFORMOTEROL TARTRATE 15 UG/2ML
15 SOLUTION RESPIRATORY (INHALATION) 2 TIMES DAILY
Status: DISCONTINUED | OUTPATIENT
Start: 2020-02-07 | End: 2020-02-10 | Stop reason: HOSPADM

## 2020-02-07 RX ADMIN — BUDESONIDE 1000 MCG: 0.5 SUSPENSION RESPIRATORY (INHALATION) at 20:26

## 2020-02-07 RX ADMIN — IPRATROPIUM BROMIDE AND ALBUTEROL SULFATE 1 AMPULE: .5; 3 SOLUTION RESPIRATORY (INHALATION) at 16:39

## 2020-02-07 RX ADMIN — FINASTERIDE 5 MG: 5 TABLET, FILM COATED ORAL at 09:07

## 2020-02-07 RX ADMIN — GABAPENTIN 300 MG: 300 CAPSULE ORAL at 14:20

## 2020-02-07 RX ADMIN — SODIUM CHLORIDE: 9 INJECTION, SOLUTION INTRAVENOUS at 18:46

## 2020-02-07 RX ADMIN — ENOXAPARIN SODIUM 40 MG: 40 INJECTION SUBCUTANEOUS at 09:07

## 2020-02-07 RX ADMIN — METHYLPREDNISOLONE SODIUM SUCCINATE 40 MG: 40 INJECTION, POWDER, LYOPHILIZED, FOR SOLUTION INTRAMUSCULAR; INTRAVENOUS at 21:18

## 2020-02-07 RX ADMIN — WATER 10 ML: 1 INJECTION INTRAMUSCULAR; INTRAVENOUS; SUBCUTANEOUS at 09:07

## 2020-02-07 RX ADMIN — CLONIDINE HYDROCHLORIDE 0.1 MG: 0.1 TABLET ORAL at 20:16

## 2020-02-07 RX ADMIN — AMLODIPINE BESYLATE 10 MG: 10 TABLET ORAL at 09:06

## 2020-02-07 RX ADMIN — HYDROCODONE BITARTRATE AND ACETAMINOPHEN 1 TABLET: 5; 325 TABLET ORAL at 06:14

## 2020-02-07 RX ADMIN — Medication 10 ML: at 20:17

## 2020-02-07 RX ADMIN — METOPROLOL SUCCINATE 100 MG: 100 TABLET, EXTENDED RELEASE ORAL at 09:06

## 2020-02-07 RX ADMIN — GABAPENTIN 300 MG: 300 CAPSULE ORAL at 09:06

## 2020-02-07 RX ADMIN — SODIUM CHLORIDE: 9 INJECTION, SOLUTION INTRAVENOUS at 10:08

## 2020-02-07 RX ADMIN — Medication 10 ML: at 09:09

## 2020-02-07 RX ADMIN — HYDROCODONE BITARTRATE AND ACETAMINOPHEN 1 TABLET: 5; 325 TABLET ORAL at 10:40

## 2020-02-07 RX ADMIN — HYDROCHLOROTHIAZIDE 12.5 MG: 12.5 TABLET ORAL at 07:10

## 2020-02-07 RX ADMIN — HYDROCODONE BITARTRATE AND ACETAMINOPHEN 1 TABLET: 5; 325 TABLET ORAL at 20:17

## 2020-02-07 RX ADMIN — WATER 10 ML: 1 INJECTION INTRAMUSCULAR; INTRAVENOUS; SUBCUTANEOUS at 03:12

## 2020-02-07 RX ADMIN — METHYLPREDNISOLONE SODIUM SUCCINATE 40 MG: 40 INJECTION, POWDER, LYOPHILIZED, FOR SOLUTION INTRAMUSCULAR; INTRAVENOUS at 09:07

## 2020-02-07 RX ADMIN — GABAPENTIN 300 MG: 300 CAPSULE ORAL at 17:14

## 2020-02-07 RX ADMIN — GABAPENTIN 300 MG: 300 CAPSULE ORAL at 20:16

## 2020-02-07 RX ADMIN — METHYLPREDNISOLONE SODIUM SUCCINATE 40 MG: 40 INJECTION, POWDER, LYOPHILIZED, FOR SOLUTION INTRAMUSCULAR; INTRAVENOUS at 03:00

## 2020-02-07 RX ADMIN — CLONIDINE HYDROCHLORIDE 0.1 MG: 0.1 TABLET ORAL at 07:10

## 2020-02-07 RX ADMIN — CLONIDINE HYDROCHLORIDE 0.1 MG: 0.1 TABLET ORAL at 14:20

## 2020-02-07 RX ADMIN — ARFORMOTEROL TARTRATE 15 MCG: 15 SOLUTION RESPIRATORY (INHALATION) at 20:26

## 2020-02-07 RX ADMIN — HYDRALAZINE HYDROCHLORIDE 50 MG: 50 TABLET, FILM COATED ORAL at 06:05

## 2020-02-07 RX ADMIN — LISINOPRIL 40 MG: 20 TABLET ORAL at 07:11

## 2020-02-07 RX ADMIN — HYDRALAZINE HYDROCHLORIDE 50 MG: 50 TABLET, FILM COATED ORAL at 14:20

## 2020-02-07 RX ADMIN — IPRATROPIUM BROMIDE AND ALBUTEROL SULFATE 1 AMPULE: .5; 3 SOLUTION RESPIRATORY (INHALATION) at 14:29

## 2020-02-07 RX ADMIN — IPRATROPIUM BROMIDE AND ALBUTEROL SULFATE 1 AMPULE: .5; 3 SOLUTION RESPIRATORY (INHALATION) at 20:25

## 2020-02-07 RX ADMIN — HYDRALAZINE HYDROCHLORIDE 50 MG: 50 TABLET, FILM COATED ORAL at 20:16

## 2020-02-07 ASSESSMENT — PAIN SCALES - GENERAL
PAINLEVEL_OUTOF10: 8
PAINLEVEL_OUTOF10: 6
PAINLEVEL_OUTOF10: 7
PAINLEVEL_OUTOF10: 7
PAINLEVEL_OUTOF10: 6

## 2020-02-07 ASSESSMENT — PAIN DESCRIPTION - LOCATION: LOCATION: ARM

## 2020-02-07 ASSESSMENT — PAIN DESCRIPTION - PAIN TYPE: TYPE: ACUTE PAIN

## 2020-02-07 ASSESSMENT — PAIN DESCRIPTION - ORIENTATION: ORIENTATION: RIGHT

## 2020-02-07 NOTE — CONSULTS
headache, diplopia, dizziness, tremor, change in muscle strength, numbness or tingling. No change in gait, balance, coordination, mood, affect, memory, mentation, behavior. · Psychiatric: No anxiety or depression. · Endocrine: No temperature intolerance, excessive thirst, fluid intake, urinary frequency, excessive appetite, or recent weight change. · Hematologic/Lymphatic: No abnormal bruising or bleeding, blood clots or swollen lymph nodes. No anemia, fever, chills, night sweats, or swollen glands. · Allergic/Immunologic: No seasonal or perenial allergies. No history of hives or atopic dermatitis. Social History:  · Alcohol: Ongoing  · Tobacco:   Ongoing  · Employment:  no silica or asbestos exposure  · Family:  No family history of lung disease    Medications:   sodium chloride 125 mL/hr at 20 1008      methylPREDNISolone  40 mg Intravenous Q12H    hydrALAZINE  50 mg Oral 3 times per day    sodium chloride flush  10 mL Intravenous 2 times per day    amLODIPine  10 mg Oral Daily    cloNIDine  0.1 mg Oral TID    finasteride  5 mg Oral Daily    gabapentin  300 mg Oral 4x Daily    hydrochlorothiazide  12.5 mg Oral Daily    lisinopril  40 mg Oral Daily    metoprolol succinate  100 mg Oral Daily    varenicline  1 mg Oral BID    enoxaparin  40 mg Subcutaneous Daily    nicotine  1 patch Transdermal Daily       Vitals:  Tmax:  VITALS:  BP (!) 155/94   Pulse 78   Temp 98.2 °F (36.8 °C) (Oral)   Resp 20   Ht 5' 7\" (1.702 m)   Wt 221 lb (100.2 kg)   SpO2 96%   BMI 34.61 kg/m²   24HR INTAKE/OUTPUT:      Intake/Output Summary (Last 24 hours) at 2020 1326  Last data filed at 2020 1859  Gross per 24 hour   Intake 1703 ml   Output --   Net 1703 ml     CURRENT PULSE OXIMETRY:  SpO2: 96 %  24HR PULSE OXIMETRY RANGE:  SpO2  Av.3 %  Min: 96 %  Max: 97 %    EXAM:  General: No distress. Alert. Eyes: PERRL. No sclera icterus. No conjunctival injection. ENT: No discharge.  Pharynx

## 2020-02-08 PROCEDURE — 94640 AIRWAY INHALATION TREATMENT: CPT

## 2020-02-08 PROCEDURE — 2060000000 HC ICU INTERMEDIATE R&B

## 2020-02-08 PROCEDURE — 6360000002 HC RX W HCPCS: Performed by: GENERAL PRACTICE

## 2020-02-08 PROCEDURE — 6370000000 HC RX 637 (ALT 250 FOR IP): Performed by: GENERAL PRACTICE

## 2020-02-08 PROCEDURE — 2580000003 HC RX 258: Performed by: GENERAL PRACTICE

## 2020-02-08 PROCEDURE — 6370000000 HC RX 637 (ALT 250 FOR IP): Performed by: INTERNAL MEDICINE

## 2020-02-08 PROCEDURE — 6360000002 HC RX W HCPCS: Performed by: INTERNAL MEDICINE

## 2020-02-08 RX ADMIN — GABAPENTIN 300 MG: 300 CAPSULE ORAL at 17:14

## 2020-02-08 RX ADMIN — HYDROCODONE BITARTRATE AND ACETAMINOPHEN 1 TABLET: 5; 325 TABLET ORAL at 06:40

## 2020-02-08 RX ADMIN — LISINOPRIL 40 MG: 20 TABLET ORAL at 07:41

## 2020-02-08 RX ADMIN — CLONIDINE HYDROCHLORIDE 0.1 MG: 0.1 TABLET ORAL at 07:41

## 2020-02-08 RX ADMIN — IPRATROPIUM BROMIDE AND ALBUTEROL SULFATE 1 AMPULE: .5; 3 SOLUTION RESPIRATORY (INHALATION) at 08:12

## 2020-02-08 RX ADMIN — IPRATROPIUM BROMIDE AND ALBUTEROL SULFATE 1 AMPULE: .5; 3 SOLUTION RESPIRATORY (INHALATION) at 16:33

## 2020-02-08 RX ADMIN — Medication 10 ML: at 21:51

## 2020-02-08 RX ADMIN — AMLODIPINE BESYLATE 10 MG: 10 TABLET ORAL at 07:41

## 2020-02-08 RX ADMIN — HYDROCODONE BITARTRATE AND ACETAMINOPHEN 1 TABLET: 5; 325 TABLET ORAL at 10:40

## 2020-02-08 RX ADMIN — ENOXAPARIN SODIUM 40 MG: 40 INJECTION SUBCUTANEOUS at 10:40

## 2020-02-08 RX ADMIN — GABAPENTIN 300 MG: 300 CAPSULE ORAL at 21:51

## 2020-02-08 RX ADMIN — ARFORMOTEROL TARTRATE 15 MCG: 15 SOLUTION RESPIRATORY (INHALATION) at 20:23

## 2020-02-08 RX ADMIN — FINASTERIDE 5 MG: 5 TABLET, FILM COATED ORAL at 10:40

## 2020-02-08 RX ADMIN — Medication 10 ML: at 10:42

## 2020-02-08 RX ADMIN — DOCUSATE SODIUM 100 MG: 100 CAPSULE, LIQUID FILLED ORAL at 10:40

## 2020-02-08 RX ADMIN — BUDESONIDE 1000 MCG: 0.5 SUSPENSION RESPIRATORY (INHALATION) at 08:13

## 2020-02-08 RX ADMIN — METHYLPREDNISOLONE SODIUM SUCCINATE 40 MG: 40 INJECTION, POWDER, LYOPHILIZED, FOR SOLUTION INTRAMUSCULAR; INTRAVENOUS at 21:51

## 2020-02-08 RX ADMIN — GABAPENTIN 300 MG: 300 CAPSULE ORAL at 13:30

## 2020-02-08 RX ADMIN — HYDRALAZINE HYDROCHLORIDE 50 MG: 50 TABLET, FILM COATED ORAL at 06:40

## 2020-02-08 RX ADMIN — HYDRALAZINE HYDROCHLORIDE 50 MG: 50 TABLET, FILM COATED ORAL at 15:10

## 2020-02-08 RX ADMIN — BUDESONIDE 1000 MCG: 0.5 SUSPENSION RESPIRATORY (INHALATION) at 20:23

## 2020-02-08 RX ADMIN — ARFORMOTEROL TARTRATE 15 MCG: 15 SOLUTION RESPIRATORY (INHALATION) at 08:13

## 2020-02-08 RX ADMIN — HYDRALAZINE HYDROCHLORIDE 50 MG: 50 TABLET, FILM COATED ORAL at 21:51

## 2020-02-08 RX ADMIN — HYDROCODONE BITARTRATE AND ACETAMINOPHEN 1 TABLET: 5; 325 TABLET ORAL at 17:14

## 2020-02-08 RX ADMIN — CLONIDINE HYDROCHLORIDE 0.1 MG: 0.1 TABLET ORAL at 21:51

## 2020-02-08 RX ADMIN — METHYLPREDNISOLONE SODIUM SUCCINATE 40 MG: 40 INJECTION, POWDER, LYOPHILIZED, FOR SOLUTION INTRAMUSCULAR; INTRAVENOUS at 10:41

## 2020-02-08 RX ADMIN — GABAPENTIN 300 MG: 300 CAPSULE ORAL at 10:41

## 2020-02-08 RX ADMIN — HYDROCHLOROTHIAZIDE 12.5 MG: 12.5 TABLET ORAL at 07:41

## 2020-02-08 RX ADMIN — HYDROCODONE BITARTRATE AND ACETAMINOPHEN 1 TABLET: 5; 325 TABLET ORAL at 23:21

## 2020-02-08 RX ADMIN — CLONIDINE HYDROCHLORIDE 0.1 MG: 0.1 TABLET ORAL at 15:10

## 2020-02-08 RX ADMIN — METOPROLOL SUCCINATE 100 MG: 100 TABLET, EXTENDED RELEASE ORAL at 07:41

## 2020-02-08 RX ADMIN — IPRATROPIUM BROMIDE AND ALBUTEROL SULFATE 1 AMPULE: .5; 3 SOLUTION RESPIRATORY (INHALATION) at 20:23

## 2020-02-08 RX ADMIN — IPRATROPIUM BROMIDE AND ALBUTEROL SULFATE 1 AMPULE: .5; 3 SOLUTION RESPIRATORY (INHALATION) at 11:54

## 2020-02-08 ASSESSMENT — PAIN SCALES - GENERAL
PAINLEVEL_OUTOF10: 6
PAINLEVEL_OUTOF10: 0
PAINLEVEL_OUTOF10: 6

## 2020-02-08 NOTE — PROGRESS NOTES
Pulmonary Progress Note    Admit Date: 2020  Hospital day                               PCP: Darrell Dwyer DO    Chief Complaint (s):  Patient Active Problem List   Diagnosis    CTS (carpal tunnel syndrome)    Cervical arthritis    Essential hypertension    Hyperlipidemia    INOCENCIA on CPAP    Community acquired bacterial pneumonia    CAP (community acquired pneumonia) due to Chlamydia species    Acute respiratory failure with hypoxia ([address]    COPD exacerbation ([address]    Precordial chest pain       Subjective:  · Doing a little bit better, viral panel is noted to be negative. Vitals:  VITALS:  BP (!) 155/114   Pulse 70   Temp 97.6 °F (36.4 °C) (Oral)   Resp 18   Ht 5' 7\" (1.702 m)   Wt 222 lb 9.6 oz (101 kg)   SpO2 98%   BMI 34.86 kg/m²     24HR INTAKE/OUTPUT:      Intake/Output Summary (Last 24 hours) at 2020 1316  Last data filed at 2020 0941  Gross per 24 hour   Intake 1690 ml   Output 700 ml   Net 990 ml       24HR PULSE OXIMETRY RANGE:    SpO2  Av %  Min: 94 %  Max: 98 %    Medications:  IV:   sodium chloride 125 mL/hr at 20 1846       Scheduled Meds:   methylPREDNISolone  40 mg Intravenous Q12H    budesonide  1,000 mcg Nebulization BID    Arformoterol Tartrate  15 mcg Nebulization BID    ipratropium-albuterol  1 ampule Inhalation Q4H    hydrALAZINE  50 mg Oral 3 times per day    sodium chloride flush  10 mL Intravenous 2 times per day    amLODIPine  10 mg Oral Daily    cloNIDine  0.1 mg Oral TID    finasteride  5 mg Oral Daily    gabapentin  300 mg Oral 4x Daily    hydrochlorothiazide  12.5 mg Oral Daily    lisinopril  40 mg Oral Daily    metoprolol succinate  100 mg Oral Daily    enoxaparin  40 mg Subcutaneous Daily    nicotine  1 patch Transdermal Daily       Diet:   DIET RENAL;     EXAM:  General: No distress. Alert. Eyes: PERRL. No sclera icterus. No conjunctival injection. ENT: No discharge. Pharynx clear. Neck: Trachea midline. Normal thyroid. Resp: No accessory muscle use. No rales. Diffuse wheezing. No rhonchi. CV: Regular rate. Regular rhythm. No murmur or rub. Abd: Non-tender. Non-distended. No masses. No organomegaly. Normal bowel sounds. Skin: Warm and dry. No nodule on exposed extremities. No rash on exposed extremities. Ext: No cyanosis, clubbing, edema  Lymph: No cervical LAD. No supraclavicular LAD. M/S: No cyanosis. No joint deformity. No clubbing. Neuro: Awake. Follows commands. Positive pupils/gag/corneals. Normal pain response. Results:  CBC:   Recent Labs     02/05/20 1715 02/06/20  0605   WBC 9.9 6.8   HGB 13.9 13.9   HCT 40.6 40.6   MCV 93.8 93.3    244     BMP:   Recent Labs     02/05/20 1715 02/06/20  0605 02/06/20  1650    133 133   K 3.7 3.9 4.3    101 105   CO2 23 19* 19*   BUN 15 15 18   CREATININE 1.5* 1.2 1.3*     LIVER PROFILE:   Recent Labs     02/05/20 1715 02/06/20  0605 02/06/20  1650   AST 26 21 18   ALT 23 23 19   BILITOT <0.2 0.2 <0.2   ALKPHOS 80 69 73     PT/INR: No results for input(s): PROTIME, INR in the last 72 hours. APTT: No results for input(s): APTT in the last 72 hours. Pathology:  1. N/A      Microbiology:  1. Viral panel is negative. Recent ABG:   No results for input(s): PH, PO2, PCO2, HCO3, BE, O2SAT, METHB, O2HB, COHB, O2CON, HHB, THB in the last 72 hours. Recent Films:  XR CERVICAL SPINE (4-5 VIEWS)   Final Result      Posterior fusion C4-C6. Oblique views show right foraminal stenosis at C3-4, C4-5 and C5-6 and   left foraminal stenosis at C3-4. XR LUMBAR SPINE (MIN 6 VIEWS)   Final Result      No abnormal mobility on lateral flexion and extension views. Degenerative disc and facet disease most notably at L5-S1. Atherosclerotic vascular disease. XR CHEST PORTABLE   Final Result      1. Likely a central bronchitis. 2. No obvious focal pneumonia. Assessment:  1.  Acute exacerbation of COPD: Viral panel is negative, this is likely inhalational aggravated by ongoing tobacco abuse. Plan:  1. Continue same. Care reviewed with the staff and the patient's family as available. Please note that voice recognition technology was used in the preparation of this note and make therefore it may contain inadvertent transcription errors. Marv Caal M.D., F.C.C.P.     [facility], [address] [place] [address], [address] [place] [address]

## 2020-02-09 LAB
CREATININE URINE: 79 MG/DL (ref 40–278)
CREATININE URINE: 81 MG/DL (ref 40–278)
MICROALBUMIN UR-MCNC: <12 MG/L
MICROALBUMIN/CREAT UR-RTO: ABNORMAL (ref 0–30)
PROTEIN PROTEIN: 7 MG/DL (ref 0–12)
PROTEIN/CREAT RATIO: 0.1
PROTEIN/CREAT RATIO: 0.1 (ref 0–0.2)

## 2020-02-09 PROCEDURE — 6370000000 HC RX 637 (ALT 250 FOR IP): Performed by: GENERAL PRACTICE

## 2020-02-09 PROCEDURE — 94640 AIRWAY INHALATION TREATMENT: CPT

## 2020-02-09 PROCEDURE — 6360000002 HC RX W HCPCS: Performed by: GENERAL PRACTICE

## 2020-02-09 PROCEDURE — 84156 ASSAY OF PROTEIN URINE: CPT

## 2020-02-09 PROCEDURE — 6370000000 HC RX 637 (ALT 250 FOR IP): Performed by: INTERNAL MEDICINE

## 2020-02-09 PROCEDURE — 6360000002 HC RX W HCPCS: Performed by: INTERNAL MEDICINE

## 2020-02-09 PROCEDURE — 82570 ASSAY OF URINE CREATININE: CPT

## 2020-02-09 PROCEDURE — 84244 ASSAY OF RENIN: CPT

## 2020-02-09 PROCEDURE — 2060000000 HC ICU INTERMEDIATE R&B

## 2020-02-09 PROCEDURE — 82088 ASSAY OF ALDOSTERONE: CPT

## 2020-02-09 PROCEDURE — 2580000003 HC RX 258: Performed by: GENERAL PRACTICE

## 2020-02-09 PROCEDURE — 82044 UR ALBUMIN SEMIQUANTITATIVE: CPT

## 2020-02-09 RX ORDER — HYDRALAZINE HYDROCHLORIDE 50 MG/1
100 TABLET, FILM COATED ORAL EVERY 8 HOURS SCHEDULED
Status: DISCONTINUED | OUTPATIENT
Start: 2020-02-09 | End: 2020-02-10 | Stop reason: HOSPADM

## 2020-02-09 RX ORDER — METHYLPREDNISOLONE SODIUM SUCCINATE 125 MG/2ML
60 INJECTION, POWDER, LYOPHILIZED, FOR SOLUTION INTRAMUSCULAR; INTRAVENOUS DAILY
Status: DISCONTINUED | OUTPATIENT
Start: 2020-02-10 | End: 2020-02-10 | Stop reason: HOSPADM

## 2020-02-09 RX ORDER — PANTOPRAZOLE SODIUM 40 MG/1
40 TABLET, DELAYED RELEASE ORAL
Status: DISCONTINUED | OUTPATIENT
Start: 2020-02-10 | End: 2020-02-10 | Stop reason: HOSPADM

## 2020-02-09 RX ORDER — HYDROCHLOROTHIAZIDE 25 MG/1
25 TABLET ORAL DAILY
Status: DISCONTINUED | OUTPATIENT
Start: 2020-02-10 | End: 2020-02-10 | Stop reason: HOSPADM

## 2020-02-09 RX ADMIN — IPRATROPIUM BROMIDE AND ALBUTEROL SULFATE 1 AMPULE: .5; 3 SOLUTION RESPIRATORY (INHALATION) at 09:10

## 2020-02-09 RX ADMIN — HYDROCODONE BITARTRATE AND ACETAMINOPHEN 1 TABLET: 5; 325 TABLET ORAL at 13:45

## 2020-02-09 RX ADMIN — HYDRALAZINE HYDROCHLORIDE 100 MG: 50 TABLET, FILM COATED ORAL at 13:44

## 2020-02-09 RX ADMIN — LISINOPRIL 40 MG: 20 TABLET ORAL at 08:49

## 2020-02-09 RX ADMIN — GABAPENTIN 300 MG: 300 CAPSULE ORAL at 08:49

## 2020-02-09 RX ADMIN — GABAPENTIN 300 MG: 300 CAPSULE ORAL at 13:44

## 2020-02-09 RX ADMIN — HYDROCODONE BITARTRATE AND ACETAMINOPHEN 1 TABLET: 5; 325 TABLET ORAL at 06:25

## 2020-02-09 RX ADMIN — BUDESONIDE 1000 MCG: 0.5 SUSPENSION RESPIRATORY (INHALATION) at 20:52

## 2020-02-09 RX ADMIN — CLONIDINE HYDROCHLORIDE 0.1 MG: 0.1 TABLET ORAL at 13:44

## 2020-02-09 RX ADMIN — GABAPENTIN 300 MG: 300 CAPSULE ORAL at 17:39

## 2020-02-09 RX ADMIN — HYDROCHLOROTHIAZIDE 12.5 MG: 12.5 TABLET ORAL at 08:50

## 2020-02-09 RX ADMIN — IPRATROPIUM BROMIDE AND ALBUTEROL SULFATE 1 AMPULE: .5; 3 SOLUTION RESPIRATORY (INHALATION) at 16:26

## 2020-02-09 RX ADMIN — CLONIDINE HYDROCHLORIDE 0.1 MG: 0.1 TABLET ORAL at 20:19

## 2020-02-09 RX ADMIN — GABAPENTIN 300 MG: 300 CAPSULE ORAL at 20:19

## 2020-02-09 RX ADMIN — BUDESONIDE 1000 MCG: 0.5 SUSPENSION RESPIRATORY (INHALATION) at 09:10

## 2020-02-09 RX ADMIN — IPRATROPIUM BROMIDE AND ALBUTEROL SULFATE 1 AMPULE: .5; 3 SOLUTION RESPIRATORY (INHALATION) at 12:40

## 2020-02-09 RX ADMIN — DOCUSATE SODIUM 100 MG: 100 CAPSULE, LIQUID FILLED ORAL at 08:50

## 2020-02-09 RX ADMIN — CLONIDINE HYDROCHLORIDE 0.1 MG: 0.1 TABLET ORAL at 08:49

## 2020-02-09 RX ADMIN — METOPROLOL SUCCINATE 100 MG: 100 TABLET, EXTENDED RELEASE ORAL at 08:49

## 2020-02-09 RX ADMIN — AMLODIPINE BESYLATE 10 MG: 10 TABLET ORAL at 08:49

## 2020-02-09 RX ADMIN — Medication 10 ML: at 08:51

## 2020-02-09 RX ADMIN — METHYLPREDNISOLONE SODIUM SUCCINATE 40 MG: 40 INJECTION, POWDER, LYOPHILIZED, FOR SOLUTION INTRAMUSCULAR; INTRAVENOUS at 08:50

## 2020-02-09 RX ADMIN — HYDRALAZINE HYDROCHLORIDE 100 MG: 50 TABLET, FILM COATED ORAL at 20:19

## 2020-02-09 RX ADMIN — HYDRALAZINE HYDROCHLORIDE 50 MG: 50 TABLET, FILM COATED ORAL at 06:25

## 2020-02-09 RX ADMIN — Medication 10 ML: at 20:22

## 2020-02-09 RX ADMIN — ENOXAPARIN SODIUM 40 MG: 40 INJECTION SUBCUTANEOUS at 08:50

## 2020-02-09 RX ADMIN — FINASTERIDE 5 MG: 5 TABLET, FILM COATED ORAL at 08:50

## 2020-02-09 RX ADMIN — IPRATROPIUM BROMIDE AND ALBUTEROL SULFATE 1 AMPULE: .5; 3 SOLUTION RESPIRATORY (INHALATION) at 00:50

## 2020-02-09 RX ADMIN — IPRATROPIUM BROMIDE AND ALBUTEROL SULFATE 1 AMPULE: .5; 3 SOLUTION RESPIRATORY (INHALATION) at 20:52

## 2020-02-09 RX ADMIN — ARFORMOTEROL TARTRATE 15 MCG: 15 SOLUTION RESPIRATORY (INHALATION) at 20:52

## 2020-02-09 RX ADMIN — ARFORMOTEROL TARTRATE 15 MCG: 15 SOLUTION RESPIRATORY (INHALATION) at 09:11

## 2020-02-09 RX ADMIN — HYDROCODONE BITARTRATE AND ACETAMINOPHEN 1 TABLET: 5; 325 TABLET ORAL at 19:05

## 2020-02-09 ASSESSMENT — PAIN SCALES - GENERAL
PAINLEVEL_OUTOF10: 6
PAINLEVEL_OUTOF10: 7
PAINLEVEL_OUTOF10: 0
PAINLEVEL_OUTOF10: 0
PAINLEVEL_OUTOF10: 6

## 2020-02-09 NOTE — PROGRESS NOTES
the patient's family as available. Please note that voice recognition technology was used in the preparation of this note and make therefore it may contain inadvertent transcription errors. Mario Kong M.D., F.C.C.P.     [facility], [address] [place] [address], [address] [place] [address]

## 2020-02-09 NOTE — PROGRESS NOTES
[facility] Quality Flow/Interdisciplinary Rounds Progress Note        Quality Flow Rounds held on February 9, 2020    Disciplines Attending:  Bedside Nurse, ,  and Nursing Unit Leadership    Jocy French was admitted on 2/5/2020  4:50 PM    Anticipated Discharge Date:  Expected Discharge Date: 02/08/20    Disposition:    Erick Score:  Erick Scale Score: 22    Readmission Risk              Risk of Unplanned Readmission:        19           Discussed patient goal for the day, patient clinical progression, and barriers to discharge.   The following Goal(s) of the Day/Commitment(s) have been identified:  Other  Check renal plan      Shahab Prieto  February 9, 2020

## 2020-02-09 NOTE — CONSULTS
HISTORY:  Include:  1. COPD. 2.  Hypertension. 3.  Hyperlipidemia. 4.  _____. PAST SURGICAL HISTORY:  Include cervical fusion. SOCIAL HISTORY:  Positive for drinking and smoking. FAMILY HISTORY:  Reviewed and noncontributory. CURRENT MEDICATIONS:  Include:  1. Norvasc. 2.  Clonidine. 3.  Finasteride. 4.  Neurontin. 5.  Hydralazine. 6.  Hydrochlorothiazide. 7.  Lisinopril. 8.  Solu-Medrol. 9.  Metoprolol. 10.  Protonix. PHYSICAL EXAMINATION:  VITAL SIGNS:  Blood pressure 160/95, heart rate 80. HEAD:  Atraumatic and normocephalic. NECK:  Supple, symmetrical.  EYES:  Pupils are round and reactive to light bilaterally. HEART:  S1 and S2 regular on my exam.  ABDOMEN:  Soft. Nontender. Bowel sounds are active. No organomegaly. LUNGS:  Good air flow bilaterally. SKIN:  Dry. PSYCHIATRIC:  Cooperative. NEUROLOGIC:  Cranial nerves are grossly intact from II through XII. LABORATORY DATA:  Blood work:  Sodium 133, potassium 4.3, creatinine  1.3. ASSESSMENT:  1. Hypertension, uncontrolled. On multiple medications including  calcium channel blocker, Norvasc, beta blocker, metoprolol, vasodilator  in the form of hydralazine, alpha 1 blocker in the form of clonidine,  and hydrochlorothiazide. He is also on lisinopril. 2.  Chronic kidney disease, stage III. Baseline creatinine seems to be  at 1.2 to 1.5.  3.  COPD exacerbation. 4.  Sleep apnea. 5.  Cervical radiculopathy. PLAN:  The patient's hypertension is likely essential hypertension  exacerbated by high-salt intake, underlying sleep apnea, history of  smoking, and alcohol intake. He on multiple medications for blood pressure management as mentioned  above including calcium channel blocker, beta blocker, ACE inhibitors,  vasodilator, diuretic in addition to clonidine. RECOMMENDATIONS:  1.  I will increase hydralazine to 100 mg t.i.d., increase  hydrochlorothiazide to 25 mg daily.   2.  With him on multiple antiHTN ,

## 2020-02-10 ENCOUNTER — APPOINTMENT (OUTPATIENT)
Dept: ULTRASOUND IMAGING | Age: 55
DRG: 192 | End: 2020-02-10
Payer: MEDICARE

## 2020-02-10 VITALS
OXYGEN SATURATION: 96 % | BODY MASS INDEX: 35.35 KG/M2 | HEART RATE: 72 BPM | HEIGHT: 67 IN | RESPIRATION RATE: 20 BRPM | TEMPERATURE: 97.9 F | DIASTOLIC BLOOD PRESSURE: 89 MMHG | WEIGHT: 225.2 LBS | SYSTOLIC BLOOD PRESSURE: 155 MMHG

## 2020-02-10 LAB
ANION GAP SERPL CALCULATED.3IONS-SCNC: 12 MMOL/L (ref 7–16)
BUN BLDV-MCNC: 22 MG/DL (ref 6–20)
CALCIUM SERPL-MCNC: 8.7 MG/DL (ref 8.6–10.2)
CHLORIDE BLD-SCNC: 102 MMOL/L (ref 98–107)
CO2: 23 MMOL/L (ref 22–29)
CREAT SERPL-MCNC: 1 MG/DL (ref 0.7–1.2)
GFR AFRICAN AMERICAN: >60
GFR NON-AFRICAN AMERICAN: >60 ML/MIN/1.73
GLUCOSE BLD-MCNC: 97 MG/DL (ref 74–99)
Lab: NORMAL
POTASSIUM SERPL-SCNC: 4 MMOL/L (ref 3.5–5)
REPORT: NORMAL
SODIUM BLD-SCNC: 137 MMOL/L (ref 132–146)
THIS TEST SENT TO: NORMAL

## 2020-02-10 PROCEDURE — 6370000000 HC RX 637 (ALT 250 FOR IP): Performed by: INTERNAL MEDICINE

## 2020-02-10 PROCEDURE — 6360000002 HC RX W HCPCS: Performed by: INTERNAL MEDICINE

## 2020-02-10 PROCEDURE — 93975 VASCULAR STUDY: CPT

## 2020-02-10 PROCEDURE — 76770 US EXAM ABDO BACK WALL COMP: CPT

## 2020-02-10 PROCEDURE — 80048 BASIC METABOLIC PNL TOTAL CA: CPT

## 2020-02-10 PROCEDURE — 36415 COLL VENOUS BLD VENIPUNCTURE: CPT

## 2020-02-10 PROCEDURE — 6370000000 HC RX 637 (ALT 250 FOR IP): Performed by: GENERAL PRACTICE

## 2020-02-10 PROCEDURE — 2580000003 HC RX 258: Performed by: GENERAL PRACTICE

## 2020-02-10 PROCEDURE — 2580000003 HC RX 258

## 2020-02-10 PROCEDURE — 94640 AIRWAY INHALATION TREATMENT: CPT

## 2020-02-10 RX ORDER — HYDROCHLOROTHIAZIDE 25 MG/1
25 TABLET ORAL DAILY
Qty: 30 TABLET | Refills: 3 | Status: ON HOLD | OUTPATIENT
Start: 2020-02-11 | End: 2020-11-05 | Stop reason: HOSPADM

## 2020-02-10 RX ORDER — PANTOPRAZOLE SODIUM 40 MG/1
40 TABLET, DELAYED RELEASE ORAL
Qty: 30 TABLET | Refills: 3 | Status: SHIPPED | OUTPATIENT
Start: 2020-02-11 | End: 2020-11-23

## 2020-02-10 RX ORDER — PREDNISONE 10 MG/1
10 TABLET ORAL 2 TIMES DAILY
Qty: 10 TABLET | Refills: 0 | Status: SHIPPED | OUTPATIENT
Start: 2020-02-10 | End: 2020-02-15

## 2020-02-10 RX ORDER — BUDESONIDE 0.5 MG/2ML
0.5 INHALANT ORAL 2 TIMES DAILY
Status: DISCONTINUED | OUTPATIENT
Start: 2020-02-10 | End: 2020-02-10 | Stop reason: HOSPADM

## 2020-02-10 RX ORDER — HYDRALAZINE HYDROCHLORIDE 100 MG/1
100 TABLET, FILM COATED ORAL EVERY 8 HOURS SCHEDULED
Qty: 90 TABLET | Refills: 3 | Status: ON HOLD | OUTPATIENT
Start: 2020-02-10 | End: 2020-11-05 | Stop reason: HOSPADM

## 2020-02-10 RX ORDER — PREDNISONE 10 MG/1
10 TABLET ORAL DAILY
Qty: 10 TABLET | Refills: 0 | Status: SHIPPED | OUTPATIENT
Start: 2020-02-10 | End: 2020-02-20

## 2020-02-10 RX ADMIN — GABAPENTIN 300 MG: 300 CAPSULE ORAL at 13:25

## 2020-02-10 RX ADMIN — IPRATROPIUM BROMIDE AND ALBUTEROL SULFATE 1 AMPULE: .5; 3 SOLUTION RESPIRATORY (INHALATION) at 09:08

## 2020-02-10 RX ADMIN — LISINOPRIL 40 MG: 20 TABLET ORAL at 08:06

## 2020-02-10 RX ADMIN — CLONIDINE HYDROCHLORIDE 0.1 MG: 0.1 TABLET ORAL at 08:06

## 2020-02-10 RX ADMIN — GABAPENTIN 300 MG: 300 CAPSULE ORAL at 08:06

## 2020-02-10 RX ADMIN — IPRATROPIUM BROMIDE AND ALBUTEROL SULFATE 1 AMPULE: .5; 3 SOLUTION RESPIRATORY (INHALATION) at 12:00

## 2020-02-10 RX ADMIN — METOPROLOL SUCCINATE 100 MG: 100 TABLET, EXTENDED RELEASE ORAL at 08:06

## 2020-02-10 RX ADMIN — HYDROCODONE BITARTRATE AND ACETAMINOPHEN 1 TABLET: 5; 325 TABLET ORAL at 01:18

## 2020-02-10 RX ADMIN — WATER 10 ML: 1 INJECTION INTRAMUSCULAR; INTRAVENOUS; SUBCUTANEOUS at 08:07

## 2020-02-10 RX ADMIN — HYDRALAZINE HYDROCHLORIDE 100 MG: 50 TABLET, FILM COATED ORAL at 13:25

## 2020-02-10 RX ADMIN — METHYLPREDNISOLONE SODIUM SUCCINATE 60 MG: 125 INJECTION, POWDER, LYOPHILIZED, FOR SOLUTION INTRAMUSCULAR; INTRAVENOUS at 08:07

## 2020-02-10 RX ADMIN — CLONIDINE HYDROCHLORIDE 0.1 MG: 0.1 TABLET ORAL at 13:25

## 2020-02-10 RX ADMIN — BUDESONIDE 1000 MCG: 0.5 SUSPENSION RESPIRATORY (INHALATION) at 09:08

## 2020-02-10 RX ADMIN — ARFORMOTEROL TARTRATE 15 MCG: 15 SOLUTION RESPIRATORY (INHALATION) at 09:09

## 2020-02-10 RX ADMIN — FINASTERIDE 5 MG: 5 TABLET, FILM COATED ORAL at 08:06

## 2020-02-10 RX ADMIN — HYDROCHLOROTHIAZIDE 25 MG: 25 TABLET ORAL at 08:06

## 2020-02-10 RX ADMIN — AMLODIPINE BESYLATE 10 MG: 10 TABLET ORAL at 08:06

## 2020-02-10 RX ADMIN — IPRATROPIUM BROMIDE AND ALBUTEROL SULFATE 1 AMPULE: .5; 3 SOLUTION RESPIRATORY (INHALATION) at 09:07

## 2020-02-10 RX ADMIN — Medication 10 ML: at 08:07

## 2020-02-10 ASSESSMENT — PAIN DESCRIPTION - LOCATION: LOCATION: ARM

## 2020-02-10 ASSESSMENT — PAIN DESCRIPTION - ORIENTATION: ORIENTATION: RIGHT

## 2020-02-10 ASSESSMENT — PAIN SCALES - GENERAL
PAINLEVEL_OUTOF10: 5
PAINLEVEL_OUTOF10: 6
PAINLEVEL_OUTOF10: 0
PAINLEVEL_OUTOF10: 8

## 2020-02-10 ASSESSMENT — PAIN DESCRIPTION - PAIN TYPE: TYPE: CHRONIC PAIN

## 2020-02-10 NOTE — PROGRESS NOTES
[facility] Quality Flow/Interdisciplinary Rounds Progress Note        Quality Flow Rounds held on February 10, 2020    Disciplines Attending:  Bedside Nurse, ,  and Nursing Unit Leadership    Spring Larson was admitted on 2/5/2020  4:50 PM    Anticipated Discharge Date:  Expected Discharge Date: 02/08/20    Disposition:    Erick Score:  Erick Scale Score: 23    Readmission Risk              Risk of Unplanned Readmission:        21           Discussed patient goal for the day, patient clinical progression, and barriers to discharge.   The following Goal(s) of the Day/Commitment(s) have been identified:  Monitor BP, check renal US    Leilani Freedman  February 10, 2020

## 2020-02-10 NOTE — PROGRESS NOTES
thyroid. Resp: No accessory muscle use. No rales. Diffuse wheezing. No rhonchi. CV: Regular rate. Regular rhythm. No murmur or rub. Abd: Non-tender. Non-distended. No masses. No organomegaly. Normal bowel sounds. Skin: Warm and dry. No nodule on exposed extremities. No rash on exposed extremities. Ext: No cyanosis, clubbing, edema  Lymph: No cervical LAD. No supraclavicular LAD. M/S: No cyanosis. No joint deformity. No clubbing. Neuro: Awake. Follows commands. Positive pupils/gag/corneals. Normal pain response. Results:  CBC:   No results for input(s): WBC, HGB, HCT, MCV, PLT in the last 72 hours. BMP:   Recent Labs     02/10/20  0510      K 4.0      CO2 23   BUN 22*   CREATININE 1.0     LIVER PROFILE:   No results for input(s): AST, ALT, LIPASE, BILIDIR, BILITOT, ALKPHOS in the last 72 hours. Invalid input(s): AMYLASE,  ALB  PT/INR: No results for input(s): PROTIME, INR in the last 72 hours. APTT: No results for input(s): APTT in the last 72 hours. Pathology:  1. N/A      Microbiology:  1. Viral panel is negative. Recent ABG:   No results for input(s): PH, PO2, PCO2, HCO3, BE, O2SAT, METHB, O2HB, COHB, O2CON, HHB, THB in the last 72 hours. Recent Films:  US DUP ABD PEL RETRO SCROT COMPLETE   Final Result   Unremarkable sonographic appearance of the kidneys. No evidence of right or left renal artery stenosis by Doppler   sonography. US RETROPERITONEAL REGAN   Final Result   Unremarkable sonographic appearance of the kidneys. No evidence of right or left renal artery stenosis by Doppler   sonography. XR CERVICAL SPINE (4-5 VIEWS)   Final Result      Posterior fusion C4-C6. Oblique views show right foraminal stenosis at C3-4, C4-5 and C5-6 and   left foraminal stenosis at C3-4. XR LUMBAR SPINE (MIN 6 VIEWS)   Final Result      No abnormal mobility on lateral flexion and extension views.       Degenerative disc and facet

## 2020-02-11 LAB
ALDOSTERONE: 7.7 NG/DL
RENIN ACTIVITY: 0.2 NG/ML/HR

## 2020-02-20 NOTE — DISCHARGE SUMMARY
[id number] [address]                               DISCHARGE SUMMARY    PATIENT NAME: Leonardo Swenson                  :        1965  MED REC NO:   [id number]                            ROOM:       3060  ACCOUNT NO:   [de-identified]                           ADMIT DATE: 2020  PROVIDER:     Melvi Watts DO                  [facility] DATE: 02/10/2020    DATE OF DISCHARGE:  02/10/2020    FINAL DIAGNOSES:  COPD with acute exacerbation, chronic myofascial pain  syndrome, degenerative disk disease of the cervical and lumbar spine,  history of hepatitis C, history of tobacco abuse, polysubstance abuse,  hypertensive crisis, and sleep apnea with noncompliance on CPAP. BRIEF HISTORY AND HOSPITAL COURSE:  The patient ended up presenting into  the emergency department with a several-day history of neck, shoulder,  and arm pain bilaterally; persistent cough; shortness of breath. Blood  pressure was significantly elevated in the emergency room. Noncompliance with his medication. He had wheezing, rhonchi, and rales  bilaterally. Thick, moist cough. O2 saturations in the 80s. EKG:   Sinus rhythm, left atrial enlargement. Chest x-ray:  Central  bronchitis. No evidence of CHF or pneumonia. White count normal.   Hemoglobin and hematocrit normal.  ProBNP was elevated at 1851. Rapid  influenza was negative for A and B.  Mild elevation of his creatinine to  1.5. Blood pressure 208/136.  _____ medications were administered in  the emergency room to bring his blood pressure down:  Labetalol,  Apresoline. The patient was brought in to adjust his antihypertensive  medications. Started on some long-acting/short-acting aerosols, O2  therapy, and steroids. Checked procalcitonin level for possibility of  empiric antibiotics for bacterial component. Had Pulmonary see him. Obtained some images of his neck and his back.   So, during his stay,  blood pressure being so high, we had Renal see him. Aldosterone was  normal at 7.7. Renin was normal at 0.2. Secondary causes of  hypertension were checked for. Ultrasound of the renal arteries was  negative for significant renal artery stenosis. X-rays of the cervical  spine showed posterior fusion from C4 to C6 with significant foraminal  stenosis bilaterally in his neck. Lumbar spine showed degenerative disk  disease and facet disease, most notably L5-S1. Degenerative spondylosis  was also appreciated. So, his pain was accounted for, for his previous  surgery and failed back syndrome. He has had appointments to see  Gastroenterology for his hepatitis C. He has been given instructions to  quit smoking. His blood pressure medications had to be resumed and  changed, so he will be on hydralazine 100 every eight hours,  hydrochlorothiazide 25 daily, Protonix 40 daily, Zestril 40 daily,  metoprolol  daily, and Catapres 0.1 three times a day. He will  take Colace and continue on with his gabapentin at 300 four times a day. He is also on amlodipine 5 or 10 daily. He will go home on a tapering  dose of steroids. Continue on with his aerosols. Continue on with  Chantix for smoking cessation and Proscar 5 mg. Feeling a lot better. Anxious to get out. He is certainly told to quit smoking. He had a  drug screen positive for opiates and cocaine. He has certainly been  told that he can reduce the risk of significant hypertensive crisis  again by stopping the use of narcotic drugs, particularly the cocaine. Hopefully, he will listen, but he will be discharged on this date in an  improved condition. He will go home, but I will see him in the office  in approximately one week.         Panda Oreilly DO    D: 02/19/2020 16:03:06       T: 02/19/2020 16:10:36     [email or web address]  Job#: [id number]     Doc#: [id number]    CC:

## 2020-02-24 ENCOUNTER — OFFICE VISIT (OUTPATIENT)
Dept: NEUROSURGERY | Age: 55
End: 2020-02-24
Payer: MEDICARE

## 2020-02-24 VITALS
HEART RATE: 89 BPM | BODY MASS INDEX: 34.78 KG/M2 | SYSTOLIC BLOOD PRESSURE: 144 MMHG | WEIGHT: 221.6 LBS | DIASTOLIC BLOOD PRESSURE: 100 MMHG | HEIGHT: 67 IN

## 2020-02-24 PROCEDURE — 99203 OFFICE O/P NEW LOW 30 MIN: CPT | Performed by: PHYSICIAN ASSISTANT

## 2020-02-24 ASSESSMENT — ENCOUNTER SYMPTOMS
GASTROINTESTINAL NEGATIVE: 1
RESPIRATORY NEGATIVE: 1
ALLERGIC/IMMUNOLOGIC NEGATIVE: 1
EYES NEGATIVE: 1

## 2020-02-24 NOTE — PROGRESS NOTES
Subjective:      Patient ID: Moises Gomez is a 62 y.o. male. Neck Pain    This is a new problem. Episode onset: 3 weeks  The problem occurs daily. The problem has been gradually worsening. Associated with: a hug from a friend. The pain is present in the midline (and right arm apin/numbness into the hand. ). The quality of the pain is described as aching. The pain is mild. Nothing aggravates the symptoms. Treatments tried: gabapentin. The treatment provided mild relief. Review of Systems   Constitutional: Negative. HENT: Negative. Eyes: Negative. Respiratory: Negative. Cardiovascular: Negative. Gastrointestinal: Negative. Endocrine: Negative. Genitourinary: Negative. Musculoskeletal: Positive for neck pain. Skin: Negative. Allergic/Immunologic: Negative. Neurological: Negative. Hematological: Negative. Psychiatric/Behavioral: Negative. Objective:   Physical Exam  Constitutional:       Appearance: Normal appearance. HENT:      Head: Normocephalic and atraumatic. Nose: Nose normal.   Eyes:      Pupils: Pupils are equal, round, and reactive to light. Pulmonary:      Effort: Pulmonary effort is normal.   Abdominal:      General: There is no distension. Musculoskeletal:      Comments: Pain with ROM and palpation of the cervical spine   Skin:     General: Skin is warm and dry. Neurological:      General: No focal deficit present. Mental Status: He is alert. GCS: GCS eye subscore is 4. GCS verbal subscore is 5. GCS motor subscore is 6. Cranial Nerves: Cranial nerves are intact. Sensory: Sensation is intact. Motor: Motor function is intact. Coordination: Coordination is intact. Gait: Gait is intact. Deep Tendon Reflexes:      Reflex Scores:       Tricep reflexes are 2+ on the right side and 2+ on the left side. Bicep reflexes are 2+ on the right side and 2+ on the left side.        Brachioradialis reflexes are 2+ on the right side and 2+ on the left side. Psychiatric:         Mood and Affect: Mood normal.         Assessment:      62year old male with 3 week history of neck, right shoulder, and arm pain since received an \"aggressive hug\". Cervical x-rays show good alignment with no hardware failure. Plan: We will begin PT for the neck and right shoulder. Right shoulder x-rays will be started.   If conservative measures fail, we will order a cervical CT/myelogram.          GRACE Ulloa

## 2020-05-20 ENCOUNTER — TELEPHONE (OUTPATIENT)
Dept: SURGERY | Age: 55
End: 2020-05-20

## 2020-05-20 NOTE — TELEPHONE ENCOUNTER
MA attempted to call the patient to make an office visit for a screening colonoscopy. The referral is from Dr. Beth Carmona. MA could not leave a message because the voicemail is full.     Electronically signed by Ronaldo Alex on 5/20/20 at 10:25 AM EDT

## 2020-10-26 ENCOUNTER — HOSPITAL ENCOUNTER (OUTPATIENT)
Age: 55
Discharge: HOME | End: 2020-10-28
Payer: MEDICARE

## 2020-10-26 ENCOUNTER — HOSPITAL ENCOUNTER (OUTPATIENT)
Dept: ULTRASOUND IMAGING | Age: 55
Discharge: HOME | End: 2020-10-28
Payer: MEDICARE

## 2020-10-26 PROCEDURE — 76705 ECHO EXAM OF ABDOMEN: CPT

## 2020-10-30 ENCOUNTER — APPOINTMENT (OUTPATIENT)
Dept: GENERAL RADIOLOGY | Age: 55
DRG: 287 | End: 2020-10-30
Payer: MEDICARE

## 2020-10-30 ENCOUNTER — HOSPITAL ENCOUNTER (INPATIENT)
Age: 55
LOS: 6 days | Discharge: HOME | DRG: 287 | End: 2020-11-06
Attending: EMERGENCY MEDICINE | Admitting: GENERAL PRACTICE
Payer: MEDICARE

## 2020-10-30 ENCOUNTER — APPOINTMENT (OUTPATIENT)
Dept: CT IMAGING | Age: 55
DRG: 287 | End: 2020-10-30
Payer: MEDICARE

## 2020-10-30 LAB — SARS-COV-2, NAAT: NOT DETECTED

## 2020-10-30 PROCEDURE — 80053 COMPREHEN METABOLIC PANEL: CPT

## 2020-10-30 PROCEDURE — 71045 X-RAY EXAM CHEST 1 VIEW: CPT

## 2020-10-30 PROCEDURE — 6370000000 HC RX 637 (ALT 250 FOR IP): Performed by: EMERGENCY MEDICINE

## 2020-10-30 PROCEDURE — 99285 EMERGENCY DEPT VISIT HI MDM: CPT

## 2020-10-30 PROCEDURE — 83880 ASSAY OF NATRIURETIC PEPTIDE: CPT

## 2020-10-30 PROCEDURE — 85025 COMPLETE CBC W/AUTO DIFF WBC: CPT

## 2020-10-30 PROCEDURE — 93005 ELECTROCARDIOGRAM TRACING: CPT | Performed by: EMERGENCY MEDICINE

## 2020-10-30 PROCEDURE — 71275 CT ANGIOGRAPHY CHEST: CPT

## 2020-10-30 PROCEDURE — 84484 ASSAY OF TROPONIN QUANT: CPT

## 2020-10-30 PROCEDURE — 6360000004 HC RX CONTRAST MEDICATION: Performed by: RADIOLOGY

## 2020-10-30 PROCEDURE — 94664 DEMO&/EVAL PT USE INHALER: CPT

## 2020-10-30 PROCEDURE — 85610 PROTHROMBIN TIME: CPT

## 2020-10-30 PROCEDURE — 85730 THROMBOPLASTIN TIME PARTIAL: CPT

## 2020-10-30 PROCEDURE — 6370000000 HC RX 637 (ALT 250 FOR IP): Performed by: STUDENT IN AN ORGANIZED HEALTH CARE EDUCATION/TRAINING PROGRAM

## 2020-10-30 PROCEDURE — U0002 COVID-19 LAB TEST NON-CDC: HCPCS

## 2020-10-30 PROCEDURE — 6370000000 HC RX 637 (ALT 250 FOR IP)

## 2020-10-30 PROCEDURE — 94640 AIRWAY INHALATION TREATMENT: CPT

## 2020-10-30 RX ORDER — ASPIRIN 81 MG/1
TABLET, CHEWABLE ORAL
Status: COMPLETED
Start: 2020-10-30 | End: 2020-10-30

## 2020-10-30 RX ORDER — IPRATROPIUM BROMIDE AND ALBUTEROL SULFATE 2.5; .5 MG/3ML; MG/3ML
3 SOLUTION RESPIRATORY (INHALATION) ONCE
Status: COMPLETED | OUTPATIENT
Start: 2020-10-30 | End: 2020-10-30

## 2020-10-30 RX ORDER — ASPIRIN 325 MG
325 TABLET ORAL ONCE
Status: COMPLETED | OUTPATIENT
Start: 2020-10-30 | End: 2020-10-30

## 2020-10-30 RX ORDER — NITROGLYCERIN 0.4 MG/1
0.4 TABLET SUBLINGUAL EVERY 5 MIN PRN
Status: DISCONTINUED | OUTPATIENT
Start: 2020-10-30 | End: 2020-10-31 | Stop reason: SDUPTHER

## 2020-10-30 RX ADMIN — ASPIRIN 325 MG ORAL TABLET 325 MG: 325 PILL ORAL at 23:32

## 2020-10-30 RX ADMIN — ASPIRIN 81 MG 324 MG: 81 TABLET ORAL at 23:17

## 2020-10-30 RX ADMIN — IPRATROPIUM BROMIDE AND ALBUTEROL SULFATE 3 AMPULE: 2.5; .5 SOLUTION RESPIRATORY (INHALATION) at 23:50

## 2020-10-30 RX ADMIN — NITROGLYCERIN 0.4 MG: 0.4 TABLET, ORALLY DISINTEGRATING SUBLINGUAL at 23:17

## 2020-10-30 RX ADMIN — IOPAMIDOL 75 ML: 755 INJECTION, SOLUTION INTRAVENOUS at 23:31

## 2020-10-30 ASSESSMENT — ENCOUNTER SYMPTOMS
RHINORRHEA: 0
VOMITING: 0
WHEEZING: 1
DIARRHEA: 0
ABDOMINAL PAIN: 0
CHOKING: 0
ALLERGIC/IMMUNOLOGIC NEGATIVE: 1
NAUSEA: 1
STRIDOR: 0
SHORTNESS OF BREATH: 1
COUGH: 1

## 2020-10-30 ASSESSMENT — PAIN DESCRIPTION - DESCRIPTORS
DESCRIPTORS: SQUEEZING
DESCRIPTORS: SQUEEZING

## 2020-10-30 ASSESSMENT — PAIN DESCRIPTION - LOCATION: LOCATION: CHEST

## 2020-10-31 ENCOUNTER — APPOINTMENT (OUTPATIENT)
Dept: NUCLEAR MEDICINE | Age: 55
DRG: 287 | End: 2020-10-31
Payer: MEDICARE

## 2020-10-31 PROBLEM — I20.0 UNSTABLE ANGINA (HCC): Status: ACTIVE | Noted: 2020-10-31

## 2020-10-31 LAB
ALBUMIN SERPL-MCNC: 3.9 G/DL (ref 3.5–5.2)
ALBUMIN SERPL-MCNC: 4.2 G/DL (ref 3.5–5.2)
ALP BLD-CCNC: 73 U/L (ref 40–129)
ALP BLD-CCNC: 75 U/L (ref 40–129)
ALT SERPL-CCNC: 14 U/L (ref 0–40)
ALT SERPL-CCNC: 14 U/L (ref 0–40)
ANION GAP SERPL CALCULATED.3IONS-SCNC: 12 MMOL/L (ref 7–16)
ANION GAP SERPL CALCULATED.3IONS-SCNC: 14 MMOL/L (ref 7–16)
APTT: 27.5 SEC (ref 24.5–35.1)
APTT: 43.5 SEC (ref 24.5–35.1)
APTT: 57.1 SEC (ref 24.5–35.1)
AST SERPL-CCNC: 15 U/L (ref 0–39)
AST SERPL-CCNC: 17 U/L (ref 0–39)
BASOPHILS ABSOLUTE: 0.06 E9/L (ref 0–0.2)
BASOPHILS RELATIVE PERCENT: 0.5 % (ref 0–2)
BILIRUB SERPL-MCNC: 0.2 MG/DL (ref 0–1.2)
BILIRUB SERPL-MCNC: <0.2 MG/DL (ref 0–1.2)
BILIRUBIN URINE: NEGATIVE
BLOOD, URINE: NEGATIVE
BUN BLDV-MCNC: 22 MG/DL (ref 6–20)
BUN BLDV-MCNC: 23 MG/DL (ref 6–20)
CALCIUM SERPL-MCNC: 9.4 MG/DL (ref 8.6–10.2)
CALCIUM SERPL-MCNC: 9.6 MG/DL (ref 8.6–10.2)
CHLORIDE BLD-SCNC: 102 MMOL/L (ref 98–107)
CHLORIDE BLD-SCNC: 98 MMOL/L (ref 98–107)
CK MB: 2.2 NG/ML (ref 0–7.7)
CLARITY: CLEAR
CO2: 21 MMOL/L (ref 22–29)
CO2: 24 MMOL/L (ref 22–29)
COLOR: YELLOW
CREAT SERPL-MCNC: 1.8 MG/DL (ref 0.7–1.2)
CREAT SERPL-MCNC: 2 MG/DL (ref 0.7–1.2)
CREATININE URINE: 35 MG/DL (ref 40–278)
EKG ATRIAL RATE: 83 BPM
EKG ATRIAL RATE: 91 BPM
EKG P AXIS: 25 DEGREES
EKG P AXIS: 26 DEGREES
EKG P-R INTERVAL: 136 MS
EKG P-R INTERVAL: 144 MS
EKG Q-T INTERVAL: 392 MS
EKG Q-T INTERVAL: 426 MS
EKG QRS DURATION: 88 MS
EKG QRS DURATION: 90 MS
EKG QTC CALCULATION (BAZETT): 460 MS
EKG QTC CALCULATION (BAZETT): 523 MS
EKG R AXIS: 12 DEGREES
EKG R AXIS: 6 DEGREES
EKG T AXIS: -167 DEGREES
EKG T AXIS: 130 DEGREES
EKG VENTRICULAR RATE: 83 BPM
EKG VENTRICULAR RATE: 91 BPM
EOSINOPHILS ABSOLUTE: 0.23 E9/L (ref 0.05–0.5)
EOSINOPHILS RELATIVE PERCENT: 1.9 % (ref 0–6)
GFR AFRICAN AMERICAN: 42
GFR AFRICAN AMERICAN: 47
GFR NON-AFRICAN AMERICAN: 35 ML/MIN/1.73
GFR NON-AFRICAN AMERICAN: 39 ML/MIN/1.73
GLUCOSE BLD-MCNC: 100 MG/DL (ref 74–99)
GLUCOSE BLD-MCNC: 117 MG/DL (ref 74–99)
GLUCOSE URINE: NEGATIVE MG/DL
HCT VFR BLD CALC: 39.8 % (ref 37–54)
HCT VFR BLD CALC: 41.5 % (ref 37–54)
HEMOGLOBIN: 13.2 G/DL (ref 12.5–16.5)
HEMOGLOBIN: 14.1 G/DL (ref 12.5–16.5)
IMMATURE GRANULOCYTES #: 0.05 E9/L
IMMATURE GRANULOCYTES %: 0.4 % (ref 0–5)
INR BLD: 1
KETONES, URINE: NEGATIVE MG/DL
LEUKOCYTE ESTERASE, URINE: NEGATIVE
LV EF: 33 %
LVEF MODALITY: NORMAL
LYMPHOCYTES ABSOLUTE: 2.6 E9/L (ref 1.5–4)
LYMPHOCYTES RELATIVE PERCENT: 21.9 % (ref 20–42)
MCH RBC QN AUTO: 31.4 PG (ref 26–35)
MCH RBC QN AUTO: 31.5 PG (ref 26–35)
MCHC RBC AUTO-ENTMCNC: 33.2 % (ref 32–34.5)
MCHC RBC AUTO-ENTMCNC: 34 % (ref 32–34.5)
MCV RBC AUTO: 92.8 FL (ref 80–99.9)
MCV RBC AUTO: 94.8 FL (ref 80–99.9)
MICROALBUMIN UR-MCNC: <12 MG/L
MICROALBUMIN/CREAT UR-RTO: ABNORMAL (ref 0–30)
MONOCYTES ABSOLUTE: 1.37 E9/L (ref 0.1–0.95)
MONOCYTES RELATIVE PERCENT: 11.5 % (ref 2–12)
NEUTROPHILS ABSOLUTE: 7.57 E9/L (ref 1.8–7.3)
NEUTROPHILS RELATIVE PERCENT: 63.8 % (ref 43–80)
NITRITE, URINE: NEGATIVE
PDW BLD-RTO: 13 FL (ref 11.5–15)
PDW BLD-RTO: 13.1 FL (ref 11.5–15)
PH UA: 6 (ref 5–9)
PLATELET # BLD: 259 E9/L (ref 130–450)
PLATELET # BLD: 280 E9/L (ref 130–450)
PMV BLD AUTO: 10.3 FL (ref 7–12)
PMV BLD AUTO: 9.9 FL (ref 7–12)
POTASSIUM SERPL-SCNC: 3.7 MMOL/L (ref 3.5–5)
POTASSIUM SERPL-SCNC: 3.9 MMOL/L (ref 3.5–5)
PRO-BNP: 811 PG/ML (ref 0–125)
PROTEIN UA: NEGATIVE MG/DL
PROTHROMBIN TIME: 10.9 SEC (ref 9.3–12.4)
RBC # BLD: 4.2 E12/L (ref 3.8–5.8)
RBC # BLD: 4.47 E12/L (ref 3.8–5.8)
SODIUM BLD-SCNC: 135 MMOL/L (ref 132–146)
SODIUM BLD-SCNC: 136 MMOL/L (ref 132–146)
SPECIFIC GRAVITY UA: 1.02 (ref 1–1.03)
TOTAL CK: 178 U/L (ref 20–200)
TOTAL PROTEIN: 7.3 G/DL (ref 6.4–8.3)
TOTAL PROTEIN: 8.1 G/DL (ref 6.4–8.3)
TROPONIN: <0.01 NG/ML (ref 0–0.03)
TROPONIN: <0.01 NG/ML (ref 0–0.03)
UROBILINOGEN, URINE: 0.2 E.U./DL
WBC # BLD: 10.9 E9/L (ref 4.5–11.5)
WBC # BLD: 11.9 E9/L (ref 4.5–11.5)

## 2020-10-31 PROCEDURE — 82550 ASSAY OF CK (CPK): CPT

## 2020-10-31 PROCEDURE — 6370000000 HC RX 637 (ALT 250 FOR IP): Performed by: INTERNAL MEDICINE

## 2020-10-31 PROCEDURE — 2140000000 HC CCU INTERMEDIATE R&B

## 2020-10-31 PROCEDURE — 85027 COMPLETE CBC AUTOMATED: CPT

## 2020-10-31 PROCEDURE — 6360000002 HC RX W HCPCS: Performed by: INTERNAL MEDICINE

## 2020-10-31 PROCEDURE — 84484 ASSAY OF TROPONIN QUANT: CPT

## 2020-10-31 PROCEDURE — 36415 COLL VENOUS BLD VENIPUNCTURE: CPT

## 2020-10-31 PROCEDURE — 94760 N-INVAS EAR/PLS OXIMETRY 1: CPT

## 2020-10-31 PROCEDURE — A9500 TC99M SESTAMIBI: HCPCS | Performed by: RADIOLOGY

## 2020-10-31 PROCEDURE — 96372 THER/PROPH/DIAG INJ SC/IM: CPT

## 2020-10-31 PROCEDURE — 93017 CV STRESS TEST TRACING ONLY: CPT

## 2020-10-31 PROCEDURE — 2500000003 HC RX 250 WO HCPCS: Performed by: STUDENT IN AN ORGANIZED HEALTH CARE EDUCATION/TRAINING PROGRAM

## 2020-10-31 PROCEDURE — 2500000003 HC RX 250 WO HCPCS: Performed by: GENERAL PRACTICE

## 2020-10-31 PROCEDURE — 6360000002 HC RX W HCPCS: Performed by: GENERAL PRACTICE

## 2020-10-31 PROCEDURE — 93010 ELECTROCARDIOGRAM REPORT: CPT | Performed by: INTERNAL MEDICINE

## 2020-10-31 PROCEDURE — 94640 AIRWAY INHALATION TREATMENT: CPT

## 2020-10-31 PROCEDURE — 85730 THROMBOPLASTIN TIME PARTIAL: CPT

## 2020-10-31 PROCEDURE — 81003 URINALYSIS AUTO W/O SCOPE: CPT

## 2020-10-31 PROCEDURE — 51798 US URINE CAPACITY MEASURE: CPT

## 2020-10-31 PROCEDURE — 80053 COMPREHEN METABOLIC PANEL: CPT

## 2020-10-31 PROCEDURE — 78452 HT MUSCLE IMAGE SPECT MULT: CPT

## 2020-10-31 PROCEDURE — 3430000000 HC RX DIAGNOSTIC RADIOPHARMACEUTICAL: Performed by: RADIOLOGY

## 2020-10-31 PROCEDURE — 6370000000 HC RX 637 (ALT 250 FOR IP): Performed by: GENERAL PRACTICE

## 2020-10-31 PROCEDURE — 82570 ASSAY OF URINE CREATININE: CPT

## 2020-10-31 PROCEDURE — 93005 ELECTROCARDIOGRAM TRACING: CPT | Performed by: GENERAL PRACTICE

## 2020-10-31 PROCEDURE — 82553 CREATINE MB FRACTION: CPT

## 2020-10-31 PROCEDURE — 2700000000 HC OXYGEN THERAPY PER DAY

## 2020-10-31 PROCEDURE — 82044 UR ALBUMIN SEMIQUANTITATIVE: CPT

## 2020-10-31 PROCEDURE — 6360000002 HC RX W HCPCS: Performed by: STUDENT IN AN ORGANIZED HEALTH CARE EDUCATION/TRAINING PROGRAM

## 2020-10-31 RX ORDER — HEPARIN SODIUM 10000 [USP'U]/100ML
12 INJECTION, SOLUTION INTRAVENOUS CONTINUOUS
Status: DISCONTINUED | OUTPATIENT
Start: 2020-10-31 | End: 2020-10-31 | Stop reason: SDUPTHER

## 2020-10-31 RX ORDER — METOPROLOL SUCCINATE 100 MG/1
100 TABLET, EXTENDED RELEASE ORAL DAILY
Status: DISCONTINUED | OUTPATIENT
Start: 2020-10-31 | End: 2020-11-06 | Stop reason: HOSPADM

## 2020-10-31 RX ORDER — HEPARIN SODIUM 1000 [USP'U]/ML
60 INJECTION, SOLUTION INTRAVENOUS; SUBCUTANEOUS PRN
Status: DISCONTINUED | OUTPATIENT
Start: 2020-10-31 | End: 2020-10-31 | Stop reason: SDUPTHER

## 2020-10-31 RX ORDER — AMLODIPINE BESYLATE 5 MG/1
5 TABLET ORAL DAILY
Status: DISCONTINUED | OUTPATIENT
Start: 2020-10-31 | End: 2020-11-01

## 2020-10-31 RX ORDER — HEPARIN SODIUM 1000 [USP'U]/ML
60 INJECTION, SOLUTION INTRAVENOUS; SUBCUTANEOUS PRN
Status: DISCONTINUED | OUTPATIENT
Start: 2020-10-31 | End: 2020-11-06 | Stop reason: HOSPADM

## 2020-10-31 RX ORDER — HEPARIN SODIUM 1000 [USP'U]/ML
60 INJECTION, SOLUTION INTRAVENOUS; SUBCUTANEOUS ONCE
Status: COMPLETED | OUTPATIENT
Start: 2020-10-31 | End: 2020-10-31

## 2020-10-31 RX ORDER — SPIRONOLACTONE 25 MG/1
25 TABLET ORAL DAILY
COMMUNITY

## 2020-10-31 RX ORDER — CALCIUM CARBONATE 200(500)MG
500 TABLET,CHEWABLE ORAL 3 TIMES DAILY PRN
Status: DISCONTINUED | OUTPATIENT
Start: 2020-10-31 | End: 2020-11-06 | Stop reason: HOSPADM

## 2020-10-31 RX ORDER — FLUTICASONE PROPIONATE 50 MCG
1 SPRAY, SUSPENSION (ML) NASAL DAILY
Status: ON HOLD | COMMUNITY
End: 2020-11-14

## 2020-10-31 RX ORDER — SPIRONOLACTONE 25 MG/1
25 TABLET ORAL DAILY
Status: DISCONTINUED | OUTPATIENT
Start: 2020-10-31 | End: 2020-11-06 | Stop reason: HOSPADM

## 2020-10-31 RX ORDER — PROMETHAZINE HYDROCHLORIDE 25 MG/1
6.25 TABLET ORAL 4 TIMES DAILY
Status: DISCONTINUED | OUTPATIENT
Start: 2020-10-31 | End: 2020-11-06 | Stop reason: HOSPADM

## 2020-10-31 RX ORDER — NITROGLYCERIN 0.4 MG/1
0.4 TABLET SUBLINGUAL EVERY 5 MIN PRN
Status: DISCONTINUED | OUTPATIENT
Start: 2020-10-31 | End: 2020-11-06 | Stop reason: HOSPADM

## 2020-10-31 RX ORDER — HEPARIN SODIUM 10000 [USP'U]/100ML
12 INJECTION, SOLUTION INTRAVENOUS CONTINUOUS
Status: DISCONTINUED | OUTPATIENT
Start: 2020-10-31 | End: 2020-11-01

## 2020-10-31 RX ORDER — GABAPENTIN 300 MG/1
300 CAPSULE ORAL 4 TIMES DAILY
Status: DISCONTINUED | OUTPATIENT
Start: 2020-10-31 | End: 2020-11-02

## 2020-10-31 RX ORDER — HEPARIN SODIUM 1000 [USP'U]/ML
30 INJECTION, SOLUTION INTRAVENOUS; SUBCUTANEOUS PRN
Status: DISCONTINUED | OUTPATIENT
Start: 2020-10-31 | End: 2020-10-31 | Stop reason: SDUPTHER

## 2020-10-31 RX ORDER — CLONIDINE HYDROCHLORIDE 0.1 MG/1
0.1 TABLET ORAL 3 TIMES DAILY
Status: DISCONTINUED | OUTPATIENT
Start: 2020-10-31 | End: 2020-11-01

## 2020-10-31 RX ORDER — HEPARIN SODIUM 1000 [USP'U]/ML
30 INJECTION, SOLUTION INTRAVENOUS; SUBCUTANEOUS PRN
Status: DISCONTINUED | OUTPATIENT
Start: 2020-10-31 | End: 2020-11-06 | Stop reason: HOSPADM

## 2020-10-31 RX ORDER — LISINOPRIL 20 MG/1
40 TABLET ORAL DAILY
Status: DISCONTINUED | OUTPATIENT
Start: 2020-10-31 | End: 2020-10-31

## 2020-10-31 RX ORDER — DOXYCYCLINE HYCLATE 100 MG/1
100 CAPSULE ORAL 2 TIMES DAILY
Status: ON HOLD | COMMUNITY
End: 2020-11-05 | Stop reason: HOSPADM

## 2020-10-31 RX ORDER — TORSEMIDE 10 MG/1
10 TABLET ORAL 3 TIMES DAILY
Status: DISCONTINUED | OUTPATIENT
Start: 2020-10-31 | End: 2020-11-01

## 2020-10-31 RX ORDER — FINASTERIDE 5 MG/1
5 TABLET, FILM COATED ORAL DAILY
Status: DISCONTINUED | OUTPATIENT
Start: 2020-10-31 | End: 2020-11-06 | Stop reason: HOSPADM

## 2020-10-31 RX ORDER — TORSEMIDE 10 MG/1
10 TABLET ORAL 3 TIMES DAILY
Status: ON HOLD | COMMUNITY
End: 2020-11-05 | Stop reason: HOSPADM

## 2020-10-31 RX ORDER — DOXYCYCLINE HYCLATE 100 MG/1
100 CAPSULE ORAL 2 TIMES DAILY
Status: DISCONTINUED | OUTPATIENT
Start: 2020-10-31 | End: 2020-11-06 | Stop reason: HOSPADM

## 2020-10-31 RX ORDER — NITROGLYCERIN 20 MG/100ML
5 INJECTION INTRAVENOUS CONTINUOUS
Status: DISCONTINUED | OUTPATIENT
Start: 2020-10-31 | End: 2020-10-31

## 2020-10-31 RX ORDER — NICOTINE 21 MG/24HR
1 PATCH, TRANSDERMAL 24 HOURS TRANSDERMAL DAILY
Status: DISCONTINUED | OUTPATIENT
Start: 2020-10-31 | End: 2020-11-06 | Stop reason: HOSPADM

## 2020-10-31 RX ORDER — ARFORMOTEROL TARTRATE 15 UG/2ML
15 SOLUTION RESPIRATORY (INHALATION) 2 TIMES DAILY
Status: DISCONTINUED | OUTPATIENT
Start: 2020-10-31 | End: 2020-11-06 | Stop reason: HOSPADM

## 2020-10-31 RX ORDER — ALBUTEROL SULFATE 2.5 MG/3ML
2.5 SOLUTION RESPIRATORY (INHALATION) EVERY 6 HOURS PRN
Status: DISCONTINUED | OUTPATIENT
Start: 2020-10-31 | End: 2020-11-06 | Stop reason: HOSPADM

## 2020-10-31 RX ORDER — PROMETHAZINE HYDROCHLORIDE 6.25 MG/5ML
2.5 SYRUP ORAL 4 TIMES DAILY
Status: ON HOLD | COMMUNITY
End: 2020-11-05 | Stop reason: HOSPADM

## 2020-10-31 RX ORDER — HYDRALAZINE HYDROCHLORIDE 25 MG/1
25 TABLET, FILM COATED ORAL 3 TIMES DAILY
Status: DISCONTINUED | OUTPATIENT
Start: 2020-10-31 | End: 2020-11-02

## 2020-10-31 RX ORDER — PANTOPRAZOLE SODIUM 40 MG/1
40 TABLET, DELAYED RELEASE ORAL
Status: DISCONTINUED | OUTPATIENT
Start: 2020-10-31 | End: 2020-11-06 | Stop reason: HOSPADM

## 2020-10-31 RX ORDER — NITROGLYCERIN 20 MG/100ML
5 INJECTION INTRAVENOUS CONTINUOUS
Status: DISCONTINUED | OUTPATIENT
Start: 2020-10-31 | End: 2020-10-31 | Stop reason: SDUPTHER

## 2020-10-31 RX ORDER — BUDESONIDE 0.25 MG/2ML
0.25 INHALANT ORAL 2 TIMES DAILY
Status: DISCONTINUED | OUTPATIENT
Start: 2020-10-31 | End: 2020-11-06 | Stop reason: HOSPADM

## 2020-10-31 RX ADMIN — HEPARIN SODIUM 3100 UNITS: 1000 INJECTION INTRAVENOUS; SUBCUTANEOUS at 20:21

## 2020-10-31 RX ADMIN — IPRATROPIUM BROMIDE 0.5 MG: 0.5 SOLUTION RESPIRATORY (INHALATION) at 09:42

## 2020-10-31 RX ADMIN — GABAPENTIN 300 MG: 300 CAPSULE ORAL at 14:29

## 2020-10-31 RX ADMIN — GABAPENTIN 300 MG: 300 CAPSULE ORAL at 08:34

## 2020-10-31 RX ADMIN — IPRATROPIUM BROMIDE 0.5 MG: 0.5 SOLUTION RESPIRATORY (INHALATION) at 20:00

## 2020-10-31 RX ADMIN — NITROGLYCERIN 1 INCH: 20 OINTMENT TOPICAL at 20:20

## 2020-10-31 RX ADMIN — LIDOCAINE HYDROCHLORIDE: 20 SOLUTION ORAL; TOPICAL at 09:26

## 2020-10-31 RX ADMIN — TORSEMIDE 10 MG: 10 TABLET ORAL at 08:34

## 2020-10-31 RX ADMIN — IPRATROPIUM BROMIDE 0.5 MG: 0.5 SOLUTION RESPIRATORY (INHALATION) at 16:21

## 2020-10-31 RX ADMIN — HYDRALAZINE HYDROCHLORIDE 25 MG: 25 TABLET, FILM COATED ORAL at 14:29

## 2020-10-31 RX ADMIN — HEPARIN SODIUM AND DEXTROSE 14 UNITS/KG/HR: 10000; 5 INJECTION INTRAVENOUS at 20:25

## 2020-10-31 RX ADMIN — NITROGLYCERIN 0.4 MG: 0.4 TABLET, ORALLY DISINTEGRATING SUBLINGUAL at 00:00

## 2020-10-31 RX ADMIN — PROMETHAZINE HYDROCHLORIDE 6.25 MG: 25 TABLET ORAL at 08:36

## 2020-10-31 RX ADMIN — HEPARIN SODIUM 6200 UNITS: 1000 INJECTION INTRAVENOUS; SUBCUTANEOUS at 02:01

## 2020-10-31 RX ADMIN — PROMETHAZINE HYDROCHLORIDE 6.25 MG: 25 TABLET ORAL at 17:40

## 2020-10-31 RX ADMIN — BUDESONIDE 250 MCG: 0.25 SUSPENSION RESPIRATORY (INHALATION) at 20:00

## 2020-10-31 RX ADMIN — CLONIDINE HYDROCHLORIDE 0.1 MG: 0.1 TABLET ORAL at 20:20

## 2020-10-31 RX ADMIN — NITROGLYCERIN 10 MCG/MIN: 20 INJECTION INTRAVENOUS at 08:46

## 2020-10-31 RX ADMIN — TORSEMIDE 10 MG: 10 TABLET ORAL at 14:30

## 2020-10-31 RX ADMIN — IPRATROPIUM BROMIDE 0.5 MG: 0.5 SOLUTION RESPIRATORY (INHALATION) at 14:39

## 2020-10-31 RX ADMIN — HYDRALAZINE HYDROCHLORIDE 25 MG: 25 TABLET, FILM COATED ORAL at 20:20

## 2020-10-31 RX ADMIN — AMLODIPINE BESYLATE 5 MG: 5 TABLET ORAL at 08:34

## 2020-10-31 RX ADMIN — PROMETHAZINE HYDROCHLORIDE 6.25 MG: 25 TABLET ORAL at 20:26

## 2020-10-31 RX ADMIN — REGADENOSON 0.4 MG: 0.08 INJECTION, SOLUTION INTRAVENOUS at 13:15

## 2020-10-31 RX ADMIN — NITROGLYCERIN 5 MCG/MIN: 20 INJECTION INTRAVENOUS at 02:00

## 2020-10-31 RX ADMIN — FINASTERIDE 5 MG: 5 TABLET, FILM COATED ORAL at 08:34

## 2020-10-31 RX ADMIN — TORSEMIDE 10 MG: 10 TABLET ORAL at 20:20

## 2020-10-31 RX ADMIN — GABAPENTIN 300 MG: 300 CAPSULE ORAL at 17:40

## 2020-10-31 RX ADMIN — GABAPENTIN 300 MG: 300 CAPSULE ORAL at 20:26

## 2020-10-31 RX ADMIN — METOPROLOL SUCCINATE 100 MG: 100 TABLET, EXTENDED RELEASE ORAL at 08:34

## 2020-10-31 RX ADMIN — BUDESONIDE 250 MCG: 0.25 SUSPENSION RESPIRATORY (INHALATION) at 09:43

## 2020-10-31 RX ADMIN — Medication 12 MILLICURIE: at 12:13

## 2020-10-31 RX ADMIN — SPIRONOLACTONE 25 MG: 25 TABLET ORAL at 08:35

## 2020-10-31 RX ADMIN — NITROGLYCERIN 0.4 MG: 0.4 TABLET, ORALLY DISINTEGRATING SUBLINGUAL at 00:11

## 2020-10-31 RX ADMIN — ARFORMOTEROL TARTRATE 15 MCG: 15 SOLUTION RESPIRATORY (INHALATION) at 20:00

## 2020-10-31 RX ADMIN — PANTOPRAZOLE SODIUM 40 MG: 40 TABLET, DELAYED RELEASE ORAL at 08:50

## 2020-10-31 RX ADMIN — CLONIDINE HYDROCHLORIDE 0.1 MG: 0.1 TABLET ORAL at 14:29

## 2020-10-31 RX ADMIN — DOXYCYCLINE HYCLATE 100 MG: 100 CAPSULE ORAL at 08:34

## 2020-10-31 RX ADMIN — TRIMETHOBENZAMIDE HYDROCHLORIDE 200 MG: 100 INJECTION INTRAMUSCULAR at 00:03

## 2020-10-31 RX ADMIN — HEPARIN SODIUM 12 UNITS/KG/HR: 10000 INJECTION, SOLUTION INTRAVENOUS at 02:02

## 2020-10-31 RX ADMIN — CLONIDINE HYDROCHLORIDE 0.1 MG: 0.1 TABLET ORAL at 08:34

## 2020-10-31 RX ADMIN — PROMETHAZINE HYDROCHLORIDE 6.25 MG: 25 TABLET ORAL at 14:30

## 2020-10-31 RX ADMIN — HYDRALAZINE HYDROCHLORIDE 25 MG: 25 TABLET, FILM COATED ORAL at 08:35

## 2020-10-31 RX ADMIN — LISINOPRIL 40 MG: 20 TABLET ORAL at 08:34

## 2020-10-31 RX ADMIN — ARFORMOTEROL TARTRATE 15 MCG: 15 SOLUTION RESPIRATORY (INHALATION) at 09:43

## 2020-10-31 RX ADMIN — DOXYCYCLINE HYCLATE 100 MG: 100 CAPSULE ORAL at 20:20

## 2020-10-31 RX ADMIN — Medication 35 MILLICURIE: at 13:15

## 2020-10-31 ASSESSMENT — PAIN DESCRIPTION - FREQUENCY: FREQUENCY: CONTINUOUS

## 2020-10-31 ASSESSMENT — PAIN - FUNCTIONAL ASSESSMENT: PAIN_FUNCTIONAL_ASSESSMENT: ACTIVITIES ARE NOT PREVENTED

## 2020-10-31 ASSESSMENT — PAIN DESCRIPTION - LOCATION: LOCATION: ABDOMEN;CHEST

## 2020-10-31 ASSESSMENT — PAIN SCALES - GENERAL
PAINLEVEL_OUTOF10: 0
PAINLEVEL_OUTOF10: 0
PAINLEVEL_OUTOF10: 10
PAINLEVEL_OUTOF10: 0

## 2020-10-31 ASSESSMENT — PAIN DESCRIPTION - ONSET: ONSET: ON-GOING

## 2020-10-31 ASSESSMENT — PAIN DESCRIPTION - ORIENTATION: ORIENTATION: MID

## 2020-10-31 ASSESSMENT — PAIN DESCRIPTION - PROGRESSION: CLINICAL_PROGRESSION: NOT CHANGED

## 2020-10-31 ASSESSMENT — PAIN DESCRIPTION - PAIN TYPE: TYPE: ACUTE PAIN

## 2020-10-31 NOTE — PLAN OF CARE
Problem: Activity:  Goal: Risk for activity intolerance will decrease  Description: Risk for activity intolerance will decrease  10/31/2020 1202 by Nydia Schwab, RN  Outcome: Met This Shift     Problem: Bowel/Gastric:  Goal: Diagnostic test results will improve  Description: Diagnostic test results will improve  Outcome: Met This Shift     Problem:  Bowel/Gastric:  Goal: Occurrences of nausea will decrease  Description: Occurrences of nausea will decrease  Outcome: Met This Shift

## 2020-10-31 NOTE — PLAN OF CARE
Problem: Pain:  Goal: Pain level will decrease  Description: Pain level will decrease  Outcome: Met This Shift     Problem:  Activity:  Goal: Risk for activity intolerance will decrease  Description: Risk for activity intolerance will decrease  Outcome: Met This Shift     Problem: Sensory:  Goal: Pain level will decrease  Description: Pain level will decrease  Outcome: Met This Shift

## 2020-10-31 NOTE — ED PROVIDER NOTES
Sameer Vazquez is a 60 y.o. male presenting to the ED for chest pain. Patient reports that he has had intermittent chest pain described as squeezing and heaviness on the left side of his chest for the past 2 weeks. His pain is not made better or worse banding. He states prior to arrival here he was at home and coughed and yellow/white sputum production was on his chest and he syncopized. He is unsure how long he was out for. He admitted to nausea upon arrival at first here. The complaint has been intermittent, moderate in severity, and worsened by nothing. Their complaint is made better by nothing. Patient has a medical history as listed below:   Past Medical History:   Diagnosis Date    Alcohol abuse     [facility]est[facility] failure) [facility] [facility]     COPD (chronic obstructive pulmonary disease) ([facility] [facility]     History of cocaine use     Hyperlipidemia     Hypertension     Marijuana use     quit November 2017     alberto      Patient Active Problem List    Diagnosis Date Noted    Unstable angina ([facility] [address] 10/31/2020    Precordial chest pain 08/17/2019    Acute respiratory failure with hypoxia ([address] 04/16/2019    COPD exacerbation ([address] 04/16/2019    CAP (community acquired pneumonia) due to Chlamydia species 03/06/2018    Community acquired bacterial pneumonia 03/03/2018    ALBERTO on CPAP     Essential hypertension     Hyperlipidemia     CTS (carpal tunnel syndrome) 02/17/2015    Cervical arthritis 02/17/2015             Review of Systems   Constitutional: Negative for chills and fever. HENT: Negative for congestion and rhinorrhea. Respiratory: Positive for cough, shortness of breath and wheezing. Negative for choking and stridor. Cardiovascular: Positive for chest pain. Negative for leg swelling. Gastrointestinal: Positive for nausea. Negative for abdominal pain, diarrhea and vomiting. Endocrine: Negative. Genitourinary: Negative for dysuria, frequency and hematuria.    Musculoskeletal: Negative for neck pain. Allergic/Immunologic: Negative. Neurological: Positive for seizures. Negative for numbness and headaches. Hematological: Negative. Psychiatric/Behavioral: Negative. Physical Exam  Vitals signs and nursing note reviewed. Constitutional:       General: He is not in acute distress. Appearance: He is well-developed. HENT:      Head: Normocephalic and atraumatic. Nose: Nose normal.      Mouth/Throat:      Pharynx: Oropharynx is clear. Eyes:      Conjunctiva/sclera: Conjunctivae normal.      Pupils: Pupils are equal, round, and reactive to light. Neck:      Musculoskeletal: Normal range of motion. Thyroid: No thyromegaly. Vascular: No JVD. Cardiovascular:      Rate and Rhythm: Normal rate and regular rhythm. Heart sounds: Normal heart sounds. Pulmonary:      Effort: Pulmonary effort is normal. No respiratory distress. Breath sounds: Normal breath sounds. No wheezing, rhonchi or rales. Comments: Cough with each deep breath. Chest:      Chest wall: No tenderness. Abdominal:      General: Abdomen is flat. There is no distension. Palpations: Abdomen is soft. There is no mass. Tenderness: There is no abdominal tenderness. There is no guarding or rebound. Skin:     General: Skin is warm and dry. Findings: No rash. Neurological:      General: No focal deficit present. Mental Status: He is alert. Procedures     MDM     ED Course as of Oct 31 0330   Fri Oct 30, 2020   6518 EKG read by me. Normal sinus rhythm with occasional premature ventricular complexes. T wave inversions in inferior and anterior lateral leads. Prolonged QT. Compared to prior EKG significant changes have occurred. New T wave inversions are seen in anterior leads. [WL]   3291 Neck and EKG performed. Normal sinus rhythm. Nonspecific T wave normalities. Prolonged QT. QTc 523 ms.   Nonspecific T wave abnormalities now present in anterior leads. These are significant when compared to prior EKG before today. [WL]   1609 Patient remaining to have pain. A third EKG was performed and remains unchanged from the first.    [WL]   Sat Oct 31, 2020   0035 Patient reevaluated bedside and continues have pain. We will treat him as unstable angina. He will be started on heparin and nitro drip. [WL]   0040 For Dr. Alexsander Ramirez, he agrees to admit patient.    [WL]      ED Course User Index  [WL] Ashlyn Jett,         Patient presents to the ED for evaluation. This patient was seen and evaluated with the attending. Work-up was performed with concerns for but not limited to ACS, pneumonia, CHF exacerbation, COPD exacerbation, PE and Viral illness  Patient's chest pain remained. We treated him as unstable angina. He was started on heparin and nitro drip. He will be admitted to medicine. Patient requires continued workup and management of their symptoms and will be admitted to the hospital for further evaluation and treatment. Anand Alanis --------------------------------------------- PAST HISTORY ---------------------------------------------  Past Medical History:  has a past medical history of Alcohol abuse, CHF (congestive heart failure) ([facility] COPD (chronic obstructive pulmonary disease) ([facility] [address] History of cocaine use, Hyperlipidemia, Hypertension, Marijuana use, and alberto. Past Surgical History:  has a past surgical history that includes Wrist surgery; cervical fusion (11/18/15); and polysomnography (01/29/2018). Social History:  reports that he has been smoking cigarettes. He started smoking about 41 years ago. He has a 60.00 pack-year smoking history. He has never used smokeless tobacco. He reports previous alcohol use. He reports current drug use. Drugs: Cocaine, Other-see comments, and Marijuana.     Family History: family history includes Arthritis in his mother; Cancer in his brother; Heart Disease in his father; High Blood Pressure in his brother, brother, brother, and brother; Other in his brother, brother, brother, brother, and mother. The patients home medications have been reviewed. Allergies: Patient has no known allergies.     -------------------------------------------------- RESULTS -------------------------------------------------    LABS:  Results for orders placed or performed during the hospital encounter of 10/30/20   CBC Auto Differential   Result Value Ref Range    WBC 11.9 (H) 4.5 - 11.5 E9/L    RBC 4.47 3.80 - 5.80 E12/L    Hemoglobin 14.1 12.5 - 16.5 g/dL    Hematocrit 41.5 37.0 - 54.0 %    MCV 92.8 80.0 - 99.9 fL    MCH 31.5 26.0 - 35.0 pg    MCHC 34.0 32.0 - 34.5 %    RDW 13.1 11.5 - 15.0 fL    Platelets 709 181 - 952 E9/L    MPV 9.9 7.0 - 12.0 fL    Neutrophils % 63.8 43.0 - 80.0 %    Immature Granulocytes % 0.4 0.0 - 5.0 %    Lymphocytes % 21.9 20.0 - 42.0 %    Monocytes % 11.5 2.0 - 12.0 %    Eosinophils % 1.9 0.0 - 6.0 %    Basophils % 0.5 0.0 - 2.0 %    Neutrophils Absolute 7.57 (H) 1.80 - 7.30 E9/L    Immature Granulocytes # 0.05 E9/L    Lymphocytes Absolute 2.60 1.50 - 4.00 E9/L    Monocytes Absolute 1.37 (H) 0.10 - 0.95 E9/L    Eosinophils Absolute 0.23 0.05 - 0.50 E9/L    Basophils Absolute 0.06 0.00 - 0.20 E9/L   Comprehensive Metabolic Panel   Result Value Ref Range    Sodium 136 132 - 146 mmol/L    Potassium 3.7 3.5 - 5.0 mmol/L    Chloride 98 98 - 107 mmol/L    CO2 24 22 - 29 mmol/L    Anion Gap 14 7 - 16 mmol/L    Glucose 100 (H) 74 - 99 mg/dL    BUN 22 (H) 6 - 20 mg/dL    CREATININE 2.0 (H) 0.7 - 1.2 mg/dL    GFR Non-African American 35 >=60 mL/min/1.73    GFR African American 42     Calcium 9.6 8.6 - 10.2 mg/dL    Total Protein 8.1 6.4 - 8.3 g/dL    Alb 4.2 3.5 - 5.2 g/dL    Total Bilirubin <0.2 0.0 - 1.2 mg/dL    Alkaline Phosphatase 75 40 - 129 U/L    ALT 14 0 - 40 U/L    AST 17 0 - 39 U/L   Troponin   Result Value Ref Range    Troponin <0.01 0.00 - 0.03 ng/mL   Protime-INR   Result Value Ref Range    Protime 10.9 9.3 - 12.4 sec    INR 1.0    APTT   Result Value Ref Range    aPTT 27.5 24.5 - 35.1 sec   Brain Natriuretic Peptide   Result Value Ref Range    Pro- (H) 0 - 125 pg/mL   COVID-19   Result Value Ref Range    SARS-CoV-2, NAAT Not Detected Not Detected   EKG 12 Lead   Result Value Ref Range    Ventricular Rate 91 BPM    Atrial Rate 91 BPM    P-R Interval 136 ms    QRS Duration 88 ms    Q-T Interval 426 ms    QTc Calculation (Bazett) 523 ms    P Axis 26 degrees    R Axis 6 degrees    T Axis 130 degrees   EKG 12 Lead   Result Value Ref Range    Ventricular Rate 83 BPM    Atrial Rate 83 BPM    P-R Interval 144 ms    QRS Duration 90 ms    Q-T Interval 392 ms    QTc Calculation (Bazett) 460 ms    P Axis 25 degrees    R Axis 12 degrees    T Axis -167 degrees       RADIOLOGY:  CTA CHEST W CONTRAST   Final Result   No evidence of pulmonary embolism or acute pulmonary abnormality. There is a 1.4 cm nodular density in the posterior left lung base. Recommend   follow-up noncontrast CT of the chest in 3 months. XR CHEST PORTABLE   Final Result   Mild peripheral patchy/ground-glass opacification in the peripheral left lung   which could represent atelectasis versus early/mild airspace disease process. ------------------------- NURSING NOTES AND VITALS REVIEWED ---------------------------  Date / Time Roomed:  10/30/2020 11:06 PM  ED Bed Assignment:  10/10    The nursing notes within the ED encounter and vital signs as below have been reviewed.      Patient Vitals for the past 24 hrs:   BP Temp Pulse Resp SpO2 Height Weight   10/31/20 0206 (!) 136/93 -- 74 (!) 34 100 % -- --   10/31/20 0202 (!) 132/94 -- 75 24 99 % -- --   10/31/20 0132 126/88 -- 75 23 99 % -- --   10/31/20 0011 116/81 -- 89 28 99 % -- --   10/30/20 2258 (!) 140/95 97.8 °F (36.6 °C) 96 20 93 % 5' 7\" (1.702 m) 228 lb (103.4 kg)   10/30/20 2253 -- 97.8 °F (36.6 °C) 94 20 94 % -- --           Counseling:  I have spoken with the patient/family members and discussed todays results, in addition to providing specific details for the plan of care and counseling regarding the diagnosis and prognosis. Their questions are answered at this time and they are agreeable with the plan of admission. This patient has remained hemodynamically stable during their ED course. Diagnosis:  1. Unstable angina (HCC)    2. Pulmonary nodule    3. BEAR (acute kidney injury) ([facility] [address]    4. Elevated brain natriuretic peptide (BNP) level        Disposition:  Patient's disposition: Admit  Patient's condition is serious. NOTE:  This report was transcribed using voice recognition software. Efforts were made to ensure accuracy; however, inadvertent computerized transcription errors may be present.          Sailaja Moreland DO  Resident  10/31/20 600 [facility]  Resident  10/31/20 0419

## 2020-10-31 NOTE — PROGRESS NOTES
Patient stated he still has some tightness in the Left chest but the \"Burning\" pain feels much better. Dr. Heydi Ortiz paged regarding update.

## 2020-10-31 NOTE — ED NOTES
Radiology Procedure Waiver   Name: Meaghan Montanez  : 1965  MRN: [id number]    Date:  10/30/20    Time: 11:21 PM EDT    Benefits of immediately proceeding with Radiology exam(s) without pre-testing outweigh the risks or are not indicated as specified below and therefore the following is/are being waived:    [] Pregnancy test   [] Patients LMP on-time and regular.   [] Patient had Tubal Ligation or has other Contraception Device. [] Patient  is Menopausal or Premenarcheal.    [] Patient had Full or Partial Hysterectomy. [] Protocol for Iodine allergy    [] MRI Questionnaire     [x] BUN/Creatinine   [] Patient age w/no hx of renal dysfunction. [] Patient on Dialysis. [] Recent Normal Labs.   Electronically signed by Yulia Mayorga DO on 10/30/20 at 11:21 PM EDT               Yulia Mayorga DO  Resident  10/30/20 6622

## 2020-10-31 NOTE — PROGRESS NOTES
Dr. Timbo Ku re-paged regarding order clarification on Stress Lab. Spoke to Stress Lab and she spoke to Dr. Timbo Ku regarding stress test. Patient to remain NPO. Permit signed and placed on light chart.

## 2020-10-31 NOTE — CONSULTS
CARDIOLOGY CONSULTATION    Patient Name:  Aramis Spaulding    :  1965    Reason for Consultation:   Recurrent CHF    History of Present Illness:   Aramis Spaulding returns to [address], following a recent history of increasing shortness of breath and both chest pressure as well as epigastric burning. Over the past week he has discontinued smoking having smoked for greater than 40 years and has discontinued alcohol ingestion as well. His discomfort is nonexertional as is his shortness of breath. He denies any palpitations nor near syncope. His symptoms have precipitously increased over the past 36 hours prior to admission and he is now admitted for further observation. Of note is that his left ventricular systolic function is also diminished over the past 24 months. He attributes most of this to his chronic habit of smoking as well as alcohol ingestion. He has now completed his nuclear stress test which demonstrates what appears to be artifact with a marked global decrease in systolic function as compared to previous diagnostic cardiac testing. Past Medical History:   has a past medical history of Alcohol abuse, CHF (congestive heart failure) ([facility] [address] COPD (chronic obstructive pulmonary disease) ([facility] [address] History of cocaine use, Hyperlipidemia, Hypertension, Marijuana use, and alberto. Surgical History:   has a past surgical history that includes Wrist surgery; cervical fusion (11/18/15); and polysomnography (2018). Social History:   reports that he has been smoking cigarettes. He started smoking about 41 years ago. He has a 60.00 pack-year smoking history. He has never used smokeless tobacco. He reports previous alcohol use. He reports current drug use. Drugs: Cocaine, Other-see comments, and Marijuana.      Family History:  family history includes Arthritis in his mother; Cancer in his brother; Heart Disease in his father; High Blood Pressure in his brother, brother, brother, and brother; Other in his brother, brother, brother, brother, and mother. Medications:  Prior to Admission medications    Medication Sig Start Date End Date Taking? Authorizing Provider   doxycycline hyclate (VIBRAMYCIN) 100 MG capsule Take 100 mg by mouth 2 times daily   Yes Historical Provider, MD   spironolactone (ALDACTONE) 25 MG tablet Take 25 mg by mouth daily   Yes Historical Provider, MD   torsemide (DEMADEX) 10 MG tablet Take 10 mg by mouth 3 times daily   Yes Historical Provider, MD   promethazine (PHENERGAN) 6.25 MG/5ML syrup Take 2.5 mg/mL by mouth 4 times daily   Yes Historical Provider, MD   fluticasone (FLONASE) 50 MCG/ACT nasal spray 1 spray by Each Nostril route daily   Yes Historical Provider, MD   fluticasone-umeclidin-vilant (TRELEGY ELLIPTA) 100-62.5-25 MCG/INH AEPB Inhale 1 puff into the lungs daily 2/10/20 6/9/20  Eddie Arshad MD   hydrALAZINE (APRESOLINE) 100 MG tablet Take 1 tablet by mouth every 8 hours  Patient taking differently: Take 25 mg by mouth 3 times daily  2/10/20   Bernarda Hansen DO   hydrochlorothiazide (HYDRODIURIL) 25 MG tablet Take 1 tablet by mouth daily 2/11/20   Bernarda Hansen DO   pantoprazole (PROTONIX) 40 MG tablet Take 1 tablet by mouth every morning (before breakfast) 2/11/20   Bernarda Hansen DO   nicotine (NICODERM CQ) 21 MG/24HR Place 1 patch onto the skin daily 10/5/19   Bernarda Hansen DO   albuterol (PROVENTIL) (2.5 MG/3ML) 0.083% nebulizer solution Take 3 mLs by nebulization every 6 hours as needed for Wheezing 10/4/19   Bernarda Hansen DO   gabapentin (NEURONTIN) 300 MG capsule Take 300 mg by mouth 4 times daily.  Takes up to 4 times a day as needed    Historical Provider, MD   metoprolol succinate (TOPROL XL) 100 MG extended release tablet Take 1 tablet by mouth daily 8/24/19   Bernarda Hansen DO   finasteride (PROSCAR) 5 MG tablet Take 1 tablet by mouth daily 8/24/19   Bernarda Hansen DO   cloNIDine (CATAPRES) 0.1 MG tablet Take 1 tablet by mouth 3 times daily 4/23/19   Nilesh Cunningham DO   amLODIPine (NORVASC) 10 MG tablet Take 1 tablet by mouth daily  Patient taking differently: Take 5 mg by mouth daily  4/24/19   Nilesh Cunningham DO   Respiratory Therapy Supplies (FULL KIT NEBULIZER SET) MISC Use as directed with nebulized medication. 4/23/19   Nilesh Cunningham DO   lisinopril (ZESTRIL) 40 MG tablet Take 1 tablet by mouth daily 7/22/18   Kadeem Alexander PA-C   albuterol sulfate HFA (PROVENTIL HFA) 108 (90 Base) MCG/ACT inhaler Inhale 2 puffs into the lungs every 4 hours as needed for Wheezing 8/24/17 8/18/19  Chavez Armijo DO       Allergies:  Patient has no known allergies. Review of Systems:   · Constitutional: there has been no unanticipated weight loss. There's been no significant change in energy level, sleep pattern or activity level. No fever chills or rigors. · Eyes: No visual changes or diplopia. No scleral icterus. · ENT: No Headaches, hearing loss or vertigo. No mouth sores or sore throat. No change in taste or smell. · Cardiovascular: + Chest discomfort, + dyspnea on exertion and now is well at rest., palpitations, loss of consciousness, no phlebitis, no claudication. · Respiratory: No cough or wheezing, no sputum production. No hemoptysis, pleuritic pain. · Gastrointestinal: No abdominal pain, appetite loss, blood in stools. No change in bowel habits. No hematemesis  · Genitourinary: No dysuria, trouble voiding or hematuria. No nocturia or increased frequency. · Musculoskeletal:  No gait disturbance, weakness or joint complaints. · Integumentary: No rash or pruritis. · Neurological: No headache, diplopia, change in muscle strength, numbness or tingling. No change in gait, balance, coordination, mood, affect, memory, mentation, behavior. · Psychiatric: No anxiety or depression. · Endocrine: No temperature intolerance. No excessive thirst, fluid intake, or urination. No tremor.   · Hematologic/Lymphatic: No abnormal bruising or Coordination intact. Pertinent Labs:  CBC:   Recent Labs     10/30/20  2321 10/31/20  0639   WBC 11.9* 10.9   HGB 14.1 13.2    259     BMP:  Recent Labs     10/30/20  2321 10/31/20  0639    135   K 3.7 3.9   CL 98 102   CO2 24 21*   BUN 22* 23*   CREATININE 2.0* 1.8*   GLUCOSE 100* 117*   LABGLOM 35 39     ABGs:   Lab Results   Component Value Date    PH 7.445 10/01/2019    PO2 69.4 10/01/2019    PCO2 25.6 10/01/2019     INR:   Recent Labs     10/30/20  2321   INR 1.0     PRO-BNP:   Lab Results   Component Value Date    PROBNP 811 (H) 10/30/2020    PROBNP 1,851 (H) 2020      Cardiac Injury Profile:   Recent Labs     10/30/20  2321 10/31/20  0639   CKTOTAL  --  178   CKMB  --  2.2   TROPONINI <0.01 <0.01      Lipid Profile:   Lab Results   Component Value Date    TRIG 217 2018    HDL 58 2018    LDLCALC 144 2018    CHOL 245 2018      Hemoglobin A1C: No components found for: HGBA1C   ECG:  See report  ECHO: 2019  Left ventricular EF 57%    Radiology:  Xr Chest Standard (2 Vw)    Result Date: 10/3/2019  Patient MRN: [id number] : 1965 Age:  62 years Gender: Male Order Date: 10/3/2019 11:15 AM Exam: XR CHEST (2 VW) Number of Images: 2 views Indication:   copd copd Comparison: Prior study from 10/03/2019 at 0075 hours available Findings: Examination of the chest in PA and lateral views shows that both lungs are free of active disease. There is hyperaeration of lungs suggesting of chronic obstructive pulmonary disease. The heart is normal in size and configuration. There is uncoiling atherosclerotic change of thoracic aorta. The trachea is in the midline. The mediastinum and both hemidiaphragms are normal. The bony thorax is intact.      No evidence of airspace consolidation or pulmonary venous congestion Mild chronic obstructive pulmonary disease     Xr Chest Portable    Result Date: 10/3/2019  Patient MRN:  [id number] : 1965 Age: 62 years Gender: Male Order Date: 10/3/2019 12:00 AM Exam: XR CHEST PORTABLE Number of views: Portable upright AP view of the chest, 1 image(s) Indication: COPD Comparison: Chest radiograph dated 10/1/2019. FINDINGS:  The heart appears prominent. There is diffuse pulmonary interstitial prominence, greatest centrally, and airspace haziness throughout both lungs. No definite pleural effusion is seen. There is no pneumothorax. Findings suggestive of pulmonary edema secondary to venous congestion with overall improvement since the previous study. Xr Chest Portable    Result Date: 10/1/2019  Patient MRN: [id number] : 1965 Age:  62 years Gender: Male Order Date: 10/1/2019 1:45 AM Exam: XR CHEST PORTABLE Number of Images: 1 view Indication:   shortness of breath shortness of breath Comparison: 2019 FINDINGS: Heart is mildly enlarged. Pulmonary vascularity is congested and there is bilateral airspace disease which is likely cardiogenic edema. Neither costophrenic angle is clearly blunted. Congestive failure. Assessment:    Active Problems:    Unstable angina (HCC)  Resolved Problems:    * No resolved hospital problems. *      Plan:   Mr. Edy Owens presents with a difficult clinical presentation and that he complains both of heaviness in the chest as well as a strong sense of indigestion relieved with his \"GI cocktail\". His pharmacologic nuclear stress test was completed and demonstrates what appears to be artifact in the inferoapical segment but cannot entirely rule out profound ischemia to that segment as well nonetheless his left ventricular global systolic function is markedly decreased with an estimated left ventricular ejection fraction of 33% and no specific segmental wall motion abnormalities noted. Thus, will empirically place him on a beta-blocker, nephrolysin inhibitor, mineralocorticoid inhibitor, diuretic and  nitroglycerin paste at bedtime.   Would also decrease dosage of gabapentin in addition to increases fluid volume retention significantly and most... I have spent more than 45 minutes face to face with Vu Islas reviewing notes and laboratory data with greater than 50% of this time instructing and counseling the patient regarding my findings and recommendations and I have answered all questions as posed to me by Mr. Vikki Dorman. Thank you, Afia Cabral DO for allowing me to consult in the care of this patient. Henrine Meigs DO, FACP, FACC, [facility]    NOTE:  This report was transcribed using voice recognition software. Every effort was made to ensure accuracy; however, inadvertent computerized transcription errors may be present.

## 2020-10-31 NOTE — PROGRESS NOTES
Patient complaining of \"Chest/Abdominal pressure\". Nitro titrated. AM EKG completed. Dr. Thao Blanco paged regarding updates and patient requesting \"GI Cocktail\". Respiratory paged for breathing treatment. Spoke to Dr. Thao Blanco regarding above. Updates given on patient. Order obtained to GI Cocktail. BP rechecked. Ordered obtained for Stress Test if patient's \"Chest/Adb pressure\" resolved. Updated Stress lab. Will obtain clarification on Stress Test from Dr. Thao Blanco.

## 2020-10-31 NOTE — PROGRESS NOTES
Updated Dr. Loly Riggs of monitor strip of afib VS 28 Bts VT. (Patient was off unit in Stress Lab) Order obtained for patient to have diet.

## 2020-10-31 NOTE — CONSULTS
Consult Note  NEPHROLOGY    Reason for Consult: Evaluation of acute on chronic kidney disease    Requesting Physician: Dr. Colon Friend    Chief Complaint: Admitted with unstable angina and acute kidney injury    History Obtained From:  patient, electronic medical record    History of Present Ilness: This is a 60 y.o.  male with a H/O Alcohol abuse, CHF (congestive heart failure), COPD, History of cocaine use, Hyperlipidemia, Hypertension, Marijuana use, and INOCENCIA who now presents to ED with chest pain. Patient reports that he has had intermittent chest pain described as squeezing and heaviness on the left side of his chest for the past 2 weeks. He states prior to arrival here he was at home watching TV and after coughing forcibly he passed out for an undetermined amount of time as he lives alone. He was able to drive himself to the ED. Vitals upon arrival included blood pressure 140/95, temperature 97.8, pulse 96, respirations 20 and 93% O2 saturation. Pertinent labs included WBC 11.9 and hemoglobin 14.1. Sodium 136, potassium 3.7, chloride 98, CO2 24, anion gap 14, glucose 100, BUN 22 and creatinine 2.0. Chest x-ray showed mild peripheral patchy groundglass opacification in the peripheral left lung. Patient was subsequently admitted with unstable angina and acute kidney injury. Currently upon exam pt is in mild acute distress. He currently denies chest pain but describes a squeezing sensation of his chest prior to arrival in the ED. He is also experiencing shortness of breath at rest; worse with exertion. He denies any nausea, vomiting or diarrhea. He also denies any recent urinary issues including hematuria, frequency or dysuria. During the review of the medical record, Dr. Namita Mohan notes that over the past week he has discontinued smoking having smoked for greater than 40 years and has discontinued alcohol ingestion as well.   Of note is that his left ventricular systolic function is also diminished over the past 24 months. He attributes most of this to his chronic habit of smoking as well as alcohol ingestion.       Past Medical History:        Diagnosis Date    Alcohol abuse     [facility]     COPD [facility]chronic obstructive pulmonary disease) ([facility]     History of cocaine use     Hyperlipidemia     Hypertension     Marijuana use     quit November 2017     alberto        Past Surgical History:        Procedure Laterality Date    CERVICAL FUSION  11/18/15    cervical laminectomy & fusion c4-c6, with rods & screws    POLYSOMNOGRAPHY  01/29/2018    AHI=29.8    WRIST SURGERY         Current Medications:    Current Facility-Administered Medications: amLODIPine (NORVASC) tablet 5 mg, 5 mg, Oral, Daily  gabapentin (NEURONTIN) capsule 300 mg, 300 mg, Oral, 4x Daily  hydrALAZINE (APRESOLINE) tablet 25 mg, 25 mg, Oral, TID  albuterol (PROVENTIL) nebulizer solution 2.5 mg, 2.5 mg, Nebulization, Q6H PRN  cloNIDine (CATAPRES) tablet 0.1 mg, 0.1 mg, Oral, TID  doxycycline hyclate (VIBRAMYCIN) capsule 100 mg, 100 mg, Oral, BID  finasteride (PROSCAR) tablet 5 mg, 5 mg, Oral, Daily  metoprolol succinate (TOPROL XL) extended release tablet 100 mg, 100 mg, Oral, Daily  nicotine (NICODERM CQ) 21 MG/24HR 1 patch, 1 patch, Transdermal, Daily  pantoprazole (PROTONIX) tablet 40 mg, 40 mg, Oral, QAM AC  promethazine (PHENERGAN) tablet 6.25 mg, 6.25 mg, Oral, 4x Daily  spironolactone (ALDACTONE) tablet 25 mg, 25 mg, Oral, Daily  torsemide (DEMADEX) tablet 10 mg, 10 mg, Oral, TID  calcium carbonate (TUMS) chewable tablet 500 mg, 500 mg, Oral, TID PRN  heparin 25,000 units in dextrose 5% 250 mL infusion, 12 Units/kg/hr, Intravenous, Continuous  heparin (porcine) injection 3,100 Units, 30 Units/kg, Intravenous, PRN  heparin (porcine) injection 6,200 Units, 60 Units/kg, Intravenous, PRN  nitroGLYCERIN (NITROSTAT) SL tablet 0.4 mg, 0.4 mg, Sublingual, Q5 Min PRN  budesonide (PULMICORT) nebulizer suspension 250 mcg, 0.25 mg, Nebulization, BID **AND** ipratropium (ATROVENT) 0.02 % nebulizer solution 0.5 mg, 0.5 mg, Nebulization, 4x daily **AND** Arformoterol Tartrate (BROVANA) nebulizer solution 15 mcg, 15 mcg, Nebulization, BID  sacubitril-valsartan (ENTRESTO) 24-26 MG per tablet 1 tablet, 1 tablet, Oral, BID  nitroglycerin (NITRO-BID) 2 % ointment 1 inch, 1 inch, Topical, Nightly    Allergies:  Patient has no known allergies. Social History:       reports that he has been smoking cigarettes. He started smoking about 41 years ago. He has a 60.00 pack-year smoking history. He has never used smokeless tobacco. He reports previous alcohol use. He reports current drug use. Drugs: Cocaine, Other-see comments, and Marijuana. Family History:     Family History   Problem Relation Age of Onset    Arthritis Mother     Other Mother         glucoma    Heart Disease Father     High Blood Pressure Brother     Other Brother         sleep apnea    High Blood Pressure Brother     Other Brother         sleep apnea    High Blood Pressure Brother     Other Brother         sleep apnea    High Blood Pressure Brother     Other Brother         sleep apnea    Cancer Brother         gitaogeal -  at 62         Review of Systems:       Pertinent positives stated above in HPI. All other systems were reviewed and were negative.     Physical exam:   Constitutional:  VITALS:  /88   Pulse 82   Temp 97 °F (36.1 °C) (Temporal)   Resp 16   Ht 5' 7\" (1.702 m)   Wt 216 lb 3.2 oz (98.1 kg)   SpO2 99%   BMI 33.86 kg/m²   CURRENT TEMPERATURE:  Temp: 97 °F (36.1 °C)  CURRENT RESPIRATORY RATE:  Resp: 16  CURRENT PULSE:  Pulse: 82  CURRENT BLOOD PRESSURE:  BP: 118/88  24HR BLOOD PRESSURE RANGE:  Systolic (21UDN), MUF:473 , Min:116 , ARIC:638   ; Diastolic (04QGS), ZHF:41, Min:81, Max:102    24HR INTAKE/OUTPUT:      Intake/Output Summary (Last 24 hours) at 10/31/2020 1648  Last data filed at 10/31/2020 1439  Gross per 24 hour   Intake 671 ml   Output 1575 ml   Net -904 ml     Gen: alert, awake, nad  Skin: no rash, turgor wnl  Heent:  eomi, mmm  Neck: no bruits or jvd noted  Cardiovascular:  S1, S2 without m/r/g  Respiratory: CTA   Abdomen:  +bs, soft, nt, nd  Ext: no edema   Psychiatric: mood and affect appropriate  Musculoskeletal:  Rom, muscular strength intact    DATA:      CBC:   Lab Results   Component Value Date    WBC 10.9 10/31/2020    RBC 4.20 10/31/2020    HGB 13.2 10/31/2020    HCT 39.8 10/31/2020    MCV 94.8 10/31/2020    MCH 31.4 10/31/2020    MCHC 33.2 10/31/2020    RDW 13.0 10/31/2020     10/31/2020    MPV 10.3 10/31/2020     BMP:    Lab Results   Component Value Date     10/31/2020    K 3.9 10/31/2020    K 3.8 10/01/2019     10/31/2020    CO2 21 10/31/2020    BUN 23 10/31/2020    LABALBU 3.9 10/31/2020    CREATININE 1.8 10/31/2020    CALCIUM 9.4 10/31/2020    GFRAA 47 10/31/2020    LABGLOM 39 10/31/2020    GLUCOSE 117 10/31/2020       RAD:  Xr Chest Portable    Result Date: 10/31/2020  EXAMINATION: ONE XRAY VIEW OF THE CHEST 10/30/2020 11:24 pm COMPARISON: Chest x-ray from 02/05/2020. HISTORY: ORDERING SYSTEM PROVIDED HISTORY: chest pain TECHNOLOGIST PROVIDED HISTORY: Reason for exam:->chest pain What reading provider will be dictating this exam?->CRC FINDINGS: Mild peripheral patchy/ground-glass opacification is noted in the peripheral left lung which could represent atelectasis versus early/mild airspace disease process. No evidence of pleural effusion. Heart size at the upper limits of normal.  Postsurgical changes in the lower cervical spine. Osseous structures are otherwise unremarkable. Mild peripheral patchy/ground-glass opacification in the peripheral left lung which could represent atelectasis versus early/mild airspace disease process.      Cta Chest W Contrast    Result Date: 10/31/2020  EXAMINATION: CTA OF THE CHEST 10/30/2020 10:32 pm TECHNIQUE: CTA of the chest was performed after the administration of intravenous contrast.  Multiplanar reformatted images are provided for review. MIP images are provided for review. Dose modulation, iterative reconstruction, and/or weight based adjustment of the mA/kV was utilized to reduce the radiation dose to as low as reasonably achievable. COMPARISON: None. HISTORY: ORDERING SYSTEM PROVIDED HISTORY: PE concern TECHNOLOGIST PROVIDED HISTORY: Reason for exam:->PE concern What reading provider will be dictating this exam?->CRC FINDINGS: Pulmonary Arteries: Pulmonary arteries are adequately opacified for evaluation. No evidence of intraluminal filling defect to suggest pulmonary embolism. Main pulmonary artery is normal in caliber. Mediastinum: No evidence of mediastinal lymphadenopathy. The heart and pericardium demonstrate no acute abnormality. There is no acute abnormality of the thoracic aorta. Lungs/pleura: There is a 1.4 cm nodular density at the posterior left lung base. Recommend follow-up noncontrast CT of the chest in 3 months. Mild dependent congestion in the bilateral posterior lungs. The lungs are otherwise without acute process. No focal consolidation or pulmonary edema. No evidence of pleural effusion or pneumothorax. Upper Abdomen: Limited images of the upper abdomen are unremarkable. Soft Tissues/Bones: Chronic fracture deformity of the lateral left 7th rib. Otherwise, no acute bone or soft tissue abnormality. No evidence of pulmonary embolism or acute pulmonary abnormality. There is a 1.4 cm nodular density in the posterior left lung base. Recommend follow-up noncontrast CT of the chest in 3 months. Nm Cardiac Stress Test Nuclear Imaging    Result Date: 10/31/2020  Indication:  Chest pressure and burning Clinical History:   Patient has no previous historyof coronary artery disease.  IMAGING: Myocardial perfusion imaging was performed at rest 30-35 minutes following the intravenous injection of 12 mCi of (Tc-Sestamibi) followed by 10 ml of Normal Saline. At peak exercise, the patient was injected intravenously with 35mCi of (Tc-Sestamibi) followed by 10 ml of Normal Saline. Gated post-stress tomographic imaging was performed 20-25 minutes after stress. FINDINGS: The overall quality of the study was good. Left ventricular cavity size was noted to be dilated Rotational analog analysis demonstrated elevated left hemidiaphragm with marked patient motion artifact The gated SPECT stress imaging in the short, vertical long, and horizontal long axes demonstrate decreased inferoapical uptake at both stress and rest images. . However there was normal brightening and thickening noted in this area. There is global hypokinesis of the left ventricle with an estimated LVEF of 33%. Healing perfusion images at both rest and stress suggest possible underlying abdomen are artifactual cause. 1. Decreased myocardial perfusion inferoapical segment both rest and stress with normal brightening and thickening and global decrease in left ventricular contractility and modest dilatation of left ventricular cavity. 2. Global decrease in left ventricular systolic contractility estimated LVEF 33% Giovany Peterson FACP [facility] FSCAI         Assessment/Plan    1)  BEAR:  most likely pre-renal with decreased effective renal perfusion in the setting of nausea, CP and SOB in a patient taking torsemide 10 mg TID, Aldactone 25 mg QD, and hydrochlorothiazide 25 mg daily  baseline scr has ranged 1.0-1.3 over the past 12 months  Renal ultrasound on 2/10/2020: Right kidney measures 10.27 cm in length / left kidney approximately 10.2 cm  / cortex preserved and no hydronephrosis is seen  Now with admitting serum creatinine of 2.0  Also concern for dye exposure during CTA in the ED  We will need to coordinate above diuretics with the planned start of Entresto by cardiology  Need UA  Follow daily labs and strict intake and output     2) CP and SOB  left ventricular global systolic function is markedly decreased with an estimated left ventricular ejection fraction of 33%   Cardiology has been consulted and following patient closely      3) Hypertension: Essential  BP has been well controlled since admission      4) Prior recreational drug use      Thank you for allowing us to participate in care of Mr Phuong Eddy, TONIA - CNP  10/31/2020  4:48 PM     Patient seen and examined all key components of the physical performed independently , case discussed with NP, all pertinent labs and radiologic tests personally reviewed agree with above.     -BEAR due to combined effects of decreased effective renal perfusion in patient with moderate LV dysfunction; contrast nephrotoxicity contributing; monitor labs and UO closely  Linden Crigler, MD

## 2020-11-01 LAB
ANION GAP SERPL CALCULATED.3IONS-SCNC: 15 MMOL/L (ref 7–16)
APTT: 51 SEC (ref 24.5–35.1)
BUN BLDV-MCNC: 23 MG/DL (ref 6–20)
CALCIUM SERPL-MCNC: 9.2 MG/DL (ref 8.6–10.2)
CHLORIDE BLD-SCNC: 101 MMOL/L (ref 98–107)
CO2: 22 MMOL/L (ref 22–29)
CREAT SERPL-MCNC: 1.9 MG/DL (ref 0.7–1.2)
EKG ATRIAL RATE: 85 BPM
EKG P AXIS: 18 DEGREES
EKG P-R INTERVAL: 144 MS
EKG Q-T INTERVAL: 430 MS
EKG QRS DURATION: 90 MS
EKG QTC CALCULATION (BAZETT): 511 MS
EKG T AXIS: -75 DEGREES
EKG VENTRICULAR RATE: 85 BPM
GFR AFRICAN AMERICAN: 44
GFR NON-AFRICAN AMERICAN: 37 ML/MIN/1.73
GLUCOSE BLD-MCNC: 109 MG/DL (ref 74–99)
HCT VFR BLD CALC: 41.8 % (ref 37–54)
HEMOGLOBIN: 14 G/DL (ref 12.5–16.5)
MAGNESIUM: 2.1 MG/DL (ref 1.6–2.6)
MCH RBC QN AUTO: 31.7 PG (ref 26–35)
MCHC RBC AUTO-ENTMCNC: 33.5 % (ref 32–34.5)
MCV RBC AUTO: 94.8 FL (ref 80–99.9)
PDW BLD-RTO: 12.9 FL (ref 11.5–15)
PHOSPHORUS: 4.6 MG/DL (ref 2.5–4.5)
PLATELET # BLD: 277 E9/L (ref 130–450)
PMV BLD AUTO: 10.2 FL (ref 7–12)
POTASSIUM SERPL-SCNC: 4.1 MMOL/L (ref 3.5–5)
RBC # BLD: 4.41 E12/L (ref 3.8–5.8)
SODIUM BLD-SCNC: 138 MMOL/L (ref 132–146)
WBC # BLD: 11 E9/L (ref 4.5–11.5)

## 2020-11-01 PROCEDURE — 6360000002 HC RX W HCPCS: Performed by: GENERAL PRACTICE

## 2020-11-01 PROCEDURE — 80048 BASIC METABOLIC PNL TOTAL CA: CPT

## 2020-11-01 PROCEDURE — 94760 N-INVAS EAR/PLS OXIMETRY 1: CPT

## 2020-11-01 PROCEDURE — 6370000000 HC RX 637 (ALT 250 FOR IP): Performed by: INTERNAL MEDICINE

## 2020-11-01 PROCEDURE — 93010 ELECTROCARDIOGRAM REPORT: CPT | Performed by: INTERNAL MEDICINE

## 2020-11-01 PROCEDURE — 84100 ASSAY OF PHOSPHORUS: CPT

## 2020-11-01 PROCEDURE — 85730 THROMBOPLASTIN TIME PARTIAL: CPT

## 2020-11-01 PROCEDURE — 2700000000 HC OXYGEN THERAPY PER DAY

## 2020-11-01 PROCEDURE — 85027 COMPLETE CBC AUTOMATED: CPT

## 2020-11-01 PROCEDURE — 6360000002 HC RX W HCPCS: Performed by: INTERNAL MEDICINE

## 2020-11-01 PROCEDURE — 2140000000 HC CCU INTERMEDIATE R&B

## 2020-11-01 PROCEDURE — 83735 ASSAY OF MAGNESIUM: CPT

## 2020-11-01 PROCEDURE — 94640 AIRWAY INHALATION TREATMENT: CPT

## 2020-11-01 PROCEDURE — 6370000000 HC RX 637 (ALT 250 FOR IP): Performed by: GENERAL PRACTICE

## 2020-11-01 PROCEDURE — 36415 COLL VENOUS BLD VENIPUNCTURE: CPT

## 2020-11-01 RX ORDER — CLONIDINE HYDROCHLORIDE 0.1 MG/1
0.1 TABLET ORAL 2 TIMES DAILY
Status: DISCONTINUED | OUTPATIENT
Start: 2020-11-01 | End: 2020-11-06 | Stop reason: HOSPADM

## 2020-11-01 RX ORDER — TORSEMIDE 20 MG/1
20 TABLET ORAL DAILY
Status: DISCONTINUED | OUTPATIENT
Start: 2020-11-02 | End: 2020-11-06 | Stop reason: HOSPADM

## 2020-11-01 RX ORDER — AMLODIPINE BESYLATE 10 MG/1
10 TABLET ORAL DAILY
Status: DISCONTINUED | OUTPATIENT
Start: 2020-11-02 | End: 2020-11-06 | Stop reason: HOSPADM

## 2020-11-01 RX ORDER — DOCUSATE SODIUM 100 MG/1
100 CAPSULE, LIQUID FILLED ORAL 2 TIMES DAILY
Status: DISCONTINUED | OUTPATIENT
Start: 2020-11-01 | End: 2020-11-06 | Stop reason: HOSPADM

## 2020-11-01 RX ADMIN — DOXYCYCLINE HYCLATE 100 MG: 100 CAPSULE ORAL at 20:45

## 2020-11-01 RX ADMIN — GABAPENTIN 300 MG: 300 CAPSULE ORAL at 20:45

## 2020-11-01 RX ADMIN — PROMETHAZINE HYDROCHLORIDE 6.25 MG: 25 TABLET ORAL at 16:39

## 2020-11-01 RX ADMIN — AMLODIPINE BESYLATE 5 MG: 5 TABLET ORAL at 09:40

## 2020-11-01 RX ADMIN — PANTOPRAZOLE SODIUM 40 MG: 40 TABLET, DELAYED RELEASE ORAL at 06:35

## 2020-11-01 RX ADMIN — HYDRALAZINE HYDROCHLORIDE 25 MG: 25 TABLET, FILM COATED ORAL at 20:45

## 2020-11-01 RX ADMIN — GABAPENTIN 300 MG: 300 CAPSULE ORAL at 16:39

## 2020-11-01 RX ADMIN — PROMETHAZINE HYDROCHLORIDE 6.25 MG: 25 TABLET ORAL at 13:05

## 2020-11-01 RX ADMIN — METOPROLOL SUCCINATE 100 MG: 100 TABLET, EXTENDED RELEASE ORAL at 09:40

## 2020-11-01 RX ADMIN — DOXYCYCLINE HYCLATE 100 MG: 100 CAPSULE ORAL at 09:40

## 2020-11-01 RX ADMIN — SPIRONOLACTONE 25 MG: 25 TABLET ORAL at 09:40

## 2020-11-01 RX ADMIN — SACUBITRIL AND VALSARTAN 1 TABLET: 24; 26 TABLET, FILM COATED ORAL at 20:45

## 2020-11-01 RX ADMIN — DOCUSATE SODIUM 100 MG: 100 CAPSULE, LIQUID FILLED ORAL at 20:45

## 2020-11-01 RX ADMIN — GABAPENTIN 300 MG: 300 CAPSULE ORAL at 09:40

## 2020-11-01 RX ADMIN — TORSEMIDE 10 MG: 10 TABLET ORAL at 09:40

## 2020-11-01 RX ADMIN — ENOXAPARIN SODIUM 40 MG: 40 INJECTION SUBCUTANEOUS at 20:46

## 2020-11-01 RX ADMIN — BUDESONIDE 250 MCG: 0.25 SUSPENSION RESPIRATORY (INHALATION) at 21:02

## 2020-11-01 RX ADMIN — NITROGLYCERIN 1 INCH: 20 OINTMENT TOPICAL at 17:48

## 2020-11-01 RX ADMIN — HYDRALAZINE HYDROCHLORIDE 25 MG: 25 TABLET, FILM COATED ORAL at 09:41

## 2020-11-01 RX ADMIN — PROMETHAZINE HYDROCHLORIDE 6.25 MG: 25 TABLET ORAL at 20:45

## 2020-11-01 RX ADMIN — PROMETHAZINE HYDROCHLORIDE 6.25 MG: 25 TABLET ORAL at 09:42

## 2020-11-01 RX ADMIN — BUDESONIDE 250 MCG: 0.25 SUSPENSION RESPIRATORY (INHALATION) at 08:34

## 2020-11-01 RX ADMIN — IPRATROPIUM BROMIDE 0.5 MG: 0.5 SOLUTION RESPIRATORY (INHALATION) at 21:02

## 2020-11-01 RX ADMIN — HYDRALAZINE HYDROCHLORIDE 25 MG: 25 TABLET, FILM COATED ORAL at 13:05

## 2020-11-01 RX ADMIN — HEPARIN SODIUM AND DEXTROSE 14 UNITS/KG/HR: 10000; 5 INJECTION INTRAVENOUS at 13:06

## 2020-11-01 RX ADMIN — CLONIDINE HYDROCHLORIDE 0.1 MG: 0.1 TABLET ORAL at 13:05

## 2020-11-01 RX ADMIN — DOCUSATE SODIUM 100 MG: 100 CAPSULE, LIQUID FILLED ORAL at 11:22

## 2020-11-01 RX ADMIN — GABAPENTIN 300 MG: 300 CAPSULE ORAL at 13:05

## 2020-11-01 RX ADMIN — TORSEMIDE 10 MG: 10 TABLET ORAL at 13:05

## 2020-11-01 RX ADMIN — FINASTERIDE 5 MG: 5 TABLET, FILM COATED ORAL at 09:40

## 2020-11-01 RX ADMIN — CLONIDINE HYDROCHLORIDE 0.1 MG: 0.1 TABLET ORAL at 20:45

## 2020-11-01 RX ADMIN — IPRATROPIUM BROMIDE 0.5 MG: 0.5 SOLUTION RESPIRATORY (INHALATION) at 08:34

## 2020-11-01 RX ADMIN — ARFORMOTEROL TARTRATE 15 MCG: 15 SOLUTION RESPIRATORY (INHALATION) at 08:34

## 2020-11-01 RX ADMIN — CLONIDINE HYDROCHLORIDE 0.1 MG: 0.1 TABLET ORAL at 09:40

## 2020-11-01 RX ADMIN — ARFORMOTEROL TARTRATE 15 MCG: 15 SOLUTION RESPIRATORY (INHALATION) at 21:02

## 2020-11-01 RX ADMIN — IPRATROPIUM BROMIDE 0.5 MG: 0.5 SOLUTION RESPIRATORY (INHALATION) at 17:09

## 2020-11-01 ASSESSMENT — PAIN DESCRIPTION - PROGRESSION: CLINICAL_PROGRESSION: NOT CHANGED

## 2020-11-01 ASSESSMENT — PAIN DESCRIPTION - PAIN TYPE: TYPE: ACUTE PAIN

## 2020-11-01 ASSESSMENT — PAIN SCALES - GENERAL
PAINLEVEL_OUTOF10: 2
PAINLEVEL_OUTOF10: 0
PAINLEVEL_OUTOF10: 6
PAINLEVEL_OUTOF10: 0
PAINLEVEL_OUTOF10: 0

## 2020-11-01 ASSESSMENT — PAIN DESCRIPTION - ORIENTATION: ORIENTATION: RIGHT;LEFT;MID

## 2020-11-01 ASSESSMENT — PAIN DESCRIPTION - DESCRIPTORS: DESCRIPTORS: DISCOMFORT;SQUEEZING

## 2020-11-01 ASSESSMENT — PAIN DESCRIPTION - LOCATION: LOCATION: CHEST

## 2020-11-01 ASSESSMENT — PAIN - FUNCTIONAL ASSESSMENT: PAIN_FUNCTIONAL_ASSESSMENT: PREVENTS OR INTERFERES SOME ACTIVE ACTIVITIES AND ADLS

## 2020-11-01 ASSESSMENT — PAIN DESCRIPTION - ONSET: ONSET: SUDDEN

## 2020-11-01 ASSESSMENT — PAIN DESCRIPTION - FREQUENCY: FREQUENCY: CONTINUOUS

## 2020-11-01 NOTE — PROGRESS NOTES
PROGRESS NOTE       PATIENT PROBLEM LIST:  Active Problems:    Unstable angina (HCC)  Resolved Problems:    * No resolved hospital problems. *      SUBJECTIVE:  Jessy Hoyt states he continues to have intermittent chest discomfort which changes position on a daily basis. His breathing is somewhat better however. REVIEW OF SYSTEMS:  General ROS: positive for - fatigue. Psychological ROS: Positive for - anxiety , depression  Ophthalmic ROS: negative for - decreased vision or visual distortion. ENT ROS: negative  Allergy and Immunology ROS: negative  Hematological and Lymphatic ROS: negative  Endocrine: no heat or cold intolerance and no polyphagia, polydipsia, or polyuria  Respiratory ROS: positive for - shortness of breath  Cardiovascular ROS: positive for - chest pain, dyspnea on exertion and shortness of breath. Gastrointestinal ROS: no abdominal pain, change in bowel habits, or black or bloody stools  Genito-Urinary ROS: no nocturia, dysuria, trouble voiding, frequency or hematuria  Musculoskeletal ROS: negative for- myalgias, arthralgias, or claudication  Neurological ROS: no TIA or stroke symptoms otherwise no significant change in symptoms or problems since yesterday as documented in previous progress notes.     SCHEDULED MEDICATIONS:   docusate sodium  100 mg Oral BID    nitroglycerin  1 inch Topical 3 times per day    enoxaparin  40 mg Subcutaneous Daily    [START ON 11/2/2020] amLODIPine  10 mg Oral Daily    cloNIDine  0.1 mg Oral BID    [START ON 11/2/2020] torsemide  20 mg Oral Daily    gabapentin  300 mg Oral 4x Daily    hydrALAZINE  25 mg Oral TID    doxycycline hyclate  100 mg Oral BID    finasteride  5 mg Oral Daily    metoprolol succinate  100 mg Oral Daily    nicotine  1 patch Transdermal Daily    pantoprazole  40 mg Oral QAM AC    promethazine  6.25 mg Oral 4x Daily    spironolactone  25 mg Oral Daily    budesonide  0.25 mg Nebulization BID    And    ipratropium  0.5 mg Nebulization 4x daily    And    Arformoterol Tartrate  15 mcg Nebulization BID    sacubitril-valsartan  1 tablet Oral BID       VITAL SIGNS:                                                                                                                          /68   Pulse 74   Temp 98.8 °F (37.1 °C) (Temporal)   Resp 18   Ht 5' 7\" (1.702 m)   Wt 212 lb 9.6 oz (96.4 kg)   SpO2 95%   BMI 33.30 kg/m²   Patient Vitals for the past 96 hrs (Last 3 readings):   Weight   11/01/20 0634 212 lb 9.6 oz (96.4 kg)   10/31/20 0420 216 lb 3.2 oz (98.1 kg)   10/30/20 2258 228 lb (103.4 kg)     OBJECTIVE:    HEENT: PERRL, EOM  Intact; sclera non-icteric, conjunctiva pink. Carotids are brisk in upstroke with normal contour. No carotid bruits. Normal jugular venous pulsation at 45°. No palpable cervical nor supraclavicular nodes. Thyroid not palpable. Trachea midline. Chest: Even excursion  Lungs: CTA B, no expiratory wheezes or rhonchi, no decreased tactile fremitus without inspiratory rales. Heart: Regular  rhythm; S1 > S2, S4 gallop; murmur not appreciated at this time. . No clicks, rub, palpable thrills   or heaves. PMI nondisplaced, 5th intercostal space MCL. Abdomen: Soft, nontender, nondistended,  moderately protuberant, no masses or organomegaly. Bowel sounds active. Extremities: Without clubbing, cyanosis or edema. Pulses present 3+ upper extermities bilaterally; present 1+ DP and present 1+ PT bilaterally.      Data:   Scheduled Meds: Reviewed  Continuous Infusions:     Intake/Output Summary (Last 24 hours) at 11/1/2020 1852  Last data filed at 11/1/2020 1449  Gross per 24 hour   Intake 1552 ml   Output 1300 ml   Net 252 ml     CBC:   Recent Labs     10/30/20  2321 10/31/20  0639 11/01/20  0141   WBC 11.9* 10.9 11.0   HGB 14.1 13.2 14.0   HCT 41.5 39.8 41.8    259 277     BMP:  Recent Labs     10/30/20  2321 10/31/20  0639 11/01/20  0141    135 138   K 3.7 3.9 4.1   CL 98 102 101   CO2 24 21* 22   BUN 22* 23* 23*   CREATININE 2.0* 1.8* 1.9*   LABGLOM 35 39 37     ABGs:   Lab Results   Component Value Date    PH 7.445 10/01/2019    PO2 69.4 10/01/2019    PCO2 25.6 10/01/2019     INR:   Recent Labs     10/30/20  2321   INR 1.0     PRO-BNP:   Lab Results   Component Value Date    PROBNP 811 (H) 10/30/2020    PROBNP 1,851 (H) 02/05/2020      TSH:   Lab Results   Component Value Date    TSH 0.809 04/18/2019      Cardiac Injury Profile:   Recent Labs     10/30/20  2321 10/31/20  0639   CKTOTAL  --  178   CKMB  --  2.2   TROPONINI <0.01 <0.01      Lipid Profile:   Lab Results   Component Value Date    TRIG 217 01/02/2018    HDL 58 01/02/2018    LDLCALC 144 01/02/2018    CHOL 245 01/02/2018      Hemoglobin A1C: No components found for: HGBA1C     RAD:   Xr Chest Portable    Result Date: 10/31/2020  EXAMINATION: ONE XRAY VIEW OF THE CHEST 10/30/2020 11:24 pm COMPARISON: Chest x-ray from 02/05/2020. HISTORY: ORDERING SYSTEM PROVIDED HISTORY: chest pain TECHNOLOGIST PROVIDED HISTORY: Reason for exam:->chest pain What reading provider will be dictating this exam?->CRC FINDINGS: Mild peripheral patchy/ground-glass opacification is noted in the peripheral left lung which could represent atelectasis versus early/mild airspace disease process. No evidence of pleural effusion. Heart size at the upper limits of normal.  Postsurgical changes in the lower cervical spine. Osseous structures are otherwise unremarkable. Mild peripheral patchy/ground-glass opacification in the peripheral left lung which could represent atelectasis versus early/mild airspace disease process. Cta Chest W Contrast    Result Date: 10/31/2020  EXAMINATION: CTA OF THE CHEST 10/30/2020 10:32 pm TECHNIQUE: CTA of the chest was performed after the administration of intravenous contrast.  Multiplanar reformatted images are provided for review. MIP images are provided for review.  Dose modulation, iterative reconstruction, and/or weight based adjustment of the mA/kV was utilized to reduce the radiation dose to as low as reasonably achievable. COMPARISON: None. HISTORY: ORDERING SYSTEM PROVIDED HISTORY: PE concern TECHNOLOGIST PROVIDED HISTORY: Reason for exam:->PE concern What reading provider will be dictating this exam?->CRC FINDINGS: Pulmonary Arteries: Pulmonary arteries are adequately opacified for evaluation. No evidence of intraluminal filling defect to suggest pulmonary embolism. Main pulmonary artery is normal in caliber. Mediastinum: No evidence of mediastinal lymphadenopathy. The heart and pericardium demonstrate no acute abnormality. There is no acute abnormality of the thoracic aorta. Lungs/pleura: There is a 1.4 cm nodular density at the posterior left lung base. Recommend follow-up noncontrast CT of the chest in 3 months. Mild dependent congestion in the bilateral posterior lungs. The lungs are otherwise without acute process. No focal consolidation or pulmonary edema. No evidence of pleural effusion or pneumothorax. Upper Abdomen: Limited images of the upper abdomen are unremarkable. Soft Tissues/Bones: Chronic fracture deformity of the lateral left 7th rib. Otherwise, no acute bone or soft tissue abnormality. No evidence of pulmonary embolism or acute pulmonary abnormality. There is a 1.4 cm nodular density in the posterior left lung base. Recommend follow-up noncontrast CT of the chest in 3 months. Us Abdomen Limited    Result Date: 10/27/2020  EXAMINATION: RIGHT UPPER QUADRANT ULTRASOUND 10/26/2020 7:13 pm COMPARISON: 04/18/2019 ultrasound HISTORY: ORDERING SYSTEM PROVIDED HISTORY: Abdominal distension TECHNOLOGIST PROVIDED HISTORY: Reason for exam:->ascites What reading provider will be dictating this exam?->CRC FINDINGS: LIVER:  The liver demonstrates normal echogenicity without evidence of intrahepatic biliary ductal dilatation.  BILIARY SYSTEM:  Gallbladder is unremarkable without evidence of pericholecystic fluid, 33% Portillo Warren. Nicholas FACP FACC FSCAI       EKG: See Report  Echo: See Report      IMPRESSIONS:  Active Problems:    Unstable angina ([address]  Resolved Problems:    * No resolved hospital problems. *      RECOMMENDATIONS:  I have recommended that Mr. Fernanda Fam undergo cardiac catheterization with potential intervention based upon his clinical presentation and that while his nuclear stress test suggested that of possible artifact it could also represent profound ischemia and in the presence of a significant decrease in left ventricular systolic function will be in his's interest to know his coronary status. He is agreed to proceed but wishes to wait until Tuesday. Additionally his cardiac medications will be adjusted. I have spent more than 26 minutes face to face with Dany Celeste and reviewing notes and laboratory data, with greater than 50% of this time instructing and counseling the patient face to face regarding my findings and recommendations and I have answered all questions as posed to me by Mr. Karel Arango. Ralph Levin,  FACP,FACC,FSCAI      NOTE:  This report was transcribed using voice recognition software.   Every effort was made to ensure accuracy; however, inadvertent computerized transcription errors may be present

## 2020-11-01 NOTE — PLAN OF CARE
Problem: Pain:  Goal: Control of acute pain  Description: Control of acute pain  Outcome: Met This Shift     Problem: Pain:  Goal: Control of chronic pain  Description: Control of chronic pain  Outcome: Met This Shift     Problem:  Activity:  Goal: Risk for activity intolerance will decrease  Description: Risk for activity intolerance will decrease  11/1/2020 1135 by Jaden Pearl RN  Outcome: Met This Shift

## 2020-11-01 NOTE — PLAN OF CARE
Problem: Pain:  Goal: Pain level will decrease  Description: Pain level will decrease  11/1/2020 0759 by Frandy Duffy RN  Outcome: Met This Shift  10/31/2020 2305 by Frandy Duffy RN  Outcome: Met This Shift     Problem:  Activity:  Goal: Risk for activity intolerance will decrease  Description: Risk for activity intolerance will decrease  11/1/2020 0759 by Frandy Duffy RN  Outcome: Met This Shift  10/31/2020 2305 by Frandy Duffy RN  Outcome: Met This Shift     Problem: Fluid Volume:  Goal: Maintenance of adequate hydration will improve  Description: Maintenance of adequate hydration will improve  Outcome: Met This Shift     Problem: Health Behavior:  Goal: Ability to state signs and symptoms to report to health care provider will improve  Description: Ability to state signs and symptoms to report to health care provider will improve  Outcome: Met This Shift     Problem: Sensory:  Goal: Pain level will decrease  Description: Pain level will decrease  11/1/2020 0759 by Frandy Duffy RN  Outcome: Met This Shift  10/31/2020 2305 by Frandy Duffy RN  Outcome: Met This Shift

## 2020-11-01 NOTE — PROGRESS NOTES
Patient seen feels out of breath. No further chest pressure. May need cath. Perfusion abnormalities o stress.  Also dilated ventricle, could  Be ischemic

## 2020-11-01 NOTE — PLAN OF CARE
Problem: Pain:  Goal: Pain level will decrease  Description: Pain level will decrease  Outcome: Met This Shift     Problem:  Activity:  Goal: Risk for activity intolerance will decrease  Description: Risk for activity intolerance will decrease  10/31/2020 2305 by Zhanna Hairston RN  Outcome: Met This Shift  10/31/2020 1202 by Charlene Elliott RN  Outcome: Met This Shift

## 2020-11-01 NOTE — PROGRESS NOTES
diminished over the past 24 months. He attributes most of this to his chronic habit of smoking as well as alcohol ingestion. Subjective    11/1/2020: Patient continues to complain of being short of breath even at rest  No chest pain however.     Current Medications:    Current Facility-Administered Medications: docusate sodium (COLACE) capsule 100 mg, 100 mg, Oral, BID  magnesium hydroxide (MILK OF MAGNESIA) 400 MG/5ML suspension 30 mL, 30 mL, Oral, Daily PRN  amLODIPine (NORVASC) tablet 5 mg, 5 mg, Oral, Daily  gabapentin (NEURONTIN) capsule 300 mg, 300 mg, Oral, 4x Daily  hydrALAZINE (APRESOLINE) tablet 25 mg, 25 mg, Oral, TID  albuterol (PROVENTIL) nebulizer solution 2.5 mg, 2.5 mg, Nebulization, Q6H PRN  cloNIDine (CATAPRES) tablet 0.1 mg, 0.1 mg, Oral, TID  doxycycline hyclate (VIBRAMYCIN) capsule 100 mg, 100 mg, Oral, BID  finasteride (PROSCAR) tablet 5 mg, 5 mg, Oral, Daily  metoprolol succinate (TOPROL XL) extended release tablet 100 mg, 100 mg, Oral, Daily  nicotine (NICODERM CQ) 21 MG/24HR 1 patch, 1 patch, Transdermal, Daily  pantoprazole (PROTONIX) tablet 40 mg, 40 mg, Oral, QAM AC  promethazine (PHENERGAN) tablet 6.25 mg, 6.25 mg, Oral, 4x Daily  spironolactone (ALDACTONE) tablet 25 mg, 25 mg, Oral, Daily  torsemide (DEMADEX) tablet 10 mg, 10 mg, Oral, TID  calcium carbonate (TUMS) chewable tablet 500 mg, 500 mg, Oral, TID PRN  heparin 25,000 units in dextrose 5% 250 mL infusion, 12 Units/kg/hr, Intravenous, Continuous  heparin (porcine) injection 3,100 Units, 30 Units/kg, Intravenous, PRN  heparin (porcine) injection 6,200 Units, 60 Units/kg, Intravenous, PRN  nitroGLYCERIN (NITROSTAT) SL tablet 0.4 mg, 0.4 mg, Sublingual, Q5 Min PRN  budesonide (PULMICORT) nebulizer suspension 250 mcg, 0.25 mg, Nebulization, BID **AND** ipratropium (ATROVENT) 0.02 % nebulizer solution 0.5 mg, 0.5 mg, Nebulization, 4x daily **AND** Arformoterol Tartrate (BROVANA) nebulizer solution 15 mcg, 15 mcg, Nebulization, BID  nitroglycerin (NITRO-BID) 2 % ointment 1 inch, 1 inch, Topical, Nightly  sacubitril-valsartan (ENTRESTO) 24-26 MG per tablet 1 tablet, 1 tablet, Oral, BID    Allergies:  Patient has no known allergies. Social History:       reports that he has been smoking cigarettes. He started smoking about 41 years ago. He has a 60.00 pack-year smoking history. He has never used smokeless tobacco. He reports previous alcohol use. He reports current drug use. Drugs: Cocaine, Other-see comments, and Marijuana. Family History:     Family History   Problem Relation Age of Onset    Arthritis Mother     Other Mother         glucoma    Heart Disease Father     High Blood Pressure Brother     Other Brother         sleep apnea    High Blood Pressure Brother     Other Brother         sleep apnea    High Blood Pressure Brother     Other Brother         sleep apnea    High Blood Pressure Brother     Other Brother         sleep apnea    Cancer Brother         gitaogeal -  at 62         Review of Systems:       Pertinent positives stated above in HPI. All other systems were reviewed and were negative.     Physical exam:   Constitutional:  VITALS:  /63   Pulse 71   Temp 97.8 °F (36.6 °C) (Temporal)   Resp 16   Ht 5' 7\" (1.702 m)   Wt 212 lb 9.6 oz (96.4 kg)   SpO2 96%   BMI 33.30 kg/m²   CURRENT TEMPERATURE:  Temp: 97.8 °F (36.6 °C)  CURRENT RESPIRATORY RATE:  Resp: 16  CURRENT PULSE:  Pulse: 71  CURRENT BLOOD PRESSURE:  BP: 104/63  24HR BLOOD PRESSURE RANGE:  Systolic (91UGZ), DHK:692 , Min:104 , JAYA:849   ; Diastolic (46SAN), CFF:62, Min:63, Max:93    24HR INTAKE/OUTPUT:      Intake/Output Summary (Last 24 hours) at 2020 1505  Last data filed at 2020 1449  Gross per 24 hour   Intake 1552 ml   Output 1300 ml   Net 252 ml     Gen: alert, awake, nad  Skin: no rash, turgor wnl  Heent:  eomi, mmm  Neck: no bruits or jvd noted  Cardiovascular:  S1, S2 without m/r/g  Respiratory: CTA   Abdomen:  +bs, soft, nt, nd  Ext: no edema   Psychiatric: mood and affect appropriate  Musculoskeletal:  Rom, muscular strength intact    DATA:      CBC:   Lab Results   Component Value Date    WBC 11.0 11/01/2020    RBC 4.41 11/01/2020    HGB 14.0 11/01/2020    HCT 41.8 11/01/2020    MCV 94.8 11/01/2020    MCH 31.7 11/01/2020    MCHC 33.5 11/01/2020    RDW 12.9 11/01/2020     11/01/2020    MPV 10.2 11/01/2020     BMP:    Lab Results   Component Value Date     11/01/2020    K 4.1 11/01/2020    K 3.8 10/01/2019     11/01/2020    CO2 22 11/01/2020    BUN 23 11/01/2020    LABALBU 3.9 10/31/2020    CREATININE 1.9 11/01/2020    CALCIUM 9.2 11/01/2020    GFRAA 44 11/01/2020    LABGLOM 37 11/01/2020    GLUCOSE 109 11/01/2020       RAD:  Xr Chest Portable        Assessment/Plan    1)  BEAR:  most likely pre-renal with decreased effective renal perfusion in the setting of nausea, CP and SOB in a patient taking torsemide 10 mg TID, Aldactone 25 mg QD, and hydrochlorothiazide 25 mg daily  baseline scr has ranged 1.0-1.3 over the past 12 months  Renal ultrasound on 2/10/2020: Right kidney measures 10.27 cm in length / left kidney approximately 10.2 cm  / cortex preserved and no hydronephrosis is seen  Now with admitting serum creatinine of 2.0  Also concern for dye exposure during CTA in the ED  We will need to coordinate above diuretics with the planned start of Entresto by cardiology  Need UA  Follow daily labs and strict intake and output     2) Acute compensated HFrEF  left ventricular global systolic function is markedly decreased with an estimated left ventricular ejection fraction of 33%   Continue current diuretic regimen with close monitoring of renal function  Cardiology has been consulted and following patient closely      3) Hypertension: Essential  BP has been well controlled since admission      4) Prior recreational drug use              Aster Luque MD  11/1/2020  3:05 PM

## 2020-11-01 NOTE — PROGRESS NOTES
Called to room by patient with complaints of chest pain 6/10, \"like the pain I had when I came in\". Vital signs stable, EKG completed and Dr. Timbo Ku notified. Orders obtained.

## 2020-11-01 NOTE — H&P
[address]                  [address] [facility] [place],  [facility]                              HISTORY AND PHYSICAL    PATIENT NAME: Ramesh Choudhury                  :        1965  MED REC NO:   [id number]                            ROOM:       3355  ACCOUNT NO:   [de-identified]                           ADMIT DATE: 10/30/2020  PROVIDER:     Angelia Lopez DO    HISTORY OF PRESENT ILLNESS:  The patient is a 57-year-old white male,  came into the emergency department with a history and chief complaint of  increasing shortness of breath and chest pressure. He also described it  as epigastric discomfort and is relieved by Tums. He does have a  longstanding history of accelerated hypertension, multiple medications  for same and is very noncompliant and he has been out of medications and  he notices that they noticed in the emergency department that his blood  pressure is quite elevated as well. He denies any palpitations,  syncope, nausea, vomiting, or sweats. He was completely short of breath  with heaviness in his chest.  He had an EKG that was suggestive of  ischemic changes with inverted T-waves, was subsequently admitted to  rule out acute myocardial infarction or coronary syndrome. PAST MEDICAL HISTORY:  Significant for alcohol abuse, congestive heart  failure, COPD, polypharmacy with history of cocaine and marijuana abuse,  hyperlipidemia, hypertension. PAST SURGICAL HISTORY:  Significant for wrist surgery, cervical fusion,  polysomnography. SOCIAL HISTORY:  He is a cigarette smoker, continues to do so despite  numerous measures to get him to quit. He does admit to cocaine,  marijuana, and occasional alcohol. FAMILY HISTORY:  Significant in that a brother had cancer. Heart  disease in his father, multiple brothers.     MEDICATIONS:  Currently at home included Vibramycin 100 b.i.d.,  Aldactone 25 daily, Demadex 10 daily, Phenergan as needed for  expectoration, Flonase, Trelegy Ellipta, hydralazine, Protonix,  NicoDerm, Proventil, gabapentin, metoprolol , Proscar 5 daily,  Catapres 0.1 t.i.d., amlodipine 10 daily. ALLERGIES:  He has no known allergies. REVIEW OF SYSTEMS:  HEENT:  Positive for cephalgia. Negative for vertigo, ringing in the  ears, hearing loss, difficulty swallowing, hoarseness in his voice,  bloody noses. CARDIOVASCULAR:  He does have chest pressure. There is no history of  MI. No palpitations. No orthopnea. PULMONARY:  He does have dyspnea. No edema. He has cough, wheeze,  shortness of breath. No hemoptysis or pleuritic pain. GASTROINTESTINAL:  There is no nausea, vomiting, diarrhea, constipation,  blood in his stool, or change in weight. GENITOURINARY:  No urgency, frequency, dysuria, or hematuria. ENDOCRINE:  Negative for diabetes or thyroid disorder. MUSCULOSKELETAL:  Positive for generalized degenerative joint  discomfort, myofascial pain syndrome. PSYCHIATRIC:  Negative for anxiety, depression. NEUROLOGIC:  Negative for CVA, seizures, tremors, tics, or TIAs. SKIN:  Without rash, eruptions, urticaria, pruritus, or lesions. He  does have multiple tattoos. He also has a known history of previous  hepatitis C.    PHYSICAL EXAMINATION:  GENERAL:  Shows this to be a 49-year-old white male in moderate distress  with the above complaints. HEAD, EYES, EARS, NOSE, AND THROAT:  Shows the head to be normocephalic  and atraumatic. Pupils are equal, reactive to light and accommodation. Extraocular muscles are intact. Tympanic membranes are clear. Ear  canals patent. Oral mucosa:  Pink and moist.  NECK:  Veins are flat, nondistended. No carotid bruits. CHEST:  Symmetrical.  HEART:  Had a regular rate and rhythm without murmur, gallops, or  friction rubs. LUNGS:  Demonstrate decreased breath sounds with basilar crackles,  rales, and expiratory wheezes. ABDOMEN:  Soft, nontender, nondistended.   Bowel sounds are present in  all four quadrants. EXTERNAL GENITALIA:  Intact. EXTREMITIES:  Peripheral pulses intact. Legs without edema. SPINE:  Shows physiologic curve. INITIAL IMPRESSION:  At this time is chest pain, rule out myocardial  infarction, coronary syndrome, accelerated hypertension. PLAN:  The patient will be admitted. Cardiology consult will be asked  for. Enzymes will be continued to cycle. Anticipate stress testing. Further orders will be written for as the clinical course dictates. At  the time of admission, his condition is fair. His prognosis is guarded.         Sabra Jones DO    D: 10/31/2020 16:32:07       T: 10/31/2020 16:42:57     [email or web address]  Job#: [id number]     Doc#: [id number]    CC:

## 2020-11-02 LAB
ANION GAP SERPL CALCULATED.3IONS-SCNC: 13 MMOL/L (ref 7–16)
ANION GAP SERPL CALCULATED.3IONS-SCNC: 16 MMOL/L (ref 7–16)
APTT: 29.1 SEC (ref 24.5–35.1)
BUN BLDV-MCNC: 29 MG/DL (ref 6–20)
BUN BLDV-MCNC: 29 MG/DL (ref 6–20)
CALCIUM SERPL-MCNC: 9.6 MG/DL (ref 8.6–10.2)
CALCIUM SERPL-MCNC: 9.7 MG/DL (ref 8.6–10.2)
CHLORIDE BLD-SCNC: 101 MMOL/L (ref 98–107)
CHLORIDE BLD-SCNC: 99 MMOL/L (ref 98–107)
CO2: 22 MMOL/L (ref 22–29)
CO2: 24 MMOL/L (ref 22–29)
CREAT SERPL-MCNC: 2.1 MG/DL (ref 0.7–1.2)
CREAT SERPL-MCNC: 2.1 MG/DL (ref 0.7–1.2)
GFR AFRICAN AMERICAN: 40
GFR AFRICAN AMERICAN: 40
GFR NON-AFRICAN AMERICAN: 33 ML/MIN/1.73
GFR NON-AFRICAN AMERICAN: 33 ML/MIN/1.73
GLUCOSE BLD-MCNC: 101 MG/DL (ref 74–99)
GLUCOSE BLD-MCNC: 99 MG/DL (ref 74–99)
HCT VFR BLD CALC: 42.9 % (ref 37–54)
HEMOGLOBIN: 14.4 G/DL (ref 12.5–16.5)
MAGNESIUM: 2.2 MG/DL (ref 1.6–2.6)
MCH RBC QN AUTO: 32 PG (ref 26–35)
MCHC RBC AUTO-ENTMCNC: 33.6 % (ref 32–34.5)
MCV RBC AUTO: 95.3 FL (ref 80–99.9)
PDW BLD-RTO: 12.7 FL (ref 11.5–15)
PHOSPHORUS: 3.7 MG/DL (ref 2.5–4.5)
PLATELET # BLD: 288 E9/L (ref 130–450)
PMV BLD AUTO: 9.9 FL (ref 7–12)
POTASSIUM SERPL-SCNC: 4.1 MMOL/L (ref 3.5–5)
POTASSIUM SERPL-SCNC: 4.2 MMOL/L (ref 3.5–5)
RBC # BLD: 4.5 E12/L (ref 3.8–5.8)
SODIUM BLD-SCNC: 137 MMOL/L (ref 132–146)
SODIUM BLD-SCNC: 138 MMOL/L (ref 132–146)
WBC # BLD: 10.9 E9/L (ref 4.5–11.5)

## 2020-11-02 PROCEDURE — 6370000000 HC RX 637 (ALT 250 FOR IP): Performed by: INTERNAL MEDICINE

## 2020-11-02 PROCEDURE — 85730 THROMBOPLASTIN TIME PARTIAL: CPT

## 2020-11-02 PROCEDURE — 6360000002 HC RX W HCPCS: Performed by: GENERAL PRACTICE

## 2020-11-02 PROCEDURE — 6360000002 HC RX W HCPCS: Performed by: INTERNAL MEDICINE

## 2020-11-02 PROCEDURE — 85027 COMPLETE CBC AUTOMATED: CPT

## 2020-11-02 PROCEDURE — 84100 ASSAY OF PHOSPHORUS: CPT

## 2020-11-02 PROCEDURE — 36415 COLL VENOUS BLD VENIPUNCTURE: CPT

## 2020-11-02 PROCEDURE — 2700000000 HC OXYGEN THERAPY PER DAY

## 2020-11-02 PROCEDURE — 94640 AIRWAY INHALATION TREATMENT: CPT

## 2020-11-02 PROCEDURE — 2140000000 HC CCU INTERMEDIATE R&B

## 2020-11-02 PROCEDURE — 83735 ASSAY OF MAGNESIUM: CPT

## 2020-11-02 PROCEDURE — 80048 BASIC METABOLIC PNL TOTAL CA: CPT

## 2020-11-02 PROCEDURE — 6370000000 HC RX 637 (ALT 250 FOR IP): Performed by: GENERAL PRACTICE

## 2020-11-02 RX ORDER — HYDRALAZINE HYDROCHLORIDE 10 MG/1
10 TABLET, FILM COATED ORAL 3 TIMES DAILY
Status: DISCONTINUED | OUTPATIENT
Start: 2020-11-02 | End: 2020-11-06 | Stop reason: HOSPADM

## 2020-11-02 RX ORDER — GABAPENTIN 100 MG/1
200 CAPSULE ORAL 4 TIMES DAILY
Status: DISCONTINUED | OUTPATIENT
Start: 2020-11-02 | End: 2020-11-06 | Stop reason: HOSPADM

## 2020-11-02 RX ADMIN — SPIRONOLACTONE 25 MG: 25 TABLET ORAL at 09:26

## 2020-11-02 RX ADMIN — ENOXAPARIN SODIUM 40 MG: 40 INJECTION SUBCUTANEOUS at 09:24

## 2020-11-02 RX ADMIN — HYDRALAZINE HYDROCHLORIDE 25 MG: 25 TABLET, FILM COATED ORAL at 13:31

## 2020-11-02 RX ADMIN — PROMETHAZINE HYDROCHLORIDE 6.25 MG: 25 TABLET ORAL at 13:31

## 2020-11-02 RX ADMIN — PROMETHAZINE HYDROCHLORIDE 6.25 MG: 25 TABLET ORAL at 17:03

## 2020-11-02 RX ADMIN — DOXYCYCLINE HYCLATE 100 MG: 100 CAPSULE ORAL at 21:51

## 2020-11-02 RX ADMIN — HYDRALAZINE HYDROCHLORIDE 10 MG: 10 TABLET, FILM COATED ORAL at 21:51

## 2020-11-02 RX ADMIN — DOCUSATE SODIUM 100 MG: 100 CAPSULE, LIQUID FILLED ORAL at 09:25

## 2020-11-02 RX ADMIN — IPRATROPIUM BROMIDE 0.5 MG: 0.5 SOLUTION RESPIRATORY (INHALATION) at 19:44

## 2020-11-02 RX ADMIN — NITROGLYCERIN 1 INCH: 20 OINTMENT TOPICAL at 02:00

## 2020-11-02 RX ADMIN — NITROGLYCERIN 1 INCH: 20 OINTMENT TOPICAL at 09:25

## 2020-11-02 RX ADMIN — IPRATROPIUM BROMIDE 0.5 MG: 0.5 SOLUTION RESPIRATORY (INHALATION) at 08:36

## 2020-11-02 RX ADMIN — IPRATROPIUM BROMIDE 0.5 MG: 0.5 SOLUTION RESPIRATORY (INHALATION) at 12:05

## 2020-11-02 RX ADMIN — SACUBITRIL AND VALSARTAN 1 TABLET: 24; 26 TABLET, FILM COATED ORAL at 09:25

## 2020-11-02 RX ADMIN — AMLODIPINE BESYLATE 10 MG: 10 TABLET ORAL at 09:26

## 2020-11-02 RX ADMIN — BUDESONIDE 250 MCG: 0.25 SUSPENSION RESPIRATORY (INHALATION) at 19:44

## 2020-11-02 RX ADMIN — CLONIDINE HYDROCHLORIDE 0.1 MG: 0.1 TABLET ORAL at 21:52

## 2020-11-02 RX ADMIN — GABAPENTIN 200 MG: 100 CAPSULE ORAL at 21:51

## 2020-11-02 RX ADMIN — ARFORMOTEROL TARTRATE 15 MCG: 15 SOLUTION RESPIRATORY (INHALATION) at 19:44

## 2020-11-02 RX ADMIN — GABAPENTIN 300 MG: 300 CAPSULE ORAL at 09:26

## 2020-11-02 RX ADMIN — GABAPENTIN 300 MG: 300 CAPSULE ORAL at 13:31

## 2020-11-02 RX ADMIN — METOPROLOL SUCCINATE 100 MG: 100 TABLET, EXTENDED RELEASE ORAL at 09:25

## 2020-11-02 RX ADMIN — PROMETHAZINE HYDROCHLORIDE 6.25 MG: 25 TABLET ORAL at 21:56

## 2020-11-02 RX ADMIN — DOCUSATE SODIUM 100 MG: 100 CAPSULE, LIQUID FILLED ORAL at 21:52

## 2020-11-02 RX ADMIN — GABAPENTIN 200 MG: 100 CAPSULE ORAL at 17:03

## 2020-11-02 RX ADMIN — PANTOPRAZOLE SODIUM 40 MG: 40 TABLET, DELAYED RELEASE ORAL at 06:39

## 2020-11-02 RX ADMIN — TORSEMIDE 20 MG: 20 TABLET ORAL at 09:25

## 2020-11-02 RX ADMIN — ARFORMOTEROL TARTRATE 15 MCG: 15 SOLUTION RESPIRATORY (INHALATION) at 08:36

## 2020-11-02 RX ADMIN — ALBUTEROL SULFATE 2.5 MG: 2.5 SOLUTION RESPIRATORY (INHALATION) at 19:45

## 2020-11-02 RX ADMIN — DOXYCYCLINE HYCLATE 100 MG: 100 CAPSULE ORAL at 09:26

## 2020-11-02 RX ADMIN — NITROGLYCERIN 1 INCH: 20 OINTMENT TOPICAL at 17:03

## 2020-11-02 RX ADMIN — FINASTERIDE 5 MG: 5 TABLET, FILM COATED ORAL at 09:26

## 2020-11-02 RX ADMIN — HYDRALAZINE HYDROCHLORIDE 25 MG: 25 TABLET, FILM COATED ORAL at 09:26

## 2020-11-02 RX ADMIN — PROMETHAZINE HYDROCHLORIDE 6.25 MG: 25 TABLET ORAL at 09:25

## 2020-11-02 RX ADMIN — BUDESONIDE 250 MCG: 0.25 SUSPENSION RESPIRATORY (INHALATION) at 08:36

## 2020-11-02 RX ADMIN — IPRATROPIUM BROMIDE 0.5 MG: 0.5 SOLUTION RESPIRATORY (INHALATION) at 17:44

## 2020-11-02 RX ADMIN — CLONIDINE HYDROCHLORIDE 0.1 MG: 0.1 TABLET ORAL at 09:26

## 2020-11-02 ASSESSMENT — PAIN SCALES - GENERAL
PAINLEVEL_OUTOF10: 0
PAINLEVEL_OUTOF10: 0

## 2020-11-02 NOTE — PROGRESS NOTES
Arformoterol Tartrate  15 mcg Nebulization BID    sacubitril-valsartan  1 tablet Oral BID       VITAL SIGNS:                                                                                                                          /76   Pulse 75   Temp 97.1 °F (36.2 °C)   Resp 18   Ht 5' 7\" (1.702 m)   Wt 216 lb 9.6 oz (98.2 kg)   SpO2 92%   BMI 33.92 kg/m²   Patient Vitals for the past 96 hrs (Last 3 readings):   Weight   11/02/20 0344 216 lb 9.6 oz (98.2 kg)   11/01/20 0634 212 lb 9.6 oz (96.4 kg)   10/31/20 0420 216 lb 3.2 oz (98.1 kg)     OBJECTIVE:    HEENT: PERRL, EOM  Intact; sclera non-icteric, conjunctiva pink. Carotids are brisk in upstroke with normal contour. No carotid bruits. Normal jugular venous pulsation at 45°. No palpable cervical nor supraclavicular nodes. Thyroid not palpable. Trachea midline. Chest: Even excursion  Lungs: CTA B, no expiratory wheezes or rhonchi, no decreased tactile fremitus without inspiratory rales. Heart: Regular  rhythm; S1 > S2, S4 gallop; murmur not appreciated at this time. . No clicks, rub, palpable thrills   or heaves. PMI nondisplaced, 5th intercostal space MCL. Abdomen: Soft, nontender, nondistended,  moderately protuberant, no masses or organomegaly. Bowel sounds active. Extremities: Without clubbing, cyanosis or edema. Pulses present 3+ upper extermities bilaterally; present 1+ DP and present 1+ PT bilaterally.      Data:   Scheduled Meds: Reviewed  Continuous Infusions:     Intake/Output Summary (Last 24 hours) at 11/2/2020 1458  Last data filed at 11/2/2020 1226  Gross per 24 hour   Intake 720 ml   Output 700 ml   Net 20 ml     CBC:   Recent Labs     10/30/20  2321 10/31/20  0639 11/01/20  0141 11/02/20  0630   WBC 11.9* 10.9 11.0 10.9   HGB 14.1 13.2 14.0 14.4   HCT 41.5 39.8 41.8 42.9    259 277 288     BMP:  Recent Labs     10/30/20  2321 10/31/20  0639 11/01/20  0141 11/02/20  0630    135 138 137  138   K 3.7 3.9 4.1 4.1  4.2 CL 98 102 101 99  101   CO2 24 21* 22 22  24   BUN 22* 23* 23* 29*  29*   CREATININE 2.0* 1.8* 1.9* 2.1*  2.1*   LABGLOM 35 39 37 33  33     ABGs:   Lab Results   Component Value Date    PH 7.445 10/01/2019    PO2 69.4 10/01/2019    PCO2 25.6 10/01/2019     INR:   Recent Labs     10/30/20  2321   INR 1.0     PRO-BNP:   Lab Results   Component Value Date    PROBNP 811 (H) 10/30/2020    PROBNP 1,851 (H) 02/05/2020      TSH:   Lab Results   Component Value Date    TSH 0.809 04/18/2019      Cardiac Injury Profile:   Recent Labs     10/30/20  2321 10/31/20  0639   CKTOTAL  --  178   CKMB  --  2.2   TROPONINI <0.01 <0.01      Lipid Profile:   Lab Results   Component Value Date    TRIG 217 01/02/2018    HDL 58 01/02/2018    LDLCALC 144 01/02/2018    CHOL 245 01/02/2018      Hemoglobin A1C: No components found for: HGBA1C     RAD:   Xr Chest Portable    Result Date: 10/31/2020  EXAMINATION: ONE XRAY VIEW OF THE CHEST 10/30/2020 11:24 pm COMPARISON: Chest x-ray from 02/05/2020. HISTORY: ORDERING SYSTEM PROVIDED HISTORY: chest pain TECHNOLOGIST PROVIDED HISTORY: Reason for exam:->chest pain What reading provider will be dictating this exam?->CRC FINDINGS: Mild peripheral patchy/ground-glass opacification is noted in the peripheral left lung which could represent atelectasis versus early/mild airspace disease process. No evidence of pleural effusion. Heart size at the upper limits of normal.  Postsurgical changes in the lower cervical spine. Osseous structures are otherwise unremarkable. Mild peripheral patchy/ground-glass opacification in the peripheral left lung which could represent atelectasis versus early/mild airspace disease process. Cta Chest W Contrast    Result Date: 10/31/2020  EXAMINATION: CTA OF THE CHEST 10/30/2020 10:32 pm TECHNIQUE: CTA of the chest was performed after the administration of intravenous contrast.  Multiplanar reformatted images are provided for review.   MIP images are provided for review. Dose modulation, iterative reconstruction, and/or weight based adjustment of the mA/kV was utilized to reduce the radiation dose to as low as reasonably achievable. COMPARISON: None. HISTORY: ORDERING SYSTEM PROVIDED HISTORY: PE concern TECHNOLOGIST PROVIDED HISTORY: Reason for exam:->PE concern What reading provider will be dictating this exam?->CRC FINDINGS: Pulmonary Arteries: Pulmonary arteries are adequately opacified for evaluation. No evidence of intraluminal filling defect to suggest pulmonary embolism. Main pulmonary artery is normal in caliber. Mediastinum: No evidence of mediastinal lymphadenopathy. The heart and pericardium demonstrate no acute abnormality. There is no acute abnormality of the thoracic aorta. Lungs/pleura: There is a 1.4 cm nodular density at the posterior left lung base. Recommend follow-up noncontrast CT of the chest in 3 months. Mild dependent congestion in the bilateral posterior lungs. The lungs are otherwise without acute process. No focal consolidation or pulmonary edema. No evidence of pleural effusion or pneumothorax. Upper Abdomen: Limited images of the upper abdomen are unremarkable. Soft Tissues/Bones: Chronic fracture deformity of the lateral left 7th rib. Otherwise, no acute bone or soft tissue abnormality. No evidence of pulmonary embolism or acute pulmonary abnormality. There is a 1.4 cm nodular density in the posterior left lung base. Recommend follow-up noncontrast CT of the chest in 3 months. Us Abdomen Limited    Result Date: 10/27/2020  EXAMINATION: RIGHT UPPER QUADRANT ULTRASOUND 10/26/2020 7:13 pm COMPARISON: 04/18/2019 ultrasound HISTORY: ORDERING SYSTEM PROVIDED HISTORY: Abdominal distension TECHNOLOGIST PROVIDED HISTORY: Reason for exam:->ascites What reading provider will be dictating this exam?->CRC FINDINGS: LIVER:  The liver demonstrates normal echogenicity without evidence of intrahepatic biliary ductal dilatation.  BILIARY cavity. 2. Global decrease in left ventricular systolic contractility estimated LVEF 33% Giovany Peterson FACP FACC FSCAI       EKG: See Report  Echo: See Report      IMPRESSIONS:  Active Problems:    Unstable angina ([address]  Resolved Problems:    * No resolved hospital problems. *      RECOMMENDATIONS:  Once again, I have recommended that Mr. Bryant Cotto undergo cardiac catheterization with potential intervention. Thus I have reviewed the indications risks and benefits of cardiac catheterization/PCI once again with the patient and he indicates he understands and wishes to proceed. I have spent more than 25 minutes face to face with Tano Patel and reviewing notes and laboratory data, with greater than 50% of this time instructing and counseling the patient face to face regarding my findings and recommendations and I have answered all questions as posed to me by Mr. Jennifer Robert. Vanesa Fuller,  FACP,FACC,[facility]      NOTE:  This report was transcribed using voice recognition software.   Every effort was made to ensure accuracy; however, inadvertent computerized transcription errors may be present

## 2020-11-02 NOTE — PLAN OF CARE
Problem: Pain:  Goal: Pain level will decrease  Description: Pain level will decrease  11/2/2020 0022 by Branden Kuo RN  Outcome: Met This Shift  11/1/2020 1933 by Radha Garay RN  Outcome: Met This Shift  Goal: Control of acute pain  Description: Control of acute pain  11/2/2020 0022 by Branden Kuo RN  Outcome: Met This Shift  11/1/2020 1135 by Irene Medina RN  Outcome: Met This Shift  Goal: Control of chronic pain  Description: Control of chronic pain  11/2/2020 0022 by Branden Kuo RN  Outcome: Met This Shift  11/1/2020 1135 by Irene Medina RN  Outcome: Met This Shift     Problem: Activity:  Goal: Risk for activity intolerance will decrease  Description: Risk for activity intolerance will decrease  11/2/2020 0022 by Branden Kuo RN  Outcome: Met This Shift  11/1/2020 1135 by Irene Medina RN  Outcome: Met This Shift     Problem:  Bowel/Gastric:  Goal: Bowel function will improve  Description: Bowel function will improve  Outcome: Met This Shift  Goal: Diagnostic test results will improve  Description: Diagnostic test results will improve  Outcome: Met This Shift  Goal: Occurrences of nausea will decrease  Description: Occurrences of nausea will decrease  Outcome: Met This Shift  Goal: Occurrences of vomiting will decrease  Description: Occurrences of vomiting will decrease  Outcome: Met This Shift     Problem: Fluid Volume:  Goal: Maintenance of adequate hydration will improve  Description: Maintenance of adequate hydration will improve  Outcome: Met This Shift     Problem: Health Behavior:  Goal: Ability to state signs and symptoms to report to health care provider will improve  Description: Ability to state signs and symptoms to report to health care provider will improve  Outcome: Met This Shift     Problem: Physical Regulation:  Goal: Complications related to the disease process, condition or treatment will be avoided or minimized  Description: Complications related to the

## 2020-11-02 NOTE — CARE COORDINATION
SOCIAL WORK/CASEMANAGEMENT TRANSITION OF CARE [id number] [facility], [address], [place], [phone]): pt lives alone in a apt on the first floor there are 2 steps to enter the building and none once in. He is independent pta. With no hhc. Pt has borrowed o2 and uses it hs only at 5l. He has canes at home and a nebulizer. Pt is not a . His pcp is dr. Jose Florez. Pt has a limp from old hip issues. Pt is on disability. His daughter and grandchild are local. The plan remains home with no needs. I left a pulse ox for rn to check on discharge. I left a hhc list for pt if he changes his mind and wants nursing to come in. Angelic/angelica to follow.  Makayla Olguin  11/2/2020

## 2020-11-02 NOTE — PROGRESS NOTES
Progress Note  NEPHROLOGY    Reason for Consult: Evaluation of acute on chronic kidney disease    Requesting Physician: Dr. Tameka Solis    Chief Complaint: Admitted with unstable angina and acute kidney injury    History Obtained From:  patient, electronic medical record    History of Present Ilness: We are following this patient for his BEAR. On admission his creatinine was 2.0, his baseline cr over the past year has been 1.0-1.3    Subjective    11/2/2020: Patient is short of breath at rest and sob increases with talking. O2 on per nasal cannula. He denies chest pain currently. Feels like his abdomen is full. Denies nausea.        Current Medications:    Current Facility-Administered Medications: gabapentin (NEURONTIN) capsule 200 mg, 200 mg, Oral, 4x Daily  hydrALAZINE (APRESOLINE) tablet 10 mg, 10 mg, Oral, TID  docusate sodium (COLACE) capsule 100 mg, 100 mg, Oral, BID  magnesium hydroxide (MILK OF MAGNESIA) 400 MG/5ML suspension 30 mL, 30 mL, Oral, Daily PRN  enoxaparin (LOVENOX) injection 40 mg, 40 mg, Subcutaneous, Daily  amLODIPine (NORVASC) tablet 10 mg, 10 mg, Oral, Daily  cloNIDine (CATAPRES) tablet 0.1 mg, 0.1 mg, Oral, BID  torsemide (DEMADEX) tablet 20 mg, 20 mg, Oral, Daily  nitroglycerin (NITRO-BID) 2 % ointment 1 inch, 1 inch, Topical, 3 times per day  albuterol (PROVENTIL) nebulizer solution 2.5 mg, 2.5 mg, Nebulization, Q6H PRN  doxycycline hyclate (VIBRAMYCIN) capsule 100 mg, 100 mg, Oral, BID  finasteride (PROSCAR) tablet 5 mg, 5 mg, Oral, Daily  metoprolol succinate (TOPROL XL) extended release tablet 100 mg, 100 mg, Oral, Daily  nicotine (NICODERM CQ) 21 MG/24HR 1 patch, 1 patch, Transdermal, Daily  pantoprazole (PROTONIX) tablet 40 mg, 40 mg, Oral, QAM AC  promethazine (PHENERGAN) tablet 6.25 mg, 6.25 mg, Oral, 4x Daily  spironolactone (ALDACTONE) tablet 25 mg, 25 mg, Oral, Daily  calcium carbonate (TUMS) chewable tablet 500 mg, 500 mg, Oral, TID PRN  heparin (porcine) injection 3,100 Min:58, Max:80    24HR INTAKE/OUTPUT:      Intake/Output Summary (Last 24 hours) at 11/2/2020 1555  Last data filed at 11/2/2020 1226  Gross per 24 hour   Intake 720 ml   Output 700 ml   Net 20 ml     Gen: alert, awake  Skin: no rash on exposed skin, turgor wnl  Neck: no jvd noted  Cardiovascular:  RRR, no rubs  Respiratory: lungs cleat b/l, no adventitious sounds  Abdomen: round, soft, +bowel sounds  Ext: no edema   Psychiatric: mood and affect appropriate      DATA:      CBC:   Lab Results   Component Value Date    WBC 10.9 11/02/2020    RBC 4.50 11/02/2020    HGB 14.4 11/02/2020    HCT 42.9 11/02/2020    MCV 95.3 11/02/2020    MCH 32.0 11/02/2020    MCHC 33.6 11/02/2020    RDW 12.7 11/02/2020     11/02/2020    MPV 9.9 11/02/2020     BMP:    Lab Results   Component Value Date     11/02/2020     11/02/2020    K 4.2 11/02/2020    K 4.1 11/02/2020    K 3.8 10/01/2019     11/02/2020    CL 99 11/02/2020    CO2 24 11/02/2020    CO2 22 11/02/2020    BUN 29 11/02/2020    BUN 29 11/02/2020    LABALBU 3.9 10/31/2020    CREATININE 2.1 11/02/2020    CREATININE 2.1 11/02/2020    CALCIUM 9.6 11/02/2020    CALCIUM 9.7 11/02/2020    GFRAA 40 11/02/2020    GFRAA 40 11/02/2020    LABGLOM 33 11/02/2020    LABGLOM 33 11/02/2020    GLUCOSE 99 11/02/2020    GLUCOSE 101 11/02/2020       RAD:  Xr Chest Portable        Assessment/Plan    1)  BEAR:  most likely pre-renal with decreased effective renal perfusion in the setting of nausea, CP and SOB in a patient taking torsemide 10 mg TID, Aldactone 25 mg QD, and hydrochlorothiazide 25 mg daily  Baseline scr has ranged 1.0-1.3 over the past 12 months  Renal ultrasound on 2/10/2020: Right kidney measures 10.27 cm in length / left kidney approximately 10.2 cm  / cortex preserved and no hydronephrosis is seen  Admitting serum creatinine of 2.0 ->2.1 today with diuresis  Also concern for dye exposure during CTA in the ED  We will need to coordinate above diuretics with the planned start of Entresto by cardiology  UOP last 24 hours 1000, daily wt. shows loss of @ 12 lbs.   Follow daily labs and strict intake and output     2) Acute compensated HFrEF  left ventricular global systolic function is markedly decreased with an estimated left ventricular ejection fraction of 33%   Continue current diuretic regimen with close monitoring of renal function  Cardiology is following  For possible cardiac cath with potential intervention - will need IV fluids given cautiously pre & post procedure      3) Hypertension: Essential  BP has been well controlled since admission  BP is at goal of <130/80      4) Prior recreational drug use      TONIA Castillo - CNS  11/2/2020  3:55 PM   Pt seen and examined agree with above  Cr at 2  Follow  Hold on cath until cr improved  Follow on entresto demadex  Yolie Jo

## 2020-11-02 NOTE — PLAN OF CARE
Problem: Pain:  Goal: Pain level will decrease  Description: Pain level will decrease  11/1/2020 1933 by Adriana Sow RN  Outcome: Met This Shift

## 2020-11-02 NOTE — PROGRESS NOTES
Patient seen no new complaints. bp much improved. Follow up on renal function.  Still may need cath , defer to cardiology

## 2020-11-03 LAB
ABO/RH: NORMAL
ANION GAP SERPL CALCULATED.3IONS-SCNC: 11 MMOL/L (ref 7–16)
ANION GAP SERPL CALCULATED.3IONS-SCNC: 14 MMOL/L (ref 7–16)
ANTIBODY SCREEN: NORMAL
BUN BLDV-MCNC: 21 MG/DL (ref 6–20)
BUN BLDV-MCNC: 22 MG/DL (ref 6–20)
CALCIUM SERPL-MCNC: 9.2 MG/DL (ref 8.6–10.2)
CALCIUM SERPL-MCNC: 9.2 MG/DL (ref 8.6–10.2)
CHLORIDE BLD-SCNC: 100 MMOL/L (ref 98–107)
CHLORIDE BLD-SCNC: 100 MMOL/L (ref 98–107)
CO2: 21 MMOL/L (ref 22–29)
CO2: 24 MMOL/L (ref 22–29)
CREAT SERPL-MCNC: 1.7 MG/DL (ref 0.7–1.2)
CREAT SERPL-MCNC: 1.8 MG/DL (ref 0.7–1.2)
GFR AFRICAN AMERICAN: 47
GFR AFRICAN AMERICAN: 50
GFR NON-AFRICAN AMERICAN: 39 ML/MIN/1.73
GFR NON-AFRICAN AMERICAN: 42 ML/MIN/1.73
GLUCOSE BLD-MCNC: 80 MG/DL (ref 74–99)
GLUCOSE BLD-MCNC: 91 MG/DL (ref 74–99)
HCT VFR BLD CALC: 42 % (ref 37–54)
HEMOGLOBIN: 14.2 G/DL (ref 12.5–16.5)
MAGNESIUM: 2.2 MG/DL (ref 1.6–2.6)
MCH RBC QN AUTO: 31.6 PG (ref 26–35)
MCHC RBC AUTO-ENTMCNC: 33.8 % (ref 32–34.5)
MCV RBC AUTO: 93.3 FL (ref 80–99.9)
PDW BLD-RTO: 12.3 FL (ref 11.5–15)
PHOSPHORUS: 4 MG/DL (ref 2.5–4.5)
PLATELET # BLD: 309 E9/L (ref 130–450)
PMV BLD AUTO: 10 FL (ref 7–12)
POTASSIUM SERPL-SCNC: 4.2 MMOL/L (ref 3.5–5)
POTASSIUM SERPL-SCNC: 4.4 MMOL/L (ref 3.5–5)
RBC # BLD: 4.5 E12/L (ref 3.8–5.8)
SODIUM BLD-SCNC: 135 MMOL/L (ref 132–146)
SODIUM BLD-SCNC: 135 MMOL/L (ref 132–146)
WBC # BLD: 12.2 E9/L (ref 4.5–11.5)

## 2020-11-03 PROCEDURE — C1894 INTRO/SHEATH, NON-LASER: HCPCS

## 2020-11-03 PROCEDURE — 6370000000 HC RX 637 (ALT 250 FOR IP): Performed by: INTERNAL MEDICINE

## 2020-11-03 PROCEDURE — 4A023N7 MEASUREMENT OF CARDIAC SAMPLING AND PRESSURE, LEFT HEART, PERCUTANEOUS APPROACH: ICD-10-PCS | Performed by: INTERNAL MEDICINE

## 2020-11-03 PROCEDURE — 6370000000 HC RX 637 (ALT 250 FOR IP): Performed by: GENERAL PRACTICE

## 2020-11-03 PROCEDURE — 86900 BLOOD TYPING SEROLOGIC ABO: CPT

## 2020-11-03 PROCEDURE — 2580000003 HC RX 258: Performed by: INTERNAL MEDICINE

## 2020-11-03 PROCEDURE — B2111ZZ FLUOROSCOPY OF MULTIPLE CORONARY ARTERIES USING LOW OSMOLAR CONTRAST: ICD-10-PCS | Performed by: INTERNAL MEDICINE

## 2020-11-03 PROCEDURE — 85027 COMPLETE CBC AUTOMATED: CPT

## 2020-11-03 PROCEDURE — 2700000000 HC OXYGEN THERAPY PER DAY

## 2020-11-03 PROCEDURE — 6360000002 HC RX W HCPCS: Performed by: GENERAL PRACTICE

## 2020-11-03 PROCEDURE — 6360000002 HC RX W HCPCS

## 2020-11-03 PROCEDURE — 83735 ASSAY OF MAGNESIUM: CPT

## 2020-11-03 PROCEDURE — 2500000003 HC RX 250 WO HCPCS

## 2020-11-03 PROCEDURE — 86901 BLOOD TYPING SEROLOGIC RH(D): CPT

## 2020-11-03 PROCEDURE — 36415 COLL VENOUS BLD VENIPUNCTURE: CPT

## 2020-11-03 PROCEDURE — 2709999900 HC NON-CHARGEABLE SUPPLY

## 2020-11-03 PROCEDURE — 84100 ASSAY OF PHOSPHORUS: CPT

## 2020-11-03 PROCEDURE — 93458 L HRT ARTERY/VENTRICLE ANGIO: CPT | Performed by: INTERNAL MEDICINE

## 2020-11-03 PROCEDURE — 86850 RBC ANTIBODY SCREEN: CPT

## 2020-11-03 PROCEDURE — 2140000000 HC CCU INTERMEDIATE R&B

## 2020-11-03 PROCEDURE — 93005 ELECTROCARDIOGRAM TRACING: CPT | Performed by: GENERAL PRACTICE

## 2020-11-03 PROCEDURE — 94640 AIRWAY INHALATION TREATMENT: CPT

## 2020-11-03 PROCEDURE — 80048 BASIC METABOLIC PNL TOTAL CA: CPT

## 2020-11-03 PROCEDURE — C1769 GUIDE WIRE: HCPCS

## 2020-11-03 RX ORDER — SODIUM CHLORIDE 9 MG/ML
INJECTION, SOLUTION INTRAVENOUS CONTINUOUS
Status: DISCONTINUED | OUTPATIENT
Start: 2020-11-03 | End: 2020-11-05

## 2020-11-03 RX ORDER — SODIUM CHLORIDE 0.9 % (FLUSH) 0.9 %
10 SYRINGE (ML) INJECTION PRN
Status: DISCONTINUED | OUTPATIENT
Start: 2020-11-03 | End: 2020-11-06 | Stop reason: HOSPADM

## 2020-11-03 RX ORDER — SODIUM CHLORIDE 0.9 % (FLUSH) 0.9 %
10 SYRINGE (ML) INJECTION EVERY 12 HOURS SCHEDULED
Status: DISCONTINUED | OUTPATIENT
Start: 2020-11-03 | End: 2020-11-06 | Stop reason: HOSPADM

## 2020-11-03 RX ADMIN — SODIUM CHLORIDE, PRESERVATIVE FREE 10 ML: 5 INJECTION INTRAVENOUS at 21:00

## 2020-11-03 RX ADMIN — NITROGLYCERIN 1 INCH: 20 OINTMENT TOPICAL at 11:39

## 2020-11-03 RX ADMIN — ARFORMOTEROL TARTRATE 15 MCG: 15 SOLUTION RESPIRATORY (INHALATION) at 19:04

## 2020-11-03 RX ADMIN — HYDRALAZINE HYDROCHLORIDE 10 MG: 10 TABLET, FILM COATED ORAL at 21:00

## 2020-11-03 RX ADMIN — TORSEMIDE 20 MG: 20 TABLET ORAL at 08:53

## 2020-11-03 RX ADMIN — GABAPENTIN 200 MG: 100 CAPSULE ORAL at 16:12

## 2020-11-03 RX ADMIN — ARFORMOTEROL TARTRATE 15 MCG: 15 SOLUTION RESPIRATORY (INHALATION) at 08:20

## 2020-11-03 RX ADMIN — FINASTERIDE 5 MG: 5 TABLET, FILM COATED ORAL at 08:53

## 2020-11-03 RX ADMIN — PROMETHAZINE HYDROCHLORIDE 6.25 MG: 25 TABLET ORAL at 16:12

## 2020-11-03 RX ADMIN — AMLODIPINE BESYLATE 10 MG: 10 TABLET ORAL at 08:53

## 2020-11-03 RX ADMIN — DOCUSATE SODIUM 100 MG: 100 CAPSULE, LIQUID FILLED ORAL at 21:00

## 2020-11-03 RX ADMIN — HYDRALAZINE HYDROCHLORIDE 10 MG: 10 TABLET, FILM COATED ORAL at 16:12

## 2020-11-03 RX ADMIN — PROMETHAZINE HYDROCHLORIDE 6.25 MG: 25 TABLET ORAL at 08:52

## 2020-11-03 RX ADMIN — IPRATROPIUM BROMIDE 0.5 MG: 0.5 SOLUTION RESPIRATORY (INHALATION) at 19:04

## 2020-11-03 RX ADMIN — CLONIDINE HYDROCHLORIDE 0.1 MG: 0.1 TABLET ORAL at 08:53

## 2020-11-03 RX ADMIN — NITROGLYCERIN 1 INCH: 20 OINTMENT TOPICAL at 02:12

## 2020-11-03 RX ADMIN — SPIRONOLACTONE 25 MG: 25 TABLET ORAL at 08:53

## 2020-11-03 RX ADMIN — HYDRALAZINE HYDROCHLORIDE 10 MG: 10 TABLET, FILM COATED ORAL at 08:53

## 2020-11-03 RX ADMIN — CALCIUM CARBONATE (ANTACID) CHEW TAB 500 MG 500 MG: 500 CHEW TAB at 22:54

## 2020-11-03 RX ADMIN — SACUBITRIL AND VALSARTAN 1 TABLET: 24; 26 TABLET, FILM COATED ORAL at 21:13

## 2020-11-03 RX ADMIN — PANTOPRAZOLE SODIUM 40 MG: 40 TABLET, DELAYED RELEASE ORAL at 06:21

## 2020-11-03 RX ADMIN — IPRATROPIUM BROMIDE 0.5 MG: 0.5 SOLUTION RESPIRATORY (INHALATION) at 08:19

## 2020-11-03 RX ADMIN — GABAPENTIN 200 MG: 100 CAPSULE ORAL at 08:52

## 2020-11-03 RX ADMIN — CLONIDINE HYDROCHLORIDE 0.1 MG: 0.1 TABLET ORAL at 21:00

## 2020-11-03 RX ADMIN — DOCUSATE SODIUM 100 MG: 100 CAPSULE, LIQUID FILLED ORAL at 08:53

## 2020-11-03 RX ADMIN — DOXYCYCLINE HYCLATE 100 MG: 100 CAPSULE ORAL at 21:00

## 2020-11-03 RX ADMIN — SACUBITRIL AND VALSARTAN 1 TABLET: 24; 26 TABLET, FILM COATED ORAL at 08:53

## 2020-11-03 RX ADMIN — SODIUM CHLORIDE: 9 INJECTION, SOLUTION INTRAVENOUS at 21:09

## 2020-11-03 RX ADMIN — PROMETHAZINE HYDROCHLORIDE 6.25 MG: 25 TABLET ORAL at 21:00

## 2020-11-03 RX ADMIN — METOPROLOL SUCCINATE 100 MG: 100 TABLET, EXTENDED RELEASE ORAL at 08:52

## 2020-11-03 RX ADMIN — BUDESONIDE 250 MCG: 0.25 SUSPENSION RESPIRATORY (INHALATION) at 19:04

## 2020-11-03 RX ADMIN — GABAPENTIN 200 MG: 100 CAPSULE ORAL at 21:01

## 2020-11-03 RX ADMIN — NITROGLYCERIN 1 INCH: 20 OINTMENT TOPICAL at 21:00

## 2020-11-03 RX ADMIN — BUDESONIDE 250 MCG: 0.25 SUSPENSION RESPIRATORY (INHALATION) at 08:21

## 2020-11-03 RX ADMIN — SODIUM CHLORIDE: 9 INJECTION, SOLUTION INTRAVENOUS at 04:01

## 2020-11-03 RX ADMIN — DOXYCYCLINE HYCLATE 100 MG: 100 CAPSULE ORAL at 08:53

## 2020-11-03 RX ADMIN — IPRATROPIUM BROMIDE 0.5 MG: 0.5 SOLUTION RESPIRATORY (INHALATION) at 15:53

## 2020-11-03 ASSESSMENT — PAIN SCALES - GENERAL
PAINLEVEL_OUTOF10: 0

## 2020-11-03 NOTE — PLAN OF CARE
Problem: Pain:  Goal: Control of acute pain  Description: Control of acute pain  Outcome: Met This Shift     Problem: Activity:  Goal: Risk for activity intolerance will decrease  Description: Risk for activity intolerance will decrease  Outcome: Met This Shift     Problem:  Bowel/Gastric:  Goal: Occurrences of vomiting will decrease  Description: Occurrences of vomiting will decrease  Outcome: Met This Shift

## 2020-11-03 NOTE — PROGRESS NOTES
Progress Note  NEPHROLOGY    Reason for Consult: Evaluation of acute on chronic kidney disease    Requesting Physician: Dr. Man Campbell    Chief Complaint: Admitted with unstable angina and acute kidney injury    History Obtained From:  patient, electronic medical record    History of Present Ilness: We are following this patient for his BEAR. On admission his creatinine was 2.0, his baseline cr over the past year has been 1.0-1.3    Subjective    11/2/2020: Patient is short of breath at rest and sob increases with talking. O2 on per nasal cannula. He denies chest pain currently. Feels like his abdomen is full. Denies nausea. 11/3/2020: Resting in bed s/p cardiac cath. States he is feeling better today, breathing easier.      Current Medications:    Current Facility-Administered Medications: 0.9 % sodium chloride infusion, , Intravenous, Continuous  sodium chloride flush 0.9 % injection 10 mL, 10 mL, Intravenous, 2 times per day  sodium chloride flush 0.9 % injection 10 mL, 10 mL, Intravenous, PRN  gabapentin (NEURONTIN) capsule 200 mg, 200 mg, Oral, 4x Daily  hydrALAZINE (APRESOLINE) tablet 10 mg, 10 mg, Oral, TID  docusate sodium (COLACE) capsule 100 mg, 100 mg, Oral, BID  magnesium hydroxide (MILK OF MAGNESIA) 400 MG/5ML suspension 30 mL, 30 mL, Oral, Daily PRN  enoxaparin (LOVENOX) injection 40 mg, 40 mg, Subcutaneous, Daily  amLODIPine (NORVASC) tablet 10 mg, 10 mg, Oral, Daily  cloNIDine (CATAPRES) tablet 0.1 mg, 0.1 mg, Oral, BID  torsemide (DEMADEX) tablet 20 mg, 20 mg, Oral, Daily  nitroglycerin (NITRO-BID) 2 % ointment 1 inch, 1 inch, Topical, 3 times per day  albuterol (PROVENTIL) nebulizer solution 2.5 mg, 2.5 mg, Nebulization, Q6H PRN  doxycycline hyclate (VIBRAMYCIN) capsule 100 mg, 100 mg, Oral, BID  finasteride (PROSCAR) tablet 5 mg, 5 mg, Oral, Daily  metoprolol succinate (TOPROL XL) extended release tablet 100 mg, 100 mg, Oral, Daily  nicotine (NICODERM CQ) 21 MG/24HR 1 patch, 1 patch, Transdermal, Daily  pantoprazole (PROTONIX) tablet 40 mg, 40 mg, Oral, QAM AC  promethazine (PHENERGAN) tablet 6.25 mg, 6.25 mg, Oral, 4x Daily  spironolactone (ALDACTONE) tablet 25 mg, 25 mg, Oral, Daily  calcium carbonate (TUMS) chewable tablet 500 mg, 500 mg, Oral, TID PRN  heparin (porcine) injection 3,100 Units, 30 Units/kg, Intravenous, PRN  heparin (porcine) injection 6,200 Units, 60 Units/kg, Intravenous, PRN  nitroGLYCERIN (NITROSTAT) SL tablet 0.4 mg, 0.4 mg, Sublingual, Q5 Min PRN  budesonide (PULMICORT) nebulizer suspension 250 mcg, 0.25 mg, Nebulization, BID **AND** ipratropium (ATROVENT) 0.02 % nebulizer solution 0.5 mg, 0.5 mg, Nebulization, 4x daily **AND** Arformoterol Tartrate (BROVANA) nebulizer solution 15 mcg, 15 mcg, Nebulization, BID  sacubitril-valsartan (ENTRESTO) 24-26 MG per tablet 1 tablet, 1 tablet, Oral, BID    Allergies:  Patient has no known allergies. Social History:       reports that he has been smoking cigarettes. He started smoking about 41 years ago. He has a 60.00 pack-year smoking history. He has never used smokeless tobacco. He reports previous alcohol use. He reports current drug use. Drugs: Cocaine, Other-see comments, and Marijuana. Family History:     Family History   Problem Relation Age of Onset    Arthritis Mother     Other Mother         glucoma    Heart Disease Father     High Blood Pressure Brother     Other Brother         sleep apnea    High Blood Pressure Brother     Other Brother         sleep apnea    High Blood Pressure Brother     Other Brother         sleep apnea    High Blood Pressure Brother     Other Brother         sleep apnea    Cancer Brother         esophogeal -  at 62         Review of Systems:       Pertinent positives stated above in HPI. All other systems were reviewed and were negative.     Physical exam:   Constitutional:  VITALS:  /89   Pulse 72   Temp 98.1 °F (36.7 °C) (Temporal)   Resp 16   Ht 5' 7\" (1.702 m) Wt 216 lb (98 kg)   SpO2 90%   BMI 33.83 kg/m²   CURRENT TEMPERATURE:  Temp: 98.1 °F (36.7 °C)  CURRENT RESPIRATORY RATE:  Resp: 16  CURRENT PULSE:  Pulse: 72  CURRENT BLOOD PRESSURE:  BP: 124/89  24HR BLOOD PRESSURE RANGE:  Systolic (74FGG), RIB:939 , Min:100 , HXN:195   ; Diastolic (96IEZ), DK, Min:60, Max:89    24HR INTAKE/OUTPUT:      Intake/Output Summary (Last 24 hours) at 11/3/2020 1601  Last data filed at 11/3/2020 1336  Gross per 24 hour   Intake 984 ml   Output 1675 ml   Net -691 ml     Gen: alert, awake  Skin: no rash on exposed skin, turgor wnl  Neck: no jvd noted  Cardiovascular:  RRR, no rubs  Respiratory: lungs cleat b/l, no adventitious sounds  Abdomen: round, soft, +bowel sounds  Ext: no edema   Psychiatric: mood and affect appropriate      DATA:      CBC:   Lab Results   Component Value Date    WBC 12.2 2020    RBC 4.50 2020    HGB 14.2 2020    HCT 42.0 2020    MCV 93.3 2020    MCH 31.6 2020    MCHC 33.8 2020    RDW 12.3 2020     2020    MPV 10.0 2020     BMP:    Lab Results   Component Value Date     2020    K 4.2 2020    K 3.8 10/01/2019     2020    CO2 24 2020    BUN 22 2020    LABALBU 3.9 10/31/2020    CREATININE 1.8 2020    CALCIUM 9.2 2020    GFRAA 47 2020    LABGLOM 39 2020    GLUCOSE 91 2020       RAD:  Xr Chest Portable        Assessment/Plan    1)  BEAR:  most likely pre-renal with decreased effective renal perfusion in the setting of nausea, CP and SOB in a patient taking torsemide 10 mg TID, Aldactone 25 mg QD, and hydrochlorothiazide 25 mg daily  Baseline scr has ranged 1.0-1.3 over the past 12 months  Renal ultrasound on 2/10/2020: Right kidney measures 10.27 cm in length / left kidney approximately 10.2 cm  / cortex preserved and no hydronephrosis is seen  Admitting serum creatinine of 2.0 ->2.1->1.8   Also concern for dye exposure during CTA in the ED  We will need to coordinate above diuretics with the planned start of Entresto by cardiology  UOP last 24 hours 1225, daily wt. shows loss of @ 12 lbs.   Follow daily labs and strict intake and output     2) Acute compensated HFrEF  left ventricular global systolic function is markedly decreased with an estimated left ventricular ejection fraction of 33%   Continue current diuretic regimen with close monitoring of renal function  Cardiology is following  S/p cardiac cath - continue IV fluids post procedure  Monitor cr closely      3) Hypertension: Essential  BP has been well controlled since admission  BP is at goal of <130/80      4) Prior recreational drug use      TONIA Wilcox - CNS  11/3/2020  4:01 PM   Pt seen and examined agree with above  Cr at 2

## 2020-11-04 LAB
ANION GAP SERPL CALCULATED.3IONS-SCNC: 12 MMOL/L (ref 7–16)
BUN BLDV-MCNC: 18 MG/DL (ref 6–20)
CALCIUM SERPL-MCNC: 9 MG/DL (ref 8.6–10.2)
CHLORIDE BLD-SCNC: 102 MMOL/L (ref 98–107)
CO2: 22 MMOL/L (ref 22–29)
CREAT SERPL-MCNC: 1.6 MG/DL (ref 0.7–1.2)
GFR AFRICAN AMERICAN: 54
GFR NON-AFRICAN AMERICAN: 45 ML/MIN/1.73
GLUCOSE BLD-MCNC: 86 MG/DL (ref 74–99)
HCT VFR BLD CALC: 41 % (ref 37–54)
HEMOGLOBIN: 14.2 G/DL (ref 12.5–16.5)
MCH RBC QN AUTO: 31.8 PG (ref 26–35)
MCHC RBC AUTO-ENTMCNC: 34.6 % (ref 32–34.5)
MCV RBC AUTO: 91.9 FL (ref 80–99.9)
PDW BLD-RTO: 12.2 FL (ref 11.5–15)
PLATELET # BLD: 282 E9/L (ref 130–450)
PMV BLD AUTO: 9.9 FL (ref 7–12)
POTASSIUM SERPL-SCNC: 4.2 MMOL/L (ref 3.5–5)
RBC # BLD: 4.46 E12/L (ref 3.8–5.8)
SODIUM BLD-SCNC: 136 MMOL/L (ref 132–146)
WBC # BLD: 11.9 E9/L (ref 4.5–11.5)

## 2020-11-04 PROCEDURE — 6370000000 HC RX 637 (ALT 250 FOR IP): Performed by: INTERNAL MEDICINE

## 2020-11-04 PROCEDURE — 85027 COMPLETE CBC AUTOMATED: CPT

## 2020-11-04 PROCEDURE — 6370000000 HC RX 637 (ALT 250 FOR IP): Performed by: GENERAL PRACTICE

## 2020-11-04 PROCEDURE — 2580000003 HC RX 258: Performed by: INTERNAL MEDICINE

## 2020-11-04 PROCEDURE — 6360000002 HC RX W HCPCS: Performed by: INTERNAL MEDICINE

## 2020-11-04 PROCEDURE — 36415 COLL VENOUS BLD VENIPUNCTURE: CPT

## 2020-11-04 PROCEDURE — 94640 AIRWAY INHALATION TREATMENT: CPT

## 2020-11-04 PROCEDURE — 2140000000 HC CCU INTERMEDIATE R&B

## 2020-11-04 PROCEDURE — 2700000000 HC OXYGEN THERAPY PER DAY

## 2020-11-04 PROCEDURE — 6360000002 HC RX W HCPCS: Performed by: GENERAL PRACTICE

## 2020-11-04 PROCEDURE — 80048 BASIC METABOLIC PNL TOTAL CA: CPT

## 2020-11-04 RX ADMIN — DOCUSATE SODIUM 100 MG: 100 CAPSULE, LIQUID FILLED ORAL at 08:57

## 2020-11-04 RX ADMIN — FINASTERIDE 5 MG: 5 TABLET, FILM COATED ORAL at 08:57

## 2020-11-04 RX ADMIN — DOXYCYCLINE HYCLATE 100 MG: 100 CAPSULE ORAL at 08:57

## 2020-11-04 RX ADMIN — DOCUSATE SODIUM 100 MG: 100 CAPSULE, LIQUID FILLED ORAL at 21:49

## 2020-11-04 RX ADMIN — ENOXAPARIN SODIUM 40 MG: 40 INJECTION SUBCUTANEOUS at 08:57

## 2020-11-04 RX ADMIN — ARFORMOTEROL TARTRATE 15 MCG: 15 SOLUTION RESPIRATORY (INHALATION) at 20:39

## 2020-11-04 RX ADMIN — DOXYCYCLINE HYCLATE 100 MG: 100 CAPSULE ORAL at 21:54

## 2020-11-04 RX ADMIN — HYDRALAZINE HYDROCHLORIDE 10 MG: 10 TABLET, FILM COATED ORAL at 08:57

## 2020-11-04 RX ADMIN — SODIUM CHLORIDE, PRESERVATIVE FREE 10 ML: 5 INJECTION INTRAVENOUS at 21:50

## 2020-11-04 RX ADMIN — ARFORMOTEROL TARTRATE 15 MCG: 15 SOLUTION RESPIRATORY (INHALATION) at 09:57

## 2020-11-04 RX ADMIN — SACUBITRIL AND VALSARTAN 1 TABLET: 24; 26 TABLET, FILM COATED ORAL at 21:49

## 2020-11-04 RX ADMIN — GABAPENTIN 200 MG: 100 CAPSULE ORAL at 17:24

## 2020-11-04 RX ADMIN — BUDESONIDE 250 MCG: 0.25 SUSPENSION RESPIRATORY (INHALATION) at 20:39

## 2020-11-04 RX ADMIN — SPIRONOLACTONE 25 MG: 25 TABLET ORAL at 08:57

## 2020-11-04 RX ADMIN — NITROGLYCERIN 1 INCH: 20 OINTMENT TOPICAL at 17:25

## 2020-11-04 RX ADMIN — HYDRALAZINE HYDROCHLORIDE 10 MG: 10 TABLET, FILM COATED ORAL at 21:50

## 2020-11-04 RX ADMIN — GABAPENTIN 200 MG: 100 CAPSULE ORAL at 08:57

## 2020-11-04 RX ADMIN — IPRATROPIUM BROMIDE 0.5 MG: 0.5 SOLUTION RESPIRATORY (INHALATION) at 09:56

## 2020-11-04 RX ADMIN — IPRATROPIUM BROMIDE 0.5 MG: 0.5 SOLUTION RESPIRATORY (INHALATION) at 16:25

## 2020-11-04 RX ADMIN — PROMETHAZINE HYDROCHLORIDE 6.25 MG: 25 TABLET ORAL at 14:22

## 2020-11-04 RX ADMIN — PROMETHAZINE HYDROCHLORIDE 6.25 MG: 25 TABLET ORAL at 17:24

## 2020-11-04 RX ADMIN — PROMETHAZINE HYDROCHLORIDE 6.25 MG: 25 TABLET ORAL at 08:57

## 2020-11-04 RX ADMIN — PANTOPRAZOLE SODIUM 40 MG: 40 TABLET, DELAYED RELEASE ORAL at 05:47

## 2020-11-04 RX ADMIN — HYDRALAZINE HYDROCHLORIDE 10 MG: 10 TABLET, FILM COATED ORAL at 14:22

## 2020-11-04 RX ADMIN — CLONIDINE HYDROCHLORIDE 0.1 MG: 0.1 TABLET ORAL at 08:57

## 2020-11-04 RX ADMIN — IPRATROPIUM BROMIDE 0.5 MG: 0.5 SOLUTION RESPIRATORY (INHALATION) at 20:39

## 2020-11-04 RX ADMIN — GABAPENTIN 200 MG: 100 CAPSULE ORAL at 21:50

## 2020-11-04 RX ADMIN — METOPROLOL SUCCINATE 100 MG: 100 TABLET, EXTENDED RELEASE ORAL at 08:57

## 2020-11-04 RX ADMIN — SODIUM CHLORIDE, PRESERVATIVE FREE 10 ML: 5 INJECTION INTRAVENOUS at 08:58

## 2020-11-04 RX ADMIN — PROMETHAZINE HYDROCHLORIDE 6.25 MG: 25 TABLET ORAL at 21:49

## 2020-11-04 RX ADMIN — NITROGLYCERIN 1 INCH: 20 OINTMENT TOPICAL at 11:20

## 2020-11-04 RX ADMIN — CLONIDINE HYDROCHLORIDE 0.1 MG: 0.1 TABLET ORAL at 21:49

## 2020-11-04 RX ADMIN — GABAPENTIN 200 MG: 100 CAPSULE ORAL at 14:22

## 2020-11-04 RX ADMIN — SACUBITRIL AND VALSARTAN 1 TABLET: 24; 26 TABLET, FILM COATED ORAL at 08:57

## 2020-11-04 RX ADMIN — NITROGLYCERIN 1 INCH: 20 OINTMENT TOPICAL at 03:30

## 2020-11-04 RX ADMIN — IPRATROPIUM BROMIDE 0.5 MG: 0.5 SOLUTION RESPIRATORY (INHALATION) at 13:21

## 2020-11-04 RX ADMIN — BUDESONIDE 250 MCG: 0.25 SUSPENSION RESPIRATORY (INHALATION) at 09:58

## 2020-11-04 RX ADMIN — AMLODIPINE BESYLATE 10 MG: 10 TABLET ORAL at 08:57

## 2020-11-04 RX ADMIN — TORSEMIDE 20 MG: 20 TABLET ORAL at 08:57

## 2020-11-04 ASSESSMENT — PAIN SCALES - GENERAL
PAINLEVEL_OUTOF10: 0

## 2020-11-04 NOTE — CARE COORDINATION
Confirmed discharge plan with Pt - return home with [facility]. Dtr to provide transport at discharge. Pt [facility]. Referral made to [facility]ord by ABRAHAM.    Katelyn Hutchinson, L.S.W.  [phone]

## 2020-11-04 NOTE — PROGRESS NOTES
Progress Note  NEPHROLOGY    Reason for Consult: Evaluation of acute on chronic kidney disease    Requesting Physician: Dr. Marisel Hess    Chief Complaint: Admitted with unstable angina and acute kidney injury    History Obtained From:  patient, electronic medical record    History of Present Ilness: We are following this patient for his BEAR. On admission his creatinine was 2.0, his baseline cr over the past year has been 1.0-1.3    Subjective    11/2/2020: Patient is short of breath at rest and sob increases with talking. O2 on per nasal cannula. He denies chest pain currently. Feels like his abdomen is full. Denies nausea. 11/3/2020: Resting in bed s/p cardiac cath. States he is feeling better today, breathing easier. 11/4/2020: Just returned from walking 2 laps @ [employer] and is feeling sob. Denies chest pain.     Current Medications:    Current Facility-Administered Medications: 0.9 % sodium chloride infusion, , Intravenous, Continuous  sodium chloride flush 0.9 % injection 10 mL, 10 mL, Intravenous, 2 times per day  sodium chloride flush 0.9 % injection 10 mL, 10 mL, Intravenous, PRN  gabapentin (NEURONTIN) capsule 200 mg, 200 mg, Oral, 4x Daily  hydrALAZINE (APRESOLINE) tablet 10 mg, 10 mg, Oral, TID  docusate sodium (COLACE) capsule 100 mg, 100 mg, Oral, BID  magnesium hydroxide (MILK OF MAGNESIA) 400 MG/5ML suspension 30 mL, 30 mL, Oral, Daily PRN  enoxaparin (LOVENOX) injection 40 mg, 40 mg, Subcutaneous, Daily  amLODIPine (NORVASC) tablet 10 mg, 10 mg, Oral, Daily  cloNIDine (CATAPRES) tablet 0.1 mg, 0.1 mg, Oral, BID  torsemide (DEMADEX) tablet 20 mg, 20 mg, Oral, Daily  nitroglycerin (NITRO-BID) 2 % ointment 1 inch, 1 inch, Topical, 3 times per day  albuterol (PROVENTIL) nebulizer solution 2.5 mg, 2.5 mg, Nebulization, Q6H PRN  doxycycline hyclate (VIBRAMYCIN) capsule 100 mg, 100 mg, Oral, BID  finasteride (PROSCAR) tablet 5 mg, 5 mg, Oral, Daily  metoprolol succinate (TOPROL XL) extended release tablet 100 mg, 100 mg, Oral, Daily  nicotine (NICODERM CQ) 21 MG/24HR 1 patch, 1 patch, Transdermal, Daily  pantoprazole (PROTONIX) tablet 40 mg, 40 mg, Oral, QAM AC  promethazine (PHENERGAN) tablet 6.25 mg, 6.25 mg, Oral, 4x Daily  spironolactone (ALDACTONE) tablet 25 mg, 25 mg, Oral, Daily  calcium carbonate (TUMS) chewable tablet 500 mg, 500 mg, Oral, TID PRN  heparin (porcine) injection 3,100 Units, 30 Units/kg, Intravenous, PRN  heparin (porcine) injection 6,200 Units, 60 Units/kg, Intravenous, PRN  nitroGLYCERIN (NITROSTAT) SL tablet 0.4 mg, 0.4 mg, Sublingual, Q5 Min PRN  budesonide (PULMICORT) nebulizer suspension 250 mcg, 0.25 mg, Nebulization, BID **AND** ipratropium (ATROVENT) 0.02 % nebulizer solution 0.5 mg, 0.5 mg, Nebulization, 4x daily **AND** Arformoterol Tartrate (BROVANA) nebulizer solution 15 mcg, 15 mcg, Nebulization, BID  sacubitril-valsartan (ENTRESTO) 24-26 MG per tablet 1 tablet, 1 tablet, Oral, BID    Allergies:  Patient has no known allergies. Social History:       reports that he has been smoking cigarettes. He started smoking about 41 years ago. He has a 60.00 pack-year smoking history. He has never used smokeless tobacco. He reports previous alcohol use. He reports current drug use. Drugs: Cocaine, Other-see comments, and Marijuana. Family History:     Family History   Problem Relation Age of Onset    Arthritis Mother     Other Mother         glucoma    Heart Disease Father     High Blood Pressure Brother     Other Brother         sleep apnea    High Blood Pressure Brother     Other Brother         sleep apnea    High Blood Pressure Brother     Other Brother         sleep apnea    High Blood Pressure Brother     Other Brother         sleep apnea    Cancer Brother         bertramophogeal -  at 62         Review of Systems:       Pertinent positives stated above in HPI. All other systems were reviewed and were negative.     Physical exam:   Constitutional:  VITALS:  BP 115/82   Pulse 79   Temp 98 °F (36.7 °C) (Temporal)   Resp 20   Ht 5' 7\" (1.702 m)   Wt 214 lb 6.4 oz (97.3 kg)   SpO2 96%   BMI 33.58 kg/m²   CURRENT TEMPERATURE:  Temp: 98 °F (36.7 °C)  CURRENT RESPIRATORY RATE:  Resp: 20  CURRENT PULSE:  Pulse: 79  CURRENT BLOOD PRESSURE:  BP: 115/82  24HR BLOOD PRESSURE RANGE:  Systolic (58ZHC), QSM:030 , Min:106 , SUP:757   ; Diastolic (16NWE), HVV:55, Min:59, Max:89    24HR INTAKE/OUTPUT:      Intake/Output Summary (Last 24 hours) at 11/4/2020 1423  Last data filed at 11/4/2020 0815  Gross per 24 hour   Intake 2102.2 ml   Output 1100 ml   Net 1002.2 ml     Gen: alert, awake  Skin: no rash on exposed skin, turgor wnl  Neck: no jvd noted  Cardiovascular:  RRR, no rubs  Respiratory: lungs cleat b/l, no adventitious sounds  Abdomen: round, soft, +bowel sounds  Ext: no edema   Psychiatric: mood and affect appropriate      DATA:      CBC:   Lab Results   Component Value Date    WBC 11.9 11/04/2020    RBC 4.46 11/04/2020    HGB 14.2 11/04/2020    HCT 41.0 11/04/2020    MCV 91.9 11/04/2020    MCH 31.8 11/04/2020    MCHC 34.6 11/04/2020    RDW 12.2 11/04/2020     11/04/2020    MPV 9.9 11/04/2020     BMP:    Lab Results   Component Value Date     11/04/2020    K 4.2 11/04/2020    K 3.8 10/01/2019     11/04/2020    CO2 22 11/04/2020    BUN 18 11/04/2020    LABALBU 3.9 10/31/2020    CREATININE 1.6 11/04/2020    CALCIUM 9.0 11/04/2020    GFRAA 54 11/04/2020    LABGLOM 45 11/04/2020    GLUCOSE 86 11/04/2020       RAD:  Xr Chest Portable        Assessment/Plan    1)  BEAR:  most likely pre-renal with decreased effective renal perfusion in the setting of nausea, CP and SOB in a patient taking torsemide 10 mg TID, Aldactone 25 mg QD, and hydrochlorothiazide 25 mg daily  Baseline scr has ranged 1.0-1.3 over the past 12 months  Renal ultrasound on 2/10/2020: Right kidney measures 10.27 cm in length / left kidney approximately 10.2 cm  / cortex preserved and no hydronephrosis   Admitting serum creatinine of 2.0 ->2.1->1.8 ->1. 6  Also concern for dye exposure during CTA in the ED  We will need to coordinate above diuretics with the planned start of Entresto by cardiology  UOP last 24 hours 1850, daily wt. shows loss of @ 14 lbs.   Follow daily labs and strict intake and output     2) Acute compensated HFrEF  left ventricular global systolic function is markedly decreased with an estimated left ventricular ejection fraction of 33%   Continue current diuretic regimen with close monitoring of renal function  Cardiology is following  S/p cardiac cath - continue IV fluids post procedure  Monitor cr closely      3) Hypertension: Essential  BP has been well controlled since admission  BP is at goal of <130/80      4) Prior recreational drug use      TONIA Gaxiola - CNS  11/4/2020  2:23 PM   Pt seen and examined agree with above  Cr at 1.6  Sp heart cath  Good uo  Jose Curiel

## 2020-11-04 NOTE — PROCEDURES
[address]                  [address] [facility],  [facility]                            CARDIAC CATHETERIZATION    PATIENT NAME: Emelia Agustin                  :        1965  MED REC NO:   [id number]                            ROOM:       6503  ACCOUNT NO:   [de-identified]                           ADMIT DATE: 10/30/2020  PROVIDER:     Ledy Mason DO    DATE OF PROCEDURE:  2020    [facility] LEFT HEART CATHETERIZATION    SCAI indicator 4, score of 8    PRESSURES:  AO is 124/92, mean 105, /21. SUMMARY OF INJECTIONS:  II.  Selective coronary arteriogram.  a. Right coronary artery - congenitally small vessel with 45% to 50%  segmental stenosis seen in the proximal mid third segment before the  bifurcation into the right ventricular branches. The right ventricular  branches are of moderate luminal caliber and widely patent. b.  Left coronary - preponderant. 1.  Left main trunk, early bifurcation with separate ostia of left  anterior descending and circumflex arteries. 2.  Left anterior descending, 15% ostial narrowing followed by an area  of 60% to 65% stenosis seen at the level of first diagonal branch with  75% stenosis seen in the origin of the first diagonal branch, which is  of small luminal caliber. In the mid anterior descending artery, there  is a second area of 50% narrowing at the level of the second septal  . The distal anterior descending artery had mild luminal wall  irregularities, but no areas of critical stenosis. 3.  Circumflex is a large dominant vessel giving rise to the posterior  descending and posterior ventricular branches with minimal luminal wall  irregularities and no areas of critical stenosis noted. III. Left ventricular angiogram, left ventricular cavity size is mildly  enlarged with mild hypertrophy and global hypokinesis. Estimated left  ventricular ejection fraction 35%.   There is mild mitral regurgitation  noted as well. Note is also made of a tortuous thoracic aorta. CONCLUSION:  1. Coronary atherosclerosis. 2.  Global left ventricular systolic dysfunction - moderately severe. 3.  Mild mitral regurgitation. 4.  Left ventricular diastolic dysfunction. INITIATION OF CONSCIOUS SEDATION:  1422. COMPLETION:  1454. ESTIMATED BLOOD LOSS:  Less than 5 mL. FLUOROSCOPY TIME:  10 minutes. CONTRAST UTILIZED:  80 mL.         KATIE HAMILTON DO    D: 11/04/2020 11:21:16       T: 11/04/2020 11:28:07     [email or web address]  Job#: [id number]     Doc#: [id number]    CC:

## 2020-11-04 NOTE — CARE COORDINATION
At home with [facility] returned call. Unable to accept d/t not taking insurance.    Chani Rajput, L.S.W.  [phone]

## 2020-11-05 ENCOUNTER — APPOINTMENT (OUTPATIENT)
Dept: GENERAL RADIOLOGY | Age: 55
DRG: 287 | End: 2020-11-05
Payer: MEDICARE

## 2020-11-05 LAB
ANION GAP SERPL CALCULATED.3IONS-SCNC: 14 MMOL/L (ref 7–16)
BUN BLDV-MCNC: 18 MG/DL (ref 6–20)
CALCIUM SERPL-MCNC: 9.1 MG/DL (ref 8.6–10.2)
CHLORIDE BLD-SCNC: 102 MMOL/L (ref 98–107)
CO2: 22 MMOL/L (ref 22–29)
CREAT SERPL-MCNC: 1.5 MG/DL (ref 0.7–1.2)
EKG ATRIAL RATE: 71 BPM
EKG ATRIAL RATE: 80 BPM
EKG P AXIS: 26 DEGREES
EKG P AXIS: 28 DEGREES
EKG P-R INTERVAL: 128 MS
EKG P-R INTERVAL: 144 MS
EKG Q-T INTERVAL: 406 MS
EKG Q-T INTERVAL: 406 MS
EKG QRS DURATION: 86 MS
EKG QRS DURATION: 86 MS
EKG QTC CALCULATION (BAZETT): 441 MS
EKG QTC CALCULATION (BAZETT): 468 MS
EKG R AXIS: 31 DEGREES
EKG R AXIS: 7 DEGREES
EKG T AXIS: -35 DEGREES
EKG T AXIS: 102 DEGREES
EKG VENTRICULAR RATE: 71 BPM
EKG VENTRICULAR RATE: 80 BPM
GFR AFRICAN AMERICAN: 58
GFR NON-AFRICAN AMERICAN: 48 ML/MIN/1.73
GLUCOSE BLD-MCNC: 110 MG/DL (ref 74–99)
POTASSIUM SERPL-SCNC: 4.1 MMOL/L (ref 3.5–5)
SODIUM BLD-SCNC: 138 MMOL/L (ref 132–146)
TOTAL CK: 99 U/L (ref 20–200)
TROPONIN: <0.01 NG/ML (ref 0–0.03)

## 2020-11-05 PROCEDURE — 6370000000 HC RX 637 (ALT 250 FOR IP): Performed by: GENERAL PRACTICE

## 2020-11-05 PROCEDURE — 84484 ASSAY OF TROPONIN QUANT: CPT

## 2020-11-05 PROCEDURE — 2700000000 HC OXYGEN THERAPY PER DAY

## 2020-11-05 PROCEDURE — 71046 X-RAY EXAM CHEST 2 VIEWS: CPT

## 2020-11-05 PROCEDURE — 6360000002 HC RX W HCPCS: Performed by: INTERNAL MEDICINE

## 2020-11-05 PROCEDURE — 6370000000 HC RX 637 (ALT 250 FOR IP): Performed by: INTERNAL MEDICINE

## 2020-11-05 PROCEDURE — 2140000000 HC CCU INTERMEDIATE R&B

## 2020-11-05 PROCEDURE — 2500000003 HC RX 250 WO HCPCS: Performed by: INTERNAL MEDICINE

## 2020-11-05 PROCEDURE — 2580000003 HC RX 258: Performed by: INTERNAL MEDICINE

## 2020-11-05 PROCEDURE — 36415 COLL VENOUS BLD VENIPUNCTURE: CPT

## 2020-11-05 PROCEDURE — 82550 ASSAY OF CK (CPK): CPT

## 2020-11-05 PROCEDURE — 94640 AIRWAY INHALATION TREATMENT: CPT

## 2020-11-05 PROCEDURE — 6360000002 HC RX W HCPCS: Performed by: GENERAL PRACTICE

## 2020-11-05 PROCEDURE — 80048 BASIC METABOLIC PNL TOTAL CA: CPT

## 2020-11-05 RX ORDER — NITROGLYCERIN 0.4 MG/1
TABLET SUBLINGUAL
Qty: 25 TABLET | Refills: 3 | Status: SHIPPED | OUTPATIENT
Start: 2020-11-05 | End: 2020-11-23

## 2020-11-05 RX ORDER — ISOSORBIDE MONONITRATE 60 MG/1
60 TABLET, EXTENDED RELEASE ORAL DAILY
Status: DISCONTINUED | OUTPATIENT
Start: 2020-11-06 | End: 2020-11-06 | Stop reason: HOSPADM

## 2020-11-05 RX ORDER — SORBITOL SOLUTION 70 %
30 SOLUTION, ORAL MISCELLANEOUS ONCE
Status: COMPLETED | OUTPATIENT
Start: 2020-11-06 | End: 2020-11-06

## 2020-11-05 RX ORDER — BUMETANIDE 0.25 MG/ML
2 INJECTION, SOLUTION INTRAMUSCULAR; INTRAVENOUS ONCE
Status: COMPLETED | OUTPATIENT
Start: 2020-11-05 | End: 2020-11-05

## 2020-11-05 RX ORDER — CLONIDINE HYDROCHLORIDE 0.1 MG/1
0.1 TABLET ORAL 2 TIMES DAILY
Qty: 60 TABLET | Refills: 3 | Status: ON HOLD
Start: 2020-11-05 | End: 2021-06-18 | Stop reason: HOSPADM

## 2020-11-05 RX ORDER — HYDRALAZINE HYDROCHLORIDE 10 MG/1
10 TABLET, FILM COATED ORAL 3 TIMES DAILY
Qty: 90 TABLET | Refills: 3 | Status: ON HOLD | OUTPATIENT
Start: 2020-11-05 | End: 2020-11-20 | Stop reason: HOSPADM

## 2020-11-05 RX ADMIN — HYDRALAZINE HYDROCHLORIDE 10 MG: 10 TABLET, FILM COATED ORAL at 20:28

## 2020-11-05 RX ADMIN — IPRATROPIUM BROMIDE 0.5 MG: 0.5 SOLUTION RESPIRATORY (INHALATION) at 07:56

## 2020-11-05 RX ADMIN — SODIUM CHLORIDE, PRESERVATIVE FREE 10 ML: 5 INJECTION INTRAVENOUS at 20:27

## 2020-11-05 RX ADMIN — TORSEMIDE 20 MG: 20 TABLET ORAL at 08:32

## 2020-11-05 RX ADMIN — IPRATROPIUM BROMIDE 0.5 MG: 0.5 SOLUTION RESPIRATORY (INHALATION) at 15:37

## 2020-11-05 RX ADMIN — GABAPENTIN 200 MG: 100 CAPSULE ORAL at 16:10

## 2020-11-05 RX ADMIN — BUDESONIDE 250 MCG: 0.25 SUSPENSION RESPIRATORY (INHALATION) at 07:57

## 2020-11-05 RX ADMIN — GABAPENTIN 200 MG: 100 CAPSULE ORAL at 20:27

## 2020-11-05 RX ADMIN — PROMETHAZINE HYDROCHLORIDE 6.25 MG: 25 TABLET ORAL at 08:32

## 2020-11-05 RX ADMIN — METOPROLOL SUCCINATE 100 MG: 100 TABLET, EXTENDED RELEASE ORAL at 08:31

## 2020-11-05 RX ADMIN — AMLODIPINE BESYLATE 10 MG: 10 TABLET ORAL at 08:32

## 2020-11-05 RX ADMIN — NITROGLYCERIN 1 INCH: 20 OINTMENT TOPICAL at 08:31

## 2020-11-05 RX ADMIN — SACUBITRIL AND VALSARTAN 1 TABLET: 24; 26 TABLET, FILM COATED ORAL at 20:28

## 2020-11-05 RX ADMIN — IPRATROPIUM BROMIDE 0.5 MG: 0.5 SOLUTION RESPIRATORY (INHALATION) at 20:10

## 2020-11-05 RX ADMIN — ENOXAPARIN SODIUM 40 MG: 40 INJECTION SUBCUTANEOUS at 08:31

## 2020-11-05 RX ADMIN — DOCUSATE SODIUM 100 MG: 100 CAPSULE, LIQUID FILLED ORAL at 08:31

## 2020-11-05 RX ADMIN — ARFORMOTEROL TARTRATE 15 MCG: 15 SOLUTION RESPIRATORY (INHALATION) at 20:11

## 2020-11-05 RX ADMIN — NITROGLYCERIN 1 INCH: 20 OINTMENT TOPICAL at 02:10

## 2020-11-05 RX ADMIN — DOXYCYCLINE HYCLATE 100 MG: 100 CAPSULE ORAL at 08:32

## 2020-11-05 RX ADMIN — SODIUM CHLORIDE: 9 INJECTION, SOLUTION INTRAVENOUS at 04:51

## 2020-11-05 RX ADMIN — BUDESONIDE 250 MCG: 0.25 SUSPENSION RESPIRATORY (INHALATION) at 20:10

## 2020-11-05 RX ADMIN — HYDRALAZINE HYDROCHLORIDE 10 MG: 10 TABLET, FILM COATED ORAL at 13:00

## 2020-11-05 RX ADMIN — SPIRONOLACTONE 25 MG: 25 TABLET ORAL at 08:32

## 2020-11-05 RX ADMIN — NITROGLYCERIN 1 INCH: 20 OINTMENT TOPICAL at 16:30

## 2020-11-05 RX ADMIN — HYDRALAZINE HYDROCHLORIDE 10 MG: 10 TABLET, FILM COATED ORAL at 08:31

## 2020-11-05 RX ADMIN — FINASTERIDE 5 MG: 5 TABLET, FILM COATED ORAL at 08:32

## 2020-11-05 RX ADMIN — BUMETANIDE 2 MG: 0.25 INJECTION, SOLUTION INTRAMUSCULAR; INTRAVENOUS at 13:00

## 2020-11-05 RX ADMIN — ARFORMOTEROL TARTRATE 15 MCG: 15 SOLUTION RESPIRATORY (INHALATION) at 07:56

## 2020-11-05 RX ADMIN — GABAPENTIN 200 MG: 100 CAPSULE ORAL at 13:00

## 2020-11-05 RX ADMIN — CLONIDINE HYDROCHLORIDE 0.1 MG: 0.1 TABLET ORAL at 20:30

## 2020-11-05 RX ADMIN — DOXYCYCLINE HYCLATE 100 MG: 100 CAPSULE ORAL at 20:27

## 2020-11-05 RX ADMIN — GABAPENTIN 200 MG: 100 CAPSULE ORAL at 08:31

## 2020-11-05 RX ADMIN — DOCUSATE SODIUM 100 MG: 100 CAPSULE, LIQUID FILLED ORAL at 20:27

## 2020-11-05 RX ADMIN — CLONIDINE HYDROCHLORIDE 0.1 MG: 0.1 TABLET ORAL at 08:32

## 2020-11-05 RX ADMIN — PROMETHAZINE HYDROCHLORIDE 6.25 MG: 25 TABLET ORAL at 16:11

## 2020-11-05 RX ADMIN — PROMETHAZINE HYDROCHLORIDE 6.25 MG: 25 TABLET ORAL at 20:28

## 2020-11-05 RX ADMIN — PANTOPRAZOLE SODIUM 40 MG: 40 TABLET, DELAYED RELEASE ORAL at 05:59

## 2020-11-05 RX ADMIN — SACUBITRIL AND VALSARTAN 1 TABLET: 24; 26 TABLET, FILM COATED ORAL at 08:31

## 2020-11-05 ASSESSMENT — PAIN SCALES - GENERAL
PAINLEVEL_OUTOF10: 0

## 2020-11-05 NOTE — PLAN OF CARE
Problem: Pain:  Goal: Pain level will decrease  Description: Pain level will decrease  11/5/2020 1249 by Renee Smith RN  Outcome: Completed     Problem: Pain:  Goal: Control of acute pain  Description: Control of acute pain  11/5/2020 1249 by Renee Smith RN  Outcome: Completed

## 2020-11-05 NOTE — PLAN OF CARE
Problem: Pain:  Goal: Pain level will decrease  Description: Pain level will decrease  Outcome: Met This Shift  Goal: Control of acute pain  Description: Control of acute pain  Outcome: Met This Shift  Goal: Control of chronic pain  Description: Control of chronic pain  Outcome: Met This Shift     Problem: Activity:  Goal: Risk for activity intolerance will decrease  Description: Risk for activity intolerance will decrease  Outcome: Met This Shift     Problem:  Bowel/Gastric:  Goal: Bowel function will improve  Description: Bowel function will improve  Outcome: Met This Shift  Goal: Diagnostic test results will improve  Description: Diagnostic test results will improve  Outcome: Met This Shift  Goal: Occurrences of nausea will decrease  Description: Occurrences of nausea will decrease  Outcome: Met This Shift  Goal: Occurrences of vomiting will decrease  Description: Occurrences of vomiting will decrease  Outcome: Met This Shift     Problem: Fluid Volume:  Goal: Maintenance of adequate hydration will improve  Description: Maintenance of adequate hydration will improve  Outcome: Met This Shift     Problem: Health Behavior:  Goal: Ability to state signs and symptoms to report to health care provider will improve  Description: Ability to state signs and symptoms to report to health care provider will improve  Outcome: Met This Shift     Problem: Physical Regulation:  Goal: Complications related to the disease process, condition or treatment will be avoided or minimized  Description: Complications related to the disease process, condition or treatment will be avoided or minimized  Outcome: Met This Shift  Goal: Ability to maintain clinical measurements within normal limits will improve  Description: Ability to maintain clinical measurements within normal limits will improve  Outcome: Met This Shift     Problem: Sensory:  Goal: Pain level will decrease  Description: Pain level will decrease  Outcome: Met This Shift  Goal: Ability to identify factors that increase the pain will improve  Description: Ability to identify factors that increase the pain will improve  Outcome: Met This Shift  Goal: Ability to notify healthcare provider of pain before it becomes unmanageable or unbearable will improve  Description: Ability to notify healthcare provider of pain before it becomes unmanageable or unbearable will improve  Outcome: Met This Shift     Problem: Falls - Risk of:  Goal: Will remain free from falls  Description: Will remain free from falls  Outcome: Met This Shift  Goal: Absence of physical injury  Description: Absence of physical injury  Outcome: Met This Shift

## 2020-11-05 NOTE — PROGRESS NOTES
PROGRESS NOTE       PATIENT PROBLEM LIST:  Active Problems:    Unstable angina (HCC)  Resolved Problems:    * No resolved hospital problems. *      SUBJECTIVE:  Brant Bernheim states he feels somewhat better but now has left-sided posterior lateral chest and upper back discomfort improved with laying on his right side. REVIEW OF SYSTEMS:  General ROS: positive for - fatigue. Psychological ROS: Positive for - anxiety , depression  Ophthalmic ROS: negative for - decreased vision or visual distortion. ENT ROS: negative  Allergy and Immunology ROS: negative  Hematological and Lymphatic ROS: negative  Endocrine: no heat or cold intolerance and no polyphagia, polydipsia, or polyuria  Respiratory ROS: positive for - shortness of breath  Cardiovascular ROS: positive for - chest pain, dyspnea on exertion and shortness of breath. Gastrointestinal ROS: no abdominal pain, change in bowel habits, or black or bloody stools  Genito-Urinary ROS: no nocturia, dysuria, trouble voiding, frequency or hematuria  Musculoskeletal ROS: negative for- myalgias, arthralgias, or claudication  Neurological ROS: no TIA or stroke symptoms otherwise no significant change in symptoms or problems since yesterday as documented in previous progress notes.     SCHEDULED MEDICATIONS:   sodium chloride flush  10 mL Intravenous 2 times per day    gabapentin  200 mg Oral 4x Daily    hydrALAZINE  10 mg Oral TID    docusate sodium  100 mg Oral BID    enoxaparin  40 mg Subcutaneous Daily    amLODIPine  10 mg Oral Daily    cloNIDine  0.1 mg Oral BID    torsemide  20 mg Oral Daily    nitroglycerin  1 inch Topical 3 times per day    doxycycline hyclate  100 mg Oral BID    finasteride  5 mg Oral Daily    metoprolol succinate  100 mg Oral Daily    nicotine  1 patch Transdermal Daily    pantoprazole  40 mg Oral QAM AC    promethazine  6.25 mg Oral 4x Daily    spironolactone  25 mg Oral Daily    budesonide  0.25 mg Nebulization BID    And    11/04/20  0638     138 135  135 136   K 4.1  4.2 4.4  4.2 4.2   CL 99  101 100  100 102   CO2 22  24 21*  24 22   BUN 29*  29* 21*  22* 18   CREATININE 2.1*  2.1* 1.7*  1.8* 1.6*   LABGLOM 33  33 42  39 45     ABGs:   Lab Results   Component Value Date    PH 7.445 10/01/2019    PO2 69.4 10/01/2019    PCO2 25.6 10/01/2019     INR:   No results for input(s): INR in the last 72 hours. PRO-BNP:   Lab Results   Component Value Date    PROBNP 811 (H) 10/30/2020    PROBNP 1,851 (H) 02/05/2020      TSH:   Lab Results   Component Value Date    TSH 0.809 04/18/2019      Cardiac Injury Profile:   No results for input(s): CKTOTAL, CKMB, TROPONINI in the last 72 hours. Lipid Profile:   Lab Results   Component Value Date    TRIG 217 01/02/2018    HDL 58 01/02/2018    LDLCALC 144 01/02/2018    CHOL 245 01/02/2018      Hemoglobin A1C: No components found for: HGBA1C     RAD:   Xr Chest Portable    Result Date: 10/31/2020  EXAMINATION: ONE XRAY VIEW OF THE CHEST 10/30/2020 11:24 pm COMPARISON: Chest x-ray from 02/05/2020. HISTORY: ORDERING SYSTEM PROVIDED HISTORY: chest pain TECHNOLOGIST PROVIDED HISTORY: Reason for exam:->chest pain What reading provider will be dictating this exam?->CRC FINDINGS: Mild peripheral patchy/ground-glass opacification is noted in the peripheral left lung which could represent atelectasis versus early/mild airspace disease process. No evidence of pleural effusion. Heart size at the upper limits of normal.  Postsurgical changes in the lower cervical spine. Osseous structures are otherwise unremarkable. Mild peripheral patchy/ground-glass opacification in the peripheral left lung which could represent atelectasis versus early/mild airspace disease process.      Cta Chest W Contrast    Result Date: 10/31/2020  EXAMINATION: CTA OF THE CHEST 10/30/2020 10:32 pm TECHNIQUE: CTA of the chest was performed after the administration of intravenous contrast.  Multiplanar reformatted images are provided for review. MIP images are provided for review. Dose modulation, iterative reconstruction, and/or weight based adjustment of the mA/kV was utilized to reduce the radiation dose to as low as reasonably achievable. COMPARISON: None. HISTORY: ORDERING SYSTEM PROVIDED HISTORY: PE concern TECHNOLOGIST PROVIDED HISTORY: Reason for exam:->PE concern What reading provider will be dictating this exam?->CRC FINDINGS: Pulmonary Arteries: Pulmonary arteries are adequately opacified for evaluation. No evidence of intraluminal filling defect to suggest pulmonary embolism. Main pulmonary artery is normal in caliber. Mediastinum: No evidence of mediastinal lymphadenopathy. The heart and pericardium demonstrate no acute abnormality. There is no acute abnormality of the thoracic aorta. Lungs/pleura: There is a 1.4 cm nodular density at the posterior left lung base. Recommend follow-up noncontrast CT of the chest in 3 months. Mild dependent congestion in the bilateral posterior lungs. The lungs are otherwise without acute process. No focal consolidation or pulmonary edema. No evidence of pleural effusion or pneumothorax. Upper Abdomen: Limited images of the upper abdomen are unremarkable. Soft Tissues/Bones: Chronic fracture deformity of the lateral left 7th rib. Otherwise, no acute bone or soft tissue abnormality. No evidence of pulmonary embolism or acute pulmonary abnormality. There is a 1.4 cm nodular density in the posterior left lung base. Recommend follow-up noncontrast CT of the chest in 3 months.      Us Abdomen Limited    Result Date: 10/27/2020  EXAMINATION: RIGHT UPPER QUADRANT ULTRASOUND 10/26/2020 7:13 pm COMPARISON: 04/18/2019 ultrasound HISTORY: ORDERING SYSTEM PROVIDED HISTORY: Abdominal distension TECHNOLOGIST PROVIDED HISTORY: Reason for exam:->ascites What reading provider will be dictating this exam?->CRC FINDINGS: LIVER:  The liver demonstrates normal echogenicity without evidence of intrahepatic biliary ductal dilatation. BILIARY SYSTEM:  Gallbladder is unremarkable without evidence of pericholecystic fluid, wall thickening or stones. Negative sonographic James's sign. Common bile duct is within normal limits measuring 3 mm. RIGHT KIDNEY: The right kidney is grossly unremarkable without evidence of hydronephrosis. PANCREAS:  Visualized portions of the pancreas are unremarkable. OTHER: Small pleural effusions are noted incidentally in the bilateral chest.     1. No significant finding in the abdomen. 2. There are small bilateral pleural effusions. Nm Cardiac Stress Test Nuclear Imaging    Result Date: 10/31/2020  Indication:  Chest pressure and burning Clinical History:   Patient has no previous historyof coronary artery disease. IMAGING: Myocardial perfusion imaging was performed at rest 30-35 minutes following the intravenous injection of 12 mCi of (Tc-Sestamibi) followed by 10 ml of Normal Saline. At peak exercise, the patient was injected intravenously with 35mCi of (Tc-Sestamibi) followed by 10 ml of Normal Saline. Gated post-stress tomographic imaging was performed 20-25 minutes after stress. FINDINGS: The overall quality of the study was good. Left ventricular cavity size was noted to be dilated Rotational analog analysis demonstrated elevated left hemidiaphragm with marked patient motion artifact The gated SPECT stress imaging in the short, vertical long, and horizontal long axes demonstrate decreased inferoapical uptake at both stress and rest images. . However there was normal brightening and thickening noted in this area. There is global hypokinesis of the left ventricle with an estimated LVEF of 33%. Healing perfusion images at both rest and stress suggest possible underlying abdomen are artifactual cause.      1. Decreased myocardial perfusion inferoapical segment both rest and stress with normal brightening and thickening and global decrease in left ventricular contractility and modest dilatation of left ventricular cavity. 2. Global decrease in left ventricular systolic contractility estimated LVEF 33%. Valentin Pollack iNcholas FACP FACC FSCAI     EKG: See Report  Echo: See Report      IMPRESSIONS:  Active Problems:    Unstable angina ([address]  Resolved Problems:    * No resolved hospital problems. *      RECOMMENDATIONS:  Cardiac catheterization has been completed without incident and no postop complaints of groin pain, shortness of breath nor chest discomfort. He notes no discomfort from his catheterization site and no impairment of urination. I have spent more than 25 minutes face to face with Mica Chung and reviewing notes and laboratory data, with greater than 50% of this time instructing and counseling the patient face to face regarding my findings and recommendations and I have answered all questions as posed to me by Mr. Pastora Adams. Samira Wynne,  FACP,FACC,FSCAI      NOTE:  This report was transcribed using voice recognition software.   Every effort was made to ensure accuracy; however, inadvertent computerized transcription errors may be present

## 2020-11-05 NOTE — PROGRESS NOTES
PROGRESS NOTE       PATIENT PROBLEM LIST:  Active Problems:    Unstable angina (HCC)  Resolved Problems:    * No resolved hospital problems. *      SUBJECTIVE:  Dharmesh Suazo states he feels fine following cardiac catheterization. Anand Alanis REVIEW OF SYSTEMS:  General ROS: positive for - fatigue. Psychological ROS: positive for - anxiety , depression  Ophthalmic ROS: negative for - decreased vision or visual distortion. ENT ROS: negative  Allergy and Immunology ROS: negative  Hematological and Lymphatic ROS: negative  Endocrine: no heat or cold intolerance and no polyphagia, polydipsia, or polyuria  Respiratory ROS: positive for - shortness of breath  Cardiovascular ROS: positive for - chest pain, dyspnea on exertion and shortness of breath. Gastrointestinal ROS: no abdominal pain, change in bowel habits, or black or bloody stools  Genito-Urinary ROS: no nocturia, dysuria, trouble voiding, frequency or hematuria  Musculoskeletal ROS: negative for- myalgias, arthralgias, or claudication  Neurological ROS: no TIA or stroke symptoms otherwise no significant change in symptoms or problems since yesterday as documented in previous progress notes.     SCHEDULED MEDICATIONS:   sodium chloride flush  10 mL Intravenous 2 times per day    gabapentin  200 mg Oral 4x Daily    hydrALAZINE  10 mg Oral TID    docusate sodium  100 mg Oral BID    enoxaparin  40 mg Subcutaneous Daily    amLODIPine  10 mg Oral Daily    cloNIDine  0.1 mg Oral BID    torsemide  20 mg Oral Daily    nitroglycerin  1 inch Topical 3 times per day    doxycycline hyclate  100 mg Oral BID    finasteride  5 mg Oral Daily    metoprolol succinate  100 mg Oral Daily    nicotine  1 patch Transdermal Daily    pantoprazole  40 mg Oral QAM AC    promethazine  6.25 mg Oral 4x Daily    spironolactone  25 mg Oral Daily    budesonide  0.25 mg Nebulization BID    And    ipratropium  0.5 mg Nebulization 4x daily    And    Arformoterol Tartrate  15 mcg Nebulization BID    sacubitril-valsartan  1 tablet Oral BID       VITAL SIGNS:                                                                                                                          BP (!) 120/93   Pulse 77   Temp 98.2 °F (36.8 °C) (Temporal)   Resp 16   Ht 5' 7\" (1.702 m)   Wt 214 lb 6.4 oz (97.3 kg)   SpO2 97%   BMI 33.58 kg/m²   Patient Vitals for the past 96 hrs (Last 3 readings):   Weight   11/03/20 0625 216 lb (98 kg)   11/02/20 0344 216 lb 9.6 oz (98.2 kg)     OBJECTIVE:    HEENT: PERRL, EOM  Intact; sclera non-icteric, conjunctiva pink. Carotids are brisk in upstroke with normal contour. No carotid bruits. Normal jugular venous pulsation at 45°. No palpable cervical nor supraclavicular nodes. Thyroid not palpable. Trachea midline. Chest: Even excursion  Lungs: CTA B, no expiratory wheezes or rhonchi, no decreased tactile fremitus without inspiratory rales. Heart: Regular  rhythm; S1 > S2, S4 gallop; murmur not appreciated at this time. . No clicks, rub, palpable thrills   or heaves. PMI nondisplaced, 5th intercostal space MCL. Abdomen: Soft, nontender, nondistended,  moderately protuberant, no masses or organomegaly. Bowel sounds active. Extremities: Without clubbing, cyanosis or edema. Pulses present 3+ upper extermities bilaterally; present 1+ DP and present 1+ PT bilaterally.      Data:   Scheduled Meds: Reviewed  Continuous Infusions:    sodium chloride 50 mL/hr at 11/03/20 2109       CBC:     11/02/20  0630 11/03/20  0557   WBC 10.9 12.2*   HGB 14.4 14.2   HCT 42.9 42.0    309     BMP:    11/02/20  0630 11/03/20  0557     138 135  135   K 4.1  4.2 4.4  4.2   CL 99  101 100  100   CO2 22  24 21*  24   BUN 29*  29* 21*  22*   CREATININE 2.1*  2.1* 1.7*  1.8*   LABGLOM 33  33 42  39     ABGs:   Lab Results   Component Value Date    PH 7.445 10/01/2019    PO2 69.4 10/01/2019    PCO2 25.6 10/01/2019     INR:   No results for input(s): INR in the last 72 hours. PRO-BNP:   Lab Results   Component Value Date    PROBNP 811 (H) 10/30/2020    PROBNP 1,851 (H) 02/05/2020      TSH:   Lab Results   Component Value Date    TSH 0.809 04/18/2019      Cardiac Injury Profile:   No results for input(s): CKTOTAL, CKMB, TROPONINI in the last 72 hours. Lipid Profile:   Lab Results   Component Value Date    TRIG 217 01/02/2018    HDL 58 01/02/2018    LDLCALC 144 01/02/2018    CHOL 245 01/02/2018      Hemoglobin A1C: No components found for: HGBA1C     RAD:   Xr Chest Portable    Result Date: 10/31/2020  EXAMINATION: ONE XRAY VIEW OF THE CHEST 10/30/2020 11:24 pm COMPARISON: Chest x-ray from 02/05/2020. HISTORY: ORDERING SYSTEM PROVIDED HISTORY: chest pain TECHNOLOGIST PROVIDED HISTORY: Reason for exam:->chest pain What reading provider will be dictating this exam?->CRC FINDINGS: Mild peripheral patchy/ground-glass opacification is noted in the peripheral left lung which could represent atelectasis versus early/mild airspace disease process. No evidence of pleural effusion. Heart size at the upper limits of normal.  Postsurgical changes in the lower cervical spine. Osseous structures are otherwise unremarkable. Mild peripheral patchy/ground-glass opacification in the peripheral left lung which could represent atelectasis versus early/mild airspace disease process. Cta Chest W Contrast    Result Date: 10/31/2020  EXAMINATION: CTA OF THE CHEST 10/30/2020 10:32 pm TECHNIQUE: CTA of the chest was performed after the administration of intravenous contrast.  Multiplanar reformatted images are provided for review. MIP images are provided for review. Dose modulation, iterative reconstruction, and/or weight based adjustment of the mA/kV was utilized to reduce the radiation dose to as low as reasonably achievable. COMPARISON: None.  HISTORY: ORDERING SYSTEM PROVIDED HISTORY: PE concern TECHNOLOGIST PROVIDED HISTORY: Reason for exam:->PE concern What reading provider will be dictating this exam?->CRC FINDINGS: Pulmonary Arteries: Pulmonary arteries are adequately opacified for evaluation. No evidence of intraluminal filling defect to suggest pulmonary embolism. Main pulmonary artery is normal in caliber. Mediastinum: No evidence of mediastinal lymphadenopathy. The heart and pericardium demonstrate no acute abnormality. There is no acute abnormality of the thoracic aorta. Lungs/pleura: There is a 1.4 cm nodular density at the posterior left lung base. Recommend follow-up noncontrast CT of the chest in 3 months. Mild dependent congestion in the bilateral posterior lungs. The lungs are otherwise without acute process. No focal consolidation or pulmonary edema. No evidence of pleural effusion or pneumothorax. Upper Abdomen: Limited images of the upper abdomen are unremarkable. Soft Tissues/Bones: Chronic fracture deformity of the lateral left 7th rib. Otherwise, no acute bone or soft tissue abnormality. No evidence of pulmonary embolism or acute pulmonary abnormality. There is a 1.4 cm nodular density in the posterior left lung base. Recommend follow-up noncontrast CT of the chest in 3 months. Us Abdomen Limited    Result Date: 10/27/2020  EXAMINATION: RIGHT UPPER QUADRANT ULTRASOUND 10/26/2020 7:13 pm COMPARISON: 04/18/2019 ultrasound HISTORY: ORDERING SYSTEM PROVIDED HISTORY: Abdominal distension TECHNOLOGIST PROVIDED HISTORY: Reason for exam:->ascites What reading provider will be dictating this exam?->CRC FINDINGS: LIVER:  The liver demonstrates normal echogenicity without evidence of intrahepatic biliary ductal dilatation. BILIARY SYSTEM:  Gallbladder is unremarkable without evidence of pericholecystic fluid, wall thickening or stones. Negative sonographic James's sign. Common bile duct is within normal limits measuring 3 mm. RIGHT KIDNEY: The right kidney is grossly unremarkable without evidence of hydronephrosis.  PANCREAS:  Visualized portions of the pancreas are without incident and demonstrates approximately 55-65% segmental narrowing in the proximal left anterior descending artery likewise there is a 75% ostial stenosis in a small first diagonal branch. The arteries have noncritical areas of stenosis and his left coronary system is preponderant. Likewise his left ventricular ejection fraction is approximately 35% similar to what was seen on his nuclear stress test.  Will treat medically at this time. Importantly he must remain without smoking or alcohol ingestion and maintain his LDL cholesterol within updated 2020 ACC/AHA/AACE/ESC/EAS cholesterol guidelines. I have spent more than 25 minutes face to face with Rafiq Terrell and reviewing notes and laboratory data, with greater than 50% of this time instructing and counseling the patient face to face regarding my findings and recommendations and I have answered all questions as posed to me by Mr. Marcel Whaley. Shahram Florez, DO FACP,FACC,FSCAI      NOTE:  This report was transcribed using voice recognition software.   Every effort was made to ensure accuracy; however, inadvertent computerized transcription errors may be present

## 2020-11-05 NOTE — PROGRESS NOTES
Progress Note  NEPHROLOGY    Reason for Consult: Evaluation of acute on chronic kidney disease    Requesting Physician: Dr. Colon Friend    Chief Complaint: Admitted with unstable angina and acute kidney injury    History Obtained From:  patient, electronic medical record    History of Present Ilness: We are following this patient for his BEAR. On admission his creatinine was 2.0, his baseline cr over the past year has been 1.0-1.3    Subjective    11/2/2020: Patient is short of breath at rest and sob increases with talking. O2 on per nasal cannula. He denies chest pain currently. Feels like his abdomen is full. Denies nausea. 11/3/2020: Resting in bed s/p cardiac cath. States he is feeling better today, breathing easier. 11/4/2020: Just returned from walking 2 laps @ [employer] and is feeling sob. Denies chest pain. 11/5/2020- eating lunch, states he is going home today.      Current Medications:    Current Facility-Administered Medications: 0.9 % sodium chloride infusion, , Intravenous, Continuous  sodium chloride flush 0.9 % injection 10 mL, 10 mL, Intravenous, 2 times per day  sodium chloride flush 0.9 % injection 10 mL, 10 mL, Intravenous, PRN  gabapentin (NEURONTIN) capsule 200 mg, 200 mg, Oral, 4x Daily  hydrALAZINE (APRESOLINE) tablet 10 mg, 10 mg, Oral, TID  docusate sodium (COLACE) capsule 100 mg, 100 mg, Oral, BID  magnesium hydroxide (MILK OF MAGNESIA) 400 MG/5ML suspension 30 mL, 30 mL, Oral, Daily PRN  enoxaparin (LOVENOX) injection 40 mg, 40 mg, Subcutaneous, Daily  amLODIPine (NORVASC) tablet 10 mg, 10 mg, Oral, Daily  cloNIDine (CATAPRES) tablet 0.1 mg, 0.1 mg, Oral, BID  torsemide (DEMADEX) tablet 20 mg, 20 mg, Oral, Daily  nitroglycerin (NITRO-BID) 2 % ointment 1 inch, 1 inch, Topical, 3 times per day  albuterol (PROVENTIL) nebulizer solution 2.5 mg, 2.5 mg, Nebulization, Q6H PRN  doxycycline hyclate (VIBRAMYCIN) capsule 100 mg, 100 mg, Oral, BID  finasteride (PROSCAR) tablet 5 mg, 5 mg, Oral, Daily  metoprolol succinate (TOPROL XL) extended release tablet 100 mg, 100 mg, Oral, Daily  nicotine (NICODERM CQ) 21 MG/24HR 1 patch, 1 patch, Transdermal, Daily  pantoprazole (PROTONIX) tablet 40 mg, 40 mg, Oral, QAM AC  promethazine (PHENERGAN) tablet 6.25 mg, 6.25 mg, Oral, 4x Daily  spironolactone (ALDACTONE) tablet 25 mg, 25 mg, Oral, Daily  calcium carbonate (TUMS) chewable tablet 500 mg, 500 mg, Oral, TID PRN  heparin (porcine) injection 3,100 Units, 30 Units/kg, Intravenous, PRN  heparin (porcine) injection 6,200 Units, 60 Units/kg, Intravenous, PRN  nitroGLYCERIN (NITROSTAT) SL tablet 0.4 mg, 0.4 mg, Sublingual, Q5 Min PRN  budesonide (PULMICORT) nebulizer suspension 250 mcg, 0.25 mg, Nebulization, BID **AND** ipratropium (ATROVENT) 0.02 % nebulizer solution 0.5 mg, 0.5 mg, Nebulization, 4x daily **AND** Arformoterol Tartrate (BROVANA) nebulizer solution 15 mcg, 15 mcg, Nebulization, BID  sacubitril-valsartan (ENTRESTO) 24-26 MG per tablet 1 tablet, 1 tablet, Oral, BID    Allergies:  Patient has no known allergies. Social History:       reports that he has been smoking cigarettes. He started smoking about 41 years ago. He has a 60.00 pack-year smoking history. He has never used smokeless tobacco. He reports previous alcohol use. He reports current drug use. Drugs: Cocaine, Other-see comments, and Marijuana. Family History:     Family History   Problem Relation Age of Onset    Arthritis Mother     Other Mother         glucoma    Heart Disease Father     High Blood Pressure Brother     Other Brother         sleep apnea    High Blood Pressure Brother     Other Brother         sleep apnea    High Blood Pressure Brother     Other Brother         sleep apnea    High Blood Pressure Brother     Other Brother         sleep apnea    Cancer Brother         bertramophogeal -  at 62         Review of Systems:       Pertinent positives stated above in HPI.  All other systems were reviewed and were negative.     Physical exam:   Constitutional:  VITALS:  BP (!) 128/90   Pulse 80   Temp 97.5 °F (36.4 °C)   Resp 18   Ht 5' 7\" (1.702 m)   Wt 216 lb 3.2 oz (98.1 kg)   SpO2 96%   BMI 33.86 kg/m²   CURRENT TEMPERATURE:  Temp: 97.5 °F (36.4 °C)  CURRENT RESPIRATORY RATE:  Resp: 18  CURRENT PULSE:  Pulse: 80  CURRENT BLOOD PRESSURE:  BP: (!) 128/90  24HR BLOOD PRESSURE RANGE:  Systolic (07NSK), OFD:695 , Min:115 , HSR:828   ; Diastolic (94VCN), BXC:25, Min:80, Max:93    24HR INTAKE/OUTPUT:      Intake/Output Summary (Last 24 hours) at 11/5/2020 0931  Last data filed at 11/5/2020 9017  Gross per 24 hour   Intake 1252.5 ml   Output 2650 ml   Net -1397.5 ml     Gen: alert, awake  Skin: no rash on exposed skin, turgor wnl  Neck: no jvd noted  Cardiovascular:  RRR, no rubs  Respiratory: lungs cleat b/l, no adventitious sounds  Abdomen: round, soft, +bowel sounds  Ext: no edema   Psychiatric: mood and affect appropriate      DATA:      CBC:   Lab Results   Component Value Date    WBC 11.9 11/04/2020    RBC 4.46 11/04/2020    HGB 14.2 11/04/2020    HCT 41.0 11/04/2020    MCV 91.9 11/04/2020    MCH 31.8 11/04/2020    MCHC 34.6 11/04/2020    RDW 12.2 11/04/2020     11/04/2020    MPV 9.9 11/04/2020     BMP:    Lab Results   Component Value Date     11/05/2020    K 4.1 11/05/2020    K 3.8 10/01/2019     11/05/2020    CO2 22 11/05/2020    BUN 18 11/05/2020    LABALBU 3.9 10/31/2020    CREATININE 1.5 11/05/2020    CALCIUM 9.1 11/05/2020    GFRAA 58 11/05/2020    LABGLOM 48 11/05/2020    GLUCOSE 110 11/05/2020       RAD:  Xr Chest Portable        Assessment/Plan    1)  BEAR:  most likely pre-renal with decreased effective renal perfusion in the setting of nausea, CP and SOB in a patient taking torsemide 10 mg TID, Aldactone 25 mg QD, and hydrochlorothiazide 25 mg daily  Baseline scr has ranged 1.0-1.3 over the past 12 months  Renal ultrasound on 2/10/2020: Right kidney measures 10.27 cm in length / left kidney approximately 10.2 cm  / cortex preserved and no hydronephrosis   Admitting serum creatinine of 2.0 ->2.1->1.8 ->1.6->1.5  Also concern for dye exposure during CTA in the ED  We will need to coordinate above diuretics with the planned start of Entresto by cardiology  UOP last 24 hours 2650  Follow daily labs and strict intake and output     2) Acute compensated HFrEF  left ventricular global systolic function is markedly decreased with an estimated left ventricular ejection fraction of 33%   Continue current diuretic regimen with close monitoring of renal function  Cardiology is following  S/p cardiac cath   Monitor cr closely  Stop ivf  Dose bume 2 mg iv now  Check cxr      3) Hypertension: Essential  BP has been well controlled since admission  BP is at goal of <130/80      4) Prior recreational drug use    5.  Chronic kidney disease stage 3a-   Baseline 1.1-1.3      Az Mcneal, APRN - CNP  11/5/2020  9:31 AM     Having sob  Stop ivf  Dose bumex  Check cxr  Kenia Wilson

## 2020-11-06 ENCOUNTER — APPOINTMENT (OUTPATIENT)
Dept: NUCLEAR MEDICINE | Age: 55
DRG: 287 | End: 2020-11-06
Payer: MEDICARE

## 2020-11-06 VITALS
OXYGEN SATURATION: 100 % | BODY MASS INDEX: 33.37 KG/M2 | WEIGHT: 212.6 LBS | DIASTOLIC BLOOD PRESSURE: 72 MMHG | SYSTOLIC BLOOD PRESSURE: 128 MMHG | RESPIRATION RATE: 18 BRPM | HEIGHT: 67 IN | HEART RATE: 72 BPM | TEMPERATURE: 97.7 F

## 2020-11-06 LAB
ANION GAP SERPL CALCULATED.3IONS-SCNC: 17 MMOL/L (ref 7–16)
BUN BLDV-MCNC: 22 MG/DL (ref 6–20)
CALCIUM SERPL-MCNC: 9.7 MG/DL (ref 8.6–10.2)
CHLORIDE BLD-SCNC: 100 MMOL/L (ref 98–107)
CO2: 21 MMOL/L (ref 22–29)
CREAT SERPL-MCNC: 1.6 MG/DL (ref 0.7–1.2)
GFR AFRICAN AMERICAN: 54
GFR NON-AFRICAN AMERICAN: 45 ML/MIN/1.73
GLUCOSE BLD-MCNC: 87 MG/DL (ref 74–99)
HCT VFR BLD CALC: 42.6 % (ref 37–54)
HEMOGLOBIN: 14.9 G/DL (ref 12.5–16.5)
MCH RBC QN AUTO: 32.1 PG (ref 26–35)
MCHC RBC AUTO-ENTMCNC: 35 % (ref 32–34.5)
MCV RBC AUTO: 91.8 FL (ref 80–99.9)
PDW BLD-RTO: 11.9 FL (ref 11.5–15)
PLATELET # BLD: 334 E9/L (ref 130–450)
PMV BLD AUTO: 10 FL (ref 7–12)
POTASSIUM SERPL-SCNC: 4.2 MMOL/L (ref 3.5–5)
RBC # BLD: 4.64 E12/L (ref 3.8–5.8)
SODIUM BLD-SCNC: 138 MMOL/L (ref 132–146)
TROPONIN: <0.01 NG/ML (ref 0–0.03)
TROPONIN: <0.01 NG/ML (ref 0–0.03)
WBC # BLD: 11.7 E9/L (ref 4.5–11.5)

## 2020-11-06 PROCEDURE — 6360000002 HC RX W HCPCS: Performed by: GENERAL PRACTICE

## 2020-11-06 PROCEDURE — 3430000000 HC RX DIAGNOSTIC RADIOPHARMACEUTICAL: Performed by: RADIOLOGY

## 2020-11-06 PROCEDURE — 78580 LUNG PERFUSION IMAGING: CPT | Performed by: RADIOLOGY

## 2020-11-06 PROCEDURE — 6360000002 HC RX W HCPCS: Performed by: INTERNAL MEDICINE

## 2020-11-06 PROCEDURE — 2700000000 HC OXYGEN THERAPY PER DAY

## 2020-11-06 PROCEDURE — 6370000000 HC RX 637 (ALT 250 FOR IP): Performed by: GENERAL PRACTICE

## 2020-11-06 PROCEDURE — 85027 COMPLETE CBC AUTOMATED: CPT

## 2020-11-06 PROCEDURE — 94640 AIRWAY INHALATION TREATMENT: CPT

## 2020-11-06 PROCEDURE — 6370000000 HC RX 637 (ALT 250 FOR IP): Performed by: INTERNAL MEDICINE

## 2020-11-06 PROCEDURE — 36415 COLL VENOUS BLD VENIPUNCTURE: CPT

## 2020-11-06 PROCEDURE — 80048 BASIC METABOLIC PNL TOTAL CA: CPT

## 2020-11-06 PROCEDURE — A9540 TC99M MAA: HCPCS | Performed by: RADIOLOGY

## 2020-11-06 PROCEDURE — 2580000003 HC RX 258: Performed by: INTERNAL MEDICINE

## 2020-11-06 PROCEDURE — 78580 LUNG PERFUSION IMAGING: CPT

## 2020-11-06 PROCEDURE — 84484 ASSAY OF TROPONIN QUANT: CPT

## 2020-11-06 RX ADMIN — SACUBITRIL AND VALSARTAN 1 TABLET: 24; 26 TABLET, FILM COATED ORAL at 09:26

## 2020-11-06 RX ADMIN — Medication 8 MILLICURIE: at 12:20

## 2020-11-06 RX ADMIN — DOCUSATE SODIUM 100 MG: 100 CAPSULE, LIQUID FILLED ORAL at 09:27

## 2020-11-06 RX ADMIN — HYDRALAZINE HYDROCHLORIDE 10 MG: 10 TABLET, FILM COATED ORAL at 14:07

## 2020-11-06 RX ADMIN — METOPROLOL SUCCINATE 100 MG: 100 TABLET, EXTENDED RELEASE ORAL at 09:26

## 2020-11-06 RX ADMIN — PROMETHAZINE HYDROCHLORIDE 6.25 MG: 25 TABLET ORAL at 14:07

## 2020-11-06 RX ADMIN — ISOSORBIDE MONONITRATE 60 MG: 60 TABLET ORAL at 09:26

## 2020-11-06 RX ADMIN — PROMETHAZINE HYDROCHLORIDE 6.25 MG: 25 TABLET ORAL at 09:28

## 2020-11-06 RX ADMIN — DOXYCYCLINE HYCLATE 100 MG: 100 CAPSULE ORAL at 09:26

## 2020-11-06 RX ADMIN — CLONIDINE HYDROCHLORIDE 0.1 MG: 0.1 TABLET ORAL at 09:26

## 2020-11-06 RX ADMIN — SPIRONOLACTONE 25 MG: 25 TABLET ORAL at 09:26

## 2020-11-06 RX ADMIN — PANTOPRAZOLE SODIUM 40 MG: 40 TABLET, DELAYED RELEASE ORAL at 06:40

## 2020-11-06 RX ADMIN — GABAPENTIN 200 MG: 100 CAPSULE ORAL at 09:27

## 2020-11-06 RX ADMIN — ARFORMOTEROL TARTRATE 15 MCG: 15 SOLUTION RESPIRATORY (INHALATION) at 08:28

## 2020-11-06 RX ADMIN — AMLODIPINE BESYLATE 10 MG: 10 TABLET ORAL at 09:26

## 2020-11-06 RX ADMIN — HYDRALAZINE HYDROCHLORIDE 10 MG: 10 TABLET, FILM COATED ORAL at 09:26

## 2020-11-06 RX ADMIN — TORSEMIDE 20 MG: 20 TABLET ORAL at 09:26

## 2020-11-06 RX ADMIN — SODIUM CHLORIDE, PRESERVATIVE FREE 10 ML: 5 INJECTION INTRAVENOUS at 09:27

## 2020-11-06 RX ADMIN — IPRATROPIUM BROMIDE 0.5 MG: 0.5 SOLUTION RESPIRATORY (INHALATION) at 16:20

## 2020-11-06 RX ADMIN — SORBITOL SOLUTION (BULK) 30 ML: 70 SOLUTION at 06:46

## 2020-11-06 RX ADMIN — BUDESONIDE 250 MCG: 0.25 SUSPENSION RESPIRATORY (INHALATION) at 08:28

## 2020-11-06 RX ADMIN — IPRATROPIUM BROMIDE 0.5 MG: 0.5 SOLUTION RESPIRATORY (INHALATION) at 08:28

## 2020-11-06 RX ADMIN — GABAPENTIN 200 MG: 100 CAPSULE ORAL at 14:07

## 2020-11-06 RX ADMIN — FINASTERIDE 5 MG: 5 TABLET, FILM COATED ORAL at 09:26

## 2020-11-06 RX ADMIN — ENOXAPARIN SODIUM 40 MG: 40 INJECTION SUBCUTANEOUS at 09:27

## 2020-11-06 ASSESSMENT — PAIN SCALES - GENERAL
PAINLEVEL_OUTOF10: 0
PAINLEVEL_OUTOF10: 0
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
## 2020-11-06 NOTE — PROGRESS NOTES
Progress Note  NEPHROLOGY    Reason for Consult: Evaluation of acute on chronic kidney disease    Requesting Physician: Dr. Trish Downs    Chief Complaint: Admitted with unstable angina and acute kidney injury    History Obtained From:  patient, electronic medical record    History of Present Ilness: We are following this patient for his BEAR. On admission his creatinine was 2.0, his baseline cr over the past year has been 1.0-1.3    Subjective    11/2/2020: Patient is short of breath at rest and sob increases with talking. O2 on per nasal cannula. He denies chest pain currently. Feels like his abdomen is full. Denies nausea. 11/3/2020: Resting in bed s/p cardiac cath. States he is feeling better today, breathing easier. 11/4/2020: Just returned from walking 2 laps @ [employer] and is feeling sob. Denies chest pain. 11/5/2020- eating lunch, states he is going home today. 11/6/2020- feeling better, became more SOB last afternoon. CXR negative, dosed with diuretic. Awaiting VQ scan and possible discharge today.      Current Medications:    Current Facility-Administered Medications: isosorbide mononitrate (IMDUR) extended release tablet 60 mg, 60 mg, Oral, Daily  sodium chloride flush 0.9 % injection 10 mL, 10 mL, Intravenous, 2 times per day  sodium chloride flush 0.9 % injection 10 mL, 10 mL, Intravenous, PRN  gabapentin (NEURONTIN) capsule 200 mg, 200 mg, Oral, 4x Daily  hydrALAZINE (APRESOLINE) tablet 10 mg, 10 mg, Oral, TID  docusate sodium (COLACE) capsule 100 mg, 100 mg, Oral, BID  magnesium hydroxide (MILK OF MAGNESIA) 400 MG/5ML suspension 30 mL, 30 mL, Oral, Daily PRN  enoxaparin (LOVENOX) injection 40 mg, 40 mg, Subcutaneous, Daily  amLODIPine (NORVASC) tablet 10 mg, 10 mg, Oral, Daily  cloNIDine (CATAPRES) tablet 0.1 mg, 0.1 mg, Oral, BID  torsemide (DEMADEX) tablet 20 mg, 20 mg, Oral, Daily  albuterol (PROVENTIL) nebulizer solution 2.5 mg, 2.5 mg, Nebulization, Q6H PRN  doxycycline hyclate (VIBRAMYCIN) capsule 100 mg, 100 mg, Oral, BID  finasteride (PROSCAR) tablet 5 mg, 5 mg, Oral, Daily  metoprolol succinate (TOPROL XL) extended release tablet 100 mg, 100 mg, Oral, Daily  nicotine (NICODERM CQ) 21 MG/24HR 1 patch, 1 patch, Transdermal, Daily  pantoprazole (PROTONIX) tablet 40 mg, 40 mg, Oral, QAM AC  promethazine (PHENERGAN) tablet 6.25 mg, 6.25 mg, Oral, 4x Daily  spironolactone (ALDACTONE) tablet 25 mg, 25 mg, Oral, Daily  calcium carbonate (TUMS) chewable tablet 500 mg, 500 mg, Oral, TID PRN  heparin (porcine) injection 3,100 Units, 30 Units/kg, Intravenous, PRN  heparin (porcine) injection 6,200 Units, 60 Units/kg, Intravenous, PRN  nitroGLYCERIN (NITROSTAT) SL tablet 0.4 mg, 0.4 mg, Sublingual, Q5 Min PRN  budesonide (PULMICORT) nebulizer suspension 250 mcg, 0.25 mg, Nebulization, BID **AND** ipratropium (ATROVENT) 0.02 % nebulizer solution 0.5 mg, 0.5 mg, Nebulization, 4x daily **AND** Arformoterol Tartrate (BROVANA) nebulizer solution 15 mcg, 15 mcg, Nebulization, BID  sacubitril-valsartan (ENTRESTO) 24-26 MG per tablet 1 tablet, 1 tablet, Oral, BID    Allergies:  Patient has no known allergies. Social History:       reports that he has been smoking cigarettes. He started smoking about 41 years ago. He has a 60.00 pack-year smoking history. He has never used smokeless tobacco. He reports previous alcohol use. He reports current drug use. Drugs: Cocaine, Other-see comments, and Marijuana.     Family History:     Family History   Problem Relation Age of Onset    Arthritis Mother     Other Mother         glucoma    Heart Disease Father     High Blood Pressure Brother     Other Brother         sleep apnea    High Blood Pressure Brother     Other Brother         sleep apnea    High Blood Pressure Brother     Other Brother         sleep apnea    High Blood Pressure Brother     Other Brother         sleep apnea    Cancer Brother         gitaogeal -  at 62         Review of Systems: Pertinent positives stated above in HPI. All other systems were reviewed and were negative.     Physical exam:   Constitutional:  VITALS:  /77   Pulse 79   Temp 97.7 °F (36.5 °C)   Resp 18   Ht 5' 7\" (1.702 m)   Wt 212 lb 9.6 oz (96.4 kg)   SpO2 94%   BMI 33.30 kg/m²   CURRENT TEMPERATURE:  Temp: 97.7 °F (36.5 °C)  CURRENT RESPIRATORY RATE:  Resp: 18  CURRENT PULSE:  Pulse: 79  CURRENT BLOOD PRESSURE:  BP: 110/77  24HR BLOOD PRESSURE RANGE:  Systolic (64UQQ), UGV:581 , Min:105 , MTT:557   ; Diastolic (81ZVR), GUL:74, Min:70, Max:90    24HR INTAKE/OUTPUT:      Intake/Output Summary (Last 24 hours) at 11/6/2020 0836  Last data filed at 11/6/2020 0421  Gross per 24 hour   Intake 420 ml   Output 900 ml   Net -480 ml     Gen: alert, awake  Skin: no rash on exposed skin, turgor wnl  Neck: no jvd noted  Cardiovascular: S1 S2 no S3 or rub  Respiratory: lungs clear  Abdomen: round, soft, +bowel sounds  Ext: no edema   Psychiatric: mood and affect appropriate      DATA:      CBC:   Lab Results   Component Value Date    WBC 11.7 11/06/2020    RBC 4.64 11/06/2020    HGB 14.9 11/06/2020    HCT 42.6 11/06/2020    MCV 91.8 11/06/2020    MCH 32.1 11/06/2020    MCHC 35.0 11/06/2020    RDW 11.9 11/06/2020     11/06/2020    MPV 10.0 11/06/2020     BMP:    Lab Results   Component Value Date     11/06/2020    K 4.2 11/06/2020    K 3.8 10/01/2019     11/06/2020    CO2 21 11/06/2020    BUN 22 11/06/2020    LABALBU 3.9 10/31/2020    CREATININE 1.6 11/06/2020    CALCIUM 9.7 11/06/2020    GFRAA 54 11/06/2020    LABGLOM 45 11/06/2020    GLUCOSE 87 11/06/2020       RAD:  Xr Chest Portable        Assessment/Plan    1)  BEAR:  most likely pre-renal with decreased effective renal perfusion in the setting of nausea, CP and SOB in a patient taking torsemide 10 mg TID, Aldactone 25 mg QD, and hydrochlorothiazide 25 mg daily  Baseline scr has ranged 1.0-1.3 over the past 12 months  Renal ultrasound on 2/10/2020:

## 2020-11-06 NOTE — PROGRESS NOTES
pantoprazole  40 mg Oral QAM AC    promethazine  6.25 mg Oral 4x Daily    spironolactone  25 mg Oral Daily    budesonide  0.25 mg Nebulization BID    And    ipratropium  0.5 mg Nebulization 4x daily    And    Arformoterol Tartrate  15 mcg Nebulization BID    sacubitril-valsartan  1 tablet Oral BID       VITAL SIGNS:                                                                                                                          /85   Pulse 80   Temp 97.3 °F (36.3 °C) (Temporal)   Resp 18   Ht 5' 7\" (1.702 m)   Wt 216 lb 3.2 oz (98.1 kg)   SpO2 91%   BMI 33.86 kg/m²   Patient Vitals for the past 96 hrs (Last 3 readings):   Weight   11/05/20 0441 216 lb 3.2 oz (98.1 kg)   11/04/20 0647 214 lb 6.4 oz (97.3 kg)   11/03/20 0625 216 lb (98 kg)     OBJECTIVE:    HEENT: PERRL, EOM  Intact; sclera non-icteric, conjunctiva pink. Carotids are brisk in upstroke with normal contour. No carotid bruits. Normal jugular venous pulsation at 45°. No palpable cervical nor supraclavicular nodes. Thyroid not palpable. Trachea midline. Chest: Even excursion  Lungs: CTA B, no expiratory wheezes or rhonchi, no decreased tactile fremitus without inspiratory rales. Heart: Regular  rhythm; S1 > S2, S4 gallop; murmur not appreciated at this time. . No clicks, rub, palpable thrills   or heaves. PMI nondisplaced, 5th intercostal space MCL. Abdomen: Soft, nontender, nondistended,  moderately protuberant, no masses or organomegaly. Bowel sounds active. Extremities: Without clubbing, cyanosis or edema. Pulses present 3+ upper extermities bilaterally; present 1+ DP and present 1+ PT bilaterally.      Data:   Scheduled Meds: Reviewed  Continuous Infusions:       Intake/Output Summary (Last 24 hours) at 11/4/2020 2245  Last data filed at 11/4/2020 2203  Gross per 24 hour   Intake 2934.7 ml   Output 2500 ml   Net 434.7 ml     CBC:   Recent Labs     11/03/20  0557 11/04/20  0638   WBC 12.2* 11.9*   HGB 14.2 14.2   HCT 42.0 41.0    282     BMP:  Recent Labs     11/03/20  0557 11/04/20  0638 11/05/20  0621     135 136 138   K 4.4  4.2 4.2 4.1     100 102 102   CO2 21*  24 22 22   BUN 21*  22* 18 18   CREATININE 1.7*  1.8* 1.6* 1.5*   LABGLOM 42  39 45 48     ABGs:   Lab Results   Component Value Date    PH 7.445 10/01/2019    PO2 69.4 10/01/2019    PCO2 25.6 10/01/2019     INR:   No results for input(s): INR in the last 72 hours. PRO-BNP:   Lab Results   Component Value Date    PROBNP 811 (H) 10/30/2020    PROBNP 1,851 (H) 02/05/2020      TSH:   Lab Results   Component Value Date    TSH 0.809 04/18/2019      Cardiac Injury Profile:   Recent Labs     11/05/20  1642   CKTOTAL 99   TROPONINI <0.01      Lipid Profile:   Lab Results   Component Value Date    TRIG 217 01/02/2018    HDL 58 01/02/2018    LDLCALC 144 01/02/2018    CHOL 245 01/02/2018      Hemoglobin A1C: No components found for: HGBA1C     RAD:   Xr Chest Portable    Result Date: 10/31/2020  EXAMINATION: ONE XRAY VIEW OF THE CHEST 10/30/2020 11:24 pm COMPARISON: Chest x-ray from 02/05/2020. HISTORY: ORDERING SYSTEM PROVIDED HISTORY: chest pain TECHNOLOGIST PROVIDED HISTORY: Reason for exam:->chest pain What reading provider will be dictating this exam?->CRC FINDINGS: Mild peripheral patchy/ground-glass opacification is noted in the peripheral left lung which could represent atelectasis versus early/mild airspace disease process. No evidence of pleural effusion. Heart size at the upper limits of normal.  Postsurgical changes in the lower cervical spine. Osseous structures are otherwise unremarkable. Mild peripheral patchy/ground-glass opacification in the peripheral left lung which could represent atelectasis versus early/mild airspace disease process.      Cta Chest W Contrast    Result Date: 10/31/2020  EXAMINATION: CTA OF THE CHEST 10/30/2020 10:32 pm TECHNIQUE: CTA of the chest was performed after the administration of intravenous contrast. Multiplanar reformatted images are provided for review. MIP images are provided for review. Dose modulation, iterative reconstruction, and/or weight based adjustment of the mA/kV was utilized to reduce the radiation dose to as low as reasonably achievable. COMPARISON: None. HISTORY: ORDERING SYSTEM PROVIDED HISTORY: PE concern TECHNOLOGIST PROVIDED HISTORY: Reason for exam:->PE concern What reading provider will be dictating this exam?->CRC FINDINGS: Pulmonary Arteries: Pulmonary arteries are adequately opacified for evaluation. No evidence of intraluminal filling defect to suggest pulmonary embolism. Main pulmonary artery is normal in caliber. Mediastinum: No evidence of mediastinal lymphadenopathy. The heart and pericardium demonstrate no acute abnormality. There is no acute abnormality of the thoracic aorta. Lungs/pleura: There is a 1.4 cm nodular density at the posterior left lung base. Recommend follow-up noncontrast CT of the chest in 3 months. Mild dependent congestion in the bilateral posterior lungs. The lungs are otherwise without acute process. No focal consolidation or pulmonary edema. No evidence of pleural effusion or pneumothorax. Upper Abdomen: Limited images of the upper abdomen are unremarkable. Soft Tissues/Bones: Chronic fracture deformity of the lateral left 7th rib. Otherwise, no acute bone or soft tissue abnormality. No evidence of pulmonary embolism or acute pulmonary abnormality. There is a 1.4 cm nodular density in the posterior left lung base. Recommend follow-up noncontrast CT of the chest in 3 months.      Us Abdomen Limited    Result Date: 10/27/2020  EXAMINATION: RIGHT UPPER QUADRANT ULTRASOUND 10/26/2020 7:13 pm COMPARISON: 04/18/2019 ultrasound HISTORY: ORDERING SYSTEM PROVIDED HISTORY: Abdominal distension TECHNOLOGIST PROVIDED HISTORY: Reason for exam:->ascites What reading provider will be dictating this exam?->CRC FINDINGS: LIVER:  The liver demonstrates normal echogenicity without evidence of intrahepatic biliary ductal dilatation. BILIARY SYSTEM:  Gallbladder is unremarkable without evidence of pericholecystic fluid, wall thickening or stones. Negative sonographic James's sign. Common bile duct is within normal limits measuring 3 mm. RIGHT KIDNEY: The right kidney is grossly unremarkable without evidence of hydronephrosis. PANCREAS:  Visualized portions of the pancreas are unremarkable. OTHER: Small pleural effusions are noted incidentally in the bilateral chest.     1. No significant finding in the abdomen. 2. There are small bilateral pleural effusions. Nm Cardiac Stress Test Nuclear Imaging    Result Date: 10/31/2020  Indication:  Chest pressure and burning Clinical History:   Patient has no previous historyof coronary artery disease. IMAGING: Myocardial perfusion imaging was performed at rest 30-35 minutes following the intravenous injection of 12 mCi of (Tc-Sestamibi) followed by 10 ml of Normal Saline. At peak exercise, the patient was injected intravenously with 35mCi of (Tc-Sestamibi) followed by 10 ml of Normal Saline. Gated post-stress tomographic imaging was performed 20-25 minutes after stress. FINDINGS: The overall quality of the study was good. Left ventricular cavity size was noted to be dilated Rotational analog analysis demonstrated elevated left hemidiaphragm with marked patient motion artifact The gated SPECT stress imaging in the short, vertical long, and horizontal long axes demonstrate decreased inferoapical uptake at both stress and rest images. . However there was normal brightening and thickening noted in this area. There is global hypokinesis of the left ventricle with an estimated LVEF of 33%. Healing perfusion images at both rest and stress suggest possible underlying abdomen are artifactual cause.      1. Decreased myocardial perfusion inferoapical segment both rest and stress with normal brightening and thickening and global decrease in left ventricular contractility and modest dilatation of left ventricular cavity. 2. Global decrease in left ventricular systolic contractility estimated LVEF 33%. Justyn Lipscombman FACP FACC [facility]     EKG: See Report  Echo: See Report      IMPRESSIONS:  Active Problems:    Unstable angina ([address]  Resolved Problems:    * No resolved hospital problems. *      RECOMMENDATIONS:  Will try sorbitol liquid for bowel movement which she has not had in the past 48-72 hours. From a cardiac standpoint he appears stable although he notes intermittent fleeting pleuritic-like pains as well as \"gasping\" for air yet when I went into his room he was snoring heavily and did not appear to be dyspneic whatsoever. Thus would obtain a sleep study with appropriate intervention as well. Fortunately his renal function was not compromised as result of his cardiac catheterization. I have spent more than 25 minutes face to face with Juan Judd and reviewing notes and laboratory data, with greater than 50% of this time instructing and counseling the patient face to face regarding my findings and recommendations and I have answered all questions as posed to me by Mr. Jordon Costa. Timmy Ryan, DO FACP,FACC,[facility]      NOTE:  This report was transcribed using voice recognition software.   Every effort was made to ensure accuracy; however, inadvertent computerized transcription errors may be present

## 2020-11-06 NOTE — PROGRESS NOTES
[facility]  [address], [address]  [facility], [facility]      Pulmonary Progress Note      SUBJECTIVE:  Claims doing some better. Better with abdominal pain as given medication. Stable with respiratory function, possible discharge today.     OBJECTIVE    Medications    Continuous Infusions:    Scheduled Meds:   isosorbide mononitrate  60 mg Oral Daily    sodium chloride flush  10 mL Intravenous 2 times per day    gabapentin  200 mg Oral 4x Daily    hydrALAZINE  10 mg Oral TID    docusate sodium  100 mg Oral BID    enoxaparin  40 mg Subcutaneous Daily    amLODIPine  10 mg Oral Daily    cloNIDine  0.1 mg Oral BID    torsemide  20 mg Oral Daily    doxycycline hyclate  100 mg Oral BID    finasteride  5 mg Oral Daily    metoprolol succinate  100 mg Oral Daily    nicotine  1 patch Transdermal Daily    pantoprazole  40 mg Oral QAM AC    promethazine  6.25 mg Oral 4x Daily    spironolactone  25 mg Oral Daily    budesonide  0.25 mg Nebulization BID    And    ipratropium  0.5 mg Nebulization 4x daily    And    Arformoterol Tartrate  15 mcg Nebulization BID    sacubitril-valsartan  1 tablet Oral BID       PRN Meds:sodium chloride flush, magnesium hydroxide, albuterol, calcium carbonate, heparin (porcine), heparin (porcine), nitroGLYCERIN    Physical    VITALS:  /72   Pulse 72   Temp 97.7 °F (36.5 °C)   Resp 18   Ht 5' 7\" (1.702 m)   Wt 212 lb 9.6 oz (96.4 kg)   SpO2 100%   BMI 33.30 kg/m²     24HR INTAKE/OUTPUT:      Intake/Output Summary (Last 24 hours) at 2020 1655  Last data filed at 2020 1431  Gross per 24 hour   Intake 720 ml   Output 600 ml   Net 120 ml       24HR PULSE OXIMETRY RANGE:    SpO2  Av.4 %  Min: 91 %  Max: 100 %    General appearance: alert, appears stated age and cooperative, obese  Lungs: clear to auscultation bilaterally  Heart: regular rate and rhythm, S1, S2 normal, no murmur, click, rub

## 2020-11-07 NOTE — PROGRESS NOTES
nor supraclavicular nodes. Thyroid not palpable. Trachea midline. Chest: Even excursion  Lungs: CTA B, no expiratory wheezes or rhonchi, no decreased tactile fremitus without inspiratory rales. Heart: Regular  rhythm; S1 > S2, S4 gallop; murmur not appreciated at this time. . No clicks, rub, palpable thrills   or heaves. PMI nondisplaced, 5th intercostal space MCL. Abdomen: Soft, nontender, nondistended,  moderately protuberant, no masses or organomegaly. Bowel sounds active. Extremities: Without clubbing, cyanosis or edema. Pulses present 3+ upper extermities bilaterally; present 1+ DP and present 1+ PT bilaterally. Data:   Scheduled Meds: Reviewed  Continuous Infusions:       Intake/Output Summary (Last 24 hours) at 11/6/2020 1710  Last data filed at 11/6/2020 0836  Gross per 24 hour   Intake 420 ml   Output 900 ml   Net -480 ml     CBC:   Recent Labs     11/04/20  0638 11/06/20  0513   WBC 11.9* 11.7*   HGB 14.2 14.9   HCT 41.0 42.6    334     BMP:  Recent Labs     11/04/20  0638 11/05/20  0621 11/06/20  0513    138 138   K 4.2 4.1 4.2    102 100   CO2 22 22 21*   BUN 18 18 22*   CREATININE 1.6* 1.5* 1.6*   LABGLOM 45 48 45     ABGs:   Lab Results   Component Value Date    PH 7.445 10/01/2019    PO2 69.4 10/01/2019    PCO2 25.6 10/01/2019     INR:   No results for input(s): INR in the last 72 hours.   PRO-BNP:   Lab Results   Component Value Date    PROBNP 811 (H) 10/30/2020    PROBNP 1,851 (H) 02/05/2020      TSH:   Lab Results   Component Value Date    TSH 0.809 04/18/2019      Cardiac Injury Profile:   Recent Labs     11/05/20  1642 11/05/20  2318 11/06/20  0513   CKTOTAL 99  --   --    TROPONINI <0.01 <0.01 <0.01      Lipid Profile:   Lab Results   Component Value Date    TRIG 217 01/02/2018    HDL 58 01/02/2018    LDLCALC 144 01/02/2018    CHOL 245 01/02/2018      Hemoglobin A1C: No components found for: HGBA1C     RAD:   Xr Chest Portable    Result Date: 10/31/2020  EXAMINATION: ONE XRAY VIEW OF THE CHEST 10/30/2020 11:24 pm COMPARISON: Chest x-ray from 02/05/2020. HISTORY: ORDERING SYSTEM PROVIDED HISTORY: chest pain TECHNOLOGIST PROVIDED HISTORY: Reason for exam:->chest pain What reading provider will be dictating this exam?->CRC FINDINGS: Mild peripheral patchy/ground-glass opacification is noted in the peripheral left lung which could represent atelectasis versus early/mild airspace disease process. No evidence of pleural effusion. Heart size at the upper limits of normal.  Postsurgical changes in the lower cervical spine. Osseous structures are otherwise unremarkable. Mild peripheral patchy/ground-glass opacification in the peripheral left lung which could represent atelectasis versus early/mild airspace disease process. Cta Chest W Contrast    Result Date: 10/31/2020  EXAMINATION: CTA OF THE CHEST 10/30/2020 10:32 pm TECHNIQUE: CTA of the chest was performed after the administration of intravenous contrast.  Multiplanar reformatted images are provided for review. MIP images are provided for review. Dose modulation, iterative reconstruction, and/or weight based adjustment of the mA/kV was utilized to reduce the radiation dose to as low as reasonably achievable. COMPARISON: None. HISTORY: ORDERING SYSTEM PROVIDED HISTORY: PE concern TECHNOLOGIST PROVIDED HISTORY: Reason for exam:->PE concern What reading provider will be dictating this exam?->CRC FINDINGS: Pulmonary Arteries: Pulmonary arteries are adequately opacified for evaluation. No evidence of intraluminal filling defect to suggest pulmonary embolism. Main pulmonary artery is normal in caliber. Mediastinum: No evidence of mediastinal lymphadenopathy. The heart and pericardium demonstrate no acute abnormality. There is no acute abnormality of the thoracic aorta. Lungs/pleura: There is a 1.4 cm nodular density at the posterior left lung base. Recommend follow-up noncontrast CT of the chest in 3 months.   Mild dependent congestion in the bilateral posterior lungs. The lungs are otherwise without acute process. No focal consolidation or pulmonary edema. No evidence of pleural effusion or pneumothorax. Upper Abdomen: Limited images of the upper abdomen are unremarkable. Soft Tissues/Bones: Chronic fracture deformity of the lateral left 7th rib. Otherwise, no acute bone or soft tissue abnormality. No evidence of pulmonary embolism or acute pulmonary abnormality. There is a 1.4 cm nodular density in the posterior left lung base. Recommend follow-up noncontrast CT of the chest in 3 months. Us Abdomen Limited    Result Date: 10/27/2020  EXAMINATION: RIGHT UPPER QUADRANT ULTRASOUND 10/26/2020 7:13 pm COMPARISON: 04/18/2019 ultrasound HISTORY: ORDERING SYSTEM PROVIDED HISTORY: Abdominal distension TECHNOLOGIST PROVIDED HISTORY: Reason for exam:->ascites What reading provider will be dictating this exam?->CRC FINDINGS: LIVER:  The liver demonstrates normal echogenicity without evidence of intrahepatic biliary ductal dilatation. BILIARY SYSTEM:  Gallbladder is unremarkable without evidence of pericholecystic fluid, wall thickening or stones. Negative sonographic James's sign. Common bile duct is within normal limits measuring 3 mm. RIGHT KIDNEY: The right kidney is grossly unremarkable without evidence of hydronephrosis. PANCREAS:  Visualized portions of the pancreas are unremarkable. OTHER: Small pleural effusions are noted incidentally in the bilateral chest.     1. No significant finding in the abdomen. 2. There are small bilateral pleural effusions. Nm Cardiac Stress Test Nuclear Imaging    Result Date: 10/31/2020  Indication:  Chest pressure and burning Clinical History:   Patient has no previous historyof coronary artery disease. IMAGING: Myocardial perfusion imaging was performed at rest 30-35 minutes following the intravenous injection of 12 mCi of (Tc-Sestamibi) followed by 10 ml of Normal Saline.   At peak exercise, the patient was injected intravenously with 35mCi of (Tc-Sestamibi) followed by 10 ml of Normal Saline. Gated post-stress tomographic imaging was performed 20-25 minutes after stress. FINDINGS: The overall quality of the study was good. Left ventricular cavity size was noted to be dilated Rotational analog analysis demonstrated elevated left hemidiaphragm with marked patient motion artifact The gated SPECT stress imaging in the short, vertical long, and horizontal long axes demonstrate decreased inferoapical uptake at both stress and rest images. . However there was normal brightening and thickening noted in this area. There is global hypokinesis of the left ventricle with an estimated LVEF of 33%. Healing perfusion images at both rest and stress suggest possible underlying abdomen are artifactual cause. 1. Decreased myocardial perfusion inferoapical segment both rest and stress with normal brightening and thickening and global decrease in left ventricular contractility and modest dilatation of left ventricular cavity. 2. Global decrease in left ventricular systolic contractility estimated LVEF 33%. Sandra Peterson FACP FACC [facility]     EKG: See Report  Echo: See Report      IMPRESSIONS:  Active Problems:    Unstable angina ([address]  Resolved Problems:    * No resolved hospital problems. *      RECOMMENDATIONS:  If V/Q unremarkable consider discharge when ok with Dr. Alexsander Ramirez. Will follow up as outpatient. Continue present cardiac medications as ordered. I have spent more than 25 minutes face to face with Dharmesh Suazo and reviewing notes and laboratory data, with greater than 50% of this time instructing and counseling the patient face to face regarding my findings and recommendations and I have answered all questions as posed to me by Mr. Solomon Rad. Warrick Severin,  FACP,FACC,[facility]      NOTE:  This report was transcribed using voice recognition software.   Every effort was made to ensure

## 2020-11-10 LAB
EKG ATRIAL RATE: 75 BPM
EKG P AXIS: 18 DEGREES
EKG P-R INTERVAL: 132 MS
EKG Q-T INTERVAL: 400 MS
EKG QRS DURATION: 84 MS
EKG QTC CALCULATION (BAZETT): 446 MS
EKG R AXIS: 22 DEGREES
EKG T AXIS: 15 DEGREES
EKG VENTRICULAR RATE: 75 BPM

## 2020-11-14 ENCOUNTER — APPOINTMENT (OUTPATIENT)
Dept: CT IMAGING | Age: 55
DRG: 177 | End: 2020-11-14
Payer: MEDICARE

## 2020-11-14 ENCOUNTER — HOSPITAL ENCOUNTER (INPATIENT)
Age: 55
LOS: 6 days | Discharge: HOME | DRG: 177 | End: 2020-11-20
Attending: EMERGENCY MEDICINE | Admitting: GENERAL PRACTICE
Payer: MEDICARE

## 2020-11-14 ENCOUNTER — APPOINTMENT (OUTPATIENT)
Dept: GENERAL RADIOLOGY | Age: 55
DRG: 177 | End: 2020-11-14
Payer: MEDICARE

## 2020-11-14 LAB
ADENOVIRUS BY PCR: NOT DETECTED
ALBUMIN SERPL-MCNC: 4.3 G/DL (ref 3.5–5.2)
ALP BLD-CCNC: 65 U/L (ref 40–129)
ALT SERPL-CCNC: 14 U/L (ref 0–40)
ANION GAP SERPL CALCULATED.3IONS-SCNC: 13 MMOL/L (ref 7–16)
AST SERPL-CCNC: 25 U/L (ref 0–39)
BASOPHILS ABSOLUTE: 0.01 E9/L (ref 0–0.2)
BASOPHILS RELATIVE PERCENT: 0.2 % (ref 0–2)
BILIRUB SERPL-MCNC: 0.2 MG/DL (ref 0–1.2)
BORDETELLA PARAPERTUSSIS BY PCR: NOT DETECTED
BORDETELLA PERTUSSIS BY PCR: NOT DETECTED
BUN BLDV-MCNC: 23 MG/DL (ref 6–20)
C-REACTIVE PROTEIN: 2.4 MG/DL (ref 0–0.4)
CALCIUM SERPL-MCNC: 9.4 MG/DL (ref 8.6–10.2)
CHLAMYDOPHILIA PNEUMONIAE BY PCR: NOT DETECTED
CHLORIDE BLD-SCNC: 95 MMOL/L (ref 98–107)
CO2: 23 MMOL/L (ref 22–29)
CORONAVIRUS 229E BY PCR: NOT DETECTED
CORONAVIRUS HKU1 BY PCR: NOT DETECTED
CORONAVIRUS NL63 BY PCR: NOT DETECTED
CORONAVIRUS OC43 BY PCR: NOT DETECTED
CREAT SERPL-MCNC: 2.2 MG/DL (ref 0.7–1.2)
EOSINOPHILS ABSOLUTE: 0.02 E9/L (ref 0.05–0.5)
EOSINOPHILS RELATIVE PERCENT: 0.3 % (ref 0–6)
GFR AFRICAN AMERICAN: 37
GFR NON-AFRICAN AMERICAN: 31 ML/MIN/1.73
GLUCOSE BLD-MCNC: 91 MG/DL (ref 74–99)
HCT VFR BLD CALC: 45.7 % (ref 37–54)
HEMOGLOBIN: 15.6 G/DL (ref 12.5–16.5)
HUMAN METAPNEUMOVIRUS BY PCR: NOT DETECTED
HUMAN RHINOVIRUS/ENTEROVIRUS BY PCR: NOT DETECTED
IMMATURE GRANULOCYTES #: 0.03 E9/L
IMMATURE GRANULOCYTES %: 0.5 % (ref 0–5)
INFLUENZA A (NO SUBTYPE) BY PCR: NOT DETECTED
INFLUENZA A BY PCR: NOT DETECTED
INFLUENZA A H1 BY PCR: NOT DETECTED
INFLUENZA A H1-2009 BY PCR: NOT DETECTED
INFLUENZA A H3 BY PCR: NOT DETECTED
INFLUENZA B BY PCR: NOT DETECTED
LIPASE: 24 U/L (ref 13–60)
LYMPHOCYTES ABSOLUTE: 0.88 E9/L (ref 1.5–4)
LYMPHOCYTES RELATIVE PERCENT: 14 % (ref 20–42)
MCH RBC QN AUTO: 31.6 PG (ref 26–35)
MCHC RBC AUTO-ENTMCNC: 34.1 % (ref 32–34.5)
MCV RBC AUTO: 92.7 FL (ref 80–99.9)
MONOCYTES ABSOLUTE: 0.56 E9/L (ref 0.1–0.95)
MONOCYTES RELATIVE PERCENT: 8.9 % (ref 2–12)
MYCOPLASMA PNEUMONIAE BY PCR: NOT DETECTED
NEUTROPHILS ABSOLUTE: 4.77 E9/L (ref 1.8–7.3)
NEUTROPHILS RELATIVE PERCENT: 76.1 % (ref 43–80)
PARAINFLUENZA VIRUS 1 BY PCR: NOT DETECTED
PARAINFLUENZA VIRUS 2 BY PCR: NOT DETECTED
PARAINFLUENZA VIRUS 3 BY PCR: NOT DETECTED
PARAINFLUENZA VIRUS 4 BY PCR: NOT DETECTED
PDW BLD-RTO: 12.2 FL (ref 11.5–15)
PLATELET # BLD: 277 E9/L (ref 130–450)
PMV BLD AUTO: 9.5 FL (ref 7–12)
POTASSIUM REFLEX MAGNESIUM: 4.8 MMOL/L (ref 3.5–5)
PRO-BNP: 660 PG/ML (ref 0–125)
PROCALCITONIN: 0.18 NG/ML (ref 0–0.08)
RBC # BLD: 4.93 E12/L (ref 3.8–5.8)
RESPIRATORY SYNCYTIAL VIRUS BY PCR: NOT DETECTED
SARS-COV-2, PCR: DETECTED
SEDIMENTATION RATE, ERYTHROCYTE: 54 MM/HR (ref 0–15)
SODIUM BLD-SCNC: 131 MMOL/L (ref 132–146)
TOTAL PROTEIN: 9.2 G/DL (ref 6.4–8.3)
TROPONIN: <0.01 NG/ML (ref 0–0.03)
WBC # BLD: 6.3 E9/L (ref 4.5–11.5)

## 2020-11-14 PROCEDURE — 86140 C-REACTIVE PROTEIN: CPT

## 2020-11-14 PROCEDURE — 85651 RBC SED RATE NONAUTOMATED: CPT

## 2020-11-14 PROCEDURE — 83880 ASSAY OF NATRIURETIC PEPTIDE: CPT

## 2020-11-14 PROCEDURE — 85025 COMPLETE CBC W/AUTO DIFF WBC: CPT

## 2020-11-14 PROCEDURE — 84145 PROCALCITONIN (PCT): CPT

## 2020-11-14 PROCEDURE — 80053 COMPREHEN METABOLIC PANEL: CPT

## 2020-11-14 PROCEDURE — 2060000000 HC ICU INTERMEDIATE R&B

## 2020-11-14 PROCEDURE — 71045 X-RAY EXAM CHEST 1 VIEW: CPT

## 2020-11-14 PROCEDURE — 2580000003 HC RX 258: Performed by: STUDENT IN AN ORGANIZED HEALTH CARE EDUCATION/TRAINING PROGRAM

## 2020-11-14 PROCEDURE — 84484 ASSAY OF TROPONIN QUANT: CPT

## 2020-11-14 PROCEDURE — 6370000000 HC RX 637 (ALT 250 FOR IP): Performed by: STUDENT IN AN ORGANIZED HEALTH CARE EDUCATION/TRAINING PROGRAM

## 2020-11-14 PROCEDURE — 0202U NFCT DS 22 TRGT SARS-COV-2: CPT

## 2020-11-14 PROCEDURE — 70450 CT HEAD/BRAIN W/O DYE: CPT

## 2020-11-14 PROCEDURE — 93005 ELECTROCARDIOGRAM TRACING: CPT | Performed by: STUDENT IN AN ORGANIZED HEALTH CARE EDUCATION/TRAINING PROGRAM

## 2020-11-14 PROCEDURE — 87040 BLOOD CULTURE FOR BACTERIA: CPT

## 2020-11-14 PROCEDURE — 83690 ASSAY OF LIPASE: CPT

## 2020-11-14 PROCEDURE — 99285 EMERGENCY DEPT VISIT HI MDM: CPT

## 2020-11-14 PROCEDURE — 2700000000 HC OXYGEN THERAPY PER DAY

## 2020-11-14 RX ORDER — NITROGLYCERIN 0.4 MG/1
0.4 TABLET SUBLINGUAL EVERY 5 MIN PRN
Status: DISCONTINUED | OUTPATIENT
Start: 2020-11-14 | End: 2020-11-21 | Stop reason: HOSPADM

## 2020-11-14 RX ORDER — ACETAMINOPHEN 325 MG/1
650 TABLET ORAL ONCE
Status: COMPLETED | OUTPATIENT
Start: 2020-11-14 | End: 2020-11-14

## 2020-11-14 RX ORDER — IPRATROPIUM BROMIDE AND ALBUTEROL SULFATE 2.5; .5 MG/3ML; MG/3ML
1 SOLUTION RESPIRATORY (INHALATION) EVERY 4 HOURS PRN
Status: DISCONTINUED | OUTPATIENT
Start: 2020-11-14 | End: 2020-11-14 | Stop reason: CLARIF

## 2020-11-14 RX ORDER — SODIUM CHLORIDE 9 MG/ML
INJECTION, SOLUTION INTRAVENOUS CONTINUOUS
Status: DISCONTINUED | OUTPATIENT
Start: 2020-11-14 | End: 2020-11-21 | Stop reason: HOSPADM

## 2020-11-14 RX ORDER — SPIRONOLACTONE 25 MG/1
25 TABLET ORAL DAILY
Status: DISCONTINUED | OUTPATIENT
Start: 2020-11-15 | End: 2020-11-21 | Stop reason: HOSPADM

## 2020-11-14 RX ORDER — BUDESONIDE AND FORMOTEROL FUMARATE DIHYDRATE 80; 4.5 UG/1; UG/1
2 AEROSOL RESPIRATORY (INHALATION) 2 TIMES DAILY
Status: DISCONTINUED | OUTPATIENT
Start: 2020-11-14 | End: 2020-11-21 | Stop reason: HOSPADM

## 2020-11-14 RX ORDER — SODIUM CHLORIDE 0.9 % (FLUSH) 0.9 %
SYRINGE (ML) INJECTION
Status: DISPENSED
Start: 2020-11-14 | End: 2020-11-15

## 2020-11-14 RX ORDER — ARFORMOTEROL TARTRATE 15 UG/2ML
15 SOLUTION RESPIRATORY (INHALATION) 2 TIMES DAILY
Status: DISCONTINUED | OUTPATIENT
Start: 2020-11-14 | End: 2020-11-14

## 2020-11-14 RX ORDER — AMLODIPINE BESYLATE 5 MG/1
5 TABLET ORAL DAILY
Status: DISCONTINUED | OUTPATIENT
Start: 2020-11-15 | End: 2020-11-21 | Stop reason: HOSPADM

## 2020-11-14 RX ORDER — PANTOPRAZOLE SODIUM 40 MG/1
40 TABLET, DELAYED RELEASE ORAL
Status: DISCONTINUED | OUTPATIENT
Start: 2020-11-15 | End: 2020-11-21 | Stop reason: HOSPADM

## 2020-11-14 RX ORDER — GABAPENTIN 300 MG/1
300 CAPSULE ORAL 4 TIMES DAILY
Status: DISCONTINUED | OUTPATIENT
Start: 2020-11-14 | End: 2020-11-21 | Stop reason: HOSPADM

## 2020-11-14 RX ORDER — NICOTINE 21 MG/24HR
1 PATCH, TRANSDERMAL 24 HOURS TRANSDERMAL DAILY
Status: DISCONTINUED | OUTPATIENT
Start: 2020-11-15 | End: 2020-11-21 | Stop reason: HOSPADM

## 2020-11-14 RX ORDER — HYDRALAZINE HYDROCHLORIDE 10 MG/1
10 TABLET, FILM COATED ORAL EVERY 8 HOURS SCHEDULED
Status: DISCONTINUED | OUTPATIENT
Start: 2020-11-14 | End: 2020-11-21 | Stop reason: HOSPADM

## 2020-11-14 RX ORDER — 0.9 % SODIUM CHLORIDE 0.9 %
1000 INTRAVENOUS SOLUTION INTRAVENOUS ONCE
Status: COMPLETED | OUTPATIENT
Start: 2020-11-14 | End: 2020-11-14

## 2020-11-14 RX ORDER — CLONIDINE HYDROCHLORIDE 0.1 MG/1
0.1 TABLET ORAL 2 TIMES DAILY
Status: DISCONTINUED | OUTPATIENT
Start: 2020-11-14 | End: 2020-11-21 | Stop reason: HOSPADM

## 2020-11-14 RX ORDER — ACETAMINOPHEN 325 MG/1
650 TABLET ORAL EVERY 4 HOURS PRN
Status: DISCONTINUED | OUTPATIENT
Start: 2020-11-14 | End: 2020-11-21 | Stop reason: HOSPADM

## 2020-11-14 RX ADMIN — SODIUM CHLORIDE 1000 ML: 9 INJECTION, SOLUTION INTRAVENOUS at 21:50

## 2020-11-14 RX ADMIN — ACETAMINOPHEN 650 MG: 325 TABLET ORAL at 14:57

## 2020-11-14 ASSESSMENT — PAIN DESCRIPTION - PAIN TYPE: TYPE: ACUTE PAIN

## 2020-11-14 ASSESSMENT — PAIN SCALES - GENERAL
PAINLEVEL_OUTOF10: 9

## 2020-11-14 ASSESSMENT — ENCOUNTER SYMPTOMS
DIARRHEA: 1
WHEEZING: 0
ABDOMINAL PAIN: 1
TROUBLE SWALLOWING: 0
SHORTNESS OF BREATH: 1
SORE THROAT: 0
EYE PAIN: 0
ABDOMINAL DISTENTION: 0
CONSTIPATION: 0
PHOTOPHOBIA: 0
COUGH: 1
VOMITING: 0
NAUSEA: 0

## 2020-11-14 ASSESSMENT — PAIN DESCRIPTION - PROGRESSION: CLINICAL_PROGRESSION: NOT CHANGED

## 2020-11-14 ASSESSMENT — PAIN DESCRIPTION - DESCRIPTORS: DESCRIPTORS: SPASM

## 2020-11-14 ASSESSMENT — PAIN DESCRIPTION - ONSET: ONSET: ON-GOING

## 2020-11-14 ASSESSMENT — PAIN DESCRIPTION - ORIENTATION: ORIENTATION: LEFT;LOWER

## 2020-11-14 ASSESSMENT — PAIN DESCRIPTION - FREQUENCY: FREQUENCY: INTERMITTENT

## 2020-11-14 ASSESSMENT — PAIN DESCRIPTION - LOCATION
LOCATION: CHEST
LOCATION: NOSE;RIB CAGE

## 2020-11-14 NOTE — ED PROVIDER NOTES
Patient is a 66-year-old male with a past medical history of MI, polysubstance abuse, hypertension, lipidemia, COPD, CHF who presented to the ED via ambulance complaining of shortness of breath, cough and fever. Patient reports that symptoms started 2 days ago. He lives at home alone and denies any contact with Covid positive individuals. Patient did recently get catheterization on 11/3/2020 with Dr. Timbo Ku without any stent placement. He also reports having 3 falls at home. Reports hitting his head but denies any loss of consciousness. He admits to taking cocaine, crack cocaine, alcohol and smoking prior but quit all that a month ago. Has associated abdominal pain, diarrhea and decreased appetite and generalized body tenderness. He does have a history of COPD and takes 1 L as needed. Denies any blurry vision, chest pain, dysuria, hematuria, hematochezia or melena. Review of Systems   Constitutional: Positive for activity change, appetite change, chills, fatigue and fever. HENT: Positive for congestion. Negative for sore throat and trouble swallowing. Eyes: Negative for photophobia, pain and visual disturbance. Respiratory: Positive for cough and shortness of breath. Negative for wheezing. Cardiovascular: Negative for chest pain, palpitations and leg swelling. Gastrointestinal: Positive for abdominal pain and diarrhea. Negative for abdominal distention, constipation, nausea and vomiting. Endocrine: Negative for heat intolerance and polydipsia. Genitourinary: Negative for difficulty urinating, dysuria, frequency and hematuria. Musculoskeletal: Negative for joint swelling, neck pain and neck stiffness. Skin: Negative for rash and wound. Neurological: Positive for dizziness, weakness and light-headedness. Negative for syncope, numbness and headaches. Psychiatric/Behavioral: Positive for sleep disturbance. Negative for agitation, behavioral problems and confusion. Physical Exam  Constitutional:       General: He is in acute distress. Appearance: Normal appearance. He is ill-appearing and diaphoretic. HENT:      Head: Normocephalic and atraumatic. Right Ear: External ear normal.      Left Ear: External ear normal.      Nose: Nose normal. No congestion. Mouth/Throat:      Mouth: Mucous membranes are moist.      Pharynx: Oropharynx is clear. Eyes:      Conjunctiva/sclera: Conjunctivae normal.      Pupils: Pupils are equal, round, and reactive to light. Neck:      Musculoskeletal: Normal range of motion and neck supple. Cardiovascular:      Rate and Rhythm: Regular rhythm. Tachycardia present. Pulses: Normal pulses. Heart sounds: Normal heart sounds. No murmur. Pulmonary:      Effort: Tachypnea and respiratory distress present. Breath sounds: Decreased breath sounds present. No wheezing. Comments: Continue 4 L oxygen via nasal cannula  Abdominal:      General: Bowel sounds are normal. There is no distension. Palpations: Abdomen is soft. Tenderness: There is no abdominal tenderness. There is no guarding. Musculoskeletal: Normal range of motion. General: No swelling or tenderness. Right lower leg: He exhibits no tenderness. No edema. Left lower leg: No edema. Skin:     General: Skin is warm. Capillary Refill: Capillary refill takes less than 2 seconds. Coloration: Skin is not jaundiced. Findings: No rash. Neurological:      General: No focal deficit present. Mental Status: He is alert and oriented to person, place, and time. Mental status is at baseline. Cranial Nerves: No cranial nerve deficit. Motor: Weakness present. Psychiatric:         Mood and Affect: Mood normal.         Behavior: Behavior normal.         Thought Content:  Thought content normal.         Judgment: Judgment normal.          Procedures     MDM  Number of Diagnoses or Management Options  Acute respiratory failure with hypoxia [facility]):   BEAR (acute kidney injury) ([facility] [address]): Hyponatremia:   Diagnosis management comments: Patient is a 53-year-old male with a past medical history of MI, polysubstance abuse, hypertension, hyperlipidemia, COPD, CHF who presented to the ED via ambulance complaining of shortness of breath, cough and fever. Patient recently had cardiac catheterization 11/3/2020 with findings global left ventricle systolic dysfunction and diastolic dysfunction. Ejection fraction 33%. Does report he is compliant and takes all his medication for the time being. History of polysubstance abuse with cocaine and crack cocaine, alcohol and cigarettes. However he quit a month ago. Differential diagnoses include but not limited to acute on chronic heart failure, COPD exacerbation, Covid, pneumonia, MI, or intracranial injury/trauma. On arrival patient had fever 102.8. Tylenol was provided. Hypoxic at home during paramedic evaluation, placed in oxygen. Was tachycardic and blood pressure was borderline. Lab work showed hyponatremia sodium 131, BUN/creatinine 23/2.2 with normal anion gap suggesting BEAR. Baseline creatinine 1.5-1.6. BEAR could likely be secondary to decreased oral intake. Troponin negative. Elevated proBNP 660. Sed rate elevated 54 CBC unremarkable. Chest x-ray negative for acute cardiopulmonary processes. CT head showed no intracranial abnormality. Mild/moderate senescent changes. Mild prominence of ventricular system intracranial atherosclerotic disease. Patient hypoxic and requiring 4 L of oxygen via nasal cannula, this is new issue. EKG showed sinus tachycardia likely due to dehydration.   Bolus of IV fluids was given in the ED.           --------------------------------------------- PAST HISTORY ---------------------------------------------  Past Medical History:  has a past medical history of Alcohol abuse, [facility] COPD (chronic obstructive pulmonary disease) ([facility] [address] History of cocaine use, Hyperlipidemia, Hypertension, Marijuana use, MI (myocardial infarction) ([facility] [address] and alberto. Past Surgical History:  has a past surgical history that includes Wrist surgery; cervical fusion (11/18/15); polysomnography (01/29/2018); and Cardiac catheterization (11/03/2020). Social History:  reports that he has quit smoking. His smoking use included cigarettes. He started smoking about 41 years ago. He has a 60.00 pack-year smoking history. He has never used smokeless tobacco. He reports previous alcohol use. He reports previous drug use. Drugs: Cocaine, Other-see comments, and Marijuana. Family History: family history includes Arthritis in his mother; Cancer in his brother; Heart Disease in his father; High Blood Pressure in his brother, brother, brother, and brother; Other in his brother, brother, brother, brother, and mother. The patients home medications have been reviewed. Allergies: Patient has no known allergies.     -------------------------------------------------- RESULTS -------------------------------------------------    LABS:  Results for orders placed or performed during the hospital encounter of 11/14/20   CBC Auto Differential   Result Value Ref Range    WBC 6.3 4.5 - 11.5 E9/L    RBC 4.93 3.80 - 5.80 E12/L    Hemoglobin 15.6 12.5 - 16.5 g/dL    Hematocrit 45.7 37.0 - 54.0 %    MCV 92.7 80.0 - 99.9 fL    MCH 31.6 26.0 - 35.0 pg    MCHC 34.1 32.0 - 34.5 %    RDW 12.2 11.5 - 15.0 fL    Platelets 656 859 - 556 E9/L    MPV 9.5 7.0 - 12.0 fL    Neutrophils % 76.1 43.0 - 80.0 %    Immature Granulocytes % 0.5 0.0 - 5.0 %    Lymphocytes % 14.0 (L) 20.0 - 42.0 %    Monocytes % 8.9 2.0 - 12.0 %    Eosinophils % 0.3 0.0 - 6.0 %    Basophils % 0.2 0.0 - 2.0 %    Neutrophils Absolute 4.77 1.80 - 7.30 E9/L    Immature Granulocytes # 0.03 E9/L    Lymphocytes Absolute 0.88 (L) 1.50 - 4.00 E9/L    Monocytes Absolute 0.56 0.10 - 0.95 E9/L    Eosinophils (1.702 m) 212 lb (96.2 kg)       Oxygen Saturation Interpretation: Abnormal    ------------------------------------------ PROGRESS NOTES ------------------------------------------  Re-evaluation(s):  Time: 1500  Patients symptoms show no change  Repeat physical examination is not changed    Counseling:  I have spoken with the patient and discussed todays results, in addition to providing specific details for the plan of care and counseling regarding the diagnosis and prognosis. Their questions are answered at this time and they are agreeable with the plan of admission.    --------------------------------- ADDITIONAL PROVIDER NOTES ---------------------------------  Consultations:  Time: 2434. Spoke with Dr. Latrice Cheung. Discussed case. They will admit the patient. This patient's ED course included: a personal history and physicial examination, re-evaluation prior to disposition, multiple bedside re-evaluations, IV medications, cardiac monitoring, continuous pulse oximetry and complex medical decision making and emergency management    This patient has remained hemodynamically stable during their ED course. Diagnosis:  1. BEAR (acute kidney injury) ([address]    2. Hyponatremia    3. Acute respiratory failure with hypoxia (HCC)        Disposition:  Patient's disposition: Admit to telemetry  Patient's condition is stable.          Tayler Urban MD  Resident  11/14/20 9830

## 2020-11-15 LAB
ALBUMIN SERPL-MCNC: 3.8 G/DL (ref 3.5–5.2)
ALP BLD-CCNC: 59 U/L (ref 40–129)
ALT SERPL-CCNC: 13 U/L (ref 0–40)
ANION GAP SERPL CALCULATED.3IONS-SCNC: 16 MMOL/L (ref 7–16)
AST SERPL-CCNC: 28 U/L (ref 0–39)
BACTERIA: ABNORMAL /HPF
BASOPHILS ABSOLUTE: 0.01 E9/L (ref 0–0.2)
BASOPHILS RELATIVE PERCENT: 0.2 % (ref 0–2)
BILIRUB SERPL-MCNC: 0.3 MG/DL (ref 0–1.2)
BILIRUBIN URINE: NEGATIVE
BLOOD, URINE: NEGATIVE
BUN BLDV-MCNC: 27 MG/DL (ref 6–20)
CALCIUM SERPL-MCNC: 9.1 MG/DL (ref 8.6–10.2)
CHLORIDE BLD-SCNC: 100 MMOL/L (ref 98–107)
CLARITY: CLEAR
CO2: 19 MMOL/L (ref 22–29)
COLOR: YELLOW
CREAT SERPL-MCNC: 2.4 MG/DL (ref 0.7–1.2)
EKG ATRIAL RATE: 103 BPM
EKG P AXIS: 43 DEGREES
EKG P-R INTERVAL: 124 MS
EKG Q-T INTERVAL: 336 MS
EKG QRS DURATION: 80 MS
EKG QTC CALCULATION (BAZETT): 440 MS
EKG R AXIS: 19 DEGREES
EKG T AXIS: 37 DEGREES
EKG VENTRICULAR RATE: 103 BPM
EOSINOPHILS ABSOLUTE: 0.01 E9/L (ref 0.05–0.5)
EOSINOPHILS RELATIVE PERCENT: 0.2 % (ref 0–6)
GFR AFRICAN AMERICAN: 34
GFR NON-AFRICAN AMERICAN: 28 ML/MIN/1.73
GLUCOSE BLD-MCNC: 95 MG/DL (ref 74–99)
GLUCOSE URINE: NEGATIVE MG/DL
HCT VFR BLD CALC: 45.6 % (ref 37–54)
HEMOGLOBIN: 15.5 G/DL (ref 12.5–16.5)
IMMATURE GRANULOCYTES #: 0.03 E9/L
IMMATURE GRANULOCYTES %: 0.5 % (ref 0–5)
KETONES, URINE: ABNORMAL MG/DL
LACTIC ACID: 1.9 MMOL/L (ref 0.5–2.2)
LEUKOCYTE ESTERASE, URINE: NEGATIVE
LYMPHOCYTES ABSOLUTE: 1.47 E9/L (ref 1.5–4)
LYMPHOCYTES RELATIVE PERCENT: 24.7 % (ref 20–42)
MCH RBC QN AUTO: 31.8 PG (ref 26–35)
MCHC RBC AUTO-ENTMCNC: 34 % (ref 32–34.5)
MCV RBC AUTO: 93.4 FL (ref 80–99.9)
MONOCYTES ABSOLUTE: 0.58 E9/L (ref 0.1–0.95)
MONOCYTES RELATIVE PERCENT: 9.7 % (ref 2–12)
NEUTROPHILS ABSOLUTE: 3.86 E9/L (ref 1.8–7.3)
NEUTROPHILS RELATIVE PERCENT: 64.7 % (ref 43–80)
NITRITE, URINE: NEGATIVE
PDW BLD-RTO: 12.3 FL (ref 11.5–15)
PH UA: 5.5 (ref 5–9)
PLATELET # BLD: 255 E9/L (ref 130–450)
PMV BLD AUTO: 9.6 FL (ref 7–12)
POTASSIUM REFLEX MAGNESIUM: 5 MMOL/L (ref 3.5–5)
PROTEIN UA: 30 MG/DL
RBC # BLD: 4.88 E12/L (ref 3.8–5.8)
RBC UA: ABNORMAL /HPF (ref 0–2)
SODIUM BLD-SCNC: 135 MMOL/L (ref 132–146)
SPECIFIC GRAVITY UA: >=1.03 (ref 1–1.03)
TOTAL PROTEIN: 8.5 G/DL (ref 6.4–8.3)
UROBILINOGEN, URINE: 0.2 E.U./DL
WBC # BLD: 6 E9/L (ref 4.5–11.5)
WBC UA: ABNORMAL /HPF (ref 0–5)

## 2020-11-15 PROCEDURE — 6370000000 HC RX 637 (ALT 250 FOR IP): Performed by: GENERAL PRACTICE

## 2020-11-15 PROCEDURE — 83605 ASSAY OF LACTIC ACID: CPT

## 2020-11-15 PROCEDURE — 36415 COLL VENOUS BLD VENIPUNCTURE: CPT

## 2020-11-15 PROCEDURE — 6360000002 HC RX W HCPCS: Performed by: INTERNAL MEDICINE

## 2020-11-15 PROCEDURE — 2060000000 HC ICU INTERMEDIATE R&B

## 2020-11-15 PROCEDURE — 85025 COMPLETE CBC W/AUTO DIFF WBC: CPT

## 2020-11-15 PROCEDURE — 80053 COMPREHEN METABOLIC PANEL: CPT

## 2020-11-15 PROCEDURE — 2700000000 HC OXYGEN THERAPY PER DAY

## 2020-11-15 PROCEDURE — 81001 URINALYSIS AUTO W/SCOPE: CPT

## 2020-11-15 PROCEDURE — 2580000003 HC RX 258: Performed by: GENERAL PRACTICE

## 2020-11-15 PROCEDURE — 6370000000 HC RX 637 (ALT 250 FOR IP)

## 2020-11-15 RX ORDER — PANTOPRAZOLE SODIUM 40 MG/1
TABLET, DELAYED RELEASE ORAL
Status: COMPLETED
Start: 2020-11-15 | End: 2020-11-15

## 2020-11-15 RX ADMIN — BUDESONIDE AND FORMOTEROL FUMARATE DIHYDRATE 2 PUFF: 80; 4.5 AEROSOL RESPIRATORY (INHALATION) at 10:06

## 2020-11-15 RX ADMIN — GABAPENTIN 300 MG: 300 CAPSULE ORAL at 00:33

## 2020-11-15 RX ADMIN — SPIRONOLACTONE 25 MG: 25 TABLET ORAL at 10:04

## 2020-11-15 RX ADMIN — HYDRALAZINE HYDROCHLORIDE 10 MG: 10 TABLET, FILM COATED ORAL at 00:33

## 2020-11-15 RX ADMIN — IPRATROPIUM BROMIDE AND ALBUTEROL 1 PUFF: 20; 100 SPRAY, METERED RESPIRATORY (INHALATION) at 10:14

## 2020-11-15 RX ADMIN — SODIUM CHLORIDE: 9 INJECTION, SOLUTION INTRAVENOUS at 00:32

## 2020-11-15 RX ADMIN — PANTOPRAZOLE SODIUM 40 MG: 40 TABLET, DELAYED RELEASE ORAL at 05:11

## 2020-11-15 RX ADMIN — ACETAMINOPHEN 650 MG: 325 TABLET ORAL at 00:32

## 2020-11-15 RX ADMIN — GABAPENTIN 300 MG: 300 CAPSULE ORAL at 17:16

## 2020-11-15 RX ADMIN — SODIUM CHLORIDE: 9 INJECTION, SOLUTION INTRAVENOUS at 12:28

## 2020-11-15 RX ADMIN — GABAPENTIN 300 MG: 300 CAPSULE ORAL at 12:27

## 2020-11-15 RX ADMIN — SACUBITRIL AND VALSARTAN 1 TABLET: 24; 26 TABLET, FILM COATED ORAL at 00:34

## 2020-11-15 RX ADMIN — IPRATROPIUM BROMIDE AND ALBUTEROL 1 PUFF: 20; 100 SPRAY, METERED RESPIRATORY (INHALATION) at 17:16

## 2020-11-15 RX ADMIN — BUDESONIDE AND FORMOTEROL FUMARATE DIHYDRATE 2 PUFF: 80; 4.5 AEROSOL RESPIRATORY (INHALATION) at 20:30

## 2020-11-15 RX ADMIN — DEXAMETHASONE 6 MG: 4 TABLET ORAL at 10:12

## 2020-11-15 RX ADMIN — SACUBITRIL AND VALSARTAN 1 TABLET: 24; 26 TABLET, FILM COATED ORAL at 20:30

## 2020-11-15 RX ADMIN — CLONIDINE HYDROCHLORIDE 0.1 MG: 0.1 TABLET ORAL at 00:33

## 2020-11-15 RX ADMIN — IPRATROPIUM BROMIDE AND ALBUTEROL 1 PUFF: 20; 100 SPRAY, METERED RESPIRATORY (INHALATION) at 00:33

## 2020-11-15 RX ADMIN — NITROGLYCERIN 1 INCH: 20 OINTMENT TOPICAL at 22:56

## 2020-11-15 RX ADMIN — ACETAMINOPHEN 650 MG: 325 TABLET ORAL at 10:04

## 2020-11-15 RX ADMIN — CLONIDINE HYDROCHLORIDE 0.1 MG: 0.1 TABLET ORAL at 20:30

## 2020-11-15 RX ADMIN — GABAPENTIN 300 MG: 300 CAPSULE ORAL at 20:30

## 2020-11-15 RX ADMIN — NITROGLYCERIN 1 INCH: 20 OINTMENT TOPICAL at 00:33

## 2020-11-15 RX ADMIN — SODIUM CHLORIDE: 9 INJECTION, SOLUTION INTRAVENOUS at 10:10

## 2020-11-15 RX ADMIN — GABAPENTIN 300 MG: 300 CAPSULE ORAL at 10:04

## 2020-11-15 ASSESSMENT — PAIN DESCRIPTION - PROGRESSION: CLINICAL_PROGRESSION: NOT CHANGED

## 2020-11-15 ASSESSMENT — PAIN SCALES - GENERAL
PAINLEVEL_OUTOF10: 0
PAINLEVEL_OUTOF10: 0
PAINLEVEL_OUTOF10: 9
PAINLEVEL_OUTOF10: 0

## 2020-11-15 NOTE — H&P
[address] [address]                              HISTORY AND PHYSICAL    PATIENT NAME: Gabriele García                  :        1965  MED REC NO:   [id number]                            ROOM:       8858  ACCOUNT NO:   [de-identified]                           ADMIT DATE: 2020  PROVIDER:     Rowdy Prado DO    HISTORY OF PRESENT ILLNESS:  This is a 64-year-old white male, actually  seen in the office on Friday with symptoms of cough, chest discomfort,  and shortness of breath. Recently discharged from the hospital for  accelerated hypertension and angina type of symptoms. He came into the  office and we tested him for COVID with a PCR test.  Unfortunately, the  results would not be available probably until Monday. Symptoms got  worse. He came into the emergency department and COVID-19 was detected. He was subsequently admitted with pulmonary consult and infectious  disease consult. He had a CT of his head that was negative for acute  changes. Troponin was negative. Sodium was low at 131. Chloride was  low at 95. ProBNP slightly elevated at 660. White count normal.  No  shift. Chest x-ray:  No acute cardiopulmonary process. RECENT AND CURRENT MEDICATIONS:  Included albuterol, Trelegy, Norvasc 10  daily, Proscar 5 daily, metoprolol  daily, gabapentin 300 four  times a day, DuoNeb, nicotine patch, Protonix, Aldactone, Entresto,  clonidine, Apresoline, nitro as needed, nitro paste. PAST MEDICAL HISTORY:  Significant for community acquired pneumonia,  cervical arthritis, COPD exacerbation, carpal tunnel syndrome, essential  hypertension, hyperlipidemia, obstructive sleep apnea, precordial chest  pain, unstable angina. ALLERGIES:  HE HAS NO KNOWN ALLERGIES. PAST SURGICAL HISTORY:  Consistent with heart catheterization,  polysomnography, cervical fusion, wrist surgery.     SOCIAL HISTORY: Bowel sounds are present in  all four quadrants. EXTERNAL GENITALIA:  Intact. EXTREMITIES:  Peripheral pulses are intact. Legs are without edema. BACK:  Spine shows physiologic curve. ASSESSMENT AND PLAN:  The patient is going to be brought in, started on  some Decadron, seen by Pulmonology, and get an infectious disease  consult. At the time of admission, his condition is fair and his  prognosis is guarded.         Derrell Toth DO    D: 11/15/2020 15:10:21       T: 11/15/2020 15:13:06     [email or web address]  Job#: [id number]     Doc#: [id number]    CC:

## 2020-11-15 NOTE — PROGRESS NOTES
At 2120 found patient in room with door wide open and no mask on. Patient was dropped off by emergency room without any notification or verification of SBAR receipt (which it was not). Upon assessing patient, was found to have an oral temperature of 102.5 and very warm to the touch. After assessment, call was placed to Dr. Naomi Riggs and orders were received. Patient COVID test then came back positive. Patient was transferred to [place] [address]. I transported patient alone with his backpack, shoes and cell phone as only belongings.

## 2020-11-15 NOTE — PROGRESS NOTES
Dr. Naomi Riggs updated on current VS. No parameters received for cardiac medications. Hold Placed on hydralazine. See order.

## 2020-11-15 NOTE — PLAN OF CARE
Problem: Pain:  Goal: Pain level will decrease  Description: Pain level will decrease  Outcome: Met This Shift     Problem: Pain:  Goal: Control of acute pain  Description: Control of acute pain  Outcome: Met This Shift     Problem: Pain:  Goal: Control of chronic pain  Description: Control of chronic pain  Outcome: Met This Shift     Problem: Airway Clearance - Ineffective  Goal: Achieve or maintain patent airway  Outcome: Met This Shift     Problem: Gas Exchange - Impaired  Goal: Absence of hypoxia  Outcome: Met This Shift     Problem: Gas Exchange - Impaired  Goal: Promote optimal lung function  Outcome: Met This Shift     Problem: Breathing Pattern - Ineffective  Goal: Ability to achieve and maintain a regular respiratory rate  Outcome: Met This Shift     Problem:  Body Temperature -  Risk of, Imbalanced  Goal: Will regain or maintain usual level of consciousness  Outcome: Met This Shift

## 2020-11-15 NOTE — PLAN OF CARE
Problem: Pain:  Goal: Pain level will decrease  Description: Pain level will decrease  11/15/2020 1148 by Annette Saucedo RN  Outcome: Met This Shift  11/15/2020 0545 by Bacilio Gee RN  Outcome: Met This Shift  Goal: Control of acute pain  Description: Control of acute pain  11/15/2020 1148 by Annette Saucedo RN  Outcome: Met This Shift  11/15/2020 0545 by Bacilio Gee RN  Outcome: Met This Shift  Goal: Control of chronic pain  Description: Control of chronic pain  11/15/2020 1148 by Annette Saucedo RN  Outcome: Met This Shift  11/15/2020 0545 by Bacilio Gee RN  Outcome: Met This Shift     Problem: Airway Clearance - Ineffective  Goal: Achieve or maintain patent airway  11/15/2020 1148 by Annette Saucedo RN  Outcome: Met This Shift  11/15/2020 0545 by Bacilio Gee RN  Outcome: Met This Shift     Problem:  Body Temperature -  Risk of, Imbalanced  Goal: Ability to maintain a body temperature within defined limits  11/15/2020 1148 by Annette Saucedo RN  Outcome: Met This Shift  11/15/2020 0545 by Bacilio Gee RN  Outcome: Met This Shift  Goal: Will regain or maintain usual level of consciousness  11/15/2020 1148 by Annette Saucedo RN  Outcome: Met This Shift  11/15/2020 0545 by Bacilio Gee RN  Outcome: Met This Shift  Goal: Complications related to the disease process, condition or treatment will be avoided or minimized  11/15/2020 1148 by Annette Saucedo RN  Outcome: Met This Shift  11/15/2020 0545 by Bacilio Gee RN  Outcome: Met This Shift     Problem: Isolation Precautions - Risk of Spread of Infection  Goal: Prevent transmission of infection  11/15/2020 1148 by Annette Saucedo RN  Outcome: Met This Shift  11/15/2020 0545 by Bacilio Gee RN  Outcome: Met This Shift     Problem: Nutrition Deficits  Goal: Optimize nutrtional status  11/15/2020 1148 by nAnette Saucedo RN  Outcome: Met This Shift  11/15/2020 0545 by Bacilio Gee RN  Outcome: Met This Shift     Problem: Risk for Fluid Volume Deficit  Goal: Maintain normal heart rhythm  11/15/2020 1148 by Heaven Gale RN  Outcome: Met This Shift  11/15/2020 0545 by Mike Daugherty RN  Outcome: Met This Shift  Goal: Maintain absence of muscle cramping  11/15/2020 1148 by Heaven Gale RN  Outcome: Met This Shift  11/15/2020 0545 by Mike Daugherty RN  Outcome: Met This Shift  Goal: Maintain normal serum potassium, sodium, calcium, phosphorus, and pH  11/15/2020 1148 by Heaven Gale RN  Outcome: Met This Shift  11/15/2020 0545 by Mike Daugherty RN  Outcome: Met This Shift     Problem: Loneliness or Risk for Loneliness  Goal: Demonstrate positive use of time alone when socialization is not possible  11/15/2020 1148 by Heaven Gale RN  Outcome: Met This Shift  11/15/2020 0545 by Mike Daugherty RN  Outcome: Met This Shift     Problem: Patient Education: Go to Patient Education Activity  Goal: Patient/Family Education  11/15/2020 1148 by Heaven Gale RN  Outcome: Met This Shift  11/15/2020 0545 by iMke Daugherty RN  Outcome: Met This Shift     Problem: Falls - Risk of:  Goal: Will remain free from falls  Description: Will remain free from falls  11/15/2020 1148 by Heaven Gale RN  Outcome: Met This Shift  11/15/2020 0545 by Mike Daugherty RN  Outcome: Met This Shift  Goal: Absence of physical injury  Description: Absence of physical injury  11/15/2020 1148 by Heaven Gale RN  Outcome: Met This Shift  11/15/2020 0545 by Mike Daugherty RN  Outcome: Met This Shift     Problem: Gas Exchange - Impaired  Goal: Absence of hypoxia  11/15/2020 1148 by Heaven Gale RN  Outcome: Not Met This Shift  11/15/2020 0545 by Mike Daugherty RN  Outcome: Met This Shift  Goal: Promote optimal lung function  11/15/2020 1148 by Heaven Gale RN  Outcome: Not Met This Shift  11/15/2020 0545 by Mike Daugherty RN  Outcome: Met This Shift     Problem: Breathing Pattern - Ineffective  Goal: Ability to achieve and maintain a regular respiratory rate  11/15/2020 1148 by Demetrius Sanchez RN  Outcome: Not Met This Shift  11/15/2020 0545 by Lynda Baugh RN  Outcome: Met This Shift     Problem: Fatigue  Goal: Verbalize increase energy and improved vitality  11/15/2020 1148 by Demetrius Sanchez RN  Outcome: Not Met This Shift  11/15/2020 0545 by Lynda Baugh RN  Outcome: Met This Shift

## 2020-11-15 NOTE — CONSULTS
[facility]  [address], [address]  [facility], [address] [place] [address]    Pulmonary Consultation      Reason for Consult:  sob    Requesting Physician:  Sal Rothman DO    CHIEF COMPLAINT:  sob    History Obtained From:  patient    HISTORY OF PRESENT ILLNESS:                The patient is a 60 y.o. male with significant past medical history of COPD who presents with increased sob for the past 3 days. Was just recently discharged from hospital with chest pain on the 6th. Was his usual self until about 3 days ago when started having problems with breathing and coughing with min-mod sputum production. Gradually got worse with both breathing and coughing and decided to come here. Found to be COVID (+). Currently on 4 li NC, coughing with minimal sputum production (?) slightly better with breathing, lying down in bed talking on the phone prior to examination. Claims hasn't been smoking for the past month.     Past Medical History:        Diagnosis Date    Alcohol abuse     CHF (congestive heart failure) (HCC)     COPD (chronic obstructive pulmonary disease) (HCC)     History of cocaine use     Hyperlipidemia     Hypertension     Marijuana use     quit November 2017     [facility] [address]     alberot        Past Surgical History:        Procedure Laterality Date    CARDIAC CATHETERIZATION  11/03/2020    DR Danuta Mistry CERVICAL FUSION  11/18/15    cervical laminectomy & fusion c4-c6, with rods & screws    POLYSOMNOGRAPHY  01/29/2018    AHI=29.8    WRIST SURGERY         Current Medications:    Current Facility-Administered Medications: nitroglycerin (NITRO-BID) 2 % ointment 1 inch, 1 inch, Topical, Q8H  nitroGLYCERIN (NITROSTAT) SL tablet 0.4 mg, 0.4 mg, Sublingual, Q5 Min PRN  gabapentin (NEURONTIN) capsule 300 mg, 300 mg, Oral, 4x Daily  albuterol-ipratropium (COMBIVENT RESPIMAT)  MCG/ACT inhaler 1 puff, 1 puff, Inhalation, Q4H PRN  0.9 % sodium chloride infusion, , Intravenous, Continuous  amLODIPine (NORVASC) tablet 5 mg, 5 mg, Oral, Daily  acetaminophen (TYLENOL) tablet 650 mg, 650 mg, Oral, Q4H PRN  sacubitril-valsartan (ENTRESTO) 24-26 MG per tablet 1 tablet, 1 tablet, Oral, BID  spironolactone (ALDACTONE) tablet 25 mg, 25 mg, Oral, Daily  nicotine (NICODERM CQ) 21 MG/24HR 1 patch, 1 patch, Transdermal, Daily  pantoprazole (PROTONIX) tablet 40 mg, 40 mg, Oral, QAM AC  cloNIDine (CATAPRES) tablet 0.1 mg, 0.1 mg, Oral, BID  hydrALAZINE (APRESOLINE) tablet 10 mg, 10 mg, Oral, 3 times per day  budesonide-formoterol (SYMBICORT) 80-4.5 MCG/ACT inhaler 2 puff, 2 puff, Inhalation, BID    Allergies:  Patient has no known allergies. Social History:    TOBACCO:   reports that he has quit smoking. His smoking use included cigarettes. He started smoking about 41 years ago. He has a 60.00 pack-year smoking history. He has never used smokeless tobacco.    Family History:       Problem Relation Age of Onset    Arthritis Mother     Other Mother         glucoma    Heart Disease Father     High Blood Pressure Brother     Other Brother         sleep apnea    High Blood Pressure Brother     Other Brother         sleep apnea    High Blood Pressure Brother     Other Brother         sleep apnea    High Blood Pressure Brother     Other Brother         sleep apnea    Cancer Brother         gitaogeal -  at 62       REVIEW OF SYSTEMS:    RESPIRATORY:  sob  Remainder of complete ROS is negative.     PHYSICAL EXAM:      Vitals:    BP 97/64   Pulse 80   Temp 99.5 °F (37.5 °C) (Oral)   Resp 18   Ht 5' 7\" (1.702 m)   Wt 210 lb 12.8 oz (95.6 kg)   SpO2 90%   BMI 33.02 kg/m²     EYES:  Lids and lashes normal, pupils equal, round and reactive to light, extra ocular muscles intact, sclera clear, conjunctiva normal  ENT:  Normocephalic, without obvious abnormality, atraumatic, sinuses nontender on palpation, external ears without lesions, oral pharynx with moist mucus membranes, tonsils without erythema or exudates, gums normal and good dentition. NECK:  Supple, symmetrical, trachea midline, no adenopathy, thyroid symmetric, not enlarged and no tenderness, skin normal  LUNGS:  Bilateral ronchi with cough  CARDIOVASCULAR:  Normal apical impulse, regular rate and rhythm, normal S1 and S2, no S3 or S4, and no murmur noted  ABDOMEN:  No scars, normal bowel sounds, soft, non-distended, non-tender, no masses palpated, no hepatosplenomegally  MUSCULOSKELETAL:  There is no redness, warmth, or swelling of the joints. Full range of motion noted. NEUROLOGIC:  Awake, alert, oriented to name, place and time. Cranial nerves II-XII are grossly intact. DATA:    CBC:   Recent Labs     11/14/20  1458 11/15/20  0515   WBC 6.3 6.0   HGB 15.6 15.5   HCT 45.7 45.6   MCV 92.7 93.4    255       BMP:  Recent Labs     11/14/20  1458 11/15/20  0515   * 135   K 4.8 5.0   CL 95* 100   CO2 23 19*   BUN 23* 27*   CREATININE 2.2* 2.4*    ALB:3,BILIDIR:3,BILITOT:3,ALKPHOS:3)@    PT/INR: No results for input(s): PROTIME, INR in the last 72 hours. ABG:   No results for input(s): PH, PO2, PCO2, HCO3, BE, O2SAT, METHB, O2HB, COHB, O2CON, HHB, THB in the last 72 hours. Radiology Review:  CXR reviewed with slightly increased haziness both lower lung fields    IMPRESSION/RECOMMENDATIONS:      COVID  COPD  Hypoxia      1. Decadron 6 mg daily, can be po  2. Cont with MDI, observe respiratory function  3. Cont with oxygen, taper as tolerated  4. ID consult for possible need for remdesivir  5. OOB to chair      Time at the bedside, reviewing labs and radiographs, reviewing notes and consultations, discussing with staff and family was more than 50 minutes. Thanks for letting us see this patient in consultation. Please contact us with any questions. Office [phone] or after hours through [employer], x [phone].

## 2020-11-15 NOTE — CONSULTS
[facility]     Consult Note                                 [address] [facility] [address], [place], [address]                   Phone [phone]     Fax [phone]        Date:   11/15/2020  Patient name:  Alejandro Huntley  Date of admission:  11/14/2020  1:59 PM  MRN:   [id number]  YOB: 1965    Reason for Consult:      CC:   Chief Complaint   Patient presents with    Fever    Shortness of Breath    Cough       HISTORY OF PRESENT ILLNESS:                The patient is a 60 y.o. male who comes in with SOB for 3 days and is found to be covid19 positive  He had fevers  needing between 3-4L oxygen  CRP- 2.4  PROCALCITONIN 0.18  CXR shows no pulmonic infiltrates    Past Medical History:   has a past medical history of Alcohol abuse, CHF (congestive heart failure) ([facility] [address] COPD (chronic obstructive pulmonary disease) ([facility] [address] History of cocaine use, Hyperlipidemia, Hypertension, Marijuana use, MI (myocardial infarction) ([facility] [address] and alberto. Past Surgical History:   has a past surgical history that includes Wrist surgery; cervical fusion (11/18/15); polysomnography (01/29/2018); and Cardiac catheterization (11/03/2020). Home Medications:    Prior to Admission medications    Medication Sig Start Date End Date Taking? Authorizing Provider   nitroglycerin (NITRO-BID) 2 % ointment Place 1 inch onto the skin every 8 hours 11/5/20  Yes Joel Hager, DO   nitroGLYCERIN (NITROSTAT) 0.4 MG SL tablet up to max of 3 total doses.  If no relief after 1 dose, call 911. 11/5/20  Yes Jurgen Hamm, DO   ipratropium (ATROVENT) 0.02 % nebulizer solution Take 2.5 mLs by nebulization 4 times daily 11/5/20  Yes Jurgen Hamm, DO   hydrALAZINE (APRESOLINE) 10 MG tablet Take 1 tablet by mouth 3 times daily 11/5/20  Yes Jurgen Hamm, DO   cloNIDine (CATAPRES) 0.1 MG tablet Take 1 tablet by mouth 2 times daily 11/5/20  Yes Ghazala Mason Adiel,    sacubitril-valsartan (ENTRESTO) 24-26 MG per tablet Take 1 tablet by mouth 2 times daily 11/5/20  Yes Joel Vargas DO   spironolactone (ALDACTONE) 25 MG tablet Take 25 mg by mouth daily   Yes Historical Provider, MD   pantoprazole (PROTONIX) 40 MG tablet Take 1 tablet by mouth every morning (before breakfast) 2/11/20  Yes Raúl Duval DO   nicotine (NICODERM CQ) 21 MG/24HR Place 1 patch onto the skin daily 10/5/19  Yes Raúl Duval DO   albuterol (PROVENTIL) (2.5 MG/3ML) 0.083% nebulizer solution Take 3 mLs by nebulization every 6 hours as needed for Wheezing 10/4/19  Yes Joel Vargas DO   gabapentin (NEURONTIN) 300 MG capsule Take 300 mg by mouth 4 times daily. Takes up to 4 times a day as needed   Yes Historical Provider, MD   metoprolol succinate (TOPROL XL) 100 MG extended release tablet Take 1 tablet by mouth daily 8/24/19  Yes Raúl Duval DO   finasteride (PROSCAR) 5 MG tablet Take 1 tablet by mouth daily 8/24/19  Yes Raúl Duval DO   amLODIPine (NORVASC) 10 MG tablet Take 1 tablet by mouth daily  Patient taking differently: Take 5 mg by mouth daily  4/24/19  Yes Raúl Duval DO   Respiratory Therapy Supplies (FULL KIT NEBULIZER SET) MISC Use as directed with nebulized medication. 4/23/19  Yes Raúl Duval DO   fluticasone-umeclidin-vilant (TRELEGY ELLIPTA) 902-64.9-40 MCG/INH AEPB Inhale 1 puff into the lungs daily 2/10/20 6/9/20  Leti Zhang MD   albuterol sulfate HFA (PROVENTIL HFA) 108 (90 Base) MCG/ACT inhaler Inhale 2 puffs into the lungs every 4 hours as needed for Wheezing 8/24/17 8/18/19  Dona Royal DO       Allergies:  Patient has no known allergies. Social History:   reports that he has quit smoking. His smoking use included cigarettes. He started smoking about 41 years ago. He has a 60.00 pack-year smoking history. He has never used smokeless tobacco. He reports previous alcohol use. He reports previous drug use.  Drugs: Cocaine, Other-see comments, and Marijuana. Family History: family history includes Arthritis in his mother; Cancer in his brother; Heart Disease in his father; High Blood Pressure in his brother, brother, brother, and brother; Other in his brother, brother, brother, brother, and mother. REVIEW OF SYSTEMS:    12 point ROS has been done and pertinent neg and positive has been included in HPI and rest are non contributory        PHYSICAL EXAM:    BP 97/65   Pulse 80   Temp 97.8 °F (36.6 °C) (Oral)   Resp 18   Ht 5' 7\" (1.702 m)   Wt 210 lb 12.8 oz (95.6 kg)   SpO2 94%   BMI 33.02 kg/m²    VENT SETTINGS:   Vent Information  SpO2: 94 %    General appearance: Alert, Awake, Oriented times 3, no distress  Skin: Warm and dry  Eyes: Pink palpebral conjunctivae. PERRL  Ears: External ears normal.  Nose/Sinuses: Nares normal. Septum midline. Mucosa normal. No sinus tenderness. Oropharynx: Oropharynx clear with no exudates seen  Neck: Neck supple. No jugular venous distension, lymphadenopathy or thyromegaly Trachea midline  Lungs: few crackles B/L occasional wheezing  Heart: S1 S2  Regular rate and rhythm. No rub, murmur or gallop  Abdomen: Abdomen soft, non-tender. BS normal. No masses, organomegaly  Extremities: No edema, Peripheral pulses palpable  Musculoskeletal: Muscular strength appears intact. No joint effusion, tenderness, swelling or warmth      DATA:    Labs:     Last 3 CBC:  Recent Labs     11/14/20  1458 11/15/20  0515   WBC 6.3 6.0   RBC 4.93 4.88   HGB 15.6 15.5    255   MPV 9.5 9.6       Last 3 BMP  Recent Labs     11/14/20  1458 11/15/20  0515   * 135   K 4.8 5.0   CL 95* 100   CO2 23 19*   BUN 23* 27*   CREATININE 2.2* 2.4*   GLUCOSE 91 95   CALCIUM 9.4 9.1       LIVER PROFILE:  Recent Labs     11/14/20  1458 11/15/20  0515   AST 25 28   ALT 14 13   LABALBU 4.3 3.8       URINARY CATHETER OUTPUT ([place]):       DRAIN/TUBE OUTPUT:     Microbiology :  No results for input(s): BC in the last 72 hours.   No results for input(s): BLOODCULT2 in the last 72 hours. No results for input(s): LABURIN in the last 72 hours. No results for input(s): CULTRESP in the last 72 hours. No results for input(s): WNDABS in the last 72 hours. Radiology :  CT Head WO Contrast   Final Result   No acute intracranial abnormality. Mild to moderate senescent changes. Mild   prominence of the ventricular system. Intracranial atherosclerotic disease. XR CHEST PORTABLE   Final Result   No acute cardiopulmonary process.                  Assessment and Plan:      Mild COVID19 pneumonia  CHF/CAD    Plan  - chest CT wo contrast  - d dimer  - decadron  - hold remdesivir, re-evaluate  - low CRP and procalcitonin  - will follow                      Merlin Pabon MD Repair Type: Referred to plastics for closure

## 2020-11-16 ENCOUNTER — APPOINTMENT (OUTPATIENT)
Dept: CT IMAGING | Age: 55
DRG: 177 | End: 2020-11-16
Payer: MEDICARE

## 2020-11-16 LAB
C-REACTIVE PROTEIN: 1.6 MG/DL (ref 0–0.4)
D DIMER: <200 NG/ML DDU

## 2020-11-16 PROCEDURE — 2580000003 HC RX 258: Performed by: GENERAL PRACTICE

## 2020-11-16 PROCEDURE — 2580000003 HC RX 258: Performed by: INTERNAL MEDICINE

## 2020-11-16 PROCEDURE — 6370000000 HC RX 637 (ALT 250 FOR IP): Performed by: GENERAL PRACTICE

## 2020-11-16 PROCEDURE — 2060000000 HC ICU INTERMEDIATE R&B

## 2020-11-16 PROCEDURE — 86140 C-REACTIVE PROTEIN: CPT

## 2020-11-16 PROCEDURE — 6360000002 HC RX W HCPCS: Performed by: INTERNAL MEDICINE

## 2020-11-16 PROCEDURE — 2500000003 HC RX 250 WO HCPCS: Performed by: INTERNAL MEDICINE

## 2020-11-16 PROCEDURE — XW033E5 INTRODUCTION OF REMDESIVIR ANTI-INFECTIVE INTO PERIPHERAL VEIN, PERCUTANEOUS APPROACH, NEW TECHNOLOGY GROUP 5: ICD-10-PCS | Performed by: INTERNAL MEDICINE

## 2020-11-16 PROCEDURE — 36415 COLL VENOUS BLD VENIPUNCTURE: CPT

## 2020-11-16 PROCEDURE — 2700000000 HC OXYGEN THERAPY PER DAY

## 2020-11-16 PROCEDURE — 85378 FIBRIN DEGRADE SEMIQUANT: CPT

## 2020-11-16 PROCEDURE — 71250 CT THORAX DX C-: CPT

## 2020-11-16 RX ADMIN — CLONIDINE HYDROCHLORIDE 0.1 MG: 0.1 TABLET ORAL at 09:46

## 2020-11-16 RX ADMIN — GABAPENTIN 300 MG: 300 CAPSULE ORAL at 17:02

## 2020-11-16 RX ADMIN — GABAPENTIN 300 MG: 300 CAPSULE ORAL at 14:41

## 2020-11-16 RX ADMIN — AMLODIPINE BESYLATE 5 MG: 5 TABLET ORAL at 09:46

## 2020-11-16 RX ADMIN — SODIUM CHLORIDE: 9 INJECTION, SOLUTION INTRAVENOUS at 17:02

## 2020-11-16 RX ADMIN — CLONIDINE HYDROCHLORIDE 0.1 MG: 0.1 TABLET ORAL at 20:51

## 2020-11-16 RX ADMIN — ACETAMINOPHEN 650 MG: 325 TABLET ORAL at 20:51

## 2020-11-16 RX ADMIN — PANTOPRAZOLE SODIUM 40 MG: 40 TABLET, DELAYED RELEASE ORAL at 06:40

## 2020-11-16 RX ADMIN — NITROGLYCERIN 1 INCH: 20 OINTMENT TOPICAL at 06:40

## 2020-11-16 RX ADMIN — GABAPENTIN 300 MG: 300 CAPSULE ORAL at 09:46

## 2020-11-16 RX ADMIN — SPIRONOLACTONE 25 MG: 25 TABLET ORAL at 09:46

## 2020-11-16 RX ADMIN — SACUBITRIL AND VALSARTAN 1 TABLET: 24; 26 TABLET, FILM COATED ORAL at 09:48

## 2020-11-16 RX ADMIN — BUDESONIDE AND FORMOTEROL FUMARATE DIHYDRATE 2 PUFF: 80; 4.5 AEROSOL RESPIRATORY (INHALATION) at 20:52

## 2020-11-16 RX ADMIN — BUDESONIDE AND FORMOTEROL FUMARATE DIHYDRATE 2 PUFF: 80; 4.5 AEROSOL RESPIRATORY (INHALATION) at 09:45

## 2020-11-16 RX ADMIN — DEXAMETHASONE 6 MG: 4 TABLET ORAL at 09:46

## 2020-11-16 RX ADMIN — SACUBITRIL AND VALSARTAN 1 TABLET: 24; 26 TABLET, FILM COATED ORAL at 20:51

## 2020-11-16 RX ADMIN — REMDESIVIR 200 MG: 5 INJECTION INTRAVENOUS at 18:07

## 2020-11-16 RX ADMIN — NITROGLYCERIN 1 INCH: 20 OINTMENT TOPICAL at 20:51

## 2020-11-16 RX ADMIN — IPRATROPIUM BROMIDE AND ALBUTEROL 1 PUFF: 20; 100 SPRAY, METERED RESPIRATORY (INHALATION) at 05:07

## 2020-11-16 RX ADMIN — GABAPENTIN 300 MG: 300 CAPSULE ORAL at 20:51

## 2020-11-16 ASSESSMENT — PAIN SCALES - GENERAL
PAINLEVEL_OUTOF10: 0

## 2020-11-16 NOTE — PROGRESS NOTES
ID Progress Note                [address] [facility] [address], [place], [address]            Phone [phone]     Fax [phone]      Chief complaint   unchanged    Subjective:  Feels tired and SOB    The patient is awake and alert. Tolerating medications. Reports no side effects. Afebrile. 10 ROS otherwise negative unless otherwise specified above. Objective:    Vitals:    11/16/20 0747   BP: 123/70   Pulse: 77   Resp: 20   Temp: 97.8 °F (36.6 °C)   SpO2: 95%     VENT SETTINGS:   Vent Information  SpO2: 95 %  General Appearance:    Awake, alert , no acute distress. HEENT:    Normocephalic,PERRL,neck supple, no JVD, mucosa moist, no thrush   Lungs:     Clear to auscultation bilaterally, no wheeze , crackles   Heart:    Regular rate and rhythm, no murmur   Abdomen:     Soft, non-tender, not distended  bowel sounds present,   Extremities:   No edema,no open wound,no erythema, non  tender   Skin:   no rashes or lesions     Labs:  Recent Labs     11/14/20  1458 11/15/20  0515   WBC 6.3 6.0   RBC 4.93 4.88   HGB 15.6 15.5   HCT 45.7 45.6   MCV 92.7 93.4   MCH 31.6 31.8   MCHC 34.1 34.0   RDW 12.2 12.3    255   MPV 9.5 9.6     CMP:    Lab Results   Component Value Date     11/15/2020    K 5.0 11/15/2020     11/15/2020    CO2 19 11/15/2020    BUN 27 11/15/2020    CREATININE 2.4 11/15/2020    GFRAA 34 11/15/2020    LABGLOM 28 11/15/2020    GLUCOSE 95 11/15/2020    PROT 8.5 11/15/2020    LABALBU 3.8 11/15/2020    CALCIUM 9.1 11/15/2020    BILITOT 0.3 11/15/2020    ALKPHOS 59 11/15/2020    AST 28 11/15/2020    ALT 13 11/15/2020          Microbiology :  Recent Labs     11/14/20  1510   BC 24 Hours no growth     Recent Labs     11/14/20  1510   BLOODCULT2 24 Hours no growth     No results for input(s): LABURIN in the last 72 hours. No results for input(s): CULTRESP in the last 72 hours. No results for input(s): WNDABS in the last 72 hours.     Radiology :  CT CHEST WO CONTRAST

## 2020-11-16 NOTE — CARE COORDINATION
CM NOTE: Chart reviewed--Covid positive in droplet plus isolation. Currently using O2 3L which he does not have at home. Therapy evals ordered as pt states he is having a hard time getting around at home. CM spoke with pt by phone to discuss role, anticipated LOC & current plan of care. Reports living alone in ground level apartment. Uses cane to get around & reports increased weakness lately. Is able to use steps if needed. States he uses his friends oxygen at night. Admits having a sleep study & states he never received the CPAP machine after that. No hx MANSOOR or HHC. Discussed dx, current use of O2, medications & transition of care. States he may need rehab due to his weakness. We discussed [facility] & pt is agreeable if recommended by therapy. Await evals. CM & SW will continue to follow pt.

## 2020-11-16 NOTE — PROGRESS NOTES
Trachea midline. Normal thyroid. Resp: No accessory muscle use. No rales. No wheezing. No rhonchi. CV: Regular rate. Regular rhythm. No murmur or rub. Abd: Non-tender. Non-distended. No masses. No organomegaly. Normal bowel sounds. Skin: Warm and dry. No nodule on exposed extremities. No rash on exposed extremities. Ext: No cyanosis, clubbing, edema  Lymph: No cervical LAD. No supraclavicular LAD. M/S: No cyanosis. No joint deformity. No clubbing. Neuro: Awake. Follows commands. Positive pupils/gag/corneals. Normal pain response. Results:  CBC:   Recent Labs     11/14/20  1458 11/15/20  0515   WBC 6.3 6.0   HGB 15.6 15.5   HCT 45.7 45.6   MCV 92.7 93.4    255     BMP:   Recent Labs     11/14/20  1458 11/15/20  0515   * 135   K 4.8 5.0   CL 95* 100   CO2 23 19*   BUN 23* 27*   CREATININE 2.2* 2.4*     LIVER PROFILE:   Recent Labs     11/14/20  1458 11/15/20  0515   AST 25 28   ALT 14 13   LIPASE 24  --    BILITOT 0.2 0.3   ALKPHOS 65 59     PT/INR: No results for input(s): PROTIME, INR in the last 72 hours. APTT: No results for input(s): APTT in the last 72 hours. Pathology:  1. N/A      Microbiology:  1. None    Recent ABG:   No results for input(s): PH, PO2, PCO2, HCO3, BE, O2SAT, METHB, O2HB, COHB, O2CON, HHB, THB in the last 72 hours. Recent Films:  CT CHEST WO CONTRAST   Final Result   1. Extensive bilateral patchy ground-glass opacities, worse on the right,   specially in the upper lobe, consistent with pneumonia. Imaging features can   be seen with COVID-19 pneumonia, though are nonspecific and can occur with a   variety of infectious and noninfectious processes. 2. Mild mediastinal lymphadenopathy, unchanged and likely reactive. 3. Stable 1.2 cm subpleural nodule in the left lower lobe. This could   represent round atelectasis. Follow-up CT of the chest in 3 months is   suggested. 4. Multiple old or healing left rib fractures.    5.  The findings were sent

## 2020-11-17 PROCEDURE — 6360000002 HC RX W HCPCS: Performed by: INTERNAL MEDICINE

## 2020-11-17 PROCEDURE — 6370000000 HC RX 637 (ALT 250 FOR IP): Performed by: GENERAL PRACTICE

## 2020-11-17 PROCEDURE — 2580000003 HC RX 258: Performed by: INTERNAL MEDICINE

## 2020-11-17 PROCEDURE — 97165 OT EVAL LOW COMPLEX 30 MIN: CPT

## 2020-11-17 PROCEDURE — 2580000003 HC RX 258: Performed by: GENERAL PRACTICE

## 2020-11-17 PROCEDURE — 2060000000 HC ICU INTERMEDIATE R&B

## 2020-11-17 PROCEDURE — 2700000000 HC OXYGEN THERAPY PER DAY

## 2020-11-17 PROCEDURE — 97161 PT EVAL LOW COMPLEX 20 MIN: CPT

## 2020-11-17 PROCEDURE — 2500000003 HC RX 250 WO HCPCS: Performed by: INTERNAL MEDICINE

## 2020-11-17 RX ORDER — DEXAMETHASONE 4 MG/1
6 TABLET ORAL DAILY
Status: DISCONTINUED | OUTPATIENT
Start: 2020-11-17 | End: 2020-11-19 | Stop reason: SDUPTHER

## 2020-11-17 RX ADMIN — GABAPENTIN 300 MG: 300 CAPSULE ORAL at 08:02

## 2020-11-17 RX ADMIN — CLONIDINE HYDROCHLORIDE 0.1 MG: 0.1 TABLET ORAL at 21:49

## 2020-11-17 RX ADMIN — NITROGLYCERIN 1 INCH: 20 OINTMENT TOPICAL at 05:20

## 2020-11-17 RX ADMIN — GABAPENTIN 300 MG: 300 CAPSULE ORAL at 18:19

## 2020-11-17 RX ADMIN — NITROGLYCERIN 1 INCH: 20 OINTMENT TOPICAL at 14:15

## 2020-11-17 RX ADMIN — GABAPENTIN 300 MG: 300 CAPSULE ORAL at 14:15

## 2020-11-17 RX ADMIN — BUDESONIDE AND FORMOTEROL FUMARATE DIHYDRATE 2 PUFF: 80; 4.5 AEROSOL RESPIRATORY (INHALATION) at 21:45

## 2020-11-17 RX ADMIN — CLONIDINE HYDROCHLORIDE 0.1 MG: 0.1 TABLET ORAL at 08:02

## 2020-11-17 RX ADMIN — AMLODIPINE BESYLATE 5 MG: 5 TABLET ORAL at 08:02

## 2020-11-17 RX ADMIN — REMDESIVIR 100 MG: 5 INJECTION INTRAVENOUS at 21:47

## 2020-11-17 RX ADMIN — IPRATROPIUM BROMIDE AND ALBUTEROL 1 PUFF: 20; 100 SPRAY, METERED RESPIRATORY (INHALATION) at 11:29

## 2020-11-17 RX ADMIN — PANTOPRAZOLE SODIUM 40 MG: 40 TABLET, DELAYED RELEASE ORAL at 05:20

## 2020-11-17 RX ADMIN — BUDESONIDE AND FORMOTEROL FUMARATE DIHYDRATE 2 PUFF: 80; 4.5 AEROSOL RESPIRATORY (INHALATION) at 08:03

## 2020-11-17 RX ADMIN — IPRATROPIUM BROMIDE AND ALBUTEROL 1 PUFF: 20; 100 SPRAY, METERED RESPIRATORY (INHALATION) at 05:21

## 2020-11-17 RX ADMIN — SODIUM CHLORIDE: 9 INJECTION, SOLUTION INTRAVENOUS at 01:45

## 2020-11-17 RX ADMIN — SPIRONOLACTONE 25 MG: 25 TABLET ORAL at 08:02

## 2020-11-17 RX ADMIN — NITROGLYCERIN 1 INCH: 20 OINTMENT TOPICAL at 22:05

## 2020-11-17 RX ADMIN — DEXAMETHASONE 6 MG: 4 TABLET ORAL at 11:27

## 2020-11-17 RX ADMIN — GABAPENTIN 300 MG: 300 CAPSULE ORAL at 21:49

## 2020-11-17 ASSESSMENT — PAIN SCALES - GENERAL
PAINLEVEL_OUTOF10: 0
PAINLEVEL_OUTOF10: 0

## 2020-11-17 NOTE — PROGRESS NOTES
Physical Therapy    Facility/Department: [address] INTERMEDIATE  Initial Assessment    NAME: Meaghan Montanez  : 1965  MRN: [id number]    Date of Service: 2020       REQUIRES PT FOLLOW UP: Yes       Patient Diagnosis(es): The primary encounter diagnosis was BEAR (acute kidney injury) ([facility] [address] Diagnoses of Hyponatremia and Acute respiratory failure with hypoxia (HCC) were also pertinent to this visit. has a past medical history of Alcohol abuse, CHF (congestive heart failure) ([facility] [address] COPD (chronic obstructive pulmonary disease) ([facility] [address] History of cocaine use, Hyperlipidemia, Hypertension, Marijuana use, MI (myocardial infarction) ([facility] [address] and alberto. has a past surgical history that includes Wrist surgery; cervical fusion (11/18/15); polysomnography (2018); and Cardiac catheterization (2020). Evaluating Therapist: Xiomara Field, PT     rec ww     Referring Provider:  Dr. Madonna Moncada #:  [id number]   DIAGNOSIS:  Acute resp failure   PRECAUTIONS: falls, droplet plus, O2@ 4 LNC     Social:  Pt lives with  Alone but is moving in with a friend  in a 1 floor set up ,  steps and 1 rails to enter. Prior to admission pt walked with no AD or 2 canes      Initial Evaluation  Date:  2020 Treatment      Short Term/ Long Term   Goals   Was pt agreeable to Eval/treatment?   yes      Does pt have pain?  neuropathic pain B feet      Bed Mobility  Rolling:  NT   Supine to sit:  NT   Sit to supine:  NT   Scooting:  Independent in sit   Pt sittin g EOB upon arrival    independent    Transfers Sit to stand:  CGA   Stand to sit:  CGA   Stand pivot:  NT    independent    Ambulation     50  feet with ww  with  CGA    150  feet with ww /AAD   with  Independent        Stair negotiation: ascended and descended NT    TBA   LE ROM  WFL     LE strength  3+ to 4-/ 5   4/5    AM- PAC RAW score  18            Pt is alert and Oriented x  3     Balance: CGA , fall risk due to LE weakness   Sensation:  Neuropathy B feet Endurance: decreased  Bed/Chair alarm: no      ASSESSMENT  Pt displays functional ability as noted in the objective portion of this evaluation. Treatment/Education:     Mobility as above. Instructed on safety with all mobility, ww safety. Pt reports LE weakness; at risk for falls. Cues for hand placement with transfers using ww. Pulse ox with O2@ 34 LNC. At rest 96%, decreased to 93% with mobility, recovered to 96%. Cues for proper breathing technique. Issued incentive spirometer and instructed in use    Pt educated on fall risk,  Energy conservation techniques, safe and proper technique with all mobility        Patient response to education:   Pt verbalized understanding Pt demonstrated skill Pt requires further education in this area    x  with cues   x       Comments:  Pt left sitting EOB after session, with call light in reach. Rehab potential is Good for reaching above PT goals. Pts/ family goals   1. Home     Patient and or family understand(s) diagnosis, prognosis, and plan of care. - yes     PLAN  PT care will be provided in accordance with the objectives noted above. Whenever appropriate, clear delegation orders will be provided for nursing staff. Exercises and functional mobility practice will be used as well as appropriate assistive devices or modalities to obtain goals. Patient and family education will also be administered as needed. PLAN OF CARE:    Current Treatment Recommendations     [x] Strengthening     [] ROM   [x] Balance Training   [x] Endurance Training   [x] Transfer Training   [x] Gait Training   [x] Stair Training   [] Positioning   [x] Safety and Education Training   [x] Patient/Caregiver Education   [] HEP  [] Other       Frequency of treatments will be 2-5x/week x  7-10 days.     Time in: 1530   Time out:  1545      Evaluation Time includes thorough review of current medical information, gathering information on past medical history/social history and prior level of function, completion of standardized testing/informal observation of tasks, assessment of data and education on plan of care and goals.     CPT codes:  [x] Low Complexity PT evaluation 45993  [] Moderate Complexity PT evaluation 46226  [] High Complexity PT evaluation 49074  [] PT Re-evaluation 05139  [] Gait training 61758  minutes  [] Therapeutic activities 86807  minutes  [] Therapeutic exercises 72603  minutes  [] Neuromuscular reeducation 52321  minutes       Maco Alexis number:  PT [id number]

## 2020-11-17 NOTE — PROGRESS NOTES
Occupational Therapy  OCCUPATIONAL THERAPY INITIAL EVALUATION      Date:2020  Patient Name: Anila Salazar  MRN: [id number]  : 1965  Room: [address] [place], [place] [address]    Referring Provider: Danni Gimenez DO     Evaluating OT: Pat Cesar OTR/L [id number]    AM-PAC Daily Activity Raw Score:     Recommended Adaptive Equipment: TBD    Diagnosis: acute respiratory failure. Pertinent Medical History: HTN, COPD, CHF   Precautions:  Falls, droplet plus isolation COVID +, O2     Home Living: Pt lives alone, but reports upon discharge will be moving in with a friend in a 2 story home with 1st floor set up. Bathroom setup: tub/shower indwelling shower chair, standard commode     Prior Level of Function: Independent with ADLs, Independent with IADLs; completed functional mobility no AD. PRN use of cane. O2 at night only 3L. Driving: Yes    Pain Level: no reported pain    Cognition: A&O: 4/4. Problem solving:  WFL   Judgement/safety:  WFL     Functional Assessment:   Initial Eval Status  Date: 20 Treatment session:  Short Term Goals     Feeding Independent     Grooming Set up  Independent   UB Dressing Set up  Independent   LB Dressing SBA  Management of B socks seated EOB  Mod I    Bathing Min A  Mod I   Toileting Min A  Mod I   Bed Mobility  Supine to sit: seated EOB on entry     Functional Transfers STS: SBA  Mod I   Functional Mobility SBA with [employer]  Household distance  Limited further d/t SOB  Mod I during ADLs   Balance Sitting: fair plus    Standing: fair at [employer]     Activity Tolerance Fair minus  4L O2 throughout session maintained >93% throughout  standing katty x6-7 min with fair plus balance during self care tasks           Education on pacing and ECT during self care tasks to be implemented in home environment with verbalized understanding.  Issued and trained on use of incentive spirometer with return demonstration of 500mL    Treatment: Patient educated on techniques for completion of ADL, safe functional transfers and functional mobility. Patient required cues for follow through with proper hand/foot placement, pacing, safety and technique in bed mobility, functional transfers, functional mobility and LB dressing in preparation for maximum independence in all self care tasks. Hand Dominance: Right []  Left []   Strength ROM Additional Info:    RUE  Proximally: 3-/5  Distally: 4/5 WFL elbow to digits.  R shoulder flexion approx 90 degrees good  and FMC/dexterity noted during ADL tasks     LUE 4/5 WFL good  and FMC/dexterity noted during ADL tasks         Hearing: WFL   Vision: WFL   Sensation:  No c/o numbness or tingling   Tone: WFL   Edema: none                             Long Term Goal (1-3 wks): Pt will maximize functional performance in all self care tasks/functional transfers with good follow through of all trained techniques for safe transition to next level of care    Assessment of current deficits   Functional mobility [x]  ADLs [x] Strength [x]  Cognition []  Functional transfers  [x] IADLs [x] Safety Awareness [x]  Endurance [x]  Fine Motor Coordination [] Balance [x] Vision/perception [] Sensation []   Gross Motor Coordination [] ROM [] Delirium []                  Motor Control []    Plan of Care: 1-3 days/week for 1-2 weeks PRN   [x]ADL retraining/adaptive techniques and AE recommendations to increase functional independence within precautions                    [x]Energy conservation techniques to improve tolerance for ADL/IADLs  [x]Functional transfer/mobility training/DME recommendations for increased independence, safety and fall prevention         [x]Patient/family education to increase safety and functional independence during daily routine          [x]Environmental modifications for safe mobility and completion of ADLs                             []Cognitive retraining to improve problem solving skills & safe participation in ADLs/IADLs     []Sensory re-education techniques to improve extremity awareness, maintain skin integrity and improve hand function                             []Visual/Perceptual retraining to improve body awareness and safety during transfers and ADLs  []Splinting/positioning needs to maintain joint/skin integrity and contracture prevention  [x]Therapeutic activity to improve functional performance during ADLs                                        [x]Therapeutic exercise to improve tolerance and functional strength for ADLs   [x]Balance retraining/tolerance tasks for facilitation of postural control with dynamic challenges during ADLs  []Neuromuscular re-education to facilitate righting/equilibrium reactions, midline orientation, scapular stability/mobility, normalize muscle tone and facilitate active functional movement                        []Delirium prevention/treatment    [x]Positioning to improve functional independence and decrease risk of skin breakdown  []Other:     Rehab Potential: Good for established goals     Patient/Family Goal: To get home. Patient and/or family were instructed on functional diagnosis, prognosis/goals and OT plan of care. Pt verbalized understanding. Upon arrival, patient seated EOB. At end of session, patient seated EOB with call light and phone within reach, all lines and tubes intact. Pt would benefit from continued skilled OT to increase safety and independence with completion of ADL/IADL tasks for functional independence and quality of life.      Low Evaluation 14682  Time In: 1340   Time Out: 1352      Evaluation time includes thorough review of current medical information, gathering information on past medical history/social history and prior level of function, completion of standardized testing/informal observation of tasks, assessment of data, and development of POC/Goals    Qasim Stake OTR/L  [id number]

## 2020-11-17 NOTE — CARE COORDINATION
COVID POSITIVE 11/14. O2 3lNC-DOES NOT have home O2(has been using friend's oxygen prn)- DME list offered and declined- requesting [employer] if home O2 is needed on discharge. On Remdesivir, po Decadron. Lives alone PTA. Awaiting PT/OT evals- as per previous CM note, pt is agreeable to [facility] if needed. States plan at present pending progress is to return home w/ his sister and her boyfriend- address [address] [facility], [address]..  Will follow Martell June

## 2020-11-17 NOTE — PROGRESS NOTES
Pulmonary Progress Note    Admit Date: 2020  Hospital day                               PCP: Farhan Blackwood DO    Chief Complaint (s):  Patient Active Problem List   Diagnosis    CTS (carpal tunnel syndrome)    Cervical arthritis    Essential hypertension    Hyperlipidemia    INOCENCIA on CPAP    Community acquired bacterial pneumonia    CAP (community acquired pneumonia) due to Chlamydia species    Acute respiratory failure with hypoxia ([address]    COPD exacerbation ([facility]    Precordial chest pain    Unstable angina (HCC)       Subjective:  · Seems to be somewhat worse today with increasing breathlessness. The patient notes that he cannot walk even to the bathroom now. This is despite Decadron and remdesivir. Vitals:  VITALS:  /79   Pulse 90   Temp 98.8 °F (37.1 °C) (Oral)   Resp 18   Ht 5' 7\" (1.702 m)   Wt 210 lb 12.8 oz (95.6 kg)   SpO2 94%   BMI 33.02 kg/m²     24HR INTAKE/OUTPUT:      Intake/Output Summary (Last 24 hours) at 2020 1403  Last data filed at 2020 1051  Gross per 24 hour   Intake 120 ml   Output 1400 ml   Net -1280 ml       24HR PULSE OXIMETRY RANGE:    SpO2  Av.3 %  Min: 94 %  Max: 97 %    Medications:  IV:   sodium chloride 75 mL/hr at 20 0145       Scheduled Meds:   dexamethasone  6 mg Oral Daily    remdesivir IVPB  100 mg Intravenous Q24H    nitroglycerin  1 inch Topical Q8H    gabapentin  300 mg Oral 4x Daily    amLODIPine  5 mg Oral Daily    sacubitril-valsartan  1 tablet Oral BID    spironolactone  25 mg Oral Daily    nicotine  1 patch Transdermal Daily    pantoprazole  40 mg Oral QAM AC    cloNIDine  0.1 mg Oral BID    [Held by provider] hydrALAZINE  10 mg Oral 3 times per day    budesonide-formoterol  2 puff Inhalation BID       Diet:   DIET GENERAL;     EXAM:  General: No distress. Alert. Eyes: PERRL. No sclera icterus. No conjunctival injection. ENT: No discharge. Pharynx clear. Neck: Trachea midline. Radiology Results [address] at   1:02 pm on 11/16/2020to be communicated to a licensed caregiver. CT Head WO Contrast   Final Result   No acute intracranial abnormality. Mild to moderate senescent changes. Mild   prominence of the ventricular system. Intracranial atherosclerotic disease. XR CHEST PORTABLE   Final Result   No acute cardiopulmonary process. Assessment:  1. COVID-19 pneumonia with underlying chronic obstructive pulmonary disease    Plan:  1. Remdesivir per infectious disease  2. Continue steroids  3. Adjust FiO2    Time at the bedside, reviewing labs and radiographs, reviewing updated notes and consultations, discussing with staff and family was more than 35 minutes. Please note that voice recognition technology was used in the preparation of this note and make therefore it may contain inadvertent transcription errors. If the patient is a COVID 19 isolation patient, the above physical exam reflects that of the examining physician for the day. Osman Tripp M.D., F.C.C.P.     [facility], [address] [place] [address], [address] [place] [address]

## 2020-11-18 LAB
ALBUMIN SERPL-MCNC: 3.5 G/DL (ref 3.5–5.2)
ALP BLD-CCNC: 55 U/L (ref 40–129)
ALT SERPL-CCNC: 27 U/L (ref 0–40)
ANION GAP SERPL CALCULATED.3IONS-SCNC: 12 MMOL/L (ref 7–16)
AST SERPL-CCNC: 41 U/L (ref 0–39)
BILIRUB SERPL-MCNC: <0.2 MG/DL (ref 0–1.2)
BUN BLDV-MCNC: 19 MG/DL (ref 6–20)
CALCIUM SERPL-MCNC: 8.6 MG/DL (ref 8.6–10.2)
CHLORIDE BLD-SCNC: 104 MMOL/L (ref 98–107)
CO2: 18 MMOL/L (ref 22–29)
CREAT SERPL-MCNC: 1.1 MG/DL (ref 0.7–1.2)
GFR AFRICAN AMERICAN: >60
GFR NON-AFRICAN AMERICAN: >60 ML/MIN/1.73
GLUCOSE BLD-MCNC: 131 MG/DL (ref 74–99)
POTASSIUM SERPL-SCNC: 4.1 MMOL/L (ref 3.5–5)
SODIUM BLD-SCNC: 134 MMOL/L (ref 132–146)
TOTAL PROTEIN: 7.4 G/DL (ref 6.4–8.3)

## 2020-11-18 PROCEDURE — 2580000003 HC RX 258: Performed by: INTERNAL MEDICINE

## 2020-11-18 PROCEDURE — 6370000000 HC RX 637 (ALT 250 FOR IP): Performed by: GENERAL PRACTICE

## 2020-11-18 PROCEDURE — 36415 COLL VENOUS BLD VENIPUNCTURE: CPT

## 2020-11-18 PROCEDURE — 6360000002 HC RX W HCPCS: Performed by: INTERNAL MEDICINE

## 2020-11-18 PROCEDURE — 2060000000 HC ICU INTERMEDIATE R&B

## 2020-11-18 PROCEDURE — 2500000003 HC RX 250 WO HCPCS: Performed by: INTERNAL MEDICINE

## 2020-11-18 PROCEDURE — 80053 COMPREHEN METABOLIC PANEL: CPT

## 2020-11-18 PROCEDURE — 2700000000 HC OXYGEN THERAPY PER DAY

## 2020-11-18 PROCEDURE — 2580000003 HC RX 258: Performed by: GENERAL PRACTICE

## 2020-11-18 RX ADMIN — GABAPENTIN 300 MG: 300 CAPSULE ORAL at 21:36

## 2020-11-18 RX ADMIN — AMLODIPINE BESYLATE 5 MG: 5 TABLET ORAL at 07:59

## 2020-11-18 RX ADMIN — REMDESIVIR 100 MG: 5 INJECTION INTRAVENOUS at 21:35

## 2020-11-18 RX ADMIN — GABAPENTIN 300 MG: 300 CAPSULE ORAL at 15:37

## 2020-11-18 RX ADMIN — NITROGLYCERIN 1 INCH: 20 OINTMENT TOPICAL at 05:28

## 2020-11-18 RX ADMIN — SPIRONOLACTONE 25 MG: 25 TABLET ORAL at 07:59

## 2020-11-18 RX ADMIN — SACUBITRIL AND VALSARTAN 1 TABLET: 24; 26 TABLET, FILM COATED ORAL at 08:00

## 2020-11-18 RX ADMIN — PANTOPRAZOLE SODIUM 40 MG: 40 TABLET, DELAYED RELEASE ORAL at 05:28

## 2020-11-18 RX ADMIN — BUDESONIDE AND FORMOTEROL FUMARATE DIHYDRATE 2 PUFF: 80; 4.5 AEROSOL RESPIRATORY (INHALATION) at 21:35

## 2020-11-18 RX ADMIN — DEXAMETHASONE 6 MG: 4 TABLET ORAL at 07:59

## 2020-11-18 RX ADMIN — SACUBITRIL AND VALSARTAN 1 TABLET: 24; 26 TABLET, FILM COATED ORAL at 21:37

## 2020-11-18 RX ADMIN — SODIUM CHLORIDE: 9 INJECTION, SOLUTION INTRAVENOUS at 17:13

## 2020-11-18 RX ADMIN — GABAPENTIN 300 MG: 300 CAPSULE ORAL at 08:01

## 2020-11-18 RX ADMIN — IPRATROPIUM BROMIDE AND ALBUTEROL 1 PUFF: 20; 100 SPRAY, METERED RESPIRATORY (INHALATION) at 05:28

## 2020-11-18 RX ADMIN — CLONIDINE HYDROCHLORIDE 0.1 MG: 0.1 TABLET ORAL at 07:59

## 2020-11-18 RX ADMIN — CLONIDINE HYDROCHLORIDE 0.1 MG: 0.1 TABLET ORAL at 21:37

## 2020-11-18 RX ADMIN — NITROGLYCERIN 1 INCH: 20 OINTMENT TOPICAL at 15:37

## 2020-11-18 RX ADMIN — NITROGLYCERIN 1 INCH: 20 OINTMENT TOPICAL at 21:37

## 2020-11-18 RX ADMIN — BUDESONIDE AND FORMOTEROL FUMARATE DIHYDRATE 2 PUFF: 80; 4.5 AEROSOL RESPIRATORY (INHALATION) at 08:01

## 2020-11-18 ASSESSMENT — PAIN SCALES - GENERAL
PAINLEVEL_OUTOF10: 0
PAINLEVEL_OUTOF10: 0

## 2020-11-18 NOTE — PROGRESS NOTES
ID Progress Note                [address] [facility] [address], [place], [address] [facility]            Phone [phone]     Fax [phone]      Chief complaint   Shortness of breath    Subjective:  2 L oxygen  He feels better   SOB +  The patient is awake and alert. Tolerating medications. Reports no side effects. Afebrile. 10 ROS otherwise negative unless otherwise specified above. Objective:    Vitals:    11/18/20 0756   BP: 132/87   Pulse: 76   Resp: 20   Temp: 98.2 °F (36.8 °C)   SpO2: 94%     VENT SETTINGS:   Vent Information  SpO2: 94 %  General Appearance:    Awake, alert , no acute distress. HEENT:    Normocephalic,PERRL,neck supple, no JVD, mucosa moist, no thrush   Lungs:     Clear to auscultation bilaterally, no wheeze , crackles   Heart:    Regular rate and rhythm, no murmur   Abdomen:     Soft, non-tender, not distended  bowel sounds present,   Extremities:   No edema,no open wound,no erythema, non  tender   Skin:   no rashes or lesions     Labs:  No results for input(s): WBC, RBC, HGB, HCT, MCV, MCH, MCHC, RDW, PLT, MPV in the last 72 hours. CMP:    Lab Results   Component Value Date     11/18/2020    K 4.1 11/18/2020    K 5.0 11/15/2020     11/18/2020    CO2 18 11/18/2020    BUN 19 11/18/2020    CREATININE 1.1 11/18/2020    GFRAA >60 11/18/2020    LABGLOM >60 11/18/2020    GLUCOSE 131 11/18/2020    PROT 7.4 11/18/2020    LABALBU 3.5 11/18/2020    CALCIUM 8.6 11/18/2020    BILITOT <0.2 11/18/2020    ALKPHOS 55 11/18/2020    AST 41 11/18/2020    ALT 27 11/18/2020          Microbiology :  No results for input(s): BC in the last 72 hours. No results for input(s): Gerald Konig in the last 72 hours. No results for input(s): LABURIN in the last 72 hours. No results for input(s): CULTRESP in the last 72 hours. No results for input(s): WNDABS in the last 72 hours. Radiology :  CT CHEST WO CONTRAST   Final Result   1.  Extensive bilateral patchy ground-glass opacities, worse on the right,   specially in the upper lobe, consistent with pneumonia. Imaging features can   be seen with COVID-19 pneumonia, though are nonspecific and can occur with a   variety of infectious and noninfectious processes. 2. Mild mediastinal lymphadenopathy, unchanged and likely reactive. 3. Stable 1.2 cm subpleural nodule in the left lower lobe. This could   represent round atelectasis. Follow-up CT of the chest in 3 months is   suggested. 4. Multiple old or healing left rib fractures. 5.  The findings were sent to the Radiology Results [address] at   1:02 pm on 11/16/2020to be communicated to a licensed caregiver. CT Head WO Contrast   Final Result   No acute intracranial abnormality. Mild to moderate senescent changes. Mild   prominence of the ventricular system. Intracranial atherosclerotic disease.          XR CHEST PORTABLE   Final Result   No acute cardiopulmonary process.                Assessment and Plan:      Mild- moderate  COVID19 pneumonia  CHF/CAD     Plan  - chest CT wo contrast noted  - d dimer- <200  - decadron  - day 3 remdesivir, give 4th dose tomorrow and then plan for discharge if h continues to do good  - low CRP and procalcitonin  - will follow    Continue to Wean oxygen                Electronically signed by Candy Solis MD on 11/18/2020 at 3:12 PM

## 2020-11-18 NOTE — PROGRESS NOTES
ID Progress Note                [address] [facility] [address], [place], [address]            Phone [phone]     Fax [phone]      Chief complaint   unchanged    Subjective:  He feels better today, got tired when he was taking shower  He is having sore throat now  He's down to 2L oxygen  The patient is awake and alert. Tolerating medications. Reports no side effects. Afebrile. 10 ROS otherwise negative unless otherwise specified above. Objective:    Vitals:    11/17/20 0745   BP: 122/79   Pulse: 90   Resp: 18   Temp: 98.8 °F (37.1 °C)   SpO2: 94%     VENT SETTINGS:   Vent Information  SpO2: 94 %  General Appearance:    Awake, alert , no acute distress. HEENT:    Normocephalic,PERRL,neck supple, no JVD, mucosa moist, no thrush   Lungs:     Clear to auscultation bilaterally, no wheeze , crackles   Heart:    Regular rate and rhythm, no murmur   Abdomen:     Soft, non-tender, not distended  bowel sounds present,   Extremities:   No edema,no open wound,no erythema, non  tender   Skin:   no rashes or lesions     Labs:  Recent Labs     11/15/20  0515   WBC 6.0   RBC 4.88   HGB 15.5   HCT 45.6   MCV 93.4   MCH 31.8   MCHC 34.0   RDW 12.3      MPV 9.6     CMP:    Lab Results   Component Value Date     11/15/2020    K 5.0 11/15/2020     11/15/2020    CO2 19 11/15/2020    BUN 27 11/15/2020    CREATININE 2.4 11/15/2020    GFRAA 34 11/15/2020    LABGLOM 28 11/15/2020    GLUCOSE 95 11/15/2020    PROT 8.5 11/15/2020    LABALBU 3.8 11/15/2020    CALCIUM 9.1 11/15/2020    BILITOT 0.3 11/15/2020    ALKPHOS 59 11/15/2020    AST 28 11/15/2020    ALT 13 11/15/2020          Microbiology :  No results for input(s): BC in the last 72 hours. No results for input(s): Bretta Donavon in the last 72 hours. No results for input(s): LABURIN in the last 72 hours. No results for input(s): CULTRESP in the last 72 hours. No results for input(s): WNDABS in the last 72 hours.     Radiology :  CT CHEST WO CONTRAST   Final Result   1. Extensive bilateral patchy ground-glass opacities, worse on the right,   specially in the upper lobe, consistent with pneumonia. Imaging features can   be seen with COVID-19 pneumonia, though are nonspecific and can occur with a   variety of infectious and noninfectious processes. 2. Mild mediastinal lymphadenopathy, unchanged and likely reactive. 3. Stable 1.2 cm subpleural nodule in the left lower lobe. This could   represent round atelectasis. Follow-up CT of the chest in 3 months is   suggested. 4. Multiple old or healing left rib fractures. 5.  The findings were sent to the Radiology Results [address] at   1:02 pm on 11/16/2020to be communicated to a licensed caregiver. CT Head WO Contrast   Final Result   No acute intracranial abnormality. Mild to moderate senescent changes. Mild   prominence of the ventricular system. Intracranial atherosclerotic disease.          XR CHEST PORTABLE   Final Result   No acute cardiopulmonary process.                Assessment and Plan:      Mild- moderate  COVID19 pneumonia  CHF/CAD     Plan  - chest CT wo contrast noted  - d dimer- <200  - decadron  - day 2 remdesivir, he might do well with a short course  - low CRP and procalcitonin  - will follow    Continue to Wean oxygen                Electronically signed by Dru Valencia MD on 11/17/2020 at 7:20 PM

## 2020-11-18 NOTE — PROGRESS NOTES
Pulmonary Progress Note    Admit Date: 2020  Hospital day                               PCP: Chase Gutiérrez DO    Chief Complaint (s):  Patient Active Problem List   Diagnosis    CTS (carpal tunnel syndrome)    Cervical arthritis    Essential hypertension    Hyperlipidemia    INOCENCIA on CPAP    Community acquired bacterial pneumonia    CAP (community acquired pneumonia) due to Chlamydia species    Acute respiratory failure with hypoxia ([facility]    COPD exacerbation ([facility]    Precordial chest pain    Unstable angina (HCC)       Subjective:  · Awake and alert, still desaturates with exertion. There is a metabolic acidosis likely associated with a respiratory alkalosis. Vitals:  VITALS:  /87   Pulse 76   Temp 98.2 °F (36.8 °C) (Oral)   Resp 20   Ht 5' 7\" (1.702 m)   Wt 210 lb 12.8 oz (95.6 kg)   SpO2 94%   BMI 33.02 kg/m²     24HR INTAKE/OUTPUT:      Intake/Output Summary (Last 24 hours) at 2020 1400  Last data filed at 2020 0406  Gross per 24 hour   Intake --   Output 1225 ml   Net -1225 ml       24HR PULSE OXIMETRY RANGE:    SpO2  Av.7 %  Min: 94 %  Max: 97 %    Medications:  IV:   sodium chloride 75 mL/hr at 20 0145       Scheduled Meds:   dexamethasone  6 mg Oral Daily    remdesivir IVPB  100 mg Intravenous Q24H    nitroglycerin  1 inch Topical Q8H    gabapentin  300 mg Oral 4x Daily    amLODIPine  5 mg Oral Daily    sacubitril-valsartan  1 tablet Oral BID    spironolactone  25 mg Oral Daily    nicotine  1 patch Transdermal Daily    pantoprazole  40 mg Oral QAM AC    cloNIDine  0.1 mg Oral BID    [Held by provider] hydrALAZINE  10 mg Oral 3 times per day    budesonide-formoterol  2 puff Inhalation BID       Diet:   DIET GENERAL;  Dietary Nutrition Supplements: Standard High Calorie Oral Supplement     EXAM:  General: No distress. Alert. Eyes: PERRL. No sclera icterus. No conjunctival injection. ENT: No discharge.  Pharynx clear.  Neck: Trachea midline. Normal thyroid. Resp: No accessory muscle use. Bilateral rales. No wheezing. No rhonchi. CV: Regular rate. Regular rhythm. No murmur or rub. Abd: Non-tender. Non-distended. No masses. No organomegaly. Normal bowel sounds. Skin: Warm and dry. No nodule on exposed extremities. No rash on exposed extremities. Ext: No cyanosis, clubbing, edema  Lymph: No cervical LAD. No supraclavicular LAD. M/S: No cyanosis. No joint deformity. No clubbing. Neuro: Awake. Follows commands. Positive pupils/gag/corneals. Normal pain response. Results:  CBC:   No results for input(s): WBC, HGB, HCT, MCV, PLT in the last 72 hours. BMP:   Recent Labs     11/18/20  1000      K 4.1      CO2 18*   BUN 19   CREATININE 1.1     LIVER PROFILE:   Recent Labs     11/18/20  1000   AST 41*   ALT 27   BILITOT <0.2   ALKPHOS 55     PT/INR: No results for input(s): PROTIME, INR in the last 72 hours. APTT: No results for input(s): APTT in the last 72 hours. Pathology:  1. N/A      Microbiology:  1. None    Recent ABG:   No results for input(s): PH, PO2, PCO2, HCO3, BE, O2SAT, METHB, O2HB, COHB, O2CON, HHB, THB in the last 72 hours. Recent Films:  CT CHEST WO CONTRAST   Final Result   1. Extensive bilateral patchy ground-glass opacities, worse on the right,   specially in the upper lobe, consistent with pneumonia. Imaging features can   be seen with COVID-19 pneumonia, though are nonspecific and can occur with a   variety of infectious and noninfectious processes. 2. Mild mediastinal lymphadenopathy, unchanged and likely reactive. 3. Stable 1.2 cm subpleural nodule in the left lower lobe. This could   represent round atelectasis. Follow-up CT of the chest in 3 months is   suggested. 4. Multiple old or healing left rib fractures. 5.  The findings were sent to the Radiology Results [address] at   1:02 pm on 11/16/2020to be communicated to a licensed caregiver. CT Head WO Contrast   Final Result   No acute intracranial abnormality. Mild to moderate senescent changes. Mild   prominence of the ventricular system. Intracranial atherosclerotic disease. XR CHEST PORTABLE   Final Result   No acute cardiopulmonary process. Assessment:  1. COVID-19 pneumonia with underlying chronic obstructive pulmonary disease    Plan:  1. Remdesivir per infectious disease  2. Continue steroids  3. Adjust FiO2, wean as tolerated. The patient will likely require supplemental oxygen upon discharge has he already had significant underlying lung disease. Time at the bedside, reviewing labs and radiographs, reviewing updated notes and consultations, discussing with staff and family was more than 35 minutes. Please note that voice recognition technology was used in the preparation of this note and make therefore it may contain inadvertent transcription errors. If the patient is a COVID 19 isolation patient, the above physical exam reflects that of the examining physician for the day. Deana Shepard M.D., F.C.C.P.     [facility] [address] [facility], [address] [place] [address], [address] [place] [address]

## 2020-11-19 LAB
ANION GAP SERPL CALCULATED.3IONS-SCNC: 12 MMOL/L (ref 7–16)
BLOOD CULTURE, ROUTINE: NORMAL
BUN BLDV-MCNC: 19 MG/DL (ref 6–20)
CALCIUM SERPL-MCNC: 8 MG/DL (ref 8.6–10.2)
CHLORIDE BLD-SCNC: 107 MMOL/L (ref 98–107)
CO2: 18 MMOL/L (ref 22–29)
CREAT SERPL-MCNC: 0.9 MG/DL (ref 0.7–1.2)
CULTURE, BLOOD 2: NORMAL
GFR AFRICAN AMERICAN: >60
GFR NON-AFRICAN AMERICAN: >60 ML/MIN/1.73
GLUCOSE BLD-MCNC: 166 MG/DL (ref 74–99)
MAGNESIUM: 1.6 MG/DL (ref 1.6–2.6)
PHOSPHORUS: 2.3 MG/DL (ref 2.5–4.5)
POTASSIUM SERPL-SCNC: 3.5 MMOL/L (ref 3.5–5)
SODIUM BLD-SCNC: 137 MMOL/L (ref 132–146)

## 2020-11-19 PROCEDURE — 2500000003 HC RX 250 WO HCPCS: Performed by: INTERNAL MEDICINE

## 2020-11-19 PROCEDURE — 83735 ASSAY OF MAGNESIUM: CPT

## 2020-11-19 PROCEDURE — 2060000000 HC ICU INTERMEDIATE R&B

## 2020-11-19 PROCEDURE — 2700000000 HC OXYGEN THERAPY PER DAY

## 2020-11-19 PROCEDURE — 6360000002 HC RX W HCPCS: Performed by: INTERNAL MEDICINE

## 2020-11-19 PROCEDURE — 2580000003 HC RX 258: Performed by: INTERNAL MEDICINE

## 2020-11-19 PROCEDURE — 6370000000 HC RX 637 (ALT 250 FOR IP): Performed by: GENERAL PRACTICE

## 2020-11-19 PROCEDURE — 80048 BASIC METABOLIC PNL TOTAL CA: CPT

## 2020-11-19 PROCEDURE — 36415 COLL VENOUS BLD VENIPUNCTURE: CPT

## 2020-11-19 PROCEDURE — 2580000003 HC RX 258: Performed by: GENERAL PRACTICE

## 2020-11-19 PROCEDURE — 84100 ASSAY OF PHOSPHORUS: CPT

## 2020-11-19 RX ADMIN — NITROGLYCERIN 1 INCH: 20 OINTMENT TOPICAL at 05:29

## 2020-11-19 RX ADMIN — GABAPENTIN 300 MG: 300 CAPSULE ORAL at 21:01

## 2020-11-19 RX ADMIN — GABAPENTIN 300 MG: 300 CAPSULE ORAL at 09:22

## 2020-11-19 RX ADMIN — AMLODIPINE BESYLATE 5 MG: 5 TABLET ORAL at 09:22

## 2020-11-19 RX ADMIN — SODIUM CHLORIDE: 9 INJECTION, SOLUTION INTRAVENOUS at 05:32

## 2020-11-19 RX ADMIN — SODIUM CHLORIDE: 9 INJECTION, SOLUTION INTRAVENOUS at 20:53

## 2020-11-19 RX ADMIN — GABAPENTIN 300 MG: 300 CAPSULE ORAL at 18:42

## 2020-11-19 RX ADMIN — NITROGLYCERIN 1 INCH: 20 OINTMENT TOPICAL at 23:42

## 2020-11-19 RX ADMIN — PANTOPRAZOLE SODIUM 40 MG: 40 TABLET, DELAYED RELEASE ORAL at 05:29

## 2020-11-19 RX ADMIN — NITROGLYCERIN 1 INCH: 20 OINTMENT TOPICAL at 14:17

## 2020-11-19 RX ADMIN — LIDOCAINE HYDROCHLORIDE: 20 SOLUTION ORAL; TOPICAL at 20:44

## 2020-11-19 RX ADMIN — IPRATROPIUM BROMIDE AND ALBUTEROL 1 PUFF: 20; 100 SPRAY, METERED RESPIRATORY (INHALATION) at 05:29

## 2020-11-19 RX ADMIN — SACUBITRIL AND VALSARTAN 1 TABLET: 24; 26 TABLET, FILM COATED ORAL at 09:22

## 2020-11-19 RX ADMIN — BUDESONIDE AND FORMOTEROL FUMARATE DIHYDRATE 2 PUFF: 80; 4.5 AEROSOL RESPIRATORY (INHALATION) at 09:21

## 2020-11-19 RX ADMIN — REMDESIVIR 100 MG: 5 INJECTION INTRAVENOUS at 21:00

## 2020-11-19 RX ADMIN — BUDESONIDE AND FORMOTEROL FUMARATE DIHYDRATE 2 PUFF: 80; 4.5 AEROSOL RESPIRATORY (INHALATION) at 21:00

## 2020-11-19 RX ADMIN — GABAPENTIN 300 MG: 300 CAPSULE ORAL at 14:14

## 2020-11-19 RX ADMIN — SPIRONOLACTONE 25 MG: 25 TABLET ORAL at 09:22

## 2020-11-19 RX ADMIN — CLONIDINE HYDROCHLORIDE 0.1 MG: 0.1 TABLET ORAL at 09:22

## 2020-11-19 RX ADMIN — CLONIDINE HYDROCHLORIDE 0.1 MG: 0.1 TABLET ORAL at 21:01

## 2020-11-19 RX ADMIN — SACUBITRIL AND VALSARTAN 1 TABLET: 24; 26 TABLET, FILM COATED ORAL at 21:01

## 2020-11-19 RX ADMIN — DEXAMETHASONE 6 MG: 4 TABLET ORAL at 09:22

## 2020-11-19 ASSESSMENT — PAIN SCALES - GENERAL
PAINLEVEL_OUTOF10: 0
PAINLEVEL_OUTOF10: 0

## 2020-11-19 NOTE — PROGRESS NOTES
[facility] Quality Flow/Interdisciplinary Rounds Progress Note        Quality Flow Rounds held on November 19, 2020    Disciplines Attending:  Bedside Nurse, ,  and Nursing Unit Leadership    Raf Griggs was admitted on 11/14/2020  1:59 PM    Anticipated Discharge Date:  Expected Discharge Date: 11/20/20    Disposition:    Erick Score:  Erick Scale Score: 22    Readmission Risk              Risk of Unplanned Readmission:        19           Discussed patient goal for the day, patient clinical progression, and barriers to discharge.   The following Goal(s) of the Day/Commitment(s) have been identified:  ambulating oxygen testing , wean oxygen      [facility]  November 19, 2020

## 2020-11-19 NOTE — PROGRESS NOTES
accessory muscle use. Bilateral rales. No wheezing. No rhonchi. CV: Regular rate. Regular rhythm. No murmur or rub. Abd: Non-tender. Non-distended. No masses. No organomegaly. Normal bowel sounds. Skin: Warm and dry. No nodule on exposed extremities. No rash on exposed extremities. Ext: No cyanosis, clubbing, edema  Lymph: No cervical LAD. No supraclavicular LAD. M/S: No cyanosis. No joint deformity. No clubbing. Neuro: Awake. Follows commands. Positive pupils/gag/corneals. Normal pain response. Results:  CBC:   No results for input(s): WBC, HGB, HCT, MCV, PLT in the last 72 hours. BMP:   Recent Labs     11/18/20  1000 11/19/20  1250    137   K 4.1 3.5    107   CO2 18* 18*   PHOS  --  2.3*   BUN 19 19   CREATININE 1.1 0.9     LIVER PROFILE:   Recent Labs     11/18/20  1000   AST 41*   ALT 27   BILITOT <0.2   ALKPHOS 55     PT/INR: No results for input(s): PROTIME, INR in the last 72 hours. APTT: No results for input(s): APTT in the last 72 hours. Pathology:  1. N/A      Microbiology:  1. None    Recent ABG:   No results for input(s): PH, PO2, PCO2, HCO3, BE, O2SAT, METHB, O2HB, COHB, O2CON, HHB, THB in the last 72 hours. Recent Films:  CT CHEST WO CONTRAST   Final Result   1. Extensive bilateral patchy ground-glass opacities, worse on the right,   specially in the upper lobe, consistent with pneumonia. Imaging features can   be seen with COVID-19 pneumonia, though are nonspecific and can occur with a   variety of infectious and noninfectious processes. 2. Mild mediastinal lymphadenopathy, unchanged and likely reactive. 3. Stable 1.2 cm subpleural nodule in the left lower lobe. This could   represent round atelectasis. Follow-up CT of the chest in 3 months is   suggested. 4. Multiple old or healing left rib fractures. 5.  The findings were sent to the Radiology Results [address] at   1:02 pm on 11/16/2020to be communicated to a licensed caregiver. CT Head WO Contrast   Final Result   No acute intracranial abnormality. Mild to moderate senescent changes. Mild   prominence of the ventricular system. Intracranial atherosclerotic disease. XR CHEST PORTABLE   Final Result   No acute cardiopulmonary process. Assessment:  1. COVID-19 pneumonia with underlying chronic obstructive pulmonary disease. Wean from oxygen has been reasonably successful over the past 48 hours. Plan:  1. Remdesivir per infectious disease  2. Continue steroids  3. Adjust FiO2, wean as tolerated. The patient will likely require supplemental oxygen upon discharge has he already had significant underlying lung disease. Time at the bedside, reviewing labs and radiographs, reviewing updated notes and consultations, discussing with staff and family was more than 35 minutes. Please note that voice recognition technology was used in the preparation of this note and make therefore it may contain inadvertent transcription errors. If the patient is a COVID 19 isolation patient, the above physical exam reflects that of the examining physician for the day. Clearance GREGORY Marr., F.C.C.P.     [facility] and [facility], [address] [place] [address], [address] [place] [address], [facility]

## 2020-11-19 NOTE — CARE COORDINATION
CM NOTE: Per QFR with SW--- Covid positive in droplet plus isolation. Day #3 IV RDV. Continue po decadron. Using O2 2L which he does not have at home. [facility] 18/24. Plan is home to sister's house after 4th dose RDV today. WILL NEED TO CHECK AMBULATING O2 TO DETERMINE IF HE QUALIFIES FOR HOME OXYGEN. Will need referral to [facility]. Discussed home care again. States his daughter in law is a home care nurse & can check on him since she lives nearby. CM & SW continue to follow pt. CM informed pt has cardiology consult for new VT. Unlikely discharge today.

## 2020-11-19 NOTE — PROGRESS NOTES
Dr. Nemesio Griffin answering service notified of new consult. Will send information to Dr. Guerline Ricardo.

## 2020-11-20 VITALS
HEART RATE: 97 BPM | OXYGEN SATURATION: 93 % | RESPIRATION RATE: 20 BRPM | HEIGHT: 67 IN | BODY MASS INDEX: 30.54 KG/M2 | WEIGHT: 194.6 LBS | TEMPERATURE: 96.3 F | DIASTOLIC BLOOD PRESSURE: 99 MMHG | SYSTOLIC BLOOD PRESSURE: 172 MMHG

## 2020-11-20 LAB
EKG ATRIAL RATE: 87 BPM
EKG P AXIS: 29 DEGREES
EKG P-R INTERVAL: 134 MS
EKG Q-T INTERVAL: 392 MS
EKG QRS DURATION: 86 MS
EKG QTC CALCULATION (BAZETT): 471 MS
EKG R AXIS: 1 DEGREES
EKG T AXIS: 38 DEGREES
EKG VENTRICULAR RATE: 87 BPM

## 2020-11-20 PROCEDURE — 6370000000 HC RX 637 (ALT 250 FOR IP): Performed by: GENERAL PRACTICE

## 2020-11-20 PROCEDURE — G0008 ADMIN INFLUENZA VIRUS VAC: HCPCS | Performed by: GENERAL PRACTICE

## 2020-11-20 PROCEDURE — 2580000003 HC RX 258: Performed by: INTERNAL MEDICINE

## 2020-11-20 PROCEDURE — 90686 IIV4 VACC NO PRSV 0.5 ML IM: CPT | Performed by: GENERAL PRACTICE

## 2020-11-20 PROCEDURE — 93005 ELECTROCARDIOGRAM TRACING: CPT | Performed by: GENERAL PRACTICE

## 2020-11-20 PROCEDURE — 6360000002 HC RX W HCPCS: Performed by: INTERNAL MEDICINE

## 2020-11-20 PROCEDURE — 6360000002 HC RX W HCPCS: Performed by: GENERAL PRACTICE

## 2020-11-20 PROCEDURE — 2500000003 HC RX 250 WO HCPCS: Performed by: INTERNAL MEDICINE

## 2020-11-20 PROCEDURE — 93010 ELECTROCARDIOGRAM REPORT: CPT | Performed by: INTERNAL MEDICINE

## 2020-11-20 RX ORDER — AMLODIPINE BESYLATE 5 MG/1
5 TABLET ORAL DAILY
Qty: 30 TABLET | Refills: 3 | Status: SHIPPED | OUTPATIENT
Start: 2020-11-21

## 2020-11-20 RX ORDER — FLUTICASONE PROPIONATE 50 MCG
1 SPRAY, SUSPENSION (ML) NASAL DAILY
Qty: 1 BOTTLE | Refills: 3 | Status: SHIPPED | OUTPATIENT
Start: 2020-11-20 | End: 2020-11-23

## 2020-11-20 RX ORDER — DEXAMETHASONE 6 MG/1
6 TABLET ORAL DAILY
Qty: 10 TABLET | Refills: 0 | Status: SHIPPED | OUTPATIENT
Start: 2020-11-21 | End: 2020-11-23

## 2020-11-20 RX ADMIN — SPIRONOLACTONE 25 MG: 25 TABLET ORAL at 08:37

## 2020-11-20 RX ADMIN — GABAPENTIN 300 MG: 300 CAPSULE ORAL at 08:37

## 2020-11-20 RX ADMIN — INFLUENZA A VIRUS A/VICTORIA/2454/2019 IVR-207 (H1N1) ANTIGEN (PROPIOLACTONE INACTIVATED), INFLUENZA A VIRUS A/HONG KONG/2671/2019 IVR-208 (H3N2) ANTIGEN (PROPIOLACTONE INACTIVATED), INFLUENZA B VIRUS B/VICTORIA/705/2018 BVR-11 ANTIGEN (PROPIOLACTONE INACTIVATED), INFLUENZA B VIRUS B/PHUKET/3073/2013 BVR-1B ANTIGEN (PROPIOLACTONE INACTIVATED) 0.5 ML: 15; 15; 15; 15 INJECTION, SUSPENSION INTRAMUSCULAR at 20:05

## 2020-11-20 RX ADMIN — LIDOCAINE HYDROCHLORIDE: 20 SOLUTION ORAL; TOPICAL at 04:14

## 2020-11-20 RX ADMIN — DEXAMETHASONE 6 MG: 4 TABLET ORAL at 08:37

## 2020-11-20 RX ADMIN — PANTOPRAZOLE SODIUM 40 MG: 40 TABLET, DELAYED RELEASE ORAL at 08:37

## 2020-11-20 RX ADMIN — NITROGLYCERIN 1 INCH: 20 OINTMENT TOPICAL at 06:30

## 2020-11-20 RX ADMIN — GABAPENTIN 300 MG: 300 CAPSULE ORAL at 18:00

## 2020-11-20 RX ADMIN — BUDESONIDE AND FORMOTEROL FUMARATE DIHYDRATE 2 PUFF: 80; 4.5 AEROSOL RESPIRATORY (INHALATION) at 18:41

## 2020-11-20 RX ADMIN — IPRATROPIUM BROMIDE AND ALBUTEROL 1 PUFF: 20; 100 SPRAY, METERED RESPIRATORY (INHALATION) at 18:41

## 2020-11-20 RX ADMIN — GABAPENTIN 300 MG: 300 CAPSULE ORAL at 13:01

## 2020-11-20 RX ADMIN — SACUBITRIL AND VALSARTAN 1 TABLET: 24; 26 TABLET, FILM COATED ORAL at 08:37

## 2020-11-20 RX ADMIN — BUDESONIDE AND FORMOTEROL FUMARATE DIHYDRATE 2 PUFF: 80; 4.5 AEROSOL RESPIRATORY (INHALATION) at 08:38

## 2020-11-20 RX ADMIN — CLONIDINE HYDROCHLORIDE 0.1 MG: 0.1 TABLET ORAL at 08:37

## 2020-11-20 RX ADMIN — REMDESIVIR 100 MG: 5 INJECTION INTRAVENOUS at 18:33

## 2020-11-20 RX ADMIN — AMLODIPINE BESYLATE 5 MG: 5 TABLET ORAL at 08:37

## 2020-11-20 RX ADMIN — NITROGLYCERIN 1 INCH: 20 OINTMENT TOPICAL at 13:01

## 2020-11-20 ASSESSMENT — PAIN SCALES - GENERAL
PAINLEVEL_OUTOF10: 0
PAINLEVEL_OUTOF10: 0

## 2020-11-20 NOTE — PROGRESS NOTES
CV: Regular rate. Regular rhythm. No murmur or rub. Abd: Non-tender. Non-distended. No masses. No organomegaly. Normal bowel sounds. Skin: Warm and dry. No nodule on exposed extremities. No rash on exposed extremities. Ext: No cyanosis, clubbing, edema  Lymph: No cervical LAD. No supraclavicular LAD. M/S: No cyanosis. No joint deformity. No clubbing. Neuro: Positive pupils/gag/corneals. Normal pain response. Results:  CBC:   No results for input(s): WBC, HGB, HCT, MCV, PLT in the last 72 hours. BMP:   Recent Labs     11/18/20  1000 11/19/20  1250    137   K 4.1 3.5    107   CO2 18* 18*   PHOS  --  2.3*   BUN 19 19   CREATININE 1.1 0.9     LIVER PROFILE:   Recent Labs     11/18/20  1000   AST 41*   ALT 27   BILITOT <0.2   ALKPHOS 55     PT/INR: No results for input(s): PROTIME, INR in the last 72 hours. APTT: No results for input(s): APTT in the last 72 hours. Pathology:  1. N/A      Microbiology:  1. None    Recent ABG:   No results for input(s): PH, PO2, PCO2, HCO3, BE, O2SAT, METHB, O2HB, COHB, O2CON, HHB, THB in the last 72 hours. Recent Films:  CT CHEST WO CONTRAST   Final Result   1. Extensive bilateral patchy ground-glass opacities, worse on the right,   specially in the upper lobe, consistent with pneumonia. Imaging features can   be seen with COVID-19 pneumonia, though are nonspecific and can occur with a   variety of infectious and noninfectious processes. 2. Mild mediastinal lymphadenopathy, unchanged and likely reactive. 3. Stable 1.2 cm subpleural nodule in the left lower lobe. This could   represent round atelectasis. Follow-up CT of the chest in 3 months is   suggested. 4. Multiple old or healing left rib fractures. 5.  The findings were sent to the Radiology Results [address] at   1:02 pm on 11/16/2020to be communicated to a licensed caregiver. CT Head WO Contrast   Final Result   No acute intracranial abnormality.   Mild to moderate senescent changes. Mild   prominence of the ventricular system. Intracranial atherosclerotic disease. XR CHEST PORTABLE   Final Result   No acute cardiopulmonary process. Assessment:  1. COVID-19 pneumonia with underlying chronic obstructive pulmonary disease. Wean from oxygen has been reasonably successful over the past 48 hours. Only, the patient is on room air. Plan:  1. Remdesivir per infectious disease  2. Continue steroids  3. The patient may be able to discharge without oxygen. Time at the bedside, reviewing labs and radiographs, reviewing updated notes and consultations, discussing with staff and family was more than 35 minutes. Please note that voice recognition technology was used in the preparation of this note and make therefore it may contain inadvertent transcription errors. If the patient is a COVID 19 isolation patient, the above physical exam reflects that of the examining physician for the day. Gayla Ponce M.D., F.C.C.P.     [facility], [address] [place] [address], [address] [place] [address]

## 2020-11-20 NOTE — PROGRESS NOTES
Nutrition Assessment     Type and Reason for Visit: Initial, RD Nutrition Re-Screen/LOS(RD Re-Screen Negative)    Nutrition Assessment:  Pt assessed per LOS protocol. Chart reviewed. Pt currently eating ~% of most meals and Ensure (per pt) and w/ no other significant nutritional issues noted at this time. Will follow-up per policy. Please consult if RD needed.     Contact: ext [phone]

## 2020-11-20 NOTE — PLAN OF CARE
Problem: Pain:  Goal: Pain level will decrease  Description: Pain level will decrease  Outcome: Met This Shift     Problem: Airway Clearance - Ineffective  Goal: Achieve or maintain patent airway  Outcome: Met This Shift     Problem: Breathing Pattern - Ineffective  Goal: Ability to achieve and maintain a regular respiratory rate  Outcome: Met This Shift     Problem:  Body Temperature -  Risk of, Imbalanced  Goal: Ability to maintain a body temperature within defined limits  Outcome: Met This Shift     Problem: Falls - Risk of:  Goal: Will remain free from falls  Description: Will remain free from falls  Outcome: Met This Shift

## 2020-11-20 NOTE — PROGRESS NOTES
ID Progress Note                [address] [facility] [address] [address], [place], [address] [facility]            Phone [phone]     Fax [phone]      Chief complaint   Shortness of breath    Subjective:  2 L oxygen  He feels better   SOB +  The patient is awake and alert. Tolerating medications. Reports no side effects. Afebrile. 10 ROS otherwise negative unless otherwise specified above. Objective:    Vitals:    11/20/20 1300   BP: 125/85   Pulse:    Resp: 18   Temp:    SpO2: 92%     VENT SETTINGS:   Vent Information  SpO2: 92 %  General Appearance:    Awake, alert , no acute distress. HEENT:    Normocephalic,PERRL,neck supple, no JVD, mucosa moist, no thrush   Lungs:     Clear to auscultation bilaterally, no wheeze , crackles   Heart:    Regular rate and rhythm, no murmur   Abdomen:     Soft, non-tender, not distended  bowel sounds present,   Extremities:   No edema,no open wound,no erythema, non  tender   Skin:   no rashes or lesions     Labs:  No results for input(s): WBC, RBC, HGB, HCT, MCV, MCH, MCHC, RDW, PLT, MPV in the last 72 hours. CMP:    Lab Results   Component Value Date     11/19/2020    K 3.5 11/19/2020    K 5.0 11/15/2020     11/19/2020    CO2 18 11/19/2020    BUN 19 11/19/2020    CREATININE 0.9 11/19/2020    GFRAA >60 11/19/2020    LABGLOM >60 11/19/2020    GLUCOSE 166 11/19/2020    PROT 7.4 11/18/2020    LABALBU 3.5 11/18/2020    CALCIUM 8.0 11/19/2020    BILITOT <0.2 11/18/2020    ALKPHOS 55 11/18/2020    AST 41 11/18/2020    ALT 27 11/18/2020          Microbiology :  No results for input(s): BC in the last 72 hours. No results for input(s): Ruben Ruts in the last 72 hours. No results for input(s): LABURIN in the last 72 hours. No results for input(s): CULTRESP in the last 72 hours. No results for input(s): WNDABS in the last 72 hours. Radiology :  CT CHEST WO CONTRAST   Final Result   1.  Extensive bilateral patchy ground-glass opacities, worse on the right, specially in the upper lobe, consistent with pneumonia. Imaging features can   be seen with COVID-19 pneumonia, though are nonspecific and can occur with a   variety of infectious and noninfectious processes. 2. Mild mediastinal lymphadenopathy, unchanged and likely reactive. 3. Stable 1.2 cm subpleural nodule in the left lower lobe. This could   represent round atelectasis. Follow-up CT of the chest in 3 months is   suggested. 4. Multiple old or healing left rib fractures. 5.  The findings were sent to the Radiology Results [address] at   1:02 pm on 11/16/2020to be communicated to a licensed caregiver. CT Head WO Contrast   Final Result   No acute intracranial abnormality. Mild to moderate senescent changes. Mild   prominence of the ventricular system. Intracranial atherosclerotic disease.          XR CHEST PORTABLE   Final Result   No acute cardiopulmonary process.                Assessment and Plan:      Mild- moderate  COVID19 pneumonia  CHF/CAD     Plan  -Give last dose of remdesivir today and okay to go home on 2 L oxygen                 Electronically signed by Delia Pearce MD on 11/20/2020 at 1:26 PM

## 2020-11-20 NOTE — PROGRESS NOTES
Spoke with Dr. Segun Aparicio regarding patients current BP. Per Dr. Segun Aparicio will see patient in office next week. [id number] [address] for discharge.

## 2020-11-21 ENCOUNTER — CARE COORDINATION (OUTPATIENT)
Dept: CASE MANAGEMENT | Age: 55
End: 2020-11-21

## 2020-11-21 NOTE — CARE COORDINATION
Isa 45 Transitions Initial Follow Up Call- COVID risk follow up call       Call within 2 business days of discharge: Yes    Patient: Wil Vargas Patient : 1965   MRN: [id number]  Reason for Admission: COVID- 19  Discharge Date: 20 RARS: Readmission Risk Score: 20      Last Discharge [facility]       Complaint Diagnosis Description Type Department Provider    20 Fever; Shortness of Breath; Cough BEAR (acute kidney injury) ([facility] [address] . .. ED to Hosp-Admission (Discharged) (ADMITTED) [facility] [address] Jumana Ann, ; Leti Maloney,... Spoke with: Enzo Mayorga     Call to pt who states he just tried going to his apartment to get his oxygen and was too short of breath to finish going. He sent his daughter who brought it to him. Advised to call 911 or go to ER if symptoms worsen or continue- v/u  States he has his inhaler (albuterol) and just used it- states understanding he can use it every 4 hours prn. Confirms he is in isolation for another 7 days and is in a place he is separate from others (dtr wore 2 masks when bringing him the O2)  States eating and drinking well      Challenges to be reviewed by the provider   Additional needs identified to be addressed with provider No  none    Discussed COVID-19 related testing which was available at this time. Test results were positive. Patient informed of results, if available? Yes         Method of communication with provider : none    Advance Care Planning:   Does patient have an Advance Directive:  reviewed and current. Was this a readmission? No  Patient stated reason for admission: covid   Patients top risk factors for readmission: lack of knowledge about disease and medical condition    Care Transition Nurse (CTN) contacted the patient by telephone to perform post hospital discharge assessment. Verified name and  with patient as identifiers. Provided introduction to self, and explanation of the CTN role.      CTN reviewed discharge instructions, medical action plan and red flags with patient who verbalized understanding. Patient given an opportunity to ask questions and does not have any further questions or concerns at this time. Were discharge instructions available to patient? Yes. Reviewed appropriate site of care based on symptoms and resources available to patient including: PCP, When to call 911 and ctn. The patient agrees to contact the PCP office for questions related to their healthcare. Medication reconciliation was performed with patient, who verbalizes understanding of administration of home medications. Advised obtaining a 90-day supply of all daily and as-needed medications. Covid Risk Education    Patient has following risk factors of: heart failure, COPD and covid . Education provided regarding infection prevention, and signs and symptoms of COVID-19 and when to seek medical attention with patient who verbalized understanding. Discussed exposure protocols and quarantine From CDC: Are you at higher risk for severe illness?   and given an opportunity for questions and concerns. The patient agrees to contact the COVID-19 hotline [phone] or PCP office for questions related to COVID-19. For more information on steps you can take to protect yourself, see CDC's How to Protect Yourself     Patient/family/caregiver given information for Shivam Siddiqui and agrees to enroll yes  Patient's preferred e-mail: [email or web address]  Patient's preferred phone number: [phone]    Discussed follow-up appointments. If no appointment was previously scheduled, appointment scheduling offered: No. Is follow up appointment scheduled within 7 days of discharge? No  Non-[facility] follow up appointment(s): pt calling on Monday to schedule with PCP, ID & pulm    followed by [facility] based on severity of symptoms and risk factors.   Plan for next call: n/a followed by [facility]   CTN provided contact information for future needs.        Non-face-to-face services provided:  Scheduled appointment with PCP-pt calling on Monday to schedule with PCP  Scheduled appointment with Specialist-pt calling on Monday to schedule with ID & pulm  Obtained and reviewed discharge summary and/or continuity of care documents  Assessment and support for treatment adherence and medication management-confirms new, changed and DC med     Care Transitions 24 Hour Call    Do you have any ongoing symptoms?:  No  Do you have a copy of your discharge instructions?:  Yes  Do you have all of your prescriptions and are they filled?:  Yes  Have you been contacted by a [address]?:  No  Have you scheduled your follow up appointment?:  No  Were you discharged with any Home Care or Post Acute Services:  No  Do you feel like you have everything you need to keep you well at home?:  Yes  Care Transitions Interventions         Follow Up  No future appointments.     Yari David RN

## 2020-11-21 NOTE — CONSULTS
CARDIOLOGY CONSULTATION    Patient Name:  Sina Moralez    :  1965    Reason for Consultation:   Recurrent CHF    History of Present Illness:   Sina Moralez returns to [address], following a recent history of increasing fever as well as shortness of breath and chest discomfort. He was recently evaluated approximately 1 month ago for underlying cardiomyopathy and placed on medical therapy with significant symptomatic improvement. Unfortunately, he developed a fever approximately 1 week ago and nearly lost consciousness and was brought to the hospital and subsequently diagnosed with COVID-19. His symptoms had precipitously increased over the past 36 hours prior to admission and he is now admitted for further observation and treatment. .  Importantly during the stay he was noted to have a nonsustained 9 beat level of ventricular tachycardia for which he was asymptomatic. Past Medical History:   has a past medical history of Alcohol abuse, CHF (congestive heart failure) ([facility] [address] COPD (chronic obstructive pulmonary disease) ([facility] [address] History of cocaine use, Hyperlipidemia, Hypertension, Marijuana use, MI (myocardial infarction) ([facility] [address] and alberto. Surgical History:   has a past surgical history that includes Wrist surgery; cervical fusion (11/18/15); polysomnography (2018); and Cardiac catheterization (2020). Social History:   reports that he has quit smoking. His smoking use included cigarettes. He started smoking about 41 years ago. He has a 60.00 pack-year smoking history. He has never used smokeless tobacco. He reports previous alcohol use. He reports previous drug use. Drugs: Cocaine, Other-see comments, and Marijuana. Family History:  family history includes Arthritis in his mother; Cancer in his brother; Heart Disease in his father; High Blood Pressure in his brother, brother, brother, and brother;  Other in his brother, brother, brother, brother, and mother. Medications:  Prior to Admission medications    Medication Sig Start Date End Date Taking? Authorizing Provider   amLODIPine (NORVASC) 5 MG tablet Take 1 tablet by mouth daily 11/21/20  Yes Sal Rothman DO   dexamethasone (DECADRON) 6 MG tablet Take 1 tablet by mouth daily for 10 days 11/21/20 12/1/20 Yes Joel Vicente DO   fluticasone [facility]) 50 MCG/ACT nasal spray 1 spray by Each Nostril route daily 11/20/20  Yes Sal Rothman DO   nitroglycerin (NITRO-BID) 2 % ointment Place 1 inch onto the skin every 8 hours 11/5/20  Yes Joel Vicente DO   nitroGLYCERIN (NITROSTAT) 0.4 MG SL tablet up to max of 3 total doses. If no relief after 1 dose, call 911. 11/5/20  Yes Sal Rothman DO   ipratropium (ATROVENT) 0.02 % nebulizer solution Take 2.5 mLs by nebulization 4 times daily 11/5/20  Yes Sal Rothman DO   cloNIDine (CATAPRES) 0.1 MG tablet Take 1 tablet by mouth 2 times daily 11/5/20  Yes Sal Rothman DO   sacubitril-valsartan (ENTRESTO) 24-26 MG per tablet Take 1 tablet by mouth 2 times daily 11/5/20  Yes Sal Rothman DO   spironolactone (ALDACTONE) 25 MG tablet Take 25 mg by mouth daily   Yes Historical Provider, MD   pantoprazole (PROTONIX) 40 MG tablet Take 1 tablet by mouth every morning (before breakfast) 2/11/20  Yes Sal Rothman DO   nicotine (NICODERM CQ) 21 MG/24HR Place 1 patch onto the skin daily 10/5/19  Yes Sal Rothman DO   albuterol (PROVENTIL) (2.5 MG/3ML) 0.083% nebulizer solution Take 3 mLs by nebulization every 6 hours as needed for Wheezing 10/4/19  Yes Joel Vicente DO   gabapentin (NEURONTIN) 300 MG capsule Take 300 mg by mouth 4 times daily.  Takes up to 4 times a day as needed   Yes Historical Provider, MD   metoprolol succinate (TOPROL XL) 100 MG extended release tablet Take 1 tablet by mouth daily 8/24/19  Yes Sal Rothman,    finasteride (PROSCAR) 5 MG tablet Take 1 tablet by mouth daily 8/24/19  Yes Sal Rothman DO   Respiratory Therapy Supplies (FULL KIT NEBULIZER SET) MISC Use as directed with nebulized medication. 4/23/19  Yes Courtney Johnson,    fluticasone-umeclidin-vilant (TRELEGY ELLIPTA) 995-95.3-84 MCG/INH AEPB Inhale 1 puff into the lungs daily 2/10/20 6/9/20  Dov Barajas MD   albuterol sulfate HFA (PROVENTIL HFA) 108 (90 Base) MCG/ACT inhaler Inhale 2 puffs into the lungs every 4 hours as needed for Wheezing 8/24/17 8/18/19  Deanna Gonsalves DO       Allergies:  Patient has no known allergies. Review of Systems:   · Constitutional: there has been no unanticipated weight loss. There's been no significant change in energy level, sleep pattern or activity level. No fever chills or rigors. · Eyes: No visual changes or diplopia. No scleral icterus. · ENT: No Headaches, hearing loss or vertigo. No mouth sores or sore throat. No change in taste or smell. · Cardiovascular: + Chest discomfort with breathing, + dyspnea on exertion and now is well at rest., palpitations, loss of consciousness, no phlebitis, no claudication. · Respiratory: No cough or wheezing, no sputum production. No hemoptysis, pleuritic pain. · Gastrointestinal: No abdominal pain, appetite loss, blood in stools. No change in bowel habits. No hematemesis  · Genitourinary: No dysuria, trouble voiding or hematuria. No nocturia or increased frequency. · Musculoskeletal:  No gait disturbance, weakness or joint complaints. · Integumentary: No rash or pruritis. · Neurological: No headache, diplopia, change in muscle strength, numbness or tingling. No change in gait, balance, coordination, mood, affect, memory, mentation, behavior. · Psychiatric: No anxiety or depression. · Endocrine: No temperature intolerance. No excessive thirst, fluid intake, or urination. No tremor. · Hematologic/Lymphatic: No abnormal bruising or bleeding, blood clots or swollen lymph nodes. · Allergic/Immunologic: No nasal congestion or hives.     Physical Examination:    Vital Signs: BP (!) 172/99   Pulse 97   Temp 96.3 °F (35.7 °C) (Oral)   Resp 20   Ht 5' 7\" (1.702 m)   Wt 194 lb 9.6 oz (88.3 kg)   SpO2 93%   BMI 30.48 kg/m²   General appearance: Well preserved, mesomorphic body habitus, alert, no distress. Skin: Skin color, texture, turgor normal. No rashes or lesions. No induration or tightening palpated. Head: Normocephalic. No masses, lesions, tenderness or abnormalities  Eyes: Conjunctivae/corneas clear. PERRL, EOMs intact. Sclera non icteric. Ears: External ears normal. Canals clear. TM's clear bilaterally. Hearing normal to finger rub. Nose/Sinuses: Nares normal. Septum midline. Mucosa normal. No drainage or sinus tenderness. Oropharynx: Lips, mucosa, and tongue normal. Oropharynx clear with no exudate seen. Neck: Neck supple and symmetric. No adenopathy. Thyroid symmetric, normal size, without nodules. Trachea is midline. Carotids brisk in upstroke without bruits, no abnormal JVP noted at 45°. Chest: Even excursion  Lungs: Lungs clear to auscultation bilaterally. No retractions or use of accessory muscles. No tactile vocal fremitus. No rhonchi, crackles or rales. Heart:  S1 > S2. Regular rhythm. S4 gallop or murmur. No rub, palpable thrill or heave noted. PMI 5th intercostal space midclavicular line. Abdomen: Abdomen soft, moderately protuberant, non-tender. BS normal. No masses, organomegaly. No hernia noted. Extremities: Extremities normal. No deformities, edema, or skin discoloration. No cyanosis or clubbing noted to the nails. Peripheral pulses present 2+ upper extremities and present 1+  lower extremities. Musculoskeletal: Spine ROM normal. Muscular strength intact. Neuro: Cranial nerves intact. Motor: Strength 5/5 in all extremities. Reflexes 2+ in all extremities. No focal weakness. Sensory: grossly normal to touch. Coordination intact. Pertinent Labs:  CBC:   No results for input(s): WBC, HGB, PLT in the last 72 hours.   BMP:  Recent Labs     11/18/20  1000 11/19/20  1250   NA 134 137   K 4.1 3.5    107   CO2 18* 18*   BUN 19 19   CREATININE 1.1 0.9   GLUCOSE 131* 166*   LABGLOM >60 >60     ABGs:   Lab Results   Component Value Date    PH 7.445 10/01/2019    PO2 69.4 10/01/2019    PCO2 25.6 10/01/2019     INR:   No results for input(s): INR in the last 72 hours. PRO-BNP:   Lab Results   Component Value Date    PROBNP 660 (H) 2020    PROBNP 811 (H) 10/30/2020      Cardiac Injury Profile:   No results for input(s): CKTOTAL, CKMB, CKMBINDEX, TROPONINI in the last 72 hours. Lipid Profile:   Lab Results   Component Value Date    TRIG 217 2018    HDL 58 2018    LDLCALC 144 2018    CHOL 245 2018      Hemoglobin A1C: No components found for: HGBA1C   ECG:  See report  ECHO: 2019  Left ventricular EF 57%    Radiology:  Xr Chest Standard (2 Vw)    Result Date: 10/3/2019  Patient MRN: [id number] : 1965 Age:  62 years Gender: Male Order Date: 10/3/2019 11:15 AM Exam: XR CHEST (2 VW) Number of Images: 2 views Indication:   copd copd Comparison: Prior study from 10/03/2019 at 0075 hours available Findings: Examination of the chest in PA and lateral views shows that both lungs are free of active disease. There is hyperaeration of lungs suggesting of chronic obstructive pulmonary disease. The heart is normal in size and configuration. There is uncoiling atherosclerotic change of thoracic aorta. The trachea is in the midline. The mediastinum and both hemidiaphragms are normal. The bony thorax is intact. No evidence of airspace consolidation or pulmonary venous congestion Mild chronic obstructive pulmonary disease     Xr Chest Portable    Result Date: 10/3/2019  Patient MRN:  [id number] : 1965 Age: 62 years Gender: Male Order Date:  10/3/2019 12:00 AM Exam: XR CHEST PORTABLE Number of views: Portable upright AP view of the chest, 1 image(s) Indication: COPD Comparison: Chest radiograph dated 10/1/2019. FINDINGS:  The heart appears prominent.  There is diffuse pulmonary interstitial prominence, greatest centrally, and airspace haziness throughout both lungs. No definite pleural effusion is seen. There is no pneumothorax. Findings suggestive of pulmonary edema secondary to venous congestion with overall improvement since the previous study. Xr Chest Portable    Result Date: 10/1/2019  Patient MRN: [id number] : 1965 Age:  62 years Gender: Male Order Date: 10/1/2019 1:45 AM Exam: XR CHEST PORTABLE Number of Images: 1 view Indication:   shortness of breath shortness of breath Comparison: 2019 FINDINGS: Heart is mildly enlarged. Pulmonary vascularity is congested and there is bilateral airspace disease which is likely cardiogenic edema. Neither costophrenic angle is clearly blunted. Congestive failure. Assessment:    Active Problems:    Acute respiratory failure with hypoxia (HCC)  Resolved Problems:    * No resolved hospital problems. *      Plan:   Mr. Bob Hussein is further superimposed on his recent history of underlying cardiomyopathy diagnosed in October of this year. Thus it is not unusual for him to sustain nonsustained ventricular tachycardia. I would if at all possible maintain his present cardiac medications and maintain electrolyte balance and encourage magnesium oxide for membrane stabilization. Continue supportive care for COVID-19 as well. I have spent more than 45 minutes face to face with Layo Dalton reviewing notes and laboratory data with greater than 50% of this time instructing and counseling the patient regarding my findings and recommendations and I have answered all questions as posed to me by Mr. Bob Hussein. Thank you, Milagro Coelho DO for allowing me to consult in the care of this patient. Reji Mondragon DO, FACP, FACC, FSCAI    NOTE:  This report was transcribed using voice recognition software.   Every effort was made to ensure accuracy; however, inadvertent computerized transcription errors

## 2020-11-22 ENCOUNTER — HOSPITAL ENCOUNTER (INPATIENT)
Age: 55
LOS: 7 days | Discharge: HOME | DRG: 189 | End: 2020-11-30
Attending: EMERGENCY MEDICINE | Admitting: GENERAL PRACTICE
Payer: MEDICARE

## 2020-11-22 ENCOUNTER — APPOINTMENT (OUTPATIENT)
Dept: CT IMAGING | Age: 55
DRG: 189 | End: 2020-11-22
Payer: MEDICARE

## 2020-11-22 LAB
ALBUMIN SERPL-MCNC: 3.3 G/DL (ref 3.5–5.2)
ALP BLD-CCNC: 71 U/L (ref 40–129)
ALT SERPL-CCNC: 37 U/L (ref 0–40)
ANION GAP SERPL CALCULATED.3IONS-SCNC: 12 MMOL/L (ref 7–16)
AST SERPL-CCNC: 27 U/L (ref 0–39)
BACTERIA: NORMAL /HPF
BASOPHILS ABSOLUTE: 0.02 E9/L (ref 0–0.2)
BASOPHILS RELATIVE PERCENT: 0.2 % (ref 0–2)
BILIRUB SERPL-MCNC: 0.3 MG/DL (ref 0–1.2)
BILIRUBIN URINE: NEGATIVE
BLOOD, URINE: NEGATIVE
BUN BLDV-MCNC: 31 MG/DL (ref 6–20)
CALCIUM SERPL-MCNC: 8.4 MG/DL (ref 8.6–10.2)
CHLORIDE BLD-SCNC: 100 MMOL/L (ref 98–107)
CLARITY: CLEAR
CO2: 20 MMOL/L (ref 22–29)
COLOR: YELLOW
CREAT SERPL-MCNC: 1.1 MG/DL (ref 0.7–1.2)
EOSINOPHILS ABSOLUTE: 0.18 E9/L (ref 0.05–0.5)
EOSINOPHILS RELATIVE PERCENT: 1.6 % (ref 0–6)
GFR AFRICAN AMERICAN: >60
GFR NON-AFRICAN AMERICAN: >60 ML/MIN/1.73
GLUCOSE BLD-MCNC: 94 MG/DL (ref 74–99)
GLUCOSE URINE: NEGATIVE MG/DL
HCT VFR BLD CALC: 40.2 % (ref 37–54)
HEMOGLOBIN: 14.3 G/DL (ref 12.5–16.5)
IMMATURE GRANULOCYTES #: 0.45 E9/L
IMMATURE GRANULOCYTES %: 4.1 % (ref 0–5)
INR BLD: 1.2
KETONES, URINE: NEGATIVE MG/DL
LACTIC ACID, SEPSIS: 1.1 MMOL/L (ref 0.5–1.9)
LEUKOCYTE ESTERASE, URINE: NEGATIVE
LYMPHOCYTES ABSOLUTE: 0.96 E9/L (ref 1.5–4)
LYMPHOCYTES RELATIVE PERCENT: 8.6 % (ref 20–42)
MCH RBC QN AUTO: 31.1 PG (ref 26–35)
MCHC RBC AUTO-ENTMCNC: 35.6 % (ref 32–34.5)
MCV RBC AUTO: 87.4 FL (ref 80–99.9)
MONOCYTES ABSOLUTE: 0.59 E9/L (ref 0.1–0.95)
MONOCYTES RELATIVE PERCENT: 5.3 % (ref 2–12)
NEUTROPHILS ABSOLUTE: 8.91 E9/L (ref 1.8–7.3)
NEUTROPHILS RELATIVE PERCENT: 80.2 % (ref 43–80)
NITRITE, URINE: NEGATIVE
PDW BLD-RTO: 12.4 FL (ref 11.5–15)
PH UA: 6 (ref 5–9)
PLATELET # BLD: 243 E9/L (ref 130–450)
PMV BLD AUTO: 10.3 FL (ref 7–12)
POTASSIUM REFLEX MAGNESIUM: 4.4 MMOL/L (ref 3.5–5)
PRO-BNP: 1933 PG/ML (ref 0–125)
PROTEIN UA: NORMAL MG/DL
PROTHROMBIN TIME: 13 SEC (ref 9.3–12.4)
RBC # BLD: 4.6 E12/L (ref 3.8–5.8)
RBC UA: NORMAL /HPF (ref 0–2)
SODIUM BLD-SCNC: 132 MMOL/L (ref 132–146)
SPECIFIC GRAVITY UA: 1.02 (ref 1–1.03)
TOTAL PROTEIN: 6.9 G/DL (ref 6.4–8.3)
TROPONIN: <0.01 NG/ML (ref 0–0.03)
UROBILINOGEN, URINE: 1 E.U./DL
WBC # BLD: 11.1 E9/L (ref 4.5–11.5)
WBC UA: NORMAL /HPF (ref 0–5)

## 2020-11-22 PROCEDURE — 81001 URINALYSIS AUTO W/SCOPE: CPT

## 2020-11-22 PROCEDURE — 85025 COMPLETE CBC W/AUTO DIFF WBC: CPT

## 2020-11-22 PROCEDURE — 84484 ASSAY OF TROPONIN QUANT: CPT

## 2020-11-22 PROCEDURE — 93005 ELECTROCARDIOGRAM TRACING: CPT | Performed by: EMERGENCY MEDICINE

## 2020-11-22 PROCEDURE — 87088 URINE BACTERIA CULTURE: CPT

## 2020-11-22 PROCEDURE — 6360000004 HC RX CONTRAST MEDICATION: Performed by: RADIOLOGY

## 2020-11-22 PROCEDURE — 87040 BLOOD CULTURE FOR BACTERIA: CPT

## 2020-11-22 PROCEDURE — 83880 ASSAY OF NATRIURETIC PEPTIDE: CPT

## 2020-11-22 PROCEDURE — 99285 EMERGENCY DEPT VISIT HI MDM: CPT

## 2020-11-22 PROCEDURE — 2580000003 HC RX 258: Performed by: RADIOLOGY

## 2020-11-22 PROCEDURE — 74176 CT ABD & PELVIS W/O CONTRAST: CPT

## 2020-11-22 PROCEDURE — 83605 ASSAY OF LACTIC ACID: CPT

## 2020-11-22 PROCEDURE — 80053 COMPREHEN METABOLIC PANEL: CPT

## 2020-11-22 PROCEDURE — 71275 CT ANGIOGRAPHY CHEST: CPT

## 2020-11-22 PROCEDURE — 85610 PROTHROMBIN TIME: CPT

## 2020-11-22 PROCEDURE — 6370000000 HC RX 637 (ALT 250 FOR IP): Performed by: EMERGENCY MEDICINE

## 2020-11-22 RX ORDER — SODIUM CHLORIDE 0.9 % (FLUSH) 0.9 %
10 SYRINGE (ML) INJECTION PRN
Status: DISCONTINUED | OUTPATIENT
Start: 2020-11-22 | End: 2020-11-30 | Stop reason: HOSPADM

## 2020-11-22 RX ORDER — ACETAMINOPHEN 500 MG
1000 TABLET ORAL ONCE
Status: COMPLETED | OUTPATIENT
Start: 2020-11-22 | End: 2020-11-22

## 2020-11-22 RX ADMIN — Medication 10 ML: at 20:12

## 2020-11-22 RX ADMIN — ACETAMINOPHEN 1000 MG: 500 TABLET ORAL at 18:24

## 2020-11-22 RX ADMIN — IOPAMIDOL 75 ML: 755 INJECTION, SOLUTION INTRAVENOUS at 20:12

## 2020-11-22 ASSESSMENT — PAIN SCALES - GENERAL
PAINLEVEL_OUTOF10: 5
PAINLEVEL_OUTOF10: 5

## 2020-11-22 ASSESSMENT — PAIN DESCRIPTION - LOCATION: LOCATION: ABDOMEN

## 2020-11-22 ASSESSMENT — ENCOUNTER SYMPTOMS
VOMITING: 0
EYE PAIN: 0
SORE THROAT: 0
COUGH: 1
ABDOMINAL PAIN: 1
NAUSEA: 1
BACK PAIN: 0
ALLERGIC/IMMUNOLOGIC NEGATIVE: 1
SHORTNESS OF BREATH: 1
DIARRHEA: 1

## 2020-11-22 ASSESSMENT — PAIN DESCRIPTION - PAIN TYPE: TYPE: ACUTE PAIN

## 2020-11-22 NOTE — ED PROVIDER NOTES
Patient is a 58-year-old male presented to the emergency department with shortness of breath. Patient was discharged in the hospital yesterday with Covid states he did well yesterday however he became hypoxic at home and came back to emergency department to be evaluated due to him having shortness of breath or trouble breathing. Patient was placed on nonrebreather by EMS and his saturation improved per EMS from 90% to 94%. Patient was recently hospital first for a microinfarction per the patient said they did the cath however they did not place stents and then he was placed again in the hospital for Covid. Patient states he already got remdesivir and was sent home however when he started to worsen he came back to the emergency department. Patient is tachycardic with a rate of 117 is febrile. Patient complains of shortness of breath with abdominal pain. The history is provided by the patient. No  was used. Symptoms are worsened by not using oxygen       Symptoms are improved by nothing    Denies any associated chest pain, loss of consciousness, diarrhea  Review of Systems   Constitutional: Positive for activity change, chills, fatigue and fever. HENT: Negative for ear pain and sore throat. Eyes: Negative for pain. Respiratory: Positive for cough and shortness of breath. Cardiovascular: Positive for palpitations. Negative for chest pain. Gastrointestinal: Positive for abdominal pain, diarrhea and nausea. Negative for vomiting. Genitourinary: Negative for flank pain and penile pain. Musculoskeletal: Negative for back pain and neck pain. Skin: Negative. Negative for rash. Allergic/Immunologic: Negative. Neurological: Negative. Negative for syncope and headaches. Physical Exam  Vitals signs and nursing note reviewed. Constitutional:       General: He is not in acute distress. Appearance: He is well-developed. He is ill-appearing.    HENT: Head: Normocephalic. Right Ear: External ear normal.      Left Ear: External ear normal.      Mouth/Throat:      Mouth: Mucous membranes are moist.      Pharynx: Oropharynx is clear. Eyes:      Pupils: Pupils are equal, round, and reactive to light. Neck:      Musculoskeletal: Neck supple. No muscular tenderness. Cardiovascular:      Rate and Rhythm: Regular rhythm. Tachycardia present. Heart sounds: Normal heart sounds. No murmur. Pulmonary:      Effort: Pulmonary effort is normal. Tachypnea present. No respiratory distress. Breath sounds: Examination of the right-lower field reveals wheezing. Examination of the left-lower field reveals wheezing. Wheezing present. Abdominal:      General: Bowel sounds are normal.      Palpations: Abdomen is soft. Tenderness: There is abdominal tenderness. There is guarding. Musculoskeletal:      Right lower leg: He exhibits tenderness. Left lower leg: He exhibits tenderness. Skin:     General: Skin is warm and dry. Neurological:      Mental Status: He is alert and oriented to person, place, and time. Cranial Nerves: No cranial nerve deficit. Motor: No weakness. Procedures     EKG: This EKG is signed and interpreted by me. Rate: 110  Rhythm: Sinus  Interpretation: sinus tachycardia  Comparison: stable as compared to patient's most recent EKG       MDM  Number of Diagnoses or Management Options  Acute respiratory failure with hypoxia ([address]):   COVID-19:   Pneumonia due to organism:   Diagnosis management comments: Patient is a 49-year-old male presented emergency department with acute respiratory failure with hypoxia. Patient was diagnosed with Covid last week went home yesterday however became hypoxic at home so he came back to emergency department. On nonrebreather upon arrival emergency department and found when tested without oxygen to be below 89%.   Due to this the patient was placed back on oxygen therapy and will be admitted to the hospital for further treatment. CTA was completed of the chest and showed no pulmonary embolism. --------------------------------------------- PAST HISTORY ---------------------------------------------  Past Medical History:  has a past medical history of Alcohol abuse, CHF (congestive heart failure) ([facility] [address] [facility] History of cocaine use, Hyperlipidemia, Hypertension, Marijuana use, MI [facility] infarction) [facility] and alberto. Past Surgical History:  has a past surgical history that includes Wrist surgery; cervical fusion (11/18/15); polysomnography (01/29/2018); and Cardiac catheterization (11/03/2020). Social History:  reports that he has quit smoking. His smoking use included cigarettes. He started smoking about 41 years ago. He has a 60.00 pack-year smoking history. He has never used smokeless tobacco. He reports previous alcohol use. He reports previous drug use. Drugs: Cocaine, Other-see comments, and Marijuana. Family History: family history includes Arthritis in his mother; Cancer in his brother; Heart Disease in his father; High Blood Pressure in his brother, brother, brother, and brother; Other in his brother, brother, brother, brother, and mother. The patients home medications have been reviewed. Allergies: Patient has no known allergies.     -------------------------------------------------- RESULTS -------------------------------------------------    LABS:  Results for orders placed or performed during the hospital encounter of 11/22/20   CBC Auto Differential   Result Value Ref Range    WBC 11.1 4.5 - 11.5 E9/L    RBC 4.60 3.80 - 5.80 E12/L    Hemoglobin 14.3 12.5 - 16.5 g/dL    Hematocrit 40.2 37.0 - 54.0 %    MCV 87.4 80.0 - 99.9 fL    MCH 31.1 26.0 - 35.0 pg    MCHC 35.6 (H) 32.0 - 34.5 %    RDW 12.4 11.5 - 15.0 fL    Platelets 476 408 - 674 E9/L    MPV 10.3 7.0 - 12.0 fL    Neutrophils % 80.2 (H) 43.0 - 80.0 %    Immature Granulocytes % 4.1 0.0 - 5.0 %    Lymphocytes % 8.6 (L) 20.0 - 42.0 %    Monocytes % 5.3 2.0 - 12.0 %    Eosinophils % 1.6 0.0 - 6.0 %    Basophils % 0.2 0.0 - 2.0 %    Neutrophils Absolute 8.91 (H) 1.80 - 7.30 E9/L    Immature Granulocytes # 0.45 E9/L    Lymphocytes Absolute 0.96 (L) 1.50 - 4.00 E9/L    Monocytes Absolute 0.59 0.10 - 0.95 E9/L    Eosinophils Absolute 0.18 0.05 - 0.50 E9/L    Basophils Absolute 0.02 0.00 - 0.20 E9/L   Troponin   Result Value Ref Range    Troponin <0.01 0.00 - 0.03 ng/mL   Comprehensive Metabolic Panel w/ Reflex to MG   Result Value Ref Range    Sodium 132 132 - 146 mmol/L    Potassium reflex Magnesium 4.4 3.5 - 5.0 mmol/L    Chloride 100 98 - 107 mmol/L    CO2 20 (L) 22 - 29 mmol/L    Anion Gap 12 7 - 16 mmol/L    Glucose 94 74 - 99 mg/dL    BUN 31 (H) 6 - 20 mg/dL    CREATININE 1.1 0.7 - 1.2 mg/dL    GFR Non-African American >60 >=60 mL/min/1.73    GFR African American >60     Calcium 8.4 (L) 8.6 - 10.2 mg/dL    Total Protein 6.9 6.4 - 8.3 g/dL    Alb 3.3 (L) 3.5 - 5.2 g/dL    Total Bilirubin 0.3 0.0 - 1.2 mg/dL    Alkaline Phosphatase 71 40 - 129 U/L    ALT 37 0 - 40 U/L    AST 27 0 - 39 U/L   Brain Natriuretic Peptide   Result Value Ref Range    Pro-BNP 1,933 (H) 0 - 125 pg/mL   Lactate, Sepsis   Result Value Ref Range    Lactic Acid, Sepsis 1.1 0.5 - 1.9 mmol/L   Urinalysis, reflex to microscopic   Result Value Ref Range    Color, UA Yellow Straw/Yellow    Clarity, UA Clear Clear    Glucose, Ur Negative Negative mg/dL    Bilirubin Urine Negative Negative    Ketones, Urine Negative Negative mg/dL    Specific Gravity, UA 1.025 1.005 - 1.030    Blood, Urine Negative Negative    pH, UA 6.0 5.0 - 9.0    Protein, UA TRACE Negative mg/dL    Urobilinogen, Urine 1.0 <2.0 E.U./dL    Nitrite, Urine Negative Negative    Leukocyte Esterase, Urine Negative Negative   Protime-INR   Result Value Ref Range    Protime 13.0 (H) 9.3 - 12.4 sec    INR 1.2    Microscopic Urinalysis patient. This patient's ED course included: a personal history and physicial examination, re-evaluation prior to disposition, multiple bedside re-evaluations, IV medications, cardiac monitoring, continuous pulse oximetry and complex medical decision making and emergency management    This patient has remained hemodynamically stable during their ED course. Diagnosis:  1. Pneumonia due to organism    2. Acute respiratory failure with hypoxia (HCC)    3. COVID-19        Disposition:  Patient's disposition: Admit to telemetry  Patient's condition is poor.                 Diamante Ashford DO  Resident  11/22/20 [id number]

## 2020-11-23 LAB
ADENOVIRUS BY PCR: NOT DETECTED
B.E.: 0.5 MMOL/L (ref -3–3)
BORDETELLA PARAPERTUSSIS BY PCR: NOT DETECTED
BORDETELLA PERTUSSIS BY PCR: NOT DETECTED
CHLAMYDOPHILIA PNEUMONIAE BY PCR: NOT DETECTED
COHB: 0.4 % (ref 0–1.5)
CORONAVIRUS 229E BY PCR: NOT DETECTED
CORONAVIRUS HKU1 BY PCR: NOT DETECTED
CORONAVIRUS NL63 BY PCR: NOT DETECTED
CORONAVIRUS OC43 BY PCR: NOT DETECTED
CRITICAL: ABNORMAL
DATE ANALYZED: ABNORMAL
DATE OF COLLECTION: ABNORMAL
EKG ATRIAL RATE: 110 BPM
EKG P AXIS: 56 DEGREES
EKG P-R INTERVAL: 158 MS
EKG Q-T INTERVAL: 332 MS
EKG QRS DURATION: 78 MS
EKG QTC CALCULATION (BAZETT): 449 MS
EKG R AXIS: 4 DEGREES
EKG T AXIS: 111 DEGREES
EKG VENTRICULAR RATE: 110 BPM
HCO3: 23.2 MMOL/L (ref 22–26)
HHB: 3.4 % (ref 0–5)
HUMAN METAPNEUMOVIRUS BY PCR: NOT DETECTED
HUMAN RHINOVIRUS/ENTEROVIRUS BY PCR: NOT DETECTED
INFLUENZA A BY PCR: NOT DETECTED
INFLUENZA B BY PCR: NOT DETECTED
LAB: ABNORMAL
Lab: ABNORMAL
METHB: 0.2 % (ref 0–1.5)
MODE: ABNORMAL
MYCOPLASMA PNEUMONIAE BY PCR: NOT DETECTED
O2 CONTENT: 20.1 ML/DL
O2 SATURATION: 96.6 % (ref 92–98.5)
O2HB: 96 % (ref 94–97)
OPERATOR ID: 1394
PARAINFLUENZA VIRUS 1 BY PCR: NOT DETECTED
PARAINFLUENZA VIRUS 2 BY PCR: NOT DETECTED
PARAINFLUENZA VIRUS 3 BY PCR: NOT DETECTED
PARAINFLUENZA VIRUS 4 BY PCR: NOT DETECTED
PATIENT TEMP: 37 C
PCO2: 32.3 MMHG (ref 35–45)
PH BLOOD GAS: 7.47 (ref 7.35–7.45)
PO2: 83 MMHG (ref 75–100)
RESPIRATORY SYNCYTIAL VIRUS BY PCR: NOT DETECTED
SARS-COV-2, PCR: NOT DETECTED
SOURCE, BLOOD GAS: ABNORMAL
THB: 14.9 G/DL (ref 11.5–16.5)
TIME ANALYZED: 851

## 2020-11-23 PROCEDURE — 93010 ELECTROCARDIOGRAM REPORT: CPT | Performed by: INTERNAL MEDICINE

## 2020-11-23 PROCEDURE — 6370000000 HC RX 637 (ALT 250 FOR IP): Performed by: GENERAL PRACTICE

## 2020-11-23 PROCEDURE — 82805 BLOOD GASES W/O2 SATURATION: CPT

## 2020-11-23 PROCEDURE — 0202U NFCT DS 22 TRGT SARS-COV-2: CPT

## 2020-11-23 PROCEDURE — 2140000000 HC CCU INTERMEDIATE R&B

## 2020-11-23 PROCEDURE — 93005 ELECTROCARDIOGRAM TRACING: CPT | Performed by: GENERAL PRACTICE

## 2020-11-23 PROCEDURE — 6360000002 HC RX W HCPCS: Performed by: GENERAL PRACTICE

## 2020-11-23 RX ORDER — CLONIDINE HYDROCHLORIDE 0.1 MG/1
0.1 TABLET ORAL 2 TIMES DAILY
Status: DISCONTINUED | OUTPATIENT
Start: 2020-11-23 | End: 2020-11-28

## 2020-11-23 RX ORDER — AMLODIPINE BESYLATE 5 MG/1
5 TABLET ORAL DAILY
Status: DISCONTINUED | OUTPATIENT
Start: 2020-11-23 | End: 2020-11-30 | Stop reason: HOSPADM

## 2020-11-23 RX ORDER — HYDRALAZINE HYDROCHLORIDE 10 MG/1
10 TABLET, FILM COATED ORAL 3 TIMES DAILY
Status: ON HOLD | COMMUNITY
End: 2020-11-23

## 2020-11-23 RX ORDER — NICOTINE 21 MG/24HR
1 PATCH, TRANSDERMAL 24 HOURS TRANSDERMAL EVERY 24 HOURS
Status: DISCONTINUED | OUTPATIENT
Start: 2020-11-23 | End: 2020-11-30 | Stop reason: HOSPADM

## 2020-11-23 RX ORDER — IPRATROPIUM BROMIDE AND ALBUTEROL SULFATE 2.5; .5 MG/3ML; MG/3ML
1 SOLUTION RESPIRATORY (INHALATION) EVERY 6 HOURS PRN
COMMUNITY

## 2020-11-23 RX ORDER — DEXAMETHASONE 6 MG/1
6 TABLET ORAL
Status: ON HOLD | COMMUNITY
Start: 2020-11-21 | End: 2020-11-23

## 2020-11-23 RX ORDER — ALBUTEROL SULFATE 90 UG/1
2 AEROSOL, METERED RESPIRATORY (INHALATION) EVERY 4 HOURS PRN
COMMUNITY

## 2020-11-23 RX ORDER — FLUTICASONE PROPIONATE 50 MCG
1 SPRAY, SUSPENSION (ML) NASAL DAILY
Status: ON HOLD | COMMUNITY
End: 2020-11-23

## 2020-11-23 RX ORDER — SPIRONOLACTONE 25 MG/1
25 TABLET ORAL DAILY
Status: DISCONTINUED | OUTPATIENT
Start: 2020-11-23 | End: 2020-11-30 | Stop reason: HOSPADM

## 2020-11-23 RX ORDER — IPRATROPIUM BROMIDE AND ALBUTEROL SULFATE 2.5; .5 MG/3ML; MG/3ML
1 SOLUTION RESPIRATORY (INHALATION)
Status: DISCONTINUED | OUTPATIENT
Start: 2020-11-23 | End: 2020-11-24 | Stop reason: ALTCHOICE

## 2020-11-23 RX ORDER — NICOTINE 21 MG/24HR
1 PATCH, TRANSDERMAL 24 HOURS TRANSDERMAL EVERY 24 HOURS
COMMUNITY

## 2020-11-23 RX ORDER — NITROGLYCERIN 0.4 MG/1
0.4 TABLET SUBLINGUAL EVERY 5 MIN PRN
COMMUNITY

## 2020-11-23 RX ORDER — FINASTERIDE 5 MG/1
5 TABLET, FILM COATED ORAL DAILY
Status: DISCONTINUED | OUTPATIENT
Start: 2020-11-23 | End: 2020-11-30 | Stop reason: HOSPADM

## 2020-11-23 RX ORDER — METOPROLOL SUCCINATE 100 MG/1
100 TABLET, EXTENDED RELEASE ORAL DAILY
Status: DISCONTINUED | OUTPATIENT
Start: 2020-11-23 | End: 2020-11-30 | Stop reason: HOSPADM

## 2020-11-23 RX ORDER — ACETAMINOPHEN 325 MG/1
650 TABLET ORAL EVERY 6 HOURS PRN
Status: DISCONTINUED | OUTPATIENT
Start: 2020-11-23 | End: 2020-11-30 | Stop reason: HOSPADM

## 2020-11-23 RX ORDER — FLUTICASONE FUROATE, UMECLIDINIUM BROMIDE AND VILANTEROL TRIFENATATE 100; 62.5; 25 UG/1; UG/1; UG/1
1 POWDER RESPIRATORY (INHALATION) DAILY
COMMUNITY

## 2020-11-23 RX ADMIN — FINASTERIDE 5 MG: 5 TABLET, FILM COATED ORAL at 21:24

## 2020-11-23 RX ADMIN — AMLODIPINE BESYLATE 5 MG: 5 TABLET ORAL at 21:35

## 2020-11-23 RX ADMIN — CLONIDINE HYDROCHLORIDE 0.1 MG: 0.1 TABLET ORAL at 21:25

## 2020-11-23 RX ADMIN — ACETAMINOPHEN 650 MG: 325 TABLET ORAL at 21:34

## 2020-11-23 RX ADMIN — SACUBITRIL AND VALSARTAN 1 TABLET: 24; 26 TABLET, FILM COATED ORAL at 21:25

## 2020-11-23 RX ADMIN — ENOXAPARIN SODIUM 40 MG: 40 INJECTION SUBCUTANEOUS at 21:29

## 2020-11-23 RX ADMIN — NITROGLYCERIN 1 INCH: 20 OINTMENT TOPICAL at 21:35

## 2020-11-23 RX ADMIN — METOPROLOL SUCCINATE 100 MG: 100 TABLET, EXTENDED RELEASE ORAL at 19:03

## 2020-11-23 RX ADMIN — LIDOCAINE HYDROCHLORIDE: 20 SOLUTION ORAL; TOPICAL at 19:03

## 2020-11-23 RX ADMIN — SPIRONOLACTONE 25 MG: 25 TABLET ORAL at 21:24

## 2020-11-23 ASSESSMENT — PAIN DESCRIPTION - PAIN TYPE
TYPE: ACUTE PAIN
TYPE: ACUTE PAIN

## 2020-11-23 ASSESSMENT — PAIN DESCRIPTION - ONSET
ONSET: ON-GOING
ONSET: ON-GOING

## 2020-11-23 ASSESSMENT — PAIN DESCRIPTION - LOCATION
LOCATION: HIP;LEG
LOCATION: HIP;LEG

## 2020-11-23 ASSESSMENT — PAIN DESCRIPTION - ORIENTATION
ORIENTATION: RIGHT;LEFT
ORIENTATION: LEFT;RIGHT

## 2020-11-23 ASSESSMENT — PAIN DESCRIPTION - DESCRIPTORS
DESCRIPTORS: ACHING;CONSTANT
DESCRIPTORS: CONSTANT;ACHING

## 2020-11-23 ASSESSMENT — PAIN DESCRIPTION - FREQUENCY
FREQUENCY: CONTINUOUS
FREQUENCY: CONTINUOUS

## 2020-11-23 ASSESSMENT — PAIN SCALES - GENERAL
PAINLEVEL_OUTOF10: 7
PAINLEVEL_OUTOF10: 9
PAINLEVEL_OUTOF10: 8

## 2020-11-23 ASSESSMENT — PAIN DESCRIPTION - PROGRESSION
CLINICAL_PROGRESSION: NOT CHANGED
CLINICAL_PROGRESSION: NOT CHANGED

## 2020-11-23 NOTE — PROGRESS NOTES
Left message with answering service regarding new consult, Dr. Nick Larsen added to care team at this time.     Chuyita Garner RN

## 2020-11-23 NOTE — ACP (ADVANCE CARE PLANNING)
Advance Care Planning     Advance Care Planning Activator (Inpatient)  Conversation Note      Date of ACP Conversation: 11/22/2020    Conversation Conducted with: Patient with Decision Making Capacity    ACP Activator: [address], [address] [place] [address] Decision Maker:     Current Designated Health Care Decision Maker:   Primary Decision Maker: Farshad Blanco Child - [phone]      Care Preferences    Ventilation: \"If you were in your present state of health and suddenly became very ill and were unable to breathe on your own, what would your preference be about the use of a ventilator (breathing machine) if it were available to you? \"      Would the patient desire the use of ventilator (breathing machine)?: yes    \"If your health worsens and it becomes clear that your chance of recovery is unlikely, what would your preference be about the use of a ventilator (breathing machine) if it were available to you? \"     Would the patient desire the use of ventilator (breathing machine)?: No      Resuscitation  \"CPR works best to restart the heart when there is a sudden event, like a heart attack, in someone who is otherwise healthy. Unfortunately, CPR does not typically restart the heart for people who have serious health conditions or who are very sick. \"    \"In the event your heart stopped as a result of an underlying serious health condition, would you want attempts to be made to restart your heart (answer \"yes\" for attempt to resuscitate) or would you prefer a natural death (answer \"no\" for do not attempt to resuscitate)? \" yes       [] Yes   [x] No   Educated Patient / Suresh Ray regarding differences between Advance Directives and portable DNR orders.     Length of ACP Conversation in minutes:  5    Conversation Outcomes:  [x] ACP discussion completed  [] Existing advance directive reviewed with patient; no changes to patient's previously recorded wishes  [] New Advance Directive completed  [] Portable Do Not Rescitate prepared for Provider review and signature  [] POLST/POST/MOLST/MOST prepared for Provider review and signature      Follow-up plan:    [] Schedule follow-up conversation to continue planning  [] Referred individual to Provider for additional questions/concerns   [] Advised patient/agent/surrogate to review completed ACP document and update if needed with changes in condition, patient preferences or care setting    [x] This note routed to one or more involved healthcare providers

## 2020-11-23 NOTE — PROGRESS NOTES
Left message with Dr. Tiff Clarke answering service regarding new admission orders.     Marolyn Denver, RN

## 2020-11-23 NOTE — PROGRESS NOTES
Left message with BIBI ID regarding new consult, Dr. Sergey Cota added to care team at this time.     Xiomara Banuelos RN

## 2020-11-24 LAB
C-REACTIVE PROTEIN: 14.8 MG/DL (ref 0–0.4)
EKG ATRIAL RATE: 91 BPM
EKG P AXIS: 33 DEGREES
EKG P-R INTERVAL: 132 MS
EKG Q-T INTERVAL: 350 MS
EKG QRS DURATION: 86 MS
EKG QTC CALCULATION (BAZETT): 430 MS
EKG R AXIS: 7 DEGREES
EKG T AXIS: 138 DEGREES
EKG VENTRICULAR RATE: 91 BPM
FERRITIN: 847 NG/ML
LACTATE DEHYDROGENASE: 301 U/L (ref 135–225)
URINE CULTURE, ROUTINE: NORMAL

## 2020-11-24 PROCEDURE — 82728 ASSAY OF FERRITIN: CPT

## 2020-11-24 PROCEDURE — 6360000002 HC RX W HCPCS: Performed by: INTERNAL MEDICINE

## 2020-11-24 PROCEDURE — 36415 COLL VENOUS BLD VENIPUNCTURE: CPT

## 2020-11-24 PROCEDURE — 2140000000 HC CCU INTERMEDIATE R&B

## 2020-11-24 PROCEDURE — 6370000000 HC RX 637 (ALT 250 FOR IP): Performed by: GENERAL PRACTICE

## 2020-11-24 PROCEDURE — 2580000003 HC RX 258: Performed by: RADIOLOGY

## 2020-11-24 PROCEDURE — 83615 LACTATE (LD) (LDH) ENZYME: CPT

## 2020-11-24 PROCEDURE — 86140 C-REACTIVE PROTEIN: CPT

## 2020-11-24 PROCEDURE — 93010 ELECTROCARDIOGRAM REPORT: CPT | Performed by: INTERNAL MEDICINE

## 2020-11-24 PROCEDURE — 2580000003 HC RX 258: Performed by: INTERNAL MEDICINE

## 2020-11-24 PROCEDURE — 6360000002 HC RX W HCPCS: Performed by: GENERAL PRACTICE

## 2020-11-24 RX ORDER — DEXAMETHASONE 4 MG/1
6 TABLET ORAL DAILY
Status: DISCONTINUED | OUTPATIENT
Start: 2020-11-24 | End: 2020-11-30 | Stop reason: HOSPADM

## 2020-11-24 RX ORDER — FUROSEMIDE 10 MG/ML
20 INJECTION INTRAMUSCULAR; INTRAVENOUS ONCE
Status: COMPLETED | OUTPATIENT
Start: 2020-11-24 | End: 2020-11-24

## 2020-11-24 RX ADMIN — IPRATROPIUM BROMIDE AND ALBUTEROL 1 PUFF: 20; 100 SPRAY, METERED RESPIRATORY (INHALATION) at 12:23

## 2020-11-24 RX ADMIN — ENOXAPARIN SODIUM 40 MG: 40 INJECTION SUBCUTANEOUS at 10:39

## 2020-11-24 RX ADMIN — FINASTERIDE 5 MG: 5 TABLET, FILM COATED ORAL at 10:39

## 2020-11-24 RX ADMIN — LIDOCAINE HYDROCHLORIDE: 20 SOLUTION ORAL; TOPICAL at 18:55

## 2020-11-24 RX ADMIN — CEFEPIME 2 G: 2 INJECTION, POWDER, FOR SOLUTION INTRAVENOUS at 18:55

## 2020-11-24 RX ADMIN — Medication 10 ML: at 18:55

## 2020-11-24 RX ADMIN — SPIRONOLACTONE 25 MG: 25 TABLET ORAL at 10:39

## 2020-11-24 RX ADMIN — AMLODIPINE BESYLATE 5 MG: 5 TABLET ORAL at 10:39

## 2020-11-24 RX ADMIN — CLONIDINE HYDROCHLORIDE 0.1 MG: 0.1 TABLET ORAL at 10:39

## 2020-11-24 RX ADMIN — ENOXAPARIN SODIUM 40 MG: 40 INJECTION SUBCUTANEOUS at 21:33

## 2020-11-24 RX ADMIN — SACUBITRIL AND VALSARTAN 1 TABLET: 24; 26 TABLET, FILM COATED ORAL at 20:00

## 2020-11-24 RX ADMIN — Medication 10 ML: at 16:53

## 2020-11-24 RX ADMIN — NITROGLYCERIN 1 INCH: 20 OINTMENT TOPICAL at 21:34

## 2020-11-24 RX ADMIN — NITROGLYCERIN 1 INCH: 20 OINTMENT TOPICAL at 10:40

## 2020-11-24 RX ADMIN — IPRATROPIUM BROMIDE AND ALBUTEROL 1 PUFF: 20; 100 SPRAY, METERED RESPIRATORY (INHALATION) at 16:52

## 2020-11-24 RX ADMIN — METOPROLOL SUCCINATE 100 MG: 100 TABLET, EXTENDED RELEASE ORAL at 10:39

## 2020-11-24 RX ADMIN — FUROSEMIDE 20 MG: 10 INJECTION, SOLUTION INTRAMUSCULAR; INTRAVENOUS at 16:53

## 2020-11-24 RX ADMIN — SACUBITRIL AND VALSARTAN 1 TABLET: 24; 26 TABLET, FILM COATED ORAL at 10:39

## 2020-11-24 RX ADMIN — NITROGLYCERIN 1 INCH: 20 OINTMENT TOPICAL at 16:52

## 2020-11-24 RX ADMIN — IPRATROPIUM BROMIDE AND ALBUTEROL 1 PUFF: 20; 100 SPRAY, METERED RESPIRATORY (INHALATION) at 21:32

## 2020-11-24 RX ADMIN — DEXAMETHASONE 6 MG: 4 TABLET ORAL at 12:24

## 2020-11-24 RX ADMIN — CLONIDINE HYDROCHLORIDE 0.1 MG: 0.1 TABLET ORAL at 21:34

## 2020-11-24 ASSESSMENT — PAIN SCALES - GENERAL
PAINLEVEL_OUTOF10: 7
PAINLEVEL_OUTOF10: 0
PAINLEVEL_OUTOF10: 5
PAINLEVEL_OUTOF10: 7

## 2020-11-24 ASSESSMENT — PAIN DESCRIPTION - ORIENTATION: ORIENTATION: RIGHT;LEFT

## 2020-11-24 ASSESSMENT — PAIN DESCRIPTION - ONSET: ONSET: ON-GOING

## 2020-11-24 ASSESSMENT — PAIN DESCRIPTION - FREQUENCY: FREQUENCY: CONTINUOUS

## 2020-11-24 ASSESSMENT — PAIN DESCRIPTION - DESCRIPTORS: DESCRIPTORS: ACHING;CONSTANT

## 2020-11-24 ASSESSMENT — PAIN DESCRIPTION - LOCATION: LOCATION: HIP;LEG

## 2020-11-24 ASSESSMENT — PAIN - FUNCTIONAL ASSESSMENT: PAIN_FUNCTIONAL_ASSESSMENT: PREVENTS OR INTERFERES SOME ACTIVE ACTIVITIES AND ADLS

## 2020-11-24 ASSESSMENT — PAIN DESCRIPTION - PROGRESSION
CLINICAL_PROGRESSION: NOT CHANGED
CLINICAL_PROGRESSION: NOT CHANGED

## 2020-11-24 ASSESSMENT — PAIN DESCRIPTION - PAIN TYPE: TYPE: ACUTE PAIN

## 2020-11-24 NOTE — H&P
[address]                  [address] [id number],  [facility]                              HISTORY AND PHYSICAL    PATIENT NAME: Skinny Shaffer                  :        1965  MED REC NO:   [id number]                            ROOM:       8210  ACCOUNT NO:   [de-identified]                           ADMIT DATE: 2020  PROVIDER:     Man Campbell DO    HISTORY OF PRESENT ILLNESS:  The patient again is a 49-year-old male  recently hospitalized for shortness of breath and COVID positive  pneumonia, was treated with Decadron and remdesivir. Breathing was  better, was requiring very low oxygen, was felt to be stable enough to  be discharged by ID and Pulmonary. Subsequently he was released and he  came back the emergency department stating that he is continuing to  experience shortness of breath and it seems to be getting worse. He was  brought in by emergency medical squad on a non-rebreather, saturations  around initially at 90% now at 94%. He was tachycardic and tachypneic. He was experiencing fever, chills and fatigue, palpitations. Has known  coronary artery disease, recent catheterization and change in his  coronary meds. Subsequent CT scan showed worsening of his pulmonary  pneumonia. Subsequently admitted for hypoxic respiratory failure. PAST MEDICAL HISTORY:  Significant for alcohol abuse, congestive heart  failure, COPD, cocaine use, hyperlipidemia, hypertension, marijuana use,  myocardial infarction. PAST SURGICAL HISTORY:  Significant for wrist surgery, neck surgery,  polysomnography, cardiac catheterization. SOCIAL HISTORY:  The patient is a smoker admits to drinking and _____  using multiple types of drugs, has not used recently but generally  cocaine and marijuana. FAMILY MEDICAL HISTORY:  Significant that _____ brother had cancer,  heart disease in his father, and couple of other brothers had MIs.     ALLERGIES:  He has no known allergies. MEDICATIONS:  His medications prior to his visit here were Proscar 5 mg  daily, metoprolol 100 daily, albuterol inhalers, nebulizer solution,  Aldactone 25, Entresto 24/25 b.i.d., Catapres 0.1 b.i.d., Atrovent  daily, Norvasc 5 daily, nicotine 21 daily, DuoNebs as needed,  nitroglycerin ointment t.i.d., nitroglycerin p.r.n., Trelegy inhaler. LABORATORY DATA:  His labs showed a little elevation of his BUN at 31. ProBNP was 1933, albumin low at 3.3. Blood cultures were obtained. Urine cultures were obtained. Blood gases were checked and seemed to be  okay, seemed to be admitted with hypoxic respiratory failure and  worsening of COVID pneumonia. REVIEW OF SYSTEMS:  HEENT:  Negative for vertigo, cephalgia, ringing in the ears, hearing  loss, difficulty swallowing, hoarseness in his voice, bloody noses. No  difficulty swallowing. CHEST:  He has had intermittent chest pains, previous myocardial  infarction, previous congestive heart failure, does have orthopnea, no  paroxysmal nocturnal dyspnea. No edema. He has cough, wheeze,  shortness of breath. No hemoptysis. No pleuritic pain. GASTROINTESTINAL:  He has some nausea. No vomiting. Some diarrhea. No  constipation. No blood in the stool. No recent change in weight. GENITOURINARY:  No urgency, frequency, dysuria, hematuria. ENDOCRINE:  Negative for diabetes or thyroid disorder. MUSCULOSKELETAL:  Positive for myofascial pain syndrome, degenerative  disk disease, degenerative joint disease. PSYCHIATRIC:  Negative for anxiety or depression. NEUROLOGIC:  Negative for CVA, seizures, tremors, tics or TIAs. SKIN:  Positive for multiple tattoos but no new growths or lesions. PHYSICAL EXAMINATION:  GENERAL:  Shows this to be a 49-year-old white male in moderate distress  with the above complaints. HEAD, EYES, EARS, NOSE, THROAT:  Examination shows the head to be  normocephalic and atraumatic.   Pupils are equal and reactive to

## 2020-11-24 NOTE — CONSULTS
Department of Internal Medicine  Infectious Diseases   Consult Note      Reason for Consult:  COVID 23 , fever       Requesting Physician:  Dr Joselyn Frazier:                This is a 60 yrs old male with hx of COPD, CHF , CAD who was just treated for COVID 19 with 5 days of remdesivir and decadron ( 1 week ago ) presented to the ER with increased shortness of breath, cough, sputum expectoration, fever and chills . Temp was 102 F . Resp panel this time was neg for COVID 19 . WBC was 11 K, pro BNP 1933 .   CTA chest showed bilateral infiltrates       Past Medical History:      Past Medical History:   Diagnosis Date    Alcohol abuse     [facility]     [facility] [address]     History of cocaine use     Hyperlipidemia     Hypertension     Marijuana use     quit November 2017     MI (myocardial infarction) ([facility] [address]     alberto        Past Surgical History:      Past Surgical History:   Procedure Laterality Date    CARDIAC CATHETERIZATION  11/03/2020    DR Vale Chirinos CERVICAL FUSION  11/18/15    cervical laminectomy & fusion c4-c6, with rods & screws    POLYSOMNOGRAPHY  01/29/2018    AHI=29.8    WRIST SURGERY         Current Medications:      Current Facility-Administered Medications   Medication Dose Route Frequency Provider Last Rate Last Dose    dexamethasone (DECADRON) tablet 6 mg  6 mg Oral Daily Nicole Robert MD   6 mg at 11/24/20 1224    albuterol-ipratropium (COMBIVENT RESPIMAT)  MCG/ACT inhaler 1 puff  1 puff Inhalation Q4H WA Paolo Sykes DO   1 puff at 11/24/20 1223    enoxaparin (LOVENOX) injection 40 mg  40 mg Subcutaneous BID Paolo Sykes DO   40 mg at 11/24/20 1039    spironolactone (ALDACTONE) tablet 25 mg  25 mg Oral Daily Paolo Sykes DO   25 mg at 11/24/20 1039    sacubitril-valsartan (ENTRESTO) 24-26 MG per tablet 1 tablet  1 tablet Oral BID Paolo Sykes DO   1 tablet at 11/24/20 1039    amLODIPine (NORVASC) tablet 5 mg  5 mg Oral Daily Nolan Moulder, DO   5 mg at 11/24/20 1039    cloNIDine (CATAPRES) tablet 0.1 mg  0.1 mg Oral BID Nolan Moulder, DO   0.1 mg at 11/24/20 1039    finasteride (PROSCAR) tablet 5 mg  5 mg Oral Daily Nolan Moulder, DO   5 mg at 11/24/20 1039    metoprolol succinate (TOPROL XL) extended release tablet 100 mg  100 mg Oral Daily Nolan Moulder, DO   100 mg at 11/24/20 1039    nicotine (NICODERM CQ) 21 MG/24HR 1 patch  1 patch Transdermal Q24H Nolan Moulder, DO   1 patch at 11/23/20 1904    nitroglycerin (NITRO-BID) 2 % ointment 1 inch  1 inch Topical TID Nolan Moulder, DO   1 inch at 11/24/20 1040    acetaminophen (TYLENOL) tablet 650 mg  650 mg Oral Q6H PRN Nolan Moulder, DO   650 mg at 11/23/20 2134    aluminum & magnesium hydroxide-simethicone (MAALOX) 30 mL, lidocaine viscous hcl (XYLOCAINE) 5 mL (GI COCKTAIL)   Oral 4x Daily Nolan Moulder, DO        sodium chloride flush 0.9 % injection 10 mL  10 mL Intravenous PRN Feng Burnett, DO   10 mL at 98/97/42 2012       Allergies:  Patient has no known allergies.     Social History:      Social History     Socioeconomic History    Marital status: Legally      Spouse name: Not on file    Number of children: Not on file    Years of education: Not on file    Highest education level: Not on file   Occupational History    Not on file   Social Needs    Financial resource strain: Not on file    Food insecurity     Worry: Not on file     Inability: Not on file   [email or web address] needs     Medical: Not on file     Non-medical: Not on file   Tobacco Use    Smoking status: Former Smoker     Packs/day: 1.50     Years: 40.00     Pack years: 60.00     Types: Cigarettes     Start date: 8/18/1979    Smokeless tobacco: Never Used    Tobacco comment: rolling his own cigarettes   Substance and Sexual Activity    Alcohol use: Not Currently     Alcohol/week: 0.0 standard drinks     Comment: 6 beers on the weekend   [facility] Drug use: Not Currently     Types: Cocaine, Other-see comments, Marijuana    Sexual activity: Not on file   Lifestyle    Physical activity     Days per week: Not on file     Minutes per session: Not on file    Stress: Not on file   Relationships    Social connections     Talks on phone: Not on file     Gets together: Not on file     Attends Zoroastrianism service: Not on file     Active member of club or organization: Not on file     Attends meetings of clubs or organizations: Not on file     Relationship status: Not on file    Intimate partner violence     Fear of current or ex partner: Not on file     Emotionally abused: Not on file     Physically abused: Not on file     Forced sexual activity: Not on file   Other Topics Concern    Not on file   Social History Narrative    Not on file         Family History:     Family History   Problem Relation Age of Onset    Arthritis Mother     Other Mother         glucoma    Heart Disease Father     High Blood Pressure Brother     Other Brother         sleep apnea    High Blood Pressure Brother     Other Brother         sleep apnea    High Blood Pressure Brother     Other Brother         sleep apnea    High Blood Pressure Brother     Other Brother         sleep apnea    Cancer Brother         esophogeal -  at 62       REVIEW OF SYSTEMS:    CONSTITUTIONAL:Fever ,chills   HEENT: denies blurring of vision or double vision, denies hearing problem  RESPIRATORY: Cough, SOB , sputum expectoration   CARDIOVASCULAR:  Denies palpitation  GASTROINTESTINAL:  Denies abdomen pain, diarrhea or constipation. GENITOURINARY:  Denies burning urination or frequency of urination  INTEGUMENT: denies wound , rash  HEMATOLOGIC/LYMPHATIC:  Denies lymph node swelling, gum bleeding or easy bruising.   MUSCULOSKELETAL:  Weakness   NEUROLOGICAL:  Denies light headed, dizziness, loss of consciousness    PHYSICAL EXAM:      Vitals:     Vitals:    20 1225   BP: 123/81   Pulse: 81 Resp: 18   Temp: 98 °F (36.7 °C)   SpO2: 90%       General Appearance:    Awake, alert , no acute distress. Head:    Normocephalic, atraumatic   Eyes:    No pallor, no icterus,   Ears:    No obvious deformity or drainage.    Nose:   No nasal drainage   Throat:   Mucosa moist, no oral thrush   Neck:   Supple, no lymphadenopathy   Back:     no CVA tenderness   Lungs:     Crackles at the bases    Heart:    Regular rate and rhythm, no murmur   Abdomen:     Soft, non-tender, bowel sounds present    Extremities:   No edema, no cyanosis   Pulses:   Dorsalis pedis palpable    Skin:   no rashes or lesions       DATA:      CBC with Differential:      Lab Results   Component Value Date    WBC 11.1 11/22/2020    RBC 4.60 11/22/2020    HGB 14.3 11/22/2020    HCT 40.2 11/22/2020     11/22/2020    MCV 87.4 11/22/2020    MCH 31.1 11/22/2020    MCHC 35.6 11/22/2020    RDW 12.4 11/22/2020    METASPCT 2 02/10/2016    LYMPHOPCT 8.6 11/22/2020    MONOPCT 5.3 11/22/2020    MYELOPCT 1 02/10/2016    BASOPCT 0.2 11/22/2020    MONOSABS 0.59 11/22/2020    LYMPHSABS 0.96 11/22/2020    EOSABS 0.18 11/22/2020    BASOSABS 0.02 11/22/2020       CMP     Lab Results   Component Value Date     11/22/2020    K 4.4 11/22/2020     11/22/2020    CO2 20 11/22/2020    BUN 31 11/22/2020    CREATININE 1.1 11/22/2020    GFRAA >60 11/22/2020    LABGLOM >60 11/22/2020    GLUCOSE 94 11/22/2020    PROT 6.9 11/22/2020    LABALBU 3.3 11/22/2020    CALCIUM 8.4 11/22/2020    BILITOT 0.3 11/22/2020    ALKPHOS 71 11/22/2020    AST 27 11/22/2020    ALT 37 11/22/2020         Hepatic Function Panel:    Lab Results   Component Value Date    ALKPHOS 71 11/22/2020    ALT 37 11/22/2020    AST 27 11/22/2020    PROT 6.9 11/22/2020    BILITOT 0.3 11/22/2020    BILIDIR <0.2 08/17/2019    IBILI see below 08/17/2019    LABALBU 3.3 11/22/2020       PT/INR:    Lab Results   Component Value Date    PROTIME 13.0 11/22/2020    INR 1.2 11/22/2020       TSH:    Lab Results   Component Value Date    TSH 0.809 04/18/2019       U/A:    Lab Results   Component Value Date    COLORU Yellow 11/22/2020    PHUR 6.0 11/22/2020    WBCUA NONE 11/22/2020    RBCUA 0-1 11/22/2020    BACTERIA NONE SEEN 11/22/2020    CLARITYU Clear 11/22/2020    SPECGRAV 1.025 11/22/2020    LEUKOCYTESUR Negative 11/22/2020    UROBILINOGEN 1.0 11/22/2020    BILIRUBINUR Negative 11/22/2020    BLOODU Negative 11/22/2020    GLUCOSEU Negative 11/22/2020       ABG:  No results found for: SVO1BLN, BEART, H3ASKADN, PHART, THGBART, VTE9IRP, PO2ART, IVX0CIY    MICROBIOLOGY:    Resp panel negative       Radiology :    CTA scan of chest -  Impression:          1.  No pulmonary embolism. 2.  Increasing bilateral pneumonia compared to prior from November 16, 2020.             IMPRESSION:     1. Recently treated COVID 19 - fever and lung infiltrates ? Secondary bacterial pneumonia   2. CHF   3. Respiratory failure     RECOMMENDATIONS:      1. Cefepime 2 grams IV q 12 hrs  2. Procalcitonin, Diuretics, LDH, CRP, ferritin   3. Sputum culture     Thank you Dr Kirby Keenan for the consult

## 2020-11-24 NOTE — PROGRESS NOTES
Pt complained of sharp left sided chest pain , this nurse assessed patient & obtained vitals, B/P 150/90 map 110, Temp 101.0, Tylenol given, Nitro Paste applied, EKG performed, results Abnormal ECG, Nonspecific T wave abnormality, blood pressure meds given, Pt  States he's feeling a little better, this nurse perfect served Dr. Stefanie Ferraro to update on pt status, awaiting a response.

## 2020-11-24 NOTE — CARE COORDINATION
11/24 Transition of Care: Patient is in isolation for covid which is negative from 11/23/20. Patient is a recent discharge from [facility] due to Covid. He has completed remdesivir at [facility]. He is now with recurrent temp 101. 0(today 97.9) with (today 71) and difficulty breathing( room air sat 89%) with need for 6liters nc. Sat  Today 92%. He is currently with ID/Pulm consults pending. Spoke with infection control as resp array in ER 11/23 was negative for covid. CTA with increase worsening bilateral pneumonia. Patient when he is at home is independent. He does use a cane to ambulate. He lives alone in a ground apartment. He states he does not wear oxygen at home and did not qualify when he left [facility]. He does use his friends oxygen \"sometimes\". He does not have a history of MANSOOR or homecare. He has a daughter in law who checks on him who is a nurse. His pcp is Dr Alexsander Bridges and his pharmacy is [facility] med[facility] to beds. Will need a recheck for oxygen at home. Will follow for further plan with patient.   Lizbeth Mcbride RN [phone]

## 2020-11-24 NOTE — CONSULTS
[facility]  [address], [address]  [facility], [address] [facility]    Pulmonary Consultation      Reason for Consult:  sob    Requesting Physician:  Milagro Coelho DO    CHIEF COMPLAINT:  sob    History Obtained From:  patient    HISTORY OF PRESENT ILLNESS:                The patient is a 60 y.o. male with significant past medical history of COPD who presents with increased sob. Was just discharged from hospital after diagnosis of COVID, received remdesivir and dexamethasone, discharged home on the 20th and claims started getting sob the next day while moving around at home. (?) had his Trelegy but didn't have his nebulizer medications as was living in a different home. Didn't qualify for oxygen either when discharged. Still off smoking he said, gradually got worse and came back to hospital yesterday. Currently sitting up in bed on 4 li NC, looking comfortable with breathing, claims better with both breathing and coughing with minimal sputum production. Currently COVID (-).     Past Medical History:        Diagnosis Date    Alcohol abuse     CHF (congestive heart failure) (HCC)     COPD (chronic obstructive pulmonary disease) (HCC)     History of cocaine use     Hyperlipidemia     Hypertension     Marijuana use     quit November 2017     MI [facility]) ([facility] [address]     alberto        Past Surgical History:        Procedure Laterality Date    CARDIAC CATHETERIZATION  11/03/2020    DR Asa Robin CERVICAL FUSION  11/18/15    cervical laminectomy & fusion c4-c6, with rods & screws    POLYSOMNOGRAPHY  01/29/2018    AHI=29.8    WRIST SURGERY         Current Medications:    Current Facility-Administered Medications: dexamethasone (DECADRON) tablet 6 mg, 6 mg, Oral, Daily  albuterol-ipratropium (COMBIVENT RESPIMAT)  MCG/ACT inhaler 1 puff, 1 puff, Inhalation, Q4H WA  cefepime (MAXIPIME) 2 g IVPB extended (mini-bag), 2 g, Intravenous, Q12H  enoxaparin (LOVENOX) injection 40 mg, 40 mg, Subcutaneous, BID  spironolactone (ALDACTONE) tablet 25 mg, 25 mg, Oral, Daily  sacubitril-valsartan (ENTRESTO) 24-26 MG per tablet 1 tablet, 1 tablet, Oral, BID  amLODIPine (NORVASC) tablet 5 mg, 5 mg, Oral, Daily  cloNIDine (CATAPRES) tablet 0.1 mg, 0.1 mg, Oral, BID  finasteride (PROSCAR) tablet 5 mg, 5 mg, Oral, Daily  metoprolol succinate (TOPROL XL) extended release tablet 100 mg, 100 mg, Oral, Daily  nicotine (NICODERM CQ) 21 MG/24HR 1 patch, 1 patch, Transdermal, Q24H  nitroglycerin (NITRO-BID) 2 % ointment 1 inch, 1 inch, Topical, TID  acetaminophen (TYLENOL) tablet 650 mg, 650 mg, Oral, Q6H PRN  aluminum & magnesium hydroxide-simethicone (MAALOX) 30 mL, lidocaine viscous hcl (XYLOCAINE) 5 mL (GI COCKTAIL), , Oral, 4x Daily  sodium chloride flush 0.9 % injection 10 mL, 10 mL, Intravenous, PRN    Allergies:  Patient has no known allergies. Social History:    TOBACCO:   reports that he has quit smoking. His smoking use included cigarettes. He started smoking about 41 years ago. He has a 60.00 pack-year smoking history. He has never used smokeless tobacco.    Family History:       Problem Relation Age of Onset    Arthritis Mother     Other Mother         glucoma    Heart Disease Father     High Blood Pressure Brother     Other Brother         sleep apnea    High Blood Pressure Brother     Other Brother         sleep apnea    High Blood Pressure Brother     Other Brother         sleep apnea    High Blood Pressure Brother     Other Brother         sleep apnea    Cancer Brother         gitaogeal -  at 62       REVIEW OF SYSTEMS:    RESPIRATORY:  Cough and sob  Remainder of complete ROS is negative.     PHYSICAL EXAM:      Vitals:    /79   Pulse 73   Temp 97.8 °F (36.6 °C) (Temporal)   Resp 16   Ht 5' 7\" (1.702 m)   Wt 214 lb (97.1 kg)   SpO2 98%   BMI 33.52 kg/m²     EYES:  Lids and lashes normal, pupils equal, round and reactive to light, extra ocular muscles intact, sclera clear, conjunctiva normal  ENT:  Normocephalic, without obvious abnormality, atraumatic, sinuses nontender on palpation, external ears without lesions, oral pharynx with moist mucus membranes, tonsils without erythema or exudates, gums normal and good dentition. NECK:  Supple, symmetrical, trachea midline, no adenopathy, thyroid symmetric, not enlarged and no tenderness, skin normal  LUNGS:  ronchi bilateral worse with cough  CARDIOVASCULAR:  Normal apical impulse, regular rate and rhythm, normal S1 and S2, no S3 or S4, and no murmur noted  ABDOMEN:  No scars, normal bowel sounds, soft, non-distended, non-tender, no masses palpated, no hepatosplenomegally  MUSCULOSKELETAL:  There is no redness, warmth, or swelling of the joints. Full range of motion noted. Motor strength is 5 out of 5 all extremities bilaterally. Tone is normal.  NEUROLOGIC:  Awake, alert, oriented to name, place and time. Cranial nerves II-XII are grossly intact. DATA:    CBC:   Recent Labs     11/22/20  1808   WBC 11.1   HGB 14.3   HCT 40.2   MCV 87.4          BMP:  Recent Labs     11/22/20  1808      K 4.4      CO2 20*   BUN 31*   CREATININE 1.1    ALB:3,BILIDIR:3,BILITOT:3,ALKPHOS:3)@    PT/INR:   Recent Labs     11/22/20  1808   PROTIME 13.0*   INR 1.2       ABG:   Recent Labs     11/23/20  0851   PH 7.475*   PO2 83.0   PCO2 32.3*   HCO3 23.2   BE 0.5   O2SAT 96.6   METHB 0.2   O2HB 96.0   COHB 0.4   O2CON 20.1   HHB 3.4   THB 14.9             Radiology Review:  CTA chest reviewed with increased scattered GG/patchy opacities bilateral upper lobes, (-) PE    IMPRESSION/RECOMMENDATIONS:      Hx of COVID pneumonia  COPD  CHF    1. Watch fluid balance, diurese as needed, some pulmonary edema may explain some of worsening radiographic picture  2. Cont with dexamethasone, may start tapering in a few days since doing much better and COVID (-)  3.  Cont with antibiotics as per ID  4. Cont with MDI, will see if can add [place] since repeat testing is (-)  5. Cont with oxygen, taper as tolerated  6. OOB to chair, ambulate as tolerated      Time at the bedside, reviewing labs and radiographs, reviewing notes and consultations, discussing with staff and family was more than 50 minutes. Thanks for letting us see this patient in consultation. Please contact us with any questions. Office [phone] or after hours through [employer], x [phone].

## 2020-11-25 PROCEDURE — 6360000002 HC RX W HCPCS: Performed by: INTERNAL MEDICINE

## 2020-11-25 PROCEDURE — 2580000003 HC RX 258: Performed by: INTERNAL MEDICINE

## 2020-11-25 PROCEDURE — 6360000002 HC RX W HCPCS: Performed by: GENERAL PRACTICE

## 2020-11-25 PROCEDURE — 6370000000 HC RX 637 (ALT 250 FOR IP): Performed by: GENERAL PRACTICE

## 2020-11-25 PROCEDURE — 2580000003 HC RX 258: Performed by: RADIOLOGY

## 2020-11-25 PROCEDURE — 2140000000 HC CCU INTERMEDIATE R&B

## 2020-11-25 RX ADMIN — IPRATROPIUM BROMIDE AND ALBUTEROL 1 PUFF: 20; 100 SPRAY, METERED RESPIRATORY (INHALATION) at 12:00

## 2020-11-25 RX ADMIN — CEFEPIME 2 G: 2 INJECTION, POWDER, FOR SOLUTION INTRAVENOUS at 17:40

## 2020-11-25 RX ADMIN — LIDOCAINE HYDROCHLORIDE: 20 SOLUTION ORAL; TOPICAL at 17:37

## 2020-11-25 RX ADMIN — CLONIDINE HYDROCHLORIDE 0.1 MG: 0.1 TABLET ORAL at 21:39

## 2020-11-25 RX ADMIN — ENOXAPARIN SODIUM 40 MG: 40 INJECTION SUBCUTANEOUS at 21:39

## 2020-11-25 RX ADMIN — IPRATROPIUM BROMIDE AND ALBUTEROL 1 PUFF: 20; 100 SPRAY, METERED RESPIRATORY (INHALATION) at 21:40

## 2020-11-25 RX ADMIN — NITROGLYCERIN 1 INCH: 20 OINTMENT TOPICAL at 08:52

## 2020-11-25 RX ADMIN — LIDOCAINE HYDROCHLORIDE: 20 SOLUTION ORAL; TOPICAL at 23:14

## 2020-11-25 RX ADMIN — ENOXAPARIN SODIUM 40 MG: 40 INJECTION SUBCUTANEOUS at 08:49

## 2020-11-25 RX ADMIN — DEXAMETHASONE 6 MG: 4 TABLET ORAL at 08:49

## 2020-11-25 RX ADMIN — SPIRONOLACTONE 25 MG: 25 TABLET ORAL at 08:48

## 2020-11-25 RX ADMIN — IPRATROPIUM BROMIDE AND ALBUTEROL 1 PUFF: 20; 100 SPRAY, METERED RESPIRATORY (INHALATION) at 08:51

## 2020-11-25 RX ADMIN — FINASTERIDE 5 MG: 5 TABLET, FILM COATED ORAL at 08:48

## 2020-11-25 RX ADMIN — SACUBITRIL AND VALSARTAN 1 TABLET: 24; 26 TABLET, FILM COATED ORAL at 08:48

## 2020-11-25 RX ADMIN — NITROGLYCERIN 1 INCH: 20 OINTMENT TOPICAL at 21:39

## 2020-11-25 RX ADMIN — IPRATROPIUM BROMIDE AND ALBUTEROL 1 PUFF: 20; 100 SPRAY, METERED RESPIRATORY (INHALATION) at 17:37

## 2020-11-25 RX ADMIN — Medication 10 ML: at 21:38

## 2020-11-25 RX ADMIN — CEFEPIME 2 G: 2 INJECTION, POWDER, FOR SOLUTION INTRAVENOUS at 07:08

## 2020-11-25 RX ADMIN — NITROGLYCERIN 1 INCH: 20 OINTMENT TOPICAL at 14:00

## 2020-11-25 RX ADMIN — METOPROLOL SUCCINATE 100 MG: 100 TABLET, EXTENDED RELEASE ORAL at 08:48

## 2020-11-25 RX ADMIN — SACUBITRIL AND VALSARTAN 1 TABLET: 24; 26 TABLET, FILM COATED ORAL at 21:39

## 2020-11-25 RX ADMIN — AMLODIPINE BESYLATE 5 MG: 5 TABLET ORAL at 08:48

## 2020-11-25 RX ADMIN — CLONIDINE HYDROCHLORIDE 0.1 MG: 0.1 TABLET ORAL at 08:49

## 2020-11-25 ASSESSMENT — PAIN SCALES - GENERAL
PAINLEVEL_OUTOF10: 0

## 2020-11-25 ASSESSMENT — PAIN DESCRIPTION - PROGRESSION: CLINICAL_PROGRESSION: NOT CHANGED

## 2020-11-25 NOTE — CARE COORDINATION
11/25 Update CM Note: patient remains in isolation despite covid negative results on 11/23/20. He is on iv maxipime bid, decadron, inhalers. He is currently on 6lnc sat 99%. Plan is to return home with his family at discharge. He will need pulse oximeter checks ordered for possible need for home O2. [employer] is the choice of company if needed. Patient is an active smoker. Patient with [facility] Medicare insurance. Will follow.   Annie Cunningham RN CM

## 2020-11-25 NOTE — PROGRESS NOTES
[facility]  [address], [address]  [facility], [facility]      Pulmonary Progress Note      SUBJECTIVE:  Claims doing well, better with respiratory function since admission with minimal cough/sputum production, on 5 li NC, sitting up in bed when seen.     OBJECTIVE    Medications    Continuous Infusions:    Scheduled Meds:   dexamethasone  6 mg Oral Daily    albuterol-ipratropium  1 puff Inhalation Q4H WA    cefepime  2 g Intravenous Q12H    enoxaparin  40 mg Subcutaneous BID    spironolactone  25 mg Oral Daily    sacubitril-valsartan  1 tablet Oral BID    amLODIPine  5 mg Oral Daily    cloNIDine  0.1 mg Oral BID    finasteride  5 mg Oral Daily    metoprolol succinate  100 mg Oral Daily    nicotine  1 patch Transdermal Q24H    nitroglycerin  1 inch Topical TID    GI cocktail   Oral 4x Daily       PRN Meds:acetaminophen, sodium chloride flush    Physical    VITALS:  BP (!) 125/96   Pulse 66   Temp 97 °F (36.1 °C) (Temporal)   Resp 18   Ht 5' 7\" (1.702 m)   Wt 214 lb (97.1 kg)   SpO2 99%   BMI 33.52 kg/m²     24HR INTAKE/OUTPUT:      Intake/Output Summary (Last 24 hours) at 2020 1401  Last data filed at 2020 0659  Gross per 24 hour   Intake --   Output 2300 ml   Net -2300 ml       24HR PULSE OXIMETRY RANGE:    SpO2  Av.5 %  Min: 94 %  Max: 99 %    General appearance: alert, appears stated age and cooperative  Lungs: rhonchi bibasilar  Heart: regular rate and rhythm, S1, S2 normal, no murmur, click, rub or gallop  Abdomen: soft, non-tender; bowel sounds normal; no masses,  no organomegaly  Extremities: extremities normal, atraumatic, no cyanosis or edema  Neurologic: Mental status: Alert, oriented, thought content appropriate    Data    CBC:   Recent Labs     20  1808   WBC 11.1   HGB 14.3   HCT 40.2   MCV 87.4          BMP:  Recent Labs     20  1808      K 4.4      CO2 20*   BUN 31* CREATININE 1.1    ALB:3,BILIDIR:3,BILITOT:3,ALKPHOS:3)@    PT/INR:   Recent Labs     11/22/20  1808   PROTIME 13.0*   INR 1.2       ABG:   Recent Labs     11/23/20  0851   PH 7.475*   PO2 83.0   PCO2 32.3*   HCO3 23.2   BE 0.5   O2SAT 96.6   METHB 0.2   O2HB 96.0   COHB 0.4   O2CON 20.1   HHB 3.4   THB 14.9             Radiology/Other tests reviewed: none    Assessment:     Active Problems:    Acute respiratory failure with hypoxia (HCC)  Resolved Problems:    * No resolved hospital problems. *  Hx of COVID pneumonia  COPD  CHF  hypoxia    Plan:       1. Cont with decadron, will taper down upon discharge  2. Antibiotics as per ID  3. Cont with MDI, should be able to resume usual medications as out-pt and make sure has available depending on where he stays  4. Cont with oxygen, taper as tolerated, will need to qualify as out-pt  5. OOB to chair, ambulate as tolerated      Time at the bedside, reviewing labs and radiographs, reviewing notes and consultations, discussing with staff and family was more than 35 minutes. Thanks for letting us see this patient in consultation. Please contact us with any questions. Office [phone] or after hours through [employer], x [phone].

## 2020-11-25 NOTE — PROGRESS NOTES
Department of Internal Medicine  Infectious Diseases   Progress Note      C/C : COVID 19 , fever       Pt is awake and alert  Denies fever and chills  Feels better today   Afebrile         Current Medications:      Current Facility-Administered Medications   Medication Dose Route Frequency Provider Last Rate Last Dose    dexamethasone (DECADRON) tablet 6 mg  6 mg Oral Daily Lynda Johnson MD   6 mg at 11/25/20 0849    albuterol-ipratropium (COMBIVENT RESPIMAT)  MCG/ACT inhaler 1 puff  1 puff Inhalation Q4H WA Wanjosep Ping, DO   1 puff at 11/25/20 1200    cefepime (MAXIPIME) 2 g IVPB extended (mini-bag)  2 g Intravenous Q12H Leonard Mckinney MD   Stopped at 11/25/20 1110    enoxaparin (LOVENOX) injection 40 mg  40 mg Subcutaneous BID Wannetta Ping, DO   40 mg at 11/25/20 7506    spironolactone (ALDACTONE) tablet 25 mg  25 mg Oral Daily Wannetta Ping, DO   25 mg at 11/25/20 0848    sacubitril-valsartan (ENTRESTO) 24-26 MG per tablet 1 tablet  1 tablet Oral BID Wannetta Ping, DO   1 tablet at 11/25/20 0848    amLODIPine (NORVASC) tablet 5 mg  5 mg Oral Daily Wannetta Ping, DO   5 mg at 11/25/20 0848    cloNIDine (CATAPRES) tablet 0.1 mg  0.1 mg Oral BID Wannetta Ping, DO   0.1 mg at 11/25/20 0566    finasteride (PROSCAR) tablet 5 mg  5 mg Oral Daily Wannetta Ping, DO   5 mg at 11/25/20 0848    metoprolol succinate (TOPROL XL) extended release tablet 100 mg  100 mg Oral Daily Wannetta Ping, DO   100 mg at 11/25/20 0848    nicotine (NICODERM CQ) 21 MG/24HR 1 patch  1 patch Transdermal Q24H Wannetta Ping, DO   1 patch at 11/24/20 1846    nitroglycerin (NITRO-BID) 2 % ointment 1 inch  1 inch Topical TID Wannetta Ping, DO   1 inch at 11/25/20 1400    acetaminophen (TYLENOL) tablet 650 mg  650 mg Oral Q6H PRN Wanlesiaa Ping, DO   650 mg at 11/23/20 2134    aluminum & magnesium hydroxide-simethicone (MAALOX) 30 mL, lidocaine viscous hcl (XYLOCAINE) 5 mL (GI COCKTAIL)   Oral 4x Daily DO Rosina Barber Date     11/22/2020    K 4.4 11/22/2020     11/22/2020    CO2 20 11/22/2020    BUN 31 11/22/2020    CREATININE 1.1 11/22/2020    GFRAA >60 11/22/2020    LABGLOM >60 11/22/2020    GLUCOSE 94 11/22/2020    PROT 6.9 11/22/2020    LABALBU 3.3 11/22/2020    CALCIUM 8.4 11/22/2020    BILITOT 0.3 11/22/2020    ALKPHOS 71 11/22/2020    AST 27 11/22/2020    ALT 37 11/22/2020         Hepatic Function Panel:    Lab Results   Component Value Date    ALKPHOS 71 11/22/2020    ALT 37 11/22/2020    AST 27 11/22/2020    PROT 6.9 11/22/2020    BILITOT 0.3 11/22/2020    BILIDIR <0.2 08/17/2019    IBILI see below 08/17/2019    LABALBU 3.3 11/22/2020       PT/INR:    Lab Results   Component Value Date    PROTIME 13.0 11/22/2020    INR 1.2 11/22/2020       TSH:    Lab Results   Component Value Date    TSH 0.809 04/18/2019       U/A:    Lab Results   Component Value Date    COLORU Yellow 11/22/2020    PHUR 6.0 11/22/2020    WBCUA NONE 11/22/2020    RBCUA 0-1 11/22/2020    BACTERIA NONE SEEN 11/22/2020    CLARITYU Clear 11/22/2020    SPECGRAV 1.025 11/22/2020    LEUKOCYTESUR Negative 11/22/2020    UROBILINOGEN 1.0 11/22/2020    BILIRUBINUR Negative 11/22/2020    BLOODU Negative 11/22/2020    GLUCOSEU Negative 11/22/2020       ABG:  No results found for: FRT5WBH, BEART, F7UJXHNT, PHART, THGBART, NIT0WQD, PO2ART, JXQ3ZJY    MICROBIOLOGY:    Resp panel negative       Radiology :    CTA scan of chest -  Impression:          1.  No pulmonary embolism. 2.  Increasing bilateral pneumonia compared to prior from November 16, 2020.             IMPRESSION:     1. Recently treated COVID 19 - fever and lung infiltrates ? Secondary bacterial pneumonia   2. CHF   3. Respiratory failure     RECOMMENDATIONS:      1. Cefepime 2 grams IV q 12 hrs  2. Diuretics   3.  Oxygen supplement

## 2020-11-26 LAB
ANION GAP SERPL CALCULATED.3IONS-SCNC: 15 MMOL/L (ref 7–16)
BUN BLDV-MCNC: 29 MG/DL (ref 6–20)
CALCIUM SERPL-MCNC: 8.9 MG/DL (ref 8.6–10.2)
CHLORIDE BLD-SCNC: 100 MMOL/L (ref 98–107)
CO2: 18 MMOL/L (ref 22–29)
CREAT SERPL-MCNC: 1 MG/DL (ref 0.7–1.2)
GFR AFRICAN AMERICAN: >60
GFR NON-AFRICAN AMERICAN: >60 ML/MIN/1.73
GLUCOSE BLD-MCNC: 190 MG/DL (ref 74–99)
POTASSIUM SERPL-SCNC: 4.9 MMOL/L (ref 3.5–5)
SODIUM BLD-SCNC: 133 MMOL/L (ref 132–146)

## 2020-11-26 PROCEDURE — 2580000003 HC RX 258: Performed by: INTERNAL MEDICINE

## 2020-11-26 PROCEDURE — 6360000002 HC RX W HCPCS: Performed by: GENERAL PRACTICE

## 2020-11-26 PROCEDURE — 2580000003 HC RX 258: Performed by: RADIOLOGY

## 2020-11-26 PROCEDURE — 6360000002 HC RX W HCPCS: Performed by: INTERNAL MEDICINE

## 2020-11-26 PROCEDURE — 6370000000 HC RX 637 (ALT 250 FOR IP): Performed by: GENERAL PRACTICE

## 2020-11-26 PROCEDURE — 36415 COLL VENOUS BLD VENIPUNCTURE: CPT

## 2020-11-26 PROCEDURE — 2140000000 HC CCU INTERMEDIATE R&B

## 2020-11-26 PROCEDURE — 80048 BASIC METABOLIC PNL TOTAL CA: CPT

## 2020-11-26 RX ADMIN — IPRATROPIUM BROMIDE AND ALBUTEROL 1 PUFF: 20; 100 SPRAY, METERED RESPIRATORY (INHALATION) at 15:43

## 2020-11-26 RX ADMIN — LIDOCAINE HYDROCHLORIDE: 20 SOLUTION ORAL; TOPICAL at 13:00

## 2020-11-26 RX ADMIN — DEXAMETHASONE 6 MG: 4 TABLET ORAL at 09:30

## 2020-11-26 RX ADMIN — AMLODIPINE BESYLATE 5 MG: 5 TABLET ORAL at 09:29

## 2020-11-26 RX ADMIN — CLONIDINE HYDROCHLORIDE 0.1 MG: 0.1 TABLET ORAL at 21:12

## 2020-11-26 RX ADMIN — CEFEPIME 2 G: 2 INJECTION, POWDER, FOR SOLUTION INTRAVENOUS at 17:10

## 2020-11-26 RX ADMIN — NITROGLYCERIN 1 INCH: 20 OINTMENT TOPICAL at 15:00

## 2020-11-26 RX ADMIN — IPRATROPIUM BROMIDE AND ALBUTEROL 1 PUFF: 20; 100 SPRAY, METERED RESPIRATORY (INHALATION) at 11:30

## 2020-11-26 RX ADMIN — NITROGLYCERIN 1 INCH: 20 OINTMENT TOPICAL at 09:30

## 2020-11-26 RX ADMIN — SACUBITRIL AND VALSARTAN 1 TABLET: 24; 26 TABLET, FILM COATED ORAL at 09:29

## 2020-11-26 RX ADMIN — Medication 10 ML: at 06:45

## 2020-11-26 RX ADMIN — IPRATROPIUM BROMIDE AND ALBUTEROL 1 PUFF: 20; 100 SPRAY, METERED RESPIRATORY (INHALATION) at 09:28

## 2020-11-26 RX ADMIN — SACUBITRIL AND VALSARTAN 1 TABLET: 24; 26 TABLET, FILM COATED ORAL at 21:11

## 2020-11-26 RX ADMIN — FINASTERIDE 5 MG: 5 TABLET, FILM COATED ORAL at 09:29

## 2020-11-26 RX ADMIN — SPIRONOLACTONE 25 MG: 25 TABLET ORAL at 09:29

## 2020-11-26 RX ADMIN — NITROGLYCERIN 1 INCH: 20 OINTMENT TOPICAL at 21:12

## 2020-11-26 RX ADMIN — ENOXAPARIN SODIUM 40 MG: 40 INJECTION SUBCUTANEOUS at 21:12

## 2020-11-26 RX ADMIN — LIDOCAINE HYDROCHLORIDE: 20 SOLUTION ORAL; TOPICAL at 21:12

## 2020-11-26 RX ADMIN — CLONIDINE HYDROCHLORIDE 0.1 MG: 0.1 TABLET ORAL at 09:29

## 2020-11-26 RX ADMIN — CEFEPIME 2 G: 2 INJECTION, POWDER, FOR SOLUTION INTRAVENOUS at 06:45

## 2020-11-26 RX ADMIN — METOPROLOL SUCCINATE 100 MG: 100 TABLET, EXTENDED RELEASE ORAL at 09:29

## 2020-11-26 RX ADMIN — LIDOCAINE HYDROCHLORIDE: 20 SOLUTION ORAL; TOPICAL at 09:37

## 2020-11-26 RX ADMIN — ENOXAPARIN SODIUM 40 MG: 40 INJECTION SUBCUTANEOUS at 09:30

## 2020-11-26 RX ADMIN — LIDOCAINE HYDROCHLORIDE: 20 SOLUTION ORAL; TOPICAL at 17:09

## 2020-11-26 RX ADMIN — IPRATROPIUM BROMIDE AND ALBUTEROL 1 PUFF: 20; 100 SPRAY, METERED RESPIRATORY (INHALATION) at 21:12

## 2020-11-26 RX ADMIN — ACETAMINOPHEN 650 MG: 325 TABLET ORAL at 09:28

## 2020-11-26 ASSESSMENT — PAIN SCALES - GENERAL
PAINLEVEL_OUTOF10: 3
PAINLEVEL_OUTOF10: 0

## 2020-11-26 NOTE — PROGRESS NOTES
Department of Internal Medicine  Infectious Diseases   Progress Note      C/C : COVID 19 , fever       Pt is awake and alert  Denies fever and chills  Feels better today   Afebrile         Current Medications:      Current Facility-Administered Medications   Medication Dose Route Frequency Provider Last Rate Last Dose    dexamethasone (DECADRON) tablet 6 mg  6 mg Oral Daily Leanne Bamberger, MD   6 mg at 11/26/20 0930    albuterol-ipratropium (COMBIVENT RESPIMAT)  MCG/ACT inhaler 1 puff  1 puff Inhalation Q4H WA Ermalinda Boer, DO   1 puff at 11/26/20 1130    cefepime (MAXIPIME) 2 g IVPB extended (mini-bag)  2 g Intravenous Q12H Isabela Owen MD   Stopped at 11/26/20 1128    enoxaparin (LOVENOX) injection 40 mg  40 mg Subcutaneous BID Ermalinda Boer, DO   40 mg at 11/26/20 0930    spironolactone (ALDACTONE) tablet 25 mg  25 mg Oral Daily Ermalinda Boer, DO   25 mg at 11/26/20 2772    sacubitril-valsartan (ENTRESTO) 24-26 MG per tablet 1 tablet  1 tablet Oral BID Ermalinda Boer, DO   1 tablet at 11/26/20 0155    amLODIPine (NORVASC) tablet 5 mg  5 mg Oral Daily Ermalinda Boer, DO   5 mg at 11/26/20 2113    cloNIDine (CATAPRES) tablet 0.1 mg  0.1 mg Oral BID Ermalinda Boer, DO   0.1 mg at 11/26/20 9274    finasteride (PROSCAR) tablet 5 mg  5 mg Oral Daily Ermalinda Boer, DO   5 mg at 11/26/20 0998    metoprolol succinate (TOPROL XL) extended release tablet 100 mg  100 mg Oral Daily Ermalinda Boer, DO   100 mg at 11/26/20 0929    nicotine (NICODERM CQ) 21 MG/24HR 1 patch  1 patch Transdermal Q24H Ermalinda Boer, DO   1 patch at 11/25/20 1859    nitroglycerin (NITRO-BID) 2 % ointment 1 inch  1 inch Topical TID Ermalinda Boer, DO   1 inch at 11/26/20 0930    acetaminophen (TYLENOL) tablet 650 mg  650 mg Oral Q6H PRN Ermalinda Boer, DO   650 mg at 11/26/20 5729    aluminum & magnesium hydroxide-simethicone (MAALOX) 30 mL, lidocaine viscous hcl (XYLOCAINE) 5 mL (GI COCKTAIL)   Oral 4x Daily DO Maryse Young Date     11/26/2020    K 4.9 11/26/2020    K 4.4 11/22/2020     11/26/2020    CO2 18 11/26/2020    BUN 29 11/26/2020    CREATININE 1.0 11/26/2020    GFRAA >60 11/26/2020    LABGLOM >60 11/26/2020    GLUCOSE 190 11/26/2020    PROT 6.9 11/22/2020    LABALBU 3.3 11/22/2020    CALCIUM 8.9 11/26/2020    BILITOT 0.3 11/22/2020    ALKPHOS 71 11/22/2020    AST 27 11/22/2020    ALT 37 11/22/2020         Hepatic Function Panel:    Lab Results   Component Value Date    ALKPHOS 71 11/22/2020    ALT 37 11/22/2020    AST 27 11/22/2020    PROT 6.9 11/22/2020    BILITOT 0.3 11/22/2020    BILIDIR <0.2 08/17/2019    IBILI see below 08/17/2019    LABALBU 3.3 11/22/2020       PT/INR:    Lab Results   Component Value Date    PROTIME 13.0 11/22/2020    INR 1.2 11/22/2020       TSH:    Lab Results   Component Value Date    TSH 0.809 04/18/2019       U/A:    Lab Results   Component Value Date    COLORU Yellow 11/22/2020    PHUR 6.0 11/22/2020    WBCUA NONE 11/22/2020    RBCUA 0-1 11/22/2020    BACTERIA NONE SEEN 11/22/2020    CLARITYU Clear 11/22/2020    SPECGRAV 1.025 11/22/2020    LEUKOCYTESUR Negative 11/22/2020    UROBILINOGEN 1.0 11/22/2020    BILIRUBINUR Negative 11/22/2020    BLOODU Negative 11/22/2020    GLUCOSEU Negative 11/22/2020       ABG:  No results found for: FIU4KTQ, BEART, L8TNFPXZ, PHART, THGBART, XCG0VUC, PO2ART, SBD6TMX    MICROBIOLOGY:    Resp panel negative       Radiology :    CTA scan of chest -  Impression:          1.  No pulmonary embolism. 2.  Increasing bilateral pneumonia compared to prior from November 16, 2020.             IMPRESSION:     1. Recently treated COVID 19 - fever and lung infiltrates ? Secondary bacterial      pneumonia                 2. CHF   3. Respiratory failure - improving     RECOMMENDATIONS:      1. Cefepime 2 grams IV q 12 hrs  2. Diuretics   3.  Wean off oxygen as tolerated

## 2020-11-26 NOTE — PROGRESS NOTES
72 hours. BMP:  No results for input(s): NA, K, CL, CO2, PHOS, BUN, CREATININE in the last 72 hours. Invalid input(s): CA ALB:3,BILIDIR:3,BILITOT:3,ALKPHOS:3)@    PT/INR:   No results for input(s): PROTIME, INR in the last 72 hours. ABG:   No results for input(s): PH, PO2, PCO2, HCO3, BE, O2SAT, METHB, O2HB, COHB, O2CON, HHB, THB in the last 72 hours. Radiology/Other tests reviewed: none    Assessment:     Active Problems:    Acute respiratory failure with hypoxia (HCC)  Resolved Problems:    * No resolved hospital problems. *  Hx of COVID pneumonia  COPD  CHF  hypoxia    Plan:       1. Cont with decadron, will taper down upon discharge  2. Antibiotics as per ID  3. Cont with MDI, should be able to resume usual medications as out-pt and make sure has available depending on where he stays  4. Cont with oxygen, taper as tolerated, will need to qualify as out-pt, told nurse to get a longer oxygen line to reach bathroom  5. OOB to chair, ambulate as tolerated      Time at the bedside, reviewing labs and radiographs, reviewing notes and consultations, discussing with staff and family was more than 35 minutes. Thanks for letting us see this patient in consultation. Please contact us with any questions. Office [phone] or after hours through [facility], x [phone].

## 2020-11-27 LAB
BLOOD CULTURE, ROUTINE: NORMAL
CULTURE, BLOOD 2: NORMAL

## 2020-11-27 PROCEDURE — 6370000000 HC RX 637 (ALT 250 FOR IP): Performed by: GENERAL PRACTICE

## 2020-11-27 PROCEDURE — 2580000003 HC RX 258: Performed by: RADIOLOGY

## 2020-11-27 PROCEDURE — 6360000002 HC RX W HCPCS: Performed by: INTERNAL MEDICINE

## 2020-11-27 PROCEDURE — 6360000002 HC RX W HCPCS: Performed by: GENERAL PRACTICE

## 2020-11-27 PROCEDURE — 2580000003 HC RX 258: Performed by: INTERNAL MEDICINE

## 2020-11-27 PROCEDURE — 2140000000 HC CCU INTERMEDIATE R&B

## 2020-11-27 PROCEDURE — 2700000000 HC OXYGEN THERAPY PER DAY

## 2020-11-27 RX ADMIN — Medication 10 ML: at 08:04

## 2020-11-27 RX ADMIN — CEFEPIME 2 G: 2 INJECTION, POWDER, FOR SOLUTION INTRAVENOUS at 16:01

## 2020-11-27 RX ADMIN — NITROGLYCERIN 1 INCH: 20 OINTMENT TOPICAL at 15:06

## 2020-11-27 RX ADMIN — ENOXAPARIN SODIUM 40 MG: 40 INJECTION SUBCUTANEOUS at 08:05

## 2020-11-27 RX ADMIN — FINASTERIDE 5 MG: 5 TABLET, FILM COATED ORAL at 07:59

## 2020-11-27 RX ADMIN — Medication 10 ML: at 16:01

## 2020-11-27 RX ADMIN — Medication 10 ML: at 04:30

## 2020-11-27 RX ADMIN — CEFEPIME 2 G: 2 INJECTION, POWDER, FOR SOLUTION INTRAVENOUS at 04:30

## 2020-11-27 RX ADMIN — SACUBITRIL AND VALSARTAN 1 TABLET: 24; 26 TABLET, FILM COATED ORAL at 08:04

## 2020-11-27 RX ADMIN — CLONIDINE HYDROCHLORIDE 0.1 MG: 0.1 TABLET ORAL at 07:59

## 2020-11-27 RX ADMIN — METOPROLOL SUCCINATE 100 MG: 100 TABLET, EXTENDED RELEASE ORAL at 08:05

## 2020-11-27 RX ADMIN — IPRATROPIUM BROMIDE AND ALBUTEROL 1 PUFF: 20; 100 SPRAY, METERED RESPIRATORY (INHALATION) at 15:56

## 2020-11-27 RX ADMIN — LIDOCAINE HYDROCHLORIDE: 20 SOLUTION ORAL; TOPICAL at 12:59

## 2020-11-27 RX ADMIN — IPRATROPIUM BROMIDE AND ALBUTEROL 1 PUFF: 20; 100 SPRAY, METERED RESPIRATORY (INHALATION) at 12:28

## 2020-11-27 RX ADMIN — AMLODIPINE BESYLATE 5 MG: 5 TABLET ORAL at 08:00

## 2020-11-27 RX ADMIN — NITROGLYCERIN 1 INCH: 20 OINTMENT TOPICAL at 08:06

## 2020-11-27 RX ADMIN — DEXAMETHASONE 6 MG: 4 TABLET ORAL at 08:01

## 2020-11-27 RX ADMIN — LIDOCAINE HYDROCHLORIDE: 20 SOLUTION ORAL; TOPICAL at 17:39

## 2020-11-27 RX ADMIN — SPIRONOLACTONE 25 MG: 25 TABLET ORAL at 08:00

## 2020-11-27 ASSESSMENT — PAIN SCALES - GENERAL
PAINLEVEL_OUTOF10: 0

## 2020-11-27 NOTE — PROGRESS NOTES
[facility]  [address], [address], [address] [facility]      Pulmonary Progress Note      SUBJECTIVE:  Claims stable with respiratory function, still with dyspnea with mod exertion and minimal cough/sputum production, on 4 li NC, sitting up in bed when seen. OBJECTIVE    Medications    Continuous Infusions:    Scheduled Meds:   dexamethasone  6 mg Oral Daily    albuterol-ipratropium  1 puff Inhalation Q4H WA    cefepime  2 g Intravenous Q12H    enoxaparin  40 mg Subcutaneous BID    spironolactone  25 mg Oral Daily    sacubitril-valsartan  1 tablet Oral BID    amLODIPine  5 mg Oral Daily    cloNIDine  0.1 mg Oral BID    finasteride  5 mg Oral Daily    metoprolol succinate  100 mg Oral Daily    nicotine  1 patch Transdermal Q24H    nitroglycerin  1 inch Topical TID    GI cocktail   Oral 4x Daily       PRN Meds:acetaminophen, sodium chloride flush    Physical    VITALS:  /87   Pulse 68   Temp 96.6 °F (35.9 °C) (Temporal)   Resp 16   Ht 5' 7\" (1.702 m)   Wt 214 lb (97.1 kg)   SpO2 95% Comment: sitting in bed  BMI 33.52 kg/m²     24HR INTAKE/OUTPUT:      Intake/Output Summary (Last 24 hours) at 2020 1540  Last data filed at 2020 1227  Gross per 24 hour   Intake 1230 ml   Output 875 ml   Net 355 ml       24HR PULSE OXIMETRY RANGE:    SpO2  Av.2 %  Min: 92 %  Max: 97 %    General appearance: alert, appears stated age and cooperative  Lungs: rhonchi bibasilar  Heart: regular rate and rhythm, S1, S2 normal, no murmur, click, rub or gallop  Abdomen: soft, non-tender; bowel sounds normal; no masses,  no organomegaly  Extremities: extremities normal, atraumatic, no cyanosis or edema  Neurologic: Mental status: Alert, oriented, thought content appropriate    Data    CBC:   No results for input(s): WBC, HGB, HCT, MCV, PLT in the last 72 hours.     BMP:  Recent Labs     20  1310      K 4.9      CO2 18*   BUN 29*   CREATININE 1.0    ALB:3,BILIDIR:3,BILITOT:3,ALKPHOS:3)@    PT/INR:   No results for input(s): PROTIME, INR in the last 72 hours. ABG:   No results for input(s): PH, PO2, PCO2, HCO3, BE, O2SAT, METHB, O2HB, COHB, O2CON, HHB, THB in the last 72 hours. Radiology/Other tests reviewed: none    Assessment:     Active Problems:    Acute respiratory failure with hypoxia (HCC)  Resolved Problems:    * No resolved hospital problems. *  Hx of COVID pneumonia  COPD  CHF  hypoxia    Plan:       1. Cont with decadron, will taper down upon discharge  2. Antibiotics as per ID  3. Cont with MDI, should be able to resume usual medications as out-pt and make sure has available depending on where he stays  4. Cont with oxygen, taper as tolerated, will need to qualify as out-pt, discussed with nurse to check saturation on RA at rest and ambulation to fulfill DME requirements  5. OOB to chair, ambulate as tolerated  6. Possible discharge over the weekend if remains stable      Time at the bedside, reviewing labs and radiographs, reviewing notes and consultations, discussing with staff and family was more than 35 minutes. Thanks for letting us see this patient in consultation. Please contact us with any questions. Office [phone] or after hours through [facility], x [phone].

## 2020-11-27 NOTE — PROGRESS NOTES
Vrable, DO        sodium chloride flush 0.9 % injection 10 mL  10 mL Intravenous PRN Feng Burnett, DO   10 mL at 59/27/36 0804         REVIEW OF SYSTEMS:    CONSTITUTIONAL:Fever ,chills   HEENT: denies blurring of vision or double vision, denies hearing problem  RESPIRATORY: Cough, SOB , sputum expectoration   CARDIOVASCULAR:  Denies palpitation  GASTROINTESTINAL:  Denies abdomen pain, diarrhea or constipation. GENITOURINARY:  Denies burning urination or frequency of urination  INTEGUMENT: denies wound , rash  HEMATOLOGIC/LYMPHATIC:  Denies lymph node swelling, gum bleeding or easy bruising. MUSCULOSKELETAL:  Weakness   NEUROLOGICAL:  Denies light headed, dizziness, loss of consciousness    PHYSICAL EXAM:      Vitals:     Vitals:    11/27/20 1223   BP: 134/87   Pulse: 68   Resp: 16   Temp: 96.6 °F (35.9 °C)   SpO2: 97%       General Appearance:    Awake, alert , no acute distress. Head:    Normocephalic, atraumatic   Eyes:    No pallor, no icterus,   Ears:    No obvious deformity or drainage.    Nose:   No nasal drainage   Throat:   Mucosa moist, no oral thrush   Neck:   Supple, no lymphadenopathy   Back:     no CVA tenderness   Lungs:     Clear bilaterally     Heart:    Regular rate and rhythm, no murmur   Abdomen:     Soft, non-tender, bowel sounds present    Extremities:   No edema, no cyanosis   Pulses:   Dorsalis pedis palpable    Skin:   no rashes or lesions       DATA:      CBC with Differential:      Lab Results   Component Value Date    WBC 11.1 11/22/2020    RBC 4.60 11/22/2020    HGB 14.3 11/22/2020    HCT 40.2 11/22/2020     11/22/2020    MCV 87.4 11/22/2020    MCH 31.1 11/22/2020    MCHC 35.6 11/22/2020    RDW 12.4 11/22/2020    METASPCT 2 02/10/2016    LYMPHOPCT 8.6 11/22/2020    MONOPCT 5.3 11/22/2020    MYELOPCT 1 02/10/2016    BASOPCT 0.2 11/22/2020    MONOSABS 0.59 11/22/2020    LYMPHSABS 0.96 11/22/2020    EOSABS 0.18 11/22/2020    BASOSABS 0.02 11/22/2020       CMP     Lab Results Component Value Date     11/26/2020    K 4.9 11/26/2020    K 4.4 11/22/2020     11/26/2020    CO2 18 11/26/2020    BUN 29 11/26/2020    CREATININE 1.0 11/26/2020    GFRAA >60 11/26/2020    LABGLOM >60 11/26/2020    GLUCOSE 190 11/26/2020    PROT 6.9 11/22/2020    LABALBU 3.3 11/22/2020    CALCIUM 8.9 11/26/2020    BILITOT 0.3 11/22/2020    ALKPHOS 71 11/22/2020    AST 27 11/22/2020    ALT 37 11/22/2020         Hepatic Function Panel:    Lab Results   Component Value Date    ALKPHOS 71 11/22/2020    ALT 37 11/22/2020    AST 27 11/22/2020    PROT 6.9 11/22/2020    BILITOT 0.3 11/22/2020    BILIDIR <0.2 08/17/2019    IBILI see below 08/17/2019    LABALBU 3.3 11/22/2020       PT/INR:    Lab Results   Component Value Date    PROTIME 13.0 11/22/2020    INR 1.2 11/22/2020       TSH:    Lab Results   Component Value Date    TSH 0.809 04/18/2019       U/A:    Lab Results   Component Value Date    COLORU Yellow 11/22/2020    PHUR 6.0 11/22/2020    WBCUA NONE 11/22/2020    RBCUA 0-1 11/22/2020    BACTERIA NONE SEEN 11/22/2020    CLARITYU Clear 11/22/2020    SPECGRAV 1.025 11/22/2020    LEUKOCYTESUR Negative 11/22/2020    UROBILINOGEN 1.0 11/22/2020    BILIRUBINUR Negative 11/22/2020    BLOODU Negative 11/22/2020    GLUCOSEU Negative 11/22/2020       ABG:  No results found for: RKE2HBX, BEART, S0DTNYCP, PHART, THGBART, XIH1DWI, PO2ART, XLD5HRX    MICROBIOLOGY:    Resp panel negative       Radiology :    CTA scan of chest -  Impression:          1.  No pulmonary embolism. 2.  Increasing bilateral pneumonia compared to prior from November 16, 2020.             IMPRESSION:     1. Recently treated COVID 19 - fever and lung infiltrates ? Secondary bacterial      pneumonia                 2. CHF   3. Respiratory failure - improving     RECOMMENDATIONS:      1. Cefepime 2 grams IV q 12 hrs-stop IV at discharge   2. Diuretics   3.  Wean off oxygen as tolerated  Possible d/c next 24 hrs   Discussed with Dr Gayla Wilkins

## 2020-11-28 LAB — PRO-BNP: 321 PG/ML (ref 0–125)

## 2020-11-28 PROCEDURE — 83880 ASSAY OF NATRIURETIC PEPTIDE: CPT

## 2020-11-28 PROCEDURE — 6370000000 HC RX 637 (ALT 250 FOR IP): Performed by: GENERAL PRACTICE

## 2020-11-28 PROCEDURE — 6360000002 HC RX W HCPCS: Performed by: GENERAL PRACTICE

## 2020-11-28 PROCEDURE — 36415 COLL VENOUS BLD VENIPUNCTURE: CPT

## 2020-11-28 PROCEDURE — 2580000003 HC RX 258: Performed by: INTERNAL MEDICINE

## 2020-11-28 PROCEDURE — 6360000002 HC RX W HCPCS: Performed by: INTERNAL MEDICINE

## 2020-11-28 PROCEDURE — 2140000000 HC CCU INTERMEDIATE R&B

## 2020-11-28 PROCEDURE — 6370000000 HC RX 637 (ALT 250 FOR IP): Performed by: INTERNAL MEDICINE

## 2020-11-28 RX ORDER — BUMETANIDE 1 MG/1
1 TABLET ORAL DAILY
Status: DISCONTINUED | OUTPATIENT
Start: 2020-11-29 | End: 2020-11-30 | Stop reason: HOSPADM

## 2020-11-28 RX ORDER — CLONIDINE HYDROCHLORIDE 0.1 MG/1
0.1 TABLET ORAL DAILY
Status: DISCONTINUED | OUTPATIENT
Start: 2020-11-29 | End: 2020-11-30 | Stop reason: HOSPADM

## 2020-11-28 RX ADMIN — CEFEPIME 2 G: 2 INJECTION, POWDER, FOR SOLUTION INTRAVENOUS at 18:18

## 2020-11-28 RX ADMIN — LIDOCAINE HYDROCHLORIDE: 20 SOLUTION ORAL; TOPICAL at 14:54

## 2020-11-28 RX ADMIN — IPRATROPIUM BROMIDE AND ALBUTEROL 1 PUFF: 20; 100 SPRAY, METERED RESPIRATORY (INHALATION) at 08:00

## 2020-11-28 RX ADMIN — METOPROLOL SUCCINATE 100 MG: 100 TABLET, EXTENDED RELEASE ORAL at 09:55

## 2020-11-28 RX ADMIN — IPRATROPIUM BROMIDE AND ALBUTEROL 1 PUFF: 20; 100 SPRAY, METERED RESPIRATORY (INHALATION) at 16:00

## 2020-11-28 RX ADMIN — CEFEPIME 2 G: 2 INJECTION, POWDER, FOR SOLUTION INTRAVENOUS at 07:07

## 2020-11-28 RX ADMIN — ENOXAPARIN SODIUM 40 MG: 40 INJECTION SUBCUTANEOUS at 09:55

## 2020-11-28 RX ADMIN — NITROGLYCERIN 1 INCH: 20 OINTMENT TOPICAL at 20:56

## 2020-11-28 RX ADMIN — LIDOCAINE HYDROCHLORIDE: 20 SOLUTION ORAL; TOPICAL at 00:11

## 2020-11-28 RX ADMIN — LIDOCAINE HYDROCHLORIDE: 20 SOLUTION ORAL; TOPICAL at 18:17

## 2020-11-28 RX ADMIN — SACUBITRIL AND VALSARTAN 1 TABLET: 24; 26 TABLET, FILM COATED ORAL at 00:04

## 2020-11-28 RX ADMIN — SPIRONOLACTONE 25 MG: 25 TABLET ORAL at 09:55

## 2020-11-28 RX ADMIN — IPRATROPIUM BROMIDE AND ALBUTEROL 1 PUFF: 20; 100 SPRAY, METERED RESPIRATORY (INHALATION) at 20:55

## 2020-11-28 RX ADMIN — NITROGLYCERIN 1 INCH: 20 OINTMENT TOPICAL at 00:05

## 2020-11-28 RX ADMIN — NITROGLYCERIN 1 INCH: 20 OINTMENT TOPICAL at 14:54

## 2020-11-28 RX ADMIN — CLONIDINE HYDROCHLORIDE 0.1 MG: 0.1 TABLET ORAL at 09:55

## 2020-11-28 RX ADMIN — ENOXAPARIN SODIUM 40 MG: 40 INJECTION SUBCUTANEOUS at 20:56

## 2020-11-28 RX ADMIN — NITROGLYCERIN 1 INCH: 20 OINTMENT TOPICAL at 09:54

## 2020-11-28 RX ADMIN — SACUBITRIL AND VALSARTAN 1 TABLET: 24; 26 TABLET, FILM COATED ORAL at 09:55

## 2020-11-28 RX ADMIN — DEXAMETHASONE 6 MG: 4 TABLET ORAL at 09:54

## 2020-11-28 RX ADMIN — CLONIDINE HYDROCHLORIDE 0.1 MG: 0.1 TABLET ORAL at 00:05

## 2020-11-28 RX ADMIN — AMLODIPINE BESYLATE 5 MG: 5 TABLET ORAL at 09:55

## 2020-11-28 RX ADMIN — SACUBITRIL AND VALSARTAN 1 TABLET: 49; 51 TABLET, FILM COATED ORAL at 20:56

## 2020-11-28 RX ADMIN — IPRATROPIUM BROMIDE AND ALBUTEROL 1 PUFF: 20; 100 SPRAY, METERED RESPIRATORY (INHALATION) at 12:00

## 2020-11-28 RX ADMIN — LIDOCAINE HYDROCHLORIDE: 20 SOLUTION ORAL; TOPICAL at 10:00

## 2020-11-28 RX ADMIN — LIDOCAINE HYDROCHLORIDE: 20 SOLUTION ORAL; TOPICAL at 20:57

## 2020-11-28 RX ADMIN — ENOXAPARIN SODIUM 40 MG: 40 INJECTION SUBCUTANEOUS at 00:11

## 2020-11-28 RX ADMIN — FINASTERIDE 5 MG: 5 TABLET, FILM COATED ORAL at 09:55

## 2020-11-28 ASSESSMENT — PAIN SCALES - GENERAL: PAINLEVEL_OUTOF10: 0

## 2020-11-28 NOTE — PROGRESS NOTES
Oxygen sat on room air sitting 94%  Pulse ox room air ambulating 88%  Pulse ox on 3L sitting recovery 94%  Pulse ox 3L ambulating recovery 95%

## 2020-11-28 NOTE — CONSULTS
the lungs every 4 hours as needed for Wheezing   Yes Historical Provider, MD   fluticasone-umeclidin-vilant (TRELEGY ELLIPTA) 100-62.5-25 MCG/INH AEPB Inhale 1 puff into the lungs daily   Yes Historical Provider, MD   nitroGLYCERIN (NITROSTAT) 0.4 MG SL tablet Place 0.4 mg under the tongue every 5 minutes as needed for Chest pain up to max of 3 total doses. If no relief after 1 dose, call 911. Yes Historical Provider, MD   nitroglycerin (NITRO-BID) 2 % ointment Place 1 inch onto the skin 3 times daily   Yes Historical Provider, MD   ipratropium-albuterol (DUONEB) 0.5-2.5 (3) MG/3ML SOLN nebulizer solution Take 1 vial by nebulization every 6 hours as needed for Shortness of Breath   Yes Historical Provider, MD   nicotine (NICODERM CQ) 21 MG/24HR Place 1 patch onto the skin every 24 hours   Yes Historical Provider, MD   amLODIPine (NORVASC) 5 MG tablet Take 1 tablet by mouth daily 11/21/20  Yes Chase Gutiérrez DO   ipratropium (ATROVENT) 0.02 % nebulizer solution Take 2.5 mLs by nebulization 4 times daily 11/5/20  Yes Chase Gutiérrez DO   cloNIDine (CATAPRES) 0.1 MG tablet Take 1 tablet by mouth 2 times daily 11/5/20  Yes Chase Gutiérrez DO   sacubitril-valsartan (ENTRESTO) 24-26 MG per tablet Take 1 tablet by mouth 2 times daily 11/5/20  Yes Chase Gutiérrez DO   spironolactone (ALDACTONE) 25 MG tablet Take 25 mg by mouth daily   Yes Historical Provider, MD   albuterol (PROVENTIL) (2.5 MG/3ML) 0.083% nebulizer solution Take 3 mLs by nebulization every 6 hours as needed for Wheezing 10/4/19  Yes Chase Gutiérrez DO   metoprolol succinate (TOPROL XL) 100 MG extended release tablet Take 1 tablet by mouth daily 8/24/19  Yes Chase Gutiérrez DO   finasteride (PROSCAR) 5 MG tablet Take 1 tablet by mouth daily 8/24/19  Yes Chase Gutiérrez DO       Allergies:  Patient has no known allergies. Review of Systems:   · Constitutional: there has been no unanticipated weight loss.  There's been no significant change in energy level, sleep pattern or activity level. No fever chills or rigors. · Eyes: No visual changes or diplopia. No scleral icterus. · ENT: No Headaches, hearing loss or vertigo. No mouth sores or sore throat. No change in taste or smell. · Cardiovascular: No chest discomfort + dyspnea on exertion and now is well at rest., palpitations, loss of consciousness, no phlebitis, no claudication. · Respiratory: No cough or wheezing, no sputum production. No hemoptysis, pleuritic pain. · Gastrointestinal: No abdominal pain, appetite loss, blood in stools. No change in bowel habits. No hematemesis  · Genitourinary: No dysuria, trouble voiding or hematuria. No nocturia or increased frequency. · Musculoskeletal:  No gait disturbance, weakness or joint complaints. · Integumentary: No rash or pruritis. · Neurological: No headache, diplopia, change in muscle strength, numbness or tingling. No change in gait, balance, coordination, mood, affect, memory, mentation, behavior. · Psychiatric: No anxiety or depression. · Endocrine: No temperature intolerance. No excessive thirst, fluid intake, or urination. No tremor. · Hematologic/Lymphatic: No abnormal bruising or bleeding, blood clots or swollen lymph nodes. · Allergic/Immunologic: No nasal congestion or hives. Physical Examination:    Vital Signs: BP (!) 128/91   Pulse 68   Temp 97.6 °F (36.4 °C) (Temporal)   Resp 20   Ht 5' 7\" (1.702 m)   Wt 214 lb (97.1 kg)   SpO2 95%   BMI 33.52 kg/m²   General appearance: Well preserved, mesomorphic body habitus, alert, no distress. Skin: Skin color, texture, turgor normal. No rashes or lesions. No induration or tightening palpated. Head: Normocephalic. No masses, lesions, tenderness or abnormalities  Eyes: Conjunctivae/corneas clear. PERRL, EOMs intact. Sclera non icteric. Ears: External ears normal. Canals clear. TM's clear bilaterally. Hearing normal to finger rub. Nose/Sinuses: Nares normal. Septum midline.  Mucosa normal. No drainage or sinus tenderness. Oropharynx: Lips, mucosa, and tongue normal. Oropharynx clear with no exudate seen. Neck: Neck supple and symmetric. No adenopathy. Thyroid symmetric, normal size, without nodules. Trachea is midline. Carotids brisk in upstroke without bruits, no abnormal JVP noted at 45°. Chest: Even excursion  Lungs: Lungs grossly clear to auscultation bilaterally. No retractions or use of accessory muscles. No tactile vocal fremitus. No rhonchi, crackles or rales. Heart:  S1 > S2. Regular rhythm. S4 gallop but no murmur. No rub, palpable thrill or heave noted. PMI 5th intercostal space midclavicular line. Abdomen: Abdomen soft, moderately protuberant, non-tender. BS normal. No masses, organomegaly. No hernia noted. Extremities: Extremities normal. No deformities, edema, or skin discoloration. No cyanosis or clubbing noted to the nails. Peripheral pulses present 2+ upper extremities and present 1+  lower extremities. Musculoskeletal: Spine ROM normal. Muscular strength intact. Neuro: Cranial nerves intact. Motor: Strength 5/5 in all extremities. Reflexes 2+ in all extremities. No focal weakness. Sensory: grossly normal to touch. Coordination intact. Pertinent Labs:  CBC:   No results for input(s): WBC, HGB, PLT in the last 72 hours. BMP:  Recent Labs     11/26/20  1310      K 4.9      CO2 18*   BUN 29*   CREATININE 1.0   GLUCOSE 190*   LABGLOM >60     ABGs:   Lab Results   Component Value Date    PH 7.475 11/23/2020    PO2 83.0 11/23/2020    PCO2 32.3 11/23/2020     INR:   No results for input(s): INR in the last 72 hours. PRO-BNP:   Lab Results   Component Value Date    PROBNP 1,933 (H) 11/22/2020    PROBNP 660 (H) 11/14/2020      Cardiac Injury Profile:   No results for input(s): CKTOTAL, CKMB, CKMBINDEX, TROPONINI in the last 72 hours.    Lipid Profile:   Lab Results   Component Value Date    TRIG 217 01/02/2018    HDL 58 01/02/2018    LDLCALC 144 01/02/2018    CHOL 245 2018      Hemoglobin A1C: No components found for: HGBA1C   ECG:  See report  ECHO: 2019  Left ventricular EF 57%    Radiology:  Xr Chest Standard (2 Vw)    Result Date: 10/3/2019  Patient MRN: [id number] : 1965 Age:  62 years Gender: Male Order Date: 10/3/2019 11:15 AM Exam: XR CHEST (2 VW) Number of Images: 2 views Indication:   copd copd Comparison: Prior study from 10/03/2019 at 0075 hours available Findings: Examination of the chest in PA and lateral views shows that both lungs are free of active disease. There is hyperaeration of lungs suggesting of chronic obstructive pulmonary disease. The heart is normal in size and configuration. There is uncoiling atherosclerotic change of thoracic aorta. The trachea is in the midline. The mediastinum and both hemidiaphragms are normal. The bony thorax is intact. No evidence of airspace consolidation or pulmonary venous congestion Mild chronic obstructive pulmonary disease     Xr Chest Portable    Result Date: 10/3/2019  Patient MRN:  [id number] : 1965 Age: 62 years Gender: Male Order Date:  10/3/2019 12:00 AM Exam: XR CHEST PORTABLE Number of views: Portable upright AP view of the chest, 1 image(s) Indication: COPD Comparison: Chest radiograph dated 10/1/2019. FINDINGS:  The heart appears prominent. There is diffuse pulmonary interstitial prominence, greatest centrally, and airspace haziness throughout both lungs. No definite pleural effusion is seen. There is no pneumothorax. Findings suggestive of pulmonary edema secondary to venous congestion with overall improvement since the previous study. Xr Chest Portable    Result Date: 10/1/2019  Patient MRN: [id number] : 1965 Age:  62 years Gender: Male Order Date: 10/1/2019 1:45 AM Exam: XR CHEST PORTABLE Number of Images: 1 view Indication:   shortness of breath shortness of breath Comparison: 2019 FINDINGS: Heart is mildly enlarged.  Pulmonary vascularity is congested and there

## 2020-11-29 ENCOUNTER — APPOINTMENT (OUTPATIENT)
Dept: CT IMAGING | Age: 55
DRG: 189 | End: 2020-11-29
Payer: MEDICARE

## 2020-11-29 PROCEDURE — 2140000000 HC CCU INTERMEDIATE R&B

## 2020-11-29 PROCEDURE — 2580000003 HC RX 258: Performed by: INTERNAL MEDICINE

## 2020-11-29 PROCEDURE — 6370000000 HC RX 637 (ALT 250 FOR IP): Performed by: INTERNAL MEDICINE

## 2020-11-29 PROCEDURE — 6360000002 HC RX W HCPCS: Performed by: GENERAL PRACTICE

## 2020-11-29 PROCEDURE — 6370000000 HC RX 637 (ALT 250 FOR IP): Performed by: GENERAL PRACTICE

## 2020-11-29 PROCEDURE — 2580000003 HC RX 258: Performed by: RADIOLOGY

## 2020-11-29 PROCEDURE — 71250 CT THORAX DX C-: CPT

## 2020-11-29 PROCEDURE — 6360000002 HC RX W HCPCS: Performed by: INTERNAL MEDICINE

## 2020-11-29 RX ADMIN — CLONIDINE HYDROCHLORIDE 0.1 MG: 0.1 TABLET ORAL at 08:51

## 2020-11-29 RX ADMIN — ENOXAPARIN SODIUM 40 MG: 40 INJECTION SUBCUTANEOUS at 21:21

## 2020-11-29 RX ADMIN — LIDOCAINE HYDROCHLORIDE: 20 SOLUTION ORAL; TOPICAL at 08:49

## 2020-11-29 RX ADMIN — METOPROLOL SUCCINATE 100 MG: 100 TABLET, EXTENDED RELEASE ORAL at 08:51

## 2020-11-29 RX ADMIN — IPRATROPIUM BROMIDE AND ALBUTEROL 1 PUFF: 20; 100 SPRAY, METERED RESPIRATORY (INHALATION) at 07:00

## 2020-11-29 RX ADMIN — Medication 10 ML: at 04:55

## 2020-11-29 RX ADMIN — Medication 10 ML: at 08:55

## 2020-11-29 RX ADMIN — NITROGLYCERIN 1 INCH: 20 OINTMENT TOPICAL at 21:21

## 2020-11-29 RX ADMIN — IPRATROPIUM BROMIDE AND ALBUTEROL 1 PUFF: 20; 100 SPRAY, METERED RESPIRATORY (INHALATION) at 16:00

## 2020-11-29 RX ADMIN — FINASTERIDE 5 MG: 5 TABLET, FILM COATED ORAL at 08:52

## 2020-11-29 RX ADMIN — BUMETANIDE 1 MG: 1 TABLET ORAL at 08:52

## 2020-11-29 RX ADMIN — IPRATROPIUM BROMIDE AND ALBUTEROL 1 PUFF: 20; 100 SPRAY, METERED RESPIRATORY (INHALATION) at 12:22

## 2020-11-29 RX ADMIN — DEXAMETHASONE 6 MG: 4 TABLET ORAL at 08:52

## 2020-11-29 RX ADMIN — LIDOCAINE HYDROCHLORIDE: 20 SOLUTION ORAL; TOPICAL at 14:22

## 2020-11-29 RX ADMIN — NITROGLYCERIN 1 INCH: 20 OINTMENT TOPICAL at 08:51

## 2020-11-29 RX ADMIN — LIDOCAINE HYDROCHLORIDE: 20 SOLUTION ORAL; TOPICAL at 17:30

## 2020-11-29 RX ADMIN — SACUBITRIL AND VALSARTAN 1 TABLET: 49; 51 TABLET, FILM COATED ORAL at 08:50

## 2020-11-29 RX ADMIN — CEFEPIME 2 G: 2 INJECTION, POWDER, FOR SOLUTION INTRAVENOUS at 18:00

## 2020-11-29 RX ADMIN — CEFEPIME 2 G: 2 INJECTION, POWDER, FOR SOLUTION INTRAVENOUS at 04:55

## 2020-11-29 RX ADMIN — ENOXAPARIN SODIUM 40 MG: 40 INJECTION SUBCUTANEOUS at 08:51

## 2020-11-29 RX ADMIN — SPIRONOLACTONE 25 MG: 25 TABLET ORAL at 08:51

## 2020-11-29 RX ADMIN — NITROGLYCERIN 1 INCH: 20 OINTMENT TOPICAL at 14:22

## 2020-11-29 RX ADMIN — AMLODIPINE BESYLATE 5 MG: 5 TABLET ORAL at 08:51

## 2020-11-29 RX ADMIN — LIDOCAINE HYDROCHLORIDE: 20 SOLUTION ORAL; TOPICAL at 21:21

## 2020-11-29 ASSESSMENT — PAIN SCALES - GENERAL
PAINLEVEL_OUTOF10: 0
PAINLEVEL_OUTOF10: 0

## 2020-11-29 NOTE — PROGRESS NOTES
PROGRESS NOTE       PATIENT PROBLEM LIST:  Active Problems:    Acute respiratory failure with hypoxia (HCC)  Resolved Problems:    * No resolved hospital problems. *      SUBJECTIVE:  Carol Harris states to his caregivers that he generally feels better today. Ambulated in room without significant dyspnea as recorded by others. Patient's case was discussed with nursing and RT staff in an effort to preserve PPE and prevent spread of COVID-19. REVIEW OF SYSTEMS:  General ROS: negative for - fatigue, malaise,  weight gain or weight loss  Psychological ROS: negative for - anxiety , depression  Ophthalmic ROS: negative for - decreased vision or visual distortion. ENT ROS: negative  Allergy and Immunology ROS: negative  Hematological and Lymphatic ROS: negative  Endocrine: no heat or cold intolerance and no polyphagia, polydipsia, or polyuria  Respiratory ROS: positive for - cough and shortness of breath- improving  Cardiovascular ROS: positive for - dyspnea on exertion. Gastrointestinal ROS: no abdominal pain, change in bowel habits, or black or bloody stools  Genito-Urinary ROS: no nocturia, dysuria, trouble voiding, frequency or hematuria  Musculoskeletal ROS: negative for- myalgias, arthralgias, or claudication  Neurological ROS: no TIA or stroke symptoms otherwise no significant change in symptoms or problems since yesterday as documented in previous progress notes.     SCHEDULED MEDICATIONS:   cloNIDine  0.1 mg Oral Daily    sacubitril-valsartan  1 tablet Oral BID    bumetanide  1 mg Oral Daily    dexamethasone  6 mg Oral Daily    albuterol-ipratropium  1 puff Inhalation Q4H WA    cefepime  2 g Intravenous Q12H    enoxaparin  40 mg Subcutaneous BID    spironolactone  25 mg Oral Daily    amLODIPine  5 mg Oral Daily    finasteride  5 mg Oral Daily    metoprolol succinate  100 mg Oral Daily    nicotine  1 patch Transdermal Q24H    nitroglycerin  1 inch Topical TID    GI cocktail   Oral 4x Daily VITAL SIGNS:                                                                                                                          /77   Pulse 69   Temp 98 °F (36.7 °C) (Temporal)   Resp 18   Ht 5' 7\" (1.702 m)   Wt 214 lb (97.1 kg)   SpO2 96%   BMI 33.52 kg/m²   No data found. OBJECTIVE:  OBJECTIVE:  Due to the current efforts to prevent transmission of COVID-19 and also the need to preserve PPE for other caregivers, a face-to-face encounter with the patient was not performed. That being said, all relevant records and diagnostic tests were reviewed, including laboratory results and imaging. Please reference any relevant documentation elsewhere. Care will be coordinated with the primary service. Data:   Scheduled Meds: Reviewed  Continuous Infusions:     Intake/Output Summary (Last 24 hours) at 11/29/2020 1848  Last data filed at 11/29/2020 1415  Gross per 24 hour   Intake 840 ml   Output 600 ml   Net 240 ml     CBC: No results for input(s): WBC, HGB, HCT, PLT in the last 72 hours. BMP:No results for input(s): NA, K, CL, CO2, BUN, CREATININE, LABGLOM in the last 72 hours. Invalid input(s): GLU,  CA  ABGs:   Lab Results   Component Value Date    PH 7.475 11/23/2020    PO2 83.0 11/23/2020    PCO2 32.3 11/23/2020     INR: No results for input(s): INR in the last 72 hours. PRO-BNP:   Lab Results   Component Value Date    PROBNP 321 (H) 11/28/2020    PROBNP 1,933 (H) 11/22/2020      TSH:   Lab Results   Component Value Date    TSH 0.809 04/18/2019      Cardiac Injury Profile: No results for input(s): CKTOTAL, CKMB, TROPONINI in the last 72 hours.    Lipid Profile:   Lab Results   Component Value Date    TRIG 217 01/02/2018    HDL 58 01/02/2018    LDLCALC 144 01/02/2018    CHOL 245 01/02/2018      Hemoglobin A1C: No components found for: HGBA1C     RAD:   Ct Chest Wo Contrast    Result Date: 11/29/2020  EXAMINATION: CT OF THE CHEST WITHOUT CONTRAST 11/29/2020 7:58 pm TECHNIQUE: CT of ml of Normal Saline. Gated post-stress tomographic imaging was performed 20-25 minutes after stress. FINDINGS: The overall quality of the study was good. Left ventricular cavity size was noted to be dilated Rotational analog analysis demonstrated elevated left hemidiaphragm with marked patient motion artifact The gated SPECT stress imaging in the short, vertical long, and horizontal long axes demonstrate decreased inferoapical uptake at both stress and rest images. . However there was normal brightening and thickening noted in this area. There is global hypokinesis of the left ventricle with an estimated LVEF of 33%. Healing perfusion images at both rest and stress suggest possible underlying abdomen are artifactual cause. 1. Decreased myocardial perfusion inferoapical segment both rest and stress with normal brightening and thickening suggests artifact and global decrease in left ventricular contractility and modest dilatation of left ventricular cavity. 2. Global decrease in left ventricular systolic contractility estimated LVEF 33%   Giovany Peterson FACP FACC FSCAI       EKG: See Report  Echo: See Report      IMPRESSIONS:  Active Problems:    Acute respiratory failure with hypoxia ([address]  Resolved Problems:    * No resolved hospital problems. *      RECOMMENDATIONS:  Only mildly elevated ProBnp as compared to previous suggests that his dyspnea is most likely a function of his underlying COVID-19 superimposed on COPD and pulmonary fibrosis than his known LV systolic dysfunction which appears well compensated based upon Pro BNP level. Continue present cardiac medications and will see again as needed.     I have spent more than 26 minutes face to face with Jessy Hoyt and reviewing notes and laboratory data, with greater than 50% of this time instructing and counseling the patient's healthcare team face to face regarding my findings and recommendations and I have answered all questions as posed to me by Mr. Kathryn Payan' healthcare team..    Salvatore Leos, DO FACP,FACC,FSCAI      NOTE:  This report was transcribed using voice recognition software.   Every effort was made to ensure accuracy; however, inadvertent computerized transcription errors may be present

## 2020-11-29 NOTE — PROGRESS NOTES
Department of Internal Medicine  Infectious Diseases   Progress Note      C/C : COVID 19 , respiratory failure     Pt is awake and alert  Denies fever and chills  Feels better today   Afebrile         Current Medications:      Current Facility-Administered Medications   Medication Dose Route Frequency Provider Last Rate Last Dose    cloNIDine (CATAPRES) tablet 0.1 mg  0.1 mg Oral Daily Luzma Bjornstad, DO   0.1 mg at 11/29/20 0851    sacubitril-valsartan (ENTRESTO) 49-51 MG per tablet 1 tablet  1 tablet Oral BID Luzma Bjornstad, DO   1 tablet at 11/29/20 0850    bumetanide (BUMEX) tablet 1 mg  1 mg Oral Daily Luzma Bjornstad, DO   1 mg at 11/29/20 6854    dexamethasone (DECADRON) tablet 6 mg  6 mg Oral Daily Joshua Castaneda MD   6 mg at 11/29/20 0852    albuterol-ipratropium (COMBIVENT RESPIMAT)  MCG/ACT inhaler 1 puff  1 puff Inhalation Q4H WA Minus Moron, DO   1 puff at 11/29/20 1222    cefepime (MAXIPIME) 2 g IVPB extended (mini-bag)  2 g Intravenous Q12H Mario Salas MD   Stopped at 11/29/20 0900    enoxaparin (LOVENOX) injection 40 mg  40 mg Subcutaneous BID Minus Moron, DO   40 mg at 11/29/20 4292    spironolactone (ALDACTONE) tablet 25 mg  25 mg Oral Daily Minus Moron, DO   25 mg at 11/29/20 0851    amLODIPine (NORVASC) tablet 5 mg  5 mg Oral Daily Minus Moron, DO   5 mg at 11/29/20 5072    finasteride (PROSCAR) tablet 5 mg  5 mg Oral Daily Minus Moron, DO   5 mg at 11/29/20 4049    metoprolol succinate (TOPROL XL) extended release tablet 100 mg  100 mg Oral Daily Minus Moron, DO   100 mg at 11/29/20 0851    nicotine (NICODERM CQ) 21 MG/24HR 1 patch  1 patch Transdermal Q24H Minus Moron, DO   1 patch at 11/28/20 2055    nitroglycerin (NITRO-BID) 2 % ointment 1 inch  1 inch Topical TID Minus Moron, DO   1 inch at 11/29/20 1422    acetaminophen (TYLENOL) tablet 650 mg  650 mg Oral Q6H PRN Minus DO Berna   650 mg at 11/26/20 0928    aluminum & magnesium hydroxide-simethicone (MAALOX) 30 mL, lidocaine viscous hcl (XYLOCAINE) 5 mL (GI COCKTAIL)   Oral 4x Daily Courtney Confer, DO        sodium chloride flush 0.9 % injection 10 mL  10 mL Intravenous PRN Feng Burnett, DO   10 mL at 25/88/10 0855         REVIEW OF SYSTEMS:    CONSTITUTIONAL:Fever ,chills   HEENT: denies blurring of vision or double vision, denies hearing problem  RESPIRATORY: Cough, SOB , sputum expectoration   CARDIOVASCULAR:  Denies palpitation  GASTROINTESTINAL:  Denies abdomen pain, diarrhea or constipation. GENITOURINARY:  Denies burning urination or frequency of urination  INTEGUMENT: denies wound , rash  HEMATOLOGIC/LYMPHATIC:  Denies lymph node swelling, gum bleeding or easy bruising. MUSCULOSKELETAL:  Weakness   NEUROLOGICAL:  Denies light headed, dizziness, loss of consciousness    PHYSICAL EXAM:      Vitals:     Vitals:    11/29/20 1415   BP: 139/77   Pulse: 69   Resp: 18   Temp: 98 °F (36.7 °C)   SpO2: 96%       General Appearance:    Awake, alert , no acute distress. Head:    Normocephalic, atraumatic   Eyes:    No pallor, no icterus,   Ears:    No obvious deformity or drainage.    Nose:   No nasal drainage   Throat:   Mucosa moist, no oral thrush   Neck:   Supple, no lymphadenopathy   Back:     no CVA tenderness   Lungs:     Clear bilaterally     Heart:    Regular rate and rhythm, no murmur   Abdomen:     Soft, non-tender, bowel sounds present    Extremities:   No edema, no cyanosis   Pulses:   Dorsalis pedis palpable    Skin:   no rashes or lesions       DATA:      CBC with Differential:      Lab Results   Component Value Date    WBC 11.1 11/22/2020    RBC 4.60 11/22/2020    HGB 14.3 11/22/2020    HCT 40.2 11/22/2020     11/22/2020    MCV 87.4 11/22/2020    MCH 31.1 11/22/2020    MCHC 35.6 11/22/2020    RDW 12.4 11/22/2020    METASPCT 2 02/10/2016    LYMPHOPCT 8.6 11/22/2020    MONOPCT 5.3 11/22/2020    MYELOPCT 1 02/10/2016    BASOPCT 0.2 11/22/2020    MONOSABS 0.59 11/22/2020 LYMPHSABS 0.96 11/22/2020    EOSABS 0.18 11/22/2020    BASOSABS 0.02 11/22/2020       CMP     Lab Results   Component Value Date     11/26/2020    K 4.9 11/26/2020    K 4.4 11/22/2020     11/26/2020    CO2 18 11/26/2020    BUN 29 11/26/2020    CREATININE 1.0 11/26/2020    GFRAA >60 11/26/2020    LABGLOM >60 11/26/2020    GLUCOSE 190 11/26/2020    PROT 6.9 11/22/2020    LABALBU 3.3 11/22/2020    CALCIUM 8.9 11/26/2020    BILITOT 0.3 11/22/2020    ALKPHOS 71 11/22/2020    AST 27 11/22/2020    ALT 37 11/22/2020         Hepatic Function Panel:    Lab Results   Component Value Date    ALKPHOS 71 11/22/2020    ALT 37 11/22/2020    AST 27 11/22/2020    PROT 6.9 11/22/2020    BILITOT 0.3 11/22/2020    BILIDIR <0.2 08/17/2019    IBILI see below 08/17/2019    LABALBU 3.3 11/22/2020       PT/INR:    Lab Results   Component Value Date    PROTIME 13.0 11/22/2020    INR 1.2 11/22/2020       TSH:    Lab Results   Component Value Date    TSH 0.809 04/18/2019       U/A:    Lab Results   Component Value Date    COLORU Yellow 11/22/2020    PHUR 6.0 11/22/2020    WBCUA NONE 11/22/2020    RBCUA 0-1 11/22/2020    BACTERIA NONE SEEN 11/22/2020    CLARITYU Clear 11/22/2020    SPECGRAV 1.025 11/22/2020    LEUKOCYTESUR Negative 11/22/2020    UROBILINOGEN 1.0 11/22/2020    BILIRUBINUR Negative 11/22/2020    BLOODU Negative 11/22/2020    GLUCOSEU Negative 11/22/2020       ABG:  No results found for: XLH3AYX, BEART, S2UODUOT, PHART, THGBART, SHJ9HJR, PO2ART, XVZ3DWJ    MICROBIOLOGY:    Resp panel negative       Radiology :    CTA scan of chest -  Impression:          1.  No pulmonary embolism. 2.  Increasing bilateral pneumonia compared to prior from November 16, 2020.             IMPRESSION:     1. Recently treated COVID 19 - fever and lung infiltrates ? Secondary bacterial      pneumonia                 2. CHF   3. Respiratory failure - improving     RECOMMENDATIONS:      1. Cefepime 2 grams IV q 12 hrs  2. CT chest   3.  Wean off oxygen as tolerated

## 2020-11-30 VITALS
SYSTOLIC BLOOD PRESSURE: 121 MMHG | OXYGEN SATURATION: 92 % | DIASTOLIC BLOOD PRESSURE: 89 MMHG | BODY MASS INDEX: 33.59 KG/M2 | WEIGHT: 214 LBS | RESPIRATION RATE: 16 BRPM | TEMPERATURE: 97.9 F | HEIGHT: 67 IN | HEART RATE: 97 BPM

## 2020-11-30 PROCEDURE — 6370000000 HC RX 637 (ALT 250 FOR IP): Performed by: INTERNAL MEDICINE

## 2020-11-30 PROCEDURE — 6360000002 HC RX W HCPCS: Performed by: INTERNAL MEDICINE

## 2020-11-30 PROCEDURE — 6360000002 HC RX W HCPCS: Performed by: GENERAL PRACTICE

## 2020-11-30 PROCEDURE — 6370000000 HC RX 637 (ALT 250 FOR IP): Performed by: GENERAL PRACTICE

## 2020-11-30 PROCEDURE — 2580000003 HC RX 258: Performed by: INTERNAL MEDICINE

## 2020-11-30 RX ORDER — DEXAMETHASONE 4 MG/1
4 TABLET ORAL DAILY
Qty: 3 TABLET | Refills: 0 | Status: SHIPPED | OUTPATIENT
Start: 2020-12-01 | End: 2020-12-04

## 2020-11-30 RX ADMIN — CLONIDINE HYDROCHLORIDE 0.1 MG: 0.1 TABLET ORAL at 10:17

## 2020-11-30 RX ADMIN — BUMETANIDE 1 MG: 1 TABLET ORAL at 09:30

## 2020-11-30 RX ADMIN — LIDOCAINE HYDROCHLORIDE: 20 SOLUTION ORAL; TOPICAL at 13:09

## 2020-11-30 RX ADMIN — NITROGLYCERIN 1 INCH: 20 OINTMENT TOPICAL at 09:00

## 2020-11-30 RX ADMIN — DEXAMETHASONE 6 MG: 4 TABLET ORAL at 09:30

## 2020-11-30 RX ADMIN — IPRATROPIUM BROMIDE AND ALBUTEROL 1 PUFF: 20; 100 SPRAY, METERED RESPIRATORY (INHALATION) at 13:10

## 2020-11-30 RX ADMIN — IPRATROPIUM BROMIDE AND ALBUTEROL 1 PUFF: 20; 100 SPRAY, METERED RESPIRATORY (INHALATION) at 17:55

## 2020-11-30 RX ADMIN — FINASTERIDE 5 MG: 5 TABLET, FILM COATED ORAL at 09:30

## 2020-11-30 RX ADMIN — AMLODIPINE BESYLATE 5 MG: 5 TABLET ORAL at 09:30

## 2020-11-30 RX ADMIN — SACUBITRIL AND VALSARTAN 1 TABLET: 49; 51 TABLET, FILM COATED ORAL at 09:30

## 2020-11-30 RX ADMIN — METOPROLOL SUCCINATE 100 MG: 100 TABLET, EXTENDED RELEASE ORAL at 10:18

## 2020-11-30 RX ADMIN — LIDOCAINE HYDROCHLORIDE: 20 SOLUTION ORAL; TOPICAL at 17:49

## 2020-11-30 RX ADMIN — NITROGLYCERIN 1 INCH: 20 OINTMENT TOPICAL at 13:09

## 2020-11-30 RX ADMIN — LIDOCAINE HYDROCHLORIDE: 20 SOLUTION ORAL; TOPICAL at 08:00

## 2020-11-30 RX ADMIN — ENOXAPARIN SODIUM 40 MG: 40 INJECTION SUBCUTANEOUS at 09:30

## 2020-11-30 RX ADMIN — SPIRONOLACTONE 25 MG: 25 TABLET ORAL at 09:30

## 2020-11-30 RX ADMIN — IPRATROPIUM BROMIDE AND ALBUTEROL 1 PUFF: 20; 100 SPRAY, METERED RESPIRATORY (INHALATION) at 08:00

## 2020-11-30 RX ADMIN — CEFEPIME 2 G: 2 INJECTION, POWDER, FOR SOLUTION INTRAVENOUS at 04:49

## 2020-11-30 ASSESSMENT — PAIN SCALES - GENERAL: PAINLEVEL_OUTOF10: 0

## 2020-11-30 ASSESSMENT — PAIN DESCRIPTION - PROGRESSION
CLINICAL_PROGRESSION: NOT CHANGED

## 2020-11-30 NOTE — PROGRESS NOTES
Department of Internal Medicine  Infectious Diseases   Progress Note      C/C : COVID 19 , respiratory failure     Pt is awake and alert  Denies fever and chills  Feels better today   Afebrile         Current Medications:      Current Facility-Administered Medications   Medication Dose Route Frequency Provider Last Rate Last Dose    cloNIDine (CATAPRES) tablet 0.1 mg  0.1 mg Oral Daily Bethanne Massing, DO   0.1 mg at 11/30/20 1017    sacubitril-valsartan (ENTRESTO) 49-51 MG per tablet 1 tablet  1 tablet Oral BID Bethanne Massing, DO   1 tablet at 11/30/20 0930    bumetanide (BUMEX) tablet 1 mg  1 mg Oral Daily Bethanne Massing, DO   1 mg at 11/30/20 0930    dexamethasone (DECADRON) tablet 6 mg  6 mg Oral Daily Gerald Delacruz MD   6 mg at 11/30/20 0930    albuterol-ipratropium (COMBIVENT RESPIMAT)  MCG/ACT inhaler 1 puff  1 puff Inhalation Q4H WA Nolan Moulder, DO   1 puff at 11/30/20 1310    cefepime (MAXIPIME) 2 g IVPB extended (mini-bag)  2 g Intravenous Q12H Tara Guido MD   Stopped at 11/30/20 0850    enoxaparin (LOVENOX) injection 40 mg  40 mg Subcutaneous BID Nolan Moulder, DO   40 mg at 11/30/20 0930    spironolactone (ALDACTONE) tablet 25 mg  25 mg Oral Daily Nolan Moulder, DO   25 mg at 11/30/20 0930    amLODIPine (NORVASC) tablet 5 mg  5 mg Oral Daily Nolan Moulder, DO   5 mg at 11/30/20 0930    finasteride (PROSCAR) tablet 5 mg  5 mg Oral Daily Nolan Moulder, DO   5 mg at 11/30/20 0930    metoprolol succinate (TOPROL XL) extended release tablet 100 mg  100 mg Oral Daily Nolan Moulder, DO   100 mg at 11/30/20 1018    nicotine (NICODERM CQ) 21 MG/24HR 1 patch  1 patch Transdermal Q24H Nolan Moulder, DO   1 patch at 11/29/20 1912    nitroglycerin (NITRO-BID) 2 % ointment 1 inch  1 inch Topical TID Daniel Grande DO   1 inch at 11/30/20 1309    acetaminophen (TYLENOL) tablet 650 mg  650 mg Oral Q6H PRN Daniel Grande DO   650 mg at 11/26/20 0928    aluminum & magnesium hydroxide-simethicone (MAALOX) 30 mL, lidocaine viscous hcl (XYLOCAINE) 5 mL (GI COCKTAIL)   Oral 4x Daily Elizabeth Nay,         sodium chloride flush 0.9 % injection 10 mL  10 mL Intravenous PRN Feng Burnett, DO   10 mL at 10/35/54 0855         REVIEW OF SYSTEMS:    CONSTITUTIONAL:Fever ,chills   HEENT: denies blurring of vision or double vision, denies hearing problem  RESPIRATORY: Cough, SOB , sputum expectoration   CARDIOVASCULAR:  Denies palpitation  GASTROINTESTINAL:  Denies abdomen pain, diarrhea or constipation. GENITOURINARY:  Denies burning urination or frequency of urination  INTEGUMENT: denies wound , rash  HEMATOLOGIC/LYMPHATIC:  Denies lymph node swelling, gum bleeding or easy bruising. MUSCULOSKELETAL:  Weakness   NEUROLOGICAL:  Denies light headed, dizziness, loss of consciousness    PHYSICAL EXAM:      Vitals:     Vitals:    11/30/20 1645   BP: 121/89   Pulse: 97   Resp: 16   Temp: 97.9 °F (36.6 °C)   SpO2:        General Appearance:    Awake, alert , no acute distress. Head:    Normocephalic, atraumatic   Eyes:    No pallor, no icterus,   Ears:    No obvious deformity or drainage.    Nose:   No nasal drainage   Throat:   Mucosa moist, no oral thrush   Neck:   Supple, no lymphadenopathy   Back:     no CVA tenderness   Lungs:     Clear bilaterally     Heart:    Regular rate and rhythm, no murmur   Abdomen:     Soft, non-tender, bowel sounds present    Extremities:   No edema, no cyanosis   Pulses:   Dorsalis pedis palpable    Skin:   no rashes or lesions       DATA:      CBC with Differential:      Lab Results   Component Value Date    WBC 11.1 11/22/2020    RBC 4.60 11/22/2020    HGB 14.3 11/22/2020    HCT 40.2 11/22/2020     11/22/2020    MCV 87.4 11/22/2020    MCH 31.1 11/22/2020    MCHC 35.6 11/22/2020    RDW 12.4 11/22/2020    METASPCT 2 02/10/2016    LYMPHOPCT 8.6 11/22/2020    MONOPCT 5.3 11/22/2020    MYELOPCT 1 02/10/2016    BASOPCT 0.2 11/22/2020    MONOSABS 0.59 11/22/2020 LYMPHSABS 0.96 11/22/2020    EOSABS 0.18 11/22/2020    BASOSABS 0.02 11/22/2020       CMP     Lab Results   Component Value Date     11/26/2020    K 4.9 11/26/2020    K 4.4 11/22/2020     11/26/2020    CO2 18 11/26/2020    BUN 29 11/26/2020    CREATININE 1.0 11/26/2020    GFRAA >60 11/26/2020    LABGLOM >60 11/26/2020    GLUCOSE 190 11/26/2020    PROT 6.9 11/22/2020    LABALBU 3.3 11/22/2020    CALCIUM 8.9 11/26/2020    BILITOT 0.3 11/22/2020    ALKPHOS 71 11/22/2020    AST 27 11/22/2020    ALT 37 11/22/2020         Hepatic Function Panel:    Lab Results   Component Value Date    ALKPHOS 71 11/22/2020    ALT 37 11/22/2020    AST 27 11/22/2020    PROT 6.9 11/22/2020    BILITOT 0.3 11/22/2020    BILIDIR <0.2 08/17/2019    IBILI see below 08/17/2019    LABALBU 3.3 11/22/2020       PT/INR:    Lab Results   Component Value Date    PROTIME 13.0 11/22/2020    INR 1.2 11/22/2020       TSH:    Lab Results   Component Value Date    TSH 0.809 04/18/2019       U/A:    Lab Results   Component Value Date    COLORU Yellow 11/22/2020    PHUR 6.0 11/22/2020    WBCUA NONE 11/22/2020    RBCUA 0-1 11/22/2020    BACTERIA NONE SEEN 11/22/2020    CLARITYU Clear 11/22/2020    SPECGRAV 1.025 11/22/2020    LEUKOCYTESUR Negative 11/22/2020    UROBILINOGEN 1.0 11/22/2020    BILIRUBINUR Negative 11/22/2020    BLOODU Negative 11/22/2020    GLUCOSEU Negative 11/22/2020       ABG:  No results found for: TIQ1IUT, BEART, Y0MZJKKL, PHART, THGBART, VYM2NTS, PO2ART, SHF3TAH    MICROBIOLOGY:    Resp panel negative       Radiology :    Diffuse patchy and multifocal ground-glass opacities with significant   improvement consistent with the improving multifocal pneumonia/viral   infection. Underlying COPD with pulmonary fibrosis and bronchiectasis     IMPRESSION:     1. Recently treated COVID 19  ? Secondary bacterial  pneumonia                 2. CHF   3.  Respiratory failure - oxygen saturation 92 % on room air at rest RECOMMENDATIONS:      1.  Stop cefepime   OK to discharge from ID POV

## 2020-11-30 NOTE — PROGRESS NOTES
Comprehensive Nutrition Assessment    Type and Reason for Visit:  Initial, RD Nutrition Re-Screen/LOS    Nutrition Recommendations/Plan: Recommend pt continue current diet as tolerated. Nutrition Assessment:  Pt w/ pmh EtOH, HF, COPD, substance abuse admin for PNA 2/2 COVID 19. Malnutrition Assessment:  Malnutrition Status:  No malnutrition    Context:  Acute Illness     Findings of the 6 clinical characteristics of malnutrition:  Energy Intake:  No significant decrease in energy intake  Weight Loss:  No significant weight loss     Body Fat Loss:  Unable to assess     Muscle Mass Loss:  Unable to assess    Fluid Accumulation:  No significant fluid accumulation     Strength:  Not Performed    Nutrition Related Findings:  A/Ox4, missing teeth, abd wdl, no noted edema, -I/O's      Wounds:  None       Current Nutrition Therapies:    DIET LOW SODIUM 2 GM; Anthropometric Measures:  · Height: 5' 7\" (170.2 cm)  · Current Body Weight: 214 lb (97.1 kg)(11/23)   · Admission Body Weight: (Same as CBW)    · Usual Body Weight: (Poor wt hx per emr)     · Ideal Body Weight: 148 lbs; % Ideal Body Weight 144.6 %   · BMI: 33.5  · BMI Categories: Obese Class 1 (BMI 30.0-34. 9)       Nutrition Diagnosis:   No nutrition diagnosis at this time     Nutrition Interventions:   Food and/or Nutrient Delivery:  Continue Current Diet  Nutrition Education/Counseling:  Education not indicated   Coordination of Nutrition Care:  Continue to monitor while inpatient    Goals:  Pt to consume >75% of all meals.        Nutrition Monitoring and Evaluation:   Food/Nutrient Intake Outcomes:  Food and Nutrient Intake, Supplement Intake  Physical Signs/Symptoms Outcomes:  Biochemical Data, Nutrition Focused Physical Findings, Skin, Weight, GI Status, Fluid Status or Edema, Hemodynamic Status     Discharge Planning:    No discharge needs at this time     Electronically signed by Guanaco Gomez RD, TEE on 11/30/20 at 3:07 PM EST    Contact: [phone]

## 2020-11-30 NOTE — PROGRESS NOTES
Patient seen , ct improved . bp better. Still on iv antibiotics .  Once he is switched to pp can likely be discharged

## 2020-11-30 NOTE — CARE COORDINATION
11/30 Update CM Note: patient remained in isolation over the weekend. He is continued on iv cefepime q12. Ct chest was done last pm showing ground glass opacities with improvement. He remains on 3lnc sat 97%. His plan is to return home. Still awaiting pulse oximeter readings for possible home oxygen need. Bedside nurse notified.  Jesica Brunson RN CM

## 2020-11-30 NOTE — PROGRESS NOTES
[facility]  [address], [address]  [facility], [facility]      Pulmonary Progress Note      SUBJECTIVE:  Claims stable with respiratory function, still with dyspnea with mod exertion and minimal cough/sputum production but appears close to baseline, on 4 li NC, sitting up in bed when seen. OBJECTIVE    Medications    Continuous Infusions:    Scheduled Meds:   cloNIDine  0.1 mg Oral Daily    sacubitril-valsartan  1 tablet Oral BID    bumetanide  1 mg Oral Daily    dexamethasone  6 mg Oral Daily    albuterol-ipratropium  1 puff Inhalation Q4H WA    cefepime  2 g Intravenous Q12H    enoxaparin  40 mg Subcutaneous BID    spironolactone  25 mg Oral Daily    amLODIPine  5 mg Oral Daily    finasteride  5 mg Oral Daily    metoprolol succinate  100 mg Oral Daily    nicotine  1 patch Transdermal Q24H    nitroglycerin  1 inch Topical TID    GI cocktail   Oral 4x Daily       PRN Meds:acetaminophen, sodium chloride flush    Physical    VITALS:  BP (!) 156/82   Pulse 74   Temp 98.6 °F (37 °C) (Temporal)   Resp 18   Ht 5' 7\" (1.702 m)   Wt 214 lb (97.1 kg)   SpO2 97%   BMI 33.52 kg/m²     24HR INTAKE/OUTPUT:      Intake/Output Summary (Last 24 hours) at 2020 1615  Last data filed at 2020 0714  Gross per 24 hour   Intake --   Output 400 ml   Net -400 ml       24HR PULSE OXIMETRY RANGE:    SpO2  Av.7 %  Min: 96 %  Max: 97 %    General appearance: alert, appears stated age and cooperative  Lungs: rhonchi bibasilar  Heart: regular rate and rhythm, S1, S2 normal, no murmur, click, rub or gallop  Abdomen: soft, non-tender; bowel sounds normal; no masses,  no organomegaly  Extremities: extremities normal, atraumatic, no cyanosis or edema  Neurologic: Mental status: Alert, oriented, thought content appropriate    Data    CBC:   No results for input(s): WBC, HGB, HCT, MCV, PLT in the last 72 hours.     BMP:  No results for

## 2020-11-30 NOTE — PROGRESS NOTES
CLINICAL PHARMACY NOTE: MEDS TO [address] Select Patient?: No  Total # of Prescriptions Filled: 1   The following medications were delivered to the patient:  · Dexamethasone 4 mg  Total # of Interventions Completed: 3  Time Spent (min): 30    Additional Documentation:

## 2020-11-30 NOTE — PLAN OF CARE
Problem: Airway Clearance - Ineffective  Goal: Achieve or maintain patent airway  11/30/2020 1839 by Diane Branch RN  Outcome: Completed  11/30/2020 1743 by Diane Branch RN  Outcome: Met This Shift     Problem: Gas Exchange - Impaired  Goal: Absence of hypoxia  Outcome: Completed  Goal: Promote optimal lung function  Outcome: Completed     Problem: Breathing Pattern - Ineffective  Goal: Ability to achieve and maintain a regular respiratory rate  Outcome: Completed     Problem:  Body Temperature -  Risk of, Imbalanced  Goal: Ability to maintain a body temperature within defined limits  Outcome: Completed  Goal: Will regain or maintain usual level of consciousness  Outcome: Completed  Goal: Complications related to the disease process, condition or treatment will be avoided or minimized  Outcome: Completed     Problem: Isolation Precautions - Risk of Spread of Infection  Goal: Prevent transmission of infection  Outcome: Completed     Problem: Nutrition Deficits  Goal: Optimize nutrtional status  Outcome: Completed     Problem: Risk for Fluid Volume Deficit  Goal: Maintain normal heart rhythm  Outcome: Completed  Goal: Maintain absence of muscle cramping  Outcome: Completed  Goal: Maintain normal serum potassium, sodium, calcium, phosphorus, and pH  Outcome: Completed     Problem: Loneliness or Risk for Loneliness  Goal: Demonstrate positive use of time alone when socialization is not possible  Outcome: Completed     Problem: Fatigue  Goal: Verbalize increase energy and improved vitality  Outcome: Completed     Problem: Patient Education: Go to Patient Education Activity  Goal: Patient/Family Education  Outcome: Completed     Problem: Pain:  Goal: Pain level will decrease  Description: Pain level will decrease  11/30/2020 1839 by Diane Branch RN  Outcome: Completed  11/30/2020 1743 by Diane Branch RN  Outcome: Met This Shift  Goal: Control of acute pain  Description: Control of acute
Problem: Airway Clearance - Ineffective  Goal: Achieve or maintain patent airway  Outcome: Met This Shift
Problem: Airway Clearance - Ineffective  Goal: Achieve or maintain patent airway  Outcome: Met This Shift     Problem: Gas Exchange - Impaired  Goal: Absence of hypoxia  Outcome: Met This Shift  Goal: Promote optimal lung function  Outcome: Met This Shift     Problem: Breathing Pattern - Ineffective  Goal: Ability to achieve and maintain a regular respiratory rate  Outcome: Met This Shift     Problem:  Body Temperature -  Risk of, Imbalanced  Goal: Ability to maintain a body temperature within defined limits  Outcome: Met This Shift  Goal: Will regain or maintain usual level of consciousness  Outcome: Met This Shift  Goal: Complications related to the disease process, condition or treatment will be avoided or minimized  Outcome: Met This Shift
Problem: Airway Clearance - Ineffective  Goal: Achieve or maintain patent airway  Outcome: Met This Shift     Problem: Pain:  Goal: Pain level will decrease  Description: Pain level will decrease  Outcome: Met This Shift  Goal: Control of acute pain  Description: Control of acute pain  Outcome: Met This Shift  Goal: Control of chronic pain  Description: Control of chronic pain  Outcome: Met This Shift     Problem: Falls - Risk of:  Goal: Will remain free from falls  Description: Will remain free from falls  Outcome: Met This Shift  Goal: Absence of physical injury  Description: Absence of physical injury  Outcome: Met This Shift
Problem: Pain:  Goal: Control of acute pain  Description: Control of acute pain  11/26/2020 0105 by Iva Wade RN  Outcome: Met This Shift  11/25/2020 2159 by Iva Wade RN  Outcome: Met This Shift
Problem: Pain:  Goal: Control of acute pain  Description: Control of acute pain  Outcome: Met This Shift
Risk of:  Goal: Will remain free from falls  Description: Will remain free from falls  Outcome: Met This Shift  Goal: Absence of physical injury  Description: Absence of physical injury  Outcome: Met This Shift

## 2020-11-30 NOTE — PROGRESS NOTES
[facility]  [address], [address], [address]      Pulmonary Progress Note      SUBJECTIVE:  Claims stable with respiratory function, still with dyspnea with mod exertion and minimal cough/sputum production but appears close to baseline, on 4 li NC, sitting up in bed when seen, for CT chest to reassess lung parenchyma.     OBJECTIVE    Medications    Continuous Infusions:    Scheduled Meds:   cloNIDine  0.1 mg Oral Daily    sacubitril-valsartan  1 tablet Oral BID    bumetanide  1 mg Oral Daily    dexamethasone  6 mg Oral Daily    albuterol-ipratropium  1 puff Inhalation Q4H WA    cefepime  2 g Intravenous Q12H    enoxaparin  40 mg Subcutaneous BID    spironolactone  25 mg Oral Daily    amLODIPine  5 mg Oral Daily    finasteride  5 mg Oral Daily    metoprolol succinate  100 mg Oral Daily    nicotine  1 patch Transdermal Q24H    nitroglycerin  1 inch Topical TID    GI cocktail   Oral 4x Daily       PRN Meds:acetaminophen, sodium chloride flush    Physical    VITALS:  /77   Pulse 69   Temp 98 °F (36.7 °C) (Temporal)   Resp 18   Ht 5' 7\" (1.702 m)   Wt 214 lb (97.1 kg)   SpO2 96%   BMI 33.52 kg/m²     24HR INTAKE/OUTPUT:      Intake/Output Summary (Last 24 hours) at 2020 1903  Last data filed at 2020 1415  Gross per 24 hour   Intake 840 ml   Output 600 ml   Net 240 ml       24HR PULSE OXIMETRY RANGE:    SpO2  Av.5 %  Min: 96 %  Max: 97 %    General appearance: alert, appears stated age and cooperative  Lungs: rhonchi bibasilar  Heart: regular rate and rhythm, S1, S2 normal, no murmur, click, rub or gallop  Abdomen: soft, non-tender; bowel sounds normal; no masses,  no organomegaly  Extremities: extremities normal, atraumatic, no cyanosis or edema  Neurologic: Mental status: Alert, oriented, thought content appropriate    Data    CBC:   No results for input(s): WBC, HGB, HCT, MCV, PLT in the last 72 hours. BMP:  No results for input(s): NA, K, CL, CO2, PHOS, BUN, CREATININE in the last 72 hours. Invalid input(s): CA ALB:3,BILIDIR:3,BILITOT:3,ALKPHOS:3)@    PT/INR:   No results for input(s): PROTIME, INR in the last 72 hours. ABG:   No results for input(s): PH, PO2, PCO2, HCO3, BE, O2SAT, METHB, O2HB, COHB, O2CON, HHB, THB in the last 72 hours. Radiology/Other tests reviewed: none    Assessment:     Active Problems:    Acute respiratory failure with hypoxia (HCC)  Resolved Problems:    * No resolved hospital problems. *  Hx of COVID pneumonia  COPD  CHF  hypoxia    Plan:       1. Cont with decadron, will taper down upon discharge  2. Antibiotics as per ID  3. Cont with MDI, should be able to resume usual medications as out-pt and make sure has available depending on where he stays  4. Cont with oxygen, taper as tolerated, will need to qualify as out-pt, discussed with nurse to check saturation on RA at rest and ambulation to fulfill DME requirements  5. OOB to chair, ambulate as tolerated      Time at the bedside, reviewing labs and radiographs, reviewing notes and consultations, discussing with staff and family was more than 35 minutes. Thanks for letting us see this patient in consultation. Please contact us with any questions. Office [phone] or after hours through [employer], x [phone].

## 2020-12-01 ENCOUNTER — CARE COORDINATION (OUTPATIENT)
Dept: CARE COORDINATION | Age: 55
End: 2020-12-01

## 2020-12-01 NOTE — CARE COORDINATION
12/01 Late Entry: Oxygen order obtained and set up with [facility] after speaking with patient. All information faxed to [facility] and Claribel(liaison) notified via phone conversation.  Carolina Quintana Rn CM

## 2020-12-01 NOTE — CARE COORDINATION
Patient contacted regarding Aiden Harrell. Discussed COVID-19 related testing which was available at this time. Test results were negative. Patient informed of results, if available? Yes    Care Transition Nurse/ Ambulatory Care Manager contacted the patient by telephone to perform post discharge assessment. Call within 2 business days of discharge: Yes. Verified name and  with patient as identifiers. Provided introduction to self, and explanation of the CTN/ACM role, and reason for call due to risk factors for infection and/or exposure to COVID-19. Symptoms reviewed with patient who verbalized the following symptoms: shortness of breath, loss of taste or smell and Sore throat. Due to no new or worsening symptoms encounter was not routed to provider for escalation. Discussed follow-up appointments. If no appointment was previously scheduled, appointment scheduling offered: Yes  [address] follow up appointment(s): No future appointments. Non-[facility] follow up appointment(s): Patient states he will call. Non-face-to-face services provided:  Obtained and reviewed discharge summary and/or continuity of care documents     Advance Care Planning:   Does patient have an Advance Directive: On File. Patient has following risk factors of: COPD, pneumonia and acute respiratory failure. CTN/ACM reviewed discharge instructions, medical action plan and red flags such as increased shortness of breath, increasing fever and signs of decompensation with patient who verbalized understanding. Discussed exposure protocols and quarantine with CDC Guidelines What to do if you are sick with coronavirus disease .  Patient was given an opportunity for questions and concerns. The patient agrees to contact the [facility] exposure line [phone], local health department  and PCP office for questions related to their healthcare. CTN/ACM provided contact information for future needs.     Reviewed and educated patient on any new and changed medications related to discharge diagnosis     Patient/family/caregiver given information for [facility] Loop and agrees to enroll no  Patient's preferred e-mail:    Patient's preferred phone number:   Based on Loop alert triggers, patient will be contacted by nurse care manager for worsening symptoms. Plan for follow-up call in 5-7 days based on severity of symptoms and risk factors. Patient states he is now staying with a friend. Still has no taste or smell. Denies fever and cough. Gets SOB with exertion and he is getting Oxygen 3/L later today. Oxygen levels are between 86% to 92% and them with Oxygen it is 100%. Patient has all medications is taking medications as directed and has no questions concerning medications at this time. Has sore throat. Patient was instructed to rest, drink plenty of liquids, eat healthy diet, Tylenol as needed for body aches, fever, and headache. If you have a sore throat then gargle with warm salt water. Report any worsening symptoms to PCP and  Go to ER or call 911 if symptoms severe. Patient expresses understanding. Will continue to follow.     Nehal Vernon LPN    [phone]  [facility] 45 Coordinator

## 2020-12-02 NOTE — DISCHARGE SUMMARY
[address]                  [address] [facility]  [place]                               DISCHARGE SUMMARY    PATIENT NAME: Franco Judd                  :        1965  MED REC NO:   [id number]                            ROOM:       1539  ACCOUNT NO:   [de-identified]                           ADMIT DATE: 10/30/2020  PROVIDER:     Reba Franks DO                  DISCHARGE DATE:  2020    FINAL DIAGNOSES:  1. Chest pain, rule out myocardial infarction. 2.  Underlying coronary artery disease. 3.  Hypertension. 4.  Chronic kidney disease. 5.  COPD. 6.  Dyspnea. 7.  Polysubstance abuse. 8.  History of hepatitis C.    HOSPITAL COURSE:  The patient is a 39-year-old white male who came into  the emergency department with history and chief complaint of increasing  shortness of breath and chest pressure. He also described some  epigastric discomfort, relieved by Tums. He does have longstanding  history of hypertension, multiple medications and noncompliance for the  same. Blood pressure was quite elevated at the time of admission. He  was very short of breath, heaviness in his chest.  EKG suggestive of  ischemic changes with inverted T-waves. Subsequently admitted into the  hospital for cardiology consultation. He had a stress test which showed  a fixed rest and stressed area on nuclear imaging suggestive of scar. Symptoms continued and he was ultimately taken to the cath lab and he  certainly had underlying coronary artery disease, but it was recommended  to treat medically, nothing of any surgical intervention. He also has  significantly reduced ejection fraction consistent with acute-on-chronic  systolic heart failure of 35%. He was placed on Entresto. We had Dr. Mindy Hahn see him. We watched his fluids and _____ into the heart  catheterization. We also had Dr. Lamar Aparicio from Pulmonology seen him, he  contributed with his COPD.   Like I said, he had a mild elevation of his  BUN and creatinine, initially at 23 and 1.9 and all the way up to 29 and  2.1, certainly trended downwards after fluid resuscitation, began to  remain stable even after his heart catheterization with creatinine also  only at 1.5. Blood count remained pretty stable. COVID-19 test was  negative on this admission. CTA of the chest showed a nodular density  in the posterior left lung base and this will have to be followed up in  approximately three months, but no evidence of acute cardiopulmonary  pathology or pulmonary embolism. Chest x-ray was obtained and showed  mild peripheral patchy ground-glass opacifications of the left lung. He  was advised to absolutely quit smoking before compliant with all his  medications. DISCHARGE MEDICATIONS:  He was discharged on:  1. Proscar 5 mg daily. 2.  Metoprolol 100 mg XL daily. 3.  Amlodipine 5 daily. 4.  Nicoderm patch. 5.  DuoNeb as needed. 6.  Nitroglycerin ointment three times a day. 7.  Nitrostat 0.4 as needed for angina. 8.  He was given Trelegy inhaler. 9.  Albuterol inhaler. 10.  He was on some Decadron. 11.  Spironolactone. 12.  Entresto 24/26 b.i.d.  13.  Clonidine 0.1 b.i.d.    DISCHARGE INSTRUCTIONS:  He was told to return if he had any further  chest pain. He will be followed up in the office in approximately a  week. He will be sent home on discharge and he will return if there are  any further problems. At the time of discharge, his condition was fair,  his prognosis was guarded.         Sybil Garcia DO    D: 12/01/2020 16:35:40       T: 12/01/2020 16:41:03     [id number]  Job#: [id number]     Doc#: [id number]    CC:

## 2020-12-08 ENCOUNTER — CARE COORDINATION (OUTPATIENT)
Dept: CASE MANAGEMENT | Age: 55
End: 2020-12-08

## 2020-12-08 NOTE — CARE COORDINATION
CTN final outreach call for [address] Transitions episode on 12/8/2020  [place] has been provided the following resources and education related to COVID-19:                         Signs, symptoms and red flags related to COVID-19            CDC exposure and quarantine guidelines            [facility] exposure contact - [phone]            Contact for their local Department of Health     Spoke with Abebe See for final outreach call for [facility], [address], he reports that he is continuing to wear 3L O2 and his oxygen levels have been in the upper 80's-low 90's today. He denies any SOB or cough today. CTN encouraged deep breathing exercises and using the Albuterol inhaler. After using the albuterol during this call his oxygen level was 94%. The highest his fever has been since discharge was 100.1 and during this call it was 97. 3. He is scheduled with his PCP in 2 weeks, he cannot recall the exact date. Abebe See denies any needs, questions, or concerns at this time. No further outreach scheduled with this CTN. Patient has this CTN contact information if future needs arise.

## 2020-12-10 NOTE — DISCHARGE SUMMARY
[address] [address]                               DISCHARGE SUMMARY    PATIENT NAME: Cora Marti                  :        1965  MED REC NO:   [id number]                            ROOM:       7571  ACCOUNT NO:   [de-identified]                           ADMIT DATE: 2020  PROVIDER:     Isaiah Baires DO                  [facility] DATE: 2020    FINAL DIAGNOSES:  Mild to moderate COVID-19 pneumonia, COPD, hypoxia,  coronary artery disease, hypertension. HOSPITAL COURSE:  The patient is a 78-year-old male seen in the office,  history of symptoms of cough, chest discomfort, shortness of breath, had  a COVID test, was not initially read and he was sent home on some  antibiotics and actually got a little worse and came into the emergency  department, where repeat COVID was checked and he was found to be  positive. He had a CT of his head, which was negative for acute change,  and troponin was negative, sodium a bit low at 31, as well as chloride  at 95, ProBNP slightly elevated at 660, white count normal.  Chest x-ray  with no significant acute cardiopulmonary pathology. We had Pulmonary  see him and he was placed on Decadron and then we had ID see him, who  eventually placed him on remdesivir. He was given some breathing  treatments and O2 therapy and seemed to do well with this. C-reactive  protein was only 2.4. Troponin again like I previously mentioned was  negative. White count was normal.  Hemoglobin and hematocrit were  normal.  Sed rate was 54. D-dimer was less than 200. Blood cultures  were obtained, failed to demonstrate any growth. COVID was detected on  respiratory panel. Urinalysis was pretty much noncontributory. He was  continued on with the Decadron and remdesivir and seemed to do well and  repeat CMP was pretty much okay without significant change.   He was able  to be discharged

## 2021-01-07 NOTE — DISCHARGE SUMMARY
[address]                  [address] [facility],  [address]                               DISCHARGE SUMMARY    PATIENT NAME: Jorie Saint                  :        1965  MED REC NO:   [id number]                            ROOM:       8210  ACCOUNT NO:   [de-identified]                           ADMIT DATE: 2020  PROVIDER:     Marisel Hess DO                  DISCHARGE DATE:  2020    FINAL DIAGNOSES:  1. Acute hypoxic respiratory failure. 2.  Recent COVID-19 pneumonia secondary to bacterial pneumonia. 3.  Hypertension. 4.  COPD. 5.  Congestive heart failure with preserved ejection fraction. HOSPITAL COURSE:  The patient is a 47-year-old white male hospitalized  for shortness of breath and positive COVID-19 pneumonia, was treated  with Decadron and remdesivir, seemed to get better quickly, not  requiring very much oxygen, stable enough to be discharged home and he  was released. He came back to the emergency department, continuing to  experience worsening of shortness of breath and cough. He was  tachycardic and tachypneic. He had some x-rays and a repeat CT of the  chest did show diffuse patchy multifocal ground-glass opacities with  significant worsening compared to his 2020 CAT scan. Again, he  was treated with another course of Decadron and with cefepime and  aerosol treatments. ID had seen him and Pulmonary had see him. Procalcitonin levels were elevated. Certainly appears as though he had  an exacerbation of this COVID with a secondary bacterial infection on  top of that. ProBNP was elevated at 1933. Troponins were negative. White count was normal.  Repeat coronavirus was negative. Blood gases  were okay. EKG showed tachycardia. C-reactive protein up at 14.8.   _____ up at 301. BUN and creatinine remained pretty stable. Slight  elevation in his blood glucose related to his steroids.   He had a repeat CT of the chest which showed significant improvement. He was requiring  less oxygen if any at all and was feeling much better, subsequently was  able to be discharged home. DISCHARGE MEDICATIONS:  He went home on his,  1. Proscar. 2.  Metoprolol .  3.  Aldactone 25 daily. 4.  Entresto 24/26.  5.  Catapres 0.1. 6.  Norvasc 5 daily. 7.  Nitro-Bid ointment. 8.  DuoNeb. 9.  Trelegy. He went home off antibiotics. I will follow him in the office when he  showing a little bit stronger from being released to home. At the time  of discharge, his condition was good. His prognosis has improved.         Zuri Galvin DO    D: 01/06/2021 13:47:04       T: 01/06/2021 13:53:32     [email or web address]  Job#: [id number]     Doc#: [id number]    CC:

## 2021-06-13 ENCOUNTER — APPOINTMENT (OUTPATIENT)
Dept: GENERAL RADIOLOGY | Age: 56
DRG: 191 | End: 2021-06-13
Payer: MEDICARE

## 2021-06-13 ENCOUNTER — HOSPITAL ENCOUNTER (INPATIENT)
Age: 56
LOS: 5 days | Discharge: HOME | DRG: 191 | End: 2021-06-18
Attending: EMERGENCY MEDICINE | Admitting: GENERAL PRACTICE
Payer: MEDICARE

## 2021-06-13 DIAGNOSIS — R06.03 RESPIRATORY DISTRESS: ICD-10-CM

## 2021-06-13 DIAGNOSIS — J44.1 COPD WITH ACUTE EXACERBATION (HCC): Primary | ICD-10-CM

## 2021-06-13 LAB
ADENOVIRUS BY PCR: NOT DETECTED
BACTERIA: ABNORMAL /HPF
BASOPHILS ABSOLUTE: 0.06 E9/L (ref 0–0.2)
BASOPHILS RELATIVE PERCENT: 0.6 % (ref 0–2)
BILIRUBIN URINE: NEGATIVE
BLOOD, URINE: NEGATIVE
BORDETELLA PARAPERTUSSIS BY PCR: NOT DETECTED
BORDETELLA PERTUSSIS BY PCR: NOT DETECTED
CHLAMYDOPHILIA PNEUMONIAE BY PCR: NOT DETECTED
CLARITY: CLEAR
COLOR: YELLOW
CORONAVIRUS 229E BY PCR: NOT DETECTED
CORONAVIRUS HKU1 BY PCR: NOT DETECTED
CORONAVIRUS NL63 BY PCR: NOT DETECTED
CORONAVIRUS OC43 BY PCR: NOT DETECTED
D DIMER: 232 NG/ML DDU
EKG ATRIAL RATE: 100 BPM
EKG P AXIS: 63 DEGREES
EKG P-R INTERVAL: 130 MS
EKG Q-T INTERVAL: 386 MS
EKG QRS DURATION: 94 MS
EKG QTC CALCULATION (BAZETT): 497 MS
EKG R AXIS: 29 DEGREES
EKG T AXIS: 47 DEGREES
EKG VENTRICULAR RATE: 100 BPM
EOSINOPHILS ABSOLUTE: 0.18 E9/L (ref 0.05–0.5)
EOSINOPHILS RELATIVE PERCENT: 1.8 % (ref 0–6)
GLUCOSE URINE: NEGATIVE MG/DL
HCT VFR BLD CALC: 42.5 % (ref 37–54)
HEMOGLOBIN: 14.5 G/DL (ref 12.5–16.5)
HUMAN METAPNEUMOVIRUS BY PCR: NOT DETECTED
HUMAN RHINOVIRUS/ENTEROVIRUS BY PCR: NOT DETECTED
IMMATURE GRANULOCYTES #: 0.03 E9/L
IMMATURE GRANULOCYTES %: 0.3 % (ref 0–5)
INFLUENZA A BY PCR: NOT DETECTED
INFLUENZA B BY PCR: NOT DETECTED
KETONES, URINE: NEGATIVE MG/DL
LACTIC ACID, SEPSIS: 1.1 MMOL/L (ref 0.5–1.9)
LEUKOCYTE ESTERASE, URINE: NEGATIVE
LYMPHOCYTES ABSOLUTE: 1.65 E9/L (ref 1.5–4)
LYMPHOCYTES RELATIVE PERCENT: 16.8 % (ref 20–42)
MAGNESIUM: 2.2 MG/DL (ref 1.6–2.6)
MCH RBC QN AUTO: 32.2 PG (ref 26–35)
MCHC RBC AUTO-ENTMCNC: 34.1 % (ref 32–34.5)
MCV RBC AUTO: 94.2 FL (ref 80–99.9)
MONOCYTES ABSOLUTE: 0.7 E9/L (ref 0.1–0.95)
MONOCYTES RELATIVE PERCENT: 7.1 % (ref 2–12)
MYCOPLASMA PNEUMONIAE BY PCR: NOT DETECTED
NEUTROPHILS ABSOLUTE: 7.23 E9/L (ref 1.8–7.3)
NEUTROPHILS RELATIVE PERCENT: 73.4 % (ref 43–80)
NITRITE, URINE: NEGATIVE
PARAINFLUENZA VIRUS 1 BY PCR: NOT DETECTED
PARAINFLUENZA VIRUS 2 BY PCR: NOT DETECTED
PARAINFLUENZA VIRUS 3 BY PCR: NOT DETECTED
PARAINFLUENZA VIRUS 4 BY PCR: NOT DETECTED
PDW BLD-RTO: 14.2 FL (ref 11.5–15)
PH UA: 6 (ref 5–9)
PLATELET # BLD: 280 E9/L (ref 130–450)
PMV BLD AUTO: 9.4 FL (ref 7–12)
PRO-BNP: 7751 PG/ML (ref 0–125)
PROCALCITONIN: 0.06 NG/ML (ref 0–0.08)
PROTEIN UA: 30 MG/DL
RBC # BLD: 4.51 E12/L (ref 3.8–5.8)
RBC UA: ABNORMAL /HPF (ref 0–2)
REASON FOR REJECTION: NORMAL
REJECTED TEST: NORMAL
RESPIRATORY SYNCYTIAL VIRUS BY PCR: NOT DETECTED
SARS-COV-2, PCR: NOT DETECTED
SPECIFIC GRAVITY UA: 1.02 (ref 1–1.03)
TROPONIN, HIGH SENSITIVITY: 27 NG/L (ref 0–11)
UROBILINOGEN, URINE: 0.2 E.U./DL
WBC # BLD: 9.9 E9/L (ref 4.5–11.5)
WBC UA: ABNORMAL /HPF (ref 0–5)

## 2021-06-13 PROCEDURE — 83880 ASSAY OF NATRIURETIC PEPTIDE: CPT

## 2021-06-13 PROCEDURE — 84484 ASSAY OF TROPONIN QUANT: CPT

## 2021-06-13 PROCEDURE — 96365 THER/PROPH/DIAG IV INF INIT: CPT

## 2021-06-13 PROCEDURE — 36415 COLL VENOUS BLD VENIPUNCTURE: CPT

## 2021-06-13 PROCEDURE — 93005 ELECTROCARDIOGRAM TRACING: CPT | Performed by: EMERGENCY MEDICINE

## 2021-06-13 PROCEDURE — 99284 EMERGENCY DEPT VISIT MOD MDM: CPT

## 2021-06-13 PROCEDURE — 87150 DNA/RNA AMPLIFIED PROBE: CPT

## 2021-06-13 PROCEDURE — 6370000000 HC RX 637 (ALT 250 FOR IP): Performed by: EMERGENCY MEDICINE

## 2021-06-13 PROCEDURE — 2060000000 HC ICU INTERMEDIATE R&B

## 2021-06-13 PROCEDURE — 83605 ASSAY OF LACTIC ACID: CPT

## 2021-06-13 PROCEDURE — 84145 PROCALCITONIN (PCT): CPT

## 2021-06-13 PROCEDURE — 87040 BLOOD CULTURE FOR BACTERIA: CPT

## 2021-06-13 PROCEDURE — 94640 AIRWAY INHALATION TREATMENT: CPT

## 2021-06-13 PROCEDURE — 83735 ASSAY OF MAGNESIUM: CPT

## 2021-06-13 PROCEDURE — 85378 FIBRIN DEGRADE SEMIQUANT: CPT

## 2021-06-13 PROCEDURE — 71045 X-RAY EXAM CHEST 1 VIEW: CPT

## 2021-06-13 PROCEDURE — 0202U NFCT DS 22 TRGT SARS-COV-2: CPT

## 2021-06-13 PROCEDURE — 85025 COMPLETE CBC W/AUTO DIFF WBC: CPT

## 2021-06-13 PROCEDURE — 81001 URINALYSIS AUTO W/SCOPE: CPT

## 2021-06-13 PROCEDURE — 6360000002 HC RX W HCPCS: Performed by: GENERAL PRACTICE

## 2021-06-13 PROCEDURE — 96375 TX/PRO/DX INJ NEW DRUG ADDON: CPT

## 2021-06-13 PROCEDURE — 6360000002 HC RX W HCPCS: Performed by: EMERGENCY MEDICINE

## 2021-06-13 PROCEDURE — 6370000000 HC RX 637 (ALT 250 FOR IP): Performed by: GENERAL PRACTICE

## 2021-06-13 PROCEDURE — 93010 ELECTROCARDIOGRAM REPORT: CPT | Performed by: INTERNAL MEDICINE

## 2021-06-13 RX ORDER — CLONIDINE HYDROCHLORIDE 0.2 MG/1
0.2 TABLET ORAL 2 TIMES DAILY
Status: DISCONTINUED | OUTPATIENT
Start: 2021-06-13 | End: 2021-06-18 | Stop reason: HOSPADM

## 2021-06-13 RX ORDER — IPRATROPIUM BROMIDE AND ALBUTEROL SULFATE 2.5; .5 MG/3ML; MG/3ML
1 SOLUTION RESPIRATORY (INHALATION) EVERY 4 HOURS PRN
Status: DISCONTINUED | OUTPATIENT
Start: 2021-06-13 | End: 2021-06-18 | Stop reason: HOSPADM

## 2021-06-13 RX ORDER — METHYLPREDNISOLONE SODIUM SUCCINATE 40 MG/ML
40 INJECTION, POWDER, LYOPHILIZED, FOR SOLUTION INTRAMUSCULAR; INTRAVENOUS EVERY 12 HOURS
Status: DISPENSED | OUTPATIENT
Start: 2021-06-13 | End: 2021-06-15

## 2021-06-13 RX ORDER — SPIRONOLACTONE 25 MG/1
25 TABLET ORAL DAILY
Status: DISCONTINUED | OUTPATIENT
Start: 2021-06-14 | End: 2021-06-18 | Stop reason: HOSPADM

## 2021-06-13 RX ORDER — BUDESONIDE 0.25 MG/2ML
0.25 INHALANT ORAL 2 TIMES DAILY
Status: DISCONTINUED | OUTPATIENT
Start: 2021-06-13 | End: 2021-06-18 | Stop reason: HOSPADM

## 2021-06-13 RX ORDER — ARFORMOTEROL TARTRATE 15 UG/2ML
15 SOLUTION RESPIRATORY (INHALATION) 2 TIMES DAILY
Status: DISCONTINUED | OUTPATIENT
Start: 2021-06-13 | End: 2021-06-13 | Stop reason: SDUPTHER

## 2021-06-13 RX ORDER — IPRATROPIUM BROMIDE AND ALBUTEROL SULFATE 2.5; .5 MG/3ML; MG/3ML
3 SOLUTION RESPIRATORY (INHALATION) ONCE
Status: COMPLETED | OUTPATIENT
Start: 2021-06-13 | End: 2021-06-13

## 2021-06-13 RX ORDER — SODIUM CHLORIDE 9 MG/ML
INJECTION, SOLUTION INTRAVENOUS CONTINUOUS
Status: DISCONTINUED | OUTPATIENT
Start: 2021-06-13 | End: 2021-06-18 | Stop reason: HOSPADM

## 2021-06-13 RX ORDER — IPRATROPIUM BROMIDE AND ALBUTEROL SULFATE 2.5; .5 MG/3ML; MG/3ML
1 SOLUTION RESPIRATORY (INHALATION)
Status: DISCONTINUED | OUTPATIENT
Start: 2021-06-14 | End: 2021-06-18 | Stop reason: HOSPADM

## 2021-06-13 RX ORDER — ARFORMOTEROL TARTRATE 15 UG/2ML
15 SOLUTION RESPIRATORY (INHALATION) 2 TIMES DAILY
Status: DISCONTINUED | OUTPATIENT
Start: 2021-06-13 | End: 2021-06-18 | Stop reason: HOSPADM

## 2021-06-13 RX ORDER — FINASTERIDE 5 MG/1
5 TABLET, FILM COATED ORAL DAILY
Status: DISCONTINUED | OUTPATIENT
Start: 2021-06-14 | End: 2021-06-18 | Stop reason: HOSPADM

## 2021-06-13 RX ORDER — NICOTINE 21 MG/24HR
1 PATCH, TRANSDERMAL 24 HOURS TRANSDERMAL EVERY 24 HOURS
Status: DISCONTINUED | OUTPATIENT
Start: 2021-06-13 | End: 2021-06-18 | Stop reason: HOSPADM

## 2021-06-13 RX ORDER — MAGNESIUM SULFATE IN WATER 40 MG/ML
2000 INJECTION, SOLUTION INTRAVENOUS ONCE
Status: COMPLETED | OUTPATIENT
Start: 2021-06-13 | End: 2021-06-13

## 2021-06-13 RX ORDER — METOPROLOL SUCCINATE 100 MG/1
100 TABLET, EXTENDED RELEASE ORAL DAILY
Status: DISCONTINUED | OUTPATIENT
Start: 2021-06-14 | End: 2021-06-18 | Stop reason: HOSPADM

## 2021-06-13 RX ORDER — AMLODIPINE BESYLATE 5 MG/1
5 TABLET ORAL DAILY
Status: DISCONTINUED | OUTPATIENT
Start: 2021-06-14 | End: 2021-06-18 | Stop reason: HOSPADM

## 2021-06-13 RX ORDER — NITROGLYCERIN 0.4 MG/1
0.4 TABLET SUBLINGUAL EVERY 5 MIN PRN
Status: DISCONTINUED | OUTPATIENT
Start: 2021-06-13 | End: 2021-06-18 | Stop reason: HOSPADM

## 2021-06-13 RX ORDER — METHYLPREDNISOLONE SODIUM SUCCINATE 125 MG/2ML
125 INJECTION, POWDER, LYOPHILIZED, FOR SOLUTION INTRAMUSCULAR; INTRAVENOUS ONCE
Status: COMPLETED | OUTPATIENT
Start: 2021-06-13 | End: 2021-06-13

## 2021-06-13 RX ORDER — AMLODIPINE BESYLATE 5 MG/1
5 TABLET ORAL ONCE
Status: COMPLETED | OUTPATIENT
Start: 2021-06-13 | End: 2021-06-13

## 2021-06-13 RX ORDER — FUROSEMIDE 10 MG/ML
40 INJECTION INTRAMUSCULAR; INTRAVENOUS ONCE
Status: COMPLETED | OUTPATIENT
Start: 2021-06-13 | End: 2021-06-13

## 2021-06-13 RX ADMIN — MAGNESIUM SULFATE HEPTAHYDRATE 2000 MG: 40 INJECTION, SOLUTION INTRAVENOUS at 17:20

## 2021-06-13 RX ADMIN — IPRATROPIUM BROMIDE AND ALBUTEROL SULFATE 3 AMPULE: .5; 2.5 SOLUTION RESPIRATORY (INHALATION) at 14:32

## 2021-06-13 RX ADMIN — METHYLPREDNISOLONE SODIUM SUCCINATE 125 MG: 125 INJECTION, POWDER, FOR SOLUTION INTRAMUSCULAR; INTRAVENOUS at 15:06

## 2021-06-13 RX ADMIN — FUROSEMIDE 40 MG: 10 INJECTION, SOLUTION INTRAVENOUS at 17:21

## 2021-06-13 RX ADMIN — AMLODIPINE BESYLATE 5 MG: 5 TABLET ORAL at 17:20

## 2021-06-13 RX ADMIN — CLONIDINE HYDROCHLORIDE 0.2 MG: 0.2 TABLET ORAL at 23:49

## 2021-06-13 RX ADMIN — METHYLPREDNISOLONE SODIUM SUCCINATE 40 MG: 40 INJECTION, POWDER, FOR SOLUTION INTRAMUSCULAR; INTRAVENOUS at 23:49

## 2021-06-13 ASSESSMENT — PAIN SCALES - GENERAL: PAINLEVEL_OUTOF10: 0

## 2021-06-13 NOTE — ED PROVIDER NOTES
Department of Emergency Medicine   ED  Provider Note  Admit Date/RoomTime: 6/13/2021  1:51 PM  ED Room: 22/22          History of Present Illness:  6/13/21, Time: 2:00 PM EDT  Chief Complaint   Patient presents with    Shortness of Breath     states he is having a COPD excerbation. Alejandro Huntley is a 60 y.o. male presenting to the ED for sob, beginning 4 days. The complaint has been persistent, severe in severity, and worsened by light exertion. Patient presents for shortness of breath. Reports he felt short of breath for the last 3 to 4 days. He is having cough productive of different colored sputum than he normally has. States he becomes severely short of breath after few steps. Stop smoking 3 days ago. Pulmonologist is Dr. Kassy Jama, states his PCP is Dr. Floyd Weaver but has not seen him in quite some time. Has stopped all his medications last several months. In respiratory distress upon arrival    ENCOUNTER LIMITATION:    Please note that the HPI, ROS, Past History, and Physical Examination are limited due to this patients acuity of illness. Review of Systems:   A complete review of systems was unable to be performed secondary to the limitations noted above          --------------------------------------------- PAST HISTORY ---------------------------------------------  Past Medical History:  has a past medical history of Alcohol abuse, CHF (congestive heart failure) ([facility] [address] COPD (chronic obstructive pulmonary disease) ([facility] [address] History of cocaine use, Hyperlipidemia, Hypertension, Marijuana use, MI (myocardial infarction) ([facility] [address] and alberto. Past Surgical History:  has a past surgical history that includes Wrist surgery; cervical fusion (11/18/15); polysomnography (01/29/2018); and Cardiac catheterization (11/03/2020). Social History:  reports that he has quit smoking. His smoking use included cigarettes. He started smoking about 41 years ago.  He has a 60.00 pack-year smoking history. He has never used smokeless tobacco. He reports previous alcohol use. He reports previous drug use. Drugs: Cocaine, Other-see comments, and Marijuana. Family History: family history includes Arthritis in his mother; Cancer in his brother; Heart Disease in his father; High Blood Pressure in his brother, brother, brother, and brother; Other in his brother, brother, brother, brother, and mother. . Unless otherwise noted, family history is non contributory    The patients home medications have been reviewed. Allergies: Patient has no known allergies. ---------------------------------------------------PHYSICAL EXAM--------------------------------------    Constitutional/General: Alert and oriented x3 -in respiratory distress, speaking in 2-3 word sentences  Head: Normocephalic and atraumatic  Eyes: PERRL, EOMI, sclera non icteric  Mouth: Oropharynx clear, handling secretions, no trismus, no asymmetry of the posterior oropharynx or uvular edema  Neck: Supple, full ROM, no stridor, no meningeal signs no appreciable JVD  Respiratory: Severely diminished with scattered wheezing. In respiratory distress. Between 1-2 word sentences, accessory muscle usage  Cardiovascular: Tachycardic and regular. 2+ distal pulses. Equal extremity pulses. Chest: No chest wall tenderness  GI:  Abdomen Soft, Non tender, Non distended. No rebound, guarding, or rigidity. BMI of 35  Musculoskeletal: Moves all extremities x 4. Warm and well perfused, no clubbing, cyanosis, there is trace edema bilateral lower extremities. Capillary refill <3 seconds  Integument: skin warm and dry. No rashes. Neurologic: GCS 15,   Psychiatric: anxious Affect      EKG: Interpreted by emergency department physician, Dr. Puneet Khan   This EKG is signed and interpreted by me. Rate: 100  Rhythm: Sinus  Interpretation: Sinus rhythm, normal axis, NC is 130, QRS is 94, QTc is 497.   Nonspecific T changes  Comparison: no previous EKG available      -------------------------------------------------- RESULTS -------------------------------------------------  I have personally reviewed all laboratory and imaging results for this patient. Results are listed below. LABS: (Lab results interpreted by me)  No results found for this visit on 06/13/21.,       RADIOLOGY:  Interpreted by Radiologist unless otherwise specified  XR CHEST PORTABLE    (Results Pending)                    ------------------------- NURSING NOTES AND VITALS REVIEWED ---------------------------   The nursing notes within the ED encounter and vital signs as below have been reviewed by myself  BP (!) 193/139   Pulse 102   Temp 97.3 °F (36.3 °C)   Resp 28   Ht 5' 7\" (1.702 m)   Wt 225 lb (102.1 kg)   SpO2 95%   BMI 35.24 kg/m²     Oxygen Saturation Interpretation: Normal    The cardiac monitor revealed NSR with a heart rate in the 100s as interpreted by me. The cardiac monitor was ordered secondary to the patient's heart rate and to monitor the patient for dysrhythmia. CPT 06542    The patients available past medical records and past encounters were reviewed. ------------------------------ ED COURSE/MEDICAL DECISION MAKING----------------------  Medications   ipratropium-albuterol (DUONEB) nebulizer solution 3 ampule (has no administration in time range)   methylPREDNISolone sodium (SOLU-MEDROL) injection 125 mg (has no administration in time range)   magnesium sulfate 2000 mg in 50 mL IVPB premix (has no administration in time range)   0.9 % sodium chloride infusion (has no administration in time range)                    Medical Decision Making:     I, Dr. Caitlyn Chavez am the primary provider of record    Patient presents in some respiratory distress, multiple duo nebs and IV medications ordered. My successor spoke with medicine have the patient admitted      Re-Evaluations:          Re-evaluation.   Patients symptoms are improving  Repeat physical examination is improved    Re-evaluation. Patients symptoms show no change  Repeat physical examination is not changed        This patient's ED course included: a personal history and physicial examination, re-evaluation prior to disposition, IV medications, cardiac monitoring, continuous pulse oximetry and complex medical decision making and emergency management    This patient has remained hemodynamically stable during their ED course. Consultations:  Internal Medicine       Critical Care:         Counseling: The emergency provider has spoken with the patient and discussed todays results, in addition to providing specific details for the plan of care and counseling regarding the diagnosis and prognosis. Questions are answered at this time and they are agreeable with the plan.       --------------------------------- IMPRESSION AND DISPOSITION ---------------------------------    IMPRESSION  1. COPD with acute exacerbation ([facility] [address]    2. Respiratory distress        DISPOSITION  Disposition: Admit  Patient condition is stable        NOTE: This report was transcribed using voice recognition software.  Every effort was made to ensure accuracy; however, inadvertent computerized transcription errors may be present       Vincent Arteaga DO  06/18/21 2037

## 2021-06-14 PROCEDURE — 6360000002 HC RX W HCPCS: Performed by: GENERAL PRACTICE

## 2021-06-14 PROCEDURE — 2580000003 HC RX 258: Performed by: GENERAL PRACTICE

## 2021-06-14 PROCEDURE — 6370000000 HC RX 637 (ALT 250 FOR IP): Performed by: GENERAL PRACTICE

## 2021-06-14 PROCEDURE — 93005 ELECTROCARDIOGRAM TRACING: CPT | Performed by: GENERAL PRACTICE

## 2021-06-14 PROCEDURE — 2060000000 HC ICU INTERMEDIATE R&B

## 2021-06-14 PROCEDURE — 94640 AIRWAY INHALATION TREATMENT: CPT

## 2021-06-14 PROCEDURE — 2700000000 HC OXYGEN THERAPY PER DAY

## 2021-06-14 RX ORDER — LORAZEPAM 1 MG/1
2 TABLET ORAL
Status: DISCONTINUED | OUTPATIENT
Start: 2021-06-14 | End: 2021-06-18 | Stop reason: HOSPADM

## 2021-06-14 RX ORDER — LORAZEPAM 2 MG/ML
4 INJECTION INTRAMUSCULAR
Status: DISCONTINUED | OUTPATIENT
Start: 2021-06-14 | End: 2021-06-18 | Stop reason: HOSPADM

## 2021-06-14 RX ORDER — LORAZEPAM 2 MG/ML
3 INJECTION INTRAMUSCULAR
Status: DISCONTINUED | OUTPATIENT
Start: 2021-06-14 | End: 2021-06-18 | Stop reason: HOSPADM

## 2021-06-14 RX ORDER — SODIUM CHLORIDE 0.9 % (FLUSH) 0.9 %
5-40 SYRINGE (ML) INJECTION EVERY 12 HOURS SCHEDULED
Status: DISCONTINUED | OUTPATIENT
Start: 2021-06-14 | End: 2021-06-18 | Stop reason: HOSPADM

## 2021-06-14 RX ORDER — SODIUM CHLORIDE 0.9 % (FLUSH) 0.9 %
5-40 SYRINGE (ML) INJECTION PRN
Status: DISCONTINUED | OUTPATIENT
Start: 2021-06-14 | End: 2021-06-18 | Stop reason: HOSPADM

## 2021-06-14 RX ORDER — LORAZEPAM 2 MG/ML
2 INJECTION INTRAMUSCULAR
Status: DISCONTINUED | OUTPATIENT
Start: 2021-06-14 | End: 2021-06-18 | Stop reason: HOSPADM

## 2021-06-14 RX ORDER — LORAZEPAM 1 MG/1
3 TABLET ORAL
Status: DISCONTINUED | OUTPATIENT
Start: 2021-06-14 | End: 2021-06-18 | Stop reason: HOSPADM

## 2021-06-14 RX ORDER — LORAZEPAM 1 MG/1
1 TABLET ORAL
Status: DISCONTINUED | OUTPATIENT
Start: 2021-06-14 | End: 2021-06-18 | Stop reason: HOSPADM

## 2021-06-14 RX ORDER — LORAZEPAM 1 MG/1
4 TABLET ORAL
Status: DISCONTINUED | OUTPATIENT
Start: 2021-06-14 | End: 2021-06-18 | Stop reason: HOSPADM

## 2021-06-14 RX ORDER — LORAZEPAM 2 MG/ML
1 INJECTION INTRAMUSCULAR
Status: DISCONTINUED | OUTPATIENT
Start: 2021-06-14 | End: 2021-06-18 | Stop reason: HOSPADM

## 2021-06-14 RX ADMIN — NITROGLYCERIN 0.4 MG: 0.4 TABLET, ORALLY DISINTEGRATING SUBLINGUAL at 15:14

## 2021-06-14 RX ADMIN — SACUBITRIL AND VALSARTAN 1 TABLET: 24; 26 TABLET, FILM COATED ORAL at 22:27

## 2021-06-14 RX ADMIN — BUDESONIDE 250 MCG: 0.25 SUSPENSION RESPIRATORY (INHALATION) at 20:33

## 2021-06-14 RX ADMIN — CLONIDINE HYDROCHLORIDE 0.2 MG: 0.2 TABLET ORAL at 22:27

## 2021-06-14 RX ADMIN — AMLODIPINE BESYLATE 5 MG: 5 TABLET ORAL at 09:17

## 2021-06-14 RX ADMIN — IPRATROPIUM BROMIDE AND ALBUTEROL SULFATE 1 AMPULE: .5; 3 SOLUTION RESPIRATORY (INHALATION) at 11:09

## 2021-06-14 RX ADMIN — METHYLPREDNISOLONE SODIUM SUCCINATE 40 MG: 40 INJECTION, POWDER, FOR SOLUTION INTRAMUSCULAR; INTRAVENOUS at 14:04

## 2021-06-14 RX ADMIN — FINASTERIDE 5 MG: 5 TABLET, FILM COATED ORAL at 09:17

## 2021-06-14 RX ADMIN — BUDESONIDE 250 MCG: 0.25 SUSPENSION RESPIRATORY (INHALATION) at 07:29

## 2021-06-14 RX ADMIN — IPRATROPIUM BROMIDE AND ALBUTEROL SULFATE 1 AMPULE: .5; 3 SOLUTION RESPIRATORY (INHALATION) at 16:01

## 2021-06-14 RX ADMIN — Medication 10 ML: at 22:28

## 2021-06-14 RX ADMIN — ARFORMOTEROL TARTRATE 15 MCG: 15 SOLUTION RESPIRATORY (INHALATION) at 20:32

## 2021-06-14 RX ADMIN — LORAZEPAM 2 MG: 1 TABLET ORAL at 22:37

## 2021-06-14 RX ADMIN — SPIRONOLACTONE 25 MG: 25 TABLET ORAL at 09:17

## 2021-06-14 RX ADMIN — METOPROLOL SUCCINATE 100 MG: 100 TABLET, EXTENDED RELEASE ORAL at 09:17

## 2021-06-14 RX ADMIN — ARFORMOTEROL TARTRATE 15 MCG: 15 SOLUTION RESPIRATORY (INHALATION) at 07:29

## 2021-06-14 RX ADMIN — SACUBITRIL AND VALSARTAN 1 TABLET: 24; 26 TABLET, FILM COATED ORAL at 09:17

## 2021-06-14 RX ADMIN — CLONIDINE HYDROCHLORIDE 0.2 MG: 0.2 TABLET ORAL at 09:17

## 2021-06-14 RX ADMIN — IPRATROPIUM BROMIDE AND ALBUTEROL SULFATE 1 AMPULE: .5; 3 SOLUTION RESPIRATORY (INHALATION) at 20:32

## 2021-06-14 RX ADMIN — IPRATROPIUM BROMIDE AND ALBUTEROL SULFATE 1 AMPULE: .5; 3 SOLUTION RESPIRATORY (INHALATION) at 07:28

## 2021-06-14 ASSESSMENT — PAIN DESCRIPTION - ONSET: ONSET: SUDDEN

## 2021-06-14 ASSESSMENT — PAIN DESCRIPTION - DESCRIPTORS: DESCRIPTORS: BURNING;DISCOMFORT

## 2021-06-14 ASSESSMENT — PAIN SCALES - GENERAL
PAINLEVEL_OUTOF10: 0
PAINLEVEL_OUTOF10: 5
PAINLEVEL_OUTOF10: 0

## 2021-06-14 ASSESSMENT — PAIN DESCRIPTION - ORIENTATION: ORIENTATION: MID

## 2021-06-14 ASSESSMENT — PAIN DESCRIPTION - PAIN TYPE: TYPE: ACUTE PAIN

## 2021-06-14 ASSESSMENT — PAIN DESCRIPTION - LOCATION: LOCATION: CHEST

## 2021-06-14 ASSESSMENT — PAIN DESCRIPTION - FREQUENCY: FREQUENCY: INTERMITTENT

## 2021-06-14 NOTE — CARE COORDINATION
Peer Recovery Support Note    Name: Kristian Mtz  Date: 6/14/2021    Chief Complaint   Patient presents with    Shortness of Breath     states he is having a COPD excerbation. Peer Support met with patient.   [x] Support and education provided  [x] Resources provided   [] Treatment referral:   [] Other:   [] Patient declined peer recovery services     Referred By:     Notes:     Signed: Joycelyn Curiel, 6/14/2021

## 2021-06-14 NOTE — PROGRESS NOTES
Patient complaining of new onset mid sternal chest pain which he describes as burning and 5/10. EKG completed and placed in paper chart as well as transmitted to EMR. Vitals obtained. Nitro SL 0.4mg administered. Patient states pain is somewhat improved 3/10 following nitro administration. Attempted to contact Dr. Tyesha Serna to notify of this change. Number available through perfect serve not working at this time. Will continue to try to reach him.

## 2021-06-14 NOTE — PROGRESS NOTES
1486 Patient arrived to [phone] in stable condition. Patient ambulating in room with mild dyspnea on exertion on 2L. Patient oriented to room with call light in reach. Dr. Alyssia Gutierrez notified of patient admission and verbal orders were taken via telephone.  Clonidine given for elevated BP, additional home meds ordered for am.

## 2021-06-14 NOTE — PLAN OF CARE
Problem: Falls - Risk of:  Goal: Will remain free from falls  Description: Will remain free from falls  Outcome: Met This Shift  Goal: Absence of physical injury  Description: Absence of physical injury  Outcome: Met This Shift     Problem: Breathing Pattern - Ineffective:  Goal: Ability to achieve and maintain a regular respiratory rate will improve  Description: Ability to achieve and maintain a regular respiratory rate will improve  Outcome: Met This Shift     Problem: Gas Exchange - Impaired:  Goal: Levels of oxygenation will improve  Description: Levels of oxygenation will improve  Outcome: Met This Shift

## 2021-06-14 NOTE — PROGRESS NOTES
When asked patient states he drinks approximately a case and a half of beer per day, in addition to 1/2 bottle [place]. He states he's been \"drinking again since [place]" and had been sober prior.

## 2021-06-14 NOTE — CONSULTS
Intravenous, Q1H PRN **OR** LORazepam (ATIVAN) tablet 3 mg, 3 mg, Oral, Q1H PRN **OR** LORazepam (ATIVAN) injection 3 mg, 3 mg, Intravenous, Q1H PRN **OR** LORazepam (ATIVAN) tablet 4 mg, 4 mg, Oral, Q1H PRN **OR** LORazepam (ATIVAN) injection 4 mg, 4 mg, Intravenous, Q1H PRN  0.9 % sodium chloride infusion, , Intravenous, Continuous  methylPREDNISolone sodium (SOLU-MEDROL) injection 40 mg, 40 mg, Intravenous, Q12H  ipratropium-albuterol (DUONEB) nebulizer solution 1 ampule, 1 ampule, Inhalation, Q4H WA  ipratropium-albuterol (DUONEB) nebulizer solution 1 ampule, 1 ampule, Inhalation, Q4H PRN  finasteride (PROSCAR) tablet 5 mg, 5 mg, Oral, Daily  spironolactone (ALDACTONE) tablet 25 mg, 25 mg, Oral, Daily  sacubitril-valsartan (ENTRESTO) 24-26 MG per tablet 1 tablet, 1 tablet, Oral, BID  amLODIPine (NORVASC) tablet 5 mg, 5 mg, Oral, Daily  nitroGLYCERIN (NITROSTAT) SL tablet 0.4 mg, 0.4 mg, Sublingual, Q5 Min PRN  metoprolol succinate (TOPROL XL) extended release tablet 100 mg, 100 mg, Oral, Daily  nicotine (NICODERM CQ) 21 MG/24HR 1 patch, 1 patch, Transdermal, Q24H  cloNIDine (CATAPRES) tablet 0.2 mg, 0.2 mg, Oral, BID  budesonide (PULMICORT) nebulizer suspension 250 mcg, 0.25 mg, Nebulization, BID **AND** [DISCONTINUED] ipratropium (ATROVENT) 0.02 % nebulizer solution 0.5 mg, 0.5 mg, Nebulization, 4x daily **AND** Arformoterol Tartrate (BROVANA) nebulizer solution 15 mcg, 15 mcg, Nebulization, BID    Allergies:  Patient has no known allergies. Social History:    TOBACCO:   reports that he has quit smoking. His smoking use included cigarettes. He started smoking about 41 years ago. He has a 60.00 pack-year smoking history.  He has never used smokeless tobacco.    Family History:       Problem Relation Age of Onset    Arthritis Mother     Other Mother         glucoma    Heart Disease Father     High Blood Pressure Brother     Other Brother         sleep apnea    High Blood Pressure Brother     Other Review:  CXR reviewed with slightly increased right lower lung field interstitial markings    IMPRESSION/RECOMMENDATIONS:      COPD  Hypoxia  ETOH and Drug abuse    1. Cont with steroids, taper as tolerated, may switch to po prednisone in another few days  2. Cont with nebs  3. Cont with oxygen, taper as tolerated  4. OOB to chair, ambulate as tolerated      Time at the bedside, reviewing labs and radiographs, reviewing notes and consultations, discussing with staff and family was more than 55 minutes. Thanks for letting us see this patient in consultation. Please contact us with any questions. Office [phone] or after hours through [employer] x [phone].

## 2021-06-15 LAB
EKG ATRIAL RATE: 73 BPM
EKG P AXIS: 27 DEGREES
EKG P-R INTERVAL: 142 MS
EKG Q-T INTERVAL: 480 MS
EKG QRS DURATION: 90 MS
EKG QTC CALCULATION (BAZETT): 528 MS
EKG R AXIS: 5 DEGREES
EKG T AXIS: -30 DEGREES
EKG VENTRICULAR RATE: 73 BPM

## 2021-06-15 PROCEDURE — 6360000002 HC RX W HCPCS: Performed by: GENERAL PRACTICE

## 2021-06-15 PROCEDURE — 6370000000 HC RX 637 (ALT 250 FOR IP): Performed by: GENERAL PRACTICE

## 2021-06-15 PROCEDURE — 2700000000 HC OXYGEN THERAPY PER DAY

## 2021-06-15 PROCEDURE — 94640 AIRWAY INHALATION TREATMENT: CPT

## 2021-06-15 PROCEDURE — 2060000000 HC ICU INTERMEDIATE R&B

## 2021-06-15 PROCEDURE — 2580000003 HC RX 258: Performed by: GENERAL PRACTICE

## 2021-06-15 RX ORDER — PREDNISONE 10 MG/1
30 TABLET ORAL DAILY
Status: DISCONTINUED | OUTPATIENT
Start: 2021-06-16 | End: 2021-06-18 | Stop reason: HOSPADM

## 2021-06-15 RX ADMIN — IPRATROPIUM BROMIDE AND ALBUTEROL SULFATE 1 AMPULE: .5; 3 SOLUTION RESPIRATORY (INHALATION) at 15:48

## 2021-06-15 RX ADMIN — METOPROLOL SUCCINATE 100 MG: 100 TABLET, EXTENDED RELEASE ORAL at 09:48

## 2021-06-15 RX ADMIN — SACUBITRIL AND VALSARTAN 1 TABLET: 24; 26 TABLET, FILM COATED ORAL at 09:48

## 2021-06-15 RX ADMIN — SODIUM CHLORIDE, PRESERVATIVE FREE 10 ML: 5 INJECTION INTRAVENOUS at 02:45

## 2021-06-15 RX ADMIN — AMLODIPINE BESYLATE 5 MG: 5 TABLET ORAL at 09:48

## 2021-06-15 RX ADMIN — FINASTERIDE 5 MG: 5 TABLET, FILM COATED ORAL at 09:48

## 2021-06-15 RX ADMIN — NITROGLYCERIN 0.4 MG: 0.4 TABLET, ORALLY DISINTEGRATING SUBLINGUAL at 16:46

## 2021-06-15 RX ADMIN — SPIRONOLACTONE 25 MG: 25 TABLET ORAL at 09:48

## 2021-06-15 RX ADMIN — CLONIDINE HYDROCHLORIDE 0.2 MG: 0.2 TABLET ORAL at 21:49

## 2021-06-15 RX ADMIN — IPRATROPIUM BROMIDE AND ALBUTEROL SULFATE 1 AMPULE: .5; 3 SOLUTION RESPIRATORY (INHALATION) at 08:17

## 2021-06-15 RX ADMIN — IPRATROPIUM BROMIDE AND ALBUTEROL SULFATE 1 AMPULE: .5; 3 SOLUTION RESPIRATORY (INHALATION) at 03:30

## 2021-06-15 RX ADMIN — Medication 10 ML: at 09:48

## 2021-06-15 RX ADMIN — CLONIDINE HYDROCHLORIDE 0.2 MG: 0.2 TABLET ORAL at 09:48

## 2021-06-15 RX ADMIN — IPRATROPIUM BROMIDE AND ALBUTEROL SULFATE 1 AMPULE: .5; 3 SOLUTION RESPIRATORY (INHALATION) at 19:45

## 2021-06-15 RX ADMIN — BUDESONIDE 250 MCG: 0.25 SUSPENSION RESPIRATORY (INHALATION) at 08:18

## 2021-06-15 RX ADMIN — SACUBITRIL AND VALSARTAN 1 TABLET: 24; 26 TABLET, FILM COATED ORAL at 21:49

## 2021-06-15 RX ADMIN — BUDESONIDE 250 MCG: 0.25 SUSPENSION RESPIRATORY (INHALATION) at 19:45

## 2021-06-15 RX ADMIN — ARFORMOTEROL TARTRATE 15 MCG: 15 SOLUTION RESPIRATORY (INHALATION) at 19:46

## 2021-06-15 RX ADMIN — Medication 10 ML: at 21:56

## 2021-06-15 RX ADMIN — ARFORMOTEROL TARTRATE 15 MCG: 15 SOLUTION RESPIRATORY (INHALATION) at 08:20

## 2021-06-15 RX ADMIN — METHYLPREDNISOLONE SODIUM SUCCINATE 40 MG: 40 INJECTION, POWDER, FOR SOLUTION INTRAMUSCULAR; INTRAVENOUS at 02:44

## 2021-06-15 RX ADMIN — IPRATROPIUM BROMIDE AND ALBUTEROL SULFATE 1 AMPULE: .5; 3 SOLUTION RESPIRATORY (INHALATION) at 12:21

## 2021-06-15 RX ADMIN — LORAZEPAM 3 MG: 2 INJECTION INTRAMUSCULAR; INTRAVENOUS at 11:36

## 2021-06-15 ASSESSMENT — PAIN SCALES - GENERAL
PAINLEVEL_OUTOF10: 0
PAINLEVEL_OUTOF10: 0

## 2021-06-15 NOTE — PROGRESS NOTES
[facility]  [address], [address]  [facility], [address] [place] [address]      Pulmonary Progress Note      SUBJECTIVE:  Looking tremulous, got Ativan earlier for possible DT. Claims stable with respiratory function, on RA and saturating about 96%, minimal cough/sputum production.     OBJECTIVE    Medications    Continuous Infusions:   sodium chloride Stopped (06/15/21 0036)       Scheduled Meds:   sodium chloride flush  5-40 mL Intravenous 2 times per day    methylPREDNISolone  40 mg Intravenous Q12H    ipratropium-albuterol  1 ampule Inhalation Q4H WA    finasteride  5 mg Oral Daily    spironolactone  25 mg Oral Daily    sacubitril-valsartan  1 tablet Oral BID    amLODIPine  5 mg Oral Daily    metoprolol succinate  100 mg Oral Daily    nicotine  1 patch Transdermal Q24H    cloNIDine  0.2 mg Oral BID    budesonide  0.25 mg Nebulization BID    And    Arformoterol Tartrate  15 mcg Nebulization BID       PRN Meds:perflutren lipid microspheres, sodium chloride flush, LORazepam **OR** LORazepam **OR** LORazepam **OR** LORazepam **OR** LORazepam **OR** LORazepam **OR** LORazepam **OR** LORazepam, ipratropium-albuterol, nitroGLYCERIN    Physical    VITALS:  BP (!) 130/95   Pulse 71   Temp 98 °F (36.7 °C) (Oral)   Resp 22   Ht 5' 7\" (1.702 m)   Wt 225 lb (102.1 kg)   SpO2 96%   BMI 35.24 kg/m²     24HR INTAKE/OUTPUT:    No intake or output data in the 24 hours ending 06/15/21 1807    24HR PULSE OXIMETRY RANGE:    SpO2  Av.4 %  Min: 96 %  Max: 98 %    General appearance: alert, appears stated age and cooperative, obese  Lungs: rhonchi bilaterally and wheezes bilaterally with cough  Heart: regular rate and rhythm, S1, S2 normal, no murmur, click, rub or gallop  Abdomen: soft, non-tender; bowel sounds normal; no masses,  no organomegaly  Extremities: extremities normal, atraumatic, no cyanosis or edema  Neurologic: Mental status: Alert, oriented, thought content appropriate    Data    CBC:   Recent Labs     06/13/21  1558   WBC 9.9   HGB 14.5   HCT 42.5   MCV 94.2          BMP:No results for input(s): NA, K, CL, CO2, PHOS, BUN, CREATININE in the last 72 hours. Invalid input(s): CA ALB:3,BILIDIR:3,BILITOT:3,ALKPHOS:3)@    PT/INR: No results for input(s): PROTIME, INR in the last 72 hours. ABG:   No results for input(s): PH, PO2, PCO2, HCO3, BE, O2SAT, METHB, O2HB, COHB, O2CON, HHB, THB in the last 72 hours. Radiology/Other tests reviewed: none    Assessment:     Active Problems:    COPD exacerbation (HCC)    Respiratory distress  Resolved Problems:    * No resolved hospital problems. *      Plan:       1. Cont with steroids, will switch to po prednisone tomorrow  2. Cont with nebs  3. On RA, watch oxygenation  4. Observe mental function and possible DT, may impact on medications being given here if gets worse      Time at the bedside, reviewing labs and radiographs, reviewing notes and consultations, discussing with staff and family was more than 35 minutes. Thanks for letting us see this patient in consultation. Please contact us with any questions. Office [phone] or after hours through [employer], x [phone].

## 2021-06-15 NOTE — PLAN OF CARE
Problem: Falls - Risk of:  Goal: Will remain free from falls  Description: Will remain free from falls  6/15/2021 0335 by Biju Blair RN  Outcome: Met This Shift  6/14/2021 1856 by Andree Lipscomb RN  Outcome: Met This Shift  Goal: Absence of physical injury  Description: Absence of physical injury  6/15/2021 0335 by Biju Blair RN  Outcome: Met This Shift  6/14/2021 1856 by Andree Lipscomb RN  Outcome: Met This Shift     Problem: Breathing Pattern - Ineffective:  Goal: Ability to achieve and maintain a regular respiratory rate will improve  Description: Ability to achieve and maintain a regular respiratory rate will improve  6/15/2021 0335 by Biju Blair RN  Outcome: Met This Shift  6/14/2021 1856 by Andree Lipscomb RN  Outcome: Met This Shift     Problem: Gas Exchange - Impaired:  Goal: Levels of oxygenation will improve  Description: Levels of oxygenation will improve  6/15/2021 0335 by Biju Blair RN  Outcome: Met This Shift  6/14/2021 1856 by Andree Lipscomb RN  Outcome: Met This Shift

## 2021-06-15 NOTE — CARE COORDINATION
Patient on solumedrol taper. Started on CIWA this am for detox. Plan is home when released. Currently on 2 L oxygen, he does not have home oxygen. For questions I can be reached at [phone].  Bryce Rodrigues, [place]

## 2021-06-15 NOTE — CONSULTS
CARDIOLOGY CONSULTATION    Patient Name:  Dax Kapoor    :  1965    Reason for Consultation:   Recurrent CHF    History of Present Illness:   Dax Kapoor returns to [address], following increasing shortness of breath over the past few weeks prior to admission. He denies any hemoptysis nor chest discomfort. 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 In addition he also admits to initiating significant alcohol ingestion over the past few weeks. He has a known underlying cardiomyopathy for which he was treated with nephrolysin inhibitor with subsequent improvement of his left ventricular function and no further recent hospitalizations until now. He is now readmitted for reassessment of his symptoms. Past Medical History:   has a past medical history of Alcohol abuse, CHF (congestive heart failure) ([facility] [address] COPD (chronic obstructive pulmonary disease) ([facility] [address] History of cocaine use, Hyperlipidemia, Hypertension, Marijuana use, MI (myocardial infarction) ([facility] [address] and alberto. Surgical History:   has a past surgical history that includes Wrist surgery; cervical fusion (11/18/15); polysomnography (2018); and Cardiac catheterization (2020). Social History:   reports that he has been smoking cigarettes. He started smoking about 41 years ago. He has a 60.00 pack-year smoking history. He has never used smokeless tobacco. He reports previous alcohol use. He reports previous drug use. Drugs: Cocaine, Other-see comments, and Marijuana. Family History:  family history includes Arthritis in his mother; Cancer in his brother; Heart Disease in his father; High Blood Pressure in his brother, brother, brother, and brother; Other in his brother, brother, brother, brother, and mother. Medications:  Prior to Admission medications    Medication Sig Start Date End Date Taking? Authorizing Provider   albuterol sulfate HFA (VENTOLIN HFA) 108 (90 Base) MCG/ACT inhaler Inhale 2 puffs into the lungs every 4 hours as needed for Wheezing   Yes Historical Provider, MD   nitroGLYCERIN (NITROSTAT) 0.4 MG SL tablet Place 0.4 mg under the tongue every 5 minutes as needed for Chest pain up to max of 3 total doses. If no relief after 1 dose, call 911. Yes Historical Provider, MD   ipratropium-albuterol (DUONEB) 0.5-2.5 (3) MG/3ML SOLN nebulizer solution Take 1 vial by nebulization every 6 hours as needed for Shortness of Breath   Yes Historical Provider, MD   nicotine (NICODERM CQ) 21 MG/24HR Place 1 patch onto the skin every 24 hours   Yes Historical Provider, MD   fluticasone-umeclidin-vilant (TRELEGY ELLIPTA) 100-62.5-25 MCG/INH AEPB Inhale 1 puff into the lungs daily    Historical Provider, MD   amLODIPine (NORVASC) 5 MG tablet Take 1 tablet by mouth daily 11/21/20   Iliana Chirinos DO   cloNIDine (CATAPRES) 0.1 MG tablet Take 1 tablet by mouth 2 times daily 11/5/20   Iliana Chirinos DO   sacubitril-valsartan (ENTRESTO) 24-26 MG per tablet Take 1 tablet by mouth 2 times daily 11/5/20   Iliana Chirinos DO   spironolactone (ALDACTONE) 25 MG tablet Take 25 mg by mouth daily    Historical Provider, MD   metoprolol succinate (TOPROL XL) 100 MG extended release tablet Take 1 tablet by mouth daily 8/24/19   Iliana Chirinos DO   finasteride (PROSCAR) 5 MG tablet Take 1 tablet by mouth daily 8/24/19   Iliana Chirinos DO       Allergies:  Patient has no known allergies. Review of Systems:   · Constitutional: there has been no unanticipated weight loss. There's been no significant change in energy level, sleep pattern or activity level. No fever chills or rigors. · Eyes: No visual changes or diplopia. No scleral icterus. · ENT: No Headaches, hearing loss or vertigo. No mouth sores or sore throat. No change in taste or smell.   · Cardiovascular: No chest discomfort + dyspnea on exertion and now is well at Even excursion  Lungs: Lungs grossly clear to auscultation bilaterally. No retractions or use of accessory muscles. No tactile vocal fremitus. No rhonchi, crackles or rales. Heart:  S1 > S2. Regular rhythm. S4 gallop but no murmur. No rub, palpable thrill or heave noted. PMI 5th intercostal space midclavicular line. Abdomen: Abdomen soft, moderately protuberant, non-tender. BS normal. No masses, organomegaly. No hernia noted. Extremities: Extremities normal. No deformities, edema, or skin discoloration. No cyanosis or clubbing noted to the nails. Peripheral pulses present 2+ upper extremities and present 1+  lower extremities. Musculoskeletal: Spine ROM normal. Muscular strength intact. Neuro: Cranial nerves intact. Motor: Strength 5/5 in all extremities. Reflexes 2+ in all extremities. No focal weakness. Sensory: grossly normal to touch. Coordination intact. Pertinent Labs:  CBC:   Recent Labs     06/13/21  1558   WBC 9.9   HGB 14.5        BMP:  No results for input(s): NA, K, CL, CO2, BUN, CREATININE, GLUCOSE, CALCIUM in the last 72 hours. ABGs:   Lab Results   Component Value Date    PH 7.475 11/23/2020    PO2 83.0 11/23/2020    PCO2 32.3 11/23/2020     INR:   No results for input(s): INR in the last 72 hours. PRO-BNP:   Lab Results   Component Value Date    PROBNP 7,751 (H) 06/13/2021    PROBNP 321 (H) 11/28/2020      Cardiac Injury Profile:   No results for input(s): CKTOTAL, CKMB, CKMBINDEX, TROPONINI in the last 72 hours. Lipid Profile:   Lab Results   Component Value Date    TRIG 217 01/02/2018    HDL 58 01/02/2018    LDLCALC 144 01/02/2018    CHOL 245 01/02/2018      Hemoglobin A1C: No components found for: HGBA1C   ECG:  See report  ECHO: 4/18/2019  Left ventricular EF 57%    Radiology:  XR CHEST PORTABLE    Result Date: 6/13/2021  1. Ill-defined opacities in the bilateral mid and lower lungs. No pleural effusion or pneumothorax.  2.  Atherosclerotic disease, mild cardiomegaly, and mild central pulmonary vascular congestion. RECOMMENDATION: (Recommend upright PA and lateral chest radiographs 6-8 weeks after resolution of patient's symptoms to ensure complete resolution of radiographic findings.)     Assessment:    Active Problems:    COPD exacerbation (HCC)    Respiratory distress  Resolved Problems:    * No resolved hospital problems. *      Plan:   Mr. Pastora Adams will have a repeat proBNP and two-dimensional echocardiogram obtained and based upon results his cardiac medications will be further adjusted. Carefully monitor electrolytes as well. Once again I have counseled him on smoking cessation and importantly alcohol cessation and he indicates that he fully understands but has in the past has had difficulty following through. He should also maintain his LDL cholesterol within updated 2020 ACC/AHA/AACE/ESC/EAS cholesterol guidelines. I have spent more than 45 minutes face to face with Jus Mcdaniel reviewing notes and laboratory data with greater than 50% of this time instructing and counseling the patient regarding my findings and recommendations and I have answered all questions as posed to me by Mr. Pastora Adams. Thank you, Bernarda Hansen DO for allowing me to consult in the care of this patient. Samira Wynne DO DO, FACP, [facility] [place]    NOTE:  This report was transcribed using voice recognition software. Every effort was made to ensure accuracy; however, inadvertent computerized transcription errors may be present.

## 2021-06-15 NOTE — PROGRESS NOTES
Patient seen  A bit better. cxr with ill defined infiltrates. Will order echo. bp improved. Breathing better.  Continue with steroids and aerosols

## 2021-06-15 NOTE — H&P
[address]                  [address] [facility],  [facility]                              HISTORY AND PHYSICAL    PATIENT NAME: Maricela Harada                  :        1965  MED REC NO:   [id number]                            ROOM:       18  ACCOUNT NO:   [de-identified]                           ADMIT DATE: 2021  PROVIDER:     Isai Hewitt DO    HISTORY OF PRESENT ILLNESS:  This is a 67-year-old white male, well  known to me, longstanding history of COPD, coronary artery disease,  accelerated hypertension, hyperlipidemia, previous history of COVID,  community-acquired pneumonia, acute respiratory failure with hypoxia. He has not been seen in the office for several months, probably since  his last admission. No followup and medical noncompliance. States he  has not been taking any of his medication. 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 Admits to not taking his medication and also  admits taking cocaine. Comes into the emergency department with a  history and chief complaint of chest discomfort and difficulty  breathing. He had a chest x-ray, which showed increased pulmonary  markings bilaterally from previous x-ray. No cardiomegaly. Blood  pressure was significantly elevated to 180/110. He had a proBNP of  7751. Troponin was high at 27. Blood cultures were obtained. Respiratory, _____ was negative. COVID was negative. EKG; sinus  tachycardia, nonspecific ST-T wave changes. White count normal at 9.9. Procalcitonin 0.06. Lactic acid 1.1. He was subsequently admitted with  chest pain and acute exacerbation of COPD for Cardiology and Pulmonary  consult, also to try to get his blood pressure back under control and  resume his meds. Spoke to get him some counseling on drug use and  smoking cessation.     MEDICATIONS:  Medications he was supposed to be taking prior to  admission included Proscar 5 mg daily, metoprolol  daily,  spironolactone 25 daily, Entresto 24/26 b.i.d., Catapres 0.1 mg b.i.d.,  amlodipine 5 mg daily, Trelegy inhaler, Nicoderm patch, DuoNebs,  Nitrostat p.r.n. and albuterol sulfate. PAST MEDICAL HISTORY:  Consistent with acute respiratory failure,  hypoxia, community-acquired pneumonia, COVID infection, cervical  arthritis, carpal tunnel syndrome, essential hypertension, unstable  angina, precordial chest pain, obstructive sleep apnea on CPAP,  hyperlipidemia. PAST SURGICAL HISTORY:  Consistent with cardiac catheterization,  polysomnography, cervical fusion surgery, breast surgery for carpal  tunnel. ALLERGIES:  The patient has no known allergies. SOCIAL HISTORY:  Continues to smoke and use drugs and probably some  alcohol. FAMILY MEDICAL HISTORY:  Was noted and discussed. REVIEW OF SYSTEMS:  HEENT:  Negative for vertigo, cephalgia, ringing in ears, hearing loss,  difficulty swallowing, hoarseness in his voice, bloody noses. CARDIOVASCULAR:  He has precordial type of chest pain and pleuritic type  of chest pain, cough, wheeze, shortness of breath. No hemoptysis. GASTROINTESTINAL:  There is no nausea, vomiting, diarrhea, constipation,  blood in stool or change in weight. GENITOURINARY:  No urgency, frequency, dysuria, hematuria. ENDOCRINE:  Negative for diabetes or thyroid disorder. MUSCULOSKELETAL:  Positive degenerative disk disease in cervical and  lumbar spine, chronic myofascial pain syndrome. SKIN:  Without lesions, rash, eruptions, urticaria, pruritus. Has  multiple tattoos. Also positive history of hepatitis C in the past.  PSYCHIATRIC:  Positive for anxiety and depression. NEUROLOGIC:  Negative for CVA, seizures, tremors, tics or TIAs. PHYSICAL EXAMINATION:  GENERAL:  Shows this to be a 58-year-old white male in moderate distress  with the above complaints.   HEAD, EYES, EARS, NOSE AND THROAT:  Examination shows the head to be  normocephalic and atraumatic. The pupils are equal and reactive to  light and accommodation. Extraocular eye muscles are intact. Tympanic  membranes are clear. Ear canals are patent. Oral mucosa is pink and  moist.  NECK:  Veins are flat, nondistended. No carotid bruits. CHEST:  Symmetrical.  HEART:  Regular rate and rhythm without murmur, gallops or friction  rubs. LUNGS:  Clear to auscultation bilaterally without rhonchi, rales or  wheezes. ABDOMEN:  Soft, nontender, nondistended. Bowel sounds are present in  all four quadrants. GENITALIA:  External genitalia intact. EXTREMITIES:  Peripheral pulses intact. Legs have edema. SPINE:  Shows physiologic curve. IMPRESSION:   At this time is acute exacerbation of COPD, medical  noncompliance, drug abuse, accelerated hypertension. The patient is  going to be admitted, restart his coronary and antihypertensive  medications. Get Cardiology consult for his chest pain, also Pulmonary  consult. Start him on some steroids, IV nebulizers, monitor his oxygen  saturation and his breathing. Hopefully taper him soon, see if we can  get him back _____ at his baseline. Discharge when stable. At the time  of admission, his condition was fair. Long-term prognosis is guarded. He has been counseled again about drug use, smoking, etc. and taking his  medications as directed. He states he is going to try. At the time of  admission, his condition is fair. His prognosis is guarded.         Aries Zayas DO    D: 06/14/2021 17:39:21       T: 06/14/2021 17:44:04     [email or web address]  Job#: [id number]     Doc#: [id number]    CC:

## 2021-06-16 ENCOUNTER — APPOINTMENT (OUTPATIENT)
Dept: GENERAL RADIOLOGY | Age: 56
DRG: 191 | End: 2021-06-16
Payer: MEDICARE

## 2021-06-16 LAB
ACINETOBACTER BAUMANNII BY PCR: NOT DETECTED
BOTTLE TYPE: NORMAL
CANDIDA ALBICANS BY PCR: NOT DETECTED
CANDIDA GLABRATA BY PCR: NOT DETECTED
CANDIDA KRUSEI BY PCR: NOT DETECTED
CANDIDA PARAPSILOSIS BY PCR: NOT DETECTED
CANDIDA TROPICALIS BY PCR: NOT DETECTED
ENTEROBACTER CLOACAE COMPLEX BY PCR: NOT DETECTED
ENTEROBACTERALES BY PCR: NOT DETECTED
ENTEROCOCCUS BY PCR: NOT DETECTED
ESCHERICHIA COLI BY PCR: NOT DETECTED
HAEMOPHILUS INFLUENZAE BY PCR: NOT DETECTED
KLEBSIELLA OXYTOCA BY PCR: NOT DETECTED
KLEBSIELLA PNEUMONIAE GROUP BY PCR: NOT DETECTED
LISTERIA MONOCYTOGENES BY PCR: NOT DETECTED
LV EF: 39 %
LVEF MODALITY: NORMAL
NEISSERIA MENINGITIDIS BY PCR: NOT DETECTED
ORDER NUMBER: NORMAL
PROTEUS SPECIES BY PCR: NOT DETECTED
PSEUDOMONAS AERUGINOSA BY PCR: NOT DETECTED
SERRATIA MARCESCENS BY PCR: NOT DETECTED
SOURCE OF BLOOD CULTURE: NORMAL
STAPHYLOCOCCUS AUREUS BY PCR: NOT DETECTED
STAPHYLOCOCCUS SPECIES BY PCR: NOT DETECTED
STREPTOCOCCUS AGALACTIAE BY PCR: NOT DETECTED
STREPTOCOCCUS PNEUMONIAE BY PCR: NOT DETECTED
STREPTOCOCCUS PYOGENES  BY PCR: NOT DETECTED
STREPTOCOCCUS SPECIES BY PCR: NOT DETECTED

## 2021-06-16 PROCEDURE — 2060000000 HC ICU INTERMEDIATE R&B

## 2021-06-16 PROCEDURE — 6370000000 HC RX 637 (ALT 250 FOR IP): Performed by: GENERAL PRACTICE

## 2021-06-16 PROCEDURE — 2700000000 HC OXYGEN THERAPY PER DAY

## 2021-06-16 PROCEDURE — 6360000002 HC RX W HCPCS: Performed by: GENERAL PRACTICE

## 2021-06-16 PROCEDURE — 6360000004 HC RX CONTRAST MEDICATION: Performed by: GENERAL PRACTICE

## 2021-06-16 PROCEDURE — 93306 TTE W/DOPPLER COMPLETE: CPT

## 2021-06-16 PROCEDURE — 6370000000 HC RX 637 (ALT 250 FOR IP): Performed by: INTERNAL MEDICINE

## 2021-06-16 PROCEDURE — 71046 X-RAY EXAM CHEST 2 VIEWS: CPT

## 2021-06-16 PROCEDURE — 94640 AIRWAY INHALATION TREATMENT: CPT

## 2021-06-16 PROCEDURE — 2580000003 HC RX 258: Performed by: GENERAL PRACTICE

## 2021-06-16 RX ORDER — PREDNISONE 10 MG/1
TABLET ORAL
Qty: 12 TABLET | Refills: 0 | Status: SHIPPED | OUTPATIENT
Start: 2021-06-16

## 2021-06-16 RX ADMIN — SPIRONOLACTONE 25 MG: 25 TABLET ORAL at 10:46

## 2021-06-16 RX ADMIN — PREDNISONE 30 MG: 10 TABLET ORAL at 10:46

## 2021-06-16 RX ADMIN — IPRATROPIUM BROMIDE AND ALBUTEROL SULFATE 1 AMPULE: .5; 3 SOLUTION RESPIRATORY (INHALATION) at 00:00

## 2021-06-16 RX ADMIN — BUDESONIDE 250 MCG: 0.25 SUSPENSION RESPIRATORY (INHALATION) at 07:44

## 2021-06-16 RX ADMIN — ARFORMOTEROL TARTRATE 15 MCG: 15 SOLUTION RESPIRATORY (INHALATION) at 19:34

## 2021-06-16 RX ADMIN — LORAZEPAM 1 MG: 1 TABLET ORAL at 01:38

## 2021-06-16 RX ADMIN — IPRATROPIUM BROMIDE AND ALBUTEROL SULFATE 1 AMPULE: .5; 3 SOLUTION RESPIRATORY (INHALATION) at 11:08

## 2021-06-16 RX ADMIN — IPRATROPIUM BROMIDE AND ALBUTEROL SULFATE 1 AMPULE: .5; 3 SOLUTION RESPIRATORY (INHALATION) at 15:50

## 2021-06-16 RX ADMIN — PERFLUTREN 1.65 MG: 6.52 INJECTION, SUSPENSION INTRAVENOUS at 10:18

## 2021-06-16 RX ADMIN — SACUBITRIL AND VALSARTAN 1 TABLET: 24; 26 TABLET, FILM COATED ORAL at 21:02

## 2021-06-16 RX ADMIN — AMLODIPINE BESYLATE 5 MG: 5 TABLET ORAL at 10:46

## 2021-06-16 RX ADMIN — ARFORMOTEROL TARTRATE 15 MCG: 15 SOLUTION RESPIRATORY (INHALATION) at 07:46

## 2021-06-16 RX ADMIN — IPRATROPIUM BROMIDE AND ALBUTEROL SULFATE 1 AMPULE: .5; 3 SOLUTION RESPIRATORY (INHALATION) at 19:34

## 2021-06-16 RX ADMIN — Medication 10 ML: at 21:03

## 2021-06-16 RX ADMIN — CLONIDINE HYDROCHLORIDE 0.2 MG: 0.2 TABLET ORAL at 10:46

## 2021-06-16 RX ADMIN — METOPROLOL SUCCINATE 100 MG: 100 TABLET, EXTENDED RELEASE ORAL at 10:45

## 2021-06-16 RX ADMIN — BUDESONIDE 250 MCG: 0.25 SUSPENSION RESPIRATORY (INHALATION) at 19:34

## 2021-06-16 RX ADMIN — Medication 10 ML: at 10:46

## 2021-06-16 RX ADMIN — FINASTERIDE 5 MG: 5 TABLET, FILM COATED ORAL at 10:46

## 2021-06-16 RX ADMIN — LORAZEPAM 2 MG: 1 TABLET ORAL at 00:27

## 2021-06-16 RX ADMIN — SACUBITRIL AND VALSARTAN 1 TABLET: 24; 26 TABLET, FILM COATED ORAL at 10:46

## 2021-06-16 RX ADMIN — IPRATROPIUM BROMIDE AND ALBUTEROL SULFATE 1 AMPULE: .5; 3 SOLUTION RESPIRATORY (INHALATION) at 07:45

## 2021-06-16 RX ADMIN — CLONIDINE HYDROCHLORIDE 0.2 MG: 0.2 TABLET ORAL at 21:02

## 2021-06-16 RX ADMIN — LORAZEPAM 3 MG: 1 TABLET ORAL at 05:30

## 2021-06-16 ASSESSMENT — PAIN SCALES - GENERAL: PAINLEVEL_OUTOF10: 0

## 2021-06-16 NOTE — PLAN OF CARE
Problem: Falls - Risk of:  Goal: Will remain free from falls  Description: Will remain free from falls  Outcome: Met This Shift  Goal: Absence of physical injury  Description: Absence of physical injury  Outcome: Met This Shift     Problem: Gas Exchange - Impaired:  Goal: Levels of oxygenation will improve  Description: Levels of oxygenation will improve  Outcome: Met This Shift     Problem: Breathing Pattern - Ineffective:  Goal: Ability to achieve and maintain a regular respiratory rate will improve  Description: Ability to achieve and maintain a regular respiratory rate will improve  Outcome: Not Met This Shift

## 2021-06-16 NOTE — CARE COORDINATION
Patient received ativan through the night for CIWA. Echo pending, called requesting it be completed. Currently on 2L, he does not have home oxygen. Plan is home with roommate when released. For questions I can be reached at [phone].  Dafne Salcido [place]

## 2021-06-16 NOTE — PROGRESS NOTES
Patient seen drowsy from ativan, will stop. Co breathing worse. Echo today. cxr today. bp improved. No chest pain.  Continue with current meds

## 2021-06-16 NOTE — PROGRESS NOTES
[facility]  [address], [address], [address]      Pulmonary Progress Note      SUBJECTIVE:  Claims stable with respiratory function, on 2 li NC, minimal cough/sputum production, looking a little better compared to yesterday (ETOH and drug abuse).     OBJECTIVE    Medications    Continuous Infusions:   sodium chloride Stopped (06/15/21 0036)       Scheduled Meds:   predniSONE  30 mg Oral Daily    sodium chloride flush  5-40 mL Intravenous 2 times per day    ipratropium-albuterol  1 ampule Inhalation Q4H WA    finasteride  5 mg Oral Daily    spironolactone  25 mg Oral Daily    sacubitril-valsartan  1 tablet Oral BID    amLODIPine  5 mg Oral Daily    metoprolol succinate  100 mg Oral Daily    nicotine  1 patch Transdermal Q24H    cloNIDine  0.2 mg Oral BID    budesonide  0.25 mg Nebulization BID    And    Arformoterol Tartrate  15 mcg Nebulization BID       PRN Meds:sodium chloride flush, [Held by provider] LORazepam **OR** [Held by provider] LORazepam **OR** [Held by provider] LORazepam **OR** [Held by provider] LORazepam **OR** [Held by provider] LORazepam **OR** [Held by provider] LORazepam **OR** [Held by provider] LORazepam **OR** [Held by provider] LORazepam, ipratropium-albuterol, nitroGLYCERIN    Physical    VITALS:  BP (!) 136/110   Pulse 68   Temp 97 °F (36.1 °C) (Axillary)   Resp 24   Ht 5' 7\" (1.702 m)   Wt 225 lb (102.1 kg)   SpO2 98%   BMI 35.24 kg/m²     24HR INTAKE/OUTPUT:      Intake/Output Summary (Last 24 hours) at 2021 1602  Last data filed at 2021 1406  Gross per 24 hour   Intake 420 ml   Output --   Net 420 ml       24HR PULSE OXIMETRY RANGE:    SpO2  Av.5 %  Min: 97 %  Max: 100 %    General appearance: alert, appears stated age and cooperative, obese  Lungs: ronchi bilaterally with cough, rare wheeze with cough  Heart: regular rate and rhythm, S1, S2 normal, no murmur, click, rub or gallop  Abdomen: soft, non-tender; bowel sounds normal; no masses,  no organomegaly  Extremities: extremities normal, atraumatic, no cyanosis or edema  Neurologic: Mental status: Alert, oriented, thought content appropriate    Data    CBC:   No results for input(s): WBC, HGB, HCT, MCV, PLT in the last 72 hours. BMP:No results for input(s): NA, K, CL, CO2, PHOS, BUN, CREATININE in the last 72 hours. Invalid input(s): CA ALB:3,BILIDIR:3,BILITOT:3,ALKPHOS:3)@    PT/INR: No results for input(s): PROTIME, INR in the last 72 hours. ABG:   No results for input(s): PH, PO2, PCO2, HCO3, BE, O2SAT, METHB, O2HB, COHB, O2CON, HHB, THB in the last 72 hours. Radiology/Other tests reviewed: CXR reviewed with slightly better aeration both lung fields    Assessment:     Active Problems:    COPD exacerbation (HCC)    Respiratory distress  Resolved Problems:    * No resolved hospital problems. *      Plan:       1. Cont with steroids, taper as tolerated  2. Cont with nebs  3. Cont with oxygen, discussed with nurse to check at rest and ambulation if will qualify for home oxygen supplementation prior to discharge  4. Observe mental function and possible DT, may impact on medications being given here if gets worse  5. Need to work on smoking, ETOH, and drug use      Time at the bedside, reviewing labs and radiographs, reviewing notes and consultations, discussing with staff and family was more than 35 minutes. Thanks for letting us see this patient in consultation. Please contact us with any questions. Office [phone] or after hours through [email or web address], x [phone].

## 2021-06-16 NOTE — PLAN OF CARE
Problem: Falls - Risk of:  Goal: Will remain free from falls  Description: Will remain free from falls  Outcome: Met This Shift  Goal: Absence of physical injury  Description: Absence of physical injury  Outcome: Met This Shift     Problem: Gas Exchange - Impaired:  Goal: Levels of oxygenation will improve  Description: Levels of oxygenation will improve  Outcome: Met This Shift     Problem: Fluid Volume - Deficit:  Goal: Absence of fluid volume deficit signs and symptoms  Description: Absence of fluid volume deficit signs and symptoms  Outcome: Met This Shift     Problem: Nutrition Deficit:  Goal: Ability to achieve adequate nutritional intake will improve  Description: Ability to achieve adequate nutritional intake will improve  Outcome: Met This Shift     Problem: Violence - Risk of, Self/Other-Directed:  Goal: Knowledge of developmental care interventions  Description: Absence of violence  Outcome: Met This Shift     Problem: Breathing Pattern - Ineffective:  Goal: Ability to achieve and maintain a regular respiratory rate will improve  Description: Ability to achieve and maintain a regular respiratory rate will improve  Outcome: Not Met This Shift     Problem: Discharge Planning:  Goal: Discharged to appropriate level of care  Description: Discharged to appropriate level of care  Outcome: Not Met This Shift     Problem: Sleep Pattern Disturbance:  Goal: Appears well-rested  Description: Appears well-rested  Outcome: Not Met This Shift

## 2021-06-17 PROCEDURE — 94640 AIRWAY INHALATION TREATMENT: CPT

## 2021-06-17 PROCEDURE — 6370000000 HC RX 637 (ALT 250 FOR IP): Performed by: GENERAL PRACTICE

## 2021-06-17 PROCEDURE — 6370000000 HC RX 637 (ALT 250 FOR IP): Performed by: INTERNAL MEDICINE

## 2021-06-17 PROCEDURE — 6360000002 HC RX W HCPCS: Performed by: GENERAL PRACTICE

## 2021-06-17 PROCEDURE — 2580000003 HC RX 258: Performed by: GENERAL PRACTICE

## 2021-06-17 PROCEDURE — 2060000000 HC ICU INTERMEDIATE R&B

## 2021-06-17 RX ADMIN — PREDNISONE 30 MG: 10 TABLET ORAL at 09:32

## 2021-06-17 RX ADMIN — MAGNESIUM HYDROXIDE/ALUMINUM HYDROXICE/SIMETHICONE: 120; 1200; 1200 SUSPENSION ORAL at 22:01

## 2021-06-17 RX ADMIN — AMLODIPINE BESYLATE 5 MG: 5 TABLET ORAL at 09:32

## 2021-06-17 RX ADMIN — METOPROLOL SUCCINATE 100 MG: 100 TABLET, EXTENDED RELEASE ORAL at 09:36

## 2021-06-17 RX ADMIN — IPRATROPIUM BROMIDE AND ALBUTEROL SULFATE 1 AMPULE: .5; 3 SOLUTION RESPIRATORY (INHALATION) at 20:07

## 2021-06-17 RX ADMIN — BUDESONIDE 250 MCG: 0.25 SUSPENSION RESPIRATORY (INHALATION) at 08:40

## 2021-06-17 RX ADMIN — SPIRONOLACTONE 25 MG: 25 TABLET ORAL at 09:32

## 2021-06-17 RX ADMIN — ARFORMOTEROL TARTRATE 15 MCG: 15 SOLUTION RESPIRATORY (INHALATION) at 20:05

## 2021-06-17 RX ADMIN — Medication 10 ML: at 09:33

## 2021-06-17 RX ADMIN — IPRATROPIUM BROMIDE AND ALBUTEROL SULFATE 1 AMPULE: .5; 3 SOLUTION RESPIRATORY (INHALATION) at 08:42

## 2021-06-17 RX ADMIN — CLONIDINE HYDROCHLORIDE 0.2 MG: 0.2 TABLET ORAL at 09:32

## 2021-06-17 RX ADMIN — FINASTERIDE 5 MG: 5 TABLET, FILM COATED ORAL at 09:32

## 2021-06-17 RX ADMIN — BUDESONIDE 250 MCG: 0.25 SUSPENSION RESPIRATORY (INHALATION) at 20:06

## 2021-06-17 RX ADMIN — SACUBITRIL AND VALSARTAN 1 TABLET: 24; 26 TABLET, FILM COATED ORAL at 09:32

## 2021-06-17 RX ADMIN — IPRATROPIUM BROMIDE AND ALBUTEROL SULFATE 1 AMPULE: .5; 3 SOLUTION RESPIRATORY (INHALATION) at 11:54

## 2021-06-17 RX ADMIN — ARFORMOTEROL TARTRATE 15 MCG: 15 SOLUTION RESPIRATORY (INHALATION) at 08:40

## 2021-06-17 RX ADMIN — Medication 10 ML: at 20:53

## 2021-06-17 RX ADMIN — IPRATROPIUM BROMIDE AND ALBUTEROL SULFATE 1 AMPULE: .5; 3 SOLUTION RESPIRATORY (INHALATION) at 16:04

## 2021-06-17 RX ADMIN — CLONIDINE HYDROCHLORIDE 0.2 MG: 0.2 TABLET ORAL at 20:51

## 2021-06-17 RX ADMIN — SACUBITRIL AND VALSARTAN 1 TABLET: 24; 26 TABLET, FILM COATED ORAL at 20:51

## 2021-06-17 ASSESSMENT — PAIN SCALES - GENERAL
PAINLEVEL_OUTOF10: 0

## 2021-06-17 ASSESSMENT — PULMONARY FUNCTION TESTS: PEFR_L/MIN: 20

## 2021-06-17 NOTE — CONSULTS
Department of Internal Medicine  Infectious Diseases   Consult Note      Reason for Consult:  GPC bacteremia       Requesting Physician:  Dr Hosey Gilford:                Pt is known to me . This is a 60 yr sold male with hx of COPD, CHF ,CAD , COVID 19 ( Nov 2020) presented to the ER with shortness of breath, cough, sputum expectoration x 4 days  . He denied fever and chills . Denied sick contact, has not received COVID vaccine.    WBC was 9.9 K procalcitonin 0.06,   Chest xray - bilateral interstitial infiltrates  Blood cx - GPC         Past Medical History:      Past Medical History:   Diagnosis Date    Alcohol abuse     CHF (congestive heart failure) (HCC)     [facility]     History of cocaine use     Hyperlipidemia     Hypertension     Marijuana use     quit November 2017     MI (myocardial infarction) ([facility]     alberto        Past Surgical History:      Past Surgical History:   Procedure Laterality Date    CARDIAC CATHETERIZATION  11/03/2020    DR Jah Hernandez CERVICAL FUSION  11/18/15    cervical laminectomy & fusion c4-c6, with rods & screws    POLYSOMNOGRAPHY  01/29/2018    AHI=29.8    WRIST SURGERY           Current Medications:      Current Facility-Administered Medications   Medication Dose Route Frequency Provider Last Rate Last Admin    predniSONE (DELTASONE) tablet 30 mg  30 mg Oral Daily Kanu Ramon MD   30 mg at 06/17/21 0932    sodium chloride flush 0.9 % injection 5-40 mL  5-40 mL Intravenous 2 times per day Tammy Gails, DO   10 mL at 06/17/21 0933    sodium chloride flush 0.9 % injection 5-40 mL  5-40 mL Intravenous PRN Tammy Gails, DO   10 mL at 06/15/21 0245    [Held by provider] LORazepam (ATIVAN) tablet 1 mg  1 mg Oral Q1H PRN Tammy Gails, DO   1 mg at 06/16/21 0138    Or    [Held by provider] LORazepam (ATIVAN) injection 1 mg  1 mg Intravenous Q1H PRN Tammy Gails, DO        Or   Kylee Rodriguez by provider] LORazepam (ATIVAN) tablet 2 mg  2 mg Oral Q1H PRN Giovany Ortega, DO   2 mg at 06/16/21 8663    Or    [Held by provider] LORazepam (ATIVAN) injection 2 mg  2 mg Intravenous Q1H PRN Giovany Ortega, DO        Or   [facility] [Held by provider] LORazepam (ATIVAN) tablet 3 mg  3 mg Oral Q1H PRN Giovany Shannon, DO   3 mg at 06/16/21 0530    Or    [Held by provider] LORazepam (ATIVAN) injection 3 mg  3 mg Intravenous Q1H PRN Giovany Shannon, DO   3 mg at 06/15/21 1136    Or    [Held by provider] LORazepam (ATIVAN) tablet 4 mg  4 mg Oral Q1H PRN Giovany Ortega, DO        Or   [facility] [Held by provider] LORazepam (ATIVAN) injection 4 mg  4 mg Intravenous Q1H PRN Giovany Ortega, DO        0.9 % sodium chloride infusion   Intravenous Continuous Durenda Alfred, DO   Held at 06/15/21 0036    ipratropium-albuterol (DUONEB) nebulizer solution 1 ampule  1 ampule Inhalation Q4H WA Giovany Ortega, DO   1 ampule at 06/17/21 1154    ipratropium-albuterol (DUONEB) nebulizer solution 1 ampule  1 ampule Inhalation Q4H PRN Giovany Ortega, DO   1 ampule at 06/16/21 0000    finasteride (PROSCAR) tablet 5 mg  5 mg Oral Daily Giovany Ortega, DO   5 mg at 06/17/21 0932    spironolactone (ALDACTONE) tablet 25 mg  25 mg Oral Daily Giovany Ortega, DO   25 mg at 06/17/21 0932    sacubitril-valsartan (ENTRESTO) 24-26 MG per tablet 1 tablet  1 tablet Oral BID Giovany Ortega, DO   1 tablet at 06/17/21 0932    amLODIPine (NORVASC) tablet 5 mg  5 mg Oral Daily Giovany Ortega, DO   5 mg at 06/17/21 0932    nitroGLYCERIN (NITROSTAT) SL tablet 0.4 mg  0.4 mg Sublingual Q5 Min PRN Giovany Ortega, DO   0.4 mg at 06/15/21 1646    metoprolol succinate (TOPROL XL) extended release tablet 100 mg  100 mg Oral Daily Giovany Ortega, DO   100 mg at 06/17/21 0936    nicotine (NICODERM CQ) 21 MG/24HR 1 patch  1 patch Transdermal Q24H Giovany Ortega DO   1 patch at 06/16/21 5155    cloNIDine (CATAPRES) tablet 0.2 mg  0.2 mg Oral BID Giovany Ortega DO   0.2 mg at 06/17/21 0135  budesonide (PULMICORT) nebulizer suspension 250 mcg  0.25 mg Nebulization BID Tammy Gails, DO   250 mcg at 06/17/21 0840    And    Arformoterol Tartrate (BROVANA) nebulizer solution 15 mcg  15 mcg Nebulization BID Tammy Gails, DO   15 mcg at 06/17/21 0840       Allergies:  Patient has no known allergies. Social History:      Social History     Socioeconomic History    Marital status: Legally      Spouse name: Not on file    Number of children: Not on file    Years of education: Not on file    Highest education level: Not on file   Occupational History    Not on file   Tobacco Use    Smoking status: Current Every Day Smoker     Packs/day: 1.50     Years: 40.00     Pack years: 60.00     Types: Cigarettes     Start date: 8/18/1979    Smokeless tobacco: Never Used    Tobacco comment: rolling his own cigarettes   Substance and Sexual Activity    Alcohol use: Not Currently     Alcohol/week: 0.0 standard drinks     Comment: 6 beers on the weekend    Drug use: Not Currently     Types: Cocaine, Other-see comments, Marijuana    Sexual activity: Not on file   Other Topics Concern    Not on file   Social History Narrative    Not on file     Social Determinants of Health     Financial Resource Strain:     Difficulty of Paying Living Expenses:    Food Insecurity:     Worried About Running Out of Food in the Last Year:     Vinh of Food in the Last Year:    Transportation Needs:     Lack of Transportation (Medical):      Lack of Transportation (Non-Medical):    Physical Activity:     Days of Exercise per Week:     Minutes of Exercise per Session:    Stress:     Feeling of Stress :    Social Connections:     Frequency of Communication with Friends and Family:     Frequency of Social Gatherings with Friends and Family:     Attends Confucianism Services:     Active Member of Clubs or Organizations:     Attends Club or Organization Meetings:     Marital Status:    Intimate Partner Violence:     Fear of Current or Ex-Partner:     Emotionally Abused:     Physically Abused:     Sexually Abused:          Family History:     Family History   Problem Relation Age of Onset    Arthritis Mother     Other Mother         glucoma    Heart Disease Father     High Blood Pressure Brother     Other Brother         sleep apnea    High Blood Pressure Brother     Other Brother         sleep apnea    High Blood Pressure Brother     Other Brother         sleep apnea    High Blood Pressure Brother     Other Brother         sleep apnea    Cancer Brother         josias -  at 62       REVIEW OF SYSTEMS:    CONSTITUTIONAL:  Denies fever, chill or rigors  HEENT: red eyes and discomfort   RESPIRATORY: Reports  cough, shortness of breath, sputum expectoration. CARDIOVASCULAR:  Denies palpitation  GASTROINTESTINAL:  Denies abdomen pain, diarrhea or constipation. GENITOURINARY:  Denies burning urination or frequency of urination  INTEGUMENT: denies wound , rash  HEMATOLOGIC/LYMPHATIC:  Denies lymph node swelling, gum bleeding or easy bruising. MUSCULOSKELETAL:  Denies leg pain , joint pain , joint swelling  NEUROLOGICAL:  Denies light headed, dizziness, loss of consciousness      PHYSICAL EXAM:      Vitals:     BP (!) 144/95   Pulse 82   Temp 98.4 °F (36.9 °C) (Oral)   Resp 18   Ht 5' 7\" (1.702 m)   Wt 225 lb (102.1 kg)   SpO2 97%   BMI 35.24 kg/m²     General Appearance:    Awake, alert , no acute distress. Head:    Normocephalic, atraumatic   Eyes:    Mild conjunctival congestion    Ears:    No obvious deformity or drainage.    Nose:   No nasal drainage   Throat:   Mucosa moist, no oral thrush   Neck:   Supple, no lymphadenopathy   Back:     no CVA tenderness   Lungs:     Diminished breath sound     Heart:    Regular rate and rhythm, no murmur   Abdomen:     Soft, non-tender, bowel sounds present    Extremities:   + edema, no cyanosis    Pulses:   Dorsalis pedis palpable    Skin:   no rashes or lesions   CBC with Differential:      Lab Results   Component Value Date    WBC 9.9 06/13/2021    RBC 4.51 06/13/2021    HGB 14.5 06/13/2021    HCT 42.5 06/13/2021     06/13/2021    MCV 94.2 06/13/2021    MCH 32.2 06/13/2021    MCHC 34.1 06/13/2021    RDW 14.2 06/13/2021    METASPCT 2 02/10/2016    LYMPHOPCT 16.8 06/13/2021    MONOPCT 7.1 06/13/2021    MYELOPCT 1 02/10/2016    BASOPCT 0.6 06/13/2021    MONOSABS 0.70 06/13/2021    LYMPHSABS 1.65 06/13/2021    EOSABS 0.18 06/13/2021    BASOSABS 0.06 06/13/2021       CMP     Lab Results   Component Value Date     11/26/2020    K 4.9 11/26/2020    K 4.4 11/22/2020     11/26/2020    CO2 18 11/26/2020    BUN 29 11/26/2020    CREATININE 1.0 11/26/2020    GFRAA >60 11/26/2020    LABGLOM >60 11/26/2020    GLUCOSE 190 11/26/2020    PROT 6.9 11/22/2020    LABALBU 3.3 11/22/2020    CALCIUM 8.9 11/26/2020    BILITOT 0.3 11/22/2020    ALKPHOS 71 11/22/2020    AST 27 11/22/2020    ALT 37 11/22/2020         Hepatic Function Panel:    Lab Results   Component Value Date    ALKPHOS 71 11/22/2020    ALT 37 11/22/2020    AST 27 11/22/2020    PROT 6.9 11/22/2020    BILITOT 0.3 11/22/2020    BILIDIR <0.2 08/17/2019    IBILI see below 08/17/2019    LABALBU 3.3 11/22/2020       PT/INR:    Lab Results   Component Value Date    PROTIME 13.0 11/22/2020    INR 1.2 11/22/2020       TSH:    Lab Results   Component Value Date    TSH 0.809 04/18/2019       U/A:    Lab Results   Component Value Date    COLORU Yellow 06/13/2021    PHUR 6.0 06/13/2021    WBCUA 0-1 06/13/2021    RBCUA NONE 06/13/2021    BACTERIA FEW 06/13/2021    CLARITYU Clear 06/13/2021    SPECGRAV 1.020 06/13/2021    LEUKOCYTESUR Negative 06/13/2021    UROBILINOGEN 0.2 06/13/2021    BILIRUBINUR Negative 06/13/2021    BLOODU Negative 06/13/2021    GLUCOSEU Negative 06/13/2021       ABG:  No results found for: CBD4AVX, BEART, V4OPHICT, PHART, THGBART, SKP0HVH, PO2ART, LRF4LAJ    MICROBIOLOGY:    Blood culture -    6/16/2021  8:24 AM - Alfredo, Mhy Incoming Results From [facility]    Component Value Ref Range & Units Status Collected Lab   Bottle Type Aerobic   Final 06/13/2021  2:35 PM [facility] of Blood Culture No site given   Final 06/13/2021  2:35 PM [facility]   Order Number [id number]   Final 06/13/2021  2:35 PM [address] Lab   Enterobacter cloacae complex by PCR Not Detected  Not Detected Final 06/13/2021  2:35 PM [facility]   Escherichia coli by PCR Not Detected  Not Detected Final 06/13/2021  2:35 PM [facility]   Klebsiella oxytoca by PCR Not Detected  Not Detected Final 06/13/2021  2:35 PM [facility]   Klebsiella pneumoniae group by PCR Not Detected  Not Detected Final 06/13/2021  2:35 PM [facility]   Proteus species by PCR Not Detected  Not Detected Final 06/13/2021  2:35 PM  - [facility]   Streptococcus agalactiae by PCR Not Detected  Not Detected Final 06/13/2021  2:35 PM [address] Lab   Staphylococcus aureus by PCR Not Detected  Not Detected Final 06/13/2021  2:35 PM [address] Lab   Serratia marcescens by PCR Not Detected  Not Detected Final 06/13/2021  2:35 PM [address] Lab   Streptococcus pneumoniae by PCR Not Detected  Not Detected Final 06/13/2021  2:35 PM [address] Lab   Streptococcus pyogenes  by PCR Not Detected  Not Detected Final 06/13/2021  2:35 PM [address] Lab   Acinetobacter baumannii by PCR Not Detected  Not Detected Final 06/13/2021  2:35 PM [facility]   Candida albicans by PCR Not Detected  Not Detected Final 06/13/2021  2:35 PM [address] Lab   Candida glabrata by PCR Not Detected  Not Detected Final 06/13/2021  2:35 PM [address] Lab   Candida krusei by PCR Not Detected  Not Detected Final 06/13/2021  2:35 PM  - [facility]   Palak parapsilosis by PCR Not Detected  Not Detected Final 06/13/2021  2:35 PM [address] [place] [address] Lab   Candida tropicalis by PCR Not Detected  Not Detected Final 06/13/2021  2:35 PM [place] [address] [facility]   Enterobacteriaceae by PCR Not Detected  Not Detected Final 06/13/2021  2:35 PM [address] Lab   Enterococcus by PCR Not Detected  Not Detected Final 06/13/2021  2:35 PM [place] [address] [facility]   Haemophilus Influenzae by PCR Not Detected  Not Detected Final 06/13/2021  2:35 PM [place] [address] [facility]   Listeria monocytogenes by PCR Not Detected  Not Detected Final 06/13/2021  2:35 PM [place] [address] [facility]   Neisseria meningitidis by PCR Not Detected  Not Detected Final 06/13/2021  2:35 PM [place] [address] [facility]   Pseudomonas aeruginosa by PCR Not Detected  Not Detected Final 06/13/2021  2:35 PM [address] [place] [address] Lab   Staphylococcus species by PCR Not Detected  Not Detected Final 06/13/2021  2:35 PM [address] [place] [address] Lab   Streptococcus species by PCR Not Detected  Not Detected Final 06/13/2021  2:35 PM [address] [place] [address] Lab   Testing Performed By    Lab - Abbreviation Name Director Address Valid Date Range   49- - [facility]  [address] [facility] Tesha Bhardwaj MD [address] [place] [address]. [address] [place] [address] 12/03/19 1502-Present   Narrative          Radiology :    Chest X ray -bilateral interstitial infiltrates       IMPRESSION:     1.COPDE  2. GPC bacteremia - turned +ve after 4 days - likely contamination     RECOMMENDATIONS:      1. No abx for blood cx   2.  Breathing treatment     Thank you Dr Bryce Stahl for the consult

## 2021-06-17 NOTE — PROGRESS NOTES
[facility]  [address], [address], [address]      Pulmonary Progress Note      SUBJECTIVE:  Claims stable with respiratory function, on RA, minimal cough/sputum production, looking a little better compared to yesterday, sitting up at side of bed and claims ambulating in hallway.     OBJECTIVE    Medications    Continuous Infusions:   sodium chloride Stopped (06/15/21 0036)       Scheduled Meds:   predniSONE  30 mg Oral Daily    sodium chloride flush  5-40 mL Intravenous 2 times per day    ipratropium-albuterol  1 ampule Inhalation Q4H WA    finasteride  5 mg Oral Daily    spironolactone  25 mg Oral Daily    sacubitril-valsartan  1 tablet Oral BID    amLODIPine  5 mg Oral Daily    metoprolol succinate  100 mg Oral Daily    nicotine  1 patch Transdermal Q24H    cloNIDine  0.2 mg Oral BID    budesonide  0.25 mg Nebulization BID    And    Arformoterol Tartrate  15 mcg Nebulization BID       PRN Meds:sodium chloride flush, [Held by provider] LORazepam **OR** [Held by provider] LORazepam **OR** [Held by provider] LORazepam **OR** [Held by provider] LORazepam **OR** [Held by provider] LORazepam **OR** [Held by provider] LORazepam **OR** [Held by provider] LORazepam **OR** [Held by provider] LORazepam, ipratropium-albuterol, nitroGLYCERIN    Physical    VITALS:  BP (!) 144/95   Pulse 82   Temp 98.4 °F (36.9 °C) (Oral)   Resp 18   Ht 5' 7\" (1.702 m)   Wt 225 lb (102.1 kg)   SpO2 100%   BMI 35.24 kg/m²     24HR INTAKE/OUTPUT:      Intake/Output Summary (Last 24 hours) at 2021 1650  Last data filed at 2021 1541  Gross per 24 hour   Intake 1440 ml   Output 0 ml   Net 1440 ml       24HR PULSE OXIMETRY RANGE:    SpO2  Av.3 %  Min: 92 %  Max: 100 %    General appearance: alert, appears stated age and cooperative, obese  Lungs: ronchi bilaterally with cough, rare wheeze with cough  Heart: regular rate and rhythm, S1, S2 normal, no murmur, click, rub or gallop  Abdomen: soft, non-tender; bowel sounds normal; no masses,  no organomegaly  Extremities: extremities normal, atraumatic, no cyanosis or edema  Neurologic: Mental status: Alert, oriented, thought content appropriate    Data    CBC:   No results for input(s): WBC, HGB, HCT, MCV, PLT in the last 72 hours. BMP:No results for input(s): NA, K, CL, CO2, PHOS, BUN, CREATININE in the last 72 hours. Invalid input(s): CA ALB:3,BILIDIR:3,BILITOT:3,ALKPHOS:3)@    PT/INR: No results for input(s): PROTIME, INR in the last 72 hours. ABG:   No results for input(s): PH, PO2, PCO2, HCO3, BE, O2SAT, METHB, O2HB, COHB, O2CON, HHB, THB in the last 72 hours. Radiology/Other tests reviewed: CXR reviewed with slightly better aeration both lung fields    Assessment:     Active Problems:    COPD exacerbation (HCC)    Respiratory distress  Resolved Problems:    * No resolved hospital problems. *      Plan:       1. Cont with steroids, taper as tolerated  2. Cont with nebs, can cont with usual medications as out-pt  3. On RA, watch oxygenation  4. Need to work on smoking, ETOH, and drug use  5. Can be discharged from pulmonary pov      Time at the bedside, reviewing labs and radiographs, reviewing notes and consultations, discussing with staff and family was more than 35 minutes. Thanks for letting us see this patient in consultation. Please contact us with any questions. Office [phone] or after hours through [employer], x [phone].

## 2021-06-18 VITALS
BODY MASS INDEX: 35.31 KG/M2 | SYSTOLIC BLOOD PRESSURE: 123 MMHG | TEMPERATURE: 97.8 F | OXYGEN SATURATION: 98 % | RESPIRATION RATE: 20 BRPM | DIASTOLIC BLOOD PRESSURE: 70 MMHG | HEIGHT: 67 IN | WEIGHT: 225 LBS | HEART RATE: 73 BPM

## 2021-06-18 LAB — CULTURE, BLOOD 2: NORMAL

## 2021-06-18 PROCEDURE — 2580000003 HC RX 258: Performed by: GENERAL PRACTICE

## 2021-06-18 PROCEDURE — 6370000000 HC RX 637 (ALT 250 FOR IP): Performed by: GENERAL PRACTICE

## 2021-06-18 PROCEDURE — 6370000000 HC RX 637 (ALT 250 FOR IP): Performed by: INTERNAL MEDICINE

## 2021-06-18 PROCEDURE — 94640 AIRWAY INHALATION TREATMENT: CPT

## 2021-06-18 PROCEDURE — 6360000002 HC RX W HCPCS: Performed by: GENERAL PRACTICE

## 2021-06-18 RX ORDER — CLONIDINE HYDROCHLORIDE 0.2 MG/1
0.2 TABLET ORAL 2 TIMES DAILY
Qty: 60 TABLET | Refills: 3 | Status: SHIPPED | OUTPATIENT
Start: 2021-06-18

## 2021-06-18 RX ADMIN — CLONIDINE HYDROCHLORIDE 0.2 MG: 0.2 TABLET ORAL at 08:28

## 2021-06-18 RX ADMIN — IPRATROPIUM BROMIDE AND ALBUTEROL SULFATE 1 AMPULE: .5; 3 SOLUTION RESPIRATORY (INHALATION) at 07:59

## 2021-06-18 RX ADMIN — SPIRONOLACTONE 25 MG: 25 TABLET ORAL at 08:29

## 2021-06-18 RX ADMIN — SACUBITRIL AND VALSARTAN 1 TABLET: 24; 26 TABLET, FILM COATED ORAL at 08:28

## 2021-06-18 RX ADMIN — ARFORMOTEROL TARTRATE 15 MCG: 15 SOLUTION RESPIRATORY (INHALATION) at 07:59

## 2021-06-18 RX ADMIN — Medication 10 ML: at 08:30

## 2021-06-18 RX ADMIN — PREDNISONE 30 MG: 10 TABLET ORAL at 08:29

## 2021-06-18 RX ADMIN — METOPROLOL SUCCINATE 100 MG: 100 TABLET, EXTENDED RELEASE ORAL at 08:28

## 2021-06-18 RX ADMIN — AMLODIPINE BESYLATE 5 MG: 5 TABLET ORAL at 08:28

## 2021-06-18 RX ADMIN — BUDESONIDE 250 MCG: 0.25 SUSPENSION RESPIRATORY (INHALATION) at 07:59

## 2021-06-18 RX ADMIN — FINASTERIDE 5 MG: 5 TABLET, FILM COATED ORAL at 08:29

## 2021-06-18 ASSESSMENT — PAIN SCALES - GENERAL
PAINLEVEL_OUTOF10: 0
PAINLEVEL_OUTOF10: 0

## 2021-06-18 NOTE — PROGRESS NOTES
Patient seen seen stable from a cardiopulmonary point of view. Lungs clear and bp improved . Will dc today and follow in the  Office and taper steroids. Advised to quit smoking and stop using drugs and etoh.  Be compliant with meds

## 2021-06-18 NOTE — CARE COORDINATION
Plan is home today. Now on room air. Friend to transport home, no needs. For questions I can be reached at [phone].  [place], [place]

## 2021-06-18 NOTE — PLAN OF CARE
Problem: Falls - Risk of:  Goal: Will remain free from falls  Description: Will remain free from falls  Outcome: Met This Shift     Problem: Falls - Risk of:  Goal: Absence of physical injury  Description: Absence of physical injury  Outcome: Met This Shift     Problem: Breathing Pattern - Ineffective:  Goal: Ability to achieve and maintain a regular respiratory rate will improve  Description: Ability to achieve and maintain a regular respiratory rate will improve  Outcome: Met This Shift     Problem: Gas Exchange - Impaired:  Goal: Levels of oxygenation will improve  Description: Levels of oxygenation will improve  Outcome: Met This Shift

## 2021-06-18 NOTE — PROGRESS NOTES
PROGRESS NOTE       PATIENT PROBLEM LIST:  Active Problems:    COPD exacerbation ([address]    Respiratory distress  Resolved Problems:    * No resolved hospital problems. *      SUBJECTIVE:  Luisito Heard states he feels much better and denies significant shortness of breath at this time. Likewise he has no complaints of chest discomfort or palpitations. REVIEW OF SYSTEMS:  General ROS: positive for - fatigue-improving  Psychological ROS: positive for -depression  Ophthalmic ROS: negative for - decreased vision or visual distortion. ENT ROS: negative  Allergy and Immunology ROS: negative  Hematological and Lymphatic ROS: negative  Endocrine: no heat or cold intolerance and no polyphagia, polydipsia, or polyuria  Respiratory ROS: positive for - shortness of breath-improving  Cardiovascular ROS: positive for - dyspnea on exertion, edema and shortness of breath. Gastrointestinal ROS: no abdominal pain, change in bowel habits, or black or bloody stools  Genito-Urinary ROS: no nocturia, dysuria, trouble voiding, frequency or hematuria  Musculoskeletal ROS: negative for- myalgias, arthralgias, or claudication  Neurological ROS: no TIA or stroke symptoms otherwise no significant change in symptoms or problems since yesterday as documented in previous progress notes.     SCHEDULED MEDICATIONS:   predniSONE  30 mg Oral Daily    sodium chloride flush  5-40 mL Intravenous 2 times per day    ipratropium-albuterol  1 ampule Inhalation Q4H WA    finasteride  5 mg Oral Daily    spironolactone  25 mg Oral Daily    sacubitril-valsartan  1 tablet Oral BID    amLODIPine  5 mg Oral Daily    metoprolol succinate  100 mg Oral Daily    nicotine  1 patch Transdermal Q24H    cloNIDine  0.2 mg Oral BID    budesonide  0.25 mg Nebulization BID    And    Arformoterol Tartrate  15 mcg Nebulization BID       VITAL SIGNS: /78   Pulse 84   Temp 98.4 °F (36.9 °C) (Oral)   Resp 20   Ht 5' 7\" (1.702 m)   Wt 225 lb (102.1 kg)   SpO2 98%   BMI 35.24 kg/m²   No data found. OBJECTIVE:    HEENT: PERRL, EOM  Intact; sclera non-icteric, conjunctiva pink. Carotids are brisk in upstroke with normal contour. No carotid bruits. Normal jugular venous pulsation at 45°. No palpable cervical nor supraclavicular nodes. Thyroid not palpable. Trachea midline. Chest: Even excursion  Lungs: CTA B, no expiratory wheezes or rhonchi, no decreased tactile fremitus without inspiratory rales. Heart: Regular  rhythm; S1 > S2, no gallop or murmur. No clicks, rub, palpable thrills   or heaves. PMI nondisplaced, 5th intercostal space MCL. Abdomen: Soft, nontender, nondistended,  moderately protuberant, no masses or organomegaly. Bowel sounds active. Extremities: Without clubbing, cyanosis or edema. Pulses present 3+ upper extermities bilaterally; present 1+ DP and present 1+ PT bilaterally. Data:   Scheduled Meds: Reviewed  Continuous Infusions:    sodium chloride Stopped (06/15/21 0036)       Intake/Output Summary (Last 24 hours) at 6/17/2021 2255  Last data filed at 6/17/2021 2230  Gross per 24 hour   Intake 1920 ml   Output 0 ml   Net 1920 ml     CBC: No results for input(s): WBC, HGB, HCT, PLT in the last 72 hours. BMP:No results for input(s): NA, K, CL, CO2, BUN, CREATININE, LABGLOM in the last 72 hours. Invalid input(s): GLU,  CA  ABGs:   Lab Results   Component Value Date    PH 7.475 11/23/2020    PO2 83.0 11/23/2020    PCO2 32.3 11/23/2020     INR: No results for input(s): INR in the last 72 hours. PRO-BNP:   Lab Results   Component Value Date    PROBNP 7,751 (H) 06/13/2021    PROBNP 321 (H) 11/28/2020      TSH:   Lab Results   Component Value Date    TSH 0.809 04/18/2019      Cardiac Injury Profile: No results for input(s): CKTOTAL, CKMB, TROPONINI in the last 72 hours.    Lipid Profile:   Lab Results   Component Value Problems:    * No resolved hospital problems. *      RECOMMENDATIONS:  I have carefully reviewed with MrMario Noel the need for him to remain compliant with his medical regimen and that he absolutely discontinue alcohol and/or recreational drugs. His two-dimensional echocardiogram confirms persistent left ventricular dysfunction (EF approximately 36%) and he should remain on his medications as prescribed upon his previous discharge. He indicates that he will comply but his understanding of the ramifications of medical noncompliance with his underlying heart condition are somewhat questionable and that his first question was regarding its effect on his \"malehood\". I have asked that he follow-up with both Dr. Floyd Weaver and myself soon after discharge. Likewise it may be in his best interest to follow-up in the CHF clinic as well. I have spent more than 25 minutes face to face with Alejandro Huntley and reviewing notes and laboratory data, with greater than 50% of this time instructing and counseling the patient face to face regarding my findings and recommendations and I have answered all questions as posed to me by Mr. Jessica Augustine. Eddie Taylor, DO FACP,FACC,[facility]      NOTE:  This report was transcribed using voice recognition software.   Every effort was made to ensure accuracy; however, inadvertent computerized transcription errors may be present

## 2021-06-18 NOTE — PROGRESS NOTES
PROGRESS NOTE       PATIENT PROBLEM LIST:  Active Problems:    COPD exacerbation ([address]    Respiratory distress  Resolved Problems:    * No resolved hospital problems. *      SUBJECTIVE:  Anila Salazar states he feels much better and denies significant shortness of breath at this time. Likewise he has no complaints of chest discomfort or palpitations. REVIEW OF SYSTEMS:  General ROS: positive for - fatigueimproving  Psychological ROS: positive for -depression  Ophthalmic ROS: negative for - decreased vision or visual distortion. ENT ROS: negative  Allergy and Immunology ROS: negative  Hematological and Lymphatic ROS: negative  Endocrine: no heat or cold intolerance and no polyphagia, polydipsia, or polyuria  Respiratory ROS: positive for - shortness of breathimproving  Cardiovascular ROS: positive for - dyspnea on exertion, edema and shortness of breath. Gastrointestinal ROS: no abdominal pain, change in bowel habits, or black or bloody stools  Genito-Urinary ROS: no nocturia, dysuria, trouble voiding, frequency or hematuria  Musculoskeletal ROS: negative for- myalgias, arthralgias, or claudication  Neurological ROS: no TIA or stroke symptoms otherwise no significant change in symptoms or problems since yesterday as documented in previous progress notes.     SCHEDULED MEDICATIONS:   predniSONE  30 mg Oral Daily    sodium chloride flush  5-40 mL Intravenous 2 times per day    ipratropium-albuterol  1 ampule Inhalation Q4H WA    finasteride  5 mg Oral Daily    spironolactone  25 mg Oral Daily    sacubitril-valsartan  1 tablet Oral BID    amLODIPine  5 mg Oral Daily    metoprolol succinate  100 mg Oral Daily    nicotine  1 patch Transdermal Q24H    cloNIDine  0.2 mg Oral BID    budesonide  0.25 mg Nebulization BID    And    Arformoterol Tartrate  15 mcg Nebulization BID       VITAL SIGNS: /78   Pulse 84   Temp 98.4 °F (36.9 °C) (Oral)   Resp 20   Ht 5' 7\" (1.702 m)   Wt 225 lb (102.1 kg)   SpO2 98%   BMI 35.24 kg/m²   No data found. OBJECTIVE:    HEENT: PERRL, EOM  Intact; sclera non-icteric, conjunctiva pink. Carotids are brisk in upstroke with normal contour. No carotid bruits. Normal jugular venous pulsation at 45°. No palpable cervical nor supraclavicular nodes. Thyroid not palpable. Trachea midline. Chest: Even excursion  Lungs: CTA B, no expiratory wheezes or rhonchi, no decreased tactile fremitus without inspiratory rales. Heart: Regular  rhythm; S1 > S2, no gallop or murmur. No clicks, rub, palpable thrills   or heaves. PMI nondisplaced, 5th intercostal space MCL. Abdomen: Soft, nontender, nondistended,  moderately protuberant, no masses or organomegaly. Bowel sounds active. Extremities: Without clubbing, cyanosis or edema. Pulses present 3+ upper extermities bilaterally; present 1+ DP and present 1+ PT bilaterally. Data:   Scheduled Meds: Reviewed  Continuous Infusions:    sodium chloride Stopped (06/15/21 0036)       Intake/Output Summary (Last 24 hours) at 6/17/2021 2301  Last data filed at 6/17/2021 2230  Gross per 24 hour   Intake 1920 ml   Output 0 ml   Net 1920 ml     CBC: No results for input(s): WBC, HGB, HCT, PLT in the last 72 hours. BMP:No results for input(s): NA, K, CL, CO2, BUN, CREATININE, LABGLOM in the last 72 hours. Invalid input(s): GLU,  CA  ABGs:   Lab Results   Component Value Date    PH 7.475 11/23/2020    PO2 83.0 11/23/2020    PCO2 32.3 11/23/2020     INR: No results for input(s): INR in the last 72 hours. PRO-BNP:   Lab Results   Component Value Date    PROBNP 7,751 (H) 06/13/2021    PROBNP 321 (H) 11/28/2020      TSH:   Lab Results   Component Value Date    TSH 0.809 04/18/2019      Cardiac Injury Profile: No results for input(s): CKTOTAL, CKMB, TROPONINI in the last 72 hours.    Lipid Profile:   Lab Results   Component Value Date    TRIG 217 01/02/2018    HDL 58 01/02/2018    LDLCALC 144 01/02/2018    CHOL 245 01/02/2018      Hemoglobin A1C: No components found for: HGBA1C     RAD:   XR CHEST (2 VW)    Result Date: 6/16/2021  EXAMINATION: TWO XRAY VIEWS OF THE CHEST 6/16/2021 9:02 am COMPARISON: 06/13/2021 HISTORY: ORDERING SYSTEM PROVIDED HISTORY: sob TECHNOLOGIST PROVIDED HISTORY: Reason for exam:->sob What reading provider will be dictating this exam?->CRC FINDINGS: The heart is enlarged. There is partial interval clearing of the bilateral multifocal infiltrates noted on the patient's previous study. There is no gross pleural effusion. 1. Partial interval clearing of the previously noted multifocal bilateral pulmonary infiltrates. XR CHEST PORTABLE    Result Date: 6/13/2021  EXAMINATION: ONE XRAY VIEW OF THE CHEST 6/13/2021 2:10 pm COMPARISON: Chest series from November 14, 2020 HISTORY: ORDERING SYSTEM PROVIDED HISTORY: SOB, resp distress TECHNOLOGIST PROVIDED HISTORY: Reason for exam:->SOB, resp distress What reading provider will be dictating this exam?->CRC FINDINGS: Symmetric aeration of the lungs. There are ill-defined opacities in the bilateral mid and lower lungs. No obvious pleural effusion or pneumothorax. Atherosclerotic disease in the aortic arch. Cardiac silhouette is prominent. Mild central pulmonary vascular congestion. Osseous and thoracic soft tissue structures demonstrate no acute findings. 1.  Ill-defined opacities in the bilateral mid and lower lungs. No pleural effusion or pneumothorax. 2.  Atherosclerotic disease, mild cardiomegaly, and mild central pulmonary vascular congestion.  RECOMMENDATION: (Recommend upright PA and lateral chest radiographs 6-8 weeks after resolution of patient's symptoms to ensure complete resolution of radiographic findings.)       EKG: See Report  Echo: See Report      IMPRESSIONS:  Active Problems:    COPD exacerbation ([address]    Respiratory distress  Resolved Problems: * No resolved hospital problems. *      RECOMMENDATIONS:  I have carefully reviewed with Mr. Obdulio Riddle the need for him to remain compliant with his medical regimen and that he absolutely discontinue alcohol and/or recreational drugs. His two-dimensional echocardiogram confirms persistent left ventricular dysfunction (EF approximately 36%) and he should remain on his medications as prescribed upon his previous discharge. He indicates that he will comply but his understanding of the ramifications of medical noncompliance with his underlying heart condition are somewhat questionable and that his first question was regarding its effect on his \"malehood\". I have asked that he follow-up with both Dr. Naomi Riggs and myself soon after discharge. Likewise it may be in his best interest to follow-up in the CHF clinic as well. I have spent more than 25 minutes face to face with Luisito Heard and reviewing notes and laboratory data, with greater than 50% of this time instructing and counseling the patient face to face regarding my findings and recommendations and I have answered all questions as posed to me by Mr. Jonita Favre. Sugey Lombardo, DO FACP,FACC,[facility]      NOTE:  This report was transcribed using voice recognition software.   Every effort was made to ensure accuracy; however, inadvertent computerized transcription errors may be present

## 2021-06-18 NOTE — PROGRESS NOTES
PROGRESS NOTE       PATIENT PROBLEM LIST:  Active Problems:    COPD exacerbation ([address]    Respiratory distress  Resolved Problems:    * No resolved hospital problems. *      SUBJECTIVE:  [facility] ***    REVIEW OF SYSTEMS:  General ROS: negative for - fatigue, malaise,  weight gain or weight loss  Psychological ROS: negative for - anxiety , depression  Ophthalmic ROS: negative for - decreased vision or visual distortion. ENT ROS: negative  Allergy and Immunology ROS: negative  Hematological and Lymphatic ROS: negative  Endocrine: no heat or cold intolerance and no polyphagia, polydipsia, or polyuria  Respiratory ROS: positive for - {rosresp symptoms:735298}  Cardiovascular ROS: positive for - {roscv symptoms:656949}. Gastrointestinal ROS: no abdominal pain, change in bowel habits, or black or bloody stools  Genito-Urinary ROS: no nocturia, dysuria, trouble voiding, frequency or hematuria  Musculoskeletal ROS: negative for- myalgias, arthralgias, or claudication  Neurological ROS: no TIA or stroke symptoms otherwise no significant change in symptoms or problems since yesterday as documented in previous progress notes.     SCHEDULED MEDICATIONS:   predniSONE  30 mg Oral Daily    sodium chloride flush  5-40 mL Intravenous 2 times per day    ipratropium-albuterol  1 ampule Inhalation Q4H WA    finasteride  5 mg Oral Daily    spironolactone  25 mg Oral Daily    sacubitril-valsartan  1 tablet Oral BID    amLODIPine  5 mg Oral Daily    metoprolol succinate  100 mg Oral Daily    nicotine  1 patch Transdermal Q24H    cloNIDine  0.2 mg Oral BID    budesonide  0.25 mg Nebulization BID    And    Arformoterol Tartrate  15 mcg Nebulization BID       VITAL SIGNS:                                                                                                                          /78   Pulse 84   Temp 98.4 °F (36.9 °C) (Oral)   Resp 20   Ht 5' 7\" (1.702 m)   Wt 225 lb (102.1 kg)   SpO2 98% BMI 35.24 kg/m²   No data found. OBJECTIVE:    HEENT: PERRL, EOM  Intact; sclera non-icteric, conjunctiva pink. Carotids are brisk in upstroke with normal contour. No carotid bruits. Normal jugular venous pulsation at 45°. No palpable cervical nor supraclavicular nodes. Thyroid not palpable. Trachea midline. Chest: Even excursion  Lungs: CTA B, no expiratory wheezes or rhonchi, no decreased tactile fremitus without inspiratory rales. Heart: Regular  rhythm; S1 > S2, no gallop or murmur. No clicks, rub, palpable thrills   or heaves. PMI nondisplaced, 5th intercostal space MCL. Abdomen: Soft, nontender, nondistended,  {Blank single:03208::\"scaphoid\",\"mildly protuberant\",\"moderately protuberant\",\"grossly protuberant\"}, no masses or organomegaly. Bowel sounds active. Extremities: Without clubbing, cyanosis or edema. Pulses present 3+ upper extermities bilaterally; {POSITIVE-NEGATIVE PULSES:62096} DP and {POSITIVE-NEGATIVE PULSES:39948} PT bilaterally. Data:   Scheduled Meds: Reviewed  Continuous Infusions:    sodium chloride Stopped (06/15/21 0036)       Intake/Output Summary (Last 24 hours) at 6/17/2021 2323  Last data filed at 6/17/2021 2230  Gross per 24 hour   Intake 1920 ml   Output 0 ml   Net 1920 ml     CBC: No results for input(s): WBC, HGB, HCT, PLT in the last 72 hours. BMP:No results for input(s): NA, K, CL, CO2, BUN, CREATININE, LABGLOM in the last 72 hours. Invalid input(s): GLU,  CA  ABGs:   Lab Results   Component Value Date    PH 7.475 11/23/2020    PO2 83.0 11/23/2020    PCO2 32.3 11/23/2020     INR: No results for input(s): INR in the last 72 hours. PRO-BNP:   Lab Results   Component Value Date    PROBNP 7,751 (H) 06/13/2021    PROBNP 321 (H) 11/28/2020      TSH:   Lab Results   Component Value Date    TSH 0.809 04/18/2019      Cardiac Injury Profile: No results for input(s): CKTOTAL, CKMB, TROPONINI in the last 72 hours.    Lipid Profile:   Lab Results   Component Value Date    TRIG 217 01/02/2018    HDL 58 01/02/2018    LDLCALC 144 01/02/2018    CHOL 245 01/02/2018      Hemoglobin A1C: No components found for: HGBA1C     RAD:   XR CHEST (2 VW)    Result Date: 6/16/2021  EXAMINATION: TWO XRAY VIEWS OF THE CHEST 6/16/2021 9:02 am COMPARISON: 06/13/2021 HISTORY: ORDERING SYSTEM PROVIDED HISTORY: sob TECHNOLOGIST PROVIDED HISTORY: Reason for exam:->sob What reading provider will be dictating this exam?->CRC FINDINGS: The heart is enlarged. There is partial interval clearing of the bilateral multifocal infiltrates noted on the patient's previous study. There is no gross pleural effusion. 1. Partial interval clearing of the previously noted multifocal bilateral pulmonary infiltrates. XR CHEST PORTABLE    Result Date: 6/13/2021  EXAMINATION: ONE XRAY VIEW OF THE CHEST 6/13/2021 2:10 pm COMPARISON: Chest series from November 14, 2020 HISTORY: ORDERING SYSTEM PROVIDED HISTORY: SOB, resp distress TECHNOLOGIST PROVIDED HISTORY: Reason for exam:->SOB, resp distress What reading provider will be dictating this exam?->CRC FINDINGS: Symmetric aeration of the lungs. There are ill-defined opacities in the bilateral mid and lower lungs. No obvious pleural effusion or pneumothorax. Atherosclerotic disease in the aortic arch. Cardiac silhouette is prominent. Mild central pulmonary vascular congestion. Osseous and thoracic soft tissue structures demonstrate no acute findings. 1.  Ill-defined opacities in the bilateral mid and lower lungs. No pleural effusion or pneumothorax. 2.  Atherosclerotic disease, mild cardiomegaly, and mild central pulmonary vascular congestion.  RECOMMENDATION: (Recommend upright PA and lateral chest radiographs 6-8 weeks after resolution of patient's symptoms to ensure complete resolution of radiographic findings.)       EKG: See Report  Echo: See Report      IMPRESSIONS:  Active Problems:    COPD exacerbation ([address]    Respiratory distress  Resolved Problems:    * No resolved hospital problems. *      RECOMMENDATIONS:  ***    I have spent more than *** minutes face to face with Kristian Mtz and reviewing notes and laboratory data, with greater than 50% of this time instructing and counseling the patient *** face to face regarding my findings and recommendations and I have answered all questions as posed to me by Mr. Rosy Jane. Rocio Oliva, DO FACP,FACC,[facility]      NOTE:  This report was transcribed using voice recognition software.   Every effort was made to ensure accuracy; however, inadvertent computerized transcription errors may be present

## 2021-06-18 NOTE — PROGRESS NOTES
[facility]  [address] [facility], [address], [address] [facility]      Pulmonary Progress Note      SUBJECTIVE:  Claims stable with respiratory function, on RA, minimal cough/sputum production, looking a little better compared to yesterday, sitting up at side of bed and claims ambulating in hallway.     OBJECTIVE    Medications    Continuous Infusions:   sodium chloride Stopped (06/15/21 0036)       Scheduled Meds:   predniSONE  30 mg Oral Daily    sodium chloride flush  5-40 mL Intravenous 2 times per day    ipratropium-albuterol  1 ampule Inhalation Q4H WA    finasteride  5 mg Oral Daily    spironolactone  25 mg Oral Daily    sacubitril-valsartan  1 tablet Oral BID    amLODIPine  5 mg Oral Daily    metoprolol succinate  100 mg Oral Daily    nicotine  1 patch Transdermal Q24H    cloNIDine  0.2 mg Oral BID    budesonide  0.25 mg Nebulization BID    And    Arformoterol Tartrate  15 mcg Nebulization BID       PRN Meds:sodium chloride flush, [Held by provider] LORazepam **OR** [Held by provider] LORazepam **OR** [Held by provider] LORazepam **OR** [Held by provider] LORazepam **OR** [Held by provider] LORazepam **OR** [Held by provider] LORazepam **OR** [Held by provider] LORazepam **OR** [Held by provider] LORazepam, ipratropium-albuterol, nitroGLYCERIN    Physical    VITALS:  /70   Pulse 73   Temp 97.8 °F (36.6 °C)   Resp 20   Ht 5' 7\" (1.702 m)   Wt 225 lb (102.1 kg)   SpO2 98%   BMI 35.24 kg/m²     24HR INTAKE/OUTPUT:      Intake/Output Summary (Last 24 hours) at 2021 0915  Last data filed at 2021 0913  Gross per 24 hour   Intake 1620 ml   Output 0 ml   Net 1620 ml       24HR PULSE OXIMETRY RANGE:    SpO2  Av.9 %  Min: 97 %  Max: 100 %    General appearance: alert, appears stated age and cooperative, obese  Lungs: ronchi bilaterally with cough, rare wheeze with cough  Heart: regular rate and rhythm, S1, S2 normal, no murmur, click, rub or gallop  Abdomen: soft, non-tender; bowel sounds normal; no masses,  no organomegaly  Extremities: extremities normal, atraumatic, no cyanosis or edema  Neurologic: Mental status: Alert, oriented, thought content appropriate    Data    CBC:   No results for input(s): WBC, HGB, HCT, MCV, PLT in the last 72 hours. BMP:No results for input(s): NA, K, CL, CO2, PHOS, BUN, CREATININE in the last 72 hours. Invalid input(s): CA ALB:3,BILIDIR:3,BILITOT:3,ALKPHOS:3)@    PT/INR: No results for input(s): PROTIME, INR in the last 72 hours. ABG:   No results for input(s): PH, PO2, PCO2, HCO3, BE, O2SAT, METHB, O2HB, COHB, O2CON, HHB, THB in the last 72 hours. Radiology/Other tests reviewed: CXR reviewed with slightly better aeration both lung fields    Assessment:     Active Problems:    COPD exacerbation (HCC)    Respiratory distress  Resolved Problems:    * No resolved hospital problems. *      Plan:       1. Cont with steroids, taper as tolerated  2. Can cont with usual medications as out-pt, need to get them to control respiratory function  3. On RA, watch oxygenation  4. Need to work on smoking, ETOH, and drug use  5. ffup in office in 2-4 weeks for ffup  6. Can be discharged from pulmonary pov      Time at the bedside, reviewing labs and radiographs, reviewing notes and consultations, discussing with staff and family was more than 35 minutes. Thanks for letting us see this patient in consultation. Please contact us with any questions. Office [phone] or after hours through [employer], x [phone].

## 2021-06-19 LAB
BLOOD CULTURE, ROUTINE: ABNORMAL
ORGANISM: ABNORMAL

## 2021-06-19 NOTE — DISCHARGE SUMMARY
[address]                  [address] [facility],  [facility]                               DISCHARGE SUMMARY    PATIENT NAME: Gabriele García                  :        1965  MED REC NO:   [id number]                            ROOM:       77  ACCOUNT NO:   [de-identified]                           ADMIT DATE: 2021  PROVIDER:     Rowdy Prado DO                  DISCHARGE DATE:  2021    FINAL DIAGNOSES:  1. Acute exacerbation of COPD. 2.  Acute hypoxic respiratory failure. 3.  ETOH and drug abuse. 4.  Resolution of previous COVID-19.  5.  Nonspecific infiltrates, could be on the basis of fibrosis of his  chest.  6.  Accelerated hypertension as well. HOSPITAL COURSE:  The patient is a 12-year-old white male with  longstanding history of COPD, coronary artery disease, severe  hypertension, previous history of COVID infection. He became noncompliant with his medication. 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 Also does a little bit  of partying. He admits to being noncompliant with his medication and to  keep going at his job, as busy as he is down there, he states that he  needs to occasionally snort cocaine. Came into the emergency department  with a history and chief complaint of chest discomfort and difficulty  breathing. Chest x-ray showed increased pulmonary markings bilaterally  from previous x-ray, maybe related to COVID. Subsequent x-ray showed  improvement. MRSA may be related to COVID. It is on the basis of  previous infection. His blood pressure was also significantly elevated  at 180/110, elevated proBNP of 751, troponin high at 27. He had EKG  findings with nonspecific ST-T wave changes.     He had in the past heart catheterization, echocardiogram, and stress  testing and he is not having any ischemic events at this point, but  Cardiology took a look at him and just recommended to have his pulmonary  situation corrected. He was placed on long-acting and short-acting beta  agonist with parenteral steroids. Seemed to tolerate this quite well. He was able to subsequently be tapered down to oral steroids. He had  blood cultures that were positive for staph, only one bottle; I did see  them and thought that it was likely due to contamination. He feels much  better. His breathing is good. His chest pain has gone. His blood  pressure is under control. He is going to go home on the following  medications:  1. Proscar 5 daily. 2.  Metoprolol  daily. 3.  Spironolactone 25 daily. 4.  Entresto 24/26 b.i.d.  5.  Amlodipine 5 daily. 6.  Trelegy one puff daily. 7.  NicoDerm patch. 8.  Albuterol. 9.  DuoNebs as needed. 10.  Tapered dose of prednisone. 11.  Clonidine 0.2 three times a day. He improved nicely, was breathing good. Not in any respiratory  distress. Did have a little bit of heartburn, might have been steroid  induced. We gave him a GI cocktail. He did fine with that. He has  been instructed to completely abstain from drinking, smoking, and using  narcotics. He understands this. He is going to try. He will be  scheduled to be followed up in the office for evaluation and go over his  home medications in approximately a week. At the time of discharge, his  condition was improved. His prognosis is guarded.         Sybil Garcia DO    D: 06/18/2021 11:42:51       T: 06/18/2021 12:44:40     [email or web address]  Job#: [id number]     Doc#: [id number]    CC:

## 2021-06-21 ENCOUNTER — CARE COORDINATION (OUTPATIENT)
Dept: CASE MANAGEMENT | Age: 56
End: 2021-06-21

## 2021-06-22 ENCOUNTER — CARE COORDINATION (OUTPATIENT)
Dept: CASE MANAGEMENT | Age: 56
End: 2021-06-22

## 2021-06-22 NOTE — CARE COORDINATION
[facility] Transitions Follow Up Call    2021    Patient: Lida Carmona  Patient : 1965   MRN: <[id number]>  Reason for Admission:   Discharge Date: 21 RARS: Readmission Risk Score: 14      Attempted to reach pt for BPCI-A follow up call. Mailbox full. Care Transitions Subsequent and Final Call    Subsequent and Final Calls  Care Transitions Interventions  Other Interventions: Follow Up  No future appointments.     Catalina Aldana

## 2021-06-24 ENCOUNTER — CARE COORDINATION (OUTPATIENT)
Dept: CASE MANAGEMENT | Age: 56
End: 2021-06-24

## 2021-06-24 NOTE — CARE COORDINATION
[facility] Transitions Follow Up Call    2021    Patient: Taiwo Bernstein  Patient : 1965   MRN: [id number]  Reason for Admission:   Discharge Date: 21 RARS: Readmission Risk Score: 14         Spoke with: Taiwo Bernstein, patient    Contacted patient for BPCI-A follow up. Victoriano Au stated he is not too bad. Reports breathing has improved. O2 sats in the 90's. Reports no longer having a cough for a day in a half. No c/o chest pain/discomfort, pressure, tightness. He stated he is on his way to [facility] to  the Spironolactone and Finasteride. He was wondering if nurse called pharmacy the other day to have Entresto transferred to [facility] because they do not have the Rx. Informed him that CTN did call to have it transferred. This CTN will follow up on this. He also will need refills for metoprolol, nicotine and trelegy inhaler. CTN will also follow up on these medications. Victoriano Au stated that his follow up appointment with PCP is on Monday, 21 at 11:30. He does not have any other questions or concerns at this time. Will continue to follow. CTN contacted [address] Spoke with Tiburcio Schwab. She stated that they do not have an Rx for Entresto. Will need to follow up with PCP. CTN attempted to contact PCP office. Office is closed. Will try to reach out tomorrow morning. Patient updated that CTN will reach out to PCP office tomorrow morning.         Care Transitions Subsequent and Final Call    Subsequent and Final Calls  Do you have any questions related to your medications?: Yes  Patient Reports: Per patient, [facility] only has 2 of his medications (Spironolactone and Finasteride)  Do you currently have any active services?: No  Do you have any needs or concerns that I can assist you with?: Yes  Patient-reported Needs or Concerns: Call [address]hon[address] Jay to verify Johanne Connor was transferred over to [facility] Transitions Interventions  Other Interventions: Follow Up  No future appointments.     Riky Sandra RN

## 2021-06-25 ENCOUNTER — CARE COORDINATION (OUTPATIENT)
Dept: CASE MANAGEMENT | Age: 56
End: 2021-06-25

## 2021-06-25 NOTE — CARE COORDINATION
Isa  Transitions Follow Up Call    2021    Patient: Francesca Laguerre  Patient : 1965   MRN: [id number]  Reason for Admission:   Discharge Date: 21 RARS: Readmission Risk Score: 14    Contacted PCP office to request refills for patient. Spoke with Carmel Adams. Informed her that patient will be needing refills for Metoprolol, Nicotine Patch, Trelogy inhaler. Also wanted to verify whether or not elana should be taking Entresto because no Rx for medication. Requested Rx be sent to [facility] on [address]. Requested she contact patient directly with any questions regarding medications. She verbalized understanding. Carmel Adams confirmed patient has an appointment on Monday, 21. CTN gave Carmel Adams her contact information as well. No other needs or concerns at this time. Follow Up  No future appointments.     Yanni Grajeda RN

## 2021-06-30 ENCOUNTER — CARE COORDINATION (OUTPATIENT)
Dept: CASE MANAGEMENT | Age: 56
End: 2021-06-30

## 2021-06-30 NOTE — CARE COORDINATION
Isa [facility] Transitions Follow Up Call    2021    Patient: Saji Moya  Patient : 1965   MRN: <[id number]>  Reason for Admission:   Discharge Date: 21 RARS: Readmission Risk Score: 14       Attempted to contact patient for follow up BPCI-A transition call. Left voicemail message to return call with an update on condition since discharge. Contact information provided. Will continue to follow up. Care Transitions Subsequent and Final Call    Subsequent and Final Calls  Care Transitions Interventions  Other Interventions: Follow Up  No future appointments.     Suzanne Carrera LPN

## 2021-07-06 ENCOUNTER — CARE COORDINATION (OUTPATIENT)
Dept: CASE MANAGEMENT | Age: 56
End: 2021-07-06

## 2021-07-06 NOTE — CARE COORDINATION
[address] [facility] for Care Improvement ([facility]) Follow Up Call  Qualifying Diagnosis related to Pulmonary Function and Health. 7/6/2021  Patient Name:  Brant Bernheim   YOB: 1965  Discharge Date:  6/18/21  RARS:  Readmission Risk Score: 14    PCP:  Bola Santiago DO    Assessment:      Short of Breath: always   Cough Frequency, Productive/Color: occasional   Appetite: ok   Sleep pattern: all the time   Thinking clearly: yes   Tired/weakness: yes   Fever, Chills Sweating: chills on occasion   Headaches: denies  [address],  Active: denies need   Medication Needs/Questions:denies   Transportation Need:  Denies need   Live Alone: yes   Ability to Prepare Meals, Bathe: ok   Recent loss of balance, Falls:denies   Do you feel like you have everything you need to stay well at home? yes   Follow Up Appointment: completed   Agreeable to ongoing Care Transition Communications:yes  Care Transitions will continue to follow per [facility].  Devika Oliveros RN, CTN  Follow Up Concerns:    No future appointments.

## 2021-07-20 ENCOUNTER — CARE COORDINATION (OUTPATIENT)
Dept: CASE MANAGEMENT | Age: 56
End: 2021-07-20

## 2021-07-20 NOTE — CARE COORDINATION
[facility] Transitions Follow Up Call    2021    Patient: Dax Kapoor  Patient : 1965   MRN: [id number]  Reason for Admission:   Discharge Date: 21 RARS: Readmission Risk Score: 14         Spoke briefly with: Patient, Corinne Echeverria Transitions Subsequent and Final Call    Subsequent and Final Calls  Do you have any ongoing symptoms?: Yes  Patient-reported symptoms: Shortness of Breath, Cough  -patient reports shortness of breath is at baseline   -reports non-productive cough; denies any worsening in symptoms  Do you have any questions related to your medications?: No  Do you have any needs or concerns that I can assist you with?: No  Identified Barriers: None  Care Transitions Interventions  No Identified Needs    Patient is pleasant in conversation.   -denies any C/O chest discomfort   -denies any swelling  -reports good appetite   -normal bladder/bowel elimination     Emotional support provided; discussed will continue to follow. Follow Up  No future appointments.     Adolfo Sauer RN

## 2021-07-20 NOTE — PROGRESS NOTES
Physician Progress Note      PATIENT:               Debora Castelan  CSN #:                  [id number]  :                       1965  ADMIT DATE:       2021 1:51 PM  [address] [place] DATE:        2021 11:49 AM  RESPONDING  PROVIDER #:        Para Embs VRABLE DO          QUERY TEXT:    Noted documentation of acute respiratory failure in progress notes. Patient   with O2 sats in the 90's on 2L, weaned to room air. In order to support the   diagnosis of acute respiratory failure, please include additional clinical   indicators in your documentation. Or please document if the diagnosis of   acute respiratory failure has been ruled out after further study. The medical record reflects the following:  Risk Factors: COPD, cardiomyopathy  Clinical Indicators: Per progress notes \"comes into the emergency department   with a history and chief complaint of chest discomfort and difficulty   breathing. Pamalee Bucker Pamalee Bucker Pamalee Bucker Consistent with acute respiratory failure\". Pulmonology notes   6/15/21 \"on RA and saturating about 96%, respiratory distress\". Nursing notes   21 \"Patient ambulating in room with mild dyspnea on exertion on 2L\",   21 \"Pulse ox at rest on RA 97%. Pulse ox ambulating on RA 96%. Pulse ox   back at rest on RA 98%. \" Per nursing flowsheets, sats in the 90's on 2L NC,   weaned to room air, unlabored with HORNE. Treatment: steroids, IV nebs, O2 sats    Thank you,  Beena Ku, RN, BSN, CCDS, Clinical Documentation Improvement  [phone]  Options provided:  -- Acute Respiratory Failure as evidenced by, Please document evidence. -- Acute Respiratory Failure ruled out after study  -- Other - I will add my own diagnosis  -- Disagree - Not applicable / Not valid  -- Disagree - Clinically unable to determine / Unknown  -- Refer to Clinical Documentation Reviewer    PROVIDER RESPONSE TEXT:    Acute Respiratory Failure has been ruled out after study.     Query created by: Albaro Catherine on 2021 12:59 PM      Electronically signed by:  Kamryn Hogan DO 7/20/2021 12:17 PM

## 2021-07-30 ENCOUNTER — APPOINTMENT (OUTPATIENT)
Dept: CT IMAGING | Age: 56
DRG: 190 | End: 2021-07-30
Payer: MEDICARE

## 2021-07-30 ENCOUNTER — HOSPITAL ENCOUNTER (INPATIENT)
Age: 56
LOS: 7 days | Discharge: HOME | DRG: 190 | End: 2021-08-06
Attending: EMERGENCY MEDICINE | Admitting: GENERAL PRACTICE
Payer: MEDICARE

## 2021-07-30 ENCOUNTER — APPOINTMENT (OUTPATIENT)
Dept: GENERAL RADIOLOGY | Age: 56
DRG: 190 | End: 2021-07-30
Payer: MEDICARE

## 2021-07-30 DIAGNOSIS — J96.01 ACUTE RESPIRATORY FAILURE WITH HYPOXIA (HCC): Primary | ICD-10-CM

## 2021-07-30 DIAGNOSIS — R94.31 ABNORMAL ECG: ICD-10-CM

## 2021-07-30 LAB
ACETAMINOPHEN LEVEL: <5 MCG/ML (ref 10–30)
ALBUMIN SERPL-MCNC: 3.9 G/DL (ref 3.5–5.2)
ALP BLD-CCNC: 69 U/L (ref 40–129)
ALT SERPL-CCNC: 27 U/L (ref 0–40)
AMPHETAMINE SCREEN, URINE: NOT DETECTED
ANION GAP SERPL CALCULATED.3IONS-SCNC: 14 MMOL/L (ref 7–16)
AST SERPL-CCNC: 26 U/L (ref 0–39)
BARBITURATE SCREEN URINE: NOT DETECTED
BASOPHILS ABSOLUTE: 0.05 E9/L (ref 0–0.2)
BASOPHILS RELATIVE PERCENT: 0.7 % (ref 0–2)
BENZODIAZEPINE SCREEN, URINE: NOT DETECTED
BILIRUB SERPL-MCNC: 0.4 MG/DL (ref 0–1.2)
BUN BLDV-MCNC: 18 MG/DL (ref 6–20)
C-REACTIVE PROTEIN: 1.3 MG/DL (ref 0–0.4)
CALCIUM SERPL-MCNC: 8.8 MG/DL (ref 8.6–10.2)
CANNABINOID SCREEN URINE: NOT DETECTED
CHLORIDE BLD-SCNC: 99 MMOL/L (ref 98–107)
CO2: 21 MMOL/L (ref 22–29)
COCAINE METABOLITE SCREEN URINE: NOT DETECTED
CREAT SERPL-MCNC: 1.5 MG/DL (ref 0.7–1.2)
EKG ATRIAL RATE: 101 BPM
EKG P AXIS: 49 DEGREES
EKG P-R INTERVAL: 116 MS
EKG Q-T INTERVAL: 380 MS
EKG QRS DURATION: 88 MS
EKG QTC CALCULATION (BAZETT): 492 MS
EKG R AXIS: 13 DEGREES
EKG T AXIS: -32 DEGREES
EKG VENTRICULAR RATE: 101 BPM
EOSINOPHILS ABSOLUTE: 0.07 E9/L (ref 0.05–0.5)
EOSINOPHILS RELATIVE PERCENT: 0.9 % (ref 0–6)
ETHANOL: <10 MG/DL (ref 0–0.08)
FENTANYL SCREEN, URINE: NOT DETECTED
GFR AFRICAN AMERICAN: 58
GFR NON-AFRICAN AMERICAN: 48 ML/MIN/1.73
GLUCOSE BLD-MCNC: 100 MG/DL (ref 74–99)
HCT VFR BLD CALC: 42.5 % (ref 37–54)
HEMOGLOBIN: 14.5 G/DL (ref 12.5–16.5)
IMMATURE GRANULOCYTES #: 0.02 E9/L
IMMATURE GRANULOCYTES %: 0.3 % (ref 0–5)
INFLUENZA A BY PCR: NOT DETECTED
INFLUENZA B BY PCR: NOT DETECTED
LACTIC ACID, SEPSIS: 1.6 MMOL/L (ref 0.5–1.9)
LYMPHOCYTES ABSOLUTE: 1.26 E9/L (ref 1.5–4)
LYMPHOCYTES RELATIVE PERCENT: 16.7 % (ref 20–42)
Lab: NORMAL
MCH RBC QN AUTO: 31.8 PG (ref 26–35)
MCHC RBC AUTO-ENTMCNC: 34.1 % (ref 32–34.5)
MCV RBC AUTO: 93.2 FL (ref 80–99.9)
METHADONE SCREEN, URINE: NOT DETECTED
MONOCYTES ABSOLUTE: 0.88 E9/L (ref 0.1–0.95)
MONOCYTES RELATIVE PERCENT: 11.6 % (ref 2–12)
NEUTROPHILS ABSOLUTE: 5.28 E9/L (ref 1.8–7.3)
NEUTROPHILS RELATIVE PERCENT: 69.8 % (ref 43–80)
OPIATE SCREEN URINE: NOT DETECTED
OXYCODONE URINE: NOT DETECTED
PDW BLD-RTO: 13.5 FL (ref 11.5–15)
PHENCYCLIDINE SCREEN URINE: NOT DETECTED
PLATELET # BLD: 167 E9/L (ref 130–450)
PMV BLD AUTO: 9.8 FL (ref 7–12)
POTASSIUM REFLEX MAGNESIUM: 4 MMOL/L (ref 3.5–5)
PRO-BNP: 3266 PG/ML (ref 0–125)
RBC # BLD: 4.56 E12/L (ref 3.8–5.8)
SALICYLATE, SERUM: <0.3 MG/DL (ref 0–30)
SARS-COV-2, NAAT: NOT DETECTED
SEDIMENTATION RATE, ERYTHROCYTE: 12 MM/HR (ref 0–15)
SODIUM BLD-SCNC: 134 MMOL/L (ref 132–146)
TOTAL PROTEIN: 7.4 G/DL (ref 6.4–8.3)
TRICYCLIC ANTIDEPRESSANTS SCREEN SERUM: NEGATIVE NG/ML
TROPONIN, HIGH SENSITIVITY: 42 NG/L (ref 0–11)
WBC # BLD: 7.6 E9/L (ref 4.5–11.5)

## 2021-07-30 PROCEDURE — 82077 ASSAY SPEC XCP UR&BREATH IA: CPT

## 2021-07-30 PROCEDURE — 99285 EMERGENCY DEPT VISIT HI MDM: CPT

## 2021-07-30 PROCEDURE — 2060000000 HC ICU INTERMEDIATE R&B

## 2021-07-30 PROCEDURE — 83605 ASSAY OF LACTIC ACID: CPT

## 2021-07-30 PROCEDURE — 94664 DEMO&/EVAL PT USE INHALER: CPT

## 2021-07-30 PROCEDURE — 85651 RBC SED RATE NONAUTOMATED: CPT

## 2021-07-30 PROCEDURE — 84484 ASSAY OF TROPONIN QUANT: CPT

## 2021-07-30 PROCEDURE — 96365 THER/PROPH/DIAG IV INF INIT: CPT

## 2021-07-30 PROCEDURE — 80143 DRUG ASSAY ACETAMINOPHEN: CPT

## 2021-07-30 PROCEDURE — 87040 BLOOD CULTURE FOR BACTERIA: CPT

## 2021-07-30 PROCEDURE — 93005 ELECTROCARDIOGRAM TRACING: CPT | Performed by: EMERGENCY MEDICINE

## 2021-07-30 PROCEDURE — 87502 INFLUENZA DNA AMP PROBE: CPT

## 2021-07-30 PROCEDURE — 6360000002 HC RX W HCPCS: Performed by: GENERAL PRACTICE

## 2021-07-30 PROCEDURE — 6360000002 HC RX W HCPCS: Performed by: EMERGENCY MEDICINE

## 2021-07-30 PROCEDURE — 6360000004 HC RX CONTRAST MEDICATION: Performed by: RADIOLOGY

## 2021-07-30 PROCEDURE — 6360000002 HC RX W HCPCS: Performed by: INTERNAL MEDICINE

## 2021-07-30 PROCEDURE — 80053 COMPREHEN METABOLIC PANEL: CPT

## 2021-07-30 PROCEDURE — 85025 COMPLETE CBC W/AUTO DIFF WBC: CPT

## 2021-07-30 PROCEDURE — 80307 DRUG TEST PRSMV CHEM ANLYZR: CPT

## 2021-07-30 PROCEDURE — 6370000000 HC RX 637 (ALT 250 FOR IP): Performed by: INTERNAL MEDICINE

## 2021-07-30 PROCEDURE — 83880 ASSAY OF NATRIURETIC PEPTIDE: CPT

## 2021-07-30 PROCEDURE — 96367 TX/PROPH/DG ADDL SEQ IV INF: CPT

## 2021-07-30 PROCEDURE — 71275 CT ANGIOGRAPHY CHEST: CPT

## 2021-07-30 PROCEDURE — 94640 AIRWAY INHALATION TREATMENT: CPT

## 2021-07-30 PROCEDURE — 86140 C-REACTIVE PROTEIN: CPT

## 2021-07-30 PROCEDURE — 71045 X-RAY EXAM CHEST 1 VIEW: CPT

## 2021-07-30 PROCEDURE — 36415 COLL VENOUS BLD VENIPUNCTURE: CPT

## 2021-07-30 PROCEDURE — 2580000003 HC RX 258: Performed by: EMERGENCY MEDICINE

## 2021-07-30 PROCEDURE — 87635 SARS-COV-2 COVID-19 AMP PRB: CPT

## 2021-07-30 PROCEDURE — 6370000000 HC RX 637 (ALT 250 FOR IP): Performed by: GENERAL PRACTICE

## 2021-07-30 PROCEDURE — 96375 TX/PRO/DX INJ NEW DRUG ADDON: CPT

## 2021-07-30 PROCEDURE — 6370000000 HC RX 637 (ALT 250 FOR IP): Performed by: EMERGENCY MEDICINE

## 2021-07-30 PROCEDURE — 80179 DRUG ASSAY SALICYLATE: CPT

## 2021-07-30 RX ORDER — KETOROLAC TROMETHAMINE 30 MG/ML
30 INJECTION, SOLUTION INTRAMUSCULAR; INTRAVENOUS ONCE
Status: COMPLETED | OUTPATIENT
Start: 2021-07-30 | End: 2021-07-30

## 2021-07-30 RX ORDER — SPIRONOLACTONE 25 MG/1
25 TABLET ORAL DAILY
Status: DISCONTINUED | OUTPATIENT
Start: 2021-07-30 | End: 2021-08-06 | Stop reason: HOSPADM

## 2021-07-30 RX ORDER — METHYLPREDNISOLONE SODIUM SUCCINATE 125 MG/2ML
60 INJECTION, POWDER, LYOPHILIZED, FOR SOLUTION INTRAMUSCULAR; INTRAVENOUS EVERY 6 HOURS
Status: DISCONTINUED | OUTPATIENT
Start: 2021-07-30 | End: 2021-08-04

## 2021-07-30 RX ORDER — HYDRALAZINE HYDROCHLORIDE 20 MG/ML
10 INJECTION INTRAMUSCULAR; INTRAVENOUS EVERY 4 HOURS PRN
Status: DISCONTINUED | OUTPATIENT
Start: 2021-07-30 | End: 2021-08-06 | Stop reason: HOSPADM

## 2021-07-30 RX ORDER — ACETAMINOPHEN 325 MG/1
650 TABLET ORAL EVERY 4 HOURS PRN
Status: DISCONTINUED | OUTPATIENT
Start: 2021-07-30 | End: 2021-08-06 | Stop reason: HOSPADM

## 2021-07-30 RX ORDER — MAGNESIUM SULFATE IN WATER 40 MG/ML
2000 INJECTION, SOLUTION INTRAVENOUS ONCE
Status: COMPLETED | OUTPATIENT
Start: 2021-07-30 | End: 2021-07-30

## 2021-07-30 RX ORDER — ARFORMOTEROL TARTRATE 15 UG/2ML
15 SOLUTION RESPIRATORY (INHALATION) 2 TIMES DAILY
Status: DISCONTINUED | OUTPATIENT
Start: 2021-07-30 | End: 2021-08-06 | Stop reason: HOSPADM

## 2021-07-30 RX ORDER — NICOTINE 21 MG/24HR
1 PATCH, TRANSDERMAL 24 HOURS TRANSDERMAL EVERY 24 HOURS
Status: DISCONTINUED | OUTPATIENT
Start: 2021-07-30 | End: 2021-08-06 | Stop reason: HOSPADM

## 2021-07-30 RX ORDER — NITROGLYCERIN 0.4 MG/1
0.4 TABLET SUBLINGUAL EVERY 5 MIN PRN
Status: DISCONTINUED | OUTPATIENT
Start: 2021-07-30 | End: 2021-08-06 | Stop reason: HOSPADM

## 2021-07-30 RX ORDER — FUROSEMIDE 10 MG/ML
40 INJECTION INTRAMUSCULAR; INTRAVENOUS ONCE
Status: COMPLETED | OUTPATIENT
Start: 2021-07-30 | End: 2021-07-30

## 2021-07-30 RX ORDER — METHYLPREDNISOLONE SODIUM SUCCINATE 125 MG/2ML
125 INJECTION, POWDER, LYOPHILIZED, FOR SOLUTION INTRAMUSCULAR; INTRAVENOUS ONCE
Status: COMPLETED | OUTPATIENT
Start: 2021-07-30 | End: 2021-07-30

## 2021-07-30 RX ORDER — AMLODIPINE BESYLATE 10 MG/1
10 TABLET ORAL DAILY
Status: DISCONTINUED | OUTPATIENT
Start: 2021-07-30 | End: 2021-08-06 | Stop reason: HOSPADM

## 2021-07-30 RX ORDER — BUDESONIDE 0.5 MG/2ML
1000 INHALANT ORAL 2 TIMES DAILY
Status: DISCONTINUED | OUTPATIENT
Start: 2021-07-30 | End: 2021-08-06 | Stop reason: HOSPADM

## 2021-07-30 RX ORDER — FINASTERIDE 5 MG/1
5 TABLET, FILM COATED ORAL DAILY
Status: DISCONTINUED | OUTPATIENT
Start: 2021-07-30 | End: 2021-08-06 | Stop reason: HOSPADM

## 2021-07-30 RX ORDER — CLONIDINE HYDROCHLORIDE 0.2 MG/1
0.2 TABLET ORAL 2 TIMES DAILY
Status: DISCONTINUED | OUTPATIENT
Start: 2021-07-30 | End: 2021-08-06 | Stop reason: HOSPADM

## 2021-07-30 RX ORDER — IPRATROPIUM BROMIDE AND ALBUTEROL SULFATE 2.5; .5 MG/3ML; MG/3ML
1 SOLUTION RESPIRATORY (INHALATION)
Status: COMPLETED | OUTPATIENT
Start: 2021-07-30 | End: 2021-07-30

## 2021-07-30 RX ORDER — IPRATROPIUM BROMIDE AND ALBUTEROL SULFATE 2.5; .5 MG/3ML; MG/3ML
1 SOLUTION RESPIRATORY (INHALATION) EVERY 4 HOURS
Status: DISCONTINUED | OUTPATIENT
Start: 2021-07-30 | End: 2021-08-06 | Stop reason: HOSPADM

## 2021-07-30 RX ORDER — METOPROLOL SUCCINATE 100 MG/1
100 TABLET, EXTENDED RELEASE ORAL DAILY
Status: DISCONTINUED | OUTPATIENT
Start: 2021-07-30 | End: 2021-08-06 | Stop reason: HOSPADM

## 2021-07-30 RX ADMIN — CLONIDINE HYDROCHLORIDE 0.2 MG: 0.2 TABLET ORAL at 20:30

## 2021-07-30 RX ADMIN — IPRATROPIUM BROMIDE AND ALBUTEROL SULFATE 1 AMPULE: .5; 3 SOLUTION RESPIRATORY (INHALATION) at 20:12

## 2021-07-30 RX ADMIN — METOPROLOL SUCCINATE 100 MG: 100 TABLET, EXTENDED RELEASE ORAL at 17:09

## 2021-07-30 RX ADMIN — METHYLPREDNISOLONE SODIUM SUCCINATE 60 MG: 125 INJECTION, POWDER, LYOPHILIZED, FOR SOLUTION INTRAMUSCULAR; INTRAVENOUS at 20:43

## 2021-07-30 RX ADMIN — FINASTERIDE 5 MG: 5 TABLET, FILM COATED ORAL at 20:43

## 2021-07-30 RX ADMIN — FUROSEMIDE 40 MG: 10 INJECTION, SOLUTION INTRAMUSCULAR; INTRAVENOUS at 13:34

## 2021-07-30 RX ADMIN — SPIRONOLACTONE 25 MG: 25 TABLET ORAL at 17:08

## 2021-07-30 RX ADMIN — AZITHROMYCIN 500 MG: 500 INJECTION, POWDER, LYOPHILIZED, FOR SOLUTION INTRAVENOUS at 13:34

## 2021-07-30 RX ADMIN — MAGNESIUM SULFATE HEPTAHYDRATE 2000 MG: 40 INJECTION, SOLUTION INTRAVENOUS at 10:09

## 2021-07-30 RX ADMIN — ARFORMOTEROL TARTRATE 15 MCG: 15 SOLUTION RESPIRATORY (INHALATION) at 20:12

## 2021-07-30 RX ADMIN — IOPAMIDOL 75 ML: 755 INJECTION, SOLUTION INTRAVENOUS at 12:13

## 2021-07-30 RX ADMIN — HYDRALAZINE HYDROCHLORIDE 10 MG: 20 INJECTION, SOLUTION INTRAMUSCULAR; INTRAVENOUS at 17:09

## 2021-07-30 RX ADMIN — IPRATROPIUM BROMIDE AND ALBUTEROL SULFATE 1 AMPULE: .5; 3 SOLUTION RESPIRATORY (INHALATION) at 10:28

## 2021-07-30 RX ADMIN — METHYLPREDNISOLONE SODIUM SUCCINATE 125 MG: 125 INJECTION, POWDER, FOR SOLUTION INTRAMUSCULAR; INTRAVENOUS at 10:09

## 2021-07-30 RX ADMIN — METHYLPREDNISOLONE SODIUM SUCCINATE 60 MG: 125 INJECTION, POWDER, LYOPHILIZED, FOR SOLUTION INTRAMUSCULAR; INTRAVENOUS at 17:09

## 2021-07-30 RX ADMIN — IPRATROPIUM BROMIDE AND ALBUTEROL SULFATE 1 AMPULE: .5; 3 SOLUTION RESPIRATORY (INHALATION) at 10:29

## 2021-07-30 RX ADMIN — AMLODIPINE BESYLATE 10 MG: 10 TABLET ORAL at 17:08

## 2021-07-30 RX ADMIN — BUDESONIDE 1000 MCG: 0.5 SUSPENSION RESPIRATORY (INHALATION) at 20:12

## 2021-07-30 RX ADMIN — KETOROLAC TROMETHAMINE 30 MG: 30 INJECTION, SOLUTION INTRAMUSCULAR; INTRAVENOUS at 17:09

## 2021-07-30 RX ADMIN — IPRATROPIUM BROMIDE AND ALBUTEROL SULFATE 1 AMPULE: .5; 3 SOLUTION RESPIRATORY (INHALATION) at 10:30

## 2021-07-30 RX ADMIN — WATER 1000 MG: 1 INJECTION INTRAMUSCULAR; INTRAVENOUS; SUBCUTANEOUS at 13:34

## 2021-07-30 RX ADMIN — ACETAMINOPHEN 650 MG: 325 TABLET ORAL at 20:50

## 2021-07-30 RX ADMIN — IPRATROPIUM BROMIDE AND ALBUTEROL SULFATE 1 AMPULE: .5; 3 SOLUTION RESPIRATORY (INHALATION) at 16:18

## 2021-07-30 RX ADMIN — SACUBITRIL AND VALSARTAN 1 TABLET: 24; 26 TABLET, FILM COATED ORAL at 20:43

## 2021-07-30 ASSESSMENT — PAIN DESCRIPTION - PROGRESSION: CLINICAL_PROGRESSION: GRADUALLY WORSENING

## 2021-07-30 ASSESSMENT — PAIN SCALES - GENERAL
PAINLEVEL_OUTOF10: 3
PAINLEVEL_OUTOF10: 10
PAINLEVEL_OUTOF10: 7
PAINLEVEL_OUTOF10: 0

## 2021-07-30 ASSESSMENT — ENCOUNTER SYMPTOMS
EYE REDNESS: 0
RHINORRHEA: 0
COUGH: 1
ABDOMINAL PAIN: 0
SHORTNESS OF BREATH: 1
BACK PAIN: 0
NAUSEA: 0
COLOR CHANGE: 0
FACIAL SWELLING: 0

## 2021-07-30 ASSESSMENT — PAIN - FUNCTIONAL ASSESSMENT: PAIN_FUNCTIONAL_ASSESSMENT: PREVENTS OR INTERFERES SOME ACTIVE ACTIVITIES AND ADLS

## 2021-07-30 ASSESSMENT — PAIN DESCRIPTION - PAIN TYPE
TYPE: ACUTE PAIN

## 2021-07-30 ASSESSMENT — PAIN DESCRIPTION - FREQUENCY: FREQUENCY: INTERMITTENT

## 2021-07-30 ASSESSMENT — PAIN DESCRIPTION - LOCATION: LOCATION: HEAD

## 2021-07-30 ASSESSMENT — PAIN DESCRIPTION - DESCRIPTORS: DESCRIPTORS: HEADACHE

## 2021-07-30 ASSESSMENT — PAIN DESCRIPTION - ONSET: ONSET: GRADUAL

## 2021-07-30 NOTE — ED NOTES
Bed: 27  Expected date:   Expected time:   Means of arrival:   Comments:  Repair edson Murillo RN  07/30/21 6248

## 2021-07-30 NOTE — ED PROVIDER NOTES
Zayda Ventura is a 60 y.o. male presenting to the ED for cough, sob, fever, beginning 4 days ago. The complaint has been intermittent, moderate in severity, and worsened by nothing. Patient is a 61-year-old male with history of COPD history of tobacco abuse. He states he had Covid in this past October. He states he is here today for 4 days of cough congestion wheezing fever. He states he has lost his taste now. He states he did not have loss of taste when he had Covid previously. He states his chest hurts from coughing. He states he does have a headache as well. He denies any back pain or abdominal pain. Denies any focal weakness or numbness. He denies any arm or leg pain. He denies any urinary complaints. He denies any vomiting. He states he is been taking breathing treatments around-the-clock at home and took some extra prednisone he had at home. Patient is a smoker. He has a 60-pack-year history family doctor is Dr. Sha Mora. His pulmonologist is Dr. Damaris Garcia. Patient is not vaccinated for Covid    Review of Systems:   Review of Systems   Constitutional: Positive for fever. Negative for appetite change and fatigue. HENT: Negative for congestion, facial swelling, postnasal drip and rhinorrhea. Eyes: Negative for redness and visual disturbance. Respiratory: Positive for cough and shortness of breath. Cardiovascular: Positive for chest pain. Gastrointestinal: Negative for abdominal pain and nausea. Endocrine: Negative for polydipsia and polyuria. Genitourinary: Negative for difficulty urinating and enuresis. Musculoskeletal: Negative for back pain and neck pain. Skin: Negative for color change and rash. Allergic/Immunologic: Negative for food allergies and immunocompromised state. Neurological: Negative for dizziness. Hematological: Negative for adenopathy. Does not bruise/bleed easily. Psychiatric/Behavioral: Negative for agitation and hallucinations. --------------------------------------------- PAST HISTORY ---------------------------------------------  Past Medical History:  has a past medical history of Alcohol abuse, CHF (congestive heart failure) ([facility] COPD (chronic obstructive pulmonary disease) ([facility] History of cocaine use, Hyperlipidemia, Hypertension, Marijuana use, MI (myocardial infarction) ([facility] and alberto. Past Surgical History:  has a past surgical history that includes Wrist surgery; cervical fusion (11/18/15); polysomnography (01/29/2018); and Cardiac catheterization (11/03/2020). Social History:  reports that he has been smoking cigarettes. He started smoking about 41 years ago. He has a 60.00 pack-year smoking history. He has never used smokeless tobacco. He reports previous alcohol use of about 140.0 standard drinks of alcohol per week. Drugs: Cocaine and Other-see comments. Family History: family history includes Arthritis in his mother; Cancer in his brother; Heart Disease in his father; High Blood Pressure in his brother, brother, brother, and brother; Other in his brother, brother, brother, brother, and mother. The patients home medications have been reviewed. Allergies: Patient has no known allergies.     -------------------------------------------------- RESULTS -------------------------------------------------  All laboratory and radiology results have been personally reviewed by myself   LABS:  Results for orders placed or performed during the hospital encounter of 07/30/21   COVID-19, Rapid    Specimen: Nasopharyngeal Swab   Result Value Ref Range    SARS-CoV-2, NAAT Not Detected Not Detected   Rapid influenza A/B antigens    Specimen: Nares   Result Value Ref Range    Influenza A by PCR Not Detected Not Detected    Influenza B by PCR Not Detected Not Detected   Lactate, Sepsis   Result Value Ref Range    Lactic Acid, Sepsis 1.6 0.5 - 1.9 mmol/L   CBC Auto Differential   Result Value Ref Range    WBC 7.6 4.5 - 11.5 E9/L    RBC 4.56 3.80 - 5.80 E12/L    Hemoglobin 14.5 12.5 - 16.5 g/dL    Hematocrit 42.5 37.0 - 54.0 %    MCV 93.2 80.0 - 99.9 fL    MCH 31.8 26.0 - 35.0 pg    MCHC 34.1 32.0 - 34.5 %    RDW 13.5 11.5 - 15.0 fL    Platelets 498 565 - 308 E9/L    MPV 9.8 7.0 - 12.0 fL    Neutrophils % 69.8 43.0 - 80.0 %    Immature Granulocytes % 0.3 0.0 - 5.0 %    Lymphocytes % 16.7 (L) 20.0 - 42.0 %    Monocytes % 11.6 2.0 - 12.0 %    Eosinophils % 0.9 0.0 - 6.0 %    Basophils % 0.7 0.0 - 2.0 %    Neutrophils Absolute 5.28 1.80 - 7.30 E9/L    Immature Granulocytes # 0.02 E9/L    Lymphocytes Absolute 1.26 (L) 1.50 - 4.00 E9/L    Monocytes Absolute 0.88 0.10 - 0.95 E9/L    Eosinophils Absolute 0.07 0.05 - 0.50 E9/L    Basophils Absolute 0.05 0.00 - 0.20 E9/L   Comprehensive Metabolic Panel w/ Reflex to MG   Result Value Ref Range    Sodium 134 132 - 146 mmol/L    Potassium reflex Magnesium 4.0 3.5 - 5.0 mmol/L    Chloride 99 98 - 107 mmol/L    CO2 21 (L) 22 - 29 mmol/L    Anion Gap 14 7 - 16 mmol/L    Glucose 100 (H) 74 - 99 mg/dL    BUN 18 6 - 20 mg/dL    CREATININE 1.5 (H) 0.7 - 1.2 mg/dL    GFR Non-African American 48 >=60 mL/min/1.73    GFR African American 58     Calcium 8.8 8.6 - 10.2 mg/dL    Total Protein 7.4 6.4 - 8.3 g/dL    Albumin 3.9 3.5 - 5.2 g/dL    Total Bilirubin 0.4 0.0 - 1.2 mg/dL    Alkaline Phosphatase 69 40 - 129 U/L    ALT 27 0 - 40 U/L    AST 26 0 - 39 U/L   Troponin   Result Value Ref Range    Troponin, High Sensitivity 42 (H) 0 - 11 ng/L   Brain Natriuretic Peptide   Result Value Ref Range    Pro-BNP 3,266 (H) 0 - 125 pg/mL   C-Reactive Protein   Result Value Ref Range    CRP 1.3 (H) 0.0 - 0.4 mg/dL   Sedimentation Rate   Result Value Ref Range    Sed Rate 12 0 - 15 mm/Hr   Serum Drug Screen   Result Value Ref Range    Ethanol Lvl <10 mg/dL    Acetaminophen Level <5.0 (L) 10.0 - 68.1 mcg/mL    Salicylate, Serum <2.2 0.0 - 30.0 mg/dL    TCA Scrn NEGATIVE Cutoff:300 ng/mL   URINE DRUG SCREEN Result Value Ref Range    Amphetamine Screen, Urine NOT DETECTED Negative <1000 ng/mL    Barbiturate Screen, Ur NOT DETECTED Negative < 200 ng/mL    Benzodiazepine Screen, Urine NOT DETECTED Negative < 200 ng/mL    Cannabinoid Scrn, Ur NOT DETECTED Negative < 50ng/mL    Cocaine Metabolite Screen, Urine NOT DETECTED Negative < 300 ng/mL    Opiate Scrn, Ur NOT DETECTED Negative < 300ng/mL    PCP Screen, Urine NOT DETECTED Negative < 25 ng/mL    Methadone Screen, Urine NOT DETECTED Negative <300 ng/mL    Oxycodone Urine NOT DETECTED Negative <100 ng/mL    FENTANYL SCREEN, URINE NOT DETECTED Negative <1 ng/mL    Drug Screen Comment: see below    EKG 12 Lead   Result Value Ref Range    Ventricular Rate 101 BPM    Atrial Rate 101 BPM    P-R Interval 116 ms    QRS Duration 88 ms    Q-T Interval 380 ms    QTc Calculation (Bazett) 492 ms    P Axis 49 degrees    R Axis 13 degrees    T Axis -32 degrees       RADIOLOGY:  Interpreted by Radiologist.  CTA PULMONARY W CONTRAST   Final Result   1. There is no evidence of a pulmonary embolus   2. Very small residual or recurring infiltrate seen within the left lower   lobe measuring approximately 2 cm. 3. Mild emphysematous changes   4. Mild pleuroparenchymal scarring seen within the right upper lobe laterally. 5. Shotty stable lymph nodes within the prevascular space and right   paratracheal region. The largest lymph node measures 2.4 x 1.5 cm. This   lymph node is unchanged in size when compared to the prior study of   11/29/2020. XR CHEST PORTABLE   Final Result   1. Interval clearing of the lungs when compared with the patient's prior   study of 06/16/2021.             ------------------------- NURSING NOTES AND VITALS REVIEWED ---------------------------   The nursing notes within the ED encounter and vital signs as below have been reviewed.    BP (!) 172/113   Pulse 108   Temp 98 °F (36.7 °C) (Oral)   Resp 18   Ht 5' 7\" (1.702 m)   Wt 225 lb (102.1 kg) SpO2 93%   BMI 35.24 kg/m²   Oxygen Saturation Interpretation: Normal      ---------------------------------------------------PHYSICAL EXAM--------------------------------------    Physical Exam  Vitals reviewed. Constitutional:       General: He is not in acute distress. Appearance: Normal appearance. He is not toxic-appearing. HENT:      Head: Normocephalic and atraumatic. Right Ear: External ear normal.      Left Ear: External ear normal.      Nose: Nose normal. No congestion. Mouth/Throat:      Mouth: Mucous membranes are moist.      Pharynx: Oropharynx is clear. No posterior oropharyngeal erythema. Eyes:      Extraocular Movements: Extraocular movements intact. Pupils: Pupils are equal, round, and reactive to light. Cardiovascular:      Rate and Rhythm: Normal rate and regular rhythm. Pulses: Normal pulses. Heart sounds: No murmur heard. Pulmonary:      Effort: Pulmonary effort is normal. No respiratory distress. Breath sounds: Examination of the right-upper field reveals wheezing. Examination of the left-upper field reveals wheezing. Examination of the right-middle field reveals wheezing and rales. Examination of the left-middle field reveals wheezing and rales. Decreased breath sounds, wheezing and rales present. No rhonchi. Chest:      Chest wall: No tenderness. Abdominal:      General: Bowel sounds are normal.      Tenderness: There is no abdominal tenderness. There is no right CVA tenderness, left CVA tenderness or guarding. Musculoskeletal:         General: No swelling or deformity. Cervical back: Normal range of motion and neck supple. No muscular tenderness. Right lower leg: No tenderness. No edema. Left lower leg: No tenderness. No edema. Skin:     General: Skin is warm and dry. Capillary Refill: Capillary refill takes less than 2 seconds. Neurological:      General: No focal deficit present.       Mental Status: He is alert and oriented to person, place, and time. Cranial Nerves: No cranial nerve deficit. Motor: No weakness. Psychiatric:         Mood and Affect: Mood normal.               ------------------------------ ED COURSE/MEDICAL DECISION MAKING----------------------  Medications   ipratropium-albuterol (DUONEB) nebulizer solution 1 ampule (1 ampule Inhalation Given 7/30/21 1030)   methylPREDNISolone sodium (SOLU-MEDROL) injection 125 mg (125 mg Intravenous Given 7/30/21 1009)   magnesium sulfate 2000 mg in 50 mL IVPB premix (0 mg Intravenous Stopped 7/30/21 1118)   iopamidol (ISOVUE-370) 76 % injection 75 mL (75 mLs Intravenous Given 7/30/21 1213)   furosemide (LASIX) injection 40 mg (40 mg Intravenous Given 7/30/21 1334)   cefTRIAXone (ROCEPHIN) 1,000 mg in sterile water 10 mL IV syringe (0 mg Intravenous Stopped 7/30/21 1341)   azithromycin (ZITHROMAX) 500 mg in D5W 250ml addavial (0 mg Intravenous Stopped 7/30/21 1501)     EKG: This EKG is signed and interpreted by me. Time:1013  Rate: 101  Rhythm: Sinus  Interpretation: Inferior lateral ST T changes. Comparison: changes compared to previous EKG      ED COURSE:  ED Course as of Jul 30 1534 Fri Jul 30, 2021   1420 D/w dr Jeremías Degroot will admit to tele will have dr Michele Jacobs and dr Jesi Schmidt evaluate    [DON]      ED Course User Index  [DON] Jose Aranda DO       Medical Decision Making:    Patient presented with increased shortness of breath chest tightness wheezing coughing patient states he thinks he has pneumonia. He previously had Covid. Patient had markedly abnormal EKG. Patient was hypoxic on room air. Patient was given breathing treatments antibiotics Lasix. I personally discussed with patient's primary care doctor who admit the patient. He will have cardiology and pulmonary consult. Counseling:    The emergency provider has spoken with the patient and discussed todays results, in addition to providing specific details for the plan of care and counseling regarding the diagnosis and prognosis. Questions are answered at this time and they are agreeable with the plan.      --------------------------------- IMPRESSION AND DISPOSITION ---------------------------------    IMPRESSION  1. Acute respiratory failure with hypoxia ([facility]    2. Abnormal ECG        DISPOSITION  Disposition: Admit to telemetry  Patient condition is fair      NOTE: This report was transcribed using voice recognition software.  Every effort was made to ensure accuracy; however, inadvertent computerized transcription errors may be present       Key Hoff DO  07/30/21 1534

## 2021-07-30 NOTE — PROGRESS NOTES
Database complete. Medications reconciled. Care plans and education initiated. Cardiologist is Dr. Soledad Rowan. Pulmonologist is Dr. Sandeep Leal. 1.5 ppd smoker requesting a nicotine patch.

## 2021-07-30 NOTE — CONSULTS
Pulmonary Consultation    Admit Date: 7/30/2021  Requesting Physician: Harshal Duvall DO    Reason for consultation:  · Exacerbation of COPD  HPI:  · Silke Boswell is a 53-year-old white male with a longstanding history of smoking and chronic obstructive pulmonary disease as well as recreational drug abuse who presents to the emergency room with a 4-day history of increasing shortness of breath, intractable cough, mucus production and fever. Seen and evaluated there, COVID-19 test was negative. A chest radiograph and CT scan were done. The CT showed improvement from previous with clearing of bilateral infiltrates. He was given azithromycin and Rocephin. · The patient was discharged in the hospital about a month ago after a similar episode. This time, he notes that his chest seems heavier and hurting more. Cardiology consultation is pending. PMH:    Past Medical History:   Diagnosis Date    Alcohol abuse     CHF (congestive heart failure) (HCC)     COPD (chronic obstructive pulmonary disease) (HCC)     History of cocaine use     Hyperlipidemia     Hypertension     Marijuana use     quit November 2017     MI (myocardial infarction) ([facility] [address]     alberto      PSH:   Past Surgical History:   Procedure Laterality Date    CARDIAC CATHETERIZATION  11/03/2020    DR Andriy Iyer CERVICAL FUSION  11/18/15    cervical laminectomy & fusion c4-c6, with rods & screws    POLYSOMNOGRAPHY  01/29/2018    AHI=29.8    WRIST SURGERY         Review of Systems:   · Constitutional: As noted in the HPI. · Eyes: No visual changes or diplopia. No scleral icterus. · ENT: No headaches, hearing loss or vertigo. No nasal congestion, or sore throat. · Cardiovascular: No chest pain, dyspnea on exertion, or palpitations. · Respiratory: See above  · Gastrointestinal: No abdominal pain, nausea or emesis. No diarrhea or rectal bleeding or melena. No change in bowel habits.    · Genitourinary: No dysuria, urinary frequency, or incontinence. No hematuria. · Musculoskeletal: No gait disturbance, weakness or joint complaints. · Integumentary: No rash or pruritis. No abnormal pigmentation, hair or nail changes. · Neurological: No headache, diplopia, dizziness, tremor, change in muscle strength, numbness or tingling. No change in gait, balance, coordination, mood, affect, memory, mentation, behavior. · Psychiatric: No anxiety or depression. · Endocrine: No temperature intolerance, excessive thirst, fluid intake, urinary frequency, excessive appetite, or recent weight change. · Hematologic/Lymphatic: No abnormal bruising or bleeding, blood clots or swollen lymph nodes. No anemia, fever, chills, night sweats, or swollen glands. · Allergic/Immunologic: No seasonal or perenial allergies. No history of hives or atopic dermatitis. Social History:  · Alcohol:  +  · Tobacco:   +++  · Employment:  no silica or asbestos exposure  · Family:  No family history of lung disease    Medications:     ketorolac  30 mg Intravenous Once    ipratropium-albuterol  1 ampule Inhalation Q4H    budesonide  1,000 mcg Nebulization BID    Arformoterol Tartrate  15 mcg Nebulization BID    methylPREDNISolone  60 mg Intravenous Q6H       Vitals:  Tmax:  VITALS:  BP (!) 172/113   Pulse 108   Temp 98.3 °F (36.8 °C) (Oral)   Resp 21   Ht 5' 7\" (1.702 m)   Wt 225 lb (102.1 kg)   SpO2 93%   BMI 35.24 kg/m²   24HR INTAKE/OUTPUT:      Intake/Output Summary (Last 24 hours) at 2021 1603  Last data filed at 2021 1501  Gross per 24 hour   Intake 300 ml   Output --   Net 300 ml     CURRENT PULSE OXIMETRY:  SpO2: 93 %  24HR PULSE OXIMETRY RANGE:  SpO2  Av.6 %  Min: 93 %  Max: 98 %    EXAM:  General: No distress. Alert. Eyes: PERRL. No sclera icterus. No conjunctival injection. ENT: No discharge. Pharynx clear. Neck: Trachea midline. Normal thyroid. No jvd, no hjr. Resp: Diffuse wheezing. No accessory muscle use. No rales.   No rhonchi. CV: Regular rate. Regular rhythm. No murmur No rub. Abd: Non-tender. Non-distended. No masses. No organmegaly. Normal bowel sounds. Skin: Warm and dry. No nodule on exposed extremities. No rash on exposed extremities. Lymph: No cervical LAD. No supraclavicular LAD. Ext: No joint deformity. No clubbing. No cyanosis. No edema  Neuro: Awake. Follows commands. Positive pupils/gag/corneals. Normal pain response. Lab Results:  CBC:   Recent Labs     07/30/21  1001   WBC 7.6   HGB 14.5   HCT 42.5   MCV 93.2          BMP:  Recent Labs     07/30/21  1001      K 4.0   CL 99   CO2 21*   BUN 18   CREATININE 1.5*    ALB:3,BILIDIR:3,BILITOT:3,ALKPHOS:3)@    PT/INR: No results for input(s): PROTIME, INR in the last 72 hours. Cultures:  Sputum: not available  Blood: not available    ABG:   No results for input(s): PH, PO2, PCO2, HCO3, BE, O2SAT, METHB, O2HB, COHB, O2CON, HHB, THB in the last 72 hours. Films:     CTA PULMONARY W CONTRAST   Final Result   1. There is no evidence of a pulmonary embolus   2. Very small residual or recurring infiltrate seen within the left lower   lobe measuring approximately 2 cm. 3. Mild emphysematous changes   4. Mild pleuroparenchymal scarring seen within the right upper lobe laterally. 5. Shotty stable lymph nodes within the prevascular space and right   paratracheal region. The largest lymph node measures 2.4 x 1.5 cm. This   lymph node is unchanged in size when compared to the prior study of   11/29/2020. XR CHEST PORTABLE   Final Result   1. Interval clearing of the lungs when compared with the patient's prior   study of 06/16/2021. .        Assessment:  1. Acute exacerbation of chronic obstructive pulmonary disease. 2. Improved pulmonary infiltrates suggesting resolving pneumonia  3. Lymphadenopathy, chronic in the mediastinum  4. History of coronary artery disease  5. Likely underlying viral infection      Plan:  1.  Check procalcitonin  2. Aerosol treatments with long and short acting beta agonist, anticholinergics and inhaled steroids  3. IV steroids  4. Await cardiology opinion  5. Check procalcitonin  6. Check IgE total      Thanks for letting us see this patient in consultation. Total time in reviewing the previous admissions and records, reviewing the current x-rays, labs, and discussing with clinical staff including nursing and physicians, exceeded 50 minutes. Please contact us with any questions. Office [phone] or after hours through [employer], x [phone]. Please note that voice recognition technology was used (while wearing a Covid mandated mask) in the preparation of this note and make therefore it may contain inadvertent transcription errors. If the patient is a COVID 19 isolation patient, the above physical exam reflects that of the examining physician for the day. Konrad Yeh MD,  M.D., F.C.C.P.     [facility] [address] [facility], [address] [place] [address], [address], [facility]

## 2021-07-30 NOTE — ED NOTES
RN faxed SBAR to floor. Called floor to confirm receipt. Spoke with Ayleen White who confirmed.       Vane Vogel RN  07/30/21 [id number]

## 2021-07-31 LAB
ADENOVIRUS BY PCR: NOT DETECTED
ALBUMIN SERPL-MCNC: 4 G/DL (ref 3.5–5.2)
ALP BLD-CCNC: 73 U/L (ref 40–129)
ALT SERPL-CCNC: 24 U/L (ref 0–40)
ANION GAP SERPL CALCULATED.3IONS-SCNC: 17 MMOL/L (ref 7–16)
AST SERPL-CCNC: 21 U/L (ref 0–39)
BILIRUB SERPL-MCNC: 0.2 MG/DL (ref 0–1.2)
BORDETELLA PARAPERTUSSIS BY PCR: NOT DETECTED
BORDETELLA PERTUSSIS BY PCR: NOT DETECTED
BUN BLDV-MCNC: 36 MG/DL (ref 6–20)
CALCIUM SERPL-MCNC: 9.2 MG/DL (ref 8.6–10.2)
CHLAMYDOPHILIA PNEUMONIAE BY PCR: NOT DETECTED
CHLORIDE BLD-SCNC: 98 MMOL/L (ref 98–107)
CO2: 17 MMOL/L (ref 22–29)
CORONAVIRUS 229E BY PCR: NOT DETECTED
CORONAVIRUS HKU1 BY PCR: NOT DETECTED
CORONAVIRUS NL63 BY PCR: NOT DETECTED
CORONAVIRUS OC43 BY PCR: NOT DETECTED
CREAT SERPL-MCNC: 1.6 MG/DL (ref 0.7–1.2)
GFR AFRICAN AMERICAN: 54
GFR NON-AFRICAN AMERICAN: 45 ML/MIN/1.73
GLUCOSE BLD-MCNC: 230 MG/DL (ref 74–99)
HCT VFR BLD CALC: 48.6 % (ref 37–54)
HEMOGLOBIN: 16.9 G/DL (ref 12.5–16.5)
HUMAN METAPNEUMOVIRUS BY PCR: NOT DETECTED
HUMAN RHINOVIRUS/ENTEROVIRUS BY PCR: NOT DETECTED
INFLUENZA A BY PCR: NOT DETECTED
INFLUENZA B BY PCR: NOT DETECTED
MCH RBC QN AUTO: 31.8 PG (ref 26–35)
MCHC RBC AUTO-ENTMCNC: 34.8 % (ref 32–34.5)
MCV RBC AUTO: 91.4 FL (ref 80–99.9)
MYCOPLASMA PNEUMONIAE BY PCR: NOT DETECTED
PARAINFLUENZA VIRUS 1 BY PCR: NOT DETECTED
PARAINFLUENZA VIRUS 2 BY PCR: NOT DETECTED
PARAINFLUENZA VIRUS 3 BY PCR: DETECTED
PARAINFLUENZA VIRUS 4 BY PCR: NOT DETECTED
PDW BLD-RTO: 13.3 FL (ref 11.5–15)
PLATELET # BLD: 186 E9/L (ref 130–450)
PMV BLD AUTO: 10.2 FL (ref 7–12)
POTASSIUM SERPL-SCNC: 3.9 MMOL/L (ref 3.5–5)
PROCALCITONIN: 0.11 NG/ML (ref 0–0.08)
RBC # BLD: 5.32 E12/L (ref 3.8–5.8)
RESPIRATORY SYNCYTIAL VIRUS BY PCR: NOT DETECTED
SARS-COV-2, PCR: NOT DETECTED
SODIUM BLD-SCNC: 132 MMOL/L (ref 132–146)
TOTAL PROTEIN: 8 G/DL (ref 6.4–8.3)
WBC # BLD: 9.3 E9/L (ref 4.5–11.5)

## 2021-07-31 PROCEDURE — 2060000000 HC ICU INTERMEDIATE R&B

## 2021-07-31 PROCEDURE — 6370000000 HC RX 637 (ALT 250 FOR IP): Performed by: GENERAL PRACTICE

## 2021-07-31 PROCEDURE — 6370000000 HC RX 637 (ALT 250 FOR IP): Performed by: INTERNAL MEDICINE

## 2021-07-31 PROCEDURE — 36415 COLL VENOUS BLD VENIPUNCTURE: CPT

## 2021-07-31 PROCEDURE — 85027 COMPLETE CBC AUTOMATED: CPT

## 2021-07-31 PROCEDURE — 84145 PROCALCITONIN (PCT): CPT

## 2021-07-31 PROCEDURE — 94640 AIRWAY INHALATION TREATMENT: CPT

## 2021-07-31 PROCEDURE — 6360000002 HC RX W HCPCS: Performed by: INTERNAL MEDICINE

## 2021-07-31 PROCEDURE — 82785 ASSAY OF IGE: CPT

## 2021-07-31 PROCEDURE — 0202U NFCT DS 22 TRGT SARS-COV-2: CPT

## 2021-07-31 PROCEDURE — 80053 COMPREHEN METABOLIC PANEL: CPT

## 2021-07-31 PROCEDURE — 2580000003 HC RX 258: Performed by: GENERAL PRACTICE

## 2021-07-31 RX ORDER — LORAZEPAM 2 MG/ML
3 INJECTION INTRAMUSCULAR
Status: DISCONTINUED | OUTPATIENT
Start: 2021-07-31 | End: 2021-08-06 | Stop reason: HOSPADM

## 2021-07-31 RX ORDER — LORAZEPAM 2 MG/ML
1 INJECTION INTRAMUSCULAR
Status: DISCONTINUED | OUTPATIENT
Start: 2021-07-31 | End: 2021-08-06 | Stop reason: HOSPADM

## 2021-07-31 RX ORDER — SODIUM CHLORIDE 0.9 % (FLUSH) 0.9 %
5-40 SYRINGE (ML) INJECTION PRN
Status: DISCONTINUED | OUTPATIENT
Start: 2021-07-31 | End: 2021-08-06 | Stop reason: HOSPADM

## 2021-07-31 RX ORDER — SODIUM CHLORIDE 0.9 % (FLUSH) 0.9 %
5-40 SYRINGE (ML) INJECTION EVERY 12 HOURS SCHEDULED
Status: DISCONTINUED | OUTPATIENT
Start: 2021-07-31 | End: 2021-08-06 | Stop reason: HOSPADM

## 2021-07-31 RX ORDER — LORAZEPAM 1 MG/1
2 TABLET ORAL
Status: DISCONTINUED | OUTPATIENT
Start: 2021-07-31 | End: 2021-08-06 | Stop reason: HOSPADM

## 2021-07-31 RX ORDER — LORAZEPAM 1 MG/1
3 TABLET ORAL
Status: DISCONTINUED | OUTPATIENT
Start: 2021-07-31 | End: 2021-08-06 | Stop reason: HOSPADM

## 2021-07-31 RX ORDER — LORAZEPAM 1 MG/1
4 TABLET ORAL
Status: DISCONTINUED | OUTPATIENT
Start: 2021-07-31 | End: 2021-08-06 | Stop reason: HOSPADM

## 2021-07-31 RX ORDER — LORAZEPAM 1 MG/1
1 TABLET ORAL
Status: DISCONTINUED | OUTPATIENT
Start: 2021-07-31 | End: 2021-08-06 | Stop reason: HOSPADM

## 2021-07-31 RX ORDER — LORAZEPAM 2 MG/ML
4 INJECTION INTRAMUSCULAR
Status: DISCONTINUED | OUTPATIENT
Start: 2021-07-31 | End: 2021-08-06 | Stop reason: HOSPADM

## 2021-07-31 RX ORDER — LORAZEPAM 2 MG/ML
2 INJECTION INTRAMUSCULAR
Status: DISCONTINUED | OUTPATIENT
Start: 2021-07-31 | End: 2021-08-06 | Stop reason: HOSPADM

## 2021-07-31 RX ADMIN — METHYLPREDNISOLONE SODIUM SUCCINATE 60 MG: 125 INJECTION, POWDER, LYOPHILIZED, FOR SOLUTION INTRAMUSCULAR; INTRAVENOUS at 04:45

## 2021-07-31 RX ADMIN — METHYLPREDNISOLONE SODIUM SUCCINATE 60 MG: 125 INJECTION, POWDER, LYOPHILIZED, FOR SOLUTION INTRAMUSCULAR; INTRAVENOUS at 17:03

## 2021-07-31 RX ADMIN — SPIRONOLACTONE 25 MG: 25 TABLET ORAL at 08:07

## 2021-07-31 RX ADMIN — BUDESONIDE 1000 MCG: 0.5 SUSPENSION RESPIRATORY (INHALATION) at 08:37

## 2021-07-31 RX ADMIN — METHYLPREDNISOLONE SODIUM SUCCINATE 60 MG: 125 INJECTION, POWDER, LYOPHILIZED, FOR SOLUTION INTRAMUSCULAR; INTRAVENOUS at 22:13

## 2021-07-31 RX ADMIN — IPRATROPIUM BROMIDE AND ALBUTEROL SULFATE 1 AMPULE: .5; 3 SOLUTION RESPIRATORY (INHALATION) at 16:18

## 2021-07-31 RX ADMIN — Medication 10 ML: at 20:29

## 2021-07-31 RX ADMIN — CLONIDINE HYDROCHLORIDE 0.2 MG: 0.2 TABLET ORAL at 08:07

## 2021-07-31 RX ADMIN — BUDESONIDE 1000 MCG: 0.5 SUSPENSION RESPIRATORY (INHALATION) at 19:12

## 2021-07-31 RX ADMIN — SACUBITRIL AND VALSARTAN 1 TABLET: 24; 26 TABLET, FILM COATED ORAL at 08:07

## 2021-07-31 RX ADMIN — IPRATROPIUM BROMIDE AND ALBUTEROL SULFATE 1 AMPULE: .5; 3 SOLUTION RESPIRATORY (INHALATION) at 08:36

## 2021-07-31 RX ADMIN — IPRATROPIUM BROMIDE AND ALBUTEROL SULFATE 1 AMPULE: .5; 3 SOLUTION RESPIRATORY (INHALATION) at 19:12

## 2021-07-31 RX ADMIN — IPRATROPIUM BROMIDE AND ALBUTEROL SULFATE 1 AMPULE: .5; 3 SOLUTION RESPIRATORY (INHALATION) at 12:20

## 2021-07-31 RX ADMIN — ARFORMOTEROL TARTRATE 15 MCG: 15 SOLUTION RESPIRATORY (INHALATION) at 08:37

## 2021-07-31 RX ADMIN — CLONIDINE HYDROCHLORIDE 0.2 MG: 0.2 TABLET ORAL at 20:26

## 2021-07-31 RX ADMIN — Medication 10 ML: at 10:30

## 2021-07-31 RX ADMIN — METHYLPREDNISOLONE SODIUM SUCCINATE 60 MG: 125 INJECTION, POWDER, LYOPHILIZED, FOR SOLUTION INTRAMUSCULAR; INTRAVENOUS at 10:27

## 2021-07-31 RX ADMIN — ACETAMINOPHEN 650 MG: 325 TABLET ORAL at 10:27

## 2021-07-31 RX ADMIN — AMLODIPINE BESYLATE 10 MG: 10 TABLET ORAL at 08:07

## 2021-07-31 RX ADMIN — LIDOCAINE HYDROCHLORIDE: 20 SOLUTION ORAL; TOPICAL at 23:49

## 2021-07-31 RX ADMIN — FINASTERIDE 5 MG: 5 TABLET, FILM COATED ORAL at 08:07

## 2021-07-31 RX ADMIN — IPRATROPIUM BROMIDE AND ALBUTEROL SULFATE 1 AMPULE: .5; 3 SOLUTION RESPIRATORY (INHALATION) at 23:21

## 2021-07-31 RX ADMIN — ARFORMOTEROL TARTRATE 15 MCG: 15 SOLUTION RESPIRATORY (INHALATION) at 19:12

## 2021-07-31 RX ADMIN — SACUBITRIL AND VALSARTAN 1 TABLET: 24; 26 TABLET, FILM COATED ORAL at 20:26

## 2021-07-31 RX ADMIN — METOPROLOL SUCCINATE 100 MG: 100 TABLET, EXTENDED RELEASE ORAL at 08:07

## 2021-07-31 ASSESSMENT — PAIN SCALES - GENERAL
PAINLEVEL_OUTOF10: 0
PAINLEVEL_OUTOF10: 0
PAINLEVEL_OUTOF10: 9
PAINLEVEL_OUTOF10: 0
PAINLEVEL_OUTOF10: 0

## 2021-07-31 ASSESSMENT — PAIN DESCRIPTION - FREQUENCY: FREQUENCY: INTERMITTENT

## 2021-07-31 ASSESSMENT — PAIN DESCRIPTION - ONSET: ONSET: GRADUAL

## 2021-07-31 ASSESSMENT — PAIN DESCRIPTION - PAIN TYPE: TYPE: ACUTE PAIN

## 2021-07-31 ASSESSMENT — PAIN - FUNCTIONAL ASSESSMENT: PAIN_FUNCTIONAL_ASSESSMENT: ACTIVITIES ARE NOT PREVENTED

## 2021-07-31 ASSESSMENT — PAIN DESCRIPTION - PROGRESSION: CLINICAL_PROGRESSION: GRADUALLY IMPROVING

## 2021-07-31 ASSESSMENT — PAIN DESCRIPTION - DESCRIPTORS: DESCRIPTORS: HEADACHE

## 2021-07-31 ASSESSMENT — PAIN DESCRIPTION - LOCATION: LOCATION: HEAD

## 2021-07-31 NOTE — PROGRESS NOTES
[facility] [address] [facility]  [address] [facility], [address]  [facility], [address] [facility]      Pulmonary Progress Note      SUBJECTIVE:  Claims slightly better with breathing, still with coughing fits with less sputum production, getting neb treatment when seen.     OBJECTIVE    Medications    Continuous Infusions:    Scheduled Meds:   ipratropium-albuterol  1 ampule Inhalation Q4H    budesonide  1,000 mcg Nebulization BID    Arformoterol Tartrate  15 mcg Nebulization BID    methylPREDNISolone  60 mg Intravenous Q6H    amLODIPine  10 mg Oral Daily    cloNIDine  0.2 mg Oral BID    finasteride  5 mg Oral Daily    metoprolol succinate  100 mg Oral Daily    nicotine  1 patch Transdermal Q24H    sacubitril-valsartan  1 tablet Oral BID    spironolactone  25 mg Oral Daily       PRN Meds:nitroGLYCERIN, hydrALAZINE, acetaminophen    Physical    VITALS:  /60   Pulse 76   Temp 98.4 °F (36.9 °C) (Oral)   Resp 18   Ht 5' 7\" (1.702 m)   Wt 223 lb 14.4 oz (101.6 kg)   SpO2 94%   BMI 35.07 kg/m²     24HR INTAKE/OUTPUT:      Intake/Output Summary (Last 24 hours) at 2021 0851  Last data filed at 2021 0814  Gross per 24 hour   Intake 300 ml   Output 350 ml   Net -50 ml       24HR PULSE OXIMETRY RANGE:    SpO2  Av.2 %  Min: 93 %  Max: 98 %    General appearance: alert, appears stated age and cooperative  Lungs: rhonchi bilaterally worse with cough  Heart: regular rate and rhythm, S1, S2 normal, no murmur, click, rub or gallop  Abdomen: soft, non-tender; bowel sounds normal; no masses,  no organomegaly  Extremities: extremities normal, atraumatic, no cyanosis or edema  Neurologic: Mental status: Alert, oriented, thought content appropriate    Data    CBC:   Recent Labs     21  1001   WBC 7.6   HGB 14.5   HCT 42.5   MCV 93.2          BMP:  Recent Labs     21  1001      K 4.0   CL 99   CO2 21*   BUN 18   CREATININE 1.5* ALB:3,BILIDIR:3,BILITOT:3,ALKPHOS:3)@    PT/INR: No results for input(s): PROTIME, INR in the last 72 hours. ABG:   No results for input(s): PH, PO2, PCO2, HCO3, BE, O2SAT, METHB, O2HB, COHB, O2CON, HHB, THB in the last 72 hours. Radiology/Other tests reviewed: none    Assessment:     Active Problems:    Acute respiratory failure with hypoxia (HCC)  Resolved Problems:    * No resolved hospital problems. *  COPD  Hypoxia      Plan:       1. Cont with steroids, taper as tolerated  2. EZPAP with neb treatments and see if helps better  3. Cont with nebs, observe breathing and cough/sputum production  4. ETOH abuse, will need to watch for any DT  5. Cont with oxygen, taper as tolerated  6. OOB to chair      Time at the bedside, reviewing labs and radiographs, reviewing notes and consultations, discussing with staff and family was more than 55 minutes. Thanks for letting us see this patient in consultation. Please contact us with any questions. Office [phone] or after hours through [employer], x [phone].

## 2021-08-01 LAB
ALBUMIN SERPL-MCNC: 3.7 G/DL (ref 3.5–5.2)
ALP BLD-CCNC: 74 U/L (ref 40–129)
ALT SERPL-CCNC: 32 U/L (ref 0–40)
ANION GAP SERPL CALCULATED.3IONS-SCNC: 13 MMOL/L (ref 7–16)
AST SERPL-CCNC: 30 U/L (ref 0–39)
BILIRUB SERPL-MCNC: <0.2 MG/DL (ref 0–1.2)
BUN BLDV-MCNC: 42 MG/DL (ref 6–20)
CALCIUM SERPL-MCNC: 9 MG/DL (ref 8.6–10.2)
CHLORIDE BLD-SCNC: 102 MMOL/L (ref 98–107)
CO2: 20 MMOL/L (ref 22–29)
CREAT SERPL-MCNC: 1.4 MG/DL (ref 0.7–1.2)
GFR AFRICAN AMERICAN: >60
GFR NON-AFRICAN AMERICAN: 52 ML/MIN/1.73
GLUCOSE BLD-MCNC: 127 MG/DL (ref 74–99)
HCT VFR BLD CALC: 47.4 % (ref 37–54)
HEMOGLOBIN: 16.3 G/DL (ref 12.5–16.5)
MCH RBC QN AUTO: 32 PG (ref 26–35)
MCHC RBC AUTO-ENTMCNC: 34.4 % (ref 32–34.5)
MCV RBC AUTO: 93.1 FL (ref 80–99.9)
PDW BLD-RTO: 13.2 FL (ref 11.5–15)
PLATELET # BLD: 211 E9/L (ref 130–450)
PMV BLD AUTO: 10 FL (ref 7–12)
POTASSIUM SERPL-SCNC: 4.1 MMOL/L (ref 3.5–5)
RBC # BLD: 5.09 E12/L (ref 3.8–5.8)
SODIUM BLD-SCNC: 135 MMOL/L (ref 132–146)
TOTAL PROTEIN: 7.3 G/DL (ref 6.4–8.3)
WBC # BLD: 18.6 E9/L (ref 4.5–11.5)

## 2021-08-01 PROCEDURE — 2700000000 HC OXYGEN THERAPY PER DAY

## 2021-08-01 PROCEDURE — 6370000000 HC RX 637 (ALT 250 FOR IP): Performed by: GENERAL PRACTICE

## 2021-08-01 PROCEDURE — 94640 AIRWAY INHALATION TREATMENT: CPT

## 2021-08-01 PROCEDURE — 6370000000 HC RX 637 (ALT 250 FOR IP): Performed by: INTERNAL MEDICINE

## 2021-08-01 PROCEDURE — 36415 COLL VENOUS BLD VENIPUNCTURE: CPT

## 2021-08-01 PROCEDURE — 6360000002 HC RX W HCPCS: Performed by: INTERNAL MEDICINE

## 2021-08-01 PROCEDURE — 80053 COMPREHEN METABOLIC PANEL: CPT

## 2021-08-01 PROCEDURE — 2580000003 HC RX 258: Performed by: GENERAL PRACTICE

## 2021-08-01 PROCEDURE — 2060000000 HC ICU INTERMEDIATE R&B

## 2021-08-01 PROCEDURE — 85027 COMPLETE CBC AUTOMATED: CPT

## 2021-08-01 RX ADMIN — SACUBITRIL AND VALSARTAN 1 TABLET: 24; 26 TABLET, FILM COATED ORAL at 21:48

## 2021-08-01 RX ADMIN — FINASTERIDE 5 MG: 5 TABLET, FILM COATED ORAL at 08:30

## 2021-08-01 RX ADMIN — CLONIDINE HYDROCHLORIDE 0.2 MG: 0.2 TABLET ORAL at 21:48

## 2021-08-01 RX ADMIN — SACUBITRIL AND VALSARTAN 1 TABLET: 24; 26 TABLET, FILM COATED ORAL at 08:30

## 2021-08-01 RX ADMIN — METOPROLOL SUCCINATE 100 MG: 100 TABLET, EXTENDED RELEASE ORAL at 08:30

## 2021-08-01 RX ADMIN — IPRATROPIUM BROMIDE AND ALBUTEROL SULFATE 1 AMPULE: .5; 3 SOLUTION RESPIRATORY (INHALATION) at 08:41

## 2021-08-01 RX ADMIN — AMLODIPINE BESYLATE 10 MG: 10 TABLET ORAL at 08:30

## 2021-08-01 RX ADMIN — Medication 10 ML: at 08:31

## 2021-08-01 RX ADMIN — IPRATROPIUM BROMIDE AND ALBUTEROL SULFATE 1 AMPULE: .5; 3 SOLUTION RESPIRATORY (INHALATION) at 12:05

## 2021-08-01 RX ADMIN — ARFORMOTEROL TARTRATE 15 MCG: 15 SOLUTION RESPIRATORY (INHALATION) at 08:41

## 2021-08-01 RX ADMIN — METHYLPREDNISOLONE SODIUM SUCCINATE 60 MG: 125 INJECTION, POWDER, LYOPHILIZED, FOR SOLUTION INTRAMUSCULAR; INTRAVENOUS at 05:33

## 2021-08-01 RX ADMIN — Medication 10 ML: at 21:49

## 2021-08-01 RX ADMIN — METHYLPREDNISOLONE SODIUM SUCCINATE 60 MG: 125 INJECTION, POWDER, LYOPHILIZED, FOR SOLUTION INTRAMUSCULAR; INTRAVENOUS at 09:59

## 2021-08-01 RX ADMIN — SPIRONOLACTONE 25 MG: 25 TABLET ORAL at 08:30

## 2021-08-01 RX ADMIN — BUDESONIDE 1000 MCG: 0.5 SUSPENSION RESPIRATORY (INHALATION) at 08:41

## 2021-08-01 RX ADMIN — IPRATROPIUM BROMIDE AND ALBUTEROL SULFATE 1 AMPULE: .5; 3 SOLUTION RESPIRATORY (INHALATION) at 16:35

## 2021-08-01 RX ADMIN — BUDESONIDE 1000 MCG: 0.5 SUSPENSION RESPIRATORY (INHALATION) at 20:52

## 2021-08-01 RX ADMIN — IPRATROPIUM BROMIDE AND ALBUTEROL SULFATE 1 AMPULE: .5; 3 SOLUTION RESPIRATORY (INHALATION) at 20:52

## 2021-08-01 RX ADMIN — METHYLPREDNISOLONE SODIUM SUCCINATE 60 MG: 125 INJECTION, POWDER, LYOPHILIZED, FOR SOLUTION INTRAMUSCULAR; INTRAVENOUS at 16:28

## 2021-08-01 RX ADMIN — CLONIDINE HYDROCHLORIDE 0.2 MG: 0.2 TABLET ORAL at 08:30

## 2021-08-01 RX ADMIN — ARFORMOTEROL TARTRATE 15 MCG: 15 SOLUTION RESPIRATORY (INHALATION) at 20:52

## 2021-08-01 ASSESSMENT — PAIN SCALES - GENERAL
PAINLEVEL_OUTOF10: 0
PAINLEVEL_OUTOF10: 0

## 2021-08-01 NOTE — CONSULTS
CARDIOLOGY CONSULTATION    Patient Name:  Anila Salazar    :  1965    Reason for Consultation:   Shortness of breath; cardiac etiology? History of Present Illness:   Anila Salazar returns to [facility], following increasing shortness of breath and persistent cough with coarse breathing over the past few weeks prior to admission. He denies any hemoptysis nor chest discomfort. He has a known underlying cardiomyopathy for which he was treated with neprilysin inhibitor with subsequent improvement of his left ventricular function and no further recent hospitalizations until now. He is now readmitted for reassessment of his symptoms thought to be secondary to parainfluenza virus. Past Medical History:   has a past medical history of Alcohol abuse, CHF (congestive heart failure) ([facility] [address] COPD (chronic obstructive pulmonary disease) ([facility] [address] History of cocaine use, Hyperlipidemia, Hypertension, Marijuana use, MI (myocardial infarction) ([facility] [address] and alberto. Surgical History:   has a past surgical history that includes Wrist surgery; cervical fusion (11/18/15); polysomnography (2018); and Cardiac catheterization (2020). Social History:   reports that he has been smoking cigarettes. He started smoking about 41 years ago. He has a 60.00 pack-year smoking history. He has never used smokeless tobacco. He reports previous alcohol use of about 140.0 standard drinks of alcohol per week. Drugs: Cocaine and Other-see comments. Family History:  family history includes Arthritis in his mother; Cancer in his brother; Heart Disease in his father; High Blood Pressure in his brother, brother, brother, and brother; Other in his brother, brother, brother, brother, and mother. Medications:  Prior to Admission medications    Medication Sig Start Date End Date Taking?  Authorizing Provider   cloNIDine (CATAPRES) 0.2 MG tablet Take 1 tablet by mouth 2 times daily  Patient taking differently: Take 0.2 mg by mouth 2 times daily 0800/1400 daily 6/18/21  Yes Joel Lombardo, DO   predniSONE (DELTASONE) 10 MG tablet 3 tabs daily x2 days, 2 tabs daily x2 days, 1 tab daily x2 days. Patient taking differently: 3 tabs daily x2 days, 2 tabs daily x2 days, 1 tab daily x2 days. 7/31/21 starts 1 tab daily for 2 days 6/16/21  Yes Moo Purdy MD   albuterol sulfate HFA (VENTOLIN HFA) 108 (90 Base) MCG/ACT inhaler Inhale 2 puffs into the lungs every 4 hours as needed for Wheezing   Yes Historical Provider, MD   ipratropium-albuterol (DUONEB) 0.5-2.5 (3) MG/3ML SOLN nebulizer solution Take 1 vial by nebulization every 6 hours as needed for Shortness of Breath   Yes Historical Provider, MD   nicotine (NICODERM CQ) 21 MG/24HR Place 1 patch onto the skin every 24 hours   Yes Historical Provider, MD   fluticasone-umeclidin-vilant (TRELEGY ELLIPTA) 100-62.5-25 MCG/INH AEPB Inhale 1 puff into the lungs daily  Patient not taking: Reported on 6/21/2021    Historical Provider, MD   nitroGLYCERIN (NITROSTAT) 0.4 MG SL tablet Place 0.4 mg under the tongue every 5 minutes as needed for Chest pain up to max of 3 total doses. If no relief after 1 dose, call 911. NEVER USED YET 7/30/21    Historical Provider, MD   amLODIPine (NORVASC) 5 MG tablet Take 1 tablet by mouth daily 11/21/20   Zaki Farley, DO   sacubitril-valsartan (ENTRESTO) 24-26 MG per tablet Take 1 tablet by mouth 2 times daily  Patient taking differently: Take 1 tablet by mouth 2 times daily This was sent to pharmacy 1 month ago. Cannot afford these currently, never picked up. Prescription assistance? 11/5/20   Zaki Farley, DO   spironolactone (ALDACTONE) 25 MG tablet Take 25 mg by mouth daily This was sent to pharmacy 1 month ago. Cannot afford these currently, never picked up. Prescription assistance?     Historical Provider, MD   metoprolol succinate (TOPROL XL) 100 MG extended release tablet Take 1 tablet by mouth daily  Patient taking differently: Take 100 mg by mouth daily This was sent to pharmacy 1 month ago. Cannot afford these currently, never picked up. Prescription assistance? 8/24/19   Daniel Grande DO   finasteride (PROSCAR) 5 MG tablet Take 1 tablet by mouth daily  Patient taking differently: Take 5 mg by mouth daily This was sent to pharmacy 1 month ago. Cannot afford these currently, never picked up. Prescription assistance? 8/24/19   Daniel Grande DO       Allergies:  Patient has no known allergies. Review of Systems:   · Constitutional: there has been no unanticipated weight loss. There's been no significant change in energy level, sleep pattern or activity level. No fever chills or rigors. · Eyes: No visual changes or diplopia. No scleral icterus. · ENT: No Headaches, hearing loss or vertigo. No mouth sores or sore throat. No change in taste or smell. · Cardiovascular: No chest discomfort + dyspnea on exertion and now is well at rest., palpitations, loss of consciousness, no phlebitis, no claudication. · Respiratory: No cough or wheezing, no sputum production. No hemoptysis, pleuritic pain. · Gastrointestinal: No abdominal pain, appetite loss, blood in stools. No change in bowel habits. No hematemesis  · Genitourinary: No dysuria, trouble voiding or hematuria. No nocturia or increased frequency. · Musculoskeletal:  No gait disturbance, weakness or joint complaints. · Integumentary: No rash or pruritis. · Neurological: No headache, diplopia, change in muscle strength, numbness or tingling. No change in gait, balance, coordination, mood, affect, memory, mentation, behavior. · Psychiatric: No anxiety or depression. · Endocrine: No temperature intolerance. No excessive thirst, fluid intake, or urination. No tremor. · Hematologic/Lymphatic: No abnormal bruising or bleeding, blood clots or swollen lymph nodes. · Allergic/Immunologic: No nasal congestion or hives.     Physical Examination:    Vital Signs: /81   Pulse 63 Temp 97.8 °F (36.6 °C) (Oral)   Resp 18   Ht 5' 7\" (1.702 m)   Wt 223 lb 14.4 oz (101.6 kg)   SpO2 98%   BMI 35.07 kg/m²   General appearance: Well preserved, mesomorphic body habitus, alert, no distress. Skin: Skin color, texture, turgor normal. No rashes or lesions. No induration or tightening palpated. Head: Normocephalic. No masses, lesions, tenderness or abnormalities  Eyes: Conjunctivae/corneas clear. PERRL, EOMs intact. Sclera non icteric. Ears: External ears normal. Canals clear. TM's clear bilaterally. Hearing normal to finger rub. Nose/Sinuses: Nares normal. Septum midline. Mucosa normal. No drainage or sinus tenderness. Oropharynx: Lips, mucosa, and tongue normal. Oropharynx clear with no exudate seen. Neck: Neck supple and symmetric. No adenopathy. Thyroid symmetric, normal size, without nodules. Trachea is midline. Carotids brisk in upstroke without bruits, no abnormal JVP noted at 45°. Chest: Even excursion  Lungs: Lungs coarse expiratory rhonchi to auscultation bilaterally. No retractions or use of accessory muscles. No tactile vocal fremitus. No crackles or rales. Heart:  S1 > S2. Regular rhythm. S4 gallop but no murmur. No rub, palpable thrill or heave noted. PMI 5th intercostal space midclavicular line. Abdomen: Abdomen soft, moderately protuberant, non-tender. BS normal. No masses, organomegaly. No hernia noted. Extremities: Extremities normal. No deformities, edema, or skin discoloration. No cyanosis or clubbing noted to the nails. Peripheral pulses present 2+ upper extremities and present 1+  lower extremities. Musculoskeletal: Spine ROM normal. Muscular strength intact. Neuro: Cranial nerves intact. Motor: Strength 5/5 in all extremities. Reflexes 2+ in all extremities. No focal weakness. Sensory: grossly normal to touch. Coordination intact.     Pertinent Labs:  CBC:   Recent Labs     07/30/21  1001 07/31/21  0813 08/01/21  0541   WBC 7.6 9.3 18.6*   HGB 14.5 16.9* 16.3  186 211     BMP:  Recent Labs     07/30/21  1001 07/31/21  0813 08/01/21  0541    132 135   K 4.0 3.9 4.1   CL 99 98 102   CO2 21* 17* 20*   BUN 18 36* 42*   CREATININE 1.5* 1.6* 1.4*   GLUCOSE 100* 230* 127*   CALCIUM 8.8 9.2 9.0     ABGs:   Lab Results   Component Value Date    PH 7.475 11/23/2020    PO2 83.0 11/23/2020    PCO2 32.3 11/23/2020     INR:   No results for input(s): INR in the last 72 hours. PRO-BNP:   Lab Results   Component Value Date    PROBNP 3,266 (H) 07/30/2021    PROBNP 7,751 (H) 06/13/2021      Cardiac Injury Profile:   No results for input(s): CKTOTAL, CKMB, CKMBINDEX, TROPONINI in the last 72 hours. Lipid Profile:   Lab Results   Component Value Date    TRIG 217 01/02/2018    HDL 58 01/02/2018    LDLCALC 144 01/02/2018    CHOL 245 01/02/2018      Hemoglobin A1C: No components found for: HGBA1C   ECG:  See report  ECHO: 4/18/2019  Left ventricular EF 57%    Radiology:  XR CHEST PORTABLE    Result Date: 7/30/2021  1. Interval clearing of the lungs when compared with the patient's prior study of 06/16/2021. CTA PULMONARY W CONTRAST    Result Date: 7/30/2021  1. There is no evidence of a pulmonary embolus 2. Very small residual or recurring infiltrate seen within the left lower lobe measuring approximately 2 cm. 3. Mild emphysematous changes 4. Mild pleuroparenchymal scarring seen within the right upper lobe laterally. 5. Shotty stable lymph nodes within the prevascular space and right paratracheal region. The largest lymph node measures 2.4 x 1.5 cm. This lymph node is unchanged in size when compared to the prior study of 11/29/2020. Assessment:    Active Problems:    Acute respiratory failure with hypoxia (HCC)  Resolved Problems:    * No resolved hospital problems. *      Plan:   Mr. Amelia Kent is on supportive care for his upper respiratory viral infection will remain on his present cardiac medicationsand titrate as needed.   Once again I have counseled him on smoking cessation and importantly alcohol cessation and he indicates that he fully understands but has in the past has had difficulty following through. He should also maintain his LDL cholesterol within updated 2020 ACC/AHA/AACE/ESC/EAS cholesterol guidelines. I have spent more than 45 minutes face to face with Danyell Birmingham reviewing notes and laboratory data with greater than 50% of this time instructing and counseling the patient regarding my findings and recommendations and I have answered all questions as posed to me by Mr. Lay Jamison. Thank you, Iliana Chirinos DO for allowing me to consult in the care of this patient. Karon Walker DO DO, FACP, [facility], [facility]    NOTE:  This report was transcribed using voice recognition software. Every effort was made to ensure accuracy; however, inadvertent computerized transcription errors may be present.

## 2021-08-01 NOTE — H&P
[address] [place] [address]                              HISTORY AND PHYSICAL    PATIENT NAME: Gabriele García                  :        1965  MED REC NO:   [id number]                            ROOM:       [id number]  ACCOUNT NO:   [de-identified]                           ADMIT DATE: 2021  PROVIDER:     Rowdy Prado DO    HISTORY OF PRESENT ILLNESS:  A 67-year-old white male presented to the  emergency department with a history and chief complaint of being short  of breath, coughing, and having a fever for approximately four days. He  does have a longstanding history of COPD, tobacco abuse, previous  history of COVID. He is coughing, wheezing, states he lost his taste  now. His chest is hurting from coughing. He has a headache. His blood  pressure is extremely elevated. Denies any vomiting. COVID test was  negative. Influenza test was negative. White count was only 7.6. Creatinine slightly elevated at 1.5, close to baseline. High sensitive  troponin 42. ProBNP 3266. C-reactive protein 1.3. Drug screen  negative. EKG, sinus tach. CTA of the chest, very small residual  infiltrate within the left lower lobe, mild emphysematous changes,  shotty lymphadenopathy. Chest x-ray:  Interval clearing. /113,  pulse 108, temperature 98, respiratory rate is 18. Height 5 feet 7  inches, weight 225, BMI 35.2. He was given several breathing treatments  in the emergency department, given some Solu-Medrol, magnesium, started  on some Rocephin and azithromycin, admitted up to the floor. Initial  impression is acute hypoxia respiratory failure, abnormal EKG. The  patient is going to be admitted for Pulmonary and Cardiology consult.     RECENT AND CURRENT MEDICATIONS:  Included Proscar 5 mg, metoprolol 100  daily, spironolactone 25 daily, Entresto 24/26 b.i.d., Norvasc 5 daily,  nitro p.r.n., _____ inhaler 100 daily, nicotine patch, DuoNebs as  needed, Ventolin inhaler as needed. He was on some prednisone and  Catapres 0.2 b.i.d. PAST MEDICAL HISTORY:  Significant for community-acquired pneumonia due  to Chlamydia, previous history of COVID, cervical spine arthritis, COPD  exacerbation, essential hypertension, hyperlipidemia, obstructive sleep  apnea, respiratory failure, coronary artery disease. PAST SURGICAL HISTORY:  Consistent with cardiac catheterization,  polysomnography, cervical fusion, wrist surgery. SOCIAL HISTORY:  The patient is current everyday smoker, greater than  60-pack years, advised to stop. Does use quite a bit of alcohol and has  experienced problems with recreational drug use. REVIEW OF SYSTEMS:  Negative for cephalgia, ringing in the ears, hearing  loss, difficulty swallowing, hoarseness in his voice, bloody noses. He  has chest tightness, pain, pain mostly with cough. No orthopnea or  paroxysmal nocturnal dyspnea. He has cough, wheeze, shortness of  breath. No pleuritic pain. No pleurisy type symptoms. No hemoptysis. No nausea, vomiting, diarrhea, or constipation. No blood in the stool. No recent change in weight. No urgency, frequency, dysuria, or  hematuria. Endocrine system negative for diabetes or thyroid disorder. Musculoskeletal system positive for degenerative joint and degenerative  disk disease. Skin is without rash, eruptions, urticaria, or pruritus. Psychiatric system negative for anxiety or depression. Neurologic  system negative for CVA, seizures, tremors, tics, or TIAs. PHYSICAL EXAMINATION:  HEAD, EYES, EARS, NOSE, AND THROAT:  Examination shows the head to be  normocephalic and atraumatic. Pupils are equal and reactive to light  and accommodation. Extraocular eye muscles are intact. Tympanic  membranes are clear. Ear canals are patent. Oral mucosa pink and  moist.  NECK:  Neck veins are flat. Nondistended. No carotid bruits.   CHEST: Symmetrical.  HEART:  Had a regular rate and rhythm. LUNGS:  Showed diminished breath sounds, end-expiratory wheezes,  increased end-expiratory phase of respiration. ABDOMEN:  Soft, nontender, nondistended. Bowel sounds are present in  all four quadrants. EXTERNAL GENITALIA:  Intact. EXTREMITIES:  Peripheral pulses intact. Legs without edema. SPINE:  Shows physiologic curve. IMPRESSION:  Initial impression at this time is acute exacerbation of  chronic obstructive pulmonary disease with hypoxia. Check procalcitonin  levels, get some aerosol treatment, keep him on IV steroids, await  Cardiology opinion. Further orders will be written for as the clinical  course dictates. At the time of admission, his condition is fair. Prognosis is guarded.         Mariano Noland DO    D: 07/31/2021 17:21:21       T: 07/31/2021 17:32:32     [email or web address]  Job#: [id number]     Doc#: [id number]    CC:

## 2021-08-01 NOTE — PROGRESS NOTES
organomegaly  Extremities: extremities normal, atraumatic, no cyanosis or edema  Neurologic: Mental status: Alert, oriented, thought content appropriate    Data    CBC:   Recent Labs     07/30/21  1001 07/31/21  0813 08/01/21  0541   WBC 7.6 9.3 18.6*   HGB 14.5 16.9* 16.3   HCT 42.5 48.6 47.4   MCV 93.2 91.4 93.1    186 211       BMP:  Recent Labs     07/30/21  1001 07/31/21  0813 08/01/21  0541    132 135   K 4.0 3.9 4.1   CL 99 98 102   CO2 21* 17* 20*   BUN 18 36* 42*   CREATININE 1.5* 1.6* 1.4*    ALB:3,BILIDIR:3,BILITOT:3,ALKPHOS:3)@    PT/INR: No results for input(s): PROTIME, INR in the last 72 hours. ABG:   No results for input(s): PH, PO2, PCO2, HCO3, BE, O2SAT, METHB, O2HB, COHB, O2CON, HHB, THB in the last 72 hours. Radiology/Other tests reviewed: none    Assessment:     Active Problems:    Acute respiratory failure with hypoxia (HCC)  Resolved Problems:    * No resolved hospital problems. *  COPD  Hypoxia      Plan:       1. Cont with steroids, taper as tolerated  2. EZPAP with neb treatments, observe breathing and cough/sputum production  3. ETOH abuse, will need to watch for any DT  4. Cont with oxygen, taper as tolerated  5. OOB to chair      Time at the bedside, reviewing labs and radiographs, reviewing notes and consultations, discussing with staff and family was more than 35 minutes. Thanks for letting us see this patient in consultation. Please contact us with any questions. Office [phone] or after hours through [facility], x [phone].

## 2021-08-01 NOTE — CARE COORDINATION
Met w/ patient. Explained role of  and plan of care. Lives with a friend in an apartment- 3 steps to entrance. [facility] is actively looking for a new place to stay. Drives. Uses cane. Hx HHC- agency unknown. Hx [facility]. PCP is Dr. Toña Rasmussen and pharmacy is [facility] [address] [place] [address]. Reports drinks 12-18 drinks/day- on CIWA protocol. Is not interested in alcohol rehab. [facility] would talk to [facility] @ [facility] notified of referral. Addiction Recovery Resource list given to pt. Continues on iv steroid. [facility] plan is to return home on discharge- a friend will provide transportation.  Will follow Damian Beard RN case manager

## 2021-08-01 NOTE — PLAN OF CARE
Problem: Falls - Risk of:  Goal: Will remain free from falls  Description: Will remain free from falls  Outcome: Met This Shift  Goal: Absence of physical injury  Description: Absence of physical injury  Outcome: Met This Shift     Problem: Pain:  Goal: Pain level will decrease  Description: Pain level will decrease  Outcome: Met This Shift  Goal: Control of acute pain  Description: Control of acute pain  Outcome: Met This Shift  Goal: Control of chronic pain  Description: Control of chronic pain  Outcome: Met This Shift     Problem: Fluid and Electrolyte Imbalance  Goal: Fluid and electrolyte balance are achieved/maintained  Outcome: Met This Shift     Problem: HH FLUID RETENTION-CHF  Goal: Absence of fluid overload signs and symptoms  Outcome: Met This Shift     Problem: Cardiac Output - Decreased:  Goal: Cardiac output within specified parameters  Description: Cardiac output within specified parameters  Outcome: Met This Shift

## 2021-08-02 ENCOUNTER — ANESTHESIA EVENT (OUTPATIENT)
Dept: ENDOSCOPY | Age: 56
DRG: 190 | End: 2021-08-02
Payer: MEDICARE

## 2021-08-02 LAB
ALBUMIN SERPL-MCNC: 3.6 G/DL (ref 3.5–5.2)
ALP BLD-CCNC: 104 U/L (ref 40–129)
ALT SERPL-CCNC: 61 U/L (ref 0–40)
ANION GAP SERPL CALCULATED.3IONS-SCNC: 13 MMOL/L (ref 7–16)
AST SERPL-CCNC: 45 U/L (ref 0–39)
BILIRUB SERPL-MCNC: <0.2 MG/DL (ref 0–1.2)
BUN BLDV-MCNC: 43 MG/DL (ref 6–20)
CALCIUM SERPL-MCNC: 8.7 MG/DL (ref 8.6–10.2)
CHLORIDE BLD-SCNC: 102 MMOL/L (ref 98–107)
CO2: 20 MMOL/L (ref 22–29)
CREAT SERPL-MCNC: 1.3 MG/DL (ref 0.7–1.2)
GFR AFRICAN AMERICAN: >60
GFR NON-AFRICAN AMERICAN: 57 ML/MIN/1.73
GLUCOSE BLD-MCNC: 233 MG/DL (ref 74–99)
HCT VFR BLD CALC: 47.7 % (ref 37–54)
HEMOGLOBIN: 16 G/DL (ref 12.5–16.5)
MCH RBC QN AUTO: 31.9 PG (ref 26–35)
MCHC RBC AUTO-ENTMCNC: 33.5 % (ref 32–34.5)
MCV RBC AUTO: 95 FL (ref 80–99.9)
PDW BLD-RTO: 13.3 FL (ref 11.5–15)
PLATELET # BLD: 218 E9/L (ref 130–450)
PMV BLD AUTO: 10.3 FL (ref 7–12)
POTASSIUM SERPL-SCNC: 4.5 MMOL/L (ref 3.5–5)
RBC # BLD: 5.02 E12/L (ref 3.8–5.8)
SODIUM BLD-SCNC: 135 MMOL/L (ref 132–146)
TOTAL PROTEIN: 7 G/DL (ref 6.4–8.3)
WBC # BLD: 15.1 E9/L (ref 4.5–11.5)

## 2021-08-02 PROCEDURE — 80053 COMPREHEN METABOLIC PANEL: CPT

## 2021-08-02 PROCEDURE — 6370000000 HC RX 637 (ALT 250 FOR IP): Performed by: GENERAL PRACTICE

## 2021-08-02 PROCEDURE — 85027 COMPLETE CBC AUTOMATED: CPT

## 2021-08-02 PROCEDURE — 6360000002 HC RX W HCPCS: Performed by: INTERNAL MEDICINE

## 2021-08-02 PROCEDURE — 2060000000 HC ICU INTERMEDIATE R&B

## 2021-08-02 PROCEDURE — 2700000000 HC OXYGEN THERAPY PER DAY

## 2021-08-02 PROCEDURE — 36415 COLL VENOUS BLD VENIPUNCTURE: CPT

## 2021-08-02 PROCEDURE — 94640 AIRWAY INHALATION TREATMENT: CPT

## 2021-08-02 PROCEDURE — 6370000000 HC RX 637 (ALT 250 FOR IP): Performed by: INTERNAL MEDICINE

## 2021-08-02 PROCEDURE — 2580000003 HC RX 258: Performed by: GENERAL PRACTICE

## 2021-08-02 RX ADMIN — Medication 10 ML: at 21:08

## 2021-08-02 RX ADMIN — BUDESONIDE 1000 MCG: 0.5 SUSPENSION RESPIRATORY (INHALATION) at 20:44

## 2021-08-02 RX ADMIN — SACUBITRIL AND VALSARTAN 1 TABLET: 24; 26 TABLET, FILM COATED ORAL at 21:08

## 2021-08-02 RX ADMIN — SPIRONOLACTONE 25 MG: 25 TABLET ORAL at 09:12

## 2021-08-02 RX ADMIN — METHYLPREDNISOLONE SODIUM SUCCINATE 60 MG: 125 INJECTION, POWDER, LYOPHILIZED, FOR SOLUTION INTRAMUSCULAR; INTRAVENOUS at 06:17

## 2021-08-02 RX ADMIN — METHYLPREDNISOLONE SODIUM SUCCINATE 60 MG: 125 INJECTION, POWDER, LYOPHILIZED, FOR SOLUTION INTRAMUSCULAR; INTRAVENOUS at 19:24

## 2021-08-02 RX ADMIN — SACUBITRIL AND VALSARTAN 1 TABLET: 24; 26 TABLET, FILM COATED ORAL at 09:12

## 2021-08-02 RX ADMIN — CLONIDINE HYDROCHLORIDE 0.2 MG: 0.2 TABLET ORAL at 09:12

## 2021-08-02 RX ADMIN — IPRATROPIUM BROMIDE AND ALBUTEROL SULFATE 1 AMPULE: .5; 3 SOLUTION RESPIRATORY (INHALATION) at 09:03

## 2021-08-02 RX ADMIN — ARFORMOTEROL TARTRATE 15 MCG: 15 SOLUTION RESPIRATORY (INHALATION) at 20:44

## 2021-08-02 RX ADMIN — IPRATROPIUM BROMIDE AND ALBUTEROL SULFATE 1 AMPULE: .5; 3 SOLUTION RESPIRATORY (INHALATION) at 15:26

## 2021-08-02 RX ADMIN — LIDOCAINE HYDROCHLORIDE: 20 SOLUTION ORAL; TOPICAL at 19:21

## 2021-08-02 RX ADMIN — METHYLPREDNISOLONE SODIUM SUCCINATE 60 MG: 125 INJECTION, POWDER, LYOPHILIZED, FOR SOLUTION INTRAMUSCULAR; INTRAVENOUS at 14:30

## 2021-08-02 RX ADMIN — METHYLPREDNISOLONE SODIUM SUCCINATE 60 MG: 125 INJECTION, POWDER, LYOPHILIZED, FOR SOLUTION INTRAMUSCULAR; INTRAVENOUS at 01:15

## 2021-08-02 RX ADMIN — CLONIDINE HYDROCHLORIDE 0.2 MG: 0.2 TABLET ORAL at 21:08

## 2021-08-02 RX ADMIN — BUDESONIDE 1000 MCG: 0.5 SUSPENSION RESPIRATORY (INHALATION) at 09:03

## 2021-08-02 RX ADMIN — AMLODIPINE BESYLATE 10 MG: 10 TABLET ORAL at 09:12

## 2021-08-02 RX ADMIN — SODIUM CHLORIDE, PRESERVATIVE FREE 10 ML: 5 INJECTION INTRAVENOUS at 14:30

## 2021-08-02 RX ADMIN — LIDOCAINE HYDROCHLORIDE: 20 SOLUTION ORAL; TOPICAL at 06:18

## 2021-08-02 RX ADMIN — IPRATROPIUM BROMIDE AND ALBUTEROL SULFATE 1 AMPULE: .5; 3 SOLUTION RESPIRATORY (INHALATION) at 20:44

## 2021-08-02 RX ADMIN — IPRATROPIUM BROMIDE AND ALBUTEROL SULFATE 1 AMPULE: .5; 3 SOLUTION RESPIRATORY (INHALATION) at 12:33

## 2021-08-02 RX ADMIN — METOPROLOL SUCCINATE 100 MG: 100 TABLET, EXTENDED RELEASE ORAL at 09:12

## 2021-08-02 RX ADMIN — FINASTERIDE 5 MG: 5 TABLET, FILM COATED ORAL at 09:12

## 2021-08-02 RX ADMIN — IPRATROPIUM BROMIDE AND ALBUTEROL SULFATE 1 AMPULE: .5; 3 SOLUTION RESPIRATORY (INHALATION) at 00:33

## 2021-08-02 RX ADMIN — ACETAMINOPHEN 650 MG: 325 TABLET ORAL at 21:07

## 2021-08-02 RX ADMIN — ARFORMOTEROL TARTRATE 15 MCG: 15 SOLUTION RESPIRATORY (INHALATION) at 09:03

## 2021-08-02 RX ADMIN — Medication 10 ML: at 09:12

## 2021-08-02 ASSESSMENT — PAIN - FUNCTIONAL ASSESSMENT: PAIN_FUNCTIONAL_ASSESSMENT: PREVENTS OR INTERFERES SOME ACTIVE ACTIVITIES AND ADLS

## 2021-08-02 ASSESSMENT — PAIN DESCRIPTION - ORIENTATION: ORIENTATION: OTHER (COMMENT)

## 2021-08-02 ASSESSMENT — PAIN DESCRIPTION - LOCATION: LOCATION: BACK

## 2021-08-02 ASSESSMENT — PAIN SCALES - GENERAL
PAINLEVEL_OUTOF10: 8
PAINLEVEL_OUTOF10: 0
PAINLEVEL_OUTOF10: 3

## 2021-08-02 ASSESSMENT — PAIN DESCRIPTION - FREQUENCY: FREQUENCY: INTERMITTENT

## 2021-08-02 ASSESSMENT — LIFESTYLE VARIABLES: SMOKING_STATUS: 1

## 2021-08-02 ASSESSMENT — PAIN DESCRIPTION - ONSET: ONSET: ON-GOING

## 2021-08-02 ASSESSMENT — PAIN DESCRIPTION - PAIN TYPE: TYPE: ACUTE PAIN

## 2021-08-02 ASSESSMENT — PAIN DESCRIPTION - DESCRIPTORS: DESCRIPTORS: ACHING;DISCOMFORT

## 2021-08-02 ASSESSMENT — PAIN DESCRIPTION - PROGRESSION: CLINICAL_PROGRESSION: GRADUALLY IMPROVING

## 2021-08-02 NOTE — ANESTHESIA PRE PROCEDURE
Department of Anesthesiology  Preprocedure Note       Name:  Dax Kapoor   Age:  60 y.o.  :  1965                                          MRN:  [id number]         Date:  2021      Surgeon: Olesya Aguilera):  Jose Elliott MD    Procedure: Procedure(s):  BRONCHOSCOPY DIAGNOSTIC OR CELL [address] ONLY    Medications prior to admission:   Prior to Admission medications    Medication Sig Start Date End Date Taking? Authorizing Provider   cloNIDine (CATAPRES) 0.2 MG tablet Take 1 tablet by mouth 2 times daily  Patient taking differently: Take 0.2 mg by mouth 2 times daily 0800/1400 daily 21  Yes Joel Chun,    predniSONE (DELTASONE) 10 MG tablet 3 tabs daily x2 days, 2 tabs daily x2 days, 1 tab daily x2 days. Patient taking differently: 3 tabs daily x2 days, 2 tabs daily x2 days, 1 tab daily x2 days. 21 starts 1 tab daily for 2 days 21  Yes Doris Aranda MD   albuterol sulfate HFA (VENTOLIN HFA) 108 (90 Base) MCG/ACT inhaler Inhale 2 puffs into the lungs every 4 hours as needed for Wheezing   Yes Historical Provider, MD   ipratropium-albuterol (DUONEB) 0.5-2.5 (3) MG/3ML SOLN nebulizer solution Take 1 vial by nebulization every 6 hours as needed for Shortness of Breath   Yes Historical Provider, MD   nicotine (NICODERM CQ) 21 MG/24HR Place 1 patch onto the skin every 24 hours   Yes Historical Provider, MD   fluticasone-umeclidin-vilant (TRELEGY ELLIPTA) 100-62.5-25 MCG/INH AEPB Inhale 1 puff into the lungs daily  Patient not taking: Reported on 2021    Historical Provider, MD   nitroGLYCERIN (NITROSTAT) 0.4 MG SL tablet Place 0.4 mg under the tongue every 5 minutes as needed for Chest pain up to max of 3 total doses. If no relief after 1 dose, call 911.  NEVER USED YET 21    Historical Provider, MD   amLODIPine (NORVASC) 5 MG tablet Take 1 tablet by mouth daily 20   Danya Lockett,    sacubitril-valsartan (ENTRESTO) 24-26 MG per tablet Take 1 tablet by mouth 2 times daily  Patient taking differently: Take 1 tablet by mouth 2 times daily This was sent to pharmacy 1 month ago. Cannot afford these currently, never picked up. Prescription assistance? 11/5/20   Zofia Encinas DO   spironolactone (ALDACTONE) 25 MG tablet Take 25 mg by mouth daily This was sent to pharmacy 1 month ago. Cannot afford these currently, never picked up. Prescription assistance? Historical Provider, MD   metoprolol succinate (TOPROL XL) 100 MG extended release tablet Take 1 tablet by mouth daily  Patient taking differently: Take 100 mg by mouth daily This was sent to pharmacy 1 month ago. Cannot afford these currently, never picked up. Prescription assistance? 8/24/19   Zofia Encinas DO   finasteride (PROSCAR) 5 MG tablet Take 1 tablet by mouth daily  Patient taking differently: Take 5 mg by mouth daily This was sent to pharmacy 1 month ago. Cannot afford these currently, never picked up. Prescription assistance?  8/24/19   Zofia Encinas DO       Current medications:    Current Facility-Administered Medications   Medication Dose Route Frequency Provider Last Rate Last Admin    sodium chloride flush 0.9 % injection 5-40 mL  5-40 mL Intravenous 2 times per day Zofia Encinas, DO   10 mL at 08/02/21 0912    sodium chloride flush 0.9 % injection 5-40 mL  5-40 mL Intravenous PRN Zofia Encinas DO   10 mL at 08/02/21 1430    LORazepam (ATIVAN) tablet 1 mg  1 mg Oral Q1H PRN Zofia Encinas, DO        Or    LORazepam (ATIVAN) injection 1 mg  1 mg Intravenous Q1H PRN Zofia Encinas, DO        Or    LORazepam (ATIVAN) tablet 2 mg  2 mg Oral Q1H PRN Zofia Encinas, DO        Or    LORazepam (ATIVAN) injection 2 mg  2 mg Intravenous Q1H PRN Zofia Encinas, DO        Or    LORazepam (ATIVAN) tablet 3 mg  3 mg Oral Q1H PRN Zofia Encinas, DO        Or    LORazepam (ATIVAN) injection 3 mg  3 mg Intravenous Q1H PRN Zofia Encinas, DO        Or    LORazepam (ATIVAN) tablet 4 mg  4 mg Oral Q1H PRN Zofia Encinas, DO Or    LORazepam (ATIVAN) injection 4 mg  4 mg Intravenous Q1H PRN Priscilla Dice, DO        aluminum & magnesium hydroxide-simethicone (MAALOX) 30 mL, lidocaine viscous hcl (XYLOCAINE) 5 mL (GI COCKTAIL)   Oral Q4H PRN Priscilla Dice, DO   Given at 08/02/21 0618    ipratropium-albuterol (DUONEB) nebulizer solution 1 ampule  1 ampule Inhalation Q4H Akil Diaz MD   1 ampule at 08/02/21 1526    budesonide (PULMICORT) nebulizer suspension 1,000 mcg  1,000 mcg Nebulization BID Akil Diaz MD   1,000 mcg at 08/02/21 9181    Arformoterol Tartrate (BROVANA) nebulizer solution 15 mcg  15 mcg Nebulization BID Akil Diaz MD   15 mcg at 08/02/21 5983    methylPREDNISolone sodium (SOLU-MEDROL) injection 60 mg  60 mg Intravenous Q6H Akil Diaz MD   60 mg at 08/02/21 1430    amLODIPine (NORVASC) tablet 10 mg  10 mg Oral Daily Priscilla Dice, DO   10 mg at 08/02/21 1710    cloNIDine (CATAPRES) tablet 0.2 mg  0.2 mg Oral BID Priscilla Dice, DO   0.2 mg at 08/02/21 5704    finasteride (PROSCAR) tablet 5 mg  5 mg Oral Daily Priscilla Dice, DO   5 mg at 08/02/21 0237    metoprolol succinate (TOPROL XL) extended release tablet 100 mg  100 mg Oral Daily Priscilla Dice, DO   100 mg at 08/02/21 0912    nicotine (NICODERM CQ) 21 MG/24HR 1 patch  1 patch Transdermal Q24H Priscilla Dice, DO   1 patch at 08/02/21 1550    nitroGLYCERIN (NITROSTAT) SL tablet 0.4 mg  0.4 mg Sublingual Q5 Min PRN Priscilla Dice, DO        sacubitril-valsartan (ENTRESTO) 24-26 MG per tablet 1 tablet  1 tablet Oral BID Priscilla Dice, DO   1 tablet at 08/02/21 0912    spironolactone (ALDACTONE) tablet 25 mg  25 mg Oral Daily Priscilla Dice, DO   25 mg at 08/02/21 0912    hydrALAZINE (APRESOLINE) injection 10 mg  10 mg Intravenous Q4H PRN Priscilla Dice, DO   10 mg at 07/30/21 1709    acetaminophen (TYLENOL) tablet 650 mg  650 mg Oral Q4H PRN Priscilla Dice, DO   650 mg at 07/31/21 1027       Allergies:  No Known Allergies    Problem List:    Patient Active Problem List   Diagnosis Code    CTS (carpal tunnel syndrome) G56.00    Cervical arthritis M47.812    Essential hypertension I10    Hyperlipidemia E78.5    INOCENCIA on CPAP G47.33, Z99.89    Community acquired bacterial pneumonia J15.9    CAP (community acquired pneumonia) due to Chlamydia species J16.0    Acute respiratory failure with hypoxia ([facility] J96.01    COPD exacerbation ([facility] J44.1    Precordial chest pain R07.2    Unstable angina (HCC) I20.0    Respiratory distress R06.03       Past Medical History:        Diagnosis Date    Alcohol abuse     CHF (congestive heart failure) (formerly [facility])     COPD (chronic obstructive pulmonary disease) ([facility]     History of cocaine use     Hyperlipidemia     Hypertension     Marijuana use     quit November 2017     MI (myocardial infarction) ([facility]     inocencia        Past Surgical History:        Procedure Laterality Date    CARDIAC CATHETERIZATION  11/03/2020    DR Chery Ramirez CERVICAL FUSION  11/18/15    cervical laminectomy & fusion c4-c6, with rods & screws    POLYSOMNOGRAPHY  01/29/2018    AHI=29.8    WRIST SURGERY         Social History:    Social History     Tobacco Use    Smoking status: Current Every Day Smoker     Packs/day: 1.50     Years: 40.00     Pack years: 60.00     Types: Cigarettes     Start date: 8/18/1979    Smokeless tobacco: Never Used    Tobacco comment: rolling his own cigarettes   Substance Use Topics    Alcohol use: Not Currently     Alcohol/week: 140.0 standard drinks     Types: 84 Cans of beer, 56 Shots of liquor per week     Comment: 12 beers and 8-9 shots a day                                Ready to quit: Not Answered  Counseling given: Not Answered  Comment: rolling his own cigarettes      Vital Signs (Current):   Vitals:    08/02/21 0615 08/02/21 0909 08/02/21 1446 08/02/21 1526   BP:  (!) 134/91 (!) 137/90    Pulse:  74 67    Resp:  18 18 20   Temp:  98.5 °F (36.9 °C) 97.9 °F (36.6 °C) TempSrc:  Oral Oral    SpO2: 90% 98% 96% 97%   Weight:       Height:                                                  BP Readings from Last 3 Encounters:   08/02/21 (!) 137/90   06/18/21 123/70   11/30/20 121/89       NPO Status:  greater than 8 hours                                                                               BMI:   Wt Readings from Last 3 Encounters:   07/31/21 223 lb 14.4 oz (101.6 kg)   06/13/21 225 lb (102.1 kg)   11/23/20 214 lb (97.1 kg)     Body mass index is 35.07 kg/m². CBC:   Lab Results   Component Value Date    WBC 15.1 08/02/2021    RBC 5.02 08/02/2021    HGB 16.0 08/02/2021    HCT 47.7 08/02/2021    MCV 95.0 08/02/2021    RDW 13.3 08/02/2021     08/02/2021       CMP:   Lab Results   Component Value Date     08/02/2021    K 4.5 08/02/2021    K 4.0 07/30/2021     08/02/2021    CO2 20 08/02/2021    BUN 43 08/02/2021    CREATININE 1.3 08/02/2021    GFRAA >60 08/02/2021    LABGLOM 57 08/02/2021    GLUCOSE 233 08/02/2021    PROT 7.0 08/02/2021    CALCIUM 8.7 08/02/2021    BILITOT <0.2 08/02/2021    ALKPHOS 104 08/02/2021    AST 45 08/02/2021    ALT 61 08/02/2021       POC Tests: No results for input(s): POCGLU, POCNA, POCK, POCCL, POCBUN, POCHEMO, POCHCT in the last 72 hours.     Coags:   Lab Results   Component Value Date    PROTIME 13.0 11/22/2020    INR 1.2 11/22/2020    APTT 29.1 11/02/2020       HCG (If Applicable): No results found for: PREGTESTUR, PREGSERUM, HCG, HCGQUANT     ABGs: No results found for: PHART, PO2ART, VWJ0NRE, HIT1PXS, BEART, X4IVQRTJ     Type & Screen (If Applicable):  No results found for: LABABO, LABRH    Drug/Infectious Status (If Applicable):  No results found for: HIV, HEPCAB    COVID-19 Screening (If Applicable):   Lab Results   Component Value Date    COVID19 Not Detected 07/31/2021           Anesthesia Evaluation  Patient summary reviewed no history of anesthetic complications:   Airway: Mallampati: II  TM distance: >3 FB     Mouth opening: > = 3 FB Dental:          Pulmonary:   (+) pneumonia:  COPD:  sleep apnea:  decreased breath sounds,  current smoker                           Cardiovascular:    (+) hypertension:, past MI:, CHF:, hyperlipidemia        Rhythm: regular  Rate: normal  Echocardiogram reviewed    Cleared by cardiology              Neuro/Psych:   (+) neuromuscular disease:, psychiatric history:            GI/Hepatic/Renal:   (+) liver disease:,           Endo/Other:    (+) : arthritis:., .                 Abdominal:             Vascular: Other Findings:           Anesthesia Plan      MAC     ASA 4     (Backup GA if needed)  Induction: intravenous. Anesthetic plan and risks discussed with patient (chart review only). Plan discussed with CRNA. Romeo Sow MD   8/2/2021      Agree with above assessment. Physical exam unchanged. Spoke to patient about anesthetic plan.   Patient understands and wishes to proceed.  (this addendum was done preop but unable to be filed electronically at that time)

## 2021-08-02 NOTE — PROGRESS NOTES
Pulmonary Progress Note    Admit Date: 2021  Hospital day                               PCP: Latia Puente DO    Chief Complaint (s):  Patient Active Problem List   Diagnosis    CTS (carpal tunnel syndrome)    Cervical arthritis    Essential hypertension    Hyperlipidemia    INOCENCIA on CPAP    Community acquired bacterial pneumonia    CAP (community acquired pneumonia) due to Chlamydia species    Acute respiratory failure with hypoxia ([address]    COPD exacerbation ([address]    Precordial chest pain    Unstable angina ([address]    Respiratory distress       Subjective:  · Bakari Blend continues to struggle with a cough. The cough precipitates near syncope. Vitals:  VITALS:  BP (!) 137/90   Pulse 67   Temp 97.9 °F (36.6 °C) (Oral)   Resp 20   Ht 5' 7\" (1.702 m)   Wt 223 lb 14.4 oz (101.6 kg)   SpO2 97%   BMI 35.07 kg/m²     24HR INTAKE/OUTPUT:      Intake/Output Summary (Last 24 hours) at 2021 1602  Last data filed at 2021 4572  Gross per 24 hour   Intake 360 ml   Output 1400 ml   Net -1040 ml       24HR PULSE OXIMETRY RANGE:    SpO2  Av.5 %  Min: 90 %  Max: 98 %    Medications:  IV:      Scheduled Meds:   sodium chloride flush  5-40 mL Intravenous 2 times per day    ipratropium-albuterol  1 ampule Inhalation Q4H    budesonide  1,000 mcg Nebulization BID    Arformoterol Tartrate  15 mcg Nebulization BID    methylPREDNISolone  60 mg Intravenous Q6H    amLODIPine  10 mg Oral Daily    cloNIDine  0.2 mg Oral BID    finasteride  5 mg Oral Daily    metoprolol succinate  100 mg Oral Daily    nicotine  1 patch Transdermal Q24H    sacubitril-valsartan  1 tablet Oral BID    spironolactone  25 mg Oral Daily       Diet:   ADULT DIET; Regular; Low Sodium (2 gm)     EXAM:  General: No distress. Alert. Eyes: PERRL. No sclera icterus. No conjunctival injection. ENT: No discharge. Pharynx clear. Neck: Trachea midline. Normal thyroid. Resp: No accessory muscle use. No rales. Diffuse wheezing. Diffuse rhonchi. CV: Regular rate. Regular rhythm. No murmur or rub. Abd: Non-tender. Non-distended. No masses. No organomegaly. Normal bowel sounds. Skin: Warm and dry. No nodule on exposed extremities. No rash on exposed extremities. Ext: No cyanosis, clubbing, edema  Lymph: No cervical LAD. No supraclavicular LAD. M/S: No cyanosis. No joint deformity. No clubbing. Neuro: Awake. Follows commands. Positive pupils/gag/corneals. Normal pain response. Results:  CBC:   Recent Labs     07/31/21 0813 08/01/21 0541 08/02/21  0915   WBC 9.3 18.6* 15.1*   HGB 16.9* 16.3 16.0   HCT 48.6 47.4 47.7   MCV 91.4 93.1 95.0    211 218     BMP:   Recent Labs     07/31/21 0813 08/01/21  0541 08/02/21  0915    135 135   K 3.9 4.1 4.5   CL 98 102 102   CO2 17* 20* 20*   BUN 36* 42* 43*   CREATININE 1.6* 1.4* 1.3*     LIVER PROFILE:   Recent Labs     07/31/21 0813 08/01/21  0541 08/02/21  0915   AST 21 30 45*   ALT 24 32 61*   BILITOT 0.2 <0.2 <0.2   ALKPHOS 73 74 104     PT/INR: No results for input(s): PROTIME, INR in the last 72 hours. APTT: No results for input(s): APTT in the last 72 hours. Pathology:  1. N/A      Microbiology:  1. None    Recent ABG:   No results for input(s): PH, PO2, PCO2, HCO3, BE, O2SAT, METHB, O2HB, COHB, O2CON, HHB, THB in the last 72 hours. Recent Films:  CTA PULMONARY W CONTRAST   Final Result   1. There is no evidence of a pulmonary embolus   2. Very small residual or recurring infiltrate seen within the left lower   lobe measuring approximately 2 cm. 3. Mild emphysematous changes   4. Mild pleuroparenchymal scarring seen within the right upper lobe laterally. 5. Shotty stable lymph nodes within the prevascular space and right   paratracheal region. The largest lymph node measures 2.4 x 1.5 cm. This   lymph node is unchanged in size when compared to the prior study of   11/29/2020. XR CHEST PORTABLE   Final Result   1. Interval clearing of the lungs when compared with the patient's prior   study of 06/16/2021. Assessment:  1. Exacerbation of COPD with likely underlying tracheomalacia    Plan:  1. Bronchoscopy to clear secretions    Time at the bedside, reviewing labs and radiographs, reviewing updated notes and consultations, discussing with staff and family was more than 35 minutes. Please note that voice recognition technology was used in the preparation of this note and make therefore it may contain inadvertent transcription errors. If the patient is a COVID 19 isolation patient, the above physical exam reflects that of the examining physician for the day. Dov Barajas MD,  M.D., F.C.C.P.     [facility], [address] [place] [address], [address] [place] [address]

## 2021-08-02 NOTE — CARE COORDINATION
[facility] Support Note    Name: Meaghan Montanez  Date: 8/2/2021    Chief Complaint   Patient presents with    Cough    Shortness of Breath    Fever       Peer Support met with patient.   [x] Support and education provided  [x] Resources provided   [] Treatment referral:   [] Other:   [] Patient declined peer recovery services     Referred By:     Notes:     Signed: Mirta Castaneda, 8/2/2021

## 2021-08-02 NOTE — PROGRESS NOTES
Patient seen doing better. Tolerating meds. Steroids helpful, taper soon. No cp. bp good.  Continue with current treatment

## 2021-08-03 ENCOUNTER — ANESTHESIA (OUTPATIENT)
Dept: ENDOSCOPY | Age: 56
DRG: 190 | End: 2021-08-03
Payer: MEDICARE

## 2021-08-03 VITALS
SYSTOLIC BLOOD PRESSURE: 190 MMHG | OXYGEN SATURATION: 99 % | RESPIRATION RATE: 21 BRPM | DIASTOLIC BLOOD PRESSURE: 110 MMHG

## 2021-08-03 LAB
ALBUMIN SERPL-MCNC: 3.7 G/DL (ref 3.5–5.2)
ALP BLD-CCNC: 102 U/L (ref 40–129)
ALT SERPL-CCNC: 82 U/L (ref 0–40)
ANION GAP SERPL CALCULATED.3IONS-SCNC: 7 MMOL/L (ref 7–16)
AST SERPL-CCNC: 79 U/L (ref 0–39)
BILIRUB SERPL-MCNC: <0.2 MG/DL (ref 0–1.2)
BUN BLDV-MCNC: 36 MG/DL (ref 6–20)
CALCIUM SERPL-MCNC: 9.1 MG/DL (ref 8.6–10.2)
CHLORIDE BLD-SCNC: 104 MMOL/L (ref 98–107)
CO2: 26 MMOL/L (ref 22–29)
CREAT SERPL-MCNC: 1.2 MG/DL (ref 0.7–1.2)
GFR AFRICAN AMERICAN: >60
GFR NON-AFRICAN AMERICAN: >60 ML/MIN/1.73
GLUCOSE BLD-MCNC: 131 MG/DL (ref 74–99)
HCT VFR BLD CALC: 46.6 % (ref 37–54)
HEMOGLOBIN: 15.8 G/DL (ref 12.5–16.5)
MCH RBC QN AUTO: 31.9 PG (ref 26–35)
MCHC RBC AUTO-ENTMCNC: 33.9 % (ref 32–34.5)
MCV RBC AUTO: 94.1 FL (ref 80–99.9)
PDW BLD-RTO: 13.2 FL (ref 11.5–15)
PLATELET # BLD: 224 E9/L (ref 130–450)
PMV BLD AUTO: 10.2 FL (ref 7–12)
POTASSIUM SERPL-SCNC: 5.3 MMOL/L (ref 3.5–5)
RBC # BLD: 4.95 E12/L (ref 3.8–5.8)
SODIUM BLD-SCNC: 137 MMOL/L (ref 132–146)
TOTAL PROTEIN: 6.7 G/DL (ref 6.4–8.3)
WBC # BLD: 14.9 E9/L (ref 4.5–11.5)

## 2021-08-03 PROCEDURE — 2500000003 HC RX 250 WO HCPCS: Performed by: INTERNAL MEDICINE

## 2021-08-03 PROCEDURE — 36415 COLL VENOUS BLD VENIPUNCTURE: CPT

## 2021-08-03 PROCEDURE — 3700000000 HC ANESTHESIA ATTENDED CARE: Performed by: INTERNAL MEDICINE

## 2021-08-03 PROCEDURE — 80053 COMPREHEN METABOLIC PANEL: CPT

## 2021-08-03 PROCEDURE — 2580000003 HC RX 258: Performed by: INTERNAL MEDICINE

## 2021-08-03 PROCEDURE — 2700000000 HC OXYGEN THERAPY PER DAY

## 2021-08-03 PROCEDURE — 2580000003 HC RX 258: Performed by: GENERAL PRACTICE

## 2021-08-03 PROCEDURE — 6360000002 HC RX W HCPCS: Performed by: NURSE ANESTHETIST, CERTIFIED REGISTERED

## 2021-08-03 PROCEDURE — 6370000000 HC RX 637 (ALT 250 FOR IP): Performed by: GENERAL PRACTICE

## 2021-08-03 PROCEDURE — 2060000000 HC ICU INTERMEDIATE R&B

## 2021-08-03 PROCEDURE — 2580000003 HC RX 258: Performed by: NURSE ANESTHETIST, CERTIFIED REGISTERED

## 2021-08-03 PROCEDURE — 87206 SMEAR FLUORESCENT/ACID STAI: CPT

## 2021-08-03 PROCEDURE — 87106 FUNGI IDENTIFICATION YEAST: CPT

## 2021-08-03 PROCEDURE — 7100000010 HC PHASE II RECOVERY - FIRST 15 MIN: Performed by: INTERNAL MEDICINE

## 2021-08-03 PROCEDURE — 3609027000 HC BRONCHOSCOPY: Performed by: INTERNAL MEDICINE

## 2021-08-03 PROCEDURE — 7100000011 HC PHASE II RECOVERY - ADDTL 15 MIN: Performed by: INTERNAL MEDICINE

## 2021-08-03 PROCEDURE — 6370000000 HC RX 637 (ALT 250 FOR IP): Performed by: INTERNAL MEDICINE

## 2021-08-03 PROCEDURE — 6360000002 HC RX W HCPCS: Performed by: INTERNAL MEDICINE

## 2021-08-03 PROCEDURE — 87070 CULTURE OTHR SPECIMN AEROBIC: CPT

## 2021-08-03 PROCEDURE — 3700000001 HC ADD 15 MINUTES (ANESTHESIA): Performed by: INTERNAL MEDICINE

## 2021-08-03 PROCEDURE — 87102 FUNGUS ISOLATION CULTURE: CPT

## 2021-08-03 PROCEDURE — 2709999900 HC NON-CHARGEABLE SUPPLY: Performed by: INTERNAL MEDICINE

## 2021-08-03 PROCEDURE — 2500000003 HC RX 250 WO HCPCS: Performed by: NURSE ANESTHETIST, CERTIFIED REGISTERED

## 2021-08-03 PROCEDURE — 94640 AIRWAY INHALATION TREATMENT: CPT

## 2021-08-03 PROCEDURE — 0BJ08ZZ INSPECTION OF TRACHEOBRONCHIAL TREE, VIA NATURAL OR ARTIFICIAL OPENING ENDOSCOPIC: ICD-10-PCS | Performed by: INTERNAL MEDICINE

## 2021-08-03 PROCEDURE — 85027 COMPLETE CBC AUTOMATED: CPT

## 2021-08-03 PROCEDURE — 87116 MYCOBACTERIA CULTURE: CPT

## 2021-08-03 PROCEDURE — 87205 SMEAR GRAM STAIN: CPT

## 2021-08-03 PROCEDURE — 87015 SPECIMEN INFECT AGNT CONCNTJ: CPT

## 2021-08-03 RX ORDER — LIDOCAINE HYDROCHLORIDE 20 MG/ML
INJECTION, SOLUTION EPIDURAL; INFILTRATION; INTRACAUDAL; PERINEURAL PRN
Status: DISCONTINUED | OUTPATIENT
Start: 2021-08-03 | End: 2021-08-03 | Stop reason: ALTCHOICE

## 2021-08-03 RX ORDER — LIDOCAINE HYDROCHLORIDE 20 MG/ML
SOLUTION OROPHARYNGEAL PRN
Status: DISCONTINUED | OUTPATIENT
Start: 2021-08-03 | End: 2021-08-03 | Stop reason: ALTCHOICE

## 2021-08-03 RX ORDER — SODIUM CHLORIDE 9 MG/ML
INJECTION, SOLUTION INTRAVENOUS CONTINUOUS PRN
Status: DISCONTINUED | OUTPATIENT
Start: 2021-08-03 | End: 2021-08-03 | Stop reason: SDUPTHER

## 2021-08-03 RX ORDER — PROPOFOL 10 MG/ML
INJECTION, EMULSION INTRAVENOUS PRN
Status: DISCONTINUED | OUTPATIENT
Start: 2021-08-03 | End: 2021-08-03 | Stop reason: SDUPTHER

## 2021-08-03 RX ORDER — GLYCOPYRROLATE 1 MG/5 ML
SYRINGE (ML) INTRAVENOUS PRN
Status: DISCONTINUED | OUTPATIENT
Start: 2021-08-03 | End: 2021-08-03 | Stop reason: SDUPTHER

## 2021-08-03 RX ADMIN — Medication 0.2 MG: at 12:46

## 2021-08-03 RX ADMIN — AMLODIPINE BESYLATE 10 MG: 10 TABLET ORAL at 08:35

## 2021-08-03 RX ADMIN — BUDESONIDE 1000 MCG: 0.5 SUSPENSION RESPIRATORY (INHALATION) at 20:52

## 2021-08-03 RX ADMIN — SODIUM CHLORIDE: 9 INJECTION, SOLUTION INTRAVENOUS at 12:33

## 2021-08-03 RX ADMIN — METHYLPREDNISOLONE SODIUM SUCCINATE 60 MG: 125 INJECTION, POWDER, LYOPHILIZED, FOR SOLUTION INTRAMUSCULAR; INTRAVENOUS at 18:59

## 2021-08-03 RX ADMIN — IPRATROPIUM BROMIDE AND ALBUTEROL SULFATE 1 AMPULE: .5; 3 SOLUTION RESPIRATORY (INHALATION) at 15:45

## 2021-08-03 RX ADMIN — ARFORMOTEROL TARTRATE 15 MCG: 15 SOLUTION RESPIRATORY (INHALATION) at 20:53

## 2021-08-03 RX ADMIN — METHYLPREDNISOLONE SODIUM SUCCINATE 60 MG: 125 INJECTION, POWDER, LYOPHILIZED, FOR SOLUTION INTRAMUSCULAR; INTRAVENOUS at 00:27

## 2021-08-03 RX ADMIN — METOPROLOL SUCCINATE 100 MG: 100 TABLET, EXTENDED RELEASE ORAL at 08:35

## 2021-08-03 RX ADMIN — SACUBITRIL AND VALSARTAN 1 TABLET: 24; 26 TABLET, FILM COATED ORAL at 08:35

## 2021-08-03 RX ADMIN — FINASTERIDE 5 MG: 5 TABLET, FILM COATED ORAL at 08:35

## 2021-08-03 RX ADMIN — SPIRONOLACTONE 25 MG: 25 TABLET ORAL at 08:35

## 2021-08-03 RX ADMIN — IPRATROPIUM BROMIDE AND ALBUTEROL SULFATE 1 AMPULE: .5; 3 SOLUTION RESPIRATORY (INHALATION) at 07:55

## 2021-08-03 RX ADMIN — IPRATROPIUM BROMIDE AND ALBUTEROL SULFATE 1 AMPULE: .5; 3 SOLUTION RESPIRATORY (INHALATION) at 20:52

## 2021-08-03 RX ADMIN — BUDESONIDE 1000 MCG: 0.5 SUSPENSION RESPIRATORY (INHALATION) at 07:55

## 2021-08-03 RX ADMIN — CLONIDINE HYDROCHLORIDE 0.2 MG: 0.2 TABLET ORAL at 08:35

## 2021-08-03 RX ADMIN — LIDOCAINE HYDROCHLORIDE: 20 SOLUTION ORAL; TOPICAL at 09:06

## 2021-08-03 RX ADMIN — LIDOCAINE HYDROCHLORIDE: 20 SOLUTION ORAL; TOPICAL at 00:27

## 2021-08-03 RX ADMIN — SACUBITRIL AND VALSARTAN 1 TABLET: 24; 26 TABLET, FILM COATED ORAL at 20:22

## 2021-08-03 RX ADMIN — ARFORMOTEROL TARTRATE 15 MCG: 15 SOLUTION RESPIRATORY (INHALATION) at 07:55

## 2021-08-03 RX ADMIN — Medication 10 ML: at 20:53

## 2021-08-03 RX ADMIN — PROPOFOL 240 MG: 10 INJECTION, EMULSION INTRAVENOUS at 12:46

## 2021-08-03 RX ADMIN — METHYLPREDNISOLONE SODIUM SUCCINATE 60 MG: 125 INJECTION, POWDER, LYOPHILIZED, FOR SOLUTION INTRAMUSCULAR; INTRAVENOUS at 08:38

## 2021-08-03 RX ADMIN — LIDOCAINE HYDROCHLORIDE: 20 SOLUTION ORAL; TOPICAL at 15:55

## 2021-08-03 RX ADMIN — Medication 10 ML: at 08:43

## 2021-08-03 RX ADMIN — ACETAMINOPHEN 650 MG: 325 TABLET ORAL at 20:55

## 2021-08-03 RX ADMIN — IPRATROPIUM BROMIDE AND ALBUTEROL SULFATE 1 AMPULE: .5; 3 SOLUTION RESPIRATORY (INHALATION) at 11:52

## 2021-08-03 RX ADMIN — CLONIDINE HYDROCHLORIDE 0.2 MG: 0.2 TABLET ORAL at 20:22

## 2021-08-03 ASSESSMENT — PAIN DESCRIPTION - LOCATION
LOCATION: THROAT
LOCATION: THROAT

## 2021-08-03 ASSESSMENT — PAIN DESCRIPTION - PAIN TYPE
TYPE: ACUTE PAIN
TYPE: ACUTE PAIN

## 2021-08-03 ASSESSMENT — PAIN DESCRIPTION - FREQUENCY
FREQUENCY: CONTINUOUS
FREQUENCY: CONTINUOUS

## 2021-08-03 ASSESSMENT — PAIN DESCRIPTION - PROGRESSION
CLINICAL_PROGRESSION: NOT CHANGED
CLINICAL_PROGRESSION: NOT CHANGED

## 2021-08-03 ASSESSMENT — PAIN - FUNCTIONAL ASSESSMENT: PAIN_FUNCTIONAL_ASSESSMENT: ACTIVITIES ARE NOT PREVENTED

## 2021-08-03 ASSESSMENT — PAIN DESCRIPTION - ONSET
ONSET: ON-GOING
ONSET: ON-GOING

## 2021-08-03 ASSESSMENT — PAIN SCALES - GENERAL
PAINLEVEL_OUTOF10: 0
PAINLEVEL_OUTOF10: 10

## 2021-08-03 ASSESSMENT — PAIN DESCRIPTION - DESCRIPTORS: DESCRIPTORS: SORE;OTHER (COMMENT)

## 2021-08-03 NOTE — PLAN OF CARE
Problem: Falls - Risk of:  Goal: Will remain free from falls  Description: Will remain free from falls  8/3/2021 1255 by Kell Mason RN  Outcome: Met This Shift     Problem: Falls - Risk of:  Goal: Absence of physical injury  Description: Absence of physical injury  8/3/2021 1255 by Kell Mason RN  Outcome: Met This Shift     Problem: Pain:  Goal: Pain level will decrease  Description: Pain level will decrease  8/3/2021 1255 by Kell Mason RN  Outcome: Met This Shift     Problem: Pain:  Goal: Control of acute pain  Description: Control of acute pain  8/3/2021 1255 by Kell Mason RN  Outcome: Met This Shift

## 2021-08-03 NOTE — PLAN OF CARE
Problem: Falls - Risk of:  Goal: Will remain free from falls  Description: Will remain free from falls  8/2/2021 2304 by Parveen Whipple RN  Outcome: Met This Shift     Problem: Falls - Risk of:  Goal: Absence of physical injury  Description: Absence of physical injury  8/2/2021 2304 by Parveen Whipple RN  Outcome: Met This Shift     Problem: Pain:  Goal: Pain level will decrease  Description: Pain level will decrease  8/2/2021 2304 by Parveen Whipple RN  Outcome: Met This Shift     Problem: Pain:  Goal: Control of acute pain  Description: Control of acute pain  8/2/2021 2304 by Parveen Whipple RN  Outcome: Met This Shift     Problem: Pain:  Goal: Control of chronic pain  Description: Control of chronic pain  8/2/2021 2304 by Parveen Whipple RN  Outcome: Met This Shift     Problem: Fluid and Electrolyte Imbalance  Goal: Fluid and electrolyte balance are achieved/maintained  Outcome: Met This Shift     Problem: HH FLUID RETENTION-CHF  Goal: Absence of fluid overload signs and symptoms  Outcome: Met This Shift     Problem: Cardiac Output - Decreased:  Goal: Cardiac output within specified parameters  Description: Cardiac output within specified parameters  Outcome: Met This Shift

## 2021-08-04 LAB
ALBUMIN SERPL-MCNC: 3.8 G/DL (ref 3.5–5.2)
ALP BLD-CCNC: 88 U/L (ref 40–129)
ALT SERPL-CCNC: 70 U/L (ref 0–40)
ANION GAP SERPL CALCULATED.3IONS-SCNC: 12 MMOL/L (ref 7–16)
AST SERPL-CCNC: 39 U/L (ref 0–39)
BILIRUB SERPL-MCNC: 0.3 MG/DL (ref 0–1.2)
BLOOD CULTURE, ROUTINE: NORMAL
BUN BLDV-MCNC: 39 MG/DL (ref 6–20)
CALCIUM SERPL-MCNC: 8.9 MG/DL (ref 8.6–10.2)
CHLORIDE BLD-SCNC: 97 MMOL/L (ref 98–107)
CO2: 22 MMOL/L (ref 22–29)
CREAT SERPL-MCNC: 1.2 MG/DL (ref 0.7–1.2)
CULTURE, BLOOD 2: NORMAL
GFR AFRICAN AMERICAN: >60
GFR NON-AFRICAN AMERICAN: >60 ML/MIN/1.73
GLUCOSE BLD-MCNC: 114 MG/DL (ref 74–99)
HCT VFR BLD CALC: 51.4 % (ref 37–54)
HEMOGLOBIN: 17.6 G/DL (ref 12.5–16.5)
IGE: 166 KU/L
MCH RBC QN AUTO: 32.1 PG (ref 26–35)
MCHC RBC AUTO-ENTMCNC: 34.2 % (ref 32–34.5)
MCV RBC AUTO: 93.8 FL (ref 80–99.9)
PDW BLD-RTO: 13 FL (ref 11.5–15)
PLATELET # BLD: 241 E9/L (ref 130–450)
PMV BLD AUTO: 9.7 FL (ref 7–12)
POTASSIUM SERPL-SCNC: 4.9 MMOL/L (ref 3.5–5)
RBC # BLD: 5.48 E12/L (ref 3.8–5.8)
SODIUM BLD-SCNC: 131 MMOL/L (ref 132–146)
TOTAL PROTEIN: 7.5 G/DL (ref 6.4–8.3)
WBC # BLD: 17.1 E9/L (ref 4.5–11.5)

## 2021-08-04 PROCEDURE — 6360000002 HC RX W HCPCS: Performed by: INTERNAL MEDICINE

## 2021-08-04 PROCEDURE — 94640 AIRWAY INHALATION TREATMENT: CPT

## 2021-08-04 PROCEDURE — 6370000000 HC RX 637 (ALT 250 FOR IP): Performed by: INTERNAL MEDICINE

## 2021-08-04 PROCEDURE — 2700000000 HC OXYGEN THERAPY PER DAY

## 2021-08-04 PROCEDURE — 6370000000 HC RX 637 (ALT 250 FOR IP): Performed by: GENERAL PRACTICE

## 2021-08-04 PROCEDURE — 2060000000 HC ICU INTERMEDIATE R&B

## 2021-08-04 PROCEDURE — 2580000003 HC RX 258: Performed by: INTERNAL MEDICINE

## 2021-08-04 PROCEDURE — 85027 COMPLETE CBC AUTOMATED: CPT

## 2021-08-04 PROCEDURE — 36415 COLL VENOUS BLD VENIPUNCTURE: CPT

## 2021-08-04 PROCEDURE — 80053 COMPREHEN METABOLIC PANEL: CPT

## 2021-08-04 RX ORDER — PANTOPRAZOLE SODIUM 40 MG/1
40 TABLET, DELAYED RELEASE ORAL
Status: DISCONTINUED | OUTPATIENT
Start: 2021-08-05 | End: 2021-08-04

## 2021-08-04 RX ORDER — PANTOPRAZOLE SODIUM 40 MG/1
40 TABLET, DELAYED RELEASE ORAL
Status: DISCONTINUED | OUTPATIENT
Start: 2021-08-04 | End: 2021-08-06 | Stop reason: HOSPADM

## 2021-08-04 RX ORDER — METHYLPREDNISOLONE SODIUM SUCCINATE 125 MG/2ML
60 INJECTION, POWDER, LYOPHILIZED, FOR SOLUTION INTRAMUSCULAR; INTRAVENOUS EVERY 12 HOURS
Status: DISCONTINUED | OUTPATIENT
Start: 2021-08-05 | End: 2021-08-06 | Stop reason: HOSPADM

## 2021-08-04 RX ADMIN — IPRATROPIUM BROMIDE AND ALBUTEROL SULFATE 1 AMPULE: .5; 3 SOLUTION RESPIRATORY (INHALATION) at 17:18

## 2021-08-04 RX ADMIN — LIDOCAINE HYDROCHLORIDE: 20 SOLUTION ORAL; TOPICAL at 15:13

## 2021-08-04 RX ADMIN — AMLODIPINE BESYLATE 10 MG: 10 TABLET ORAL at 10:19

## 2021-08-04 RX ADMIN — ARFORMOTEROL TARTRATE 15 MCG: 15 SOLUTION RESPIRATORY (INHALATION) at 20:00

## 2021-08-04 RX ADMIN — METHYLPREDNISOLONE SODIUM SUCCINATE 60 MG: 125 INJECTION, POWDER, LYOPHILIZED, FOR SOLUTION INTRAMUSCULAR; INTRAVENOUS at 06:58

## 2021-08-04 RX ADMIN — BUDESONIDE 1000 MCG: 0.5 SUSPENSION RESPIRATORY (INHALATION) at 07:47

## 2021-08-04 RX ADMIN — Medication 10 ML: at 21:24

## 2021-08-04 RX ADMIN — LIDOCAINE HYDROCHLORIDE: 20 SOLUTION ORAL; TOPICAL at 01:18

## 2021-08-04 RX ADMIN — IPRATROPIUM BROMIDE AND ALBUTEROL SULFATE 1 AMPULE: .5; 3 SOLUTION RESPIRATORY (INHALATION) at 12:04

## 2021-08-04 RX ADMIN — SACUBITRIL AND VALSARTAN 1 TABLET: 24; 26 TABLET, FILM COATED ORAL at 10:19

## 2021-08-04 RX ADMIN — ARFORMOTEROL TARTRATE 15 MCG: 15 SOLUTION RESPIRATORY (INHALATION) at 07:47

## 2021-08-04 RX ADMIN — SACUBITRIL AND VALSARTAN 1 TABLET: 24; 26 TABLET, FILM COATED ORAL at 21:23

## 2021-08-04 RX ADMIN — SPIRONOLACTONE 25 MG: 25 TABLET ORAL at 10:19

## 2021-08-04 RX ADMIN — CLONIDINE HYDROCHLORIDE 0.2 MG: 0.2 TABLET ORAL at 10:19

## 2021-08-04 RX ADMIN — CLONIDINE HYDROCHLORIDE 0.2 MG: 0.2 TABLET ORAL at 21:23

## 2021-08-04 RX ADMIN — Medication 10 ML: at 10:20

## 2021-08-04 RX ADMIN — LIDOCAINE HYDROCHLORIDE: 20 SOLUTION ORAL; TOPICAL at 21:23

## 2021-08-04 RX ADMIN — LIDOCAINE HYDROCHLORIDE: 20 SOLUTION ORAL; TOPICAL at 06:58

## 2021-08-04 RX ADMIN — LIDOCAINE HYDROCHLORIDE: 20 SOLUTION ORAL; TOPICAL at 11:05

## 2021-08-04 RX ADMIN — METHYLPREDNISOLONE SODIUM SUCCINATE 60 MG: 125 INJECTION, POWDER, LYOPHILIZED, FOR SOLUTION INTRAMUSCULAR; INTRAVENOUS at 13:56

## 2021-08-04 RX ADMIN — METHYLPREDNISOLONE SODIUM SUCCINATE 60 MG: 125 INJECTION, POWDER, LYOPHILIZED, FOR SOLUTION INTRAMUSCULAR; INTRAVENOUS at 01:26

## 2021-08-04 RX ADMIN — IPRATROPIUM BROMIDE AND ALBUTEROL SULFATE 1 AMPULE: .5; 3 SOLUTION RESPIRATORY (INHALATION) at 20:00

## 2021-08-04 RX ADMIN — IPRATROPIUM BROMIDE AND ALBUTEROL SULFATE 1 AMPULE: .5; 3 SOLUTION RESPIRATORY (INHALATION) at 07:47

## 2021-08-04 RX ADMIN — BUDESONIDE 1000 MCG: 0.5 SUSPENSION RESPIRATORY (INHALATION) at 20:00

## 2021-08-04 RX ADMIN — METOPROLOL SUCCINATE 100 MG: 100 TABLET, EXTENDED RELEASE ORAL at 10:20

## 2021-08-04 RX ADMIN — FINASTERIDE 5 MG: 5 TABLET, FILM COATED ORAL at 10:20

## 2021-08-04 RX ADMIN — PANTOPRAZOLE SODIUM 40 MG: 40 TABLET, DELAYED RELEASE ORAL at 10:19

## 2021-08-04 ASSESSMENT — PAIN DESCRIPTION - FREQUENCY: FREQUENCY: INTERMITTENT

## 2021-08-04 ASSESSMENT — PAIN SCALES - GENERAL
PAINLEVEL_OUTOF10: 9
PAINLEVEL_OUTOF10: 0

## 2021-08-04 ASSESSMENT — PAIN DESCRIPTION - ONSET: ONSET: ON-GOING

## 2021-08-04 ASSESSMENT — PAIN DESCRIPTION - LOCATION: LOCATION: THROAT

## 2021-08-04 ASSESSMENT — PAIN DESCRIPTION - PAIN TYPE: TYPE: ACUTE PAIN

## 2021-08-04 NOTE — CARE COORDINATION
CASE MANAGEMENT. .. S/p bronch from yesterday. Met with patient at the bedside. Confirmed with him that he will return home at discharge. No needs anticipated. He is tolerating room air. States he DOES NOT have home o2 to use prn. Said he had home o2 at one time through [employer] but returned it. Does have a nebulizer. Pulm following and iv solumedrol decreased to 60mg q12 hours. Will follow.

## 2021-08-04 NOTE — PROGRESS NOTES
Pulmonary Progress Note    Admit Date: 2021  Hospital day                               PCP: Maria Ines Lopez DO    Chief Complaint (s):  Patient Active Problem List   Diagnosis    CTS (carpal tunnel syndrome)    Cervical arthritis    Essential hypertension    Hyperlipidemia    INOCENCIA on CPAP    Community acquired bacterial pneumonia    CAP (community acquired pneumonia) due to Chlamydia species    [facility] failure with hypoxia [facility]    COPD exacerbation (HCC)    Precordial chest pain    Unstable angina ([facility]    Respiratory distress       Subjective:  · Seen this p.m., the patient is lying flat breathing comfortably. No complications from the procedure which it appears well and growing OP dilan from the sent specimens. Vitals:  VITALS:  BP (!) 167/105   Pulse 66   Temp 97.9 °F (36.6 °C) (Axillary)   Resp 20   Ht 5' 7\" (1.702 m)   Wt 220 lb (99.8 kg)   SpO2 98%   BMI 34.46 kg/m²     24HR INTAKE/OUTPUT:      Intake/Output Summary (Last 24 hours) at 2021 1357  Last data filed at 2021 1227  Gross per 24 hour   Intake 720 ml   Output 1950 ml   Net -1230 ml       24HR PULSE OXIMETRY RANGE:    SpO2  Av.3 %  Min: 93 %  Max: 98 %    Medications:  IV:      Scheduled Meds:   pantoprazole  40 mg Oral QAM AC    sodium chloride flush  5-40 mL Intravenous 2 times per day    ipratropium-albuterol  1 ampule Inhalation Q4H    budesonide  1,000 mcg Nebulization BID    Arformoterol Tartrate  15 mcg Nebulization BID    methylPREDNISolone  60 mg Intravenous Q6H    amLODIPine  10 mg Oral Daily    cloNIDine  0.2 mg Oral BID    finasteride  5 mg Oral Daily    metoprolol succinate  100 mg Oral Daily    nicotine  1 patch Transdermal Q24H    sacubitril-valsartan  1 tablet Oral BID    spironolactone  25 mg Oral Daily       Diet:   ADULT DIET; Regular; Low Sodium (2 gm)     EXAM:  General: No distress. Alert. Eyes: PERRL. No sclera icterus.  No conjunctival injection. ENT: No discharge. Pharynx clear. Neck: Trachea midline. Normal thyroid. Resp: No accessory muscle use. No rales. Normal wheezing and no rhonchi. CV: Regular rate. Regular rhythm. No murmur or rub. Abd: Non-tender. Non-distended. No masses. No organomegaly. Normal bowel sounds. Skin: Warm and dry. No nodule on exposed extremities. No rash on exposed extremities. Ext: No cyanosis, clubbing, edema  Lymph: No cervical LAD. No supraclavicular LAD. M/S: No cyanosis. No joint deformity. No clubbing. Neuro: Awake. Follows commands. Positive pupils/gag/corneals. Normal pain response. Results:  CBC:   Recent Labs     08/02/21  0915 08/03/21  0440   WBC 15.1* 14.9*   HGB 16.0 15.8   HCT 47.7 46.6   MCV 95.0 94.1    224     BMP:   Recent Labs     08/02/21  0915 08/03/21  0440    137   K 4.5 5.3*    104   CO2 20* 26   BUN 43* 36*   CREATININE 1.3* 1.2     LIVER PROFILE:   Recent Labs     08/02/21  0915 08/03/21  0440   AST 45* 79*   ALT 61* 82*   BILITOT <0.2 <0.2   ALKPHOS 104 102     PT/INR: No results for input(s): PROTIME, INR in the last 72 hours. APTT: No results for input(s): APTT in the last 72 hours. Pathology:  1. N/A      Microbiology:  1. None    Recent ABG:   No results for input(s): PH, PO2, PCO2, HCO3, BE, O2SAT, METHB, O2HB, COHB, O2CON, HHB, THB in the last 72 hours. Recent Films:  CTA PULMONARY W CONTRAST   Final Result   1. There is no evidence of a pulmonary embolus   2. Very small residual or recurring infiltrate seen within the left lower   lobe measuring approximately 2 cm. 3. Mild emphysematous changes   4. Mild pleuroparenchymal scarring seen within the right upper lobe laterally. 5. Shotty stable lymph nodes within the prevascular space and right   paratracheal region. The largest lymph node measures 2.4 x 1.5 cm. This   lymph node is unchanged in size when compared to the prior study of   11/29/2020.          XR CHEST PORTABLE Final Result   1. Interval clearing of the lungs when compared with the patient's prior   study of 06/16/2021. Assessment:  1. Exacerbation of COPD with likely underlying tracheomalacia  2. Parainfluenza 3 infection    Plan:  1. Decrease steroids as tolerated    Time at the bedside, reviewing labs and radiographs, reviewing updated notes and consultations, discussing with staff and family was more than 35 minutes. Please note that voice recognition technology was used in the preparation of this note and make therefore it may contain inadvertent transcription errors. If the patient is a COVID 19 isolation patient, the above physical exam reflects that of the examining physician for the day. Trisha Kay MD,  M.D., F.C.C.P.     [facility] and 4 H [facility], [address] [place] [address], [address] [place] [address], [facility]

## 2021-08-04 NOTE — PLAN OF CARE
Problem: Falls - Risk of:  Goal: Will remain free from falls  Description: Will remain free from falls  8/4/2021 1555 by Divina Hanks RN  Outcome: Met This Shift     Problem: Falls - Risk of:  Goal: Absence of physical injury  Description: Absence of physical injury  8/4/2021 1555 by Divina Hanks RN  Outcome: Met This Shift     Problem: Pain:  Goal: Control of acute pain  Description: Control of acute pain  8/4/2021 1555 by Divina Hanks RN  Outcome: Met This Shift

## 2021-08-04 NOTE — ANESTHESIA POSTPROCEDURE EVALUATION
Department of Anesthesiology  Postprocedure Note    Patient: Joni Enriquez  MRN: [id number]  [place]: 1965  Date of evaluation: 8/4/2021  Time:  5:07 PM     Procedure Summary     Date: 08/03/21 Room / Location: [address] [place] [address]    Anesthesia Start: 0373 Anesthesia Stop: 5604    Procedure: BRONCHOSCOPY DIAGNOSTIC OR CELL [address] (N/A ) Diagnosis: (-)    Surgeons: Bev Bright MD Responsible Provider: Fritz Judd MD    Anesthesia Type: MAC ASA Status: 4          Anesthesia Type: MAC    Joe Phase I:      Joe Phase II: Joe Score: 8    Last vitals: Reviewed and per EMR flowsheets.        Anesthesia Post Evaluation    Patient location during evaluation: PACU  Patient participation: complete - patient participated  Level of consciousness: awake  Airway patency: patent  Nausea & Vomiting: no vomiting and no nausea  Complications: no  Cardiovascular status: hemodynamically stable  Respiratory status: acceptable  Hydration status: stable

## 2021-08-04 NOTE — OP NOTE
Operative Note      Patient: Luisito Heard  YOB: 1965  MRN: [id number]    Date of Procedure: 8/3/2021    Pre-Op Diagnosis: -Mucous plugging    Post-Op Diagnosis: Same       Procedure(s):  BRONCHOSCOPY DIAGNOSTIC OR CELL [address] ONLY    Surgeon(s):  Harvey Conklin MD    Assistant:   * No surgical staff found *    Anesthesia: Monitor Anesthesia Care    Estimated Blood Loss (mL): Minimal    Complications: None    Specimens:   ID Type Source Tests Collected by Time Destination   1 : BRONCH WASH RIGHT MAIN STEM Body Fluid Fluid CULTURE, FUNGUS, CULTURE WITH SMEAR, ACID FAST BACILLIUS, CULTURE, RESPIRATORY Harvey Conklin MD 8/3/2021 1251        Implants:  * No implants in log *      Drains: * No LDAs found *    Findings: Diffuse mucous plugging    Detailed Description of Procedure: The patient was informed of the procedure, consent was obtained. A fiberoptic bronchoscope was passed through the oropharynx. The vocal cords move promptly to midline. The trachea was intubated the right and left lungs were inspected. Diffuse mucous plugging was noted throughout the tracheobronchial tree. This was aggressively suctioned and sent for culture and sensitivity. No endobronchial lesions were identified.     The patient tolerated procedure well was sent to recovery in stable condition    Electronically signed by Harvey Conklin MD on 8/4/2021 at 11:53 AM

## 2021-08-05 LAB
ALBUMIN SERPL-MCNC: 3.5 G/DL (ref 3.5–5.2)
ALP BLD-CCNC: 91 U/L (ref 40–129)
ALT SERPL-CCNC: 85 U/L (ref 0–40)
ANION GAP SERPL CALCULATED.3IONS-SCNC: 12 MMOL/L (ref 7–16)
AST SERPL-CCNC: 39 U/L (ref 0–39)
BILIRUB SERPL-MCNC: 0.2 MG/DL (ref 0–1.2)
BUN BLDV-MCNC: 42 MG/DL (ref 6–20)
CALCIUM SERPL-MCNC: 8.3 MG/DL (ref 8.6–10.2)
CHLORIDE BLD-SCNC: 100 MMOL/L (ref 98–107)
CO2: 20 MMOL/L (ref 22–29)
CREAT SERPL-MCNC: 1.1 MG/DL (ref 0.7–1.2)
CULTURE, RESPIRATORY: NORMAL
GFR AFRICAN AMERICAN: >60
GFR NON-AFRICAN AMERICAN: >60 ML/MIN/1.73
GLUCOSE BLD-MCNC: 152 MG/DL (ref 74–99)
HCT VFR BLD CALC: 47.7 % (ref 37–54)
HEMOGLOBIN: 16.5 G/DL (ref 12.5–16.5)
MCH RBC QN AUTO: 32 PG (ref 26–35)
MCHC RBC AUTO-ENTMCNC: 34.6 % (ref 32–34.5)
MCV RBC AUTO: 92.6 FL (ref 80–99.9)
PDW BLD-RTO: 13.2 FL (ref 11.5–15)
PLATELET # BLD: 263 E9/L (ref 130–450)
PMV BLD AUTO: 10.1 FL (ref 7–12)
POTASSIUM SERPL-SCNC: 5 MMOL/L (ref 3.5–5)
RBC # BLD: 5.15 E12/L (ref 3.8–5.8)
SMEAR, RESPIRATORY: NORMAL
SODIUM BLD-SCNC: 132 MMOL/L (ref 132–146)
TOTAL PROTEIN: 6.6 G/DL (ref 6.4–8.3)
WBC # BLD: 19.5 E9/L (ref 4.5–11.5)

## 2021-08-05 PROCEDURE — 6370000000 HC RX 637 (ALT 250 FOR IP): Performed by: GENERAL PRACTICE

## 2021-08-05 PROCEDURE — 85027 COMPLETE CBC AUTOMATED: CPT

## 2021-08-05 PROCEDURE — 36415 COLL VENOUS BLD VENIPUNCTURE: CPT

## 2021-08-05 PROCEDURE — 6370000000 HC RX 637 (ALT 250 FOR IP): Performed by: INTERNAL MEDICINE

## 2021-08-05 PROCEDURE — 80053 COMPREHEN METABOLIC PANEL: CPT

## 2021-08-05 PROCEDURE — 2580000003 HC RX 258: Performed by: INTERNAL MEDICINE

## 2021-08-05 PROCEDURE — 94640 AIRWAY INHALATION TREATMENT: CPT

## 2021-08-05 PROCEDURE — 6360000002 HC RX W HCPCS: Performed by: INTERNAL MEDICINE

## 2021-08-05 PROCEDURE — 2060000000 HC ICU INTERMEDIATE R&B

## 2021-08-05 RX ADMIN — IPRATROPIUM BROMIDE AND ALBUTEROL SULFATE 1 AMPULE: .5; 3 SOLUTION RESPIRATORY (INHALATION) at 12:32

## 2021-08-05 RX ADMIN — SACUBITRIL AND VALSARTAN 1 TABLET: 24; 26 TABLET, FILM COATED ORAL at 08:01

## 2021-08-05 RX ADMIN — CLONIDINE HYDROCHLORIDE 0.2 MG: 0.2 TABLET ORAL at 08:01

## 2021-08-05 RX ADMIN — SPIRONOLACTONE 25 MG: 25 TABLET ORAL at 08:02

## 2021-08-05 RX ADMIN — FINASTERIDE 5 MG: 5 TABLET, FILM COATED ORAL at 08:01

## 2021-08-05 RX ADMIN — AMLODIPINE BESYLATE 10 MG: 10 TABLET ORAL at 08:01

## 2021-08-05 RX ADMIN — METOPROLOL SUCCINATE 100 MG: 100 TABLET, EXTENDED RELEASE ORAL at 08:01

## 2021-08-05 RX ADMIN — PANTOPRAZOLE SODIUM 40 MG: 40 TABLET, DELAYED RELEASE ORAL at 06:00

## 2021-08-05 RX ADMIN — LIDOCAINE HYDROCHLORIDE: 20 SOLUTION ORAL; TOPICAL at 14:00

## 2021-08-05 RX ADMIN — CLONIDINE HYDROCHLORIDE 0.2 MG: 0.2 TABLET ORAL at 20:44

## 2021-08-05 RX ADMIN — IPRATROPIUM BROMIDE AND ALBUTEROL SULFATE 1 AMPULE: .5; 3 SOLUTION RESPIRATORY (INHALATION) at 20:34

## 2021-08-05 RX ADMIN — SACUBITRIL AND VALSARTAN 1 TABLET: 24; 26 TABLET, FILM COATED ORAL at 20:44

## 2021-08-05 RX ADMIN — METHYLPREDNISOLONE SODIUM SUCCINATE 60 MG: 125 INJECTION, POWDER, LYOPHILIZED, FOR SOLUTION INTRAMUSCULAR; INTRAVENOUS at 01:33

## 2021-08-05 RX ADMIN — IPRATROPIUM BROMIDE AND ALBUTEROL SULFATE 1 AMPULE: .5; 3 SOLUTION RESPIRATORY (INHALATION) at 16:40

## 2021-08-05 RX ADMIN — BUDESONIDE 1000 MCG: 0.5 SUSPENSION RESPIRATORY (INHALATION) at 08:25

## 2021-08-05 RX ADMIN — Medication 10 ML: at 08:02

## 2021-08-05 RX ADMIN — LIDOCAINE HYDROCHLORIDE: 20 SOLUTION ORAL; TOPICAL at 01:31

## 2021-08-05 RX ADMIN — ARFORMOTEROL TARTRATE 15 MCG: 15 SOLUTION RESPIRATORY (INHALATION) at 08:25

## 2021-08-05 RX ADMIN — ARFORMOTEROL TARTRATE 15 MCG: 15 SOLUTION RESPIRATORY (INHALATION) at 20:34

## 2021-08-05 RX ADMIN — IPRATROPIUM BROMIDE AND ALBUTEROL SULFATE 1 AMPULE: .5; 3 SOLUTION RESPIRATORY (INHALATION) at 08:25

## 2021-08-05 RX ADMIN — METHYLPREDNISOLONE SODIUM SUCCINATE 60 MG: 125 INJECTION, POWDER, LYOPHILIZED, FOR SOLUTION INTRAMUSCULAR; INTRAVENOUS at 13:00

## 2021-08-05 RX ADMIN — BUDESONIDE 1000 MCG: 0.5 SUSPENSION RESPIRATORY (INHALATION) at 20:34

## 2021-08-05 RX ADMIN — Medication 10 ML: at 20:45

## 2021-08-05 ASSESSMENT — PAIN DESCRIPTION - FREQUENCY: FREQUENCY: CONTINUOUS

## 2021-08-05 ASSESSMENT — PAIN SCALES - GENERAL
PAINLEVEL_OUTOF10: 0
PAINLEVEL_OUTOF10: 0
PAINLEVEL_OUTOF10: 3
PAINLEVEL_OUTOF10: 0

## 2021-08-05 ASSESSMENT — PAIN DESCRIPTION - DESCRIPTORS: DESCRIPTORS: SORE;DISCOMFORT

## 2021-08-05 ASSESSMENT — PAIN DESCRIPTION - PAIN TYPE: TYPE: ACUTE PAIN

## 2021-08-05 ASSESSMENT — PAIN DESCRIPTION - ONSET: ONSET: ON-GOING

## 2021-08-05 ASSESSMENT — PAIN DESCRIPTION - PROGRESSION: CLINICAL_PROGRESSION: NOT CHANGED

## 2021-08-05 ASSESSMENT — PAIN DESCRIPTION - LOCATION: LOCATION: THROAT

## 2021-08-05 NOTE — CARE COORDINATION
Pt s/p bronch ;currently on room air , + parainfluenza 3. Weaning iv steroids; nebs continued. Await cx from bronch; plan at discharge is home, no needs. [place].

## 2021-08-05 NOTE — PROGRESS NOTES
Pulmonary Progress Note    Admit Date: 2021  Hospital day                               PCP: Maria Ines Lopez DO    Chief Complaint (s):  Patient Active Problem List   Diagnosis    CTS (carpal tunnel syndrome)    Cervical arthritis    Essential hypertension    Hyperlipidemia    INOCENCIA on CPAP    Community acquired bacterial pneumonia    CAP (community acquired pneumonia) due to Chlamydia species    Acute respiratory failure with hypoxia ([address]    COPD exacerbation ([address]    Precordial chest pain    Unstable angina ([address]    Respiratory distress       Subjective:  · Kinjal Cooley is sitting up at the edge of his bed. IgE total is noted. Sputum with OP dilan. Vitals:  VITALS:  BP (!) 140/96   Pulse 62   Temp 98.1 °F (36.7 °C) (Tympanic)   Resp 18   Ht 5' 7\" (1.702 m)   Wt 220 lb 1.6 oz (99.8 kg)   SpO2 94%   BMI 34.47 kg/m²     24HR INTAKE/OUTPUT:      Intake/Output Summary (Last 24 hours) at 2021 1847  Last data filed at 2021 1231  Gross per 24 hour   Intake 480 ml   Output 1300 ml   Net -820 ml       24HR PULSE OXIMETRY RANGE:    SpO2  Av.5 %  Min: 94 %  Max: 97 %    Medications:  IV:      Scheduled Meds:   pantoprazole  40 mg Oral QAM AC    methylPREDNISolone  60 mg Intravenous Q12H    sodium chloride flush  5-40 mL Intravenous 2 times per day    ipratropium-albuterol  1 ampule Inhalation Q4H    budesonide  1,000 mcg Nebulization BID    Arformoterol Tartrate  15 mcg Nebulization BID    amLODIPine  10 mg Oral Daily    cloNIDine  0.2 mg Oral BID    finasteride  5 mg Oral Daily    metoprolol succinate  100 mg Oral Daily    nicotine  1 patch Transdermal Q24H    sacubitril-valsartan  1 tablet Oral BID    spironolactone  25 mg Oral Daily       Diet:   ADULT DIET; Regular; Low Sodium (2 gm)     EXAM:  General: No distress. Alert. Eyes: PERRL. No sclera icterus. No conjunctival injection. ENT: No discharge. Pharynx clear. Neck: Trachea midline.  Normal thyroid. Resp: No accessory muscle use. No rales. Normal wheezing and no rhonchi. CV: Regular rate. Regular rhythm. No murmur or rub. Abd: Non-tender. Non-distended. No masses. No organomegaly. Normal bowel sounds. Skin: Warm and dry. No nodule on exposed extremities. No rash on exposed extremities. Ext: No cyanosis, clubbing, edema  Lymph: No cervical LAD. No supraclavicular LAD. M/S: No cyanosis. No joint deformity. No clubbing. Neuro: Awake. Follows commands. Positive pupils/gag/corneals. Normal pain response. Results:  CBC:   Recent Labs     08/03/21 0440 08/04/21  1448 08/05/21  1140   WBC 14.9* 17.1* 19.5*   HGB 15.8 17.6* 16.5   HCT 46.6 51.4 47.7   MCV 94.1 93.8 92.6    241 263     BMP:   Recent Labs     08/03/21 0440 08/04/21  1448 08/05/21  1140    131* 132   K 5.3* 4.9 5.0    97* 100   CO2 26 22 20*   BUN 36* 39* 42*   CREATININE 1.2 1.2 1.1     LIVER PROFILE:   Recent Labs     08/03/21 0440 08/04/21  1448 08/05/21  1140   AST 79* 39 39   ALT 82* 70* 85*   BILITOT <0.2 0.3 0.2   ALKPHOS 102 88 91     PT/INR: No results for input(s): PROTIME, INR in the last 72 hours. APTT: No results for input(s): APTT in the last 72 hours. Pathology:  1. N/A      Microbiology:  1. None    Recent ABG:   No results for input(s): PH, PO2, PCO2, HCO3, BE, O2SAT, METHB, O2HB, COHB, O2CON, HHB, THB in the last 72 hours. Recent Films:  CTA PULMONARY W CONTRAST   Final Result   1. There is no evidence of a pulmonary embolus   2. Very small residual or recurring infiltrate seen within the left lower   lobe measuring approximately 2 cm. 3. Mild emphysematous changes   4. Mild pleuroparenchymal scarring seen within the right upper lobe laterally. 5. Shotty stable lymph nodes within the prevascular space and right   paratracheal region. The largest lymph node measures 2.4 x 1.5 cm. This   lymph node is unchanged in size when compared to the prior study of   11/29/2020. XR CHEST PORTABLE   Final Result   1. Interval clearing of the lungs when compared with the patient's prior   study of 06/16/2021. Assessment:  1. Exacerbation of COPD with likely underlying tracheomalacia  2. Parainfluenza 3 infection  3. Normal IgE level    Plan:  1. Likely discharge tomorrow    Time at the bedside, reviewing labs and radiographs, reviewing updated notes and consultations, discussing with staff and family was more than 35 minutes. Please note that voice recognition technology was used in the preparation of this note and make therefore it may contain inadvertent transcription errors. If the patient is a COVID 19 isolation patient, the above physical exam reflects that of the examining physician for the day. Santo Angelucci, MD,  M.D., F.C.C.P.     [facility] and 4 H [facility], [address] [place] [address], [address] [place] [address] Street, [facility]

## 2021-08-06 VITALS
WEIGHT: 220.1 LBS | RESPIRATION RATE: 16 BRPM | HEIGHT: 67 IN | DIASTOLIC BLOOD PRESSURE: 96 MMHG | OXYGEN SATURATION: 94 % | TEMPERATURE: 97.5 F | BODY MASS INDEX: 34.55 KG/M2 | SYSTOLIC BLOOD PRESSURE: 146 MMHG | HEART RATE: 108 BPM

## 2021-08-06 LAB
ALBUMIN SERPL-MCNC: 3.7 G/DL (ref 3.5–5.2)
ALP BLD-CCNC: 94 U/L (ref 40–129)
ALT SERPL-CCNC: 108 U/L (ref 0–40)
ANION GAP SERPL CALCULATED.3IONS-SCNC: 12 MMOL/L (ref 7–16)
AST SERPL-CCNC: 61 U/L (ref 0–39)
BILIRUB SERPL-MCNC: 0.3 MG/DL (ref 0–1.2)
BUN BLDV-MCNC: 42 MG/DL (ref 6–20)
CALCIUM SERPL-MCNC: 8.6 MG/DL (ref 8.6–10.2)
CHLORIDE BLD-SCNC: 100 MMOL/L (ref 98–107)
CO2: 23 MMOL/L (ref 22–29)
CREAT SERPL-MCNC: 1.2 MG/DL (ref 0.7–1.2)
GFR AFRICAN AMERICAN: >60
GFR NON-AFRICAN AMERICAN: >60 ML/MIN/1.73
GLUCOSE BLD-MCNC: 106 MG/DL (ref 74–99)
HCT VFR BLD CALC: 51.7 % (ref 37–54)
HEMOGLOBIN: 17.6 G/DL (ref 12.5–16.5)
MCH RBC QN AUTO: 31.8 PG (ref 26–35)
MCHC RBC AUTO-ENTMCNC: 34 % (ref 32–34.5)
MCV RBC AUTO: 93.3 FL (ref 80–99.9)
PDW BLD-RTO: 13.2 FL (ref 11.5–15)
PLATELET # BLD: 234 E9/L (ref 130–450)
PMV BLD AUTO: 9.9 FL (ref 7–12)
POTASSIUM SERPL-SCNC: 5.2 MMOL/L (ref 3.5–5)
RBC # BLD: 5.54 E12/L (ref 3.8–5.8)
SODIUM BLD-SCNC: 135 MMOL/L (ref 132–146)
TOTAL PROTEIN: 7.1 G/DL (ref 6.4–8.3)
WBC # BLD: 22.6 E9/L (ref 4.5–11.5)

## 2021-08-06 PROCEDURE — 36415 COLL VENOUS BLD VENIPUNCTURE: CPT

## 2021-08-06 PROCEDURE — 94640 AIRWAY INHALATION TREATMENT: CPT

## 2021-08-06 PROCEDURE — 2580000003 HC RX 258: Performed by: INTERNAL MEDICINE

## 2021-08-06 PROCEDURE — 85027 COMPLETE CBC AUTOMATED: CPT

## 2021-08-06 PROCEDURE — 80053 COMPREHEN METABOLIC PANEL: CPT

## 2021-08-06 PROCEDURE — 6360000002 HC RX W HCPCS: Performed by: INTERNAL MEDICINE

## 2021-08-06 PROCEDURE — 6370000000 HC RX 637 (ALT 250 FOR IP): Performed by: GENERAL PRACTICE

## 2021-08-06 PROCEDURE — 6370000000 HC RX 637 (ALT 250 FOR IP): Performed by: INTERNAL MEDICINE

## 2021-08-06 RX ORDER — BUDESONIDE 0.5 MG/2ML
1000 INHALANT ORAL 2 TIMES DAILY
Qty: 60 AMPULE | Refills: 3 | Status: SHIPPED | OUTPATIENT
Start: 2021-08-06

## 2021-08-06 RX ORDER — ARFORMOTEROL TARTRATE 15 UG/2ML
15 SOLUTION RESPIRATORY (INHALATION) 2 TIMES DAILY
Qty: 120 ML | Refills: 3 | Status: SHIPPED | OUTPATIENT
Start: 2021-08-06

## 2021-08-06 RX ADMIN — PANTOPRAZOLE SODIUM 40 MG: 40 TABLET, DELAYED RELEASE ORAL at 06:04

## 2021-08-06 RX ADMIN — METHYLPREDNISOLONE SODIUM SUCCINATE 60 MG: 125 INJECTION, POWDER, LYOPHILIZED, FOR SOLUTION INTRAMUSCULAR; INTRAVENOUS at 02:40

## 2021-08-06 RX ADMIN — FINASTERIDE 5 MG: 5 TABLET, FILM COATED ORAL at 09:55

## 2021-08-06 RX ADMIN — AMLODIPINE BESYLATE 10 MG: 10 TABLET ORAL at 09:55

## 2021-08-06 RX ADMIN — BUDESONIDE 1000 MCG: 0.5 SUSPENSION RESPIRATORY (INHALATION) at 08:57

## 2021-08-06 RX ADMIN — ARFORMOTEROL TARTRATE 15 MCG: 15 SOLUTION RESPIRATORY (INHALATION) at 08:57

## 2021-08-06 RX ADMIN — SPIRONOLACTONE 25 MG: 25 TABLET ORAL at 09:55

## 2021-08-06 RX ADMIN — IPRATROPIUM BROMIDE AND ALBUTEROL SULFATE 1 AMPULE: .5; 3 SOLUTION RESPIRATORY (INHALATION) at 08:57

## 2021-08-06 RX ADMIN — METOPROLOL SUCCINATE 100 MG: 100 TABLET, EXTENDED RELEASE ORAL at 09:55

## 2021-08-06 RX ADMIN — LIDOCAINE HYDROCHLORIDE: 20 SOLUTION ORAL; TOPICAL at 01:30

## 2021-08-06 RX ADMIN — SACUBITRIL AND VALSARTAN 1 TABLET: 24; 26 TABLET, FILM COATED ORAL at 09:55

## 2021-08-06 RX ADMIN — Medication 10 ML: at 09:55

## 2021-08-06 RX ADMIN — CLONIDINE HYDROCHLORIDE 0.2 MG: 0.2 TABLET ORAL at 09:55

## 2021-08-06 ASSESSMENT — PAIN SCALES - GENERAL
PAINLEVEL_OUTOF10: 0
PAINLEVEL_OUTOF10: 0

## 2021-08-06 NOTE — PLAN OF CARE
Problem: Falls - Risk of:  Goal: Will remain free from falls  Description: Will remain free from falls  Outcome: Completed  Goal: Absence of physical injury  Description: Absence of physical injury  Outcome: Completed     Problem: Pain:  Description: Pain management should include both nonpharmacologic and pharmacologic interventions.   Goal: Pain level will decrease  Description: Pain level will decrease  Outcome: Completed  Goal: Control of acute pain  Description: Control of acute pain  Outcome: Completed  Goal: Control of chronic pain  Description: Control of chronic pain  Outcome: Completed     Problem: Fluid and Electrolyte Imbalance  Goal: Fluid and electrolyte balance are achieved/maintained  Outcome: Completed     Problem: HH FLUID RETENTION-CHF  Goal: Absence of fluid overload signs and symptoms  Outcome: Completed     Problem: Cardiac Output - Decreased:  Goal: Cardiac output within specified parameters  Description: Cardiac output within specified parameters  Outcome: Completed

## 2021-08-06 NOTE — PROGRESS NOTES
Nutrition Assessment     Type and Reason for Visit: Initial, RD Nutrition Re-Screen/LOS (RD Re-Screen Negative)    Nutrition Assessment:  Pt assessed per LOS protocol. Chart reviewed. Pt currently eating ~75% of most meals and w/ no other significant nutritional issues noted at this time. Will follow-up per policy. Please consult if RD needed.     Electronically signed by Chung Gould RD, TEE on 8/6/21 at 8:57 AM EDT    Contact: ext [phone]

## 2021-08-07 NOTE — DISCHARGE SUMMARY
[address] [address]                               DISCHARGE SUMMARY    PATIENT NAME: Emelia Agustin                  :        1965  MED REC NO:   [id number]                            ROOM:       0448  ACCOUNT NO:   [de-identified]                           ADMIT DATE: 2021  PROVIDER:     Raz Shelby DO                  [facility] DATE: 2021    DATE OF DISCHARGE:  2021. FINAL DIAGNOSES:  1. Acute respiratory failure with hypoxia, parainfluenza. 2.  Tracheobronchitis. 3.  Coronary artery disease. 4.  Hypertension. 5.  Gastroesophageal reflux disorder. 6.  Tobacco abuse. 7.  COPD. 8.  Chronic kidney disease, stage III. 9.  EtOH abuse. 10.  Previous history of COVID pneumonia. HISTORY AND HOSPITAL COURSE:  The patient is a 53-year-old white male  presented to the emergency department with a history and chief complaint  of extremely short of breath,, coughing very much, fatigued, and weak,  brought into the hospital.  He does have a history of COVID in the past.  Chest x-ray shows resolving infiltrates from his COVID infection. CTA  was negative for pulmonary emboli. He did have an elevated proBNP. Also noted to have some chronic kidney disease, likely exacerbated by  steroids. Cardiology seen him and thought that his focus of this main  problem was in relationship to his lung disease including his COPD and  respiratory viral panel, which was consistent with parainfluenza  infection. He was started on antibiotics empirically. Blood cultures  were negative. Also placed on some IV steroids and aerosol treatments,  both long-acting and short-acting steroids and beta-agonists. Continued  to cough. Had copious amounts of mucus. Pulmonary decided to take him  for bronchoscopy, which he had demonstrated tracheomalacia. No evidence  of mass was noted. Cultures were taken.   Expected dilan was obtained. Antibiotics were stopped and he was continued on IV steroids and he will  go home on a tapered oral dose for same. We will continue on with as  aerosol treatments and his handheld nebulizers. He has been adamantly  reinforced to quit smoking and we placed him on a Nicoderm patch for  same. He does have a history of alcohol abuse as well and he was on a  CIWA scale at the hospital, which he did not require. We have asked him  to abstain from alcohol as well. He is breathing much better,  tolerating his meds, not really experiencing significant shortness of  breath at this point. O2 saturations are improved. He will be  discharged home and I will follow him up in the office in approximately  a week to see how he is doing and get him off of the oral steroids. He  will also _____ for gastroesophageal reflux disorder, again likely  related to steroids, but he seems to be getting better as he had a  couple of GI cocktail for that and that seems to be resolved as well. CONDITION UPON DISCHARGE:  At the time of discharge, his condition was  improved. His prognosis is guarded, provided he is compliant with his  meds and the advice that we have give him about smoking and drinking.         Tricia Aschoff, DO    D: 08/06/2021 10:20:47       T: 08/06/2021 10:23:29     [email or web address]  Job#: [id number]     Doc#: [id number]    CC:

## 2021-08-09 ENCOUNTER — CARE COORDINATION (OUTPATIENT)
Dept: CASE MANAGEMENT | Age: 56
End: 2021-08-09

## 2021-08-09 NOTE — CARE COORDINATION
BPCI-A PROGRAM QUALIFYING  from previous admission      [address] Initial Follow Up Call    Call within 2 business days of discharge: Yes    Patient: Meaghan Montanez Patient : 1965   MRN: [id number]  Reason for Admission: acute respiratory failure with hypoxia   Discharge Date: 21 RARS: Readmission Risk Score: 20      Last Discharge [facility]       Complaint Diagnosis Description Type Department Provider    21 Cough; Shortness of Breath; Fever Acute respiratory failure with hypoxia ([facility] . .. ED to Hosp-Admission (Discharged) (ADMITTED) ANIA Cunningham DO; Andrae Brionesbr. .. Spoke with: Miriam 110: Sha English      Non-face-to-face services provided:  Scheduled appointment with PCP-declined assistance with scheduling, stated he will call himself   Obtained and reviewed discharge summary and/or continuity of care documents    Care Transitions 24 Hour Call    Schedule Follow Up Appointment with PCP: Declined  Do you have any ongoing symptoms?: No  Do you have a copy of your discharge instructions?: Yes  Do you have all of your prescriptions and are they filled?: Yes  Have you been contacted by a [facility] Pharmacist?: No  Have you scheduled your follow up appointment?: No  Were you discharged with any Home Care or Post Acute Services: No  Do you feel like you have everything you need to keep you well at home?: Yes  Care Transitions Interventions  No Identified Needs     Spoke with Jonnathan Andrew briefly for initial BPCI care transition call post hospital discharge. He reports that his throat is still sore and his voice remains hoarse, however, he is feeling \"fine\" today and denies SOB. He kindly declined a full med review, stating he did  his scripts already and started taking them. He reports that he wore the nicotine patch over the weekend but did remove it today and had two cigarettes.  He reports that his inhaler is in his pocket and his nitro is in the saddlebag of his motorcycle. He is presently at the bar hanging out with friends, he understands the importance of cutting back on his alcohol and cigarette use. Arabella Matthews denies any needs, questions, or concerns at this time. Explained that [facility] to follow for 30 days and CTN central team will resume contact after 30 day period has ended, he in agreement. Follow Up  No future appointments.     Ignacio Christie RN

## 2021-08-11 ENCOUNTER — HOSPITAL ENCOUNTER (OUTPATIENT)
Age: 56
Setting detail: OBSERVATION
Discharge: HOME | End: 2021-08-12
Attending: EMERGENCY MEDICINE | Admitting: GENERAL PRACTICE
Payer: MEDICARE

## 2021-08-11 ENCOUNTER — APPOINTMENT (OUTPATIENT)
Dept: CT IMAGING | Age: 56
End: 2021-08-11
Payer: MEDICARE

## 2021-08-11 ENCOUNTER — APPOINTMENT (OUTPATIENT)
Dept: GENERAL RADIOLOGY | Age: 56
End: 2021-08-11
Payer: MEDICARE

## 2021-08-11 DIAGNOSIS — F14.90 COCAINE USE: ICD-10-CM

## 2021-08-11 DIAGNOSIS — R06.02 SHORTNESS OF BREATH: Primary | ICD-10-CM

## 2021-08-11 DIAGNOSIS — R94.31 ABNORMAL ECG: ICD-10-CM

## 2021-08-11 DIAGNOSIS — R77.8 TROPONIN LEVEL ELEVATED: ICD-10-CM

## 2021-08-11 DIAGNOSIS — R50.9 FEVER, UNSPECIFIED FEVER CAUSE: ICD-10-CM

## 2021-08-11 LAB
ANION GAP SERPL CALCULATED.3IONS-SCNC: 11 MMOL/L (ref 7–16)
BUN BLDV-MCNC: 20 MG/DL (ref 6–20)
CALCIUM SERPL-MCNC: 8.2 MG/DL (ref 8.6–10.2)
CHLORIDE BLD-SCNC: 100 MMOL/L (ref 98–107)
CO2: 21 MMOL/L (ref 22–29)
CREAT SERPL-MCNC: 1.4 MG/DL (ref 0.7–1.2)
GFR AFRICAN AMERICAN: >60
GFR NON-AFRICAN AMERICAN: 52 ML/MIN/1.73
GLUCOSE BLD-MCNC: 108 MG/DL (ref 74–99)
LACTIC ACID, SEPSIS: 2.1 MMOL/L (ref 0.5–1.9)
POTASSIUM REFLEX MAGNESIUM: 4.6 MMOL/L (ref 3.5–5)
PRO-BNP: 3937 PG/ML (ref 0–125)
SARS-COV-2, NAAT: NOT DETECTED
SODIUM BLD-SCNC: 132 MMOL/L (ref 132–146)
TROPONIN, HIGH SENSITIVITY: 62 NG/L (ref 0–11)

## 2021-08-11 PROCEDURE — 80179 DRUG ASSAY SALICYLATE: CPT

## 2021-08-11 PROCEDURE — 99284 EMERGENCY DEPT VISIT MOD MDM: CPT

## 2021-08-11 PROCEDURE — 71250 CT THORAX DX C-: CPT

## 2021-08-11 PROCEDURE — 94640 AIRWAY INHALATION TREATMENT: CPT

## 2021-08-11 PROCEDURE — 94664 DEMO&/EVAL PT USE INHALER: CPT

## 2021-08-11 PROCEDURE — 83605 ASSAY OF LACTIC ACID: CPT

## 2021-08-11 PROCEDURE — 80048 BASIC METABOLIC PNL TOTAL CA: CPT

## 2021-08-11 PROCEDURE — 84484 ASSAY OF TROPONIN QUANT: CPT

## 2021-08-11 PROCEDURE — 93005 ELECTROCARDIOGRAM TRACING: CPT | Performed by: EMERGENCY MEDICINE

## 2021-08-11 PROCEDURE — 80143 DRUG ASSAY ACETAMINOPHEN: CPT

## 2021-08-11 PROCEDURE — 6370000000 HC RX 637 (ALT 250 FOR IP): Performed by: EMERGENCY MEDICINE

## 2021-08-11 PROCEDURE — 80307 DRUG TEST PRSMV CHEM ANLYZR: CPT

## 2021-08-11 PROCEDURE — 87040 BLOOD CULTURE FOR BACTERIA: CPT

## 2021-08-11 PROCEDURE — 85025 COMPLETE CBC W/AUTO DIFF WBC: CPT

## 2021-08-11 PROCEDURE — 87635 SARS-COV-2 COVID-19 AMP PRB: CPT

## 2021-08-11 PROCEDURE — 83880 ASSAY OF NATRIURETIC PEPTIDE: CPT

## 2021-08-11 PROCEDURE — 82077 ASSAY SPEC XCP UR&BREATH IA: CPT

## 2021-08-11 RX ORDER — IPRATROPIUM BROMIDE AND ALBUTEROL SULFATE 2.5; .5 MG/3ML; MG/3ML
1 SOLUTION RESPIRATORY (INHALATION)
Status: COMPLETED | OUTPATIENT
Start: 2021-08-11 | End: 2021-08-11

## 2021-08-11 RX ADMIN — IPRATROPIUM BROMIDE AND ALBUTEROL SULFATE 1 AMPULE: .5; 3 SOLUTION RESPIRATORY (INHALATION) at 22:19

## 2021-08-11 RX ADMIN — IPRATROPIUM BROMIDE AND ALBUTEROL SULFATE 1 AMPULE: .5; 3 SOLUTION RESPIRATORY (INHALATION) at 22:22

## 2021-08-11 RX ADMIN — IPRATROPIUM BROMIDE AND ALBUTEROL SULFATE 1 AMPULE: .5; 3 SOLUTION RESPIRATORY (INHALATION) at 22:20

## 2021-08-11 NOTE — Clinical Note
Patient Class: Observation [104]   REQUIRED: Diagnosis: Shortness of breath [786.05. ICD-9-CM]   Estimated Length of Stay: Estimated stay of less than 2 midnights   Admitting Provider: Yanet Lyons [[id number]]   Telemetry/Cardiac Monitoring Required?: Yes

## 2021-08-12 VITALS
TEMPERATURE: 97.8 F | BODY MASS INDEX: 35.31 KG/M2 | RESPIRATION RATE: 16 BRPM | HEART RATE: 64 BPM | SYSTOLIC BLOOD PRESSURE: 124 MMHG | OXYGEN SATURATION: 96 % | WEIGHT: 225 LBS | DIASTOLIC BLOOD PRESSURE: 91 MMHG | HEIGHT: 67 IN

## 2021-08-12 PROBLEM — R06.02 SHORTNESS OF BREATH: Status: ACTIVE | Noted: 2021-08-12

## 2021-08-12 LAB
ACETAMINOPHEN LEVEL: <5 MCG/ML (ref 10–30)
ADENOVIRUS BY PCR: NOT DETECTED
AMPHETAMINE SCREEN, URINE: NOT DETECTED
ATYPICAL LYMPHOCYTE RELATIVE PERCENT: 6.1 % (ref 0–4)
BACTERIA: NORMAL /HPF
BARBITURATE SCREEN URINE: NOT DETECTED
BASOPHILS ABSOLUTE: 0 E9/L (ref 0–0.2)
BASOPHILS RELATIVE PERCENT: 0 % (ref 0–2)
BENZODIAZEPINE SCREEN, URINE: NOT DETECTED
BILIRUBIN URINE: NEGATIVE
BLOOD, URINE: ABNORMAL
BORDETELLA PARAPERTUSSIS BY PCR: NOT DETECTED
BORDETELLA PERTUSSIS BY PCR: NOT DETECTED
CANNABINOID SCREEN URINE: NOT DETECTED
CHLAMYDOPHILIA PNEUMONIAE BY PCR: NOT DETECTED
CLARITY: CLEAR
COCAINE METABOLITE SCREEN URINE: POSITIVE
COLOR: YELLOW
CORONAVIRUS 229E BY PCR: NOT DETECTED
CORONAVIRUS HKU1 BY PCR: NOT DETECTED
CORONAVIRUS NL63 BY PCR: NOT DETECTED
CORONAVIRUS OC43 BY PCR: NOT DETECTED
EKG ATRIAL RATE: 80 BPM
EKG P AXIS: 4 DEGREES
EKG P-R INTERVAL: 112 MS
EKG Q-T INTERVAL: 370 MS
EKG QRS DURATION: 82 MS
EKG QTC CALCULATION (BAZETT): 426 MS
EKG R AXIS: -16 DEGREES
EKG T AXIS: 41 DEGREES
EKG VENTRICULAR RATE: 80 BPM
EOSINOPHILS ABSOLUTE: 0.09 E9/L (ref 0.05–0.5)
EOSINOPHILS RELATIVE PERCENT: 0.9 % (ref 0–6)
ETHANOL: <10 MG/DL (ref 0–0.08)
FENTANYL SCREEN, URINE: NOT DETECTED
GLUCOSE URINE: NEGATIVE MG/DL
HCT VFR BLD CALC: 47.6 % (ref 37–54)
HEMOGLOBIN: 16.2 G/DL (ref 12.5–16.5)
HUMAN METAPNEUMOVIRUS BY PCR: NOT DETECTED
HUMAN RHINOVIRUS/ENTEROVIRUS BY PCR: NOT DETECTED
INFLUENZA A BY PCR: NOT DETECTED
INFLUENZA B BY PCR: NOT DETECTED
KETONES, URINE: NEGATIVE MG/DL
LACTIC ACID, SEPSIS: 1.3 MMOL/L (ref 0.5–1.9)
LEUKOCYTE ESTERASE, URINE: NEGATIVE
LYMPHOCYTES ABSOLUTE: 2.62 E9/L (ref 1.5–4)
LYMPHOCYTES RELATIVE PERCENT: 20.9 % (ref 20–42)
Lab: ABNORMAL
MCH RBC QN AUTO: 31.4 PG (ref 26–35)
MCHC RBC AUTO-ENTMCNC: 34 % (ref 32–34.5)
MCV RBC AUTO: 92.2 FL (ref 80–99.9)
METHADONE SCREEN, URINE: NOT DETECTED
MONOCYTES ABSOLUTE: 0.39 E9/L (ref 0.1–0.95)
MONOCYTES RELATIVE PERCENT: 4.3 % (ref 2–12)
MYCOPLASMA PNEUMONIAE BY PCR: NOT DETECTED
NEUTROPHILS ABSOLUTE: 6.6 E9/L (ref 1.8–7.3)
NEUTROPHILS RELATIVE PERCENT: 67.8 % (ref 43–80)
NITRITE, URINE: NEGATIVE
NUCLEATED RED BLOOD CELLS: 0 /100 WBC
OPIATE SCREEN URINE: NOT DETECTED
OXYCODONE URINE: NOT DETECTED
PARAINFLUENZA VIRUS 1 BY PCR: NOT DETECTED
PARAINFLUENZA VIRUS 2 BY PCR: NOT DETECTED
PARAINFLUENZA VIRUS 3 BY PCR: NOT DETECTED
PARAINFLUENZA VIRUS 4 BY PCR: NOT DETECTED
PDW BLD-RTO: 13.7 FL (ref 11.5–15)
PH UA: 5.5 (ref 5–9)
PHENCYCLIDINE SCREEN URINE: NOT DETECTED
PLATELET # BLD: 119 E9/L (ref 130–450)
PMV BLD AUTO: 10.4 FL (ref 7–12)
PROTEIN UA: ABNORMAL MG/DL
RBC # BLD: 5.16 E12/L (ref 3.8–5.8)
RBC # BLD: NORMAL 10*6/UL
RBC UA: NORMAL /HPF (ref 0–2)
REASON FOR REJECTION: NORMAL
REJECTED TEST: NORMAL
RESPIRATORY SYNCYTIAL VIRUS BY PCR: NOT DETECTED
SALICYLATE, SERUM: <0.3 MG/DL (ref 0–30)
SARS-COV-2, PCR: NOT DETECTED
SPECIFIC GRAVITY UA: 1.02 (ref 1–1.03)
TOTAL CK: 94 U/L (ref 20–200)
TRICYCLIC ANTIDEPRESSANTS SCREEN SERUM: NEGATIVE NG/ML
TROPONIN, HIGH SENSITIVITY: 58 NG/L (ref 0–11)
UROBILINOGEN, URINE: 0.2 E.U./DL
WBC # BLD: 9.7 E9/L (ref 4.5–11.5)
WBC UA: NORMAL /HPF (ref 0–5)

## 2021-08-12 PROCEDURE — 84484 ASSAY OF TROPONIN QUANT: CPT

## 2021-08-12 PROCEDURE — 83605 ASSAY OF LACTIC ACID: CPT

## 2021-08-12 PROCEDURE — 6370000000 HC RX 637 (ALT 250 FOR IP): Performed by: EMERGENCY MEDICINE

## 2021-08-12 PROCEDURE — G0378 HOSPITAL OBSERVATION PER HR: HCPCS

## 2021-08-12 PROCEDURE — 6360000002 HC RX W HCPCS: Performed by: GENERAL PRACTICE

## 2021-08-12 PROCEDURE — 82550 ASSAY OF CK (CPK): CPT

## 2021-08-12 PROCEDURE — 96372 THER/PROPH/DIAG INJ SC/IM: CPT

## 2021-08-12 PROCEDURE — 93010 ELECTROCARDIOGRAM REPORT: CPT | Performed by: INTERNAL MEDICINE

## 2021-08-12 PROCEDURE — 2580000003 HC RX 258: Performed by: GENERAL PRACTICE

## 2021-08-12 PROCEDURE — 80307 DRUG TEST PRSMV CHEM ANLYZR: CPT

## 2021-08-12 PROCEDURE — 6360000002 HC RX W HCPCS: Performed by: EMERGENCY MEDICINE

## 2021-08-12 PROCEDURE — 96375 TX/PRO/DX INJ NEW DRUG ADDON: CPT

## 2021-08-12 PROCEDURE — 0202U NFCT DS 22 TRGT SARS-COV-2: CPT

## 2021-08-12 PROCEDURE — 81001 URINALYSIS AUTO W/SCOPE: CPT

## 2021-08-12 PROCEDURE — 96365 THER/PROPH/DIAG IV INF INIT: CPT

## 2021-08-12 PROCEDURE — 36415 COLL VENOUS BLD VENIPUNCTURE: CPT

## 2021-08-12 PROCEDURE — 96366 THER/PROPH/DIAG IV INF ADDON: CPT

## 2021-08-12 PROCEDURE — 85025 COMPLETE CBC W/AUTO DIFF WBC: CPT

## 2021-08-12 RX ORDER — LEVOFLOXACIN 5 MG/ML
750 INJECTION, SOLUTION INTRAVENOUS ONCE
Status: COMPLETED | OUTPATIENT
Start: 2021-08-12 | End: 2021-08-12

## 2021-08-12 RX ORDER — ACETAMINOPHEN 500 MG
1000 TABLET ORAL ONCE
Status: COMPLETED | OUTPATIENT
Start: 2021-08-12 | End: 2021-08-12

## 2021-08-12 RX ORDER — ONDANSETRON 2 MG/ML
4 INJECTION INTRAMUSCULAR; INTRAVENOUS EVERY 6 HOURS PRN
Status: DISCONTINUED | OUTPATIENT
Start: 2021-08-12 | End: 2021-08-12 | Stop reason: HOSPADM

## 2021-08-12 RX ORDER — SODIUM CHLORIDE 0.9 % (FLUSH) 0.9 %
5-40 SYRINGE (ML) INJECTION EVERY 12 HOURS SCHEDULED
Status: DISCONTINUED | OUTPATIENT
Start: 2021-08-12 | End: 2021-08-12 | Stop reason: HOSPADM

## 2021-08-12 RX ORDER — SODIUM CHLORIDE 9 MG/ML
25 INJECTION, SOLUTION INTRAVENOUS PRN
Status: DISCONTINUED | OUTPATIENT
Start: 2021-08-12 | End: 2021-08-12 | Stop reason: HOSPADM

## 2021-08-12 RX ORDER — ONDANSETRON 4 MG/1
4 TABLET, ORALLY DISINTEGRATING ORAL EVERY 8 HOURS PRN
Status: DISCONTINUED | OUTPATIENT
Start: 2021-08-12 | End: 2021-08-12 | Stop reason: HOSPADM

## 2021-08-12 RX ORDER — SODIUM CHLORIDE 0.9 % (FLUSH) 0.9 %
5-40 SYRINGE (ML) INJECTION PRN
Status: DISCONTINUED | OUTPATIENT
Start: 2021-08-12 | End: 2021-08-12 | Stop reason: HOSPADM

## 2021-08-12 RX ORDER — ASPIRIN 325 MG
325 TABLET ORAL ONCE
Status: DISCONTINUED | OUTPATIENT
Start: 2021-08-12 | End: 2021-08-12 | Stop reason: HOSPADM

## 2021-08-12 RX ORDER — ACETAMINOPHEN 325 MG/1
650 TABLET ORAL EVERY 4 HOURS PRN
Status: DISCONTINUED | OUTPATIENT
Start: 2021-08-12 | End: 2021-08-12 | Stop reason: HOSPADM

## 2021-08-12 RX ORDER — METHYLPREDNISOLONE SODIUM SUCCINATE 125 MG/2ML
125 INJECTION, POWDER, LYOPHILIZED, FOR SOLUTION INTRAMUSCULAR; INTRAVENOUS ONCE
Status: COMPLETED | OUTPATIENT
Start: 2021-08-12 | End: 2021-08-12

## 2021-08-12 RX ADMIN — ACETAMINOPHEN 1000 MG: 500 TABLET ORAL at 01:48

## 2021-08-12 RX ADMIN — SODIUM CHLORIDE, PRESERVATIVE FREE 10 ML: 5 INJECTION INTRAVENOUS at 08:36

## 2021-08-12 RX ADMIN — LEVOFLOXACIN 750 MG: 5 INJECTION, SOLUTION INTRAVENOUS at 01:45

## 2021-08-12 RX ADMIN — SODIUM CHLORIDE, PRESERVATIVE FREE 10 ML: 5 INJECTION INTRAVENOUS at 08:37

## 2021-08-12 RX ADMIN — ENOXAPARIN SODIUM 40 MG: 40 INJECTION SUBCUTANEOUS at 08:35

## 2021-08-12 RX ADMIN — METHYLPREDNISOLONE SODIUM SUCCINATE 125 MG: 125 INJECTION, POWDER, FOR SOLUTION INTRAMUSCULAR; INTRAVENOUS at 01:49

## 2021-08-12 ASSESSMENT — PAIN SCALES - GENERAL
PAINLEVEL_OUTOF10: 0
PAINLEVEL_OUTOF10: 0

## 2021-08-12 NOTE — ED NOTES
Answered call light for patient, states if he does not have a bed assigned yet that he wants to leave. Staff RN for patient notified. Eugenio RN spoke with patient and he states that he wants to leave AMA.  Dr. Logan Guan contacted through [address] [place] [address]  08/12/21 3440

## 2021-08-12 NOTE — ED NOTES
RN spoke with Dr Alexsander Bridges at this time about pt wanting to leave AMA.  Dr Alexsander Bridges states to advise the pt of the risks of leaving AMA and that it is in his best interest to stay      Vu Carson RN  08/12/21 0444

## 2021-08-12 NOTE — ED PROVIDER NOTES
Joni Enriquez is a 60 y.o. male presenting to the ED for shortness of breath, beginning pta ago. The complaint has been intermittent, moderate in severity, and worsened by nothing. 4year-old male with history of community-acquired pneumonia history of COPD obstructive sleep apnea history of unstable angina history of cocaine and marijuana use, follows with Dr. Chele Hwang for vrAbel and Dr. Nakia Win had recent bronchoscopy after recent inpatient admission was found to have tracheal bronchitis at that time patient presents for shortness of breath that started today and fever. Pt did admit to cocaine use Friday. Review of Systems:   Pertinent positives and negatives are stated within HPI, all other systems reviewed and are negative.          --------------------------------------------- PAST HISTORY ---------------------------------------------  Past Medical History:  has a past medical history of Alcohol abuse, CHF (congestive heart failure) ([facility] [address] COPD (chronic obstructive pulmonary disease) ([facility] [address] History of cocaine use, Hyperlipidemia, Hypertension, Marijuana use, MI (myocardial infarction) ([facility] [address] and alberto. Past Surgical History:  has a past surgical history that includes Wrist surgery; cervical fusion (11/18/15); polysomnography (01/29/2018); Cardiac catheterization (11/03/2020); and bronchoscopy (N/A, 8/3/2021). Social History:  reports that he has been smoking cigarettes. He started smoking about 42 years ago. He has a 60.00 pack-year smoking history. He has never used smokeless tobacco. He reports previous alcohol use of about 140.0 standard drinks of alcohol per week. Drugs: Cocaine and Other-see comments. Family History: family history includes Arthritis in his mother; Cancer in his brother; Heart Disease in his father; High Blood Pressure in his brother, brother, brother, and brother; Other in his brother, brother, brother, brother, and mother.      The patients home medications have been reviewed. Allergies: Patient has no known allergies.     -------------------------------------------------- RESULTS -------------------------------------------------  All laboratory and radiology results have been personally reviewed by myself   LABS:  Results for orders placed or performed during the hospital encounter of 08/11/21   COVID-19, Rapid    Specimen: Nasopharyngeal Swab   Result Value Ref Range    SARS-CoV-2, NAAT Not Detected Not Detected   Lactate, Sepsis   Result Value Ref Range    Lactic Acid, Sepsis 2.1 (H) 0.5 - 1.9 mmol/L   Lactate, Sepsis   Result Value Ref Range    Lactic Acid, Sepsis 1.3 0.5 - 1.9 mmol/L   CBC Auto Differential   Result Value Ref Range    WBC SEE BELOW (AA) 4.5 - 11.5 E9/L    RBC SEE BELOW (AA) 3.80 - 5.80 E12/L    Hemoglobin SEE BELOW (AA) 12.5 - 16.5 g/dL    Hematocrit SEE BELOW (AA) 37.0 - 54.0 %    MCV SEE BELOW (AA) 80.0 - 99.9 fL    MCH SEE BELOW (AA) 26.0 - 35.0 pg    MCHC SEE BELOW (AA) 32.0 - 34.5 %    RDW SEE BELOW (AA) 11.5 - 15.0 fL    Platelets SEE BELOW (AA) 130 - 450 E9/L    MPV SEE BELOW (AA) 7.0 - 12.0 fL    Neutrophils % SEE BELOW (AA) 43.0 - 80.0 %    Immature Granulocytes % SEE BELOW (AA) 0.0 - 5.0 %    Lymphocytes % SEE BELOW (AA) 20.0 - 42.0 %    Monocytes % SEE BELOW (AA) 2.0 - 12.0 %    Eosinophils % SEE BELOW (AA) 0.0 - 6.0 %    Basophils % SEE BELOW (AA) 0.0 - 2.0 %    Neutrophils Absolute SEE BELOW (AA) 1.80 - 7.30 E9/L    Immature Granulocytes # SEE BELOW (AA) E9/L    Lymphocytes Absolute SEE BELOW (AA) 1.50 - 4.00 E9/L    Monocytes Absolute SEE BELOW (AA) 0.10 - 0.95 E9/L    Eosinophils Absolute SEE BELOW (AA) 0.05 - 0.50 E9/L    Basophils Absolute SEE BELOW (AA) 0.00 - 0.20 L3/A   Basic Metabolic Panel w/ Reflex to MG   Result Value Ref Range    Sodium 132 132 - 146 mmol/L    Potassium reflex Magnesium 4.6 3.5 - 5.0 mmol/L    Chloride 100 98 - 107 mmol/L    CO2 21 (L) 22 - 29 mmol/L    Anion Gap 11 7 - 16 mmol/L    Glucose 108 (H) 74 - 99 mg/dL    BUN 20 6 - 20 mg/dL    CREATININE 1.4 (H) 0.7 - 1.2 mg/dL    GFR Non-African American 52 >=60 mL/min/1.73    GFR African American >60     Calcium 8.2 (L) 8.6 - 10.2 mg/dL   Troponin   Result Value Ref Range    Troponin, High Sensitivity 62 (H) 0 - 11 ng/L   Brain Natriuretic Peptide   Result Value Ref Range    Pro-BNP 3,937 (H) 0 - 125 pg/mL   Serum Drug Screen   Result Value Ref Range    Ethanol Lvl <10 mg/dL    Acetaminophen Level <5.0 (L) 10.0 - 75.8 mcg/mL    Salicylate, Serum <3.7 0.0 - 30.0 mg/dL   URINE DRUG SCREEN   Result Value Ref Range    Amphetamine Screen, Urine NOT DETECTED Negative <1000 ng/mL    Barbiturate Screen, Ur NOT DETECTED Negative < 200 ng/mL    Benzodiazepine Screen, Urine NOT DETECTED Negative < 200 ng/mL    Cannabinoid Scrn, Ur NOT DETECTED Negative < 50ng/mL    Cocaine Metabolite Screen, Urine POSITIVE (A) Negative < 300 ng/mL    Opiate Scrn, Ur NOT DETECTED Negative < 300ng/mL    PCP Screen, Urine NOT DETECTED Negative < 25 ng/mL    Methadone Screen, Urine NOT DETECTED Negative <300 ng/mL    Oxycodone Urine NOT DETECTED Negative <100 ng/mL    FENTANYL SCREEN, URINE NOT DETECTED Negative <1 ng/mL    Drug Screen Comment: see below    Urinalysis, reflex to microscopic   Result Value Ref Range    Color, UA Yellow Straw/Yellow    Clarity, UA Clear Clear    Glucose, Ur Negative Negative mg/dL    Bilirubin Urine Negative Negative    Ketones, Urine Negative Negative mg/dL    Specific Gravity, UA 1.025 1.005 - 1.030    Blood, Urine SMALL (A) Negative    pH, UA 5.5 5.0 - 9.0    Protein, UA TRACE Negative mg/dL    Urobilinogen, Urine 0.2 <2.0 E.U./dL    Nitrite, Urine Negative Negative    Leukocyte Esterase, Urine Negative Negative   CBC auto differential   Result Value Ref Range    WBC 9.7 4.5 - 11.5 E9/L    RBC 5.16 3.80 - 5.80 E12/L    Hemoglobin 16.2 12.5 - 16.5 g/dL    Hematocrit 47.6 37.0 - 54.0 %    MCV 92.2 80.0 - 99.9 fL    MCH 31.4 26.0 - 35.0 pg    MCHC 34.0 32.0 - 34.5 %    RDW 13.7 11.5 - 15.0 fL    Platelets 899 (L) 999 - 450 E9/L    MPV 10.4 7.0 - 12.0 fL   Microscopic Urinalysis   Result Value Ref Range    WBC, UA NONE 0 - 5 /HPF    RBC, UA 0-1 0 - 2 /HPF    Bacteria, UA NONE SEEN None Seen /HPF   EKG 12 Lead   Result Value Ref Range    Ventricular Rate 80 BPM    Atrial Rate 80 BPM    P-R Interval 112 ms    QRS Duration 82 ms    Q-T Interval 370 ms    QTc Calculation (Bazett) 426 ms    P Axis 4 degrees    R Axis -16 degrees    T Axis 41 degrees       RADIOLOGY:  Interpreted by Radiologist.  CT CHEST WO CONTRAST   Final Result   Grossly normal CT scan of the chest.  No definitive evidence for pneumonia.             ------------------------- NURSING NOTES AND VITALS REVIEWED ---------------------------   The nursing notes within the ED encounter and vital signs as below have been reviewed. /77   Pulse 79   Temp 100.7 °F (38.2 °C) (Oral)   Resp 16   Ht 5' 7\" (1.702 m)   Wt 225 lb (102.1 kg)   SpO2 96%   BMI 35.24 kg/m²   Oxygen Saturation Interpretation: Normal      ---------------------------------------------------PHYSICAL EXAM--------------------------------------    Physical Exam  Vitals reviewed. Constitutional:       General: He is not in acute distress. Appearance: Normal appearance. He is not toxic-appearing. HENT:      Head: Normocephalic and atraumatic. Right Ear: External ear normal.      Left Ear: External ear normal.      Nose: Nose normal. No congestion. Mouth/Throat:      Mouth: Mucous membranes are moist.      Pharynx: Oropharynx is clear. No posterior oropharyngeal erythema. Eyes:      Extraocular Movements: Extraocular movements intact. Pupils: Pupils are equal, round, and reactive to light. Cardiovascular:      Rate and Rhythm: Normal rate and regular rhythm. Pulses: Normal pulses. Heart sounds: No murmur heard. Pulmonary:      Effort: Pulmonary effort is normal.      Breath sounds:  No wheezing or rhonchi. Chest:      Chest wall: No tenderness. Abdominal:      General: Bowel sounds are normal.      Tenderness: There is no abdominal tenderness. There is no right CVA tenderness, left CVA tenderness or guarding. Musculoskeletal:         General: No swelling or deformity. Cervical back: Normal range of motion and neck supple. No muscular tenderness. Skin:     General: Skin is warm and dry. Capillary Refill: Capillary refill takes less than 2 seconds. Neurological:      General: No focal deficit present. Mental Status: He is alert and oriented to person, place, and time. Psychiatric:         Mood and Affect: Mood normal.               ------------------------------ ED COURSE/MEDICAL DECISION MAKING----------------------  Medications   methylPREDNISolone sodium (SOLU-MEDROL) injection 125 mg (has no administration in time range)   levoFLOXacin (LEVAQUIN) 750 MG/150ML infusion 750 mg (has no administration in time range)   acetaminophen (TYLENOL) tablet 1,000 mg (has no administration in time range)   aspirin tablet 325 mg (has no administration in time range)   sodium chloride flush 0.9 % injection 5-40 mL (has no administration in time range)   sodium chloride flush 0.9 % injection 5-40 mL (has no administration in time range)   0.9 % sodium chloride infusion (has no administration in time range)   enoxaparin (LOVENOX) injection 40 mg (has no administration in time range)   acetaminophen (TYLENOL) tablet 650 mg (has no administration in time range)   ondansetron (ZOFRAN-ODT) disintegrating tablet 4 mg (has no administration in time range)     Or   ondansetron (ZOFRAN) injection 4 mg (has no administration in time range)   ipratropium-albuterol (DUONEB) nebulizer solution 1 ampule (1 ampule Inhalation Given 8/11/21 2222)     EKG: This EKG is signed and interpreted by me.     [id number]  Rate: 80  Rhythm: Sinus  Interpretation: No nonspecific T wave abnormality  Comparison: Improved from

## 2021-08-12 NOTE — ED NOTES
Pt leaving AMA at this time due to the pt wanting to go home. Dr Cammy Isaac went over the risks of leaving AMA with the pt. Pt still wishes to leave AMA. Pt is AOx4 and is able to make his own medical decisions at this time.      Nehal Angel RN  08/12/21 1114

## 2021-08-17 LAB
BASOPHILS ABSOLUTE: ABNORMAL E9/L (ref 0–0.2)
BASOPHILS RELATIVE PERCENT: ABNORMAL % (ref 0–2)
BLOOD CULTURE, ROUTINE: NORMAL
CULTURE, BLOOD 2: NORMAL
EOSINOPHILS ABSOLUTE: ABNORMAL E9/L (ref 0.05–0.5)
EOSINOPHILS RELATIVE PERCENT: ABNORMAL % (ref 0–6)
HCT VFR BLD CALC: ABNORMAL % (ref 37–54)
HEMOGLOBIN: ABNORMAL G/DL (ref 12.5–16.5)
IMMATURE GRANULOCYTES #: ABNORMAL E9/L
IMMATURE GRANULOCYTES %: ABNORMAL % (ref 0–5)
LYMPHOCYTES ABSOLUTE: ABNORMAL E9/L (ref 1.5–4)
LYMPHOCYTES RELATIVE PERCENT: ABNORMAL % (ref 20–42)
MCH RBC QN AUTO: ABNORMAL PG (ref 26–35)
MCHC RBC AUTO-ENTMCNC: ABNORMAL % (ref 32–34.5)
MCV RBC AUTO: ABNORMAL FL (ref 80–99.9)
MONOCYTES ABSOLUTE: ABNORMAL E9/L (ref 0.1–0.95)
MONOCYTES RELATIVE PERCENT: ABNORMAL % (ref 2–12)
NEUTROPHILS ABSOLUTE: ABNORMAL E9/L (ref 1.8–7.3)
NEUTROPHILS RELATIVE PERCENT: ABNORMAL % (ref 43–80)
PDW BLD-RTO: ABNORMAL FL (ref 11.5–15)
PLATELET # BLD: ABNORMAL E9/L (ref 130–450)
PMV BLD AUTO: ABNORMAL FL (ref 7–12)
RBC # BLD: ABNORMAL E12/L (ref 3.8–5.8)
WBC # BLD: ABNORMAL E9/L (ref 4.5–11.5)

## 2021-09-01 NOTE — PROGRESS NOTES
Physician Progress Note      PATIENT:               Liban Henry  [facility] #:                  [id number]  :                       1965  ADMIT DATE:       2021 8:56 AM  DISCH DATE:        2021 10:53 AM  RESPONDING  PROVIDER #:        Juan Manuel MULTANI DO          QUERY TEXT:    Pt admitted with COPD exacerbation. Pt noted to have elevated WBC, CRP, Temp,   and HR. If possible, please document in the progress notes and discharge   summary if you are evaluating and /or treating any of the following: The medical record reflects the following:  Risk Factors: parainfluenza virus  Clinical Indicators: + parainfluenza virus, WBC 18.6, T 99.1, , CRP 1.3,   and per H&P \". ..short of breath, coughing, and having a fever for   approximately four days. Horace Hilt [place] Hilt \"  Treatment: IV Zithromax, IV Rocephin    Thank you,  Virginia CASILLAS, RN, CDIS  Clinical Documentation Improvement  [email or web address]"  Options provided:  -- Sepsis, present on admission  -- Sepsis was ruled out  -- Other - I will add my own diagnosis  -- Disagree - Not applicable / Not valid  -- Disagree - Clinically unable to determine / Unknown  -- Refer to Clinical Documentation Reviewer    PROVIDER RESPONSE TEXT:    After further study, sepsis was ruled out for this patient. Query created by: Janelle Rosenthal on 2021 11:02 AM      QUERY TEXT:    Pt admitted with COPD. Pt noted to have elevated Creat. If possible, please   document in the progress notes and discharge summary if you are evaluating   and/or treating any of the following:     The medical record reflects the following:  Risk Factors: COPD, spironolactone  Clinical Indicators:  Creat 1.6 8/3 Creat 1.2  Treatment: serial lab monitoring    Defined by Kidney Disease Improving Global Outcomes (KDIGO) clinical practice   guideline for acute kidney injury:  -Increase in SCr by greater than or equal to 0.3 mg/dl within 48 hours; or  -Increase in SCr to greater than or equal to 1.5 times baseline, which is   known or presumed to have occurred within the prior 7 days; or  -Urine volume < 0.5ml/kg/h for 6 hours    Thank you,  Cydney CASILLAS, RN, CDIS  Clinical Documentation Improvement  [email or web address]  Options provided:  -- Acute kidney injury  -- No acute kidney injury  -- Other - I will add my own diagnosis  -- Disagree - Not applicable / Not valid  -- Disagree - Clinically unable to determine / Unknown  -- Refer to Clinical Documentation Reviewer    PROVIDER RESPONSE TEXT:    Provider is clinically unable to determine a response to this query.     Query created by: Fer Malagon on 8/3/2021 9:48 AM      Electronically signed by:  Holli Carolina DO 9/1/2021 1:35 PM

## 2021-09-06 LAB
FUNGUS (MYCOLOGY) CULTURE: ABNORMAL
FUNGUS STAIN: ABNORMAL
ORGANISM: ABNORMAL

## 2021-09-10 ENCOUNTER — CARE COORDINATION (OUTPATIENT)
Dept: CASE MANAGEMENT | Age: 56
End: 2021-09-10

## 2021-09-10 NOTE — CARE COORDINATION
Isa [facility] Transitions Follow Up Call    9/10/2021    Patient: Jessy Hoyt  Patient : 1965   MRN: [id number]  Reason for Admission:   Discharge Date: 21 RARS: Readmission Risk Score: 20    Attempted to contact patient for BPCI-A follow up. Unable to reach patient. No answer. Will try again at a later time. Follow Up  No future appointments.     Sraah Garcia, RN

## 2021-09-15 ENCOUNTER — CARE COORDINATION (OUTPATIENT)
Dept: CASE MANAGEMENT | Age: 56
End: 2021-09-15

## 2021-09-15 NOTE — CARE COORDINATION
Isa [facility] Transitions Follow Up Call    9/15/2021    Patient: Luis Carlos Huddleston  Patient : 1965   MRN: <[id number]>  Reason for Admission:   Discharge Date: 21 RARS: Readmission Risk Score: 20       Attempted to contact patient for follow up BPCI-A transition call. Left voicemail message to return call and stated this is the final call we will be making. If you have any health concerns or problems arise please call your PCP to schedule an appointment. Contact information provided. No further outreach scheduled. Care Transitions Subsequent and Final Call    Subsequent and Final Calls  Care Transitions Interventions  Other Interventions: Follow Up  No future appointments.     Elayne Shah LPN
Patient requests all Lab and Radiology Results on their Discharge Instructions

## 2021-09-21 LAB
AFB CULTURE (MYCOBACTERIA): NORMAL
AFB SMEAR: NORMAL

## 2022-06-27 NOTE — PROGRESS NOTES
Vrable, DO        sodium chloride flush 0.9 % injection 10 mL  10 mL Intravenous PRN Feng Burnett, DO   10 mL at 08/81/95 1601         REVIEW OF SYSTEMS:    CONSTITUTIONAL:Fever ,chills   HEENT: denies blurring of vision or double vision, denies hearing problem  RESPIRATORY: Cough, SOB , sputum expectoration   CARDIOVASCULAR:  Denies palpitation  GASTROINTESTINAL:  Denies abdomen pain, diarrhea or constipation. GENITOURINARY:  Denies burning urination or frequency of urination  INTEGUMENT: denies wound , rash  HEMATOLOGIC/LYMPHATIC:  Denies lymph node swelling, gum bleeding or easy bruising. MUSCULOSKELETAL:  Weakness   NEUROLOGICAL:  Denies light headed, dizziness, loss of consciousness    PHYSICAL EXAM:      Vitals:     Vitals:    11/28/20 0945   BP: (!) 128/91   Pulse: 68   Resp: 20   Temp: 97.6 °F (36.4 °C)   SpO2: 95%       General Appearance:    Awake, alert , no acute distress. Head:    Normocephalic, atraumatic   Eyes:    No pallor, no icterus,   Ears:    No obvious deformity or drainage.    Nose:   No nasal drainage   Throat:   Mucosa moist, no oral thrush   Neck:   Supple, no lymphadenopathy   Back:     no CVA tenderness   Lungs:     Clear bilaterally     Heart:    Regular rate and rhythm, no murmur   Abdomen:     Soft, non-tender, bowel sounds present    Extremities:   No edema, no cyanosis   Pulses:   Dorsalis pedis palpable    Skin:   no rashes or lesions       DATA:      CBC with Differential:      Lab Results   Component Value Date    WBC 11.1 11/22/2020    RBC 4.60 11/22/2020    HGB 14.3 11/22/2020    HCT 40.2 11/22/2020     11/22/2020    MCV 87.4 11/22/2020    MCH 31.1 11/22/2020    MCHC 35.6 11/22/2020    RDW 12.4 11/22/2020    METASPCT 2 02/10/2016    LYMPHOPCT 8.6 11/22/2020    MONOPCT 5.3 11/22/2020    MYELOPCT 1 02/10/2016    BASOPCT 0.2 11/22/2020    MONOSABS 0.59 11/22/2020    LYMPHSABS 0.96 11/22/2020    EOSABS 0.18 11/22/2020    BASOSABS 0.02 11/22/2020       CMP     Lab Results   Component Value Date     11/26/2020    K 4.9 11/26/2020    K 4.4 11/22/2020     11/26/2020    CO2 18 11/26/2020    BUN 29 11/26/2020    CREATININE 1.0 11/26/2020    GFRAA >60 11/26/2020    LABGLOM >60 11/26/2020    GLUCOSE 190 11/26/2020    PROT 6.9 11/22/2020    LABALBU 3.3 11/22/2020    CALCIUM 8.9 11/26/2020    BILITOT 0.3 11/22/2020    ALKPHOS 71 11/22/2020    AST 27 11/22/2020    ALT 37 11/22/2020         Hepatic Function Panel:    Lab Results   Component Value Date    ALKPHOS 71 11/22/2020    ALT 37 11/22/2020    AST 27 11/22/2020    PROT 6.9 11/22/2020    BILITOT 0.3 11/22/2020    BILIDIR <0.2 08/17/2019    IBILI see below 08/17/2019    LABALBU 3.3 11/22/2020       PT/INR:    Lab Results   Component Value Date    PROTIME 13.0 11/22/2020    INR 1.2 11/22/2020       TSH:    Lab Results   Component Value Date    TSH 0.809 04/18/2019       U/A:    Lab Results   Component Value Date    COLORU Yellow 11/22/2020    PHUR 6.0 11/22/2020    WBCUA NONE 11/22/2020    RBCUA 0-1 11/22/2020    BACTERIA NONE SEEN 11/22/2020    CLARITYU Clear 11/22/2020    SPECGRAV 1.025 11/22/2020    LEUKOCYTESUR Negative 11/22/2020    UROBILINOGEN 1.0 11/22/2020    BILIRUBINUR Negative 11/22/2020    BLOODU Negative 11/22/2020    GLUCOSEU Negative 11/22/2020       ABG:  No results found for: ITS1MQY, BEART, K3DWULGT, PHART, THGBART, WRG1WEM, PO2ART, PXU9MZM    MICROBIOLOGY:    Resp panel negative       Radiology :    CTA scan of chest -  Impression:          1.  No pulmonary embolism. 2.  Increasing bilateral pneumonia compared to prior from November 16, 2020.             IMPRESSION:     1. Recently treated COVID 19 - fever and lung infiltrates ? Secondary bacterial      pneumonia                 2. CHF   3. Respiratory failure - improving     RECOMMENDATIONS:      1. Cefepime 2 grams IV q 12 hrs  2. CT chest   3.  Wean off oxygen as tolerated Isotretinoin Counseling: Patient should get monthly blood tests, not donate blood, not drive at night if vision affected, not share medication, and not undergo elective surgery for 6 months after tx completed. Side effects reviewed, pt to contact office should one occur.

## 2022-10-20 ENCOUNTER — HOSPITAL ENCOUNTER (EMERGENCY)
Age: 57
Discharge: HOME | End: 2022-10-20
Attending: EMERGENCY MEDICINE
Payer: MEDICARE

## 2022-10-20 VITALS
HEART RATE: 83 BPM | BODY MASS INDEX: 32.18 KG/M2 | DIASTOLIC BLOOD PRESSURE: 107 MMHG | RESPIRATION RATE: 18 BRPM | TEMPERATURE: 98.6 F | SYSTOLIC BLOOD PRESSURE: 154 MMHG | OXYGEN SATURATION: 97 % | WEIGHT: 205 LBS | HEIGHT: 67 IN

## 2022-10-20 DIAGNOSIS — L02.91 ABSCESS: Primary | ICD-10-CM

## 2022-10-20 LAB
ANION GAP SERPL CALCULATED.3IONS-SCNC: 11 MMOL/L (ref 7–16)
BASOPHILS ABSOLUTE: 0.05 E9/L (ref 0–0.2)
BASOPHILS RELATIVE PERCENT: 0.4 % (ref 0–2)
BUN BLDV-MCNC: 25 MG/DL (ref 6–20)
CALCIUM SERPL-MCNC: 9.3 MG/DL (ref 8.6–10.2)
CHLORIDE BLD-SCNC: 101 MMOL/L (ref 98–107)
CO2: 21 MMOL/L (ref 22–29)
CREAT SERPL-MCNC: 1.5 MG/DL (ref 0.7–1.2)
EOSINOPHILS ABSOLUTE: 0.2 E9/L (ref 0.05–0.5)
EOSINOPHILS RELATIVE PERCENT: 1.4 % (ref 0–6)
GFR SERPL CREATININE-BSD FRML MDRD: 53 ML/MIN/1.73
GLUCOSE BLD-MCNC: 127 MG/DL (ref 74–99)
HCT VFR BLD CALC: 42 % (ref 37–54)
HEMOGLOBIN: 14.5 G/DL (ref 12.5–16.5)
IMMATURE GRANULOCYTES #: 0.06 E9/L
IMMATURE GRANULOCYTES %: 0.4 % (ref 0–5)
LYMPHOCYTES ABSOLUTE: 3.21 E9/L (ref 1.5–4)
LYMPHOCYTES RELATIVE PERCENT: 23 % (ref 20–42)
MCH RBC QN AUTO: 31.5 PG (ref 26–35)
MCHC RBC AUTO-ENTMCNC: 34.5 % (ref 32–34.5)
MCV RBC AUTO: 91.3 FL (ref 80–99.9)
MONOCYTES ABSOLUTE: 0.99 E9/L (ref 0.1–0.95)
MONOCYTES RELATIVE PERCENT: 7.1 % (ref 2–12)
NEUTROPHILS ABSOLUTE: 9.45 E9/L (ref 1.8–7.3)
NEUTROPHILS RELATIVE PERCENT: 67.7 % (ref 43–80)
PDW BLD-RTO: 14.1 FL (ref 11.5–15)
PLATELET # BLD: 255 E9/L (ref 130–450)
PMV BLD AUTO: 9.5 FL (ref 7–12)
POTASSIUM REFLEX MAGNESIUM: 3.9 MMOL/L (ref 3.5–5)
RBC # BLD: 4.6 E12/L (ref 3.8–5.8)
SODIUM BLD-SCNC: 133 MMOL/L (ref 132–146)
WBC # BLD: 14 E9/L (ref 4.5–11.5)

## 2022-10-20 PROCEDURE — 87070 CULTURE OTHR SPECIMN AEROBIC: CPT

## 2022-10-20 PROCEDURE — 99283 EMERGENCY DEPT VISIT LOW MDM: CPT

## 2022-10-20 PROCEDURE — 80048 BASIC METABOLIC PNL TOTAL CA: CPT

## 2022-10-20 PROCEDURE — 87040 BLOOD CULTURE FOR BACTERIA: CPT

## 2022-10-20 PROCEDURE — 2500000003 HC RX 250 WO HCPCS

## 2022-10-20 PROCEDURE — 85025 COMPLETE CBC W/AUTO DIFF WBC: CPT

## 2022-10-20 RX ORDER — CEPHALEXIN 500 MG/1
500 CAPSULE ORAL 4 TIMES DAILY
Qty: 40 CAPSULE | Refills: 0 | Status: SHIPPED | OUTPATIENT
Start: 2022-10-20 | End: 2022-10-30

## 2022-10-20 RX ORDER — LIDOCAINE HYDROCHLORIDE 10 MG/ML
5 INJECTION, SOLUTION INFILTRATION; PERINEURAL ONCE
Status: COMPLETED | OUTPATIENT
Start: 2022-10-20 | End: 2022-10-20

## 2022-10-20 RX ORDER — SULFAMETHOXAZOLE AND TRIMETHOPRIM 800; 160 MG/1; MG/1
2 TABLET ORAL 2 TIMES DAILY
Qty: 40 TABLET | Refills: 0 | Status: SHIPPED | OUTPATIENT
Start: 2022-10-20 | End: 2022-10-30

## 2022-10-20 RX ADMIN — LIDOCAINE HYDROCHLORIDE 5 ML: 10 INJECTION, SOLUTION INFILTRATION; PERINEURAL at 22:47

## 2022-10-20 ASSESSMENT — ENCOUNTER SYMPTOMS
VOMITING: 0
COLOR CHANGE: 1
DIARRHEA: 0
COUGH: 0
EYE PAIN: 0
ABDOMINAL PAIN: 0
CONSTIPATION: 0
BACK PAIN: 0
NAUSEA: 0
SINUS PAIN: 0
SHORTNESS OF BREATH: 0

## 2022-10-20 NOTE — ED NOTES
Department of Emergency Medicine    FIRST PROVIDER TRIAGE NOTE             Independent MLP           10/20/22  6:53 PM EDT    Date of Encounter: 10/20/22   MRN: [id number]    Vitals:    10/20/22 1845   BP: (!) 154/107   Pulse: 83   Resp: 18   Temp: 98.6 °F (37 °C)   TempSrc: Oral   SpO2: 97%   Weight: 205 lb (93 kg)   Height: 5' 7\" (1.702 m)      HPI: James Bender is a 59 y.o. male who presents to the ED for Abscess (Left wrist, getting bigger. Sent in by urgent care.)   Pt has been on oral antibiotics for a week     ROS: Negative for cp, sob, or fever. Physical Exam:   Gen Appearance/Constitutional: alert  CV: regular rate     Initial Plan of Care: All treatment areas with department are currently occupied. Plan to order/Initiate the following while awaiting opening in ED: labs.     Initial Plan of Care: Initiate Treatment-Testing, Proceed toTreatment Area When Bed Available for ED Attending/MLP to Continue Care    Electronically signed by Jimmy Musa PA-C   DD: 10/20/22       Jimmy Musa PA-C  10/20/22 5634

## 2022-10-20 NOTE — ED PROVIDER NOTES
HPI     Patient is a 59 y.o. male presents with a chief complaint of skin infection  This has been occurring for 3 days. Patient states that it gets better with nothing. Patient states that it gets worse with nothing. Patient states that it is severe in severity. Patient states it was gradual in onset. Patient with a PMH of IV drug abuse last use 30 days ago, arrived with complaint of pain and swelling of the left anterior wrist.  Patient states that that 3 days ago he noticed a lump in the area without any redness and it has progressively enlarged and become red and swollen. Patient states he has seen spiders in his home and has also had a skin infection like this in the past which was lanced several years ago. He denies any injection drug use in the left wrist.  He states that his left index and thumb have been tingling and numb at times. He also admits to some jolting pain up his forearm. He rates his pain a 9/10 and is okay to only use NSAID medications to avoid opioids at this time. Review of Systems   Constitutional:  Negative for chills and fever. HENT:  Negative for ear pain and sinus pain. Eyes:  Negative for pain. Respiratory:  Negative for cough and shortness of breath. Cardiovascular:  Negative for chest pain. Gastrointestinal:  Negative for abdominal pain, constipation, diarrhea, nausea and vomiting. Genitourinary:  Negative for difficulty urinating, dysuria and flank pain. Musculoskeletal:  Negative for back pain. Skin:  Positive for color change and wound. Negative for rash. Neurological:  Negative for dizziness, weakness, light-headedness and headaches. Psychiatric/Behavioral:  Negative for confusion. Physical Exam  Constitutional:       General: He is not in acute distress. Appearance: Normal appearance. He is not ill-appearing. HENT:      Head: Normocephalic.       Right Ear: External ear normal.      Left Ear: External ear normal.      Nose: Nose normal. No congestion or rhinorrhea. Mouth/Throat:      Mouth: Mucous membranes are moist.   Eyes:      Conjunctiva/sclera: Conjunctivae normal.      Pupils: Pupils are equal, round, and reactive to light. Cardiovascular:      Rate and Rhythm: Normal rate and regular rhythm. Pulses: Normal pulses. Pulmonary:      Effort: Pulmonary effort is normal. No respiratory distress. Breath sounds: Normal breath sounds. No stridor. No wheezing or rales. Abdominal:      General: Abdomen is flat. Bowel sounds are normal. There is no distension. Palpations: Abdomen is soft. Tenderness: There is no abdominal tenderness. There is no guarding. Musculoskeletal:         General: Swelling and tenderness present. Cervical back: Normal range of motion and neck supple. Comments: Swollen, erythematous and fluctuant area 2 x 2cm left anterior wrist. No drainage. No axillary lymphadenopathy, no red streaking. Pulses 2+ b/l UE. Cap refill<2 sec. Sensation in tact. Difficulty extending left wrist limited by pain   Skin:     General: Skin is warm. Findings: No erythema, lesion or rash. Neurological:      General: No focal deficit present. Mental Status: He is oriented to person, place, and time. Sensory: No sensory deficit. Motor: No weakness. Psychiatric:         Behavior: Behavior normal.        Procedures     PROCEDURE  10/23/22       Time: 1800    INCISION AND DRAINAGE  Risks, benefits and alternatives (for applicable procedures below) described. Performed By: Steph Choi DO. Indication: Abscess located on left anterior wrist  Informed consent obtained: The patient provided verbal consent for this procedure. .  Prep: The skin was cleansed with povidone iodine, wiped with isopropyl alcohol and draped in a sterile fashion. Local Anesthesia:  obtained with Lidocaine 1% without epinephrine.   Incision: The Abscess located on left anterior wrist was Incised by scalpal and copious fluid was drained. A wound culture was obtained. The wound  was not irrigated and was not packed with iodoform gauze. The wound was then covered with a sterile dressing. Patient tolerated the procedure well. [facility]       ED Course as of 10/20/22 1940   Th Oct 20, 2022   1926 ATTENDING PROVIDER ATTESTATION:     I have personally performed and/or participated in the history, exam, medical decision making, and procedures and agree with all pertinent clinical information unless otherwise noted. I have also reviewed and agree with the past medical, family and social history unless otherwise noted. I have discussed this patient in detail with the resident and provided the instruction and education regarding the evidence-based evaluation and treatment of abscess. Any EKG that may have been performed has been personally reviewed by me and I agree with the documentation as noted by the resident. History: Patient with an abscess on the anterior aspect of the left wrist.  He admits to IV drug abuse although, not at this area. Abscess is now pointing and tender. My findings: Maria De Jesus Elkins is a 59 y.o. male whom is in no distress. Physical exam reveals he is alert and oriented. Heart is regular lungs are clear. Abdomen soft nontender peer extremities are intact without edema. Attention to the left arm demonstrates good distal pulse and capillary refill. Fairly large abscess right over the central portion of the left wrist.  Mild surrounding edema and tenderness. It is fluctuant and pointing. My plan: Symptomatic and supportive care. Blood cultures, antibiotics, I&D. Electronically signed by Kimo Hutchinson DO on 10/20/22 at 7:27 PM EDT       [TG]      ED Course User Index  [TG] Kimo Hutchinson DO      Patient is a 59 y.o. male with a PMH of IVDA presenting with left wrist skin infection.   There is an erythema and swelling on his anterior left wrist that has progressively worsened over the past 3 days. Patient has had a prior abscess on the left arm. Labs indicated a WBC of 14. Bedside ultrasound did show hypoechoic collection just below the surface. Examination was concerning for an abscess. I performed an I&D procedure with wound culture with copious amounts of purulent discharge and some significant pain relief after the fact. Patient handled procedure well and was discharged home with antibiotics and follow-up. I informed the patient on how to properly care for his wound and went over appropriate precautions. Patient agreed with the plan to be discharged and will follow up with his primary provider. --------------------------------------------- PAST HISTORY ---------------------------------------------  Past Medical History:  has a past medical history of Alcohol abuse, CHF (congestive heart failure) ([facility] [address] [facility] History of cocaine use, Hyperlipidemia, Hypertension, Marijuana use, MI (myocardial infarction) [facility] and alberto. Past Surgical History:  has a past surgical history that includes Wrist surgery; cervical fusion (11/18/15); polysomnography (01/29/2018); Cardiac catheterization (11/03/2020); and bronchoscopy (N/A, 8/3/2021). Social History:  reports that he has been smoking cigarettes. He started smoking about 43 years ago. He has a 60.00 pack-year smoking history. He has never used smokeless tobacco. He reports that he does not currently use alcohol after a past usage of about 140.0 standard drinks per week. Drugs: Cocaine and Other-see comments. Family History: family history includes Arthritis in his mother; Cancer in his brother; Heart Disease in his father; High Blood Pressure in his brother, brother, brother, and brother; Other in his brother, brother, brother, brother, and mother. The patients home medications have been reviewed.     Allergies: Patient has no known allergies.     -------------------------------------------------- RESULTS -------------------------------------------------  Labs:  Results for orders placed or performed during the hospital encounter of 10/20/22   Culture, Blood 1    Specimen: Blood   Result Value Ref Range    Blood Culture, Routine 24 Hours no growth    Culture, Blood 2    Specimen: Blood   Result Value Ref Range    Culture, Blood 2 24 Hours no growth    Culture, Wound Aerobic Only    Specimen: Arm   Result Value Ref Range    Organism Alpha Strep, not pneumococcus (A)     WOUND/ABSCESS Light growth    CBC with Auto Differential   Result Value Ref Range    WBC 14.0 (H) 4.5 - 11.5 E9/L    RBC 4.60 3.80 - 5.80 E12/L    Hemoglobin 14.5 12.5 - 16.5 g/dL    Hematocrit 42.0 37.0 - 54.0 %    MCV 91.3 80.0 - 99.9 fL    MCH 31.5 26.0 - 35.0 pg    MCHC 34.5 32.0 - 34.5 %    RDW 14.1 11.5 - 15.0 fL    Platelets 847 231 - 686 E9/L    MPV 9.5 7.0 - 12.0 fL    Neutrophils % 67.7 43.0 - 80.0 %    Immature Granulocytes % 0.4 0.0 - 5.0 %    Lymphocytes % 23.0 20.0 - 42.0 %    Monocytes % 7.1 2.0 - 12.0 %    Eosinophils % 1.4 0.0 - 6.0 %    Basophils % 0.4 0.0 - 2.0 %    Neutrophils Absolute 9.45 (H) 1.80 - 7.30 E9/L    Immature Granulocytes # 0.06 E9/L    Lymphocytes Absolute 3.21 1.50 - 4.00 E9/L    Monocytes Absolute 0.99 (H) 0.10 - 0.95 E9/L    Eosinophils Absolute 0.20 0.05 - 0.50 E9/L    Basophils Absolute 0.05 0.00 - 0.20 I8/I   Basic Metabolic Panel w/ Reflex to MG   Result Value Ref Range    Sodium 133 132 - 146 mmol/L    Potassium reflex Magnesium 3.9 3.5 - 5.0 mmol/L    Chloride 101 98 - 107 mmol/L    CO2 21 (L) 22 - 29 mmol/L    Anion Gap 11 7 - 16 mmol/L    Glucose 127 (H) 74 - 99 mg/dL    BUN 25 (H) 6 - 20 mg/dL    Creatinine 1.5 (H) 0.7 - 1.2 mg/dL    Est, Glom Filt Rate 53 >=60 mL/min/1.73    Calcium 9.3 8.6 - 10.2 mg/dL       Radiology:  No orders to display       ------------------------- NURSING NOTES AND VITALS REVIEWED ---------------------------  Date / Time Roomed:  10/20/2022  6:57 PM  ED Bed Assignment:  26/26    The nursing notes within the ED encounter and vital signs as below have been reviewed. BP (!) 154/107   Pulse 83   Temp 98.6 °F (37 °C) (Oral)   Resp 18   Ht 5' 7\" (1.702 m)   Wt 205 lb (93 kg)   SpO2 97%   BMI 32.11 kg/m²   Oxygen Saturation Interpretation: Normal      ------------------------------------------ PROGRESS NOTES ------------------------------------------  6:45 PM EDT  I have spoken with the patient and discussed todays results, in addition to providing specific details for the plan of care and counseling regarding the diagnosis and prognosis. Their questions are answered at this time and they are agreeable with the plan. I discussed at length with them reasons for immediate return here for re evaluation. They will followup with their primary care physician by calling their office tomorrow. --------------------------------- ADDITIONAL PROVIDER NOTES ---------------------------------  At this time the patient is without objective evidence of an acute process requiring hospitalization or inpatient management. They have remained hemodynamically stable throughout their entire ED visit and are stable for discharge with outpatient follow-up. The plan has been discussed in detail and they are aware of the specific conditions for emergent return, as well as the importance of follow-up. Discharge Medication List as of 10/20/2022  8:43 PM        START taking these medications    Details   cephALEXin (KEFLEX) 500 MG capsule Take 1 capsule by mouth 4 times daily for 10 days, Disp-40 capsule, R-0Print      sulfamethoxazole-trimethoprim (BACTRIM DS;SEPTRA DS) 800-160 MG per tablet Take 2 tablets by mouth in the morning and at bedtime for 10 days, Disp-40 tablet, R-0Print             Diagnosis:  1.  Abscess        Disposition:  Patient's disposition: Discharge to home  Patient's condition is

## 2022-10-21 NOTE — DISCHARGE INSTRUCTIONS
Please follow up with your primary doctor in 2 days to examine your wound. Please keep the skin dry for 48 hours. Please keep the area clean.

## 2022-10-23 LAB
ORGANISM: ABNORMAL
WOUND/ABSCESS: ABNORMAL

## 2022-10-25 LAB
BLOOD CULTURE, ROUTINE: NORMAL
CULTURE, BLOOD 2: NORMAL

## 2023-02-22 ENCOUNTER — HOSPITAL ENCOUNTER (INPATIENT)
Age: 58
LOS: 8 days | Discharge: HOME | End: 2023-03-02
Attending: EMERGENCY MEDICINE | Admitting: INTERNAL MEDICINE
Payer: COMMERCIAL

## 2023-02-22 ENCOUNTER — APPOINTMENT (OUTPATIENT)
Dept: GENERAL RADIOLOGY | Age: 58
End: 2023-02-22
Payer: COMMERCIAL

## 2023-02-22 DIAGNOSIS — I50.9 CONGESTIVE HEART FAILURE, UNSPECIFIED HF CHRONICITY, UNSPECIFIED HEART FAILURE TYPE (HCC): ICD-10-CM

## 2023-02-22 DIAGNOSIS — J96.01 ACUTE RESPIRATORY FAILURE WITH HYPOXIA (HCC): Primary | ICD-10-CM

## 2023-02-22 DIAGNOSIS — J44.1 COPD EXACERBATION (HCC): ICD-10-CM

## 2023-02-22 PROBLEM — I50.33 CHF (CONGESTIVE HEART FAILURE), NYHA CLASS I, ACUTE ON CHRONIC, DIASTOLIC (HCC): Status: ACTIVE | Noted: 2023-02-22

## 2023-02-22 LAB
ADENOVIRUS BY PCR: NOT DETECTED
ANION GAP SERPL CALCULATED.3IONS-SCNC: 11 MMOL/L (ref 7–16)
BASOPHILS ABSOLUTE: 0.06 E9/L (ref 0–0.2)
BASOPHILS RELATIVE PERCENT: 0.6 % (ref 0–2)
BORDETELLA PARAPERTUSSIS BY PCR: NOT DETECTED
BORDETELLA PERTUSSIS BY PCR: NOT DETECTED
BUN BLDV-MCNC: 28 MG/DL (ref 6–20)
CALCIUM SERPL-MCNC: 9.3 MG/DL (ref 8.6–10.2)
CHLAMYDOPHILIA PNEUMONIAE BY PCR: NOT DETECTED
CHLORIDE BLD-SCNC: 112 MMOL/L (ref 98–107)
CO2: 22 MMOL/L (ref 22–29)
CORONAVIRUS 229E BY PCR: NOT DETECTED
CORONAVIRUS HKU1 BY PCR: NOT DETECTED
CORONAVIRUS NL63 BY PCR: NOT DETECTED
CORONAVIRUS OC43 BY PCR: NOT DETECTED
CREAT SERPL-MCNC: 1.7 MG/DL (ref 0.7–1.2)
EOSINOPHILS ABSOLUTE: 0.21 E9/L (ref 0.05–0.5)
EOSINOPHILS RELATIVE PERCENT: 2.1 % (ref 0–6)
GFR SERPL CREATININE-BSD FRML MDRD: 46 ML/MIN/1.73
GLUCOSE BLD-MCNC: 104 MG/DL (ref 74–99)
HCT VFR BLD CALC: 43.3 % (ref 37–54)
HEMOGLOBIN: 14.8 G/DL (ref 12.5–16.5)
HUMAN METAPNEUMOVIRUS BY PCR: NOT DETECTED
HUMAN RHINOVIRUS/ENTEROVIRUS BY PCR: NOT DETECTED
IMMATURE GRANULOCYTES #: 0.04 E9/L
IMMATURE GRANULOCYTES %: 0.4 % (ref 0–5)
INFLUENZA A BY PCR: NOT DETECTED
INFLUENZA B BY PCR: NOT DETECTED
LYMPHOCYTES ABSOLUTE: 3.4 E9/L (ref 1.5–4)
LYMPHOCYTES RELATIVE PERCENT: 34.2 % (ref 20–42)
MCH RBC QN AUTO: 31.2 PG (ref 26–35)
MCHC RBC AUTO-ENTMCNC: 34.2 % (ref 32–34.5)
MCV RBC AUTO: 91.4 FL (ref 80–99.9)
MONOCYTES ABSOLUTE: 0.7 E9/L (ref 0.1–0.95)
MONOCYTES RELATIVE PERCENT: 7 % (ref 2–12)
MYCOPLASMA PNEUMONIAE BY PCR: NOT DETECTED
NEUTROPHILS ABSOLUTE: 5.53 E9/L (ref 1.8–7.3)
NEUTROPHILS RELATIVE PERCENT: 55.7 % (ref 43–80)
PARAINFLUENZA VIRUS 1 BY PCR: NOT DETECTED
PARAINFLUENZA VIRUS 2 BY PCR: NOT DETECTED
PARAINFLUENZA VIRUS 3 BY PCR: NOT DETECTED
PARAINFLUENZA VIRUS 4 BY PCR: NOT DETECTED
PDW BLD-RTO: 14.8 FL (ref 11.5–15)
PLATELET # BLD: 249 E9/L (ref 130–450)
PMV BLD AUTO: 9.3 FL (ref 7–12)
POTASSIUM SERPL-SCNC: 4.7 MMOL/L (ref 3.5–5)
PRO-BNP: ABNORMAL PG/ML (ref 0–125)
RBC # BLD: 4.74 E12/L (ref 3.8–5.8)
RESPIRATORY SYNCYTIAL VIRUS BY PCR: NOT DETECTED
SARS-COV-2, NAAT: NOT DETECTED
SARS-COV-2, PCR: NOT DETECTED
SODIUM BLD-SCNC: 145 MMOL/L (ref 132–146)
TROPONIN, HIGH SENSITIVITY: 43 NG/L (ref 0–11)
WBC # BLD: 9.9 E9/L (ref 4.5–11.5)

## 2023-02-22 PROCEDURE — 87635 SARS-COV-2 COVID-19 AMP PRB: CPT

## 2023-02-22 PROCEDURE — 96374 THER/PROPH/DIAG INJ IV PUSH: CPT

## 2023-02-22 PROCEDURE — 85025 COMPLETE CBC W/AUTO DIFF WBC: CPT

## 2023-02-22 PROCEDURE — 99291 CRITICAL CARE FIRST HOUR: CPT | Performed by: INTERNAL MEDICINE

## 2023-02-22 PROCEDURE — 2580000003 HC RX 258: Performed by: INTERNAL MEDICINE

## 2023-02-22 PROCEDURE — 99285 EMERGENCY DEPT VISIT HI MDM: CPT

## 2023-02-22 PROCEDURE — 0202U NFCT DS 22 TRGT SARS-COV-2: CPT

## 2023-02-22 PROCEDURE — 84484 ASSAY OF TROPONIN QUANT: CPT

## 2023-02-22 PROCEDURE — 6370000000 HC RX 637 (ALT 250 FOR IP): Performed by: EMERGENCY MEDICINE

## 2023-02-22 PROCEDURE — 80048 BASIC METABOLIC PNL TOTAL CA: CPT

## 2023-02-22 PROCEDURE — 94664 DEMO&/EVAL PT USE INHALER: CPT

## 2023-02-22 PROCEDURE — 83880 ASSAY OF NATRIURETIC PEPTIDE: CPT

## 2023-02-22 PROCEDURE — 71045 X-RAY EXAM CHEST 1 VIEW: CPT

## 2023-02-22 PROCEDURE — 94640 AIRWAY INHALATION TREATMENT: CPT

## 2023-02-22 PROCEDURE — 6360000002 HC RX W HCPCS: Performed by: EMERGENCY MEDICINE

## 2023-02-22 PROCEDURE — 2060000000 HC ICU INTERMEDIATE R&B

## 2023-02-22 PROCEDURE — 6370000000 HC RX 637 (ALT 250 FOR IP): Performed by: INTERNAL MEDICINE

## 2023-02-22 PROCEDURE — 93005 ELECTROCARDIOGRAM TRACING: CPT | Performed by: EMERGENCY MEDICINE

## 2023-02-22 PROCEDURE — 6360000002 HC RX W HCPCS: Performed by: INTERNAL MEDICINE

## 2023-02-22 RX ORDER — FUROSEMIDE 10 MG/ML
20 INJECTION INTRAMUSCULAR; INTRAVENOUS ONCE
Status: COMPLETED | OUTPATIENT
Start: 2023-02-22 | End: 2023-02-22

## 2023-02-22 RX ORDER — PREDNISONE 20 MG/1
40 TABLET ORAL DAILY
Status: COMPLETED | OUTPATIENT
Start: 2023-02-23 | End: 2023-02-27

## 2023-02-22 RX ORDER — ENOXAPARIN SODIUM 100 MG/ML
40 INJECTION SUBCUTANEOUS DAILY
Status: DISCONTINUED | OUTPATIENT
Start: 2023-02-23 | End: 2023-03-02 | Stop reason: HOSPADM

## 2023-02-22 RX ORDER — ARFORMOTEROL TARTRATE 15 UG/2ML
15 SOLUTION RESPIRATORY (INHALATION) 2 TIMES DAILY
Status: DISCONTINUED | OUTPATIENT
Start: 2023-02-23 | End: 2023-03-02 | Stop reason: HOSPADM

## 2023-02-22 RX ORDER — ACETAMINOPHEN 650 MG/1
650 SUPPOSITORY RECTAL EVERY 6 HOURS PRN
Status: DISCONTINUED | OUTPATIENT
Start: 2023-02-22 | End: 2023-03-02 | Stop reason: HOSPADM

## 2023-02-22 RX ORDER — METOPROLOL SUCCINATE 50 MG/1
50 TABLET, EXTENDED RELEASE ORAL DAILY
Status: DISCONTINUED | OUTPATIENT
Start: 2023-02-23 | End: 2023-03-02 | Stop reason: HOSPADM

## 2023-02-22 RX ORDER — ACETAMINOPHEN 325 MG/1
650 TABLET ORAL EVERY 6 HOURS PRN
Status: DISCONTINUED | OUTPATIENT
Start: 2023-02-22 | End: 2023-03-02 | Stop reason: HOSPADM

## 2023-02-22 RX ORDER — IPRATROPIUM BROMIDE AND ALBUTEROL SULFATE 2.5; .5 MG/3ML; MG/3ML
1 SOLUTION RESPIRATORY (INHALATION) EVERY 6 HOURS PRN
Status: DISCONTINUED | OUTPATIENT
Start: 2023-02-22 | End: 2023-03-02 | Stop reason: HOSPADM

## 2023-02-22 RX ORDER — ONDANSETRON 2 MG/ML
4 INJECTION INTRAMUSCULAR; INTRAVENOUS EVERY 6 HOURS PRN
Status: DISCONTINUED | OUTPATIENT
Start: 2023-02-22 | End: 2023-03-02 | Stop reason: HOSPADM

## 2023-02-22 RX ORDER — SPIRONOLACTONE 25 MG/1
25 TABLET ORAL DAILY
Status: DISCONTINUED | OUTPATIENT
Start: 2023-02-23 | End: 2023-03-02 | Stop reason: HOSPADM

## 2023-02-22 RX ORDER — SODIUM CHLORIDE 9 MG/ML
INJECTION, SOLUTION INTRAVENOUS PRN
Status: DISCONTINUED | OUTPATIENT
Start: 2023-02-22 | End: 2023-03-02 | Stop reason: HOSPADM

## 2023-02-22 RX ORDER — SODIUM CHLORIDE 0.9 % (FLUSH) 0.9 %
5-40 SYRINGE (ML) INJECTION PRN
Status: DISCONTINUED | OUTPATIENT
Start: 2023-02-22 | End: 2023-03-02 | Stop reason: HOSPADM

## 2023-02-22 RX ORDER — FINASTERIDE 5 MG/1
5 TABLET, FILM COATED ORAL DAILY
Status: DISCONTINUED | OUTPATIENT
Start: 2023-02-23 | End: 2023-03-02 | Stop reason: HOSPADM

## 2023-02-22 RX ORDER — IPRATROPIUM BROMIDE AND ALBUTEROL SULFATE 2.5; .5 MG/3ML; MG/3ML
3 SOLUTION RESPIRATORY (INHALATION) ONCE
Status: COMPLETED | OUTPATIENT
Start: 2023-02-22 | End: 2023-02-22

## 2023-02-22 RX ORDER — METHYLPREDNISOLONE SODIUM SUCCINATE 125 MG/2ML
125 INJECTION, POWDER, LYOPHILIZED, FOR SOLUTION INTRAMUSCULAR; INTRAVENOUS ONCE
Status: COMPLETED | OUTPATIENT
Start: 2023-02-22 | End: 2023-02-22

## 2023-02-22 RX ORDER — AMLODIPINE BESYLATE 5 MG/1
5 TABLET ORAL DAILY
Status: DISCONTINUED | OUTPATIENT
Start: 2023-02-23 | End: 2023-03-02 | Stop reason: HOSPADM

## 2023-02-22 RX ORDER — ONDANSETRON 4 MG/1
4 TABLET, ORALLY DISINTEGRATING ORAL EVERY 8 HOURS PRN
Status: DISCONTINUED | OUTPATIENT
Start: 2023-02-22 | End: 2023-03-02 | Stop reason: HOSPADM

## 2023-02-22 RX ORDER — DOXYCYCLINE HYCLATE 100 MG/1
100 CAPSULE ORAL EVERY 12 HOURS SCHEDULED
Status: DISCONTINUED | OUTPATIENT
Start: 2023-02-22 | End: 2023-02-24

## 2023-02-22 RX ORDER — CLONIDINE HYDROCHLORIDE 0.2 MG/1
0.2 TABLET ORAL 2 TIMES DAILY
Status: DISCONTINUED | OUTPATIENT
Start: 2023-02-22 | End: 2023-02-26

## 2023-02-22 RX ORDER — MAGNESIUM SULFATE IN WATER 40 MG/ML
2000 INJECTION, SOLUTION INTRAVENOUS ONCE
Status: COMPLETED | OUTPATIENT
Start: 2023-02-22 | End: 2023-02-22

## 2023-02-22 RX ORDER — BUDESONIDE 0.5 MG/2ML
0.5 INHALANT ORAL 2 TIMES DAILY
Status: DISCONTINUED | OUTPATIENT
Start: 2023-02-23 | End: 2023-03-02 | Stop reason: HOSPADM

## 2023-02-22 RX ORDER — SODIUM CHLORIDE 0.9 % (FLUSH) 0.9 %
5-40 SYRINGE (ML) INJECTION EVERY 12 HOURS SCHEDULED
Status: DISCONTINUED | OUTPATIENT
Start: 2023-02-22 | End: 2023-03-02 | Stop reason: HOSPADM

## 2023-02-22 RX ORDER — NICOTINE 21 MG/24HR
1 PATCH, TRANSDERMAL 24 HOURS TRANSDERMAL EVERY 24 HOURS
Status: DISCONTINUED | OUTPATIENT
Start: 2023-02-22 | End: 2023-03-02 | Stop reason: HOSPADM

## 2023-02-22 RX ORDER — POLYETHYLENE GLYCOL 3350 17 G/17G
17 POWDER, FOR SOLUTION ORAL DAILY PRN
Status: DISCONTINUED | OUTPATIENT
Start: 2023-02-22 | End: 2023-03-02 | Stop reason: HOSPADM

## 2023-02-22 RX ORDER — FUROSEMIDE 10 MG/ML
20 INJECTION INTRAMUSCULAR; INTRAVENOUS 2 TIMES DAILY
Status: DISCONTINUED | OUTPATIENT
Start: 2023-02-23 | End: 2023-02-26

## 2023-02-22 RX ADMIN — SODIUM CHLORIDE, PRESERVATIVE FREE 10 ML: 5 INJECTION INTRAVENOUS at 23:46

## 2023-02-22 RX ADMIN — DOXYCYCLINE HYCLATE 100 MG: 100 CAPSULE ORAL at 23:45

## 2023-02-22 RX ADMIN — METHYLPREDNISOLONE SODIUM SUCCINATE 125 MG: 125 INJECTION, POWDER, FOR SOLUTION INTRAMUSCULAR; INTRAVENOUS at 19:44

## 2023-02-22 RX ADMIN — FUROSEMIDE 20 MG: 10 INJECTION, SOLUTION INTRAVENOUS at 20:10

## 2023-02-22 RX ADMIN — IPRATROPIUM BROMIDE AND ALBUTEROL SULFATE 3 AMPULE: .5; 2.5 SOLUTION RESPIRATORY (INHALATION) at 20:01

## 2023-02-22 RX ADMIN — CLONIDINE HYDROCHLORIDE 0.2 MG: 0.2 TABLET ORAL at 23:45

## 2023-02-22 RX ADMIN — WATER 1000 MG: 1 INJECTION INTRAMUSCULAR; INTRAVENOUS; SUBCUTANEOUS at 23:45

## 2023-02-22 RX ADMIN — SACUBITRIL AND VALSARTAN 1 TABLET: 24; 26 TABLET, FILM COATED ORAL at 23:45

## 2023-02-22 RX ADMIN — MAGNESIUM SULFATE HEPTAHYDRATE 2000 MG: 40 INJECTION, SOLUTION INTRAVENOUS at 20:14

## 2023-02-22 ASSESSMENT — PAIN DESCRIPTION - ORIENTATION: ORIENTATION: RIGHT

## 2023-02-22 ASSESSMENT — PAIN - FUNCTIONAL ASSESSMENT: PAIN_FUNCTIONAL_ASSESSMENT: 0-10

## 2023-02-22 ASSESSMENT — PAIN SCALES - GENERAL
PAINLEVEL_OUTOF10: 8
PAINLEVEL_OUTOF10: 0

## 2023-02-22 ASSESSMENT — PAIN DESCRIPTION - LOCATION: LOCATION: SHOULDER

## 2023-02-22 NOTE — ED NOTES
Pt o2 sat dropped to 88% while ambulating on room air to bathroom. Pt placed on 2l NC, o2 sat increased to 96%. +dyspnea with exertion.  +SOB      Danette Martinez RN  02/22/23 1935

## 2023-02-23 PROBLEM — I10 PRIMARY HYPERTENSION: Status: ACTIVE | Noted: 2023-02-23

## 2023-02-23 PROBLEM — I50.9 CONGESTIVE HEART FAILURE (HCC): Status: ACTIVE | Noted: 2023-02-23

## 2023-02-23 LAB
ALBUMIN SERPL-MCNC: 4 G/DL (ref 3.5–5.2)
ALP BLD-CCNC: 93 U/L (ref 40–129)
ALT SERPL-CCNC: 26 U/L (ref 0–40)
ANION GAP SERPL CALCULATED.3IONS-SCNC: 11 MMOL/L (ref 7–16)
AST SERPL-CCNC: 32 U/L (ref 0–39)
BASOPHILS ABSOLUTE: 0.01 E9/L (ref 0–0.2)
BASOPHILS RELATIVE PERCENT: 0.1 % (ref 0–2)
BILIRUB SERPL-MCNC: 0.4 MG/DL (ref 0–1.2)
BUN BLDV-MCNC: 28 MG/DL (ref 6–20)
CALCIUM SERPL-MCNC: 9.4 MG/DL (ref 8.6–10.2)
CHLORIDE BLD-SCNC: 107 MMOL/L (ref 98–107)
CO2: 21 MMOL/L (ref 22–29)
CREAT SERPL-MCNC: 1.6 MG/DL (ref 0.7–1.2)
EKG ATRIAL RATE: 99 BPM
EKG P AXIS: 43 DEGREES
EKG P-R INTERVAL: 122 MS
EKG Q-T INTERVAL: 382 MS
EKG QRS DURATION: 88 MS
EKG QTC CALCULATION (BAZETT): 490 MS
EKG R AXIS: -2 DEGREES
EKG T AXIS: -34 DEGREES
EKG VENTRICULAR RATE: 99 BPM
EOSINOPHILS ABSOLUTE: 0 E9/L (ref 0.05–0.5)
EOSINOPHILS RELATIVE PERCENT: 0 % (ref 0–6)
GFR SERPL CREATININE-BSD FRML MDRD: 49 ML/MIN/1.73
GLUCOSE BLD-MCNC: 176 MG/DL (ref 74–99)
HCT VFR BLD CALC: 43.9 % (ref 37–54)
HEMOGLOBIN: 14.7 G/DL (ref 12.5–16.5)
IMMATURE GRANULOCYTES #: 0.03 E9/L
IMMATURE GRANULOCYTES %: 0.4 % (ref 0–5)
LYMPHOCYTES ABSOLUTE: 1.11 E9/L (ref 1.5–4)
LYMPHOCYTES RELATIVE PERCENT: 15.9 % (ref 20–42)
MCH RBC QN AUTO: 31 PG (ref 26–35)
MCHC RBC AUTO-ENTMCNC: 33.5 % (ref 32–34.5)
MCV RBC AUTO: 92.6 FL (ref 80–99.9)
MONOCYTES ABSOLUTE: 0.06 E9/L (ref 0.1–0.95)
MONOCYTES RELATIVE PERCENT: 0.9 % (ref 2–12)
NEUTROPHILS ABSOLUTE: 5.76 E9/L (ref 1.8–7.3)
NEUTROPHILS RELATIVE PERCENT: 82.7 % (ref 43–80)
PDW BLD-RTO: 14.6 FL (ref 11.5–15)
PLATELET # BLD: 280 E9/L (ref 130–450)
PMV BLD AUTO: 9.5 FL (ref 7–12)
POTASSIUM REFLEX MAGNESIUM: 4.5 MMOL/L (ref 3.5–5)
RBC # BLD: 4.74 E12/L (ref 3.8–5.8)
SODIUM BLD-SCNC: 139 MMOL/L (ref 132–146)
TOTAL PROTEIN: 8.3 G/DL (ref 6.4–8.3)
WBC # BLD: 7 E9/L (ref 4.5–11.5)

## 2023-02-23 PROCEDURE — 6360000002 HC RX W HCPCS: Performed by: INTERNAL MEDICINE

## 2023-02-23 PROCEDURE — 6370000000 HC RX 637 (ALT 250 FOR IP): Performed by: INTERNAL MEDICINE

## 2023-02-23 PROCEDURE — 94640 AIRWAY INHALATION TREATMENT: CPT

## 2023-02-23 PROCEDURE — 99232 SBSQ HOSP IP/OBS MODERATE 35: CPT | Performed by: INTERNAL MEDICINE

## 2023-02-23 PROCEDURE — 2060000000 HC ICU INTERMEDIATE R&B

## 2023-02-23 PROCEDURE — 6360000002 HC RX W HCPCS

## 2023-02-23 PROCEDURE — 36415 COLL VENOUS BLD VENIPUNCTURE: CPT

## 2023-02-23 PROCEDURE — 85025 COMPLETE CBC W/AUTO DIFF WBC: CPT

## 2023-02-23 PROCEDURE — 80053 COMPREHEN METABOLIC PANEL: CPT

## 2023-02-23 PROCEDURE — 93010 ELECTROCARDIOGRAM REPORT: CPT | Performed by: INTERNAL MEDICINE

## 2023-02-23 PROCEDURE — 2580000003 HC RX 258: Performed by: INTERNAL MEDICINE

## 2023-02-23 PROCEDURE — 2700000000 HC OXYGEN THERAPY PER DAY

## 2023-02-23 RX ORDER — HYDRALAZINE HYDROCHLORIDE 20 MG/ML
10 INJECTION INTRAMUSCULAR; INTRAVENOUS EVERY 6 HOURS PRN
Status: DISCONTINUED | OUTPATIENT
Start: 2023-02-23 | End: 2023-03-02 | Stop reason: HOSPADM

## 2023-02-23 RX ADMIN — CLONIDINE HYDROCHLORIDE 0.2 MG: 0.2 TABLET ORAL at 19:47

## 2023-02-23 RX ADMIN — IPRATROPIUM BROMIDE AND ALBUTEROL SULFATE 3 ML: 2.5; .5 SOLUTION RESPIRATORY (INHALATION) at 12:09

## 2023-02-23 RX ADMIN — IPRATROPIUM BROMIDE AND ALBUTEROL SULFATE 3 ML: 2.5; .5 SOLUTION RESPIRATORY (INHALATION) at 07:48

## 2023-02-23 RX ADMIN — AMLODIPINE BESYLATE 5 MG: 5 TABLET ORAL at 08:50

## 2023-02-23 RX ADMIN — BUDESONIDE 500 MCG: 0.5 SUSPENSION RESPIRATORY (INHALATION) at 07:47

## 2023-02-23 RX ADMIN — BUDESONIDE 500 MCG: 0.5 SUSPENSION RESPIRATORY (INHALATION) at 20:25

## 2023-02-23 RX ADMIN — SPIRONOLACTONE 25 MG: 25 TABLET ORAL at 08:50

## 2023-02-23 RX ADMIN — ARFORMOTEROL TARTRATE 15 MCG: 15 SOLUTION RESPIRATORY (INHALATION) at 20:25

## 2023-02-23 RX ADMIN — PREDNISONE 40 MG: 20 TABLET ORAL at 08:50

## 2023-02-23 RX ADMIN — ENOXAPARIN SODIUM 40 MG: 100 INJECTION SUBCUTANEOUS at 08:50

## 2023-02-23 RX ADMIN — FUROSEMIDE 20 MG: 10 INJECTION, SOLUTION INTRAMUSCULAR; INTRAVENOUS at 08:50

## 2023-02-23 RX ADMIN — METOPROLOL SUCCINATE 50 MG: 50 TABLET, EXTENDED RELEASE ORAL at 08:50

## 2023-02-23 RX ADMIN — FUROSEMIDE 20 MG: 10 INJECTION, SOLUTION INTRAMUSCULAR; INTRAVENOUS at 17:14

## 2023-02-23 RX ADMIN — SODIUM CHLORIDE, PRESERVATIVE FREE 10 ML: 5 INJECTION INTRAVENOUS at 08:51

## 2023-02-23 RX ADMIN — IPRATROPIUM BROMIDE AND ALBUTEROL SULFATE 3 ML: 2.5; .5 SOLUTION RESPIRATORY (INHALATION) at 20:25

## 2023-02-23 RX ADMIN — SACUBITRIL AND VALSARTAN 1 TABLET: 24; 26 TABLET, FILM COATED ORAL at 08:50

## 2023-02-23 RX ADMIN — WATER 1000 MG: 1 INJECTION INTRAMUSCULAR; INTRAVENOUS; SUBCUTANEOUS at 23:00

## 2023-02-23 RX ADMIN — ARFORMOTEROL TARTRATE 15 MCG: 15 SOLUTION RESPIRATORY (INHALATION) at 07:47

## 2023-02-23 RX ADMIN — CLONIDINE HYDROCHLORIDE 0.2 MG: 0.2 TABLET ORAL at 08:49

## 2023-02-23 RX ADMIN — SODIUM CHLORIDE, PRESERVATIVE FREE 10 ML: 5 INJECTION INTRAVENOUS at 19:47

## 2023-02-23 RX ADMIN — DOXYCYCLINE HYCLATE 100 MG: 100 CAPSULE ORAL at 19:47

## 2023-02-23 RX ADMIN — FINASTERIDE 5 MG: 5 TABLET, FILM COATED ORAL at 08:50

## 2023-02-23 RX ADMIN — HYDRALAZINE HYDROCHLORIDE 10 MG: 20 INJECTION INTRAMUSCULAR; INTRAVENOUS at 11:18

## 2023-02-23 RX ADMIN — SACUBITRIL AND VALSARTAN 1 TABLET: 24; 26 TABLET, FILM COATED ORAL at 19:47

## 2023-02-23 RX ADMIN — DOXYCYCLINE HYCLATE 100 MG: 100 CAPSULE ORAL at 08:50

## 2023-02-23 ASSESSMENT — PAIN SCALES - GENERAL
PAINLEVEL_OUTOF10: 0

## 2023-02-23 ASSESSMENT — ENCOUNTER SYMPTOMS
VOMITING: 0
SORE THROAT: 0
SINUS PRESSURE: 0
WHEEZING: 1
COUGH: 1
ABDOMINAL PAIN: 0
EYE DISCHARGE: 0
NAUSEA: 0
EYE REDNESS: 0
BACK PAIN: 0
SHORTNESS OF BREATH: 1
EYE PAIN: 0
DIARRHEA: 0

## 2023-02-23 NOTE — DISCHARGE INSTRUCTIONS
HEART FAILURE  / CONGESTIVE HEART FAILURE  DISCHARGE INSTRUCTIONS:  GUIDELINES TO FOLLOW AT HOME    Self- Managed Care:     MEDICATIONS:  Take your medication as directed. If you are experiencing any side effects, inform your doctor, Do not stop taking any of your medications without letting your doctor know. Check with your doctor before taking any over-the-counter medications / herbal / or dietary supplements. They may interfere with your other medications. Do not take ibuprofen (Advil or Motrin) and naproxen (Aleve) without talking to your doctor first. They could make your heart failure worse. WEIGHT MONITORING:   Weigh yourself everyday (with the same scale) around the same time of the day and write it down. (you can chart them on a calendar or keep track of them on paper. Notify your doctor of a weight gain of 3 pounds or more in 1 day   OR a total of 5 pounds or more in 1 week    Take your weight record to your doctor visits  Also, the same goes if you loose more than 3# in one day, let your heart doctor know. DIET:   Cardiac heart healthy diet- Low saturated / low trans fat, no added salt, caffeine restricted, Low sodium diet-   No more than 2,000mg (2 grams) of salt / sodium per day (which equals to a little less than  a teaspoon of salt)  If your doctor wants you on a fluid restriction. ..it is usually recommended a fluid limit of 2,000cc -  Fluid restriction- 2,000 ml (milliliters) = 64 ounces = you can have 8 glasses of fluid per day (each glass 8 ounces)    Follow a low salt diet - avoid using salt at the table, avoid / limit use of canned soups, processed / packaged foods, salted snacks, olives and pickles. Do not use a salt substitute without checking with your doctor, they may contain a high amount of potassioum. (Mrs. Rodger Colunga is safe to use).     Limit the use of alcohol       CALL YOUR DOCTOR THE FIRST DAY YOU NOTICE ANY OF THESE   SYMPTOMS:  You have a weight gain of 3 pounds or more in 1 day         OR 5 pounds or more in one week  More shortness of breath  More swelling of your stomach, legs, ankles or feet  Feeling more tired, No energy  Dry hacky cough  Dizziness  More chest pain / discomfort       (CALL 512 IF ANY OF THE FOLLOWING OCCURS  Chest pain (not relieved with nitroglycerine, if you have been prescribed this medication)  Severe shortness of breath  Faint / Pass out  Confusion / cannot think clearly  If symptoms get worse           SMOKING - TOBACCO USE:  * IF YOU SMOKE - STOP! Kick the habit. [address] Program is offered at [facility] [address] and [address] [facility]. Call [phone] extension [phone] for more information. ACTIVITY:   (Ask your doctor when you will be able to return to work and before starting any exercise program.  Do not drive unless unless your doctor has given you permission to do so). Start light exercise. Even if you can only do a small amount, exercise will help you get stronger, have more energy, help manage your weight and decrease  stress. Walking is an easy way to get exercise. Start out slowly and  increase the amount you walk as tolerated  If you become short of breath, dizzy or have chest pain; stop, sit down, and rest.  If you feel \"wiped out\" the day after you exercise, walk at a slower pace or for a shorter distance. You can gradually increase the pace or amount of time. (Do not exercise right after a meal or in extreme temperatures, such as above 85 degrees, if the air is really humid, or wind chill is less than 20 degrees)                                             ADDITIONAL INFORMATION:  Avoid getting sick from colds and the flu. Stay away from friends or family that you know may have a contagious illness  Get plenty of rest   Get a flu shot each year. Get a pneumococcal vaccine shot.  If you have had one before, ask your doctor whether you need another dose. My Goal for Self-management of Heart Failure Includes 5 steps :    1. Notice a change in symptoms ( weight gain, short of breath, leg swelling, decreased activity level, bloating. ...)    2. Evaluate the change: (use the Heart Failure Zones )     3. Decide to take action: decide what your options are, such as: (call your doctor for an extra visit, take a prescribed medication, such as your water pill if your doctor has given you directions to do so, [address])    4. Come up with a strategy:  (now you call the doctor for advice / appointment. This is where you take action!!! Do not wait, catch the symptom early and treat it before it worsens. 5. Evaluate the response: The next day, check your Heart Failure Zones: are you in the GREEN ZONE (safe zone)? Worsening symptoms of YELLOW ZONE? Or have you moved to the RED ZONE and need to call 911 or go to the Emergency Room for evaluation? Call your doctor's office to update them on your symptoms of heart failure.

## 2023-02-23 NOTE — H&P
[facility] History and Physical    --------------------------------------------------------------------------------------  Assessment / Plan    Acute hypoxemic respiratory failure  This is likely secondary to CHF decompensation with exacerbation of underlying COPD  Continue IV diuresis  Continue COPD management  Wean O2 supplementation as tolerated    Acute on chronic combined systolic diastolic congestive heart failure  Last echocardiogram reviewed by myself and the chart from Angela 15, 2021 showed ejection fraction 40% stage III diastolic dysfunction and moderate pulmonary hypertension RVSP was 57  BNP was 12,000  Start IV Lasix diuresis 40 mg daily  Monitor urine output renal function and electrolytes    Acute exacerbation of COPD  He is on Trelegy Ellipta at home  We will add DuoNebs  He failed outpatient Z-Ayden course so we will start IV Rocephin.   Doxycycline  Prednisone burst 40 mg daily for 5 days    CKD stage IIIa  His creatinine today is 1.7 which is slightly higher from his baseline of 1.5, will monitor while he is in diuresis    Tobacco use disorder  He is a lifelong smoker but quit 4 days ago   Currently on nicotine patch    Uncontrolled hypertension  On amlodipine and clonidine  Add as needed hydralazine    BPH  On Proscar    History of INOCENCIA does not using CPAP    Critical care time excluding procedures was 36 minutes      Past Medical History:   Diagnosis Date    Alcohol abuse     CHF (congestive heart failure) ([facility])     COPD (chronic obstructive pulmonary disease) ([facility] [address]     History of cocaine use     Hyperlipidemia     Hypertension     Marijuana use     quit November 2017     MI (myocardial infarction) ([facility] [address]     inocencia          Please see orders for further plan of care  Code status  full  DVT prophylaxis Lovenox  Disposition  Anticipate home  --------------------------------------------------------------------------------------    Admission Date  2/22/2023  5:56 PM  [facility] Complaint sob  Chief Complaint   Patient presents with    Shortness of Breath     Increasing over the past week. Breathing txs q4 hrs with no relief       Subjective  History of Present Illness    This pleasant 66-year-old  gentleman has history of COPD combined systolic and diastolic CHF last known ejection fraction was 40%, hypertension hyperlipidemia and BPH presenting with worsening shortness of breath. History has not been doing well for the last 10 days with increasing shortness of breath and cough with expectoration initially of green phlegm he saw his PCP and got a course of Z-Ayden which did not help his sputum clear to white phlegm but still short of breath and still has limited exertion. Denies any chest pain denies any fever chills denies nausea vomiting diarrhea he is not on home oxygen. Here is requiring 4 L of oxygen supplementation with initial vital signs showing temperature 98.4 respirations 22 heart rate 118 blood pressure 171/113 and requiring 3 to 4 L of oxygen supplementation.   Chest x-ray showed COPD changes with early pulmonary venous congestion per my review  Chemistry was remarkable for creatinine 1.7 BUN 28 BNP 12,000 troponin 43 and CBC was unremarkable            Review of Systems - 12-point review of systems has been reviewed and is otherwise negative except as listed in the HPI    Past Medical History:   Diagnosis Date    Alcohol abuse     CHF (congestive heart failure) (HCC)     COPD (chronic obstructive pulmonary disease) ([facility] [address]     History of cocaine use     Hyperlipidemia     Hypertension     Marijuana use     quit November 2017     MI (myocardial infarction) ([facility] [address]     alberto      Past Surgical History:   Procedure Laterality Date    BRONCHOSCOPY N/A 8/3/2021    BRONCHOSCOPY DIAGNOSTIC OR CELL [address] ONLY performed by Francis Green MD at [address] [facility]  11/03/2020    DR Zan Hoffmann    CERVICAL FUSION  11/18/15    cervical laminectomy & fusion c4-c6, with rods & screws    POLYSOMNOGRAPHY  01/29/2018    AHI=29.8    WRIST SURGERY       Prior to Admission medications    Medication Sig Start Date End Date Taking? Authorizing Provider   Arformoterol Tartrate (BROVANA) 15 MCG/2ML NEBU Take 2 mLs by nebulization 2 times daily 8/6/21   Mauro Yost DO   budesonide (PULMICORT) 0.5 MG/2ML nebulizer suspension Take 4 mLs by nebulization 2 times daily 8/6/21   Mauro Yost DO   cloNIDine (CATAPRES) 0.2 MG tablet Take 1 tablet by mouth 2 times daily  Patient taking differently: Take 0.2 mg by mouth 2 times daily 0800/1400 daily 6/18/21   Mauro Yost DO   predniSONE (DELTASONE) 10 MG tablet 3 tabs daily x2 days, 2 tabs daily x2 days, 1 tab daily x2 days. Patient taking differently: 3 tabs daily x2 days, 2 tabs daily x2 days, 1 tab daily x2 days. 7/31/21 starts 1 tab daily for 2 days 6/16/21   Emelia Gonzalez MD   albuterol sulfate HFA (VENTOLIN HFA) 108 (90 Base) MCG/ACT inhaler Inhale 2 puffs into the lungs every 4 hours as needed for Wheezing    Historical Provider, MD   fluticasone-umeclidin-vilant (TRELEGY ELLIPTA) 100-62.5-25 MCG/INH AEPB Inhale 1 puff into the lungs daily     Historical Provider, MD   nitroGLYCERIN (NITROSTAT) 0.4 MG SL tablet Place 0.4 mg under the tongue every 5 minutes as needed for Chest pain up to max of 3 total doses. If no relief after 1 dose, call 911.  NEVER USED YET 7/30/21    Historical Provider, MD   ipratropium-albuterol (DUONEB) 0.5-2.5 (3) MG/3ML SOLN nebulizer solution Take 1 vial by nebulization every 6 hours as needed for Shortness of Breath    Historical Provider, MD   nicotine (NICODERM CQ) 21 MG/24HR Place 1 patch onto the skin every 24 hours    Historical Provider, MD   amLODIPine (NORVASC) 5 MG tablet Take 1 tablet by mouth daily 11/21/20   Mauro Yost DO   sacubitril-valsartan (ENTRESTO) 24-26 MG per tablet Take 1 tablet by mouth 2 times daily  Patient taking differently: Take 1 tablet by mouth 2 times daily This was sent to pharmacy 1 month ago. Cannot afford these currently, never picked up. Prescription assistance? 11/5/20   Beckie Spikes, DO   spironolactone (ALDACTONE) 25 MG tablet Take 25 mg by mouth daily This was sent to pharmacy 1 month ago. Cannot afford these currently, never picked up. Prescription assistance? Historical Provider, MD   metoprolol succinate (TOPROL XL) 100 MG extended release tablet Take 1 tablet by mouth daily  Patient taking differently: Take 100 mg by mouth daily This was sent to pharmacy 1 month ago. Cannot afford these currently, never picked up. Prescription assistance? 8/24/19   Beckie Spikes, DO   finasteride (PROSCAR) 5 MG tablet Take 1 tablet by mouth daily  Patient taking differently: Take 5 mg by mouth daily This was sent to pharmacy 1 month ago. Cannot afford these currently, never picked up. Prescription assistance? 8/24/19   Beckie Spikes, DO     Allergies  Patient has no known allergies. Social History   reports that he quit smoking 4 days ago. His smoking use included cigarettes. He started smoking about 43 years ago. He has a 60.00 pack-year smoking history. He has never used smokeless tobacco. He reports that he does not currently use alcohol after a past usage of about 140.0 standard drinks per week. Drugs: Cocaine and Other-see comments. Family History  family history includes Arthritis in his mother; Cancer in his brother; Heart Disease in his father; High Blood Pressure in his brother, brother, brother, and brother; Other in his brother, brother, brother, brother, and mother.     Objective  Physical Exam   Vitals: BP (!) 171/113   Pulse (!) 102   Temp 98.4 °F (36.9 °C) (Oral)   Resp 20   Ht 5' 7\" (1.702 m)   Wt 205 lb (93 kg)   SpO2 96%   BMI 32.11 kg/m²   General: well-developed, well-nourished, no acute distress, cooperative  Skin: generally warm, dry, and intact, with normal color  HEENT: normocephalic, atraumatic, no gross abnormalities  Respiratory: Diminished bilaterally with bilateral rhonchi and bibasilar crackles faint wheezes cardiovascular: regular rate and rhythm without murmur / rub / gallop  Abdominal: soft, nontender, nondistended, normoactive bowel sounds  Extremities: 1+ edema no deformity  Neurologic: awake, alert, no gross deficits  Psychiatric: normal affect, cooperative    *Available labs, imaging studies, microbiologic studies, cardiac studies have been reviewed    Electronically signed by Misbah Khalil MD on 2/22/2023 at 8:13 PM

## 2023-02-23 NOTE — ED NOTES
Solumedrol infiltrated in IV, checked with pharmacy no anecdote needed IV removed     Jaycee Meléndez, RONALD  02/22/23 1950

## 2023-02-23 NOTE — PLAN OF CARE
Problem: Discharge Planning  Goal: Discharge to home or other facility with appropriate resources  2/23/2023 0958 by Sylvia Escobar RN  Outcome: Progressing     Problem: Safety - Adult  Goal: Free from fall injury  2/23/2023 0958 by Sylvia Escobar RN  Outcome: Progressing     Problem: Chronic Conditions and Co-morbidities  Goal: Patient's chronic conditions and co-morbidity symptoms are monitored and maintained or improved  Outcome: Progressing

## 2023-02-23 NOTE — CONSULTS
CHF NURSE NAVIGATOR CONSULT NOTE:      Patient currently admitted with diagnosis of Systolic heart failure. Patient was awake and alert, sitting on the couch during the consultation. He was engaged and asked appropriate questions throughout the education session. He was visibly short of breath during the consultation. He stated that it is a little better but not much. He is agreeable to Heart failure education and self monitoring once discharged. He did admit that he does not always follow a low sodium diet and also admits to some non compliance with his medications. We discussed the importance of taking medications as directed as well as the low sodium diet. He expressed understanding. Scheduling with the CHF clinic No: He will follow up with his PCP after discharge. Cardiology is not currently following but he does see Dr Thais Mathur. He will call for follow up with him after discharge . No future appointments. Barriers to Care:  Contributing risk factors for Heart Failure are identified as lack of education. The patient is ordered:  Diet: ADULT DIET;  Regular; Low Fat/Low Chol/High Fiber/JUAN F; Low Sodium (2 gm)   Sodium controlled diet Yes  Fluid restriction daily ordered (fluid restriction recommended if patient is hyponatremic and/or diuretic is initiated or increased) No  FR:   Daily Weights: Patient Vitals for the past 96 hrs (Last 3 readings):   Weight   02/23/23 0100 216 lb 4.8 oz (98.1 kg)   02/22/23 1634 205 lb (93 kg)     I/O:   Intake/Output Summary (Last 24 hours) at 2/23/2023 1243  Last data filed at 2/23/2023 1146  Gross per 24 hour   Intake 240 ml   Output 500 ml   Net -260 ml              We reviewed the introduction to Heart Failure, the HF zones, signs and symptoms to report on day 1 of onset, medications, medication compliance, the importance of obtaining daily weights, following a low sodium diet, reading food labels for the sodium content, keeping physician appointments, and smoking cessation. We discussed writing / tracking daily weights on a calendar / log because a 5 pound gain in 1 week can sneak up if you are not tracking it. I advised patient they can reduce the risk for Heart Failure exacerbations by modifying / controlling the risk factors. We discussed self-managed care which includes the following:  to take medications as prescribed, report any intolerable side effects of medications to the cardiologist / doctor, do not just stop taking the medication; follow a cardiac heart healthy / low sodium diet; weigh yourself daily, exercise regularly- per doctor recommendation and not to smoke or use an excess amount of alcohol. We discussed calling the cardiologist / doctor with a weight gain of 3 pounds in one day or a total of 5 pounds or more in one week. Also, if you should have a significant weight loss of 3# or more in one day to call the doctor, they may need to decrease or hold the diuretic dose. On days you feels nauseated and not eating / drinking, having emesis or diarrhea,  informed to call the cardiologist  / doctor, they may need to decrease or hold diuretic to avoid dehydration. I stressed the importance of informing their cardiologist the first day of onset of any of the signs and symptoms in the \"Yellow Zone\" of the HF Zones. Patient verbalizes understanding. Greater than 30 minutes was spent educating patient. The Heart Failure Booklet given to the patient with additional patient education addressing:  What is Heart Failure? Things You Can Do to Live Well with HF  How to Take Your Medications  How to Eat Less Salt  [place] its Salt?   Exercising Well with Heart Failure  Signs and symptoms of HF to report  Weight Yourself Each Day  Home Self Management- activity, weight tracking, taking medications as prescribed, meals /diet planning (sodium and fluid restriction), how to read food labels, keeping physician follow ups, smoking cessation, follow the Heart Failure Zones  The Heart Failure zones  Every Dose Every Day      Instructed  to call 911 if you have any of the following symptoms:       Struggling to breathe unrelieved with rest,     Having chest pain     Have confusion or cant think clearly          Libra Ruano RN BSN, RN  Heart Failure Navigator        CONGESTIVE HEART FAILURE (CHF) AHA GUIDELINES  (Must be completed for Primary Diagnosis CHF or History of CHF)    Discharge Plan:  I placed the Heart Failure Home Instructions in patient's discharge instructions. Per Heart Failure GWT[facility], the patient should have a follow-up appointment made within 7 days of discharge.     New Diagnosis No    ECHO Results most recent:  Lab Results   Component Value Date    LVEF 39 2021                                        Social History     Tobacco Use   Smoking Status Former    Packs/day: 1.50    Years: 40.00    Pack years: 60.00    Types: Cigarettes    Start date: 1979    Quit date: 2023    Years since quittin.0   Smokeless Tobacco Never   Tobacco Comments    rolling his own cigarettes        Immunization History   Administered Date(s) Administered    Influenza Virus Vaccine 2015, 12/15/2015, 2017    Influenza, FLUARIX, FLULAVAL, Lani Hebert (age 10 mo+) AND AFLURIA, (age 1 y+), PF, 0.5mL 2017, 10/04/2019, 2020    Pneumococcal Conjugate Vaccine 2017    Pneumococcal Polysaccharide ([id number]) 12/15/2015    Tdap (Boostrix, Adacel) 2018          Angiotensin-Converting-Enzyme (ACE) inhibitor ordered:  [] Yes  [x] No (specify contraindication): Entresto     [] Contraindicated  [] Hypotensive patient who was at immediate risk of cardiogenic shock  [] Hospitalized patient who experienced marked azotemia  [] Other Contraindications  [] Not Eligible  [] Not Tolerant  [] Patient Reason  [] System Reason  [] Other Reason    Angiotensin II receptor blockers (ARB) ordered:  [] Yes  [x] No (specify contraindication): Entresto    [] Contraindicated  [] Hypotensive patient who was at immediate risk of cardiogenic shock  [] Hospitalized patient who experienced marked azotemia  [] Other Contraindications    ARNI - Angiotensin Receptor Neprilysin Inhibitor ordered:  [x] Yes  [] No (specify contraindication):    [] ACE inhibitor use within the prior 36 hours  [] Allergy  [] Hyperkalemia  [] Hypotension  [] Renal dysfunction defined as creatinine > 2.5 mg/dL in men or > 2.0 mg/dL in women  [] Other Contraindications  [] Not Eligible  [] Not Tolerant  [] Patient Reason  []System Reason  []Other Reason      Beta Blocker ordered:    [x] Yes  [] No (specify contraindication):    [] Contraindicated  [] Asthma  [] Fluid Overload  [] Low Blood Pressure  [] Patient recently treated with an intravenous positive inotropic agent  [] Other Contraindications  [] Not Eligible  [] Not Tolerant  [] Patient Reason  [] System Reason    SGLT2 Inhibitor ordered:  [] Yes  [x] No (specify contraindication):    [] Contraindicated  [] Patient currently on dialysis  [] Ketoacidosis  [] Known hypersensitivity to the medication  [] Type I diabetes (not approved for use in patients with Type I diabetes due to increased risk of ketoacidosis)  [] Other Contraindications  [] Not Eligible  [] Not Tolerant  [] Patient Reason  [] System Reason  [x] Other Reason    Aldosterone Antagonist ordered:  [x] Yes  [] No (specify contraindication):    [] Contraindicated  [] Allergy due to aldosterone receptor antagonist  [] Hyperkalemia  [] Renal dysfunction defined as creatinine >2.5 mg/dL in men or >2.0 mg/dL in women.   [] Other contraindications  [] Not Eligible  [] Not Tolerant  [] Patient Reason  [] System Reason  [] Other Reason

## 2023-02-23 NOTE — ED PROVIDER NOTES
58-year-old male history of COPD and CHF presents to the emergency department with shortness of breath with an oxygen level of 80% in the waiting room. Patient reports he had previously been well controlled for several months on his COPD but over the last few weeks has had worsening symptoms. His PCP had put him on a Z-Ayden which provided help with his cough but he still had shortness of breath and wheezing he states has been using his inhaler around-the-clock without improvement and came in today because symptoms just were not getting any better. He reports no fevers or chills no leg swelling no nausea vomiting dye abdominal pain urinary symptoms or other complaints at this time    The history is provided by the patient. Shortness of Breath  Severity:  Moderate  Onset quality:  Gradual  Duration:  2 weeks  Timing:  Intermittent  Progression:  Waxing and waning  Chronicity:  New  Relieved by:  Nothing  Worsened by:  Nothing  Ineffective treatments:  Inhaler  Associated symptoms: cough and wheezing    Associated symptoms: no abdominal pain, no chest pain, no ear pain, no fever, no headaches, no rash, no sore throat and no vomiting       Review of Systems   Constitutional:  Negative for chills and fever. HENT:  Negative for ear pain, sinus pressure and sore throat. Eyes:  Negative for pain, discharge and redness. Respiratory:  Positive for cough, shortness of breath and wheezing. Cardiovascular:  Negative for chest pain. Gastrointestinal:  Negative for abdominal pain, diarrhea, nausea and vomiting. Genitourinary:  Negative for dysuria and frequency. Musculoskeletal:  Negative for arthralgias and back pain. Skin:  Negative for rash and wound. Neurological:  Negative for weakness and headaches. Hematological:  Negative for adenopathy. All other systems reviewed and are negative. Physical Exam  Constitutional:       Appearance: He is well-developed.    HENT:      Head: Normocephalic and atraumatic. Cardiovascular:      Rate and Rhythm: Normal rate and regular rhythm. Pulmonary:      Effort: Tachypnea and respiratory distress present. Breath sounds: Examination of the right-middle field reveals wheezing. Examination of the left-middle field reveals wheezing. Examination of the right-lower field reveals wheezing and rales. Examination of the left-lower field reveals wheezing and rales. Wheezing and rales present. Comments: Conversational dyspnea  Musculoskeletal:         General: Normal range of motion. Cervical back: Normal range of motion and neck supple. Right lower leg: No tenderness. No edema. Left lower leg: No tenderness. No edema. Skin:     General: Skin is warm. Capillary Refill: Capillary refill takes less than 2 seconds. Neurological:      General: No focal deficit present. Mental Status: He is alert and oriented to person, place, and time. Procedures     [facility]       ED Course as of 02/23/23 1048   Wed Feb 22, 2023   252 59-year-old male history of COPD who is on no home oxygen presents to the emergency department with shortness of breath. Patient's been seen by his doctor and been treated with a Z-Ayden without improvement. He still states coughing wheezing and shortness of breath has been using his inhaler around-the-clock without improvement. Per [facility] nursing staff he dropped to 88% while ambulating to the bathroom. He was having worsening shortness of breath and dyspnea on exertion with that. On examination he has conversational dyspnea. He also was noted to have wheezing with active cough on evaluation. We will start with chest x-ray labs 3 DuoNeb's and IV steroids and IV magnesium to help with symptoms. COVID swab and chest x-ray have been ordered [CF]   1932 EKG: This EKG is signed and interpreted by the EP.     Time: 19:30  Rate: 99  Rhythm: Sinus  Interpretation: no acute changes  Comparison: stable as compared to patient's most recent EKG     [CF]   2029 Patient hypoxic showing evidence of CHF and COPD. Breathing treatments and steroids were given order of Lasix provided. Given hypoxia is felt patient warrants admission. Case discussed with the hospitalist Dr. April Knight who will admit for likely CHF COPD exacerbation with respiratory failure [CF]      ED Course User Index  [CF] Maddi Noriega, DO      --------------------------------------------- PAST HISTORY ---------------------------------------------  Past Medical History:  has a past medical history of Alcohol abuse, [facility]congest[facility] failure) ([facility] COPD (chronic obstructive pulmonary disease) ([facility] History of cocaine use, Hyperlipidemia, Hypertension, Marijuana use, MI (myocardial infarction) ([facility] and alberto. Past Surgical History:  has a past surgical history that includes Wrist surgery; cervical fusion (11/18/15); polysomnography (01/29/2018); Cardiac catheterization (11/03/2020); and bronchoscopy (N/A, 8/3/2021). Social History:  reports that he quit smoking 5 days ago. His smoking use included cigarettes. He started smoking about 43 years ago. He has a 60.00 pack-year smoking history. He has never used smokeless tobacco. He reports that he does not currently use alcohol after a past usage of about 140.0 standard drinks per week. Drugs: Cocaine and Other-see comments. Family History: family history includes Arthritis in his mother; Cancer in his brother; Heart Disease in his father; High Blood Pressure in his brother, brother, brother, and brother; Other in his brother, brother, brother, brother, and mother. The patients home medications have been reviewed. Allergies: Patient has no known allergies.     -------------------------------------------------- RESULTS -------------------------------------------------    LABS:  Results for orders placed or performed during the hospital encounter of 02/22/23   COVID-19, Rapid    Specimen: Nasopharyngeal Swab   Result Value Ref Range    SARS-CoV-2, NAAT Not Detected Not Detected   Respiratory Panel, Molecular, with COVID-19 (Restricted: peds pts or suitable admitted adults)    Specimen: Nasopharyngeal   Result Value Ref Range    Adenovirus by PCR Not Detected Not Detected    Bordetella parapertussis by PCR Not Detected Not Detected    Bordetella pertussis by PCR Not Detected Not Detected    Chlamydophilia pneumoniae by PCR Not Detected Not Detected    Coronavirus 229E by PCR Not Detected Not Detected    Coronavirus HKU1 by PCR Not Detected Not Detected    Coronavirus NL63 by PCR Not Detected Not Detected    Coronavirus OC43 by PCR Not Detected Not Detected    SARS-CoV-2, PCR Not Detected Not Detected    Human Metapneumovirus by PCR Not Detected Not Detected    Human Rhinovirus/Enterovirus by PCR Not Detected Not Detected    Influenza A by PCR Not Detected Not Detected    Influenza B by PCR Not Detected Not Detected    Mycoplasma pneumoniae by PCR Not Detected Not Detected    Parainfluenza Virus 1 by PCR Not Detected Not Detected    Parainfluenza Virus 2 by PCR Not Detected Not Detected    Parainfluenza Virus 3 by PCR Not Detected Not Detected    Parainfluenza Virus 4 by PCR Not Detected Not Detected    Respiratory Syncytial Virus by PCR Not Detected Not Detected   CBC with Auto Differential   Result Value Ref Range    WBC 9.9 4.5 - 11.5 E9/L    RBC 4.74 3.80 - 5.80 E12/L    Hemoglobin 14.8 12.5 - 16.5 g/dL    Hematocrit 43.3 37.0 - 54.0 %    MCV 91.4 80.0 - 99.9 fL    MCH 31.2 26.0 - 35.0 pg    MCHC 34.2 32.0 - 34.5 %    RDW 14.8 11.5 - 15.0 fL    Platelets 542 414 - 343 E9/L    MPV 9.3 7.0 - 12.0 fL    Neutrophils % 55.7 43.0 - 80.0 %    Immature Granulocytes % 0.4 0.0 - 5.0 %    Lymphocytes % 34.2 20.0 - 42.0 %    Monocytes % 7.0 2.0 - 12.0 %    Eosinophils % 2.1 0.0 - 6.0 %    Basophils % 0.6 0.0 - 2.0 %    Neutrophils Absolute 5.53 1.80 - 7.30 E9/L    Immature Granulocytes # 0.04 E9/L    Lymphocytes Absolute 3.40 1.50 - 4.00 E9/L    Monocytes Absolute 0.70 0.10 - 0.95 E9/L    Eosinophils Absolute 0.21 0.05 - 0.50 E9/L    Basophils Absolute 0.06 0.00 - 0.20 H2/R   Basic Metabolic Panel   Result Value Ref Range    Sodium 145 132 - 146 mmol/L    Potassium 4.7 3.5 - 5.0 mmol/L    Chloride 112 (H) 98 - 107 mmol/L    CO2 22 22 - 29 mmol/L    Anion Gap 11 7 - 16 mmol/L    Glucose 104 (H) 74 - 99 mg/dL    BUN 28 (H) 6 - 20 mg/dL    Creatinine 1.7 (H) 0.7 - 1.2 mg/dL    Est, Glom Filt Rate 46 >=60 mL/min/1.73    Calcium 9.3 8.6 - 10.2 mg/dL   Brain Natriuretic Peptide   Result Value Ref Range    Pro-BNP 12,550 (H) 0 - 125 pg/mL   Troponin   Result Value Ref Range    Troponin, High Sensitivity 43 (H) 0 - 11 ng/L   Comprehensive Metabolic Panel w/ Reflex to MG   Result Value Ref Range    Sodium 139 132 - 146 mmol/L    Potassium reflex Magnesium 4.5 3.5 - 5.0 mmol/L    Chloride 107 98 - 107 mmol/L    CO2 21 (L) 22 - 29 mmol/L    Anion Gap 11 7 - 16 mmol/L    Glucose 176 (H) 74 - 99 mg/dL    BUN 28 (H) 6 - 20 mg/dL    Creatinine 1.6 (H) 0.7 - 1.2 mg/dL    Est, Glom Filt Rate 49 >=60 mL/min/1.73    Calcium 9.4 8.6 - 10.2 mg/dL    Total Protein 8.3 6.4 - 8.3 g/dL    Albumin 4.0 3.5 - 5.2 g/dL    Total Bilirubin 0.4 0.0 - 1.2 mg/dL    Alkaline Phosphatase 93 40 - 129 U/L    ALT 26 0 - 40 U/L    AST 32 0 - 39 U/L   CBC with Auto Differential   Result Value Ref Range    WBC 7.0 4.5 - 11.5 E9/L    RBC 4.74 3.80 - 5.80 E12/L    Hemoglobin 14.7 12.5 - 16.5 g/dL    Hematocrit 43.9 37.0 - 54.0 %    MCV 92.6 80.0 - 99.9 fL    MCH 31.0 26.0 - 35.0 pg    MCHC 33.5 32.0 - 34.5 %    RDW 14.6 11.5 - 15.0 fL    Platelets 729 176 - 874 E9/L    MPV 9.5 7.0 - 12.0 fL    Neutrophils % 82.7 (H) 43.0 - 80.0 %    Immature Granulocytes % 0.4 0.0 - 5.0 %    Lymphocytes % 15.9 (L) 20.0 - 42.0 %    Monocytes % 0.9 (L) 2.0 - 12.0 %    Eosinophils % 0.0 0.0 - 6.0 %    Basophils % 0.1 0.0 - 2.0 %    Neutrophils Absolute 5.76 1.80 - 7.30 E9/L    Immature Granulocytes # 0.03 E9/L Lymphocytes Absolute 1.11 (L) 1.50 - 4.00 E9/L    Monocytes Absolute 0.06 (L) 0.10 - 0.95 E9/L    Eosinophils Absolute 0.00 (L) 0.05 - 0.50 E9/L    Basophils Absolute 0.01 0.00 - 0.20 E9/L   EKG 12 Lead   Result Value Ref Range    Ventricular Rate 99 BPM    Atrial Rate 99 BPM    P-R Interval 122 ms    QRS Duration 88 ms    Q-T Interval 382 ms    QTc Calculation (Bazett) 490 ms    P Axis 43 degrees    R Axis -2 degrees    T Axis -34 degrees       RADIOLOGY:  XR CHEST PORTABLE    Result Date: 2/22/2023  EXAMINATION: ONE XRAY VIEW OF THE CHEST 2/22/2023 6:21 pm COMPARISON: None. HISTORY: ORDERING SYSTEM PROVIDED HISTORY: shortness of breath TECHNOLOGIST PROVIDED HISTORY: Reason for exam:->shortness of breath FINDINGS: The heart is enlarged. Pulmonary vessels appear congested. There is no pneumothorax or pleural effusion. Early pulmonary vascular congestion.         ------------------------- NURSING NOTES AND VITALS REVIEWED ---------------------------  Date / Time Roomed:  2/22/2023  5:56 PM  ED Bed Assignment:  [id number]    The nursing notes within the ED encounter and vital signs as below have been reviewed.      Patient Vitals for the past 24 hrs:   BP Temp Temp src Pulse Resp SpO2 Height Weight   02/23/23 1030 (!) 155/106 97.6 °F (36.4 °C) Oral 85 18 -- -- --   02/23/23 0645 (!) 157/120 97.7 °F (36.5 °C) Oral 96 20 98 % -- --   02/23/23 0230 (!) 160/109 98.2 °F (36.8 °C) Oral 95 20 95 % -- --   02/23/23 0100 -- -- -- -- -- -- -- 216 lb 4.8 oz (98.1 kg)   02/22/23 2145 (!) 178/100 98 °F (36.7 °C) Oral (!) 104 18 100 % -- --   02/22/23 2039 (!) 161/103 98.4 °F (36.9 °C) Oral 96 18 94 % -- --   02/22/23 2003 -- -- -- -- -- 96 % -- --   02/22/23 2002 -- -- -- -- -- 97 % -- --   02/22/23 2001 -- -- -- -- -- 96 % -- --   02/22/23 1634 (!) 171/113 98.4 °F (36.9 °C) Oral (!) 102 20 95 % 5' 7\" (1.702 m) 205 lb (93 kg)   02/22/23 1553 -- -- -- (!) 118 22 95 % -- --       Oxygen Saturation Interpretation: Improved after treatment    ------------------------------------------ PROGRESS NOTES ------------------------------------------    Counseling:  I have spoken with the patient and discussed todays results, in addition to providing specific details for the plan of care and counseling regarding the diagnosis and prognosis. Their questions are answered at this time and they are agreeable with the plan of admission.    --------------------------------- ADDITIONAL PROVIDER NOTES ---------------------------------  This patient's ED course included: a personal history and physicial examination, re-evaluation prior to disposition, multiple bedside re-evaluations, IV medications, cardiac monitoring, and continuous pulse oximetry    This patient has remained hemodynamically stable during their ED course. Diagnosis:  1. Acute respiratory failure with hypoxia ([address]    2. COPD exacerbation ([address]    3. Congestive heart failure, unspecified HF chronicity, unspecified heart failure type ([address]        Disposition:  Patient's disposition: Admit to telemetry  Patient's condition is fair.          Les Mcintyre DO  02/23/23 1048

## 2023-02-23 NOTE — PLAN OF CARE
Patient's chart updated to reflect:      . - HF care plan, HF education points and HF discharge instructions.  -Orders: 2 gram sodium diet, daily weights, I/O.  -PCP and cardiology follow up appointments to be scheduled within 7 days of hospital discharge. -CHF education session will be provided to the patient prior to hospital discharge.     Yamilex Soriano RN BSN  Heart Failure Navigator

## 2023-02-24 LAB
LV EF: 32 %
LVEF MODALITY: NORMAL
PRO-BNP: 1348 PG/ML (ref 0–125)

## 2023-02-24 PROCEDURE — 94640 AIRWAY INHALATION TREATMENT: CPT

## 2023-02-24 PROCEDURE — 83880 ASSAY OF NATRIURETIC PEPTIDE: CPT

## 2023-02-24 PROCEDURE — 6360000002 HC RX W HCPCS

## 2023-02-24 PROCEDURE — 6370000000 HC RX 637 (ALT 250 FOR IP): Performed by: INTERNAL MEDICINE

## 2023-02-24 PROCEDURE — 6360000002 HC RX W HCPCS: Performed by: INTERNAL MEDICINE

## 2023-02-24 PROCEDURE — 2060000000 HC ICU INTERMEDIATE R&B

## 2023-02-24 PROCEDURE — 2700000000 HC OXYGEN THERAPY PER DAY

## 2023-02-24 PROCEDURE — 99232 SBSQ HOSP IP/OBS MODERATE 35: CPT | Performed by: INTERNAL MEDICINE

## 2023-02-24 PROCEDURE — 2580000003 HC RX 258: Performed by: INTERNAL MEDICINE

## 2023-02-24 PROCEDURE — 93306 TTE W/DOPPLER COMPLETE: CPT

## 2023-02-24 PROCEDURE — 36415 COLL VENOUS BLD VENIPUNCTURE: CPT

## 2023-02-24 RX ORDER — ACETAZOLAMIDE 500 MG/5ML
500 INJECTION, POWDER, LYOPHILIZED, FOR SOLUTION INTRAVENOUS ONCE
Status: COMPLETED | OUTPATIENT
Start: 2023-02-24 | End: 2023-02-24

## 2023-02-24 RX ADMIN — BUDESONIDE 500 MCG: 0.5 SUSPENSION RESPIRATORY (INHALATION) at 20:32

## 2023-02-24 RX ADMIN — ENOXAPARIN SODIUM 40 MG: 100 INJECTION SUBCUTANEOUS at 08:51

## 2023-02-24 RX ADMIN — AMLODIPINE BESYLATE 5 MG: 5 TABLET ORAL at 08:52

## 2023-02-24 RX ADMIN — ARFORMOTEROL TARTRATE 15 MCG: 15 SOLUTION RESPIRATORY (INHALATION) at 07:47

## 2023-02-24 RX ADMIN — CLONIDINE HYDROCHLORIDE 0.2 MG: 0.2 TABLET ORAL at 08:52

## 2023-02-24 RX ADMIN — ARFORMOTEROL TARTRATE 15 MCG: 15 SOLUTION RESPIRATORY (INHALATION) at 20:32

## 2023-02-24 RX ADMIN — ACETAMINOPHEN 650 MG: 325 TABLET ORAL at 02:23

## 2023-02-24 RX ADMIN — IPRATROPIUM BROMIDE AND ALBUTEROL SULFATE 3 ML: 2.5; .5 SOLUTION RESPIRATORY (INHALATION) at 02:20

## 2023-02-24 RX ADMIN — FUROSEMIDE 20 MG: 10 INJECTION, SOLUTION INTRAMUSCULAR; INTRAVENOUS at 08:51

## 2023-02-24 RX ADMIN — CLONIDINE HYDROCHLORIDE 0.2 MG: 0.2 TABLET ORAL at 19:48

## 2023-02-24 RX ADMIN — SALINE NASAL SPRAY 1 SPRAY: 1.5 SOLUTION NASAL at 09:53

## 2023-02-24 RX ADMIN — IPRATROPIUM BROMIDE AND ALBUTEROL SULFATE 3 ML: 2.5; .5 SOLUTION RESPIRATORY (INHALATION) at 07:47

## 2023-02-24 RX ADMIN — SODIUM CHLORIDE, PRESERVATIVE FREE 10 ML: 5 INJECTION INTRAVENOUS at 19:53

## 2023-02-24 RX ADMIN — ACETAZOLAMIDE 500 MG: 500 INJECTION, POWDER, LYOPHILIZED, FOR SOLUTION INTRAVENOUS at 12:57

## 2023-02-24 RX ADMIN — FINASTERIDE 5 MG: 5 TABLET, FILM COATED ORAL at 08:52

## 2023-02-24 RX ADMIN — HYDRALAZINE HYDROCHLORIDE 10 MG: 20 INJECTION INTRAMUSCULAR; INTRAVENOUS at 07:26

## 2023-02-24 RX ADMIN — PREDNISONE 40 MG: 20 TABLET ORAL at 08:52

## 2023-02-24 RX ADMIN — METOPROLOL SUCCINATE 50 MG: 50 TABLET, EXTENDED RELEASE ORAL at 08:52

## 2023-02-24 RX ADMIN — SACUBITRIL AND VALSARTAN 1 TABLET: 24; 26 TABLET, FILM COATED ORAL at 19:48

## 2023-02-24 RX ADMIN — SPIRONOLACTONE 25 MG: 25 TABLET ORAL at 08:52

## 2023-02-24 RX ADMIN — SODIUM CHLORIDE, PRESERVATIVE FREE 10 ML: 5 INJECTION INTRAVENOUS at 08:52

## 2023-02-24 RX ADMIN — SACUBITRIL AND VALSARTAN 1 TABLET: 24; 26 TABLET, FILM COATED ORAL at 08:52

## 2023-02-24 RX ADMIN — BUDESONIDE 500 MCG: 0.5 SUSPENSION RESPIRATORY (INHALATION) at 07:47

## 2023-02-24 RX ADMIN — FUROSEMIDE 20 MG: 10 INJECTION, SOLUTION INTRAMUSCULAR; INTRAVENOUS at 17:09

## 2023-02-24 RX ADMIN — DOXYCYCLINE HYCLATE 100 MG: 100 CAPSULE ORAL at 08:52

## 2023-02-24 ASSESSMENT — PAIN - FUNCTIONAL ASSESSMENT: PAIN_FUNCTIONAL_ASSESSMENT: ACTIVITIES ARE NOT PREVENTED

## 2023-02-24 ASSESSMENT — PAIN DESCRIPTION - ONSET: ONSET: ON-GOING

## 2023-02-24 ASSESSMENT — PAIN DESCRIPTION - FREQUENCY: FREQUENCY: INTERMITTENT

## 2023-02-24 ASSESSMENT — PAIN SCALES - GENERAL
PAINLEVEL_OUTOF10: 6
PAINLEVEL_OUTOF10: 0
PAINLEVEL_OUTOF10: 0

## 2023-02-24 ASSESSMENT — PAIN DESCRIPTION - DESCRIPTORS: DESCRIPTORS: ACHING

## 2023-02-24 ASSESSMENT — PAIN DESCRIPTION - ORIENTATION: ORIENTATION: LEFT

## 2023-02-24 ASSESSMENT — PAIN DESCRIPTION - PAIN TYPE: TYPE: ACUTE PAIN

## 2023-02-24 ASSESSMENT — PAIN DESCRIPTION - LOCATION: LOCATION: HEAD

## 2023-02-24 NOTE — CONSULTS
CARDIOLOGY CONSULTATION    Patient Name:  Loly Bauer    :  1965    Reason for Consultation:   Recurrent shortness of breath    History of Present Illness:   Loly Bauer returns to [facility] [place], following increasing shortness of breath over the past few weeks prior to admission. He admits to forgetting taking his medications for an unknown known period of time as he was busy moving. He denies any hemoptysis nor chest discomfort. He has a known underlying cardiomyopathy for which he was treated with neprilysin inhibitor with subsequent improvement of his left ventricular function and no further recent hospitalizations until now. Likewise he has underlying coronary artery disease. He is now readmitted for readjustment of his symptoms thought to be secondary to parainfluenza virus. Medical therapy   Past Medical History:   has a past medical history of Alcohol abuse, CHF (congestive heart failure) ([facility] [address] COPD (chronic obstructive pulmonary disease) ([facility] [address] History of cocaine use, Hyperlipidemia, Hypertension, Marijuana use, MI (myocardial infarction) ([facility] [address] and alberto. Surgical History:   has a past surgical history that includes Wrist surgery; cervical fusion (11/18/15); polysomnography (2018); Cardiac catheterization (2020); and bronchoscopy (N/A, 8/3/2021). Social History:   reports that he quit smoking 6 days ago. His smoking use included cigarettes. He started smoking about 43 years ago. He has a 60.00 pack-year smoking history. He has never used smokeless tobacco. He reports that he does not currently use alcohol after a past usage of about 140.0 standard drinks per week. Drugs: Cocaine and Other-see comments. Family History:  family history includes Arthritis in his mother; Cancer in his brother; Heart Disease in his father; High Blood Pressure in his brother, brother, brother, and brother;  Other in his brother, brother, brother, brother, and mother.     Medications:  Prior to Admission medications    Medication Sig Start Date End Date Taking? Authorizing Provider   Arformoterol Tartrate (BROVANA) 15 MCG/2ML NEBU Take 2 mLs by nebulization 2 times daily 8/6/21   Joel Chun DO   budesonide (PULMICORT) 0.5 MG/2ML nebulizer suspension Take 4 mLs by nebulization 2 times daily 8/6/21   Joel Chun DO   cloNIDine (CATAPRES) 0.2 MG tablet Take 1 tablet by mouth 2 times daily  Patient taking differently: Take 0.2 mg by mouth daily 0800/1400 daily 6/18/21   Joel Chun DO   predniSONE (DELTASONE) 10 MG tablet 3 tabs daily x2 days, 2 tabs daily x2 days, 1 tab daily x2 days.  Patient not taking: Reported on 2/22/2023 6/16/21   Steve Zhang MD   albuterol sulfate HFA (PROVENTIL;VENTOLIN;PROAIR) 108 (90 Base) MCG/ACT inhaler Inhale 2 puffs into the lungs every 4 hours as needed for Wheezing    Historical Provider, MD   fluticasone-umeclidin-vilant (TRELEGY ELLIPTA) 100-62.5-25 MCG/INH AEPB Inhale 1 puff into the lungs daily     Historical Provider, MD   nitroGLYCERIN (NITROSTAT) 0.4 MG SL tablet Place 0.4 mg under the tongue every 5 minutes as needed for Chest pain up to max of 3 total doses. If no relief after 1 dose, call 911. NEVER USED YET 7/30/21    Historical Provider, MD   ipratropium-albuterol (DUONEB) 0.5-2.5 (3) MG/3ML SOLN nebulizer solution Take 1 vial by nebulization every 6 hours as needed for Shortness of Breath  Patient not taking: Reported on 2/22/2023    Historical Provider, MD   nicotine (NICODERM CQ) 21 MG/24HR Place 1 patch onto the skin every 24 hours    Historical Provider, MD   amLODIPine (NORVASC) 5 MG tablet Take 1 tablet by mouth daily 11/21/20   Joel Chun DO   sacubitril-valsartan (ENTRESTO) 24-26 MG per tablet Take 1 tablet by mouth 2 times daily  Patient taking differently: Take 1 tablet by mouth 2 times daily This was sent to pharmacy 1 month ago. Cannot afford these currently, never picked up. Prescription assistance?  11/5/20   Holli Sosa,    spironolactone (ALDACTONE) 25 MG tablet Take 25 mg by mouth daily This was sent to pharmacy 1 month ago. Cannot afford these currently, never picked up. Prescription assistance? Historical Provider, MD   metoprolol succinate (TOPROL XL) 100 MG extended release tablet Take 1 tablet by mouth daily  Patient taking differently: Take 100 mg by mouth daily This was sent to pharmacy 1 month ago. Cannot afford these currently, never picked up. Prescription assistance? 8/24/19   Holli Sosa DO   finasteride (PROSCAR) 5 MG tablet Take 1 tablet by mouth daily  Patient taking differently: Take 5 mg by mouth daily This was sent to pharmacy 1 month ago. Cannot afford these currently, never picked up. Prescription assistance? 8/24/19   Holli Sosa DO       Allergies:  Patient has no known allergies. Review of Systems:   Constitutional: there has been no unanticipated weight loss. There's been no significant change in energy level, sleep pattern or activity level. No fever chills or rigors. Eyes: No visual changes or diplopia. No scleral icterus. ENT: No Headaches, hearing loss or vertigo. No mouth sores or sore throat. No change in taste or smell. Cardiovascular: No chest discomfort + dyspnea on exertion and now is well at rest., palpitations, loss of consciousness, no phlebitis, no claudication. Respiratory: No cough or wheezing, no sputum production. No hemoptysis, pleuritic pain. Gastrointestinal: No abdominal pain, appetite loss, blood in stools. No change in bowel habits. No hematemesis  Genitourinary: No dysuria, trouble voiding or hematuria. No nocturia or increased frequency. Musculoskeletal:  No gait disturbance, weakness or joint complaints. Integumentary: No rash or pruritis. Neurological: No headache, diplopia, change in muscle strength, numbness or tingling. No change in gait, balance, coordination, mood, affect, memory, mentation, behavior.   Psychiatric: No anxiety or depression. Endocrine: No temperature intolerance. No excessive thirst, fluid intake, or urination. No tremor. Hematologic/Lymphatic: No abnormal bruising or bleeding, blood clots or swollen lymph nodes. Allergic/Immunologic: No nasal congestion or hives. Physical Examination:    Vital Signs: /86   Pulse 80   Temp 97.6 °F (36.4 °C) (Temporal)   Resp 18   Ht 5' 7\" (1.702 m)   Wt 216 lb 1.6 oz (98 kg)   SpO2 100%   BMI 33.85 kg/m²   General appearance: Well preserved, mesomorphic body habitus, alert, no distress. Skin: Skin color, texture, turgor normal. No rashes or lesions. No induration or tightening palpated. Head: Normocephalic. No masses, lesions, tenderness or abnormalities  Eyes: Conjunctivae/corneas clear. PERRL, EOMs intact. Sclera non icteric. Ears: External ears normal. Canals clear. TM's clear bilaterally. Hearing normal to finger rub. Nose/Sinuses: Nares normal. Septum midline. Mucosa normal. No drainage or sinus tenderness. Oropharynx: Lips, mucosa, and tongue normal. Oropharynx clear with no exudate seen. Neck: Neck supple and symmetric. No adenopathy. Thyroid symmetric, normal size, without nodules. Trachea is midline. Carotids brisk in upstroke without bruits, no abnormal JVP noted at 45°. Chest: Even excursion  Lungs: Lungs coarse expiratory rhonchi to auscultation bilaterally. No retractions or use of accessory muscles. No tactile vocal fremitus. No crackles or rales. Heart:  S1 > S2. Regular rhythm. S4 gallop but no murmur. No rub, palpable thrill or heave noted. PMI 5th intercostal space midclavicular line. Abdomen: Abdomen soft, moderately protuberant, non-tender. BS normal. No masses, organomegaly. No hernia noted. Extremities: Extremities normal. No deformities, edema, or skin discoloration. No cyanosis or clubbing noted to the nails. Peripheral pulses present 2+ upper extremities and present 1+  lower extremities.   Musculoskeletal: Spine ROM normal. Muscular strength intact. Neuro: Cranial nerves intact. Motor: Strength 5/5 in all extremities. Reflexes 2+ in all extremities. No focal weakness. Sensory: grossly normal to touch. Coordination intact. Pertinent Labs:  CBC:   Recent Labs     02/22/23  1900 02/23/23  0235   WBC 9.9 7.0   HGB 14.8 14.7    280     BMP:  Recent Labs     02/22/23  1900 02/23/23  0235    139   K 4.7 4.5   * 107   CO2 22 21*   BUN 28* 28*   CREATININE 1.7* 1.6*   GLUCOSE 104* 176*   CALCIUM 9.3 9.4     ABGs:   Lab Results   Component Value Date/Time    PH 7.475 11/23/2020 08:51 AM    PO2 83.0 11/23/2020 08:51 AM    PCO2 32.3 11/23/2020 08:51 AM     INR:   No results for input(s): INR in the last 72 hours. PRO-BNP:   Lab Results   Component Value Date    PROBNP 12,550 (H) 02/22/2023    PROBNP 3,937 (H) 08/11/2021      Cardiac Injury Profile:   No results for input(s): CKTOTAL, CKMB, CKMBINDEX, TROPONINI in the last 72 hours. Lipid Profile:   Lab Results   Component Value Date/Time    TRIG 217 01/02/2018 11:47 AM    HDL 58 01/02/2018 11:47 AM    LDLCALC 144 01/02/2018 11:47 AM    CHOL 245 01/02/2018 11:47 AM      Hemoglobin A1C: No components found for: HGBA1C   ECG:  See report  ECHO: 4/18/2019  Left ventricular EF 57%    Radiology:  XR CHEST PORTABLE    Result Date: 2/25/2023  No acute process. XR CHEST PORTABLE    Result Date: 2/22/2023  Early pulmonary vascular congestion. Assessment:    Principal Problem:    Acute respiratory failure with hypoxia (HCC)  Active Problems:    CHF (congestive heart failure), NYHA class I, acute on chronic, diastolic (HCC)    Congestive heart failure ([facility] [address]    Primary hypertension  Resolved Problems:    * No resolved hospital problems. *      Plan:   Mr. Chirag Gage will have his cardiac medications readjusted in accordance with his blood pressure and left ventricular function. A two-dimensional echocardiogram will now be obtained as it has been nearly 3 years since his last one. Once again I have counseled him on smoking cessation and importantly alcohol cessation and he indicates that he fully understands and firmly notes that he has. Likewise, he should maintain his LDL cholesterol within updated 2020 ACC/AHA/AACE/ESC/EAS cholesterol guidelines based upon his known presence of underlying coronary artery disease. .    I have spent more than 55 minutes face to face with Danae Frazier reviewing notes and laboratory data with greater than 50% of this time instructing and counseling the patient regarding my findings and recommendations and I have answered all questions as posed to me by  Neva Yu. Thank you, Humble Jimenez DO for allowing me to consult in the care of this patient. Faizan Cisneros DO DO, FACP, [facility], [facility]    NOTE:  This report was transcribed using voice recognition software. Every effort was made to ensure accuracy; however, inadvertent computerized transcription errors may be present.

## 2023-02-24 NOTE — PROGRESS NOTES
[facility] Helm Hospitalist   Progress Note    Admitting Date and Time: 2/22/2023  5:56 PM  Admit Dx: Acute respiratory failure with hypoxia (HCC) [J96.01]  CHF (congestive heart failure), NYHA class I, acute on chronic, diastolic (HCC) [F53.08]    Subjective:    Patient was admitted with Acute respiratory failure with hypoxia ([facility] [address] [J96.01]  CHF (congestive heart failure), NYHA class I, acute on chronic, diastolic ([facility] [address] [H88.72].  Patient denies fever, chills, cp, sob, n/v.     amLODIPine  5 mg Oral Daily    cloNIDine  0.2 mg Oral BID    finasteride  5 mg Oral Daily    metoprolol succinate  50 mg Oral Daily    nicotine  1 patch TransDERmal Q24H    predniSONE  40 mg Oral Daily    sacubitril-valsartan  1 tablet Oral BID    spironolactone  25 mg Oral Daily    sodium chloride flush  5-40 mL IntraVENous 2 times per day    enoxaparin  40 mg SubCUTAneous Daily    furosemide  20 mg IntraVENous BID    cefTRIAXone (ROCEPHIN) IV  1,000 mg IntraVENous Q24H    doxycycline hyclate  100 mg Oral 2 times per day    budesonide  0.5 mg Nebulization BID    arformoterol tartrate  15 mcg Nebulization BID     hydrALAZINE, 10 mg, Q6H PRN  ipratropium-albuterol, 1 vial, Q6H PRN  sodium chloride flush, 5-40 mL, PRN  sodium chloride, , PRN  ondansetron, 4 mg, Q8H PRN   Or  ondansetron, 4 mg, Q6H PRN  polyethylene glycol, 17 g, Daily PRN  acetaminophen, 650 mg, Q6H PRN   Or  acetaminophen, 650 mg, Q6H PRN         Objective:    BP (!) 116/90   Pulse 84   Temp 97.9 °F (36.6 °C) (Oral)   Resp 18   Ht 5' 7\" (1.702 m)   Wt 216 lb 4.8 oz (98.1 kg)   SpO2 95%   BMI 33.88 kg/m²   Skin: warm and dry, no rash or erythema  Pulmonary/Chest: clear to auscultation bilaterally- no wheezes, rales or rhonchi, normal air movement, no respiratory distress  Cardiovascular: rhythm reg at rate of 88  Abdomen: soft, non-tender, non-distended, normal bowel sounds, no masses or organomegaly  Extremities: no cyanosis, no clubbing, and pos for 1+ b/l le edema      Recent Labs     02/22/23  1900 02/23/23  0235    139   K 4.7 4.5   * 107   CO2 22 21*   BUN 28* 28*   CREATININE 1.7* 1.6*   GLUCOSE 104* 176*   CALCIUM 9.3 9.4       Recent Labs     02/22/23  1900 02/23/23  0235   WBC 9.9 7.0   RBC 4.74 4.74   HGB 14.8 14.7   HCT 43.3 43.9   MCV 91.4 92.6   MCH 31.2 31.0   MCHC 34.2 33.5   RDW 14.8 14.6    280   MPV 9.3 9.5       CBC with Differential:    Lab Results   Component Value Date/Time    WBC 7.0 02/23/2023 02:35 AM    RBC 4.74 02/23/2023 02:35 AM    HGB 14.7 02/23/2023 02:35 AM    HCT 43.9 02/23/2023 02:35 AM     02/23/2023 02:35 AM    MCV 92.6 02/23/2023 02:35 AM    MCH 31.0 02/23/2023 02:35 AM    MCHC 33.5 02/23/2023 02:35 AM    RDW 14.6 02/23/2023 02:35 AM    NRBC 0.0 08/12/2021 12:19 AM    METASPCT 2 02/10/2016 06:35 PM    LYMPHOPCT 15.9 02/23/2023 02:35 AM    MONOPCT 0.9 02/23/2023 02:35 AM    MYELOPCT 1 02/10/2016 06:35 PM    BASOPCT 0.1 02/23/2023 02:35 AM    MONOSABS 0.06 02/23/2023 02:35 AM    LYMPHSABS 1.11 02/23/2023 02:35 AM    EOSABS 0.00 02/23/2023 02:35 AM    BASOSABS 0.01 02/23/2023 02:35 AM     CMP:    Lab Results   Component Value Date/Time     02/23/2023 02:35 AM    K 4.5 02/23/2023 02:35 AM     02/23/2023 02:35 AM    CO2 21 02/23/2023 02:35 AM    BUN 28 02/23/2023 02:35 AM    CREATININE 1.6 02/23/2023 02:35 AM    GFRAA >60 08/11/2021 09:59 PM    LABGLOM 49 02/23/2023 02:35 AM    GLUCOSE 176 02/23/2023 02:35 AM    PROT 8.3 02/23/2023 02:35 AM    LABALBU 4.0 02/23/2023 02:35 AM    CALCIUM 9.4 02/23/2023 02:35 AM    BILITOT 0.4 02/23/2023 02:35 AM    ALKPHOS 93 02/23/2023 02:35 AM    AST 32 02/23/2023 02:35 AM    ALT 26 02/23/2023 02:35 AM        Radiology:   XR CHEST PORTABLE   Final Result   Early pulmonary vascular congestion.              Assessment:    Principal Problem:    Acute respiratory failure with hypoxia (HCC)  Active Problems:    CHF (congestive heart failure), NYHA class I, acute on chronic, diastolic (HCC)  Resolved Problems:    * No resolved hospital problems. *      Plan:  Acute respiratory failure with hypoxia(88%o2sat)POA  wean o2 as able  Acute on chronic systolic/diastolic chf  diuretics  low salt diet. Will  consult cardiology.  Monitor wts and I/o's  Htn continue medication low salt diet  Copd exacerbation pt on steroids and nebs        Electronically signed by Isai Trejo DO on 2/23/2023 at 7:54 PM

## 2023-02-24 NOTE — CARE COORDINATION
CASE MANAGEMENT. ... Met with patient at the bedside. Mr Clive Milton voices being independent. He is currently living with and moving from friends house to friends house. States he applied for housing with the [place] back in Sept 2022. Encouraged him to f/u with them while he's hospitalized and check status of application. Confirmed that he can return to a friends house at [place]. Still drives. His car is in the parking lot. Has history with [employer] when he needed home o2. Has cane and uses a friends nebulizer. Per home meds he is on brovana and pulmicort. 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 Admits to rare/occasional alcohol use. Pcp is Dr Ambrocio Andrews. Currently, Reba Morales is 100% on 3lnc-nursing to wean as tolerated. If home o2 is needed, he is requesting [employer]. On iv rocephin, po doxy, and iv lasix bid. On aerosols and Entresto pta. Will cont to follow along and assist with needs accordingly. Case Management Assessment  Initial Evaluation    Date/Time of Evaluation: 2/24/2023 2:21 PM  Assessment Completed by: Jonny Pierce RN    If patient is discharged prior to next notation, then this note serves as note for discharge by case management. Patient Name: Roque Young                   YOB: 1965  Diagnosis: Acute respiratory failure with hypoxia ([facility] [address] [J96.01]  CHF (congestive heart failure), NYHA class I, acute on chronic, diastolic ([facility] [address] [M60.52]                   Date / Time: 2/22/2023  5:56 PM    Patient Admission Status: Inpatient   Readmission Risk (Low < 19, Mod (19-27), High > 27): Readmission Risk Score: 11.1    Current PCP: Andrae Oates, DO  PCP verified by CM?  Yes    Chart Reviewed: Yes      History Provided by: Patient  Patient Orientation: Alert and Oriented, Person, Place, Situation, Self    Patient Cognition: Alert    Hospitalization in the last 30 days (Readmission):  No    If yes, Readmission Assessment in CM Navigator will be completed. Advance Directives:      Code Status: Full Code   Patient's Primary Decision Maker is:      Primary Decision Maker: Cristian Haider - Juanito - [phone]    Discharge Planning:    Patient lives with: Friends Type of Home: House  Primary Care Giver: Self  Patient Support Systems include: Friends/Neighbors   Current Financial resources:    Current community resources:    Current services prior to admission: C-pap            Current DME:              Type of Home Care services:  Nursing Services    ADLS  Prior functional level: Independent in ADLs/IADLs  Current functional level: Independent in ADLs/IADLs    PT AM-PAC:   /24  OT AM-PAC:   /24    Family can provide assistance at DC: Other (comment) (support from friends)  Would you like Case Management to discuss the discharge plan with any other family members/significant others, and if so, who? No  Plans to Return to Present Housing: Yes (patient living with/moving from friends house to friends house. applied for housing back in sept 2022 with [address])  Other Identified Issues/Barriers to RETURNING to current housing:   Potential Assistance needed at discharge: Oneil Villanueva            Potential DME:    Patient expects to discharge to: Acute rehab  Plan for transportation at discharge:      Financial    Payor: [email or web address] [address] / Plan: [address] / Product Type: *No Product type* /     Does insurance require precert for SNF: Yes    Potential assistance Purchasing Medications:    Meds-to-Beds request: No      [facility] [id number] Carmelo Hodgkins, [address] [facility] [phone] [address] [facility] [phone]  [place]  [facility] [address]  Phone: [phone] Fax: [phone]      Notes:    Factors facilitating achievement of predicted outcomes: Friend support    Barriers to discharge: Additional Case Management Notes:      The Plan for Transition of Care is related to the following treatment goals of Acute respiratory failure with hypoxia ([facility] [address] [J96.01]  CHF (congestive heart failure), NYHA class I, acute on chronic, diastolic (HCC) [R84.92]    IF APPLICABLE: The Patient and/or patient representative Jennifer Limon and his family were provided with a choice of provider and agrees with the discharge plan. Freedom of choice list with basic dialogue that supports the patient's individualized plan of care/goals and shares the quality data associated with the providers was provided to:     Patient Representative Name:       The Patient and/or Patient Representative Agree with the Discharge Plan?       Ean Castellon RN  Case Management Department  Ph: [phone] Fax: [phone]

## 2023-02-25 ENCOUNTER — APPOINTMENT (OUTPATIENT)
Dept: GENERAL RADIOLOGY | Age: 58
End: 2023-02-25
Payer: COMMERCIAL

## 2023-02-25 LAB
ANION GAP SERPL CALCULATED.3IONS-SCNC: 12 MMOL/L (ref 7–16)
BUN BLDV-MCNC: 39 MG/DL (ref 6–20)
CALCIUM SERPL-MCNC: 9.1 MG/DL (ref 8.6–10.2)
CHLORIDE BLD-SCNC: 105 MMOL/L (ref 98–107)
CO2: 19 MMOL/L (ref 22–29)
CREAT SERPL-MCNC: 1.5 MG/DL (ref 0.7–1.2)
GFR SERPL CREATININE-BSD FRML MDRD: 53 ML/MIN/1.73
GLUCOSE BLD-MCNC: 121 MG/DL (ref 74–99)
HCT VFR BLD CALC: 47.4 % (ref 37–54)
HEMOGLOBIN: 15.7 G/DL (ref 12.5–16.5)
MAGNESIUM: 2.3 MG/DL (ref 1.6–2.6)
MCH RBC QN AUTO: 31.2 PG (ref 26–35)
MCHC RBC AUTO-ENTMCNC: 33.1 % (ref 32–34.5)
MCV RBC AUTO: 94.2 FL (ref 80–99.9)
PDW BLD-RTO: 15.2 FL (ref 11.5–15)
PHOSPHORUS: 4.4 MG/DL (ref 2.5–4.5)
PLATELET # BLD: 297 E9/L (ref 130–450)
PMV BLD AUTO: 9.7 FL (ref 7–12)
POTASSIUM SERPL-SCNC: 4.2 MMOL/L (ref 3.5–5)
RBC # BLD: 5.03 E12/L (ref 3.8–5.8)
SODIUM BLD-SCNC: 136 MMOL/L (ref 132–146)
WBC # BLD: 16.4 E9/L (ref 4.5–11.5)

## 2023-02-25 PROCEDURE — 85027 COMPLETE CBC AUTOMATED: CPT

## 2023-02-25 PROCEDURE — 99232 SBSQ HOSP IP/OBS MODERATE 35: CPT | Performed by: INTERNAL MEDICINE

## 2023-02-25 PROCEDURE — 6370000000 HC RX 637 (ALT 250 FOR IP): Performed by: INTERNAL MEDICINE

## 2023-02-25 PROCEDURE — 6360000002 HC RX W HCPCS: Performed by: INTERNAL MEDICINE

## 2023-02-25 PROCEDURE — 2580000003 HC RX 258: Performed by: INTERNAL MEDICINE

## 2023-02-25 PROCEDURE — 94660 CPAP INITIATION&MGMT: CPT

## 2023-02-25 PROCEDURE — 2060000000 HC ICU INTERMEDIATE R&B

## 2023-02-25 PROCEDURE — 2700000000 HC OXYGEN THERAPY PER DAY

## 2023-02-25 PROCEDURE — 36415 COLL VENOUS BLD VENIPUNCTURE: CPT

## 2023-02-25 PROCEDURE — 84100 ASSAY OF PHOSPHORUS: CPT

## 2023-02-25 PROCEDURE — 94640 AIRWAY INHALATION TREATMENT: CPT

## 2023-02-25 PROCEDURE — 71045 X-RAY EXAM CHEST 1 VIEW: CPT

## 2023-02-25 PROCEDURE — 83735 ASSAY OF MAGNESIUM: CPT

## 2023-02-25 PROCEDURE — 80048 BASIC METABOLIC PNL TOTAL CA: CPT

## 2023-02-25 RX ORDER — ACETAZOLAMIDE 500 MG/5ML
250 INJECTION, POWDER, LYOPHILIZED, FOR SOLUTION INTRAVENOUS DAILY
Status: DISCONTINUED | OUTPATIENT
Start: 2023-02-25 | End: 2023-02-25 | Stop reason: SDUPTHER

## 2023-02-25 RX ADMIN — PREDNISONE 40 MG: 20 TABLET ORAL at 08:32

## 2023-02-25 RX ADMIN — BUDESONIDE 500 MCG: 0.5 SUSPENSION RESPIRATORY (INHALATION) at 19:29

## 2023-02-25 RX ADMIN — SPIRONOLACTONE 25 MG: 25 TABLET ORAL at 08:33

## 2023-02-25 RX ADMIN — SODIUM CHLORIDE, PRESERVATIVE FREE 10 ML: 5 INJECTION INTRAVENOUS at 21:15

## 2023-02-25 RX ADMIN — FINASTERIDE 5 MG: 5 TABLET, FILM COATED ORAL at 08:32

## 2023-02-25 RX ADMIN — ACETAZOLAMIDE 250 MG: 500 INJECTION, POWDER, LYOPHILIZED, FOR SOLUTION INTRAVENOUS at 15:50

## 2023-02-25 RX ADMIN — AMLODIPINE BESYLATE 5 MG: 5 TABLET ORAL at 08:33

## 2023-02-25 RX ADMIN — METOPROLOL SUCCINATE 50 MG: 50 TABLET, EXTENDED RELEASE ORAL at 08:33

## 2023-02-25 RX ADMIN — SACUBITRIL AND VALSARTAN 1 TABLET: 24; 26 TABLET, FILM COATED ORAL at 08:31

## 2023-02-25 RX ADMIN — ENOXAPARIN SODIUM 40 MG: 100 INJECTION SUBCUTANEOUS at 08:32

## 2023-02-25 RX ADMIN — FUROSEMIDE 20 MG: 10 INJECTION, SOLUTION INTRAMUSCULAR; INTRAVENOUS at 18:16

## 2023-02-25 RX ADMIN — SACUBITRIL AND VALSARTAN 1 TABLET: 24; 26 TABLET, FILM COATED ORAL at 21:26

## 2023-02-25 RX ADMIN — ARFORMOTEROL TARTRATE 15 MCG: 15 SOLUTION RESPIRATORY (INHALATION) at 08:20

## 2023-02-25 RX ADMIN — SODIUM CHLORIDE, PRESERVATIVE FREE 10 ML: 5 INJECTION INTRAVENOUS at 08:34

## 2023-02-25 RX ADMIN — IPRATROPIUM BROMIDE AND ALBUTEROL SULFATE 3 ML: 2.5; .5 SOLUTION RESPIRATORY (INHALATION) at 08:17

## 2023-02-25 RX ADMIN — CLONIDINE HYDROCHLORIDE 0.2 MG: 0.2 TABLET ORAL at 08:32

## 2023-02-25 RX ADMIN — FUROSEMIDE 20 MG: 10 INJECTION, SOLUTION INTRAMUSCULAR; INTRAVENOUS at 08:33

## 2023-02-25 RX ADMIN — ARFORMOTEROL TARTRATE 15 MCG: 15 SOLUTION RESPIRATORY (INHALATION) at 19:29

## 2023-02-25 RX ADMIN — BUDESONIDE 500 MCG: 0.5 SUSPENSION RESPIRATORY (INHALATION) at 08:17

## 2023-02-25 RX ADMIN — CLONIDINE HYDROCHLORIDE 0.2 MG: 0.2 TABLET ORAL at 21:26

## 2023-02-25 RX ADMIN — ACETAMINOPHEN 650 MG: 325 TABLET ORAL at 08:32

## 2023-02-25 ASSESSMENT — PAIN DESCRIPTION - LOCATION: LOCATION: HEAD

## 2023-02-25 ASSESSMENT — PAIN SCALES - GENERAL
PAINLEVEL_OUTOF10: 4
PAINLEVEL_OUTOF10: 0
PAINLEVEL_OUTOF10: 0
PAINLEVEL_OUTOF10: 6

## 2023-02-25 ASSESSMENT — PAIN DESCRIPTION - ONSET: ONSET: ON-GOING

## 2023-02-25 ASSESSMENT — PAIN DESCRIPTION - DESCRIPTORS: DESCRIPTORS: ACHING;DISCOMFORT;DULL

## 2023-02-25 ASSESSMENT — PAIN DESCRIPTION - PAIN TYPE: TYPE: ACUTE PAIN

## 2023-02-25 ASSESSMENT — PAIN - FUNCTIONAL ASSESSMENT: PAIN_FUNCTIONAL_ASSESSMENT: PREVENTS OR INTERFERES SOME ACTIVE ACTIVITIES AND ADLS

## 2023-02-25 ASSESSMENT — PAIN DESCRIPTION - FREQUENCY: FREQUENCY: CONTINUOUS

## 2023-02-25 NOTE — PROGRESS NOTES
PROGRESS NOTE       PATIENT PROBLEM LIST:  Patient Active Problem List   Diagnosis Code    CTS (carpal tunnel syndrome) G56.00    Cervical arthritis M47.812    Essential hypertension I10    Hyperlipidemia E78.5    INOCENCIA on CPAP G47.33, Z99.89    Community acquired bacterial pneumonia J15.9    CAP (community acquired pneumonia) due to Chlamydia species J16.0    Acute respiratory failure with hypoxia ([facility]) J96.01    COPD exacerbation ([facility]) J44.1    Precordial chest pain R07.2    Unstable angina ([facility]) I20.0    Respiratory distress R06.03    Shortness of breath R06.02    CHF (congestive heart failure), NYHA class I, acute on chronic, diastolic ([facility]) F55.41    Congestive heart failure ([facility] [address] I50.9    Primary hypertension I10       SUBJECTIVE:  Ovidio Bonilla states he does not feel well and is excessively short of breath at rest however CO2 is 19. Suspect that there is a primary pulmonary respiratory component to his symptoms as he is BNP is not excessively elevated despite the presence of renal insufficiency. REVIEW OF SYSTEMS:  General ROS: negative for - fatigue, malaise,  weight gain or weight loss  Psychological ROS: negative for - anxiety , depression  Ophthalmic ROS: negative for - decreased vision or visual distortion. ENT ROS: negative  Allergy and Immunology ROS: negative  Hematological and Lymphatic ROS: negative  Endocrine: no heat or cold intolerance and no polyphagia, polydipsia, or polyuria  Respiratory ROS: positive for - shortness of breath  Cardiovascular ROS: positive for - dyspnea on exertion, irregular heartbeat, and shortness of breath.   Gastrointestinal ROS: no abdominal pain, change in bowel habits, or black or bloody stools  Genito-Urinary ROS: no nocturia, dysuria, trouble voiding, frequency or hematuria  Musculoskeletal ROS: negative for- myalgias, arthralgias, or claudication  Neurological ROS: no TIA or stroke symptoms otherwise no significant change in symptoms or problems since yesterday as documented in previous progress notes. SCHEDULED MEDICATIONS:   acetaZOLAMIDE (DIAMOX) IVPB  250 mg IntraVENous Daily    amLODIPine  5 mg Oral Daily    cloNIDine  0.2 mg Oral BID    finasteride  5 mg Oral Daily    metoprolol succinate  50 mg Oral Daily    nicotine  1 patch TransDERmal Q24H    predniSONE  40 mg Oral Daily    sacubitril-valsartan  1 tablet Oral BID    spironolactone  25 mg Oral Daily    sodium chloride flush  5-40 mL IntraVENous 2 times per day    enoxaparin  40 mg SubCUTAneous Daily    furosemide  20 mg IntraVENous BID    budesonide  0.5 mg Nebulization BID    arformoterol tartrate  15 mcg Nebulization BID       VITAL SIGNS:                                                                                                                          /77   Pulse 68   Temp 97.8 °F (36.6 °C) (Oral)   Resp 22   Ht 5' 7\" (1.702 m)   Wt 218 lb 12.8 oz (99.2 kg)   SpO2 99%   BMI 34.27 kg/m²   Patient Vitals for the past 96 hrs (Last 3 readings):   Weight   02/25/23 0009 218 lb 12.8 oz (99.2 kg)   02/24/23 0019 216 lb 1.6 oz (98 kg)   02/23/23 0100 216 lb 4.8 oz (98.1 kg)     OBJECTIVE:    HEENT: PERRL, EOM  Intact; sclera non-icteric, conjunctiva pink. Carotids are brisk in upstroke with normal contour. No carotid bruits. Normal jugular venous pulsation at 45°. No palpable cervical nor supraclavicular nodes. Thyroid not palpable. Trachea midline. Chest: Even excursion  Lungs: CTA B, no expiratory wheezes or rhonchi, no decreased tactile fremitus without inspiratory rales. Heart: Regular  rhythm; S1 > S2, no gallop or murmur. No clicks, rub, palpable thrills   or heaves. PMI nondisplaced, 5th intercostal space MCL. Abdomen: Soft, nontender, nondistended,  moderately protuberant, no masses or organomegaly. Bowel sounds active. Extremities: Without clubbing, cyanosis or edema.  Pulses present 3+ upper extermities bilaterally; present 1+ DP  bilaterally and present 1+ PT bilaterally. Data:   Scheduled Meds: Reviewed  Continuous Infusions:    sodium chloride         Intake/Output Summary (Last 24 hours) at 2/25/2023 1354  Last data filed at 2/25/2023 1215  Gross per 24 hour   Intake --   Output 2175 ml   Net -2175 ml     CBC:   Recent Labs     02/22/23  1900 02/23/23  0235 02/25/23  0250   WBC 9.9 7.0 16.4*   HGB 14.8 14.7 15.7   HCT 43.3 43.9 47.4    280 297     BMP:  Recent Labs     02/22/23  1900 02/23/23  0235 02/25/23  0250    139 136   K 4.7 4.5 4.2   * 107 105   CO2 22 21* 19*   BUN 28* 28* 39*   CREATININE 1.7* 1.6* 1.5*   LABGLOM 46 49 53     ABGs:   Lab Results   Component Value Date/Time    PH 7.475 11/23/2020 08:51 AM    PO2 83.0 11/23/2020 08:51 AM    PCO2 32.3 11/23/2020 08:51 AM     INR: No results for input(s): INR in the last 72 hours. PRO-BNP:   Lab Results   Component Value Date    PROBNP 1,348 (H) 02/24/2023    PROBNP 12,550 (H) 02/22/2023      TSH:   Lab Results   Component Value Date    TSH 0.809 04/18/2019      Cardiac Injury Profile:   Recent Labs     02/22/23  1900   TROPHS 43*      Lipid Profile:   Lab Results   Component Value Date/Time    TRIG 217 01/02/2018 11:47 AM    HDL 58 01/02/2018 11:47 AM    LDLCALC 144 01/02/2018 11:47 AM    CHOL 245 01/02/2018 11:47 AM      Hemoglobin A1C: No components found for: HGBA1C      RAD:   No results found.       EKG: See Report  Echo: See Report      IMPRESSIONS:  Patient Active Problem List   Diagnosis Code    CTS (carpal tunnel syndrome) G56.00    Cervical arthritis M47.812    Essential hypertension I10    Hyperlipidemia E78.5    INOCENCIA on CPAP G47.33, Z99.89    Community acquired bacterial pneumonia J15.9    CAP (community acquired pneumonia) due to Chlamydia species J16.0    Acute respiratory failure with hypoxia (HCC) J96.01    COPD exacerbation (HCC) J44.1    Precordial chest pain R07.2    Unstable angina ([address] I20.0    Respiratory distress R06.03    Shortness of breath R06.02    CHF (congestive heart failure), NYHA class I, acute on chronic, diastolic (HCC) E08.95    Congestive heart failure (HCC) I50.9    Primary hypertension I10       RECOMMENDATIONS:  proBNP despite renal dysfunction is not excessively high but with underlying acidosis we will add acetazolamide but would also reassess pulmonary status. I have spent more than 25 minutes face to face with Cathy Bruce and reviewing notes and laboratory data, with greater than 50% of this time instructing and counseling the patient face to face regarding my findings and recommendations and I have answered all questions as posed to me by Mr. Xochilt Salazar. Kate Gutierrez, DO FACP,FACC,FSCAI      NOTE:  This report was transcribed using voice recognition software.   Every effort was made to ensure accuracy; however, inadvertent computerized transcription errors may be present

## 2023-02-25 NOTE — PROGRESS NOTES
[facility] Hospitalist   Progress Note    Admitting Date and Time: 2/22/2023  5:56 PM  Admit Dx: Acute respiratory failure with hypoxia (HCC) [J96.01]  CHF (congestive heart failure), NYHA class I, acute on chronic, diastolic (HCC) [T50.64]    Subjective:    Patient was admitted with Acute respiratory failure with hypoxia ([facility] [address] [J96.01]  CHF (congestive heart failure), NYHA class I, acute on chronic, diastolic ([facility] [address] [T44.06].  Patient denies fever, chills, cp, n/v. Pt c/o sob.      acetaZOLAMIDE (DIAMOX) IVPB  250 mg IntraVENous Daily    amLODIPine  5 mg Oral Daily    cloNIDine  0.2 mg Oral BID    finasteride  5 mg Oral Daily    metoprolol succinate  50 mg Oral Daily    nicotine  1 patch TransDERmal Q24H    predniSONE  40 mg Oral Daily    sacubitril-valsartan  1 tablet Oral BID    spironolactone  25 mg Oral Daily    sodium chloride flush  5-40 mL IntraVENous 2 times per day    enoxaparin  40 mg SubCUTAneous Daily    furosemide  20 mg IntraVENous BID    budesonide  0.5 mg Nebulization BID    arformoterol tartrate  15 mcg Nebulization BID     sodium chloride, 1 spray, PRN  perflutren lipid microspheres, 1.5 mL, ONCE PRN  hydrALAZINE, 10 mg, Q6H PRN  ipratropium-albuterol, 1 vial, Q6H PRN  sodium chloride flush, 5-40 mL, PRN  sodium chloride, , PRN  ondansetron, 4 mg, Q8H PRN   Or  ondansetron, 4 mg, Q6H PRN  polyethylene glycol, 17 g, Daily PRN  acetaminophen, 650 mg, Q6H PRN   Or  acetaminophen, 650 mg, Q6H PRN         Objective:    BP (!) 123/90   Pulse 70   Temp 97.7 °F (36.5 °C) (Oral)   Resp 20   Ht 5' 7\" (1.702 m)   Wt 218 lb 12.8 oz (99.2 kg)   SpO2 99%   BMI 34.27 kg/m²   Skin: warm and dry, no rash or erythema  Pulmonary/Chest: clear to auscultation bilaterally- no wheezes, rales or rhonchi, normal air movement, no respiratory distress  Cardiovascular: rhythm reg at rate of 72  Abdomen: soft, non-tender, non-distended, normal bowel sounds, no masses or organomegaly  Extremities: no cyanosis, no clubbing, and no edema      Recent Labs     02/22/23  1900 02/23/23  0235 02/25/23  0250    139 136   K 4.7 4.5 4.2   * 107 105   CO2 22 21* 19*   BUN 28* 28* 39*   CREATININE 1.7* 1.6* 1.5*   GLUCOSE 104* 176* 121*   CALCIUM 9.3 9.4 9.1       Recent Labs     02/22/23  1900 02/23/23  0235 02/25/23  0250   WBC 9.9 7.0 16.4*   RBC 4.74 4.74 5.03   HGB 14.8 14.7 15.7   HCT 43.3 43.9 47.4   MCV 91.4 92.6 94.2   MCH 31.2 31.0 31.2   MCHC 34.2 33.5 33.1   RDW 14.8 14.6 15.2*    280 297   MPV 9.3 9.5 9.7       CBC:   Lab Results   Component Value Date/Time    WBC 16.4 02/25/2023 02:50 AM    RBC 5.03 02/25/2023 02:50 AM    HGB 15.7 02/25/2023 02:50 AM    HCT 47.4 02/25/2023 02:50 AM    MCV 94.2 02/25/2023 02:50 AM    MCH 31.2 02/25/2023 02:50 AM    MCHC 33.1 02/25/2023 02:50 AM    RDW 15.2 02/25/2023 02:50 AM     02/25/2023 02:50 AM    MPV 9.7 02/25/2023 02:50 AM     BMP:    Lab Results   Component Value Date/Time     02/25/2023 02:50 AM    K 4.2 02/25/2023 02:50 AM    K 4.5 02/23/2023 02:35 AM     02/25/2023 02:50 AM    CO2 19 02/25/2023 02:50 AM    BUN 39 02/25/2023 02:50 AM    LABALBU 4.0 02/23/2023 02:35 AM    CREATININE 1.5 02/25/2023 02:50 AM    CALCIUM 9.1 02/25/2023 02:50 AM    GFRAA >60 08/11/2021 09:59 PM    LABGLOM 53 02/25/2023 02:50 AM    GLUCOSE 121 02/25/2023 02:50 AM     Magnesium:    Lab Results   Component Value Date/Time    MG 2.3 02/25/2023 02:50 AM     Phosphorus:    Lab Results   Component Value Date/Time    PHOS 4.4 02/25/2023 02:50 AM        Radiology:   XR CHEST PORTABLE   Final Result   No acute process. XR CHEST PORTABLE   Final Result   Early pulmonary vascular congestion.              Assessment:    Principal Problem:    Acute respiratory failure with hypoxia (HCC)  Active Problems:    CHF (congestive heart failure), NYHA class I, acute on chronic, diastolic (HCC)    Congestive heart failure (HCC)    Primary hypertension  Resolved Problems:    * No resolved hospital problems. *      Plan:  Acute respiratory failure with hypoxia(88%o2sat)POA  wean o2 as able. Pulm consulted  Acute on chronic systolic/diastolic chf  diuretics  low salt diet. D/w cardiology. Monitor wts and I/o's. Echo reviewed. Pt on diamox  Acidosis montior  Htn continue medication low salt diet  Copd exacerbation pt on steroids and nebs.  No new infectious symptoms and monitor off abx  Leukocytosis likely from steroids monitor for infectious symptoms  Atelectasis discussed use of IS        Electronically signed by Zayda Jaime DO on 2/25/2023 at 6:00 PM

## 2023-02-25 NOTE — PROGRESS NOTES
[facility] Hospitalist   Progress Note    Admitting Date and Time: 2/22/2023  5:56 PM  Admit Dx: Acute respiratory failure with hypoxia (HCC) [J96.01]  CHF (congestive heart failure), NYHA class I, acute on chronic, diastolic (HCC) [U66.25]    Subjective:    Patient was admitted with Acute respiratory failure with hypoxia ([facility] [address] [J96.01]  CHF (congestive heart failure), NYHA class I, acute on chronic, diastolic ([facility] [address] [V16.16].  Patient denies fever, chills, cp, sob, n/v.     amLODIPine  5 mg Oral Daily    cloNIDine  0.2 mg Oral BID    finasteride  5 mg Oral Daily    metoprolol succinate  50 mg Oral Daily    nicotine  1 patch TransDERmal Q24H    predniSONE  40 mg Oral Daily    sacubitril-valsartan  1 tablet Oral BID    spironolactone  25 mg Oral Daily    sodium chloride flush  5-40 mL IntraVENous 2 times per day    enoxaparin  40 mg SubCUTAneous Daily    furosemide  20 mg IntraVENous BID    budesonide  0.5 mg Nebulization BID    arformoterol tartrate  15 mcg Nebulization BID     sodium chloride, 1 spray, PRN  perflutren lipid microspheres, 1.5 mL, ONCE PRN  hydrALAZINE, 10 mg, Q6H PRN  ipratropium-albuterol, 1 vial, Q6H PRN  sodium chloride flush, 5-40 mL, PRN  sodium chloride, , PRN  ondansetron, 4 mg, Q8H PRN   Or  ondansetron, 4 mg, Q6H PRN  polyethylene glycol, 17 g, Daily PRN  acetaminophen, 650 mg, Q6H PRN   Or  acetaminophen, 650 mg, Q6H PRN         Objective:    /87   Pulse 75   Temp 97.4 °F (36.3 °C) (Oral)   Resp 20   Ht 5' 7\" (1.702 m)   Wt 216 lb 1.6 oz (98 kg)   SpO2 97%   BMI 33.85 kg/m²   Skin: warm and dry, no rash or erythema  Pulmonary/Chest: clear to auscultation bilaterally- no wheezes, rales or rhonchi, normal air movement, no respiratory distress  Cardiovascular: rhythm reg at rate of 76  Abdomen: soft, non-tender, non-distended, normal bowel sounds, no masses or organomegaly  Extremities: no cyanosis, no clubbing, and 1+ b/l le edema      Recent Labs 02/22/23  1900 02/23/23  0235    139   K 4.7 4.5   * 107   CO2 22 21*   BUN 28* 28*   CREATININE 1.7* 1.6*   GLUCOSE 104* 176*   CALCIUM 9.3 9.4       Recent Labs     02/22/23  1900 02/23/23  0235   WBC 9.9 7.0   RBC 4.74 4.74   HGB 14.8 14.7   HCT 43.3 43.9   MCV 91.4 92.6   MCH 31.2 31.0   MCHC 34.2 33.5   RDW 14.8 14.6    280   MPV 9.3 9.5            Radiology:   XR CHEST PORTABLE   Final Result   Early pulmonary vascular congestion. Assessment:    Principal Problem:    Acute respiratory failure with hypoxia (HCC)  Active Problems:    CHF (congestive heart failure), NYHA class I, acute on chronic, diastolic (HCC)    Congestive heart failure ([address]    Primary hypertension  Resolved Problems:    * No resolved hospital problems. *      Plan:  Acute respiratory failure with hypoxia(88%o2sat)POA  wean o2 as able  Acute on chronic systolic/diastolic chf  diuretics  low salt diet. D/w cardiology. Monitor wts and I/o's check echo  Htn continue medication low salt diet  Copd exacerbation pt on steroids and nebs.  No new infectious symptoms and monitor off abx        Electronically signed by Annette Elliott DO on 2/24/2023 at 7:57 PM

## 2023-02-25 NOTE — CONSULTS
[facility]  [address], [address]  [facility], [address] [facility]    Pulmonary Consultation      Reason for Consult:  sob and swelling    Requesting Physician:  Humble Jimenez DO    CHIEF COMPLAINT:  sob and swelling    History Obtained From:  patient    HISTORY OF PRESENT ILLNESS:                The patient is a 59 y.o. male with significant past medical history of COPD and CHF who presents with increased sob and swelling. Claims has been on-off sick for the past month, given antibiotic, claims got better with cough/sputum production, but then over the past week gradually got worse with swelling of lower ext and sob. Supposed on brovana and pulmicort nebs and Trelegy, claims got NIPPV just the past week but not aware of settings (autopap 4-16 from 2018), claims stopped smoking the past week.  Currently sitting up at side of bed on 2 li NC, claims slightly worse with breathing today, felt better yesterday, not much cough/congestion    Past Medical History:        Diagnosis Date    Alcohol abuse     CHF (congestive heart failure) (HCC)     COPD (chronic obstructive pulmonary disease) ([facility] [address]     History of cocaine use     Hyperlipidemia     Hypertension     Marijuana use     quit November 2017     MI (myocardial infarction) ([facility] [address]     alberto        Past Surgical History:        Procedure Laterality Date    BRONCHOSCOPY N/A 8/3/2021    BRONCHOSCOPY DIAGNOSTIC OR CELL [address] ONLY performed by Tyler Lugo MD at [address] [place] [address]  11/03/2020    DR Alex Romero    CERVICAL FUSION  11/18/15    cervical laminectomy & fusion c4-c6, with rods & screws    POLYSOMNOGRAPHY  01/29/2018    AHI=29.8    WRIST SURGERY         Current Medications:    Current Facility-Administered Medications: acetaZOLAMIDE (DIAMOX) 250 mg in sodium chloride 0.9 % 100 mL IVPB, 250 mg, IntraVENous, Daily  sodium chloride (OCEAN, BABY AYR) 0.65 % nasal spray 1 spray, 1 spray, Each Nostril, PRN  perflutren lipid microspheres (DEFINITY) injection 1.5 mL, 1.5 mL, IntraVENous, ONCE PRN  hydrALAZINE (APRESOLINE) injection 10 mg, 10 mg, IntraVENous, Q6H PRN  amLODIPine (NORVASC) tablet 5 mg, 5 mg, Oral, Daily  cloNIDine (CATAPRES) tablet 0.2 mg, 0.2 mg, Oral, BID  finasteride (PROSCAR) tablet 5 mg, 5 mg, Oral, Daily  ipratropium-albuterol (DUONEB) nebulizer solution 3 mL, 1 vial, Nebulization, Q6H PRN  metoprolol succinate (TOPROL XL) extended release tablet 50 mg, 50 mg, Oral, Daily  nicotine (NICODERM CQ) 21 MG/24HR 1 patch, 1 patch, TransDERmal, Q24H  predniSONE (DELTASONE) tablet 40 mg, 40 mg, Oral, Daily  sacubitril-valsartan (ENTRESTO) 24-26 MG per tablet 1 tablet, 1 tablet, Oral, BID  spironolactone (ALDACTONE) tablet 25 mg, 25 mg, Oral, Daily  sodium chloride flush 0.9 % injection 5-40 mL, 5-40 mL, IntraVENous, 2 times per day  sodium chloride flush 0.9 % injection 5-40 mL, 5-40 mL, IntraVENous, PRN  0.9 % sodium chloride infusion, , IntraVENous, PRN  enoxaparin (LOVENOX) injection 40 mg, 40 mg, SubCUTAneous, Daily  ondansetron (ZOFRAN-ODT) disintegrating tablet 4 mg, 4 mg, Oral, Q8H PRN **OR** ondansetron (ZOFRAN) injection 4 mg, 4 mg, IntraVENous, Q6H PRN  polyethylene glycol (GLYCOLAX) packet 17 g, 17 g, Oral, Daily PRN  acetaminophen (TYLENOL) tablet 650 mg, 650 mg, Oral, Q6H PRN **OR** acetaminophen (TYLENOL) suppository 650 mg, 650 mg, Rectal, Q6H PRN  furosemide (LASIX) injection 20 mg, 20 mg, IntraVENous, BID  budesonide (PULMICORT) nebulizer suspension 500 mcg, 0.5 mg, Nebulization, BID  arformoterol tartrate (BROVANA) nebulizer solution 15 mcg, 15 mcg, Nebulization, BID    Allergies:  Patient has no known allergies. Social History:    TOBACCO:   reports that he quit smoking 7 days ago. His smoking use included cigarettes. He started smoking about 43 years ago. He has a 60.00 pack-year smoking history.  He has never used smokeless tobacco.    Family History:       Problem Relation Age of Onset    Arthritis Mother     Other Mother         glucoma    Heart Disease Father     High Blood Pressure Brother     Other Brother         sleep apnea    High Blood Pressure Brother     Other Brother         sleep apnea    High Blood Pressure Brother     Other Brother         sleep apnea    High Blood Pressure Brother     Other Brother         sleep apnea    Cancer Brother         gitaogeal -  at 62       REVIEW OF SYSTEMS:    RESPIRATORY:  sob and cough  CARDIOVASCULAR:  lower ext swelling  Remainder of complete ROS is negative. PHYSICAL EXAM:      Vitals:    BP (!) 123/90   Pulse 70   Temp 97.7 °F (36.5 °C) (Oral)   Resp 20   Ht 5' 7\" (1.702 m)   Wt 218 lb 12.8 oz (99.2 kg)   SpO2 99%   BMI 34.27 kg/m²     CONSTITUTIONAL:  obese  EYES:  Lids and lashes normal, pupils equal, round and reactive to light, extra ocular muscles intact, sclera clear, conjunctiva normal  ENT:  Normocephalic, without obvious abnormality, atraumatic, sinuses nontender on palpation, external ears without lesions, oral pharynx with moist mucus membranes, tonsils without erythema or exudates, gums normal and good dentition. LUNGS:  bilateral ronchi with cough  CARDIOVASCULAR:  Normal apical impulse, regular rate and rhythm, normal S1 and S2, no S3 or S4, and no murmur noted  ABDOMEN:  No scars, normal bowel sounds, soft, non-distended, non-tender, no masses palpated, no hepatosplenomegally  MUSCULOSKELETAL:  minimal bipedal edema  NEUROLOGIC:  Awake, alert, oriented to name, place and time. Cranial nerves II-XII are grossly intact.     DATA:    CBC:   Recent Labs     23  1900 23  0235 23  0250   WBC 9.9 7.0 16.4*   HGB 14.8 14.7 15.7   HCT 43.3 43.9 47.4   MCV 91.4 92.6 94.2    280 297       BMP:  Recent Labs     23  1900 23  0235 23  0250    139 136   K 4.7 4.5 4.2   * 107 105   CO2 22 21* 19*   PHOS  --   --  4.4   BUN 28* 28* 39*   CREATININE 1.7* 1.6* 1.5*    ALB:3,BILIDIR:3,BILITOT:3,ALKPHOS:3)@    PT/INR: No results for input(s): PROTIME, INR in the last 72 hours. ABG:   No results for input(s): PH, PO2, PCO2, HCO3, BE, O2SAT, METHB, O2HB, COHB, O2CON, HHB, THB in the last 72 hours. Radiology Review:  CXR reviewed with slightly increased vascular markings similar compared to previous    IMPRESSION/RECOMMENDATIONS:      COPD  CHF  Hypoxia    Cont with pulmicort and brovana, add albuterol nebs qid and see if helps with respiratory function  Cont with steroids, taper as tolerated  Cont with oxygen, observe if able to taper off or will require continued out-pt use  Watch fluid balance, diurese as per PCP and Cardiology  On diamox, low bicarb, may need to stop if goes down further  OOB to chair, ambulate with assistance      Time at the bedside, reviewing labs and radiographs, reviewing notes and consultations, discussing with staff and family was more than 55 minutes. Thanks for letting us see this patient in consultation. Please contact us with any questions. Office [phone] or after hours through [employer], x [phone].

## 2023-02-26 PROCEDURE — 6360000002 HC RX W HCPCS: Performed by: INTERNAL MEDICINE

## 2023-02-26 PROCEDURE — 2580000003 HC RX 258: Performed by: INTERNAL MEDICINE

## 2023-02-26 PROCEDURE — 2700000000 HC OXYGEN THERAPY PER DAY

## 2023-02-26 PROCEDURE — 6370000000 HC RX 637 (ALT 250 FOR IP): Performed by: INTERNAL MEDICINE

## 2023-02-26 PROCEDURE — 94660 CPAP INITIATION&MGMT: CPT

## 2023-02-26 PROCEDURE — 6370000000 HC RX 637 (ALT 250 FOR IP)

## 2023-02-26 PROCEDURE — 94640 AIRWAY INHALATION TREATMENT: CPT

## 2023-02-26 PROCEDURE — 2060000000 HC ICU INTERMEDIATE R&B

## 2023-02-26 RX ORDER — CLONIDINE HYDROCHLORIDE 0.1 MG/1
0.1 TABLET ORAL 2 TIMES DAILY
Status: DISCONTINUED | OUTPATIENT
Start: 2023-02-26 | End: 2023-02-28

## 2023-02-26 RX ORDER — TORSEMIDE 20 MG/1
20 TABLET ORAL DAILY
Status: DISCONTINUED | OUTPATIENT
Start: 2023-02-26 | End: 2023-03-02 | Stop reason: HOSPADM

## 2023-02-26 RX ORDER — RANOLAZINE 500 MG/1
500 TABLET, EXTENDED RELEASE ORAL 2 TIMES DAILY
Status: DISCONTINUED | OUTPATIENT
Start: 2023-02-26 | End: 2023-03-02 | Stop reason: HOSPADM

## 2023-02-26 RX ORDER — PANTOPRAZOLE SODIUM 40 MG/1
40 TABLET, DELAYED RELEASE ORAL
Status: DISCONTINUED | OUTPATIENT
Start: 2023-02-26 | End: 2023-03-02 | Stop reason: HOSPADM

## 2023-02-26 RX ORDER — CALCIUM CARBONATE 200(500)MG
500 TABLET,CHEWABLE ORAL 3 TIMES DAILY PRN
Status: DISCONTINUED | OUTPATIENT
Start: 2023-02-26 | End: 2023-03-02 | Stop reason: HOSPADM

## 2023-02-26 RX ADMIN — RANOLAZINE 500 MG: 500 TABLET, FILM COATED, EXTENDED RELEASE ORAL at 14:52

## 2023-02-26 RX ADMIN — ENOXAPARIN SODIUM 40 MG: 100 INJECTION SUBCUTANEOUS at 09:35

## 2023-02-26 RX ADMIN — SACUBITRIL AND VALSARTAN 1 TABLET: 24; 26 TABLET, FILM COATED ORAL at 09:36

## 2023-02-26 RX ADMIN — BUDESONIDE 500 MCG: 0.5 SUSPENSION RESPIRATORY (INHALATION) at 08:08

## 2023-02-26 RX ADMIN — FINASTERIDE 5 MG: 5 TABLET, FILM COATED ORAL at 09:36

## 2023-02-26 RX ADMIN — TORSEMIDE 20 MG: 20 TABLET ORAL at 14:52

## 2023-02-26 RX ADMIN — PREDNISONE 40 MG: 20 TABLET ORAL at 09:35

## 2023-02-26 RX ADMIN — PANTOPRAZOLE SODIUM 40 MG: 40 TABLET, DELAYED RELEASE ORAL at 06:13

## 2023-02-26 RX ADMIN — SALINE NASAL SPRAY 1 SPRAY: 1.5 SOLUTION NASAL at 20:31

## 2023-02-26 RX ADMIN — ARFORMOTEROL TARTRATE 15 MCG: 15 SOLUTION RESPIRATORY (INHALATION) at 19:51

## 2023-02-26 RX ADMIN — SALINE NASAL SPRAY 1 SPRAY: 1.5 SOLUTION NASAL at 17:05

## 2023-02-26 RX ADMIN — SPIRONOLACTONE 25 MG: 25 TABLET ORAL at 09:35

## 2023-02-26 RX ADMIN — RANOLAZINE 500 MG: 500 TABLET, FILM COATED, EXTENDED RELEASE ORAL at 20:31

## 2023-02-26 RX ADMIN — SODIUM CHLORIDE, PRESERVATIVE FREE 10 ML: 5 INJECTION INTRAVENOUS at 09:35

## 2023-02-26 RX ADMIN — FUROSEMIDE 20 MG: 10 INJECTION, SOLUTION INTRAMUSCULAR; INTRAVENOUS at 09:35

## 2023-02-26 RX ADMIN — ACETAZOLAMIDE 250 MG: 500 INJECTION, POWDER, LYOPHILIZED, FOR SOLUTION INTRAVENOUS at 10:05

## 2023-02-26 RX ADMIN — METOPROLOL SUCCINATE 50 MG: 50 TABLET, EXTENDED RELEASE ORAL at 09:35

## 2023-02-26 RX ADMIN — ARFORMOTEROL TARTRATE 15 MCG: 15 SOLUTION RESPIRATORY (INHALATION) at 08:07

## 2023-02-26 RX ADMIN — BUDESONIDE 500 MCG: 0.5 SUSPENSION RESPIRATORY (INHALATION) at 19:51

## 2023-02-26 RX ADMIN — CLONIDINE HYDROCHLORIDE 0.1 MG: 0.1 TABLET ORAL at 20:31

## 2023-02-26 RX ADMIN — CLONIDINE HYDROCHLORIDE 0.2 MG: 0.2 TABLET ORAL at 09:35

## 2023-02-26 RX ADMIN — SACUBITRIL AND VALSARTAN 1 TABLET: 49; 51 TABLET, FILM COATED ORAL at 20:31

## 2023-02-26 RX ADMIN — SODIUM CHLORIDE, PRESERVATIVE FREE 10 ML: 5 INJECTION INTRAVENOUS at 20:31

## 2023-02-26 RX ADMIN — AMLODIPINE BESYLATE 5 MG: 5 TABLET ORAL at 09:35

## 2023-02-26 ASSESSMENT — PAIN SCALES - GENERAL
PAINLEVEL_OUTOF10: 0

## 2023-02-26 NOTE — PROGRESS NOTES
Date: 2/25/2023    Time: 10:49 PM    Patient Placed On BIPAP/CPAP/ Non-Invasive Ventilation? Yes    If no must comment. Facial area red/color change? No           If YES are Blister/Lesion present? No   If yes must notify nursing staff  BIPAP/CPAP skin barrier?   Yes    Skin barrier type:mepilexlite       Comments:        Matthew Goldberg, RATNA    02/25/23 2462   NIV Type   $NIV $Daily Charge   Skin Assessment Clean, dry, & intact   Skin Protection for O2 Device Yes   Orientation Middle   Location Nose   Intervention(s) Skin Barrier   NIV Started/Stopped On   Equipment Type Focus   Mode CPAP   Mask Type Nasal mask   Mask Size Large   Settings/Measurements   CPAP/EPAP 6 cmH2O   Vt (Measured) 1104 mL   Resp 21   O2 Flow Rate (L/min) 4 L/min   Minute Volume (L/min) 22.2 Liters   Mask Leak (lpm) 17 lpm   Comfort Level Good   Using Accessory Muscles No   SpO2 99

## 2023-02-26 NOTE — PROGRESS NOTES
[facility]  [address], [address]  [place], [facility]      Pulmonary Progress Note      SUBJECTIVE:  claims slightly better with breathing, still with cough and minimal sputum production, sometimes with specks/streaks of blood though thinks more from his nose, has nasal saline at bedside using daily. Currently on 3 li NC, sitting at side of bed, conversant, looking comfortable with breathing, ambulating a little in room and tolerating some better.     OBJECTIVE    Medications    Continuous Infusions:   sodium chloride         Scheduled Meds:   pantoprazole  40 mg Oral QAM AC    ranolazine  500 mg Oral BID    cloNIDine  0.1 mg Oral BID    sacubitril-valsartan  1 tablet Oral BID    torsemide  20 mg Oral Daily    acetaZOLAMIDE (DIAMOX) IVPB  250 mg IntraVENous Daily    amLODIPine  5 mg Oral Daily    finasteride  5 mg Oral Daily    metoprolol succinate  50 mg Oral Daily    nicotine  1 patch TransDERmal Q24H    predniSONE  40 mg Oral Daily    spironolactone  25 mg Oral Daily    sodium chloride flush  5-40 mL IntraVENous 2 times per day    enoxaparin  40 mg SubCUTAneous Daily    budesonide  0.5 mg Nebulization BID    arformoterol tartrate  15 mcg Nebulization BID       PRN Meds:calcium carbonate, sodium chloride, perflutren lipid microspheres, hydrALAZINE, ipratropium-albuterol, sodium chloride flush, sodium chloride, ondansetron **OR** ondansetron, polyethylene glycol, acetaminophen **OR** acetaminophen    Physical    VITALS:  /78   Pulse 75   Temp 97.7 °F (36.5 °C) (Oral)   Resp 20   Ht 5' 7\" (1.702 m)   Wt 219 lb 6.4 oz (99.5 kg)   SpO2 100%   BMI 34.36 kg/m²     24HR INTAKE/OUTPUT:      Intake/Output Summary (Last 24 hours) at 2023 1623  Last data filed at 2023 1453  Gross per 24 hour   Intake --   Output 2350 ml   Net -2350 ml       24HR PULSE OXIMETRY RANGE:    SpO2  Av.5 %  Min: 98 %  Max: 100 %    General appearance: alert, appears stated age, and cooperative  Lungs: rhonchi bilaterally with cough  Heart: regular rate and rhythm, S1, S2 normal, no murmur, click, rub or gallop  Abdomen: soft, non-tender; bowel sounds normal; no masses,  no organomegaly  Extremities: bipedal edema  Neurologic: Mental status: Alert, oriented, thought content appropriate    Data    CBC:   Recent Labs     02/25/23  0250   WBC 16.4*   HGB 15.7   HCT 47.4   MCV 94.2          BMP:  Recent Labs     02/25/23  0250      K 4.2      CO2 19*   PHOS 4.4   BUN 39*   CREATININE 1.5*    ALB:3,BILIDIR:3,BILITOT:3,ALKPHOS:3)@    PT/INR: No results for input(s): PROTIME, INR in the last 72 hours. ABG:   No results for input(s): PH, PO2, PCO2, HCO3, BE, O2SAT, METHB, O2HB, COHB, O2CON, HHB, THB in the last 72 hours. Radiology/Other tests reviewed: none    Assessment:     Principal Problem:    Acute respiratory failure with hypoxia (HCC)  Active Problems:    CHF (congestive heart failure), NYHA class I, acute on chronic, diastolic (HCC)    Congestive heart failure ([facility] [address]    Primary hypertension  Resolved Problems:    * No resolved hospital problems. *      Plan:       Cont with nebs, observe respiratory function  Can use nasal saline tid, observe if any epistaxis and hemoptysis better  Watch fluid balance, diurese as per Cardiology  Cont with steroids, taper as tolerated  Cont with oxygen, taper as tolerated  OOB to chair, ambulate with assistance      Time at the bedside, reviewing labs and radiographs, reviewing notes and consultations, discussing with staff and family was more than 35 minutes. Thanks for letting us see this patient in consultation. Please contact us with any questions. Office [phone] or after hours through [employer], x [phone].

## 2023-02-26 NOTE — PROGRESS NOTES
PROGRESS NOTE       PATIENT PROBLEM LIST:  Patient Active Problem List   Diagnosis Code    CTS (carpal tunnel syndrome) G56.00    Cervical arthritis M47.812    Essential hypertension I10    Hyperlipidemia E78.5    INOCENCIA on CPAP G47.33, Z99.89    Community acquired bacterial pneumonia J15.9    CAP (community acquired pneumonia) due to Chlamydia species J16.0    Acute respiratory failure with hypoxia ([facility]) J96.01    COPD exacerbation ([facility]) J44.1    Precordial chest pain R07.2    Unstable angina ([facility]) I20.0    Respiratory distress R06.03    Shortness of breath R06.02    CHF (congestive heart failure), NYHA class I, acute on chronic, diastolic ([facility]) G89.07    Congestive heart failure ([facility] [address] I50.9    Primary hypertension I10       SUBJECTIVE:  Lalit Rios states he does not feel well and is excessively short of breath at rest however CO2 is 19. Suspect that there is a primary pulmonary respiratory component to his symptoms as he is BNP is not excessively elevated despite the presence of renal insufficiency. REVIEW OF SYSTEMS:  General ROS: negative for - fatigue, malaise,  weight gain or weight loss  Psychological ROS: negative for - anxiety , depression  Ophthalmic ROS: negative for - decreased vision or visual distortion. ENT ROS: negative  Allergy and Immunology ROS: negative  Hematological and Lymphatic ROS: negative  Endocrine: no heat or cold intolerance and no polyphagia, polydipsia, or polyuria  Respiratory ROS: positive for - shortness of breath  Cardiovascular ROS: positive for - dyspnea on exertion, irregular heartbeat, and shortness of breath.   Gastrointestinal ROS: no abdominal pain, change in bowel habits, or black or bloody stools  Genito-Urinary ROS: no nocturia, dysuria, trouble voiding, frequency or hematuria  Musculoskeletal ROS: negative for- myalgias, arthralgias, or claudication  Neurological ROS: no TIA or stroke symptoms otherwise no significant change in symptoms or problems since yesterday as documented in previous progress notes. SCHEDULED MEDICATIONS:   pantoprazole  40 mg Oral QAM AC    ranolazine  500 mg Oral BID    cloNIDine  0.1 mg Oral BID    sacubitril-valsartan  1 tablet Oral BID    torsemide  20 mg Oral Daily    acetaZOLAMIDE (DIAMOX) IVPB  250 mg IntraVENous Daily    amLODIPine  5 mg Oral Daily    finasteride  5 mg Oral Daily    metoprolol succinate  50 mg Oral Daily    nicotine  1 patch TransDERmal Q24H    predniSONE  40 mg Oral Daily    spironolactone  25 mg Oral Daily    sodium chloride flush  5-40 mL IntraVENous 2 times per day    enoxaparin  40 mg SubCUTAneous Daily    budesonide  0.5 mg Nebulization BID    arformoterol tartrate  15 mcg Nebulization BID       VITAL SIGNS:                                                                                                                          /78   Pulse 75   Temp 97.7 °F (36.5 °C) (Oral)   Resp 20   Ht 5' 7\" (1.702 m)   Wt 219 lb 6.4 oz (99.5 kg)   SpO2 100%   BMI 34.36 kg/m²   Patient Vitals for the past 96 hrs (Last 3 readings):   Weight   02/26/23 0435 219 lb 6.4 oz (99.5 kg)   02/25/23 0009 218 lb 12.8 oz (99.2 kg)   02/24/23 0019 216 lb 1.6 oz (98 kg)     OBJECTIVE:    HEENT: PERRL, EOM  Intact; sclera non-icteric, conjunctiva pink. Carotids are brisk in upstroke with normal contour. No carotid bruits. Normal jugular venous pulsation at 45°. No palpable cervical nor supraclavicular nodes. Thyroid not palpable. Trachea midline. Chest: Even excursion  Lungs: CTA B, no expiratory wheezes or rhonchi, no decreased tactile fremitus without inspiratory rales. Heart: Regular  rhythm; S1 > S2, no gallop or murmur. No clicks, rub, palpable thrills   or heaves. PMI nondisplaced, 5th intercostal space MCL. Abdomen: Soft, nontender, nondistended,  moderately protuberant, no masses or organomegaly. Bowel sounds active. Extremities: Without clubbing, cyanosis or edema.  Pulses present 3+ upper extermities bilaterally; present 1+ DP  bilaterally and present 1+ PT bilaterally. Data:   Scheduled Meds: Reviewed  Continuous Infusions:    sodium chloride         Intake/Output Summary (Last 24 hours) at 2/26/2023 1405  Last data filed at 2/26/2023 1051  Gross per 24 hour   Intake --   Output 2000 ml   Net -2000 ml     CBC:   Recent Labs     02/25/23  0250   WBC 16.4*   HGB 15.7   HCT 47.4        BMP:  Recent Labs     02/25/23  0250      K 4.2      CO2 19*   BUN 39*   CREATININE 1.5*   LABGLOM 53     ABGs:   Lab Results   Component Value Date/Time    PH 7.475 11/23/2020 08:51 AM    PO2 83.0 11/23/2020 08:51 AM    PCO2 32.3 11/23/2020 08:51 AM     INR: No results for input(s): INR in the last 72 hours. PRO-BNP:   Lab Results   Component Value Date    PROBNP 1,348 (H) 02/24/2023    PROBNP 12,550 (H) 02/22/2023      TSH:   Lab Results   Component Value Date    TSH 0.809 04/18/2019      Cardiac Injury Profile:   No results for input(s): TROPHS in the last 72 hours. Lipid Profile:   Lab Results   Component Value Date/Time    TRIG 217 01/02/2018 11:47 AM    HDL 58 01/02/2018 11:47 AM    LDLCALC 144 01/02/2018 11:47 AM    CHOL 245 01/02/2018 11:47 AM      Hemoglobin A1C: No components found for: HGBA1C      RAD:   No results found.       EKG: See Report  Echo: See Report      IMPRESSIONS:  Patient Active Problem List   Diagnosis Code    CTS (carpal tunnel syndrome) G56.00    Cervical arthritis M47.812    Essential hypertension I10    Hyperlipidemia E78.5    INOCENCIA on CPAP G47.33, Z99.89    Community acquired bacterial pneumonia J15.9    CAP (community acquired pneumonia) due to Chlamydia species J16.0    Acute respiratory failure with hypoxia (HCC) J96.01    COPD exacerbation ([facility]) J44.1    Precordial chest pain R07.2    Unstable angina ([facility]) I20.0    Respiratory distress R06.03    Shortness of breath R06.02    CHF (congestive heart failure), NYHA class I, acute on chronic, diastolic ([facility]) Z12.19 Congestive heart failure (HCC) I50.9    Primary hypertension I10       RECOMMENDATIONS:  We will titrate neprilysin inhibitor and decrease clonidine as tolerated carefully monitoring for any rebound hypertension. We will continue combination of acetazolamide and loop diuretic. Carefully monitor renal function and electrolytes carefully as well as blood pressure. I have spent more than 25 minutes face to face with Belem Adama and reviewing notes and laboratory data, with greater than 50% of this time instructing and counseling the patient face to face regarding my findings and recommendations and I have answered all questions as posed to me by Mr. Chalo David. Mayco Kruse, DO FACP,FACC,FSCAI      NOTE:  This report was transcribed using voice recognition software.   Every effort was made to ensure accuracy; however, inadvertent computerized transcription errors may be present

## 2023-02-26 NOTE — PROGRESS NOTES
Patient seen feels okay but still sob . Apparently non compliant with meds, continue to diurese and continue with heart failure meds.  Watch renal function

## 2023-02-27 LAB
ANION GAP SERPL CALCULATED.3IONS-SCNC: 11 MMOL/L (ref 7–16)
BUN BLDV-MCNC: 38 MG/DL (ref 6–20)
CALCIUM SERPL-MCNC: 8.7 MG/DL (ref 8.6–10.2)
CHLORIDE BLD-SCNC: 106 MMOL/L (ref 98–107)
CO2: 17 MMOL/L (ref 22–29)
CREAT SERPL-MCNC: 1.6 MG/DL (ref 0.7–1.2)
GFR SERPL CREATININE-BSD FRML MDRD: 49 ML/MIN/1.73
GLUCOSE BLD-MCNC: 108 MG/DL (ref 74–99)
POTASSIUM SERPL-SCNC: 4.4 MMOL/L (ref 3.5–5)
SODIUM BLD-SCNC: 134 MMOL/L (ref 132–146)

## 2023-02-27 PROCEDURE — 6370000000 HC RX 637 (ALT 250 FOR IP): Performed by: INTERNAL MEDICINE

## 2023-02-27 PROCEDURE — 6360000002 HC RX W HCPCS: Performed by: INTERNAL MEDICINE

## 2023-02-27 PROCEDURE — 2700000000 HC OXYGEN THERAPY PER DAY

## 2023-02-27 PROCEDURE — 94660 CPAP INITIATION&MGMT: CPT

## 2023-02-27 PROCEDURE — 2060000000 HC ICU INTERMEDIATE R&B

## 2023-02-27 PROCEDURE — 80048 BASIC METABOLIC PNL TOTAL CA: CPT

## 2023-02-27 PROCEDURE — 2580000003 HC RX 258: Performed by: INTERNAL MEDICINE

## 2023-02-27 PROCEDURE — 6370000000 HC RX 637 (ALT 250 FOR IP)

## 2023-02-27 PROCEDURE — 94640 AIRWAY INHALATION TREATMENT: CPT

## 2023-02-27 PROCEDURE — 36415 COLL VENOUS BLD VENIPUNCTURE: CPT

## 2023-02-27 RX ADMIN — SPIRONOLACTONE 25 MG: 25 TABLET ORAL at 09:08

## 2023-02-27 RX ADMIN — SACUBITRIL AND VALSARTAN 1 TABLET: 49; 51 TABLET, FILM COATED ORAL at 09:08

## 2023-02-27 RX ADMIN — AMLODIPINE BESYLATE 5 MG: 5 TABLET ORAL at 09:09

## 2023-02-27 RX ADMIN — CLONIDINE HYDROCHLORIDE 0.1 MG: 0.1 TABLET ORAL at 21:34

## 2023-02-27 RX ADMIN — ENOXAPARIN SODIUM 40 MG: 100 INJECTION SUBCUTANEOUS at 09:08

## 2023-02-27 RX ADMIN — SODIUM CHLORIDE, PRESERVATIVE FREE 10 ML: 5 INJECTION INTRAVENOUS at 09:07

## 2023-02-27 RX ADMIN — SACUBITRIL AND VALSARTAN 1 TABLET: 49; 51 TABLET, FILM COATED ORAL at 21:34

## 2023-02-27 RX ADMIN — TORSEMIDE 20 MG: 20 TABLET ORAL at 09:08

## 2023-02-27 RX ADMIN — CLONIDINE HYDROCHLORIDE 0.1 MG: 0.1 TABLET ORAL at 09:09

## 2023-02-27 RX ADMIN — SALINE NASAL SPRAY 1 SPRAY: 1.5 SOLUTION NASAL at 21:34

## 2023-02-27 RX ADMIN — ARFORMOTEROL TARTRATE 15 MCG: 15 SOLUTION RESPIRATORY (INHALATION) at 08:22

## 2023-02-27 RX ADMIN — PREDNISONE 40 MG: 20 TABLET ORAL at 09:09

## 2023-02-27 RX ADMIN — ACETAZOLAMIDE 250 MG: 500 INJECTION, POWDER, LYOPHILIZED, FOR SOLUTION INTRAVENOUS at 09:13

## 2023-02-27 RX ADMIN — RANOLAZINE 500 MG: 500 TABLET, FILM COATED, EXTENDED RELEASE ORAL at 09:08

## 2023-02-27 RX ADMIN — SALINE NASAL SPRAY 1 SPRAY: 1.5 SOLUTION NASAL at 14:28

## 2023-02-27 RX ADMIN — ARFORMOTEROL TARTRATE 15 MCG: 15 SOLUTION RESPIRATORY (INHALATION) at 19:09

## 2023-02-27 RX ADMIN — FINASTERIDE 5 MG: 5 TABLET, FILM COATED ORAL at 09:09

## 2023-02-27 RX ADMIN — RANOLAZINE 500 MG: 500 TABLET, FILM COATED, EXTENDED RELEASE ORAL at 21:34

## 2023-02-27 RX ADMIN — BUDESONIDE 500 MCG: 0.5 SUSPENSION RESPIRATORY (INHALATION) at 19:09

## 2023-02-27 RX ADMIN — METOPROLOL SUCCINATE 50 MG: 50 TABLET, EXTENDED RELEASE ORAL at 09:08

## 2023-02-27 RX ADMIN — BUDESONIDE 500 MCG: 0.5 SUSPENSION RESPIRATORY (INHALATION) at 08:22

## 2023-02-27 RX ADMIN — SALINE NASAL SPRAY 1 SPRAY: 1.5 SOLUTION NASAL at 09:09

## 2023-02-27 RX ADMIN — SODIUM CHLORIDE, PRESERVATIVE FREE 10 ML: 5 INJECTION INTRAVENOUS at 21:37

## 2023-02-27 RX ADMIN — PANTOPRAZOLE SODIUM 40 MG: 40 TABLET, DELAYED RELEASE ORAL at 06:14

## 2023-02-27 ASSESSMENT — PAIN SCALES - GENERAL
PAINLEVEL_OUTOF10: 0
PAINLEVEL_OUTOF10: 0

## 2023-02-27 NOTE — PROGRESS NOTES
Date: 2/26/2023    Time: 10:34 PM    Patient Placed On BIPAP/CPAP/ Non-Invasive Ventilation? Yes    If no must comment. Facial area red/color change? No           If YES are Blister/Lesion present? No   If yes must notify nursing staff  BIPAP/CPAP skin barrier?   Yes    Skin barrier type:mepilexlite       Comments:        Diyva Alvarez RATNA    02/26/23 4928   NIV Type   NIV Started/Stopped On   Equipment Type focus   Mode CPAP   Mask Type (S)  Full face mask   Mask Size Large   Settings/Measurements   CPAP/EPAP 6 cmH2O   Vt (Measured) 871 mL   Resp 26   O2 Flow Rate (L/min) 4 L/min   Minute Volume (L/min) 16.7 Liters   Mask Leak (lpm) 16 lpm   Comfort Level Good   Using Accessory Muscles No   SpO2 97

## 2023-02-27 NOTE — CARE COORDINATION
CASE MANAGEMENT. ... Chart reviewed. Cardio/Pulm following and adjusting meds accordingly. CPAP ordered for hs/naps. Per nursing, patient states he has one at home. Continues on o2 per nc-which is new. Nursing to wean as tolerated. Currently, on 4lnc. Discharge plan is for patient to return home to [facility]. He still drives. His car is in the parking lot. Will cont to follow along and assist with needs.

## 2023-02-27 NOTE — PROGRESS NOTES
PROGRESS NOTE       PATIENT PROBLEM LIST:  Patient Active Problem List   Diagnosis Code    CTS (carpal tunnel syndrome) G56.00    Cervical arthritis M47.812    Essential hypertension I10    Hyperlipidemia E78.5    INOCENCIA on CPAP G47.33, Z99.89    Community acquired bacterial pneumonia J15.9    CAP (community acquired pneumonia) due to Chlamydia species J16.0    Acute respiratory failure with hypoxia ([facility]) J96.01    COPD exacerbation ([facility]) J44.1    Precordial chest pain R07.2    Unstable angina ([facility]) I20.0    Respiratory distress R06.03    Shortness of breath R06.02    CHF (congestive heart failure), NYHA class I, acute on chronic, diastolic ([facility]) D31.99    Congestive heart failure ([facility] [address] I50.9    Primary hypertension I10       SUBJECTIVE:  Astrid Mckeon states he ambulated this morning and desaturated to 82%. At rest eating lunch and denies any shortness of breath presently. Just feels weak. No chest discomfort presently. REVIEW OF SYSTEMS:  General ROS: negative for - fatigue, malaise,  weight gain or weight loss  Psychological ROS: negative for - anxiety , depression  Ophthalmic ROS: negative for - decreased vision or visual distortion. ENT ROS: negative  Allergy and Immunology ROS: negative  Hematological and Lymphatic ROS: negative  Endocrine: no heat or cold intolerance and no polyphagia, polydipsia, or polyuria  Respiratory ROS: positive for - shortness of breath  Cardiovascular ROS: positive for - dyspnea on exertion, irregular heartbeat, and shortness of breath. Gastrointestinal ROS: no abdominal pain, change in bowel habits, or black or bloody stools  Genito-Urinary ROS: no nocturia, dysuria, trouble voiding, frequency or hematuria  Musculoskeletal ROS: negative for- myalgias, arthralgias, or claudication  Neurological ROS: no TIA or stroke symptoms otherwise no significant change in symptoms or problems since yesterday as documented in previous progress notes.     SCHEDULED MEDICATIONS:   [START ON 2/28/2023] predniSONE  30 mg Oral Daily    pantoprazole  40 mg Oral QAM AC    ranolazine  500 mg Oral BID    cloNIDine  0.1 mg Oral BID    sacubitril-valsartan  1 tablet Oral BID    torsemide  20 mg Oral Daily    sodium chloride  1 spray Each Nostril TID    amLODIPine  5 mg Oral Daily    finasteride  5 mg Oral Daily    metoprolol succinate  50 mg Oral Daily    nicotine  1 patch TransDERmal Q24H    spironolactone  25 mg Oral Daily    sodium chloride flush  5-40 mL IntraVENous 2 times per day    enoxaparin  40 mg SubCUTAneous Daily    budesonide  0.5 mg Nebulization BID    arformoterol tartrate  15 mcg Nebulization BID       VITAL SIGNS:                                                                                                                          /70   Pulse 70   Temp 98.1 °F (36.7 °C) (Oral)   Resp 18   Ht 5' 7\" (1.702 m)   Wt 213 lb 6.4 oz (96.8 kg)   SpO2 98%   BMI 33.42 kg/m²   Patient Vitals for the past 96 hrs (Last 3 readings):   Weight   02/27/23 0600 213 lb 6.4 oz (96.8 kg)   02/26/23 0435 219 lb 6.4 oz (99.5 kg)   02/25/23 0009 218 lb 12.8 oz (99.2 kg)     OBJECTIVE:    HEENT: PERRL, EOM  Intact; sclera non-icteric, conjunctiva pink. Carotids are brisk in upstroke with normal contour. No carotid bruits. Normal jugular venous pulsation at 45°. No palpable cervical nor supraclavicular nodes. Thyroid not palpable. Trachea midline. Chest: Even excursion  Lungs: CTA B, no expiratory wheezes or rhonchi, no decreased tactile fremitus without inspiratory rales. Heart: Regular  rhythm; S1 > S2, no gallop or murmur. No clicks, rub, palpable thrills   or heaves. PMI nondisplaced, 5th intercostal space MCL. Abdomen: Soft, nontender, nondistended,  moderately protuberant, no masses or organomegaly. Bowel sounds active. Extremities: Without clubbing, cyanosis or edema. Pulses present 3+ upper extermities bilaterally; present 1+ DP  bilaterally and present 1+ PT bilaterally.      Data: Scheduled Meds: Reviewed  Continuous Infusions:    sodium chloride         Intake/Output Summary (Last 24 hours) at 2/27/2023 1131  Last data filed at 2/27/2023 0818  Gross per 24 hour   Intake 180 ml   Output 2250 ml   Net -2070 ml     CBC:   Recent Labs     02/25/23  0250   WBC 16.4*   HGB 15.7   HCT 47.4        BMP:  Recent Labs     02/25/23  0250 02/27/23  0613    134   K 4.2 4.4    106   CO2 19* 17*   BUN 39* 38*   CREATININE 1.5* 1.6*   LABGLOM 53 49     ABGs:   Lab Results   Component Value Date/Time    PH 7.475 11/23/2020 08:51 AM    PO2 83.0 11/23/2020 08:51 AM    PCO2 32.3 11/23/2020 08:51 AM     INR: No results for input(s): INR in the last 72 hours. PRO-BNP:   Lab Results   Component Value Date    PROBNP 1,348 (H) 02/24/2023    PROBNP 12,550 (H) 02/22/2023      TSH:   Lab Results   Component Value Date    TSH 0.809 04/18/2019      Cardiac Injury Profile:   No results for input(s): TROPHS in the last 72 hours. Lipid Profile:   Lab Results   Component Value Date/Time    TRIG 217 01/02/2018 11:47 AM    HDL 58 01/02/2018 11:47 AM    LDLCALC 144 01/02/2018 11:47 AM    CHOL 245 01/02/2018 11:47 AM      Hemoglobin A1C: No components found for: HGBA1C      RAD:   No results found.       EKG: See Report  Echo: See Report      IMPRESSIONS:  Patient Active Problem List   Diagnosis Code    CTS (carpal tunnel syndrome) G56.00    Cervical arthritis M47.812    Essential hypertension I10    Hyperlipidemia E78.5    INOCENCIA on CPAP G47.33, Z99.89    Community acquired bacterial pneumonia J15.9    CAP (community acquired pneumonia) due to Chlamydia species J16.0    Acute respiratory failure with hypoxia (HCC) J96.01    COPD exacerbation ([facility]) J44.1    Precordial chest pain R07.2    Unstable angina ([facility]) I20.0    Respiratory distress R06.03    Shortness of breath R06.02    CHF (congestive heart failure), NYHA class I, acute on chronic, diastolic ([facility]) B99.55    Congestive heart failure (HCC) I50.9 Primary hypertension I10       RECOMMENDATIONS:  We will continue to wean from clonidine and as needed titrate neprilysin inhibitor maintaining systolic blood pressure >559 mmHg and avoiding any evidence for vasodepressor syncope as well. Continue to monitor closely and especially monitor electrolytes and renal function. Will discontinue acetazolamide based upon decreased CO2. I have spent more than 25 minutes face to face with [facility] and reviewing notes and laboratory data, with greater than 50% of this time instructing and counseling the patient face to face regarding my findings and recommendations and I have answered all questions as posed to me by Mr. Gilson Valencia. Moe Campos, DO FACP,FACC,FSCAI      NOTE:  This report was transcribed using voice recognition software.   Every effort was made to ensure accuracy; however, inadvertent computerized transcription errors may be present

## 2023-02-27 NOTE — PLAN OF CARE
Problem: Discharge Planning  Goal: Discharge to home or other facility with appropriate resources  Outcome: Progressing  Flowsheets (Taken 2/27/2023 1444)  Discharge to home or other facility with appropriate resources:   Identify barriers to discharge with patient and caregiver   Arrange for needed discharge resources and transportation as appropriate   Identify discharge learning needs (meds, wound care, etc)   Arrange for interpreters to assist at discharge as needed   Refer to discharge planning if patient needs post-hospital services based on physician order or complex needs related to functional status, cognitive ability or social support system     Problem: Safety - Adult  Goal: Free from fall injury  Outcome: Progressing  Flowsheets (Taken 2/27/2023 1444)  Free From Fall Injury:   Instruct family/caregiver on patient safety   Based on caregiver fall risk screen, instruct family/caregiver to ask for assistance with transferring infant if caregiver noted to have fall risk factors     Problem: Chronic Conditions and Co-morbidities  Goal: Patient's chronic conditions and co-morbidity symptoms are monitored and maintained or improved  Outcome: Progressing  Flowsheets (Taken 2/27/2023 1444)  Care Plan - Patient's Chronic Conditions and Co-Morbidity Symptoms are Monitored and Maintained or Improved:   Monitor and assess patient's chronic conditions and comorbid symptoms for stability, deterioration, or improvement   Collaborate with multidisciplinary team to address chronic and comorbid conditions and prevent exacerbation or deterioration   Update acute care plan with appropriate goals if chronic or comorbid symptoms are exacerbated and prevent overall improvement and discharge     Problem: Pain  Goal: Verbalizes/displays adequate comfort level or baseline comfort level  Outcome: Progressing  Flowsheets (Taken 2/27/2023 1444)  Verbalizes/displays adequate comfort level or baseline comfort level:   Encourage patient to monitor pain and request assistance   Assess pain using appropriate pain scale   Administer analgesics based on type and severity of pain and evaluate response   Implement non-pharmacological measures as appropriate and evaluate response   Consider cultural and social influences on pain and pain management   Notify Licensed Independent Practitioner if interventions unsuccessful or patient reports new pain

## 2023-02-27 NOTE — PROGRESS NOTES
[facility]  [address] [facility], [address], [address] [place]       Pulmonary Progress Note      SUBJECTIVE:  claims stable with breathing, still with cough and minimal sputum production, some help with nasal saline TID, hasn't seen any further blood in sputum and nose. Currently on 4 li NC, sitting up in bed, conversant, ambulating a little in room and still dyspneic with minimal exertion.     OBJECTIVE    Medications    Continuous Infusions:   sodium chloride         Scheduled Meds:   pantoprazole  40 mg Oral QAM AC    ranolazine  500 mg Oral BID    cloNIDine  0.1 mg Oral BID    sacubitril-valsartan  1 tablet Oral BID    torsemide  20 mg Oral Daily    sodium chloride  1 spray Each Nostril TID    acetaZOLAMIDE (DIAMOX) IVPB  250 mg IntraVENous Daily    amLODIPine  5 mg Oral Daily    finasteride  5 mg Oral Daily    metoprolol succinate  50 mg Oral Daily    nicotine  1 patch TransDERmal Q24H    spironolactone  25 mg Oral Daily    sodium chloride flush  5-40 mL IntraVENous 2 times per day    enoxaparin  40 mg SubCUTAneous Daily    budesonide  0.5 mg Nebulization BID    arformoterol tartrate  15 mcg Nebulization BID       PRN Meds:calcium carbonate, sodium chloride, perflutren lipid microspheres, hydrALAZINE, ipratropium-albuterol, sodium chloride flush, sodium chloride, ondansetron **OR** ondansetron, polyethylene glycol, acetaminophen **OR** acetaminophen    Physical    VITALS:  /70   Pulse 70   Temp 98.1 °F (36.7 °C) (Oral)   Resp 18   Ht 5' 7\" (1.702 m)   Wt 213 lb 6.4 oz (96.8 kg)   SpO2 99%   BMI 33.42 kg/m²     24HR INTAKE/OUTPUT:      Intake/Output Summary (Last 24 hours) at 2023 0933  Last data filed at 2023 0818  Gross per 24 hour   Intake 180 ml   Output 2775 ml   Net -2595 ml         24HR PULSE OXIMETRY RANGE:    SpO2  Av.3 %  Min: 96 %  Max: 100 %    General appearance: alert, appears stated age, and cooperative  Lungs: rhonchi bilaterally with cough  Heart: regular rate and rhythm, S1, S2 normal, no murmur, click, rub or gallop  Abdomen: soft, non-tender; bowel sounds normal; no masses,  no organomegaly  Extremities: bipedal edema  Neurologic: Mental status: Alert, oriented, thought content appropriate    Data    CBC:   Recent Labs     02/25/23  0250   WBC 16.4*   HGB 15.7   HCT 47.4   MCV 94.2            BMP:  Recent Labs     02/25/23  0250 02/27/23  0613    134   K 4.2 4.4    106   CO2 19* 17*   PHOS 4.4  --    BUN 39* 38*   CREATININE 1.5* 1.6*      ALB:3,BILIDIR:3,BILITOT:3,ALKPHOS:3)@    PT/INR: No results for input(s): PROTIME, INR in the last 72 hours. ABG:   No results for input(s): PH, PO2, PCO2, HCO3, BE, O2SAT, METHB, O2HB, COHB, O2CON, HHB, THB in the last 72 hours. Radiology/Other tests reviewed: none    Assessment:     Principal Problem:    Acute respiratory failure with hypoxia (HCC)  Active Problems:    CHF (congestive heart failure), NYHA class I, acute on chronic, diastolic (HCC)    Congestive heart failure ([facility] [address]    Primary hypertension  Resolved Problems:    * No resolved hospital problems. *      Plan:       Cont with nebs, observe respiratory function  Cont with nasal saline tid, observe if any further epistaxis and hemoptysis  Watch fluid balance, diurese as per Cardiology, may need to stop diamox with decreasing HCO3  Cont with steroids, taper as tolerated  Cont with oxygen, taper as tolerated  OOB to chair, ambulate with assistance      Time at the bedside, reviewing labs and radiographs, reviewing notes and consultations, discussing with staff and family was more than 35 minutes. Thanks for letting us see this patient in consultation. Please contact us with any questions. Office [phone] or after hours through [employer], x [phone].

## 2023-02-27 NOTE — PROGRESS NOTES
Patient seen breathing seems better. Tolerating 02 with good saturations. Tolerating meds. Monitor renal function and electrolytes whiule on diuretics. Bp okay. EF reduced.  Continue with titration of entresto

## 2023-02-27 NOTE — PROGRESS NOTES
Pulse ox at room air: 97%    Patient ambulated 1 time around unit, pulse ox dropped to 82%, patient recovered quickly and pulse ox increased back up to 95% with a few deep breaths. Patient had no other difficulties ambulating.      Pulse ox after ambulation: 95%

## 2023-02-28 LAB
ANION GAP SERPL CALCULATED.3IONS-SCNC: 10 MMOL/L (ref 7–16)
BUN BLDV-MCNC: 41 MG/DL (ref 6–20)
CALCIUM SERPL-MCNC: 8.8 MG/DL (ref 8.6–10.2)
CHLORIDE BLD-SCNC: 106 MMOL/L (ref 98–107)
CO2: 17 MMOL/L (ref 22–29)
CREAT SERPL-MCNC: 1.6 MG/DL (ref 0.7–1.2)
GFR SERPL CREATININE-BSD FRML MDRD: 49 ML/MIN/1.73
GLUCOSE BLD-MCNC: 105 MG/DL (ref 74–99)
POTASSIUM SERPL-SCNC: 4 MMOL/L (ref 3.5–5)
SODIUM BLD-SCNC: 133 MMOL/L (ref 132–146)

## 2023-02-28 PROCEDURE — 6370000000 HC RX 637 (ALT 250 FOR IP): Performed by: INTERNAL MEDICINE

## 2023-02-28 PROCEDURE — 36415 COLL VENOUS BLD VENIPUNCTURE: CPT

## 2023-02-28 PROCEDURE — 6370000000 HC RX 637 (ALT 250 FOR IP)

## 2023-02-28 PROCEDURE — 80048 BASIC METABOLIC PNL TOTAL CA: CPT

## 2023-02-28 PROCEDURE — 2060000000 HC ICU INTERMEDIATE R&B

## 2023-02-28 PROCEDURE — 94640 AIRWAY INHALATION TREATMENT: CPT

## 2023-02-28 PROCEDURE — 2580000003 HC RX 258: Performed by: INTERNAL MEDICINE

## 2023-02-28 PROCEDURE — 6360000002 HC RX W HCPCS: Performed by: INTERNAL MEDICINE

## 2023-02-28 PROCEDURE — 94660 CPAP INITIATION&MGMT: CPT

## 2023-02-28 RX ORDER — CLONIDINE HYDROCHLORIDE 0.1 MG/1
0.1 TABLET ORAL DAILY
Status: DISCONTINUED | OUTPATIENT
Start: 2023-02-28 | End: 2023-03-02 | Stop reason: HOSPADM

## 2023-02-28 RX ADMIN — BUDESONIDE 500 MCG: 0.5 SUSPENSION RESPIRATORY (INHALATION) at 06:49

## 2023-02-28 RX ADMIN — CLONIDINE HYDROCHLORIDE 0.1 MG: 0.1 TABLET ORAL at 20:30

## 2023-02-28 RX ADMIN — ARFORMOTEROL TARTRATE 15 MCG: 15 SOLUTION RESPIRATORY (INHALATION) at 19:56

## 2023-02-28 RX ADMIN — SALINE NASAL SPRAY 1 SPRAY: 1.5 SOLUTION NASAL at 09:00

## 2023-02-28 RX ADMIN — IPRATROPIUM BROMIDE AND ALBUTEROL SULFATE 3 ML: 2.5; .5 SOLUTION RESPIRATORY (INHALATION) at 06:43

## 2023-02-28 RX ADMIN — BUDESONIDE 500 MCG: 0.5 SUSPENSION RESPIRATORY (INHALATION) at 19:56

## 2023-02-28 RX ADMIN — SALINE NASAL SPRAY 1 SPRAY: 1.5 SOLUTION NASAL at 20:31

## 2023-02-28 RX ADMIN — SACUBITRIL AND VALSARTAN 1 TABLET: 49; 51 TABLET, FILM COATED ORAL at 20:30

## 2023-02-28 RX ADMIN — FINASTERIDE 5 MG: 5 TABLET, FILM COATED ORAL at 08:56

## 2023-02-28 RX ADMIN — ENOXAPARIN SODIUM 40 MG: 100 INJECTION SUBCUTANEOUS at 08:57

## 2023-02-28 RX ADMIN — SODIUM CHLORIDE, PRESERVATIVE FREE 10 ML: 5 INJECTION INTRAVENOUS at 08:57

## 2023-02-28 RX ADMIN — ARFORMOTEROL TARTRATE 15 MCG: 15 SOLUTION RESPIRATORY (INHALATION) at 06:49

## 2023-02-28 RX ADMIN — IPRATROPIUM BROMIDE AND ALBUTEROL SULFATE 3 ML: 2.5; .5 SOLUTION RESPIRATORY (INHALATION) at 19:56

## 2023-02-28 RX ADMIN — SALINE NASAL SPRAY 1 SPRAY: 1.5 SOLUTION NASAL at 14:47

## 2023-02-28 RX ADMIN — SODIUM CHLORIDE, PRESERVATIVE FREE 10 ML: 5 INJECTION INTRAVENOUS at 20:32

## 2023-02-28 RX ADMIN — METOPROLOL SUCCINATE 50 MG: 50 TABLET, EXTENDED RELEASE ORAL at 08:55

## 2023-02-28 RX ADMIN — SPIRONOLACTONE 25 MG: 25 TABLET ORAL at 08:56

## 2023-02-28 RX ADMIN — SACUBITRIL AND VALSARTAN 1 TABLET: 49; 51 TABLET, FILM COATED ORAL at 08:56

## 2023-02-28 RX ADMIN — RANOLAZINE 500 MG: 500 TABLET, FILM COATED, EXTENDED RELEASE ORAL at 20:30

## 2023-02-28 RX ADMIN — AMLODIPINE BESYLATE 5 MG: 5 TABLET ORAL at 08:56

## 2023-02-28 RX ADMIN — RANOLAZINE 500 MG: 500 TABLET, FILM COATED, EXTENDED RELEASE ORAL at 08:56

## 2023-02-28 RX ADMIN — PANTOPRAZOLE SODIUM 40 MG: 40 TABLET, DELAYED RELEASE ORAL at 06:37

## 2023-02-28 RX ADMIN — CLONIDINE HYDROCHLORIDE 0.1 MG: 0.1 TABLET ORAL at 08:57

## 2023-02-28 RX ADMIN — TORSEMIDE 20 MG: 20 TABLET ORAL at 08:56

## 2023-02-28 RX ADMIN — PREDNISONE 30 MG: 20 TABLET ORAL at 08:55

## 2023-02-28 ASSESSMENT — PAIN SCALES - GENERAL
PAINLEVEL_OUTOF10: 0

## 2023-02-28 NOTE — PROGRESS NOTES
Date: 2/27/2023    Time: 8:25 PM    Patient Placed On BIPAP/CPAP/ Non-Invasive Ventilation? No    If no must comment. Comments: Patient is refusing to wear the CPAP tonight. CPAP +6, machine is on standby if needed. Patient stated he just wants to rest and wearing that mask is too suffocating. Patient is currently resting on room air and is sating 97%; patient is breathing comfortably and in no visible distress.           Sanjiv Mcmillan RCP

## 2023-02-28 NOTE — PROGRESS NOTES
[facility] [address] [facility]  [address], [address]  [place], [address] [facility]      Pulmonary Progress Note      SUBJECTIVE:  claims stable with breathing, ambulated in hallway and got worse with breathing last night and this morning, currently on RA and ambulated on RA (?) dropped down to 86-88% for a short period of time.     OBJECTIVE    Medications    Continuous Infusions:   sodium chloride         Scheduled Meds:   cloNIDine  0.1 mg Oral Daily    predniSONE  30 mg Oral Daily    pantoprazole  40 mg Oral QAM AC    ranolazine  500 mg Oral BID    sacubitril-valsartan  1 tablet Oral BID    torsemide  20 mg Oral Daily    sodium chloride  1 spray Each Nostril TID    amLODIPine  5 mg Oral Daily    finasteride  5 mg Oral Daily    metoprolol succinate  50 mg Oral Daily    nicotine  1 patch TransDERmal Q24H    spironolactone  25 mg Oral Daily    sodium chloride flush  5-40 mL IntraVENous 2 times per day    enoxaparin  40 mg SubCUTAneous Daily    budesonide  0.5 mg Nebulization BID    arformoterol tartrate  15 mcg Nebulization BID       PRN Meds:calcium carbonate, sodium chloride, perflutren lipid microspheres, hydrALAZINE, ipratropium-albuterol, sodium chloride flush, sodium chloride, ondansetron **OR** ondansetron, polyethylene glycol, acetaminophen **OR** acetaminophen    Physical    VITALS:  BP (!) 134/91   Pulse 69   Temp 98.1 °F (36.7 °C) (Oral)   Resp 20   Ht 5' 7\" (1.702 m)   Wt 217 lb (98.4 kg)   SpO2 100%   BMI 33.99 kg/m²     24HR INTAKE/OUTPUT:      Intake/Output Summary (Last 24 hours) at 2023 1413  Last data filed at 2023 1309  Gross per 24 hour   Intake 110 ml   Output 1700 ml   Net -1590 ml         24HR PULSE OXIMETRY RANGE:    SpO2  Av %  Min: 94 %  Max: 100 %    General appearance: alert, appears stated age, and cooperative  Lungs: rhonchi bilaterally with cough  Heart: regular rate and rhythm, S1, S2 normal, no murmur, click, rub or gallop  Abdomen: soft, non-tender; bowel sounds normal; no masses,  no organomegaly  Extremities: bipedal edema  Neurologic: Mental status: Alert, oriented, thought content appropriate    Data    CBC:   No results for input(s): WBC, HGB, HCT, MCV, PLT in the last 72 hours. BMP:  Recent Labs     02/27/23  0613 02/28/23  1000    133   K 4.4 4.0    106   CO2 17* 17*   BUN 38* 41*   CREATININE 1.6* 1.6*      ALB:3,BILIDIR:3,BILITOT:3,ALKPHOS:3)@    PT/INR: No results for input(s): PROTIME, INR in the last 72 hours. ABG:   No results for input(s): PH, PO2, PCO2, HCO3, BE, O2SAT, METHB, O2HB, COHB, O2CON, HHB, THB in the last 72 hours. Radiology/Other tests reviewed: none    Assessment:     Principal Problem:    Acute respiratory failure with hypoxia (HCC)  Active Problems:    CHF (congestive heart failure), NYHA class I, acute on chronic, diastolic (HCC)    Congestive heart failure ([facility] [address]    Primary hypertension  Resolved Problems:    * No resolved hospital problems. *      Plan:       Cont with nebs and nasal saline, observe respiratory function and cough/sputum production  Will need to check oxygen saturation with ambulation as may need to wear oxygen with ambulation  Watch level of activity as may also be wearing himself out  Watch fluid balance, diurese as per Cardiology  Cont with steroids, taper as tolerated  OOB to chair, ambulate with assistance      Time at the bedside, reviewing labs and radiographs, reviewing notes and consultations, discussing with staff and family was more than 35 minutes. Thanks for letting us see this patient in consultation. Please contact us with any questions. Office [phone] or after hours through [employer], x [phone].

## 2023-02-28 NOTE — PROGRESS NOTES
Patient making improvement. Still sob with exertion. Labs stable . Cxr clear. Off diamox. Tolerating meds. Watch renal function.  Continue with current care

## 2023-02-28 NOTE — PROGRESS NOTES
PROGRESS NOTE       PATIENT PROBLEM LIST:  Patient Active Problem List   Diagnosis Code    CTS (carpal tunnel syndrome) G56.00    Cervical arthritis M47.812    Essential hypertension I10    Hyperlipidemia E78.5    INOCENCIA on CPAP G47.33, Z99.89    Community acquired bacterial pneumonia J15.9    CAP (community acquired pneumonia) due to Chlamydia species J16.0    Acute respiratory failure with hypoxia ([facility]) J96.01    COPD exacerbation ([facility]) J44.1    Precordial chest pain R07.2    Unstable angina ([facility]) I20.0    Respiratory distress R06.03    Shortness of breath R06.02    CHF (congestive heart failure), NYHA class I, acute on chronic, diastolic ([facility]) J78.27    Congestive heart failure ([facility] [address] I50.9    Primary hypertension I10       SUBJECTIVE:  Rosario Coelho states he had a rough night but was not more specific but not short of breath presently nor chest discomfort. REVIEW OF SYSTEMS:  General ROS: negative for - fatigue, malaise,  weight gain or weight loss  Psychological ROS: negative for - anxiety , depression  Ophthalmic ROS: negative for - decreased vision or visual distortion. ENT ROS: negative  Allergy and Immunology ROS: negative  Hematological and Lymphatic ROS: negative  Endocrine: no heat or cold intolerance and no polyphagia, polydipsia, or polyuria  Respiratory ROS: positive for - shortness of breath  Cardiovascular ROS: positive for - dyspnea on exertion, irregular heartbeat, and shortness of breath. Gastrointestinal ROS: no abdominal pain, change in bowel habits, or black or bloody stools  Genito-Urinary ROS: no nocturia, dysuria, trouble voiding, frequency or hematuria  Musculoskeletal ROS: negative for- myalgias, arthralgias, or claudication  Neurological ROS: no TIA or stroke symptoms otherwise no significant change in symptoms or problems since yesterday as documented in previous progress notes.     SCHEDULED MEDICATIONS:   predniSONE  30 mg Oral Daily    pantoprazole  40 mg Oral QAM AC ranolazine  500 mg Oral BID    cloNIDine  0.1 mg Oral BID    sacubitril-valsartan  1 tablet Oral BID    torsemide  20 mg Oral Daily    sodium chloride  1 spray Each Nostril TID    amLODIPine  5 mg Oral Daily    finasteride  5 mg Oral Daily    metoprolol succinate  50 mg Oral Daily    nicotine  1 patch TransDERmal Q24H    spironolactone  25 mg Oral Daily    sodium chloride flush  5-40 mL IntraVENous 2 times per day    enoxaparin  40 mg SubCUTAneous Daily    budesonide  0.5 mg Nebulization BID    arformoterol tartrate  15 mcg Nebulization BID       VITAL SIGNS:                                                                                                                          BP (!) 134/91   Pulse 69   Temp 98.1 °F (36.7 °C) (Oral)   Resp 20   Ht 5' 7\" (1.702 m)   Wt 217 lb (98.4 kg)   SpO2 100%   BMI 33.99 kg/m²   Patient Vitals for the past 96 hrs (Last 3 readings):   Weight   02/28/23 0011 217 lb (98.4 kg)   02/27/23 0600 213 lb 6.4 oz (96.8 kg)   02/26/23 0435 219 lb 6.4 oz (99.5 kg)     OBJECTIVE:    HEENT: PERRL, EOM  Intact; sclera non-icteric, conjunctiva pink. Carotids are brisk in upstroke with normal contour. No carotid bruits. Normal jugular venous pulsation at 45°. No palpable cervical nor supraclavicular nodes. Thyroid not palpable. Trachea midline. Chest: Even excursion  Lungs: CTA B, no expiratory wheezes or rhonchi, no decreased tactile fremitus without inspiratory rales. Heart: Regular  rhythm; S1 > S2, no gallop or murmur. No clicks, rub, palpable thrills   or heaves. PMI nondisplaced, 5th intercostal space MCL. Abdomen: Soft, nontender, nondistended,  moderately protuberant, no masses or organomegaly. Bowel sounds active. Extremities: Without clubbing, cyanosis or edema. Pulses present 3+ upper extermities bilaterally; present 1+ DP  bilaterally and present 1+ PT bilaterally.      Data:   Scheduled Meds: Reviewed  Continuous Infusions:    sodium chloride         Intake/Output Summary (Last 24 hours) at 2/28/2023 1139  Last data filed at 2/27/2023 2137  Gross per 24 hour   Intake 370 ml   Output 1700 ml   Net -1330 ml     CBC:   No results for input(s): WBC, HGB, HCT, PLT in the last 72 hours. BMP:  Recent Labs     02/27/23  0613 02/28/23  1000    133   K 4.4 4.0    106   CO2 17* 17*   BUN 38* 41*   CREATININE 1.6* 1.6*   LABGLOM 49 49     ABGs:   Lab Results   Component Value Date/Time    PH 7.475 11/23/2020 08:51 AM    PO2 83.0 11/23/2020 08:51 AM    PCO2 32.3 11/23/2020 08:51 AM     INR: No results for input(s): INR in the last 72 hours. PRO-BNP:   Lab Results   Component Value Date    PROBNP 1,348 (H) 02/24/2023    PROBNP 12,550 (H) 02/22/2023      TSH:   Lab Results   Component Value Date    TSH 0.809 04/18/2019      Cardiac Injury Profile:   No results for input(s): TROPHS in the last 72 hours. Lipid Profile:   Lab Results   Component Value Date/Time    TRIG 217 01/02/2018 11:47 AM    HDL 58 01/02/2018 11:47 AM    LDLCALC 144 01/02/2018 11:47 AM    CHOL 245 01/02/2018 11:47 AM      Hemoglobin A1C: No components found for: HGBA1C      RAD:   No results found.       EKG: See Report  Echo: See Report      IMPRESSIONS:  Patient Active Problem List   Diagnosis Code    CTS (carpal tunnel syndrome) G56.00    Cervical arthritis M47.812    Essential hypertension I10    Hyperlipidemia E78.5    INOCENCIA on CPAP G47.33, Z99.89    Community acquired bacterial pneumonia J15.9    CAP (community acquired pneumonia) due to Chlamydia species J16.0    Acute respiratory failure with hypoxia (HCC) J96.01    COPD exacerbation ([facility]) J44.1    Precordial chest pain R07.2    Unstable angina ([facility]) I20.0    Respiratory distress R06.03    Shortness of breath R06.02    CHF (congestive heart failure), NYHA class I, acute on chronic, diastolic ([facility]) E37.84    Congestive heart failure (HCC) I50.9    Primary hypertension I10       RECOMMENDATIONS:  We will discontinue clonidine and increase dosage of neprilysin inhibitor and carefully monitor renal function. Continue to carefully monitor rhythm as well as well as blood pressure changes. I have spent more than 25 minutes face to face with Chad Benjamin and reviewing notes and laboratory data, with greater than 50% of this time instructing and counseling the patient face to face regarding my findings and recommendations and I have answered all questions as posed to me by Mr. Nino Lezama. Reji Vallejo, DO FACP,FACC,FSCAI      NOTE:  This report was transcribed using voice recognition software.   Every effort was made to ensure accuracy; however, inadvertent computerized transcription errors may be present

## 2023-02-28 NOTE — CARE COORDINATION
CASE MANAGEMENT. .. Mary Dominguez Cardio/pulm continue to monitor closely and and are adjusting meds accordingly. Upper 90's-100% on room air. All meds now po. Off diamox. However, per conversation with nursing, patient has c/o feeling sob. Discharge plan is for patient to return to friends house. Car in parking lot. Has nebulizer/cpap. Will follow.

## 2023-03-01 PROCEDURE — 2060000000 HC ICU INTERMEDIATE R&B

## 2023-03-01 PROCEDURE — 6360000002 HC RX W HCPCS: Performed by: INTERNAL MEDICINE

## 2023-03-01 PROCEDURE — 6370000000 HC RX 637 (ALT 250 FOR IP)

## 2023-03-01 PROCEDURE — 6370000000 HC RX 637 (ALT 250 FOR IP): Performed by: INTERNAL MEDICINE

## 2023-03-01 PROCEDURE — 94660 CPAP INITIATION&MGMT: CPT

## 2023-03-01 PROCEDURE — 2580000003 HC RX 258: Performed by: INTERNAL MEDICINE

## 2023-03-01 PROCEDURE — 94640 AIRWAY INHALATION TREATMENT: CPT

## 2023-03-01 RX ADMIN — RANOLAZINE 500 MG: 500 TABLET, FILM COATED, EXTENDED RELEASE ORAL at 08:04

## 2023-03-01 RX ADMIN — SALINE NASAL SPRAY 1 SPRAY: 1.5 SOLUTION NASAL at 21:12

## 2023-03-01 RX ADMIN — AMLODIPINE BESYLATE 5 MG: 5 TABLET ORAL at 08:03

## 2023-03-01 RX ADMIN — ARFORMOTEROL TARTRATE 15 MCG: 15 SOLUTION RESPIRATORY (INHALATION) at 08:39

## 2023-03-01 RX ADMIN — SALINE NASAL SPRAY 1 SPRAY: 1.5 SOLUTION NASAL at 08:06

## 2023-03-01 RX ADMIN — ARFORMOTEROL TARTRATE 15 MCG: 15 SOLUTION RESPIRATORY (INHALATION) at 19:32

## 2023-03-01 RX ADMIN — IPRATROPIUM BROMIDE AND ALBUTEROL SULFATE 3 ML: 2.5; .5 SOLUTION RESPIRATORY (INHALATION) at 08:39

## 2023-03-01 RX ADMIN — PANTOPRAZOLE SODIUM 40 MG: 40 TABLET, DELAYED RELEASE ORAL at 06:40

## 2023-03-01 RX ADMIN — CLONIDINE HYDROCHLORIDE 0.1 MG: 0.1 TABLET ORAL at 21:11

## 2023-03-01 RX ADMIN — ENOXAPARIN SODIUM 40 MG: 100 INJECTION SUBCUTANEOUS at 08:03

## 2023-03-01 RX ADMIN — SACUBITRIL AND VALSARTAN 1 TABLET: 49; 51 TABLET, FILM COATED ORAL at 21:11

## 2023-03-01 RX ADMIN — TORSEMIDE 20 MG: 20 TABLET ORAL at 08:04

## 2023-03-01 RX ADMIN — SACUBITRIL AND VALSARTAN 1 TABLET: 49; 51 TABLET, FILM COATED ORAL at 08:04

## 2023-03-01 RX ADMIN — BUDESONIDE 500 MCG: 0.5 SUSPENSION RESPIRATORY (INHALATION) at 19:32

## 2023-03-01 RX ADMIN — SPIRONOLACTONE 25 MG: 25 TABLET ORAL at 08:05

## 2023-03-01 RX ADMIN — BUDESONIDE 500 MCG: 0.5 SUSPENSION RESPIRATORY (INHALATION) at 08:39

## 2023-03-01 RX ADMIN — SALINE NASAL SPRAY 1 SPRAY: 1.5 SOLUTION NASAL at 14:46

## 2023-03-01 RX ADMIN — FINASTERIDE 5 MG: 5 TABLET, FILM COATED ORAL at 08:03

## 2023-03-01 RX ADMIN — PREDNISONE 30 MG: 20 TABLET ORAL at 08:03

## 2023-03-01 RX ADMIN — RANOLAZINE 500 MG: 500 TABLET, FILM COATED, EXTENDED RELEASE ORAL at 21:11

## 2023-03-01 RX ADMIN — SODIUM CHLORIDE, PRESERVATIVE FREE 10 ML: 5 INJECTION INTRAVENOUS at 08:05

## 2023-03-01 RX ADMIN — METOPROLOL SUCCINATE 50 MG: 50 TABLET, EXTENDED RELEASE ORAL at 08:04

## 2023-03-01 RX ADMIN — SODIUM CHLORIDE, PRESERVATIVE FREE 10 ML: 5 INJECTION INTRAVENOUS at 21:12

## 2023-03-01 ASSESSMENT — PAIN SCALES - GENERAL
PAINLEVEL_OUTOF10: 0

## 2023-03-01 NOTE — PROGRESS NOTES
Patient ambulated around unit 3 times on room air. Pulse ox before ambulation 97%, during ambulation 92%, after ambulation 95%. No concerns or complaints.

## 2023-03-01 NOTE — PLAN OF CARE
Problem: Discharge Planning  Goal: Discharge to home or other facility with appropriate resources  Outcome: Progressing  Flowsheets (Taken 2/27/2023 1444)  Discharge to home or other facility with appropriate resources:   Identify barriers to discharge with patient and caregiver   Arrange for needed discharge resources and transportation as appropriate   Identify discharge learning needs (meds, wound care, etc)   Arrange for interpreters to assist at discharge as needed   Refer to discharge planning if patient needs post-hospital services based on physician order or complex needs related to functional status, cognitive ability or social support system     Problem: Chronic Conditions and Co-morbidities  Goal: Patient's chronic conditions and co-morbidity symptoms are monitored and maintained or improved  Outcome: Progressing  Flowsheets (Taken 2/27/2023 1444)  Care Plan - Patient's Chronic Conditions and Co-Morbidity Symptoms are Monitored and Maintained or Improved:   Monitor and assess patient's chronic conditions and comorbid symptoms for stability, deterioration, or improvement   Collaborate with multidisciplinary team to address chronic and comorbid conditions and prevent exacerbation or deterioration   Update acute care plan with appropriate goals if chronic or comorbid symptoms are exacerbated and prevent overall improvement and discharge     Problem: Pain  Goal: Verbalizes/displays adequate comfort level or baseline comfort level  Outcome: Progressing  Flowsheets (Taken 2/27/2023 1444)  Verbalizes/displays adequate comfort level or baseline comfort level:   Encourage patient to monitor pain and request assistance   Assess pain using appropriate pain scale   Administer analgesics based on type and severity of pain and evaluate response   Implement non-pharmacological measures as appropriate and evaluate response   Consider cultural and social influences on pain and pain management   Notify Licensed  Independent Practitioner if interventions unsuccessful or patient reports new pain

## 2023-03-01 NOTE — PROGRESS NOTES
[facility] [address] [facility]  [address], [address]  [facility], [address] [place] [address]      Pulmonary Progress Note      SUBJECTIVE:  claims stable with breathing, no apparent desaturation with ambulation when checked yesterday.  Minimal cough and not much sputum production    OBJECTIVE    Medications    Continuous Infusions:   sodium chloride         Scheduled Meds:   cloNIDine  0.1 mg Oral Daily    predniSONE  30 mg Oral Daily    pantoprazole  40 mg Oral QAM AC    ranolazine  500 mg Oral BID    sacubitril-valsartan  1 tablet Oral BID    torsemide  20 mg Oral Daily    sodium chloride  1 spray Each Nostril TID    amLODIPine  5 mg Oral Daily    finasteride  5 mg Oral Daily    metoprolol succinate  50 mg Oral Daily    nicotine  1 patch TransDERmal Q24H    spironolactone  25 mg Oral Daily    sodium chloride flush  5-40 mL IntraVENous 2 times per day    enoxaparin  40 mg SubCUTAneous Daily    budesonide  0.5 mg Nebulization BID    arformoterol tartrate  15 mcg Nebulization BID       PRN Meds:calcium carbonate, sodium chloride, perflutren lipid microspheres, hydrALAZINE, ipratropium-albuterol, sodium chloride flush, sodium chloride, ondansetron **OR** ondansetron, polyethylene glycol, acetaminophen **OR** acetaminophen    Physical    VITALS:  /83   Pulse 71   Temp 97.3 °F (36.3 °C) (Oral)   Resp 26   Ht 5' 7\" (1.702 m)   Wt 216 lb (98 kg)   SpO2 97%   BMI 33.83 kg/m²     24HR INTAKE/OUTPUT:      Intake/Output Summary (Last 24 hours) at 3/1/2023 0847  Last data filed at 3/1/2023 0258  Gross per 24 hour   Intake 100 ml   Output 1000 ml   Net -900 ml         24HR PULSE OXIMETRY RANGE:    SpO2  Av.5 %  Min: 95 %  Max: 99 %    General appearance: alert, appears stated age, and cooperative  Lungs: rhonchi bilaterally minimal with cough  Heart: regular rate and rhythm, S1, S2 normal, no murmur, click, rub or gallop  Abdomen: soft, non-tender; bowel sounds normal; no masses,  no organomegaly  Extremities: bipedal edema  Neurologic: Mental status: Alert, oriented, thought content appropriate    Data    CBC:   No results for input(s): WBC, HGB, HCT, MCV, PLT in the last 72 hours. BMP:  Recent Labs     02/27/23  0613 02/28/23  1000    133   K 4.4 4.0    106   CO2 17* 17*   BUN 38* 41*   CREATININE 1.6* 1.6*      ALB:3,BILIDIR:3,BILITOT:3,ALKPHOS:3)@    PT/INR: No results for input(s): PROTIME, INR in the last 72 hours. ABG:   No results for input(s): PH, PO2, PCO2, HCO3, BE, O2SAT, METHB, O2HB, COHB, O2CON, HHB, THB in the last 72 hours. Radiology/Other tests reviewed: none    Assessment:     Principal Problem:    Acute respiratory failure with hypoxia (HCC)  Active Problems:    CHF (congestive heart failure), NYHA class I, acute on chronic, diastolic (HCC)    Congestive heart failure ([facility] [address]    Primary hypertension  Resolved Problems:    * No resolved hospital problems. *      Plan:       Cont with nebs and nasal saline, observe respiratory function and cough/sputum production  On RA, watch oxygenation glen with ambulation  Watch fluid balance, diurese as per Cardiology  Cont with steroids, taper as tolerated  OOB to chair, ambulate with assistance, PT/OT      Time at the bedside, reviewing labs and radiographs, reviewing notes and consultations, discussing with staff and family was more than 35 minutes. Thanks for letting us see this patient in consultation. Please contact us with any questions. Office [phone] or after hours through [employer], x [phone].

## 2023-03-01 NOTE — PROGRESS NOTES
Nutrition Assessment    Type and Reason for Visit:  Initial, RD Nutrition Re-Screen/LOS    Nutrition Recommendations/Plan:   Continue current diet, as tolerated  Continue inpatient monitoring       Nutrition Assessment:    Pt admit 2/2 acute hypoxemic respiratory failure, likely 2/2 CHF decomp with exac of COPD. Also BEAR. PMH=ETOH and substance abuse, CHF, CKD 3. Current diet is appropriate to promote fluid balance with CHF and pt eating well at meals. No additional nutrition recommendations at this time. Nutrition Related Findings:    A&Ox4, no edema, missing teeth, abd +BS Wound Type: None (abrasions)       Current Nutrition Intake & Therapies:    Average Meal Intake: %  Average Supplements Intake: None Ordered  ADULT DIET; Regular; Low Fat/Low Chol/High Fiber/JUAN F; Low Sodium (2 gm)    Anthropometric Measures:  Height: 5' 7\" (170.2 cm)  Ideal Body Weight (IBW): 148 lbs (67 kg)    Admission Body Weight: 216 lb 4.8 oz (98.1 kg) (2/23 bedscale)  Current Body Weight: 216 lb (98 kg), 145.9 % IBW. Weight Source: Bed Scale (3/1)  Current BMI (kg/m2): 33.8  Usual Body Weight:  (UTO in EMR)                       BMI Categories: Obese Class 1 (BMI 30.0-34. 9)      Nutrition Interventions:   Food and/or Nutrient Delivery: Continue Current Diet  Nutrition Education/Counseling: No recommendation at this time  Coordination of Nutrition Care: Continue to monitor while inpatient       Goals:     Goals: PO intake 75% or greater, by next RD assessment       Nutrition Monitoring and Evaluation:   Behavioral-Environmental Outcomes: None Identified  Food/Nutrient Intake Outcomes: Food and Nutrient Intake  Physical Signs/Symptoms Outcomes: Biochemical Data, GI Status, Fluid Status or Edema, Nutrition Focused Physical Findings, Skin, Weight    Discharge Planning:     Too soon to determine     Jai Sampson RD, CNSC, LD  Contact:

## 2023-03-01 NOTE — CARE COORDINATION
CASE MANAGEMENT. .. Met with patient at the bedside. States he is feeling better today. Tolerating room air at rest. Nursing to assess with ambulation. Cardio/pulm following and adjusting meds accordingly. States Dr Ping Gonzalez was in this am and anticipates dc tomorrow. Confirmed patient will return to [facility] at [facility]. No needs noted. His car is in parking lot for ride at [place]. Will follow.

## 2023-03-01 NOTE — PROGRESS NOTES
Patient seen vss afebrile. Labs holding steady. Renal function about the same. continue with current treatment. Wean 02 and steroids.

## 2023-03-02 VITALS
BODY MASS INDEX: 33.84 KG/M2 | WEIGHT: 215.6 LBS | SYSTOLIC BLOOD PRESSURE: 116 MMHG | TEMPERATURE: 97.6 F | HEART RATE: 100 BPM | HEIGHT: 67 IN | DIASTOLIC BLOOD PRESSURE: 93 MMHG | OXYGEN SATURATION: 98 % | RESPIRATION RATE: 20 BRPM

## 2023-03-02 PROCEDURE — 6360000002 HC RX W HCPCS: Performed by: INTERNAL MEDICINE

## 2023-03-02 PROCEDURE — 2580000003 HC RX 258: Performed by: INTERNAL MEDICINE

## 2023-03-02 PROCEDURE — 6370000000 HC RX 637 (ALT 250 FOR IP): Performed by: INTERNAL MEDICINE

## 2023-03-02 PROCEDURE — 94660 CPAP INITIATION&MGMT: CPT

## 2023-03-02 PROCEDURE — 6370000000 HC RX 637 (ALT 250 FOR IP)

## 2023-03-02 PROCEDURE — 94640 AIRWAY INHALATION TREATMENT: CPT

## 2023-03-02 RX ORDER — RANOLAZINE 500 MG/1
500 TABLET, EXTENDED RELEASE ORAL 2 TIMES DAILY
Qty: 60 TABLET | Refills: 3 | Status: SHIPPED | OUTPATIENT
Start: 2023-03-02

## 2023-03-02 RX ORDER — PREDNISONE 10 MG/1
TABLET ORAL
Qty: 9 TABLET | Refills: 0 | Status: SHIPPED | OUTPATIENT
Start: 2023-03-02

## 2023-03-02 RX ORDER — TORSEMIDE 20 MG/1
20 TABLET ORAL DAILY
Qty: 30 TABLET | Refills: 3 | Status: SHIPPED | OUTPATIENT
Start: 2023-03-03

## 2023-03-02 RX ORDER — BUDESONIDE 0.5 MG/2ML
0.5 INHALANT ORAL 2 TIMES DAILY
Qty: 60 EACH | Refills: 3 | Status: SHIPPED | OUTPATIENT
Start: 2023-03-02

## 2023-03-02 RX ORDER — METOPROLOL SUCCINATE 50 MG/1
50 TABLET, EXTENDED RELEASE ORAL DAILY
Qty: 30 TABLET | Refills: 3 | Status: SHIPPED | OUTPATIENT
Start: 2023-03-03

## 2023-03-02 RX ORDER — PANTOPRAZOLE SODIUM 40 MG/1
40 TABLET, DELAYED RELEASE ORAL
Qty: 30 TABLET | Refills: 3 | Status: SHIPPED | OUTPATIENT
Start: 2023-03-03

## 2023-03-02 RX ORDER — CALCIUM CARBONATE 200(500)MG
500 TABLET,CHEWABLE ORAL 3 TIMES DAILY PRN
Qty: 60 TABLET | Refills: 1 | Status: SHIPPED | OUTPATIENT
Start: 2023-03-02 | End: 2023-04-01

## 2023-03-02 RX ORDER — POLYETHYLENE GLYCOL 3350 17 G/17G
17 POWDER, FOR SOLUTION ORAL DAILY PRN
Qty: 527 G | Refills: 1 | Status: SHIPPED | OUTPATIENT
Start: 2023-03-02 | End: 2023-04-01

## 2023-03-02 RX ADMIN — SACUBITRIL AND VALSARTAN 1 TABLET: 49; 51 TABLET, FILM COATED ORAL at 07:46

## 2023-03-02 RX ADMIN — BUDESONIDE 500 MCG: 0.5 SUSPENSION RESPIRATORY (INHALATION) at 08:34

## 2023-03-02 RX ADMIN — SPIRONOLACTONE 25 MG: 25 TABLET ORAL at 07:44

## 2023-03-02 RX ADMIN — FINASTERIDE 5 MG: 5 TABLET, FILM COATED ORAL at 07:47

## 2023-03-02 RX ADMIN — SALINE NASAL SPRAY 1 SPRAY: 1.5 SOLUTION NASAL at 07:47

## 2023-03-02 RX ADMIN — PREDNISONE 30 MG: 20 TABLET ORAL at 07:44

## 2023-03-02 RX ADMIN — ARFORMOTEROL TARTRATE 15 MCG: 15 SOLUTION RESPIRATORY (INHALATION) at 08:34

## 2023-03-02 RX ADMIN — RANOLAZINE 500 MG: 500 TABLET, FILM COATED, EXTENDED RELEASE ORAL at 07:46

## 2023-03-02 RX ADMIN — TORSEMIDE 20 MG: 20 TABLET ORAL at 07:44

## 2023-03-02 RX ADMIN — PANTOPRAZOLE SODIUM 40 MG: 40 TABLET, DELAYED RELEASE ORAL at 06:27

## 2023-03-02 RX ADMIN — SODIUM CHLORIDE, PRESERVATIVE FREE 10 ML: 5 INJECTION INTRAVENOUS at 07:44

## 2023-03-02 ASSESSMENT — PAIN SCALES - GENERAL
PAINLEVEL_OUTOF10: 0
PAINLEVEL_OUTOF10: 0

## 2023-03-02 NOTE — PROGRESS NOTES
Patient discharged home with all belongings. Patient received discharge instructions. No questions or concerns.

## 2023-03-02 NOTE — CARE COORDINATION
CASE MANAGEMENT. ... OK for dc from pulm pov. Tolerating room air at rest/ambulation. Await Dr Jordan Ferro input today. Patient will return to [facility] at [facility]. No needs noted. His car [place] in parking [place] for ride at [place]. Will follow.

## 2023-03-02 NOTE — PROGRESS NOTES
[facility]  [address], [address]  [facility], [facility]      Pulmonary Progress Note      SUBJECTIVE:  claims stable with breathing, no apparent desaturation with ambulation when checked yesterday.  Minimal cough and not much sputum production, hoping to go home today    OBJECTIVE    Medications    Continuous Infusions:   sodium chloride         Scheduled Meds:   cloNIDine  0.1 mg Oral Daily    predniSONE  30 mg Oral Daily    pantoprazole  40 mg Oral QAM AC    ranolazine  500 mg Oral BID    sacubitril-valsartan  1 tablet Oral BID    torsemide  20 mg Oral Daily    sodium chloride  1 spray Each Nostril TID    amLODIPine  5 mg Oral Daily    finasteride  5 mg Oral Daily    metoprolol succinate  50 mg Oral Daily    nicotine  1 patch TransDERmal Q24H    spironolactone  25 mg Oral Daily    sodium chloride flush  5-40 mL IntraVENous 2 times per day    enoxaparin  40 mg SubCUTAneous Daily    budesonide  0.5 mg Nebulization BID    arformoterol tartrate  15 mcg Nebulization BID       PRN Meds:calcium carbonate, sodium chloride, perflutren lipid microspheres, hydrALAZINE, ipratropium-albuterol, sodium chloride flush, sodium chloride, ondansetron **OR** ondansetron, polyethylene glycol, acetaminophen **OR** acetaminophen    Physical    VITALS:  BP (!) 117/105   Pulse 100   Temp 97.6 °F (36.4 °C) (Oral)   Resp 20   Ht 5' 7\" (1.702 m)   Wt 215 lb 9.6 oz (97.8 kg)   SpO2 98%   BMI 33.77 kg/m²     24HR INTAKE/OUTPUT:      Intake/Output Summary (Last 24 hours) at 3/2/2023 0836  Last data filed at 3/2/2023 0269  Gross per 24 hour   Intake --   Output 3075 ml   Net -3075 ml         24HR PULSE OXIMETRY RANGE:    SpO2  Av.8 %  Min: 91 %  Max: 98 %    General appearance: alert, appears stated age, and cooperative  Lungs: rhonchi bilaterally minimal with cough  Heart: regular rate and rhythm, S1, S2 normal, no murmur, click, rub or gallop  Abdomen: soft, non-tender; bowel sounds normal; no masses,  no organomegaly  Extremities: bipedal edema  Neurologic: Mental status: Alert, oriented, thought content appropriate    Data    CBC:   No results for input(s): WBC, HGB, HCT, MCV, PLT in the last 72 hours. BMP:  Recent Labs     02/28/23  1000      K 4.0      CO2 17*   BUN 41*   CREATININE 1.6*      ALB:3,BILIDIR:3,BILITOT:3,ALKPHOS:3)@    PT/INR: No results for input(s): PROTIME, INR in the last 72 hours. ABG:   No results for input(s): PH, PO2, PCO2, HCO3, BE, O2SAT, METHB, O2HB, COHB, O2CON, HHB, THB in the last 72 hours. Radiology/Other tests reviewed: none    Assessment:     Principal Problem:    Acute respiratory failure with hypoxia (HCC)  Active Problems:    CHF (congestive heart failure), NYHA class I, acute on chronic, diastolic (HCC)    Congestive heart failure ([facility] [address]    Primary hypertension  Resolved Problems:    * No resolved hospital problems. *      Plan:       Cont with nebs and nasal saline, observe respiratory function and cough/sputum production, can resume usual medications as out-pt  On RA, watch oxygenation glen with ambulation  Watch fluid balance, diurese as per Cardiology  Cont with steroids, taper as tolerated  OOB to chair, ambulate with assistance, PT/OT  Can be discharged from pulmonary pov      Time at the bedside, reviewing labs and radiographs, reviewing notes and consultations, discussing with staff and family was more than 35 minutes. Thanks for letting us see this patient in consultation. Please contact us with any questions. Office [phone] or after hours through [facility], x [phone].

## 2023-03-02 NOTE — PROGRESS NOTES
Patient seen doing fine . Breathing back to bse line without 02. Tolerating meds. Labs stable. Will see as op to adjust meds .  Return to ed if any issues should come up

## 2024-02-19 ENCOUNTER — HOSPITAL ENCOUNTER (INPATIENT)
Age: 59
LOS: 2 days | Discharge: HOME | DRG: 065 | End: 2024-02-21
Attending: STUDENT IN AN ORGANIZED HEALTH CARE EDUCATION/TRAINING PROGRAM | Admitting: INTERNAL MEDICINE
Payer: MEDICARE

## 2024-02-19 ENCOUNTER — APPOINTMENT (OUTPATIENT)
Dept: CT IMAGING | Age: 59
DRG: 065 | End: 2024-02-19
Payer: MEDICARE

## 2024-02-19 ENCOUNTER — APPOINTMENT (OUTPATIENT)
Dept: GENERAL RADIOLOGY | Age: 59
DRG: 065 | End: 2024-02-19
Payer: MEDICARE

## 2024-02-19 DIAGNOSIS — R29.90 STROKE-LIKE SYMPTOMS: Primary | ICD-10-CM

## 2024-02-19 PROBLEM — I63.81 LACUNAR STROKE (HCC): Status: ACTIVE | Noted: 2024-02-19

## 2024-02-19 PROBLEM — F14.10 COCAINE ABUSE (HCC): Status: ACTIVE | Noted: 2024-02-19

## 2024-02-19 PROBLEM — N18.31 STAGE 3A CHRONIC KIDNEY DISEASE (CKD) (HCC): Status: ACTIVE | Noted: 2024-02-19

## 2024-02-19 PROBLEM — I50.42 CHRONIC COMBINED SYSTOLIC AND DIASTOLIC CONGESTIVE HEART FAILURE (HCC): Status: ACTIVE | Noted: 2023-02-22

## 2024-02-19 LAB
ALBUMIN SERPL-MCNC: 3.7 G/DL (ref 3.5–5.2)
ALP SERPL-CCNC: 68 U/L (ref 40–129)
ALT SERPL-CCNC: 11 U/L (ref 0–40)
AMPHET UR QL SCN: POSITIVE
ANION GAP SERPL CALCULATED.3IONS-SCNC: 12 MMOL/L (ref 7–16)
APAP SERPL-MCNC: <5 UG/ML (ref 10–30)
AST SERPL-CCNC: 15 U/L (ref 0–39)
BARBITURATES UR QL SCN: NEGATIVE
BASOPHILS # BLD: 0.07 K/UL (ref 0–0.2)
BASOPHILS NFR BLD: 1 % (ref 0–2)
BENZODIAZ UR QL: NEGATIVE
BILIRUB SERPL-MCNC: 0.3 MG/DL (ref 0–1.2)
BNP SERPL-MCNC: 647 PG/ML (ref 0–125)
BUN SERPL-MCNC: 35 MG/DL (ref 6–23)
BUPRENORPHINE UR QL: NEGATIVE
CALCIUM SERPL-MCNC: 8.7 MG/DL (ref 8.6–10.2)
CANNABINOIDS UR QL SCN: NEGATIVE
CHLORIDE SERPL-SCNC: 105 MMOL/L (ref 98–107)
CHOLEST SERPL-MCNC: 185 MG/DL
CO2 SERPL-SCNC: 21 MMOL/L (ref 22–29)
COCAINE UR QL SCN: POSITIVE
CREAT SERPL-MCNC: 1.7 MG/DL (ref 0.7–1.2)
EOSINOPHIL # BLD: 0.27 K/UL (ref 0.05–0.5)
EOSINOPHILS RELATIVE PERCENT: 3 % (ref 0–6)
ERYTHROCYTE [DISTWIDTH] IN BLOOD BY AUTOMATED COUNT: 13.5 % (ref 11.5–15)
ETHANOLAMINE SERPL-MCNC: <10 MG/DL
FENTANYL UR QL: NEGATIVE
GFR SERPL CREATININE-BSD FRML MDRD: 45 ML/MIN/1.73M2
GLUCOSE SERPL-MCNC: 82 MG/DL (ref 74–99)
HBA1C MFR BLD: 5.2 % (ref 4–5.6)
HCT VFR BLD AUTO: 45.1 % (ref 37–54)
HDLC SERPL-MCNC: 43 MG/DL
HGB BLD-MCNC: 15.9 G/DL (ref 12.5–16.5)
IMM GRANULOCYTES # BLD AUTO: 0.09 K/UL (ref 0–0.58)
IMM GRANULOCYTES NFR BLD: 1 % (ref 0–5)
LDLC SERPL CALC-MCNC: 111 MG/DL
LYMPHOCYTES NFR BLD: 3.86 K/UL (ref 1.5–4)
LYMPHOCYTES RELATIVE PERCENT: 35 % (ref 20–42)
MAGNESIUM SERPL-MCNC: 2.1 MG/DL (ref 1.6–2.6)
MCH RBC QN AUTO: 32.9 PG (ref 26–35)
MCHC RBC AUTO-ENTMCNC: 35.3 G/DL (ref 32–34.5)
MCV RBC AUTO: 93.4 FL (ref 80–99.9)
METHADONE UR QL: NEGATIVE
MONOCYTES NFR BLD: 0.85 K/UL (ref 0.1–0.95)
MONOCYTES NFR BLD: 8 % (ref 2–12)
NEUTROPHILS NFR BLD: 53 % (ref 43–80)
NEUTS SEG NFR BLD: 5.87 K/UL (ref 1.8–7.3)
OPIATES UR QL SCN: NEGATIVE
OXYCODONE UR QL SCN: NEGATIVE
PCP UR QL SCN: NEGATIVE
PLATELET # BLD AUTO: 262 K/UL (ref 130–450)
PMV BLD AUTO: 9.5 FL (ref 7–12)
POTASSIUM SERPL-SCNC: 4.1 MMOL/L (ref 3.5–5)
PROT SERPL-MCNC: 7.2 G/DL (ref 6.4–8.3)
RBC # BLD AUTO: 4.83 M/UL (ref 3.8–5.8)
SALICYLATES SERPL-MCNC: <0.3 MG/DL (ref 0–30)
SODIUM SERPL-SCNC: 138 MMOL/L (ref 132–146)
T4 FREE SERPL-MCNC: 1.1 NG/DL (ref 0.9–1.7)
TEST INFORMATION: ABNORMAL
TOXIC TRICYCLIC SC,BLOOD: NEGATIVE
TRIGL SERPL-MCNC: 155 MG/DL
TROPONIN I SERPL HS-MCNC: 13 NG/L (ref 0–11)
TROPONIN I SERPL HS-MCNC: 14 NG/L (ref 0–11)
TSH SERPL DL<=0.05 MIU/L-ACNC: 4.4 UIU/ML (ref 0.27–4.2)
VLDLC SERPL CALC-MCNC: 31 MG/DL
WBC OTHER # BLD: 11 K/UL (ref 4.5–11.5)

## 2024-02-19 PROCEDURE — 83735 ASSAY OF MAGNESIUM: CPT

## 2024-02-19 PROCEDURE — 85025 COMPLETE CBC W/AUTO DIFF WBC: CPT

## 2024-02-19 PROCEDURE — 84484 ASSAY OF TROPONIN QUANT: CPT

## 2024-02-19 PROCEDURE — 70450 CT HEAD/BRAIN W/O DYE: CPT

## 2024-02-19 PROCEDURE — 6370000000 HC RX 637 (ALT 250 FOR IP)

## 2024-02-19 PROCEDURE — 70496 CT ANGIOGRAPHY HEAD: CPT

## 2024-02-19 PROCEDURE — 4A03X5D MEASUREMENT OF ARTERIAL FLOW, INTRACRANIAL, EXTERNAL APPROACH: ICD-10-PCS | Performed by: INTERNAL MEDICINE

## 2024-02-19 PROCEDURE — 0042T CT BRAIN PERFUSION: CPT

## 2024-02-19 PROCEDURE — 93005 ELECTROCARDIOGRAM TRACING: CPT | Performed by: EMERGENCY MEDICINE

## 2024-02-19 PROCEDURE — 70498 CT ANGIOGRAPHY NECK: CPT

## 2024-02-19 PROCEDURE — 94640 AIRWAY INHALATION TREATMENT: CPT

## 2024-02-19 PROCEDURE — 6370000000 HC RX 637 (ALT 250 FOR IP): Performed by: EMERGENCY MEDICINE

## 2024-02-19 PROCEDURE — 84443 ASSAY THYROID STIM HORMONE: CPT

## 2024-02-19 PROCEDURE — 6360000002 HC RX W HCPCS

## 2024-02-19 PROCEDURE — 80143 DRUG ASSAY ACETAMINOPHEN: CPT

## 2024-02-19 PROCEDURE — 80307 DRUG TEST PRSMV CHEM ANLYZR: CPT

## 2024-02-19 PROCEDURE — 6360000004 HC RX CONTRAST MEDICATION: Performed by: RADIOLOGY

## 2024-02-19 PROCEDURE — 83036 HEMOGLOBIN GLYCOSYLATED A1C: CPT

## 2024-02-19 PROCEDURE — 2060000000 HC ICU INTERMEDIATE R&B

## 2024-02-19 PROCEDURE — 2580000003 HC RX 258: Performed by: EMERGENCY MEDICINE

## 2024-02-19 PROCEDURE — 84439 ASSAY OF FREE THYROXINE: CPT

## 2024-02-19 PROCEDURE — 99222 1ST HOSP IP/OBS MODERATE 55: CPT | Performed by: INTERNAL MEDICINE

## 2024-02-19 PROCEDURE — 80061 LIPID PANEL: CPT

## 2024-02-19 PROCEDURE — 83880 ASSAY OF NATRIURETIC PEPTIDE: CPT

## 2024-02-19 PROCEDURE — 2580000003 HC RX 258

## 2024-02-19 PROCEDURE — 80179 DRUG ASSAY SALICYLATE: CPT

## 2024-02-19 PROCEDURE — G0480 DRUG TEST DEF 1-7 CLASSES: HCPCS

## 2024-02-19 PROCEDURE — 71045 X-RAY EXAM CHEST 1 VIEW: CPT

## 2024-02-19 PROCEDURE — 80053 COMPREHEN METABOLIC PANEL: CPT

## 2024-02-19 PROCEDURE — 99285 EMERGENCY DEPT VISIT HI MDM: CPT

## 2024-02-19 RX ORDER — LANOLIN ALCOHOL/MO/W.PET/CERES
100 CREAM (GRAM) TOPICAL DAILY
Status: DISCONTINUED | OUTPATIENT
Start: 2024-02-19 | End: 2024-02-21 | Stop reason: HOSPADM

## 2024-02-19 RX ORDER — FINASTERIDE 5 MG/1
5 TABLET, FILM COATED ORAL DAILY
Status: DISCONTINUED | OUTPATIENT
Start: 2024-02-20 | End: 2024-02-21 | Stop reason: HOSPADM

## 2024-02-19 RX ORDER — LORAZEPAM 1 MG/1
2 TABLET ORAL
Status: DISCONTINUED | OUTPATIENT
Start: 2024-02-19 | End: 2024-02-21 | Stop reason: HOSPADM

## 2024-02-19 RX ORDER — SODIUM CHLORIDE 9 MG/ML
INJECTION, SOLUTION INTRAVENOUS PRN
Status: DISCONTINUED | OUTPATIENT
Start: 2024-02-19 | End: 2024-02-21 | Stop reason: HOSPADM

## 2024-02-19 RX ORDER — SPIRONOLACTONE 25 MG/1
25 TABLET ORAL DAILY
Status: DISCONTINUED | OUTPATIENT
Start: 2024-02-19 | End: 2024-02-21 | Stop reason: HOSPADM

## 2024-02-19 RX ORDER — ATORVASTATIN CALCIUM 40 MG/1
40 TABLET, FILM COATED ORAL NIGHTLY
Status: DISCONTINUED | OUTPATIENT
Start: 2024-02-19 | End: 2024-02-19

## 2024-02-19 RX ORDER — TORSEMIDE 10 MG/1
10 TABLET ORAL DAILY
Status: DISCONTINUED | OUTPATIENT
Start: 2024-02-19 | End: 2024-02-21 | Stop reason: HOSPADM

## 2024-02-19 RX ORDER — SODIUM CHLORIDE 0.9 % (FLUSH) 0.9 %
5-40 SYRINGE (ML) INJECTION EVERY 12 HOURS SCHEDULED
Status: DISCONTINUED | OUTPATIENT
Start: 2024-02-19 | End: 2024-02-21 | Stop reason: HOSPADM

## 2024-02-19 RX ORDER — PANTOPRAZOLE SODIUM 40 MG/1
40 TABLET, DELAYED RELEASE ORAL
Status: DISCONTINUED | OUTPATIENT
Start: 2024-02-20 | End: 2024-02-21 | Stop reason: HOSPADM

## 2024-02-19 RX ORDER — LORAZEPAM 2 MG/ML
3 INJECTION INTRAMUSCULAR
Status: DISCONTINUED | OUTPATIENT
Start: 2024-02-19 | End: 2024-02-21 | Stop reason: HOSPADM

## 2024-02-19 RX ORDER — CLOPIDOGREL 300 MG/1
300 TABLET, FILM COATED ORAL ONCE
Status: COMPLETED | OUTPATIENT
Start: 2024-02-19 | End: 2024-02-19

## 2024-02-19 RX ORDER — ENOXAPARIN SODIUM 100 MG/ML
40 INJECTION SUBCUTANEOUS DAILY
Status: DISCONTINUED | OUTPATIENT
Start: 2024-02-19 | End: 2024-02-21 | Stop reason: HOSPADM

## 2024-02-19 RX ORDER — FLUTICASONE FUROATE, UMECLIDINIUM BROMIDE AND VILANTEROL TRIFENATATE 100; 62.5; 25 UG/1; UG/1; UG/1
1 POWDER RESPIRATORY (INHALATION) DAILY
COMMUNITY

## 2024-02-19 RX ORDER — ASPIRIN 81 MG/1
81 TABLET, CHEWABLE ORAL DAILY
Status: DISCONTINUED | OUTPATIENT
Start: 2024-02-20 | End: 2024-02-21 | Stop reason: HOSPADM

## 2024-02-19 RX ORDER — CLONIDINE HYDROCHLORIDE 0.2 MG/1
0.2 TABLET ORAL 2 TIMES DAILY
Status: ON HOLD | COMMUNITY
End: 2024-02-21 | Stop reason: HOSPADM

## 2024-02-19 RX ORDER — LORAZEPAM 2 MG/ML
2 INJECTION INTRAMUSCULAR
Status: DISCONTINUED | OUTPATIENT
Start: 2024-02-19 | End: 2024-02-21 | Stop reason: HOSPADM

## 2024-02-19 RX ORDER — 0.9 % SODIUM CHLORIDE 0.9 %
1000 INTRAVENOUS SOLUTION INTRAVENOUS ONCE
Status: COMPLETED | OUTPATIENT
Start: 2024-02-19 | End: 2024-02-19

## 2024-02-19 RX ORDER — POLYETHYLENE GLYCOL 3350 17 G/17G
17 POWDER, FOR SOLUTION ORAL DAILY PRN
Status: DISCONTINUED | OUTPATIENT
Start: 2024-02-19 | End: 2024-02-21 | Stop reason: HOSPADM

## 2024-02-19 RX ORDER — ONDANSETRON 4 MG/1
4 TABLET, ORALLY DISINTEGRATING ORAL EVERY 8 HOURS PRN
Status: DISCONTINUED | OUTPATIENT
Start: 2024-02-19 | End: 2024-02-21 | Stop reason: HOSPADM

## 2024-02-19 RX ORDER — SODIUM CHLORIDE 0.9 % (FLUSH) 0.9 %
5-40 SYRINGE (ML) INJECTION PRN
Status: DISCONTINUED | OUTPATIENT
Start: 2024-02-19 | End: 2024-02-21 | Stop reason: HOSPADM

## 2024-02-19 RX ORDER — SODIUM CHLORIDE 0.9 % (FLUSH) 0.9 %
10 SYRINGE (ML) INJECTION
Status: ACTIVE | OUTPATIENT
Start: 2024-02-19 | End: 2024-02-20

## 2024-02-19 RX ORDER — LORAZEPAM 1 MG/1
3 TABLET ORAL
Status: DISCONTINUED | OUTPATIENT
Start: 2024-02-19 | End: 2024-02-21 | Stop reason: HOSPADM

## 2024-02-19 RX ORDER — LORAZEPAM 1 MG/1
4 TABLET ORAL
Status: DISCONTINUED | OUTPATIENT
Start: 2024-02-19 | End: 2024-02-21 | Stop reason: HOSPADM

## 2024-02-19 RX ORDER — AMLODIPINE BESYLATE 10 MG/1
10 TABLET ORAL DAILY
Status: DISCONTINUED | OUTPATIENT
Start: 2024-02-19 | End: 2024-02-21 | Stop reason: HOSPADM

## 2024-02-19 RX ORDER — RANOLAZINE 500 MG/1
500 TABLET, EXTENDED RELEASE ORAL 2 TIMES DAILY
Status: DISCONTINUED | OUTPATIENT
Start: 2024-02-19 | End: 2024-02-21 | Stop reason: HOSPADM

## 2024-02-19 RX ORDER — LORAZEPAM 1 MG/1
1 TABLET ORAL
Status: DISCONTINUED | OUTPATIENT
Start: 2024-02-19 | End: 2024-02-21 | Stop reason: HOSPADM

## 2024-02-19 RX ORDER — ATORVASTATIN CALCIUM 40 MG/1
40 TABLET, FILM COATED ORAL NIGHTLY
Status: DISCONTINUED | OUTPATIENT
Start: 2024-02-20 | End: 2024-02-21 | Stop reason: HOSPADM

## 2024-02-19 RX ORDER — ARFORMOTEROL TARTRATE 15 UG/2ML
15 SOLUTION RESPIRATORY (INHALATION)
Status: DISCONTINUED | OUTPATIENT
Start: 2024-02-19 | End: 2024-02-21 | Stop reason: HOSPADM

## 2024-02-19 RX ORDER — CLONIDINE HYDROCHLORIDE 0.1 MG/1
0.2 TABLET ORAL 2 TIMES DAILY
Status: DISCONTINUED | OUTPATIENT
Start: 2024-02-19 | End: 2024-02-21 | Stop reason: HOSPADM

## 2024-02-19 RX ORDER — ALBUTEROL SULFATE 2.5 MG/3ML
2.5 SOLUTION RESPIRATORY (INHALATION) EVERY 4 HOURS PRN
Status: DISCONTINUED | OUTPATIENT
Start: 2024-02-19 | End: 2024-02-21 | Stop reason: HOSPADM

## 2024-02-19 RX ORDER — FOLIC ACID 1 MG/1
1 TABLET ORAL DAILY
Status: DISCONTINUED | OUTPATIENT
Start: 2024-02-19 | End: 2024-02-21 | Stop reason: HOSPADM

## 2024-02-19 RX ORDER — ACETAMINOPHEN 650 MG/1
650 SUPPOSITORY RECTAL EVERY 6 HOURS PRN
Status: DISCONTINUED | OUTPATIENT
Start: 2024-02-19 | End: 2024-02-21 | Stop reason: HOSPADM

## 2024-02-19 RX ORDER — LORAZEPAM 2 MG/ML
4 INJECTION INTRAMUSCULAR
Status: DISCONTINUED | OUTPATIENT
Start: 2024-02-19 | End: 2024-02-21 | Stop reason: HOSPADM

## 2024-02-19 RX ORDER — BUDESONIDE 0.25 MG/2ML
0.25 INHALANT ORAL
Status: DISCONTINUED | OUTPATIENT
Start: 2024-02-19 | End: 2024-02-21 | Stop reason: HOSPADM

## 2024-02-19 RX ORDER — ONDANSETRON 2 MG/ML
4 INJECTION INTRAMUSCULAR; INTRAVENOUS EVERY 6 HOURS PRN
Status: DISCONTINUED | OUTPATIENT
Start: 2024-02-19 | End: 2024-02-21 | Stop reason: HOSPADM

## 2024-02-19 RX ORDER — LORAZEPAM 2 MG/ML
1 INJECTION INTRAMUSCULAR
Status: DISCONTINUED | OUTPATIENT
Start: 2024-02-19 | End: 2024-02-21 | Stop reason: HOSPADM

## 2024-02-19 RX ORDER — ACETAMINOPHEN 325 MG/1
650 TABLET ORAL EVERY 6 HOURS PRN
Status: DISCONTINUED | OUTPATIENT
Start: 2024-02-19 | End: 2024-02-21 | Stop reason: HOSPADM

## 2024-02-19 RX ORDER — TORSEMIDE 10 MG/1
10 TABLET ORAL DAILY
COMMUNITY

## 2024-02-19 RX ORDER — ATORVASTATIN CALCIUM 40 MG/1
80 TABLET, FILM COATED ORAL ONCE
Status: COMPLETED | OUTPATIENT
Start: 2024-02-19 | End: 2024-02-19

## 2024-02-19 RX ORDER — CLOPIDOGREL BISULFATE 75 MG/1
75 TABLET ORAL DAILY
Status: DISCONTINUED | OUTPATIENT
Start: 2024-02-20 | End: 2024-02-21 | Stop reason: HOSPADM

## 2024-02-19 RX ORDER — ASPIRIN 81 MG/1
324 TABLET, CHEWABLE ORAL ONCE
Status: COMPLETED | OUTPATIENT
Start: 2024-02-19 | End: 2024-02-19

## 2024-02-19 RX ORDER — AMLODIPINE BESYLATE 10 MG/1
10 TABLET ORAL DAILY
Status: ON HOLD | COMMUNITY
End: 2024-02-21 | Stop reason: HOSPADM

## 2024-02-19 RX ORDER — NICOTINE 21 MG/24HR
1 PATCH, TRANSDERMAL 24 HOURS TRANSDERMAL DAILY
Status: DISCONTINUED | OUTPATIENT
Start: 2024-02-19 | End: 2024-02-21 | Stop reason: HOSPADM

## 2024-02-19 RX ORDER — METOPROLOL SUCCINATE 100 MG/1
100 TABLET, EXTENDED RELEASE ORAL DAILY
Status: ON HOLD | COMMUNITY
End: 2024-02-21

## 2024-02-19 RX ADMIN — RANOLAZINE 500 MG: 500 TABLET, FILM COATED, EXTENDED RELEASE ORAL at 20:53

## 2024-02-19 RX ADMIN — BUDESONIDE 250 MCG: 0.25 INHALANT RESPIRATORY (INHALATION) at 20:46

## 2024-02-19 RX ADMIN — ENOXAPARIN SODIUM 40 MG: 100 INJECTION SUBCUTANEOUS at 18:07

## 2024-02-19 RX ADMIN — CLOPIDOGREL BISULFATE 300 MG: 300 TABLET, FILM COATED ORAL at 09:26

## 2024-02-19 RX ADMIN — SODIUM CHLORIDE 1000 ML: 9 INJECTION, SOLUTION INTRAVENOUS at 08:28

## 2024-02-19 RX ADMIN — FOLIC ACID 1 MG: 1 TABLET ORAL at 18:07

## 2024-02-19 RX ADMIN — SODIUM CHLORIDE, PRESERVATIVE FREE 10 ML: 5 INJECTION INTRAVENOUS at 18:08

## 2024-02-19 RX ADMIN — SODIUM CHLORIDE, PRESERVATIVE FREE 10 ML: 5 INJECTION INTRAVENOUS at 20:53

## 2024-02-19 RX ADMIN — IOPAMIDOL 105 ML: 755 INJECTION, SOLUTION INTRAVENOUS at 08:48

## 2024-02-19 RX ADMIN — ASPIRIN 324 MG: 81 TABLET, CHEWABLE ORAL at 09:26

## 2024-02-19 RX ADMIN — ATORVASTATIN CALCIUM 80 MG: 40 TABLET, FILM COATED ORAL at 09:26

## 2024-02-19 RX ADMIN — IPRATROPIUM BROMIDE 0.5 MG: 0.5 SOLUTION RESPIRATORY (INHALATION) at 20:46

## 2024-02-19 RX ADMIN — SODIUM CHLORIDE 1000 ML: 9 INJECTION, SOLUTION INTRAVENOUS at 09:28

## 2024-02-19 RX ADMIN — ARFORMOTEROL TARTRATE 15 MCG: 15 SOLUTION RESPIRATORY (INHALATION) at 20:46

## 2024-02-19 RX ADMIN — Medication 100 MG: at 18:07

## 2024-02-19 ASSESSMENT — PAIN SCALES - GENERAL
PAINLEVEL_OUTOF10: 0

## 2024-02-19 NOTE — ED PROVIDER NOTES
(has no administration in time range)   folic acid (FOLVITE) tablet 1 mg (has no administration in time range)   thiamine tablet 100 mg (has no administration in time range)   LORazepam (ATIVAN) tablet 1 mg (has no administration in time range)     Or   LORazepam (ATIVAN) injection 1 mg (has no administration in time range)     Or   LORazepam (ATIVAN) tablet 2 mg (has no administration in time range)     Or   LORazepam (ATIVAN) injection 2 mg (has no administration in time range)     Or   LORazepam (ATIVAN) tablet 3 mg (has no administration in time range)     Or   LORazepam (ATIVAN) injection 3 mg (has no administration in time range)     Or   LORazepam (ATIVAN) tablet 4 mg (has no administration in time range)     Or   LORazepam (ATIVAN) injection 4 mg (has no administration in time range)   sodium chloride 0.9 % bolus 1,000 mL (0 mLs IntraVENous Stopped 2/19/24 1057)   iopamidol (ISOVUE-370) 76 % injection 105 mL (105 mLs IntraVENous Given 2/19/24 0848)   sodium chloride 0.9 % bolus 1,000 mL (0 mLs IntraVENous Stopped 2/19/24 1057)   aspirin chewable tablet 324 mg (324 mg Oral Given 2/19/24 0926)   clopidogrel (PLAVIX) tablet 300 mg (300 mg Oral Given 2/19/24 0926)   atorvastatin (LIPITOR) tablet 80 mg (80 mg Oral Given 2/19/24 0926)       Medical Decision Making/Differential Diagnosis:  CC/HPI Summary, Social Determinants of health, Records Reviewed, DDx, testing done/not done, ED Course, Reassessment, disposition considerations/shared decision making with patient, consults, disposition:        CC/HPI Summary, DDx, ED Course, Reassessment, Tests Considered, Patient expectation:   58-year-old male past medical history of CHF, COPD, hypertension hyperlipidemia presents today with concern for sensation changes on left side of his body.  Hemodynamically stable on arrival to emergency room, nontoxic in no acute distress.  NIH is 0 on arrival, sensation intact, strength intact, no drift, cranial nerves appropriate,

## 2024-02-19 NOTE — H&P
(Results Pending)            Resident's Assessment and Plan     Assessment and Plan:    Concern for ischemic lacunar stroke  Secondary risk factors include history of MI, smoking, HLD    Plan   MRI  Echo to assess for afib/cardiac origin   Lipid panel   HbA1c  Consult Neurology       History of alcohol use  Drinks about 12-30 beers/day  Plan   CIWA protocol   Monitor     Bradycardia     Plan   Hold metoprolol   Telemetry monitoring      PT/OT evaluation: not indicated at this time   DVT prophylaxis: none  GI prophylaxis: none  Diet:   No diet orders on file   Bowel regimen: none  Pain management: as needed  Code status: Prior   Disposition: Transfer / Discharge / Continue Current Care  Family: updated as available    Shannan Ocampo, MS3  Attending physician: Dr. Fontaine  Precepted With: Dr. Fontaine    
HrEF  Last echo in 2023 showed EF 35% and moderate to severe global hypokinesis  Telemetry and echo as stated above  Hold home entresto for now    PT/OT evaluation: Not indicated at this time  DVT prophylaxis: Lovenox   GI prophylaxis: Diet  Bowel regimen: scheduled   Pain management: as needed   Diet: No diet orders on file  Code Status: Prior   Disposition: admitted to inpatient     Lane Wright DO, PGY-1  Attending physician: Dr. Fontaine

## 2024-02-20 ENCOUNTER — APPOINTMENT (OUTPATIENT)
Dept: MRI IMAGING | Age: 59
DRG: 065 | End: 2024-02-20
Payer: MEDICARE

## 2024-02-20 ENCOUNTER — APPOINTMENT (OUTPATIENT)
Age: 59
DRG: 065 | End: 2024-02-20
Attending: INTERNAL MEDICINE
Payer: MEDICARE

## 2024-02-20 LAB
ANION GAP SERPL CALCULATED.3IONS-SCNC: 12 MMOL/L (ref 7–16)
BASOPHILS # BLD: 0.08 K/UL (ref 0–0.2)
BASOPHILS NFR BLD: 1 % (ref 0–2)
BUN SERPL-MCNC: 28 MG/DL (ref 6–23)
CALCIUM SERPL-MCNC: 9.2 MG/DL (ref 8.6–10.2)
CHLORIDE SERPL-SCNC: 102 MMOL/L (ref 98–107)
CO2 SERPL-SCNC: 20 MMOL/L (ref 22–29)
CREAT SERPL-MCNC: 1.5 MG/DL (ref 0.7–1.2)
ECHO AO ASC DIAM: 3.8 CM
ECHO AO ASCENDING AORTA INDEX: 1.85 CM/M2
ECHO AV AREA PEAK VELOCITY: 1.5 CM2
ECHO AV AREA VTI: 1.4 CM2
ECHO AV AREA/BSA PEAK VELOCITY: 0.7 CM2/M2
ECHO AV AREA/BSA VTI: 0.7 CM2/M2
ECHO AV CUSP MM: 1.7 CM
ECHO AV MEAN GRADIENT: 8 MMHG
ECHO AV MEAN VELOCITY: 1.3 M/S
ECHO AV PEAK GRADIENT: 14 MMHG
ECHO AV PEAK VELOCITY: 1.8 M/S
ECHO AV VELOCITY RATIO: 0.5
ECHO AV VTI: 35.3 CM
ECHO BSA: 2.11 M2
ECHO EST RA PRESSURE: 3 MMHG
ECHO LA DIAMETER INDEX: 1.85 CM/M2
ECHO LA DIAMETER: 3.8 CM
ECHO LA VOL A-L A2C: 40 ML (ref 18–58)
ECHO LA VOL A-L A4C: 36 ML (ref 18–58)
ECHO LA VOL MOD A2C: 38 ML (ref 18–58)
ECHO LA VOL MOD A4C: 35 ML (ref 18–58)
ECHO LA VOLUME AREA LENGTH: 41 ML
ECHO LA VOLUME INDEX A-L A2C: 20 ML/M2 (ref 16–34)
ECHO LA VOLUME INDEX A-L A4C: 18 ML/M2 (ref 16–34)
ECHO LA VOLUME INDEX AREA LENGTH: 20 ML/M2 (ref 16–34)
ECHO LA VOLUME INDEX MOD A2C: 19 ML/M2 (ref 16–34)
ECHO LA VOLUME INDEX MOD A4C: 17 ML/M2 (ref 16–34)
ECHO LV EF PHYSICIAN: 52 %
ECHO LV EJECTION FRACTION A2C: 43 %
ECHO LV EJECTION FRACTION A4C: 40 %
ECHO LV FRACTIONAL SHORTENING: 20 % (ref 28–44)
ECHO LV INTERNAL DIMENSION DIASTOLE INDEX: 2.63 CM/M2
ECHO LV INTERNAL DIMENSION DIASTOLIC: 5.4 CM (ref 4.2–5.9)
ECHO LV INTERNAL DIMENSION SYSTOLIC INDEX: 2.1 CM/M2
ECHO LV INTERNAL DIMENSION SYSTOLIC: 4.3 CM
ECHO LV IVSD: 1 CM (ref 0.6–1)
ECHO LV IVSS: 1.1 CM
ECHO LV MASS 2D: 206.7 G (ref 88–224)
ECHO LV MASS INDEX 2D: 100.8 G/M2 (ref 49–115)
ECHO LV POSTERIOR WALL DIASTOLIC: 1 CM (ref 0.6–1)
ECHO LV POSTERIOR WALL SYSTOLIC: 1.3 CM
ECHO LV RELATIVE WALL THICKNESS RATIO: 0.37
ECHO LVOT AREA: 3.1 CM2
ECHO LVOT AV VTI INDEX: 0.46
ECHO LVOT DIAM: 2 CM
ECHO LVOT MEAN GRADIENT: 2 MMHG
ECHO LVOT PEAK GRADIENT: 3 MMHG
ECHO LVOT PEAK VELOCITY: 0.9 M/S
ECHO LVOT STROKE VOLUME INDEX: 24.7 ML/M2
ECHO LVOT SV: 50.6 ML
ECHO LVOT VTI: 16.1 CM
ECHO MV "A" WAVE DURATION: 131.5 MSEC
ECHO MV A VELOCITY: 0.95 M/S
ECHO MV AREA PHT: 2.9 CM2
ECHO MV AREA VTI: 2.9 CM2
ECHO MV E DECELERATION TIME (DT): 239.2 MS
ECHO MV E VELOCITY: 0.5 M/S
ECHO MV E/A RATIO: 0.53
ECHO MV LVOT VTI INDEX: 1.09
ECHO MV MAX VELOCITY: 1 M/S
ECHO MV MEAN GRADIENT: 1 MMHG
ECHO MV MEAN VELOCITY: 0.4 M/S
ECHO MV PEAK GRADIENT: 4 MMHG
ECHO MV PRESSURE HALF TIME (PHT): 75.6 MS
ECHO MV VTI: 17.6 CM
ECHO PV MAX VELOCITY: 0.9 M/S
ECHO PV MEAN GRADIENT: 2 MMHG
ECHO PV MEAN VELOCITY: 0.6 M/S
ECHO PV PEAK GRADIENT: 3 MMHG
ECHO PV VTI: 19.7 CM
ECHO PVEIN A DURATION: 114.2 MS
ECHO PVEIN A VELOCITY: 0.3 M/S
ECHO PVEIN PEAK D VELOCITY: 0.3 M/S
ECHO PVEIN PEAK S VELOCITY: 0.4 M/S
ECHO PVEIN S/D RATIO: 1.3
ECHO RIGHT VENTRICULAR SYSTOLIC PRESSURE (RVSP): 30 MMHG
ECHO RV INTERNAL DIMENSION: 4.6 CM
ECHO TV REGURGITANT MAX VELOCITY: 2.62 M/S
ECHO TV REGURGITANT PEAK GRADIENT: 27 MMHG
EKG ATRIAL RATE: 57 BPM
EKG P AXIS: 12 DEGREES
EKG P-R INTERVAL: 132 MS
EKG Q-T INTERVAL: 488 MS
EKG QRS DURATION: 98 MS
EKG QTC CALCULATION (BAZETT): 474 MS
EKG R AXIS: 23 DEGREES
EKG T AXIS: 48 DEGREES
EKG VENTRICULAR RATE: 57 BPM
EOSINOPHIL # BLD: 0.31 K/UL (ref 0.05–0.5)
EOSINOPHILS RELATIVE PERCENT: 3 % (ref 0–6)
ERYTHROCYTE [DISTWIDTH] IN BLOOD BY AUTOMATED COUNT: 13.3 % (ref 11.5–15)
GFR SERPL CREATININE-BSD FRML MDRD: 55 ML/MIN/1.73M2
GLUCOSE SERPL-MCNC: 82 MG/DL (ref 74–99)
HCT VFR BLD AUTO: 44.1 % (ref 37–54)
HGB BLD-MCNC: 15.1 G/DL (ref 12.5–16.5)
IMM GRANULOCYTES # BLD AUTO: 0.09 K/UL (ref 0–0.58)
IMM GRANULOCYTES NFR BLD: 1 % (ref 0–5)
LYMPHOCYTES NFR BLD: 3.52 K/UL (ref 1.5–4)
LYMPHOCYTES RELATIVE PERCENT: 34 % (ref 20–42)
MAGNESIUM SERPL-MCNC: 2.1 MG/DL (ref 1.6–2.6)
MCH RBC QN AUTO: 32 PG (ref 26–35)
MCHC RBC AUTO-ENTMCNC: 34.2 G/DL (ref 32–34.5)
MCV RBC AUTO: 93.4 FL (ref 80–99.9)
MONOCYTES NFR BLD: 0.94 K/UL (ref 0.1–0.95)
MONOCYTES NFR BLD: 9 % (ref 2–12)
NEUTROPHILS NFR BLD: 53 % (ref 43–80)
NEUTS SEG NFR BLD: 5.57 K/UL (ref 1.8–7.3)
PHOSPHATE SERPL-MCNC: 4.2 MG/DL (ref 2.5–4.5)
PLATELET # BLD AUTO: 239 K/UL (ref 130–450)
PMV BLD AUTO: 9.7 FL (ref 7–12)
POTASSIUM SERPL-SCNC: 4.7 MMOL/L (ref 3.5–5)
POTASSIUM SERPL-SCNC: 5.8 MMOL/L (ref 3.5–5)
RBC # BLD AUTO: 4.72 M/UL (ref 3.8–5.8)
SODIUM SERPL-SCNC: 134 MMOL/L (ref 132–146)
WBC OTHER # BLD: 10.5 K/UL (ref 4.5–11.5)

## 2024-02-20 PROCEDURE — 99222 1ST HOSP IP/OBS MODERATE 55: CPT | Performed by: PSYCHIATRY & NEUROLOGY

## 2024-02-20 PROCEDURE — 2580000003 HC RX 258

## 2024-02-20 PROCEDURE — 99232 SBSQ HOSP IP/OBS MODERATE 35: CPT | Performed by: INTERNAL MEDICINE

## 2024-02-20 PROCEDURE — 83735 ASSAY OF MAGNESIUM: CPT

## 2024-02-20 PROCEDURE — 93010 ELECTROCARDIOGRAM REPORT: CPT | Performed by: INTERNAL MEDICINE

## 2024-02-20 PROCEDURE — 93306 TTE W/DOPPLER COMPLETE: CPT

## 2024-02-20 PROCEDURE — 84132 ASSAY OF SERUM POTASSIUM: CPT

## 2024-02-20 PROCEDURE — 85025 COMPLETE CBC W/AUTO DIFF WBC: CPT

## 2024-02-20 PROCEDURE — 6360000002 HC RX W HCPCS

## 2024-02-20 PROCEDURE — 84100 ASSAY OF PHOSPHORUS: CPT

## 2024-02-20 PROCEDURE — 2060000000 HC ICU INTERMEDIATE R&B

## 2024-02-20 PROCEDURE — 80048 BASIC METABOLIC PNL TOTAL CA: CPT

## 2024-02-20 PROCEDURE — 94640 AIRWAY INHALATION TREATMENT: CPT

## 2024-02-20 PROCEDURE — 6370000000 HC RX 637 (ALT 250 FOR IP)

## 2024-02-20 PROCEDURE — 70551 MRI BRAIN STEM W/O DYE: CPT

## 2024-02-20 PROCEDURE — 36415 COLL VENOUS BLD VENIPUNCTURE: CPT

## 2024-02-20 RX ADMIN — IPRATROPIUM BROMIDE 0.5 MG: 0.5 SOLUTION RESPIRATORY (INHALATION) at 16:20

## 2024-02-20 RX ADMIN — RANOLAZINE 500 MG: 500 TABLET, FILM COATED, EXTENDED RELEASE ORAL at 20:16

## 2024-02-20 RX ADMIN — FINASTERIDE 5 MG: 5 TABLET, FILM COATED ORAL at 08:13

## 2024-02-20 RX ADMIN — SODIUM CHLORIDE, PRESERVATIVE FREE 10 ML: 5 INJECTION INTRAVENOUS at 20:16

## 2024-02-20 RX ADMIN — CLOPIDOGREL BISULFATE 75 MG: 75 TABLET ORAL at 08:13

## 2024-02-20 RX ADMIN — PANTOPRAZOLE SODIUM 40 MG: 40 TABLET, DELAYED RELEASE ORAL at 05:14

## 2024-02-20 RX ADMIN — IPRATROPIUM BROMIDE 0.5 MG: 0.5 SOLUTION RESPIRATORY (INHALATION) at 13:22

## 2024-02-20 RX ADMIN — SODIUM CHLORIDE, PRESERVATIVE FREE 10 ML: 5 INJECTION INTRAVENOUS at 08:13

## 2024-02-20 RX ADMIN — FOLIC ACID 1 MG: 1 TABLET ORAL at 08:13

## 2024-02-20 RX ADMIN — Medication 100 MG: at 08:13

## 2024-02-20 RX ADMIN — BUDESONIDE 250 MCG: 0.25 INHALANT RESPIRATORY (INHALATION) at 09:27

## 2024-02-20 RX ADMIN — ARFORMOTEROL TARTRATE 15 MCG: 15 SOLUTION RESPIRATORY (INHALATION) at 09:27

## 2024-02-20 RX ADMIN — IPRATROPIUM BROMIDE 0.5 MG: 0.5 SOLUTION RESPIRATORY (INHALATION) at 09:27

## 2024-02-20 RX ADMIN — RANOLAZINE 500 MG: 500 TABLET, FILM COATED, EXTENDED RELEASE ORAL at 08:13

## 2024-02-20 RX ADMIN — ATORVASTATIN CALCIUM 40 MG: 40 TABLET, FILM COATED ORAL at 20:16

## 2024-02-20 RX ADMIN — ENOXAPARIN SODIUM 40 MG: 100 INJECTION SUBCUTANEOUS at 08:13

## 2024-02-20 RX ADMIN — ASPIRIN 81 MG 81 MG: 81 TABLET ORAL at 08:13

## 2024-02-20 ASSESSMENT — PAIN SCALES - GENERAL
PAINLEVEL_OUTOF10: 0

## 2024-02-20 NOTE — CONSULTS
CARDIOLOGY CONSULTATION    Patient Name:  Reynold Holloway    :  1965    Reason for Consultation:   Cardiac source of neurologic deficit    History of Present Illness:   Reynold Holloway is admitted to [facility], following a history of sudden neurologic transient deficits involving the left side of his face and extremities which occurred over the past 48 hours prior to admission.  At this time he still notes weakness and numbness in his face.  He denies any significant shortness of breath nor chest discomfort presently.  He denies any palpitations or sudden lightheadedness.  Upon admission he was in sinus rhythm.  He does have a known history of cardiomyopathy which had improved on medical therapy as well as having underlying COPD.  He is now admitted for further investigation into his neurologic deficits.  Past Medical History:   has a past medical history of Alcohol abuse, CHF (congestive heart failure) ([facility]), COPD (chronic obstructive pulmonary disease) ([facility]), History of cocaine use, Hyperlipidemia, Hypertension, Marijuana use, MI (myocardial infarction) ([facility]), and alberto.    Surgical History:   has a past surgical history that includes Wrist surgery; cervical fusion (11/18/15); polysomnography (2018); Cardiac catheterization (2020); and bronchoscopy (N/A, 8/3/2021).     Social History:   reports that he quit smoking about a year ago. His smoking use included cigarettes. He started smoking about 44 years ago. He has a 65.3 pack-year smoking history. He has never used smokeless tobacco. He reports that he does not currently use alcohol after a past usage of about 140.0 standard drinks of alcohol per week.  Drugs: Cocaine and Other-see comments.     Family History:  family history includes Arthritis in his mother; Cancer in his brother; Heart Disease in his father; High Blood Pressure in his brother, brother, brother, and brother; Other in his brother, brother, brother,

## 2024-02-20 NOTE — ACP (ADVANCE CARE PLANNING)
Advance Care Planning   Healthcare Decision Maker:    Primary Decision Maker: JewelBryanna Briana - Child - [phone]    Click here to complete Healthcare Decision Makers including selection of the Healthcare Decision Maker Relationship (ie \"Primary\").          provided advance directives to pt. .PETRA Fonseca  .2/20/2024

## 2024-02-20 NOTE — PLAN OF CARE
Problem: Chronic Conditions and Co-morbidities  Goal: Patient's chronic conditions and co-morbidity symptoms are monitored and maintained or improved  Outcome: Progressing     Problem: Discharge Planning  Goal: Discharge to home or other facility with appropriate resources  Outcome: Progressing     Problem: Pain  Goal: Verbalizes/displays adequate comfort level or baseline comfort level  Outcome: Progressing     Problem: Safety - Adult  Goal: Free from fall injury  Outcome: Progressing     Problem: ABCDS Injury Assessment  Goal: Absence of physical injury  Outcome: Progressing

## 2024-02-21 VITALS
OXYGEN SATURATION: 98 % | BODY MASS INDEX: 32.47 KG/M2 | HEART RATE: 82 BPM | RESPIRATION RATE: 18 BRPM | DIASTOLIC BLOOD PRESSURE: 86 MMHG | TEMPERATURE: 97.1 F | SYSTOLIC BLOOD PRESSURE: 144 MMHG | HEIGHT: 67 IN | WEIGHT: 206.9 LBS

## 2024-02-21 LAB
ANION GAP SERPL CALCULATED.3IONS-SCNC: 14 MMOL/L (ref 7–16)
BASOPHILS # BLD: 0.06 K/UL (ref 0–0.2)
BASOPHILS NFR BLD: 1 % (ref 0–2)
BUN SERPL-MCNC: 27 MG/DL (ref 6–23)
CALCIUM SERPL-MCNC: 8.9 MG/DL (ref 8.6–10.2)
CHLORIDE SERPL-SCNC: 102 MMOL/L (ref 98–107)
CO2 SERPL-SCNC: 20 MMOL/L (ref 22–29)
CREAT SERPL-MCNC: 1.5 MG/DL (ref 0.7–1.2)
EOSINOPHIL # BLD: 0.32 K/UL (ref 0.05–0.5)
EOSINOPHILS RELATIVE PERCENT: 3 % (ref 0–6)
ERYTHROCYTE [DISTWIDTH] IN BLOOD BY AUTOMATED COUNT: 13.1 % (ref 11.5–15)
GFR SERPL CREATININE-BSD FRML MDRD: 55 ML/MIN/1.73M2
GLUCOSE SERPL-MCNC: 98 MG/DL (ref 74–99)
HCT VFR BLD AUTO: 44.1 % (ref 37–54)
HGB BLD-MCNC: 16 G/DL (ref 12.5–16.5)
IMM GRANULOCYTES # BLD AUTO: 0.1 K/UL (ref 0–0.58)
IMM GRANULOCYTES NFR BLD: 1 % (ref 0–5)
LYMPHOCYTES NFR BLD: 3.4 K/UL (ref 1.5–4)
LYMPHOCYTES RELATIVE PERCENT: 35 % (ref 20–42)
MAGNESIUM SERPL-MCNC: 2 MG/DL (ref 1.6–2.6)
MCH RBC QN AUTO: 32.9 PG (ref 26–35)
MCHC RBC AUTO-ENTMCNC: 36.3 G/DL (ref 32–34.5)
MCV RBC AUTO: 90.6 FL (ref 80–99.9)
MONOCYTES NFR BLD: 1.02 K/UL (ref 0.1–0.95)
MONOCYTES NFR BLD: 10 % (ref 2–12)
NEUTROPHILS NFR BLD: 50 % (ref 43–80)
NEUTS SEG NFR BLD: 4.96 K/UL (ref 1.8–7.3)
PHOSPHATE SERPL-MCNC: 4.2 MG/DL (ref 2.5–4.5)
PLATELET # BLD AUTO: 241 K/UL (ref 130–450)
PMV BLD AUTO: 9.4 FL (ref 7–12)
POTASSIUM SERPL-SCNC: 4.1 MMOL/L (ref 3.5–5)
RBC # BLD AUTO: 4.87 M/UL (ref 3.8–5.8)
SODIUM SERPL-SCNC: 136 MMOL/L (ref 132–146)
WBC OTHER # BLD: 9.9 K/UL (ref 4.5–11.5)

## 2024-02-21 PROCEDURE — 97161 PT EVAL LOW COMPLEX 20 MIN: CPT

## 2024-02-21 PROCEDURE — 6360000002 HC RX W HCPCS

## 2024-02-21 PROCEDURE — 2580000003 HC RX 258

## 2024-02-21 PROCEDURE — 83735 ASSAY OF MAGNESIUM: CPT

## 2024-02-21 PROCEDURE — 99239 HOSP IP/OBS DSCHRG MGMT >30: CPT | Performed by: INTERNAL MEDICINE

## 2024-02-21 PROCEDURE — 85025 COMPLETE CBC W/AUTO DIFF WBC: CPT

## 2024-02-21 PROCEDURE — 99232 SBSQ HOSP IP/OBS MODERATE 35: CPT | Performed by: PHYSICIAN ASSISTANT

## 2024-02-21 PROCEDURE — 80048 BASIC METABOLIC PNL TOTAL CA: CPT

## 2024-02-21 PROCEDURE — 84100 ASSAY OF PHOSPHORUS: CPT

## 2024-02-21 PROCEDURE — 97165 OT EVAL LOW COMPLEX 30 MIN: CPT

## 2024-02-21 PROCEDURE — 6370000000 HC RX 637 (ALT 250 FOR IP)

## 2024-02-21 PROCEDURE — 36415 COLL VENOUS BLD VENIPUNCTURE: CPT

## 2024-02-21 PROCEDURE — 94640 AIRWAY INHALATION TREATMENT: CPT

## 2024-02-21 RX ORDER — LANOLIN ALCOHOL/MO/W.PET/CERES
100 CREAM (GRAM) TOPICAL DAILY
Qty: 30 TABLET | Refills: 3 | Status: SHIPPED | OUTPATIENT
Start: 2024-02-22

## 2024-02-21 RX ORDER — CLOPIDOGREL BISULFATE 75 MG/1
75 TABLET ORAL DAILY
Qty: 19 TABLET | Refills: 0 | Status: SHIPPED | OUTPATIENT
Start: 2024-02-22 | End: 2024-03-12

## 2024-02-21 RX ORDER — NICOTINE 21 MG/24HR
1 PATCH, TRANSDERMAL 24 HOURS TRANSDERMAL DAILY
Qty: 30 PATCH | Refills: 3 | Status: SHIPPED | OUTPATIENT
Start: 2024-02-22

## 2024-02-21 RX ORDER — ASPIRIN 81 MG/1
81 TABLET, CHEWABLE ORAL DAILY
Qty: 30 TABLET | Refills: 3 | Status: SHIPPED | OUTPATIENT
Start: 2024-02-22

## 2024-02-21 RX ORDER — ATORVASTATIN CALCIUM 40 MG/1
40 TABLET, FILM COATED ORAL NIGHTLY
Qty: 30 TABLET | Refills: 3 | Status: SHIPPED | OUTPATIENT
Start: 2024-02-21

## 2024-02-21 RX ORDER — METOPROLOL SUCCINATE 25 MG/1
25 TABLET, EXTENDED RELEASE ORAL DAILY
Qty: 30 TABLET | Refills: 3 | Status: SHIPPED | OUTPATIENT
Start: 2024-02-21

## 2024-02-21 RX ORDER — FOLIC ACID 1 MG/1
1 TABLET ORAL DAILY
Qty: 30 TABLET | Refills: 3 | Status: SHIPPED | OUTPATIENT
Start: 2024-02-22

## 2024-02-21 RX ADMIN — Medication 100 MG: at 09:23

## 2024-02-21 RX ADMIN — IPRATROPIUM BROMIDE 0.5 MG: 0.5 SOLUTION RESPIRATORY (INHALATION) at 12:47

## 2024-02-21 RX ADMIN — PANTOPRAZOLE SODIUM 40 MG: 40 TABLET, DELAYED RELEASE ORAL at 05:08

## 2024-02-21 RX ADMIN — RANOLAZINE 500 MG: 500 TABLET, FILM COATED, EXTENDED RELEASE ORAL at 09:23

## 2024-02-21 RX ADMIN — IPRATROPIUM BROMIDE 0.5 MG: 0.5 SOLUTION RESPIRATORY (INHALATION) at 09:05

## 2024-02-21 RX ADMIN — SODIUM CHLORIDE, PRESERVATIVE FREE 10 ML: 5 INJECTION INTRAVENOUS at 09:23

## 2024-02-21 RX ADMIN — BUDESONIDE 250 MCG: 0.25 INHALANT RESPIRATORY (INHALATION) at 09:05

## 2024-02-21 RX ADMIN — ARFORMOTEROL TARTRATE 15 MCG: 15 SOLUTION RESPIRATORY (INHALATION) at 09:05

## 2024-02-21 RX ADMIN — FOLIC ACID 1 MG: 1 TABLET ORAL at 09:22

## 2024-02-21 RX ADMIN — CLOPIDOGREL BISULFATE 75 MG: 75 TABLET ORAL at 09:23

## 2024-02-21 RX ADMIN — ENOXAPARIN SODIUM 40 MG: 100 INJECTION SUBCUTANEOUS at 09:24

## 2024-02-21 RX ADMIN — FINASTERIDE 5 MG: 5 TABLET, FILM COATED ORAL at 09:22

## 2024-02-21 RX ADMIN — ASPIRIN 81 MG 81 MG: 81 TABLET ORAL at 09:23

## 2024-02-21 ASSESSMENT — PAIN SCALES - GENERAL: PAINLEVEL_OUTOF10: 0

## 2024-02-21 NOTE — PLAN OF CARE
This resident spoke with Dr villarreal, and confirmed about anti-hypertensive that we can resume are- Entresto, Toprol XL, and Torsemide.

## 2024-02-21 NOTE — DISCHARGE INSTRUCTIONS
Internal medicine    Follow ups  Please follow up with your primary care physician ( Joel Daly) within 10 days of discharge from hospital. Please call as soon as possible to make an appointment. Please contact the internal medicine clinic for an appointment if you are unable to get an appointment with your PCP.   Please keep all other follow up appointments:  Cardiology Giovany Marie, DO  Neurology stroke clinic    Changes in healthcare   Please take all medications as indicated  Diet: regular diet   Activity: activity as tolerated  New Medications started during this hospital stay  Plavix 75 MG for next 19 days  Aspirin 81 MG daily  Atorvastatin 40 MG daily  Changes to your medications  Toprol XL 25 MG daily  Medications you should stop taking   Spironolactone  Clonidine  Amlodipine  Please contact us if you have any concerns, wish to change or make an appointment:  Internal medicine clinic   Phone: [phone]  Fax: [phone] [address] [facility] [address]  Or please call the nurse line at [phone].  Should you have further questions in regards to this visit, you can review your clinical note and after visit summary document on your [employer] account.  Other questions can be directed to our nurse line at [phone].     Other than any new prescriptions given to you today, the list of home medications on this After Visit Summary are based on information provided to us from you and your healthcare providers. This information, including the list, dose, and frequency of medications is only as accurate as the information you provided. If you have any questions or concerns about your home medications, please contact your Primary Care Physician for further clarification.

## 2024-02-21 NOTE — DISCHARGE SUMMARY
you provided. If you have any questions or concerns about your home medications, please contact your Primary Care Physician for further clarification.     Carmen Valladares MD  PGY- 1  4:00 PM 2/21/2024  Attending physician: EVE Man

## 2024-02-21 NOTE — PROGRESS NOTES
CLINICAL PHARMACY NOTE: MEDS TO BEDS    Total # of Prescriptions Filled: 6   The following medications were delivered to the patient:  Aspirin 81 mg   Folic acid 1 mg   Toprol xl 25 mg   Lipitor 40 mg   Plavix 75 mg   Thiamine 100 mg       Additional Documentation:    
Occupational Therapy  Facility/Department: [address] MED SURG/PEDS  Occupational Therapy Initial Assessment    Name: Reynold Holloway  : 1965  MRN: [id number]  Date of Service: 2024  Room: 8505B    Evaluating OT: Christina Treviño OTR/L [id number]  Referring Provider: DO Kyle  Specific Provider Orders: 2024  Recommended Adaptive Equipment:  Shower seat     Diagnosis: lacunar CVA, R vertebral occlusion  Pertinent Medical History: CHF, CKD, +ETOH, spinal stenosis  Precautions:  Fall Risk    Assessment of current deficits   [] Functional mobility  []ADLs  [] Strength               []Cognition   [] Functional transfers   [] IADLs         [] Safety Awareness   []Endurance   [] Fine Coordination              [] Balance      [] Vision/perception   []Sensation    []Gross Motor Coordination  [] ROM  [] Delirium                   [] Motor Control     Modified Barton Scale (MRS)  Score     Description  0             No symptoms  1             No significant disability despite symptoms  2             Slight disability; able to look after own affairs  3             Moderate disability; able to ambulate without assist/ requires assist with ADLs  4             Moderate/Severe disability;requires assist to ambulate/assist with ADLs  5             Severe disability;bedridden/incontinent   6               Score: 1    Home Living: Pt lives alone in a 1st floor apartment & bed/bath/laundry on 1st floor- level entry  Bathroom setup: walk in -curtain  Pt does drive & is currently on disability    Pain Level: No c/o pain during OT session  Cognition: A&O: 4/4; Follows 3 step directions,    Memory:  G    Sequencing:  G   Problem solving:  G-   Judgement/safety:  G-    Functional Assessment:  AM-PAC Daily Activity Raw Score:    Initial Eval Status  Date: 24     Feeding independent     Grooming Independent- standing     UB Dressing independent     LB Dressing independent     Bathing NT     Toileting independent   
PROGRESS NOTE       PATIENT PROBLEM LIST:  Patient Active Problem List   Diagnosis Code    CTS (carpal tunnel syndrome) G56.00    Cervical arthritis M47.812    Essential hypertension I10    Hyperlipidemia E78.5    INOCENCIA on CPAP G47.33    Community acquired bacterial pneumonia J15.9    CAP (community acquired pneumonia) due to Chlamydia species J16.0    Acute respiratory failure with hypoxia ([facility]) J96.01    COPD exacerbation ([facility]) J44.1    Precordial chest pain R07.2    Unstable angina ([facility]) I20.0    Respiratory distress R06.03    Shortness of breath R06.02    Chronic combined systolic and diastolic congestive heart failure ([facility]) I50.42    Congestive heart failure ([facility]) I50.9    Primary hypertension I10    Lacunar stroke ([facility]) I63.81    Stage 3a chronic kidney disease (CKD) ([facility]) N18.31    Stroke-like symptoms R29.90    Cocaine abuse ([facility]) F14.10       SUBJECTIVE:  Reynold Holloway states he feels much improved with his neurologic deficits much reduced.  He denies any shortness of breath, chest discomfort, lightheadedness nor increased weakness in his extremities.    REVIEW OF SYSTEMS:  General ROS: negative for - fatigue, malaise,  weight gain or weight loss  Psychological ROS: negative for - anxiety , depression  Ophthalmic ROS: negative for - decreased vision or visual distortion.  ENT ROS: negative  Allergy and Immunology ROS: negative  Hematological and Lymphatic ROS: negative  Endocrine: no heat or cold intolerance and no polyphagia, polydipsia, or polyuria  Respiratory ROS: positive for - shortness of breath-occasional  Cardiovascular ROS: positive for - dyspnea on exertion.  Gastrointestinal ROS: no abdominal pain, change in bowel habits, or black or bloody stools  Genito-Urinary ROS: no nocturia, dysuria, trouble voiding, frequency or hematuria  Musculoskeletal ROS: negative for- myalgias, arthralgias, or claudication  Neurological ROS: n + TIA or stroke symptoms otherwise no significant change in symptoms or 
Paged Dr. Peterson regarding patient complaints of chest pain  
Physical Therapy  Physical Therapy Initial Assessment     Name: Reynold Holloway  : 1965  MRN: [id number]      Date of Service: 2024    Evaluating PT:  Zoe Chacon PT, DPT [id number]    Room #:  8506/8506-A  Diagnosis:  Stroke-like symptoms [R29.90]  PMHx/PSHx:    Past Medical History:   Diagnosis Date    Alcohol abuse     CHF (congestive heart failure) ([facility])     COPD (chronic obstructive pulmonary disease) ([facility])     History of cocaine use     Hyperlipidemia     Hypertension     Marijuana use     quit 2017     MI (myocardial infarction) ([facility])     alberto       Procedure/Surgery:  none this admission  Precautions:  Falls  Equipment Needs:  none, pt has SPC    SUBJECTIVE:    Pt lives alone in a 1st floor apartment with level entry.  Bed is on 1st floor and bath is on 1st floor.  Pt ambulated with SPC PTA.    OBJECTIVE:   Initial Evaluation  Date: 24 Treatment Short Term/ Long Term   Goals   AM-PAC 6 Clicks      Was pt agreeable to Eval/treatment? yes     Does pt have pain? No c/o pain     Bed Mobility  Rolling: Independent   Supine to sit: Independent   Sit to supine: Independent   Scooting: Independent   Rolling: Independent   Supine to sit: Independent   Sit to supine: Independent   Scooting: Independent    Transfers Sit to stand: Independent   Stand to sit: Independent   Stand pivot: Independent   Sit to stand: Independent   Stand to sit: Independent   Stand pivot: Independent    Ambulation    200 feet with no AD SBA  >400 feet with no AD Independent    Stair negotiation: ascended and descended  NT  3 steps with 1 rail Mod I    ROM BUE:  Defer to OT note  BLE:  WNL     Strength BUE:  Defer to OT note  BLE:  4/5  WNL   Balance Sitting EOB:  Independent   Dynamic Standing:  SBA  Sitting EOB:  Independent   Dynamic Standing:  Mod I with AAD vs Independent      Pt is A & O x 4  Sensation:  B feet and hands neuropathy   Edema:  none noted    Vitals:  SPO2 on room air: 96%  HR: 
[facility]  Internal Medicine Residency Program  Progress Note - House Team     Patient:  Reynold Holloway 58 y.o. male MRN: [id number]     Date of Service: 2/20/2024     CC:   Chief Complaint   Patient presents with    Numbness     Pt noticed Left arm and leg numbness at 0700 this am, evaluated OA by ED physician      Overnight events: None     Subjective     Patient was seen and examined this morning at bedside in no acute distress. Patient states he no longer is having neurologic symptoms and feels like he is back to his baseline.  He has no complaints at this time.    Objective     Physical Exam:  Vitals: /87   Pulse 64   Temp 98 °F (36.7 °C) (Temporal)   Resp 18   Ht 1.702 m (5' 7\")   Wt 93.8 kg (206 lb 14.4 oz)   SpO2 97%   BMI 32.41 kg/m²     I & O - 24hr: No intake/output data recorded.   Net Balance: Net IO Since Admission: 1,300 mL [02/20/24 1424]    General Appearance: No acute distress. Awake and conversant.   Neuro: Pupils asymmetric, left larger than right, both reactive, 5/5 strength in bilateral upper and lower extremities, no facial droop, dysarthria, or trouble with finger to nose testing  Cardiovascular: regular rate and rhythm, S1 and S2 heard, no murmurs appreciated   Respiratory: Clear to auscultation bilaterally. No wheezing or crackles appreciated.  Abdomen: Soft, non-tender; bowel sounds normal; no masses, no organomegaly, rebound or guarding  Extremities: Extremities normal, no cyanosis, distal pulses intact, no edema.     Pertinent Labs & Imaging Studies     Basic Labs:    Complete Blood Count:   Recent Labs     02/19/24  0813 02/20/24  0439   WBC 11.0 10.5   HGB 15.9 15.1   HCT 45.1 44.1    239        Last 3 Blood Glucose:   Recent Labs     02/19/24  0913 02/20/24  0439   GLUCOSE 82 82        PT/INR:    Lab Results   Component Value Date/Time    PROTIME 13.0 11/22/2020 06:08 PM    INR 1.2 11/22/2020 06:08 PM     PTT:    Lab Results   Component 
  MCV  --   --   --    < > 90.6   MCH  --   --   --    < > 32.9   MCHC  --   --   --    < > 36.3*   RDW  --   --   --    < > 13.1   PLT  --   --   --    < > 241   MPV  --   --   --    < > 9.4   HDL  --  43  --   --   --    LABA1C  --   --  5.2  --   --     < > = values in this interval not displayed.       Imaging  MRI BRAIN WO CONTRAST   Preliminary Result   1. Small, about 4 mm size focus of acute infarction in the right mid   periventricular white matter, at the lateral aspect of body of the right   lateral ventricle.   2. Evidence of old focal infarction in the right thalamus, tiny old lacunar   infarction in the left thalamus, chronic microvascular ischemic changes   versus old lacunar infarction in the area of the posterior limb of the left   internal capsule, and old lacunar infarctions or small focal infarction in   the posterolateral portion of right cerebellar hemisphere and lateral portion   of left cerebellar hemisphere.   3. Mild-to-moderate central atrophy.         XR CHEST PORTABLE   Final Result   Cardiomegaly.  There are no findings of failure or pneumonia.         CT Head W/O Contrast   Final Result   Addendum (preliminary) 1 of 1   ADDENDUM:   The report was relayed to the emergency room by radiology personnel at    9:11   a.m..         Final   No acute intracranial abnormality.      Old lacunar infarct within the right thalamus      Please see the dedicated CT perfusion imaging report which is pending.  No   gross mismatch is noted on the perfusion imaging.            CTA HEAD W CONTRAST   Final Result   1. No perfusion mismatch.   2. Unremarkable CTA of the head.   3. Age-indeterminate occlusion of the diminutive right vertebral artery.   Scattered areas of reconstitution with minimal flow in the V2 and V3 portion   of the right vertebral artery.         CTA NECK W CONTRAST   Final Result   1. No perfusion mismatch.   2. Unremarkable CTA of the head.   3. Age-indeterminate occlusion of the 
components found for: \"MRSACU\"  HSV No results found for: \"HSVSRC\"  FLU No results found for: \"FLUA\" No results found for: \"FLUB\"  COVID-19 Labs:  Lab Results   Component Value Date/Time    COVID19 Not Detected 02/22/2023 07:00 PM    COVID19 Not Detected 02/22/2023 07:00 PM     Legionella No results found for: \"LABLEGI\"  C Diff PCR No results found for: \"CDIFPCR\"  Strep pneumo No results found for: \"SPNEUAGU\"  Acid fast   AFB Smear   Date Value Ref Range Status   08/03/2021 No AFB observed by Fluorescent stain  Final     Stool No results found for: \"CXST\"  Fungust No results found for: \"FUNGUSSMEAR\"  VRE screen No results found for: \"VRECX\"  Ecoli O051:H7 No results found for: \"FPVBB111\"  Giardia No results found for: \"GIAAGS\"  Rotavirus No results found for: \"ROTA\"  Fecal leukocytes No results found for: \"FECLEU\"  -----------------------------------------------------  Fecal occult blood: .No results for input(s): \"OCCULTBLDFEC\" in the last 72 hours.  Occult blood screening: No results for input(s): \"OCCBS\" in the last 72 hours.  Occult blood QC: No results for input(s): \"OBQC\" in the last 72 hours.      Medications     Continuous Infusions:   sodium chloride      sodium chloride       Scheduled Meds:   [Held by provider] amLODIPine  10 mg Oral Daily    [Held by provider] cloNIDine  0.2 mg Oral BID    finasteride  5 mg Oral Daily    pantoprazole  40 mg Oral QAM AC    ranolazine  500 mg Oral BID    [Held by provider] sacubitril-valsartan  1 tablet Oral BID    [Held by provider] spironolactone  25 mg Oral Daily    [Held by provider] torsemide  10 mg Oral Daily    sodium chloride flush  5-40 mL IntraVENous 2 times per day    enoxaparin  40 mg SubCUTAneous Daily    nicotine  1 patch TransDERmal Daily    sodium chloride flush  5-40 mL IntraVENous 2 times per day    folic acid  1 mg Oral Daily    thiamine  100 mg Oral Daily    aspirin  81 mg Oral Daily    clopidogrel  75 mg Oral Daily    atorvastatin  40 mg Oral Nightly

## 2024-02-21 NOTE — CONSULTS
[facility]  Neurology Consult    Date:  2/20/2024  Patient Name:  Reynold Holloway  YOB: 1965  MRN: [id number]     PCP:  Joel Chun DO   Referring:  No ref. provider found      Chief Complaint: stroke    History obtained from: patient, chart    Assessment  Reynold Holloway is a 58 y.o. male admitted following an episode of left sided weakness and numbness concerning for possible lacunar stroke. He does have evidence of an old right basal ganglia stroke on CT head as well as multiple risk factors.      Plan  DAPT x 21 days followed by ASA 81 mg QD  Statin therapy, goal LDL<70  MRI brain pending  Echo pending  Maintain good BP and glucose control        History of Present Illness:  Reynold Holloway is a 58 y.o. right handed male presenting for evaluation of stroke. The patient states he had woken up around 0630 the day of admission when he noticed some numbness in his left foot. It started going up his leg, then to his left arm. It went away for a few minutes, and he took his morning medications. Shortly after, these same symptoms came back \"stronger than before.\" He told his friend, \"Cowboy\" to call the ambulance as he was concerned he was having a stroke. He then began noticing some left facial weakness. He had taken a nitro as well and noted his BP was 144/88 at home.     He does not take ASA routinely. He reports not prior stroke. He does report a prior MI. He does smoke.             Medical History:   Past Medical History:   Diagnosis Date    Alcohol abuse     CHF (congestive heart failure) ([facility])     COPD (chronic obstructive pulmonary disease) ([facility])     History of cocaine use     Hyperlipidemia     Hypertension     Marijuana use     quit November 2017     MI (myocardial infarction) ([facility])     alberto         Surgical History:   Past Surgical History:   Procedure Laterality Date    BRONCHOSCOPY N/A 8/3/2021    BRONCHOSCOPY DIAGNOSTIC OR CELL WASH ONLY performed by Max QUAN

## 2024-02-21 NOTE — CARE COORDINATION
SOCIAL WORK/[facility] TRANSITION OF CARE PLANNING( THOMAS JHON, [facility] [phone]): I met with pt in the room this a.m. he had a + mri for cva. PT and OT to eval. Neurology is following as well as dr. Peterson. Creatine is 1.5. will follow. .PETRA Fonseca  2/21/2024    OT rec: no needs. PETRA Fonseca  2/21/2024  PT rec: no needs. PETRA Fonseca  .2/21/2024            
basic dialogue that supports the patient's individualized plan of care/goals and shares the quality data associated with the providers was provided to:     Patient Representative Name:       The Patient and/or Patient Representative Agree with the Discharge Plan?      PETRA Fonseca  Case Management Department  Ph: [phone] Fax: [phone]

## 2024-06-20 RX ORDER — ATORVASTATIN CALCIUM 40 MG/1
40 TABLET, FILM COATED ORAL NIGHTLY
Qty: 30 TABLET | Refills: 10 | OUTPATIENT
Start: 2024-06-20

## 2024-06-20 RX ORDER — METOPROLOL SUCCINATE 25 MG/1
25 TABLET, EXTENDED RELEASE ORAL DAILY
Qty: 30 TABLET | Refills: 10 | OUTPATIENT
Start: 2024-06-20

## 2024-06-26 RX ORDER — METOPROLOL SUCCINATE 25 MG/1
25 TABLET, EXTENDED RELEASE ORAL DAILY
Qty: 30 TABLET | Refills: 10 | OUTPATIENT
Start: 2024-06-26

## 2025-01-31 ENCOUNTER — HOSPITAL ENCOUNTER (INPATIENT)
Age: 60
LOS: 3 days | Discharge: HOME | DRG: 191 | End: 2025-02-06
Attending: STUDENT IN AN ORGANIZED HEALTH CARE EDUCATION/TRAINING PROGRAM | Admitting: INTERNAL MEDICINE
Payer: COMMERCIAL

## 2025-01-31 ENCOUNTER — APPOINTMENT (OUTPATIENT)
Dept: GENERAL RADIOLOGY | Age: 60
DRG: 191 | End: 2025-01-31
Payer: COMMERCIAL

## 2025-01-31 DIAGNOSIS — J06.9 ACUTE UPPER RESPIRATORY INFECTION: ICD-10-CM

## 2025-01-31 DIAGNOSIS — J18.9 PNEUMONIA DUE TO INFECTIOUS ORGANISM, UNSPECIFIED LATERALITY, UNSPECIFIED PART OF LUNG: ICD-10-CM

## 2025-01-31 DIAGNOSIS — M54.6 ACUTE THORACIC BACK PAIN, UNSPECIFIED BACK PAIN LATERALITY: ICD-10-CM

## 2025-01-31 DIAGNOSIS — J44.9 CHRONIC OBSTRUCTIVE PULMONARY DISEASE, UNSPECIFIED COPD TYPE (HCC): ICD-10-CM

## 2025-01-31 DIAGNOSIS — J44.1 COPD EXACERBATION (HCC): Primary | ICD-10-CM

## 2025-01-31 LAB
ALBUMIN SERPL-MCNC: 4.1 G/DL (ref 3.5–5.2)
ALP SERPL-CCNC: 78 U/L (ref 40–129)
ALT SERPL-CCNC: 16 U/L (ref 0–40)
ANION GAP SERPL CALCULATED.3IONS-SCNC: 13 MMOL/L (ref 7–16)
AST SERPL-CCNC: 20 U/L (ref 0–39)
BASOPHILS # BLD: 0.06 K/UL (ref 0–0.2)
BASOPHILS NFR BLD: 1 % (ref 0–2)
BILIRUB SERPL-MCNC: 0.2 MG/DL (ref 0–1.2)
BNP SERPL-MCNC: 858 PG/ML (ref 0–125)
BUN SERPL-MCNC: 33 MG/DL (ref 6–23)
CALCIUM SERPL-MCNC: 9.3 MG/DL (ref 8.6–10.2)
CHLORIDE SERPL-SCNC: 104 MMOL/L (ref 98–107)
CO2 SERPL-SCNC: 20 MMOL/L (ref 22–29)
CREAT SERPL-MCNC: 1.8 MG/DL (ref 0.7–1.2)
D-DIMER QUANTITATIVE: <200 NG/ML DDU (ref 0–230)
EOSINOPHIL # BLD: 0.15 K/UL (ref 0.05–0.5)
EOSINOPHILS RELATIVE PERCENT: 1 % (ref 0–6)
ERYTHROCYTE [DISTWIDTH] IN BLOOD BY AUTOMATED COUNT: 13.3 % (ref 11.5–15)
GFR, ESTIMATED: 41 ML/MIN/1.73M2
GLUCOSE SERPL-MCNC: 102 MG/DL (ref 74–99)
HCT VFR BLD AUTO: 46 % (ref 37–54)
HGB BLD-MCNC: 16 G/DL (ref 12.5–16.5)
IMM GRANULOCYTES # BLD AUTO: 0.08 K/UL (ref 0–0.58)
IMM GRANULOCYTES NFR BLD: 1 % (ref 0–5)
INFLUENZA A BY PCR: NOT DETECTED
INFLUENZA B BY PCR: NOT DETECTED
LYMPHOCYTES NFR BLD: 2.94 K/UL (ref 1.5–4)
LYMPHOCYTES RELATIVE PERCENT: 24 % (ref 20–42)
MCH RBC QN AUTO: 31.8 PG (ref 26–35)
MCHC RBC AUTO-ENTMCNC: 34.8 G/DL (ref 32–34.5)
MCV RBC AUTO: 91.5 FL (ref 80–99.9)
MONOCYTES NFR BLD: 1.68 K/UL (ref 0.1–0.95)
MONOCYTES NFR BLD: 14 % (ref 2–12)
NEUTROPHILS NFR BLD: 60 % (ref 43–80)
NEUTS SEG NFR BLD: 7.44 K/UL (ref 1.8–7.3)
PLATELET # BLD AUTO: 241 K/UL (ref 130–450)
PMV BLD AUTO: 9.4 FL (ref 7–12)
POTASSIUM SERPL-SCNC: 4.3 MMOL/L (ref 3.5–5)
PROT SERPL-MCNC: 8.9 G/DL (ref 6.4–8.3)
RBC # BLD AUTO: 5.03 M/UL (ref 3.8–5.8)
RBC # BLD: NORMAL 10*6/UL
RSV BY PCR: NOT DETECTED
SARS-COV-2 RDRP RESP QL NAA+PROBE: NOT DETECTED
SODIUM SERPL-SCNC: 137 MMOL/L (ref 132–146)
SPECIMEN DESCRIPTION: NORMAL
SPECIMEN SOURCE: NORMAL
TROPONIN I SERPL HS-MCNC: 13 NG/L (ref 0–11)
TROPONIN I SERPL HS-MCNC: 14 NG/L (ref 0–11)
WBC OTHER # BLD: 12.4 K/UL (ref 4.5–11.5)

## 2025-01-31 PROCEDURE — G0378 HOSPITAL OBSERVATION PER HR: HCPCS

## 2025-01-31 PROCEDURE — 6370000000 HC RX 637 (ALT 250 FOR IP): Performed by: STUDENT IN AN ORGANIZED HEALTH CARE EDUCATION/TRAINING PROGRAM

## 2025-01-31 PROCEDURE — 6360000002 HC RX W HCPCS

## 2025-01-31 PROCEDURE — 80053 COMPREHEN METABOLIC PANEL: CPT

## 2025-01-31 PROCEDURE — 87635 SARS-COV-2 COVID-19 AMP PRB: CPT

## 2025-01-31 PROCEDURE — 84484 ASSAY OF TROPONIN QUANT: CPT

## 2025-01-31 PROCEDURE — 0202U NFCT DS 22 TRGT SARS-COV-2: CPT

## 2025-01-31 PROCEDURE — 83880 ASSAY OF NATRIURETIC PEPTIDE: CPT

## 2025-01-31 PROCEDURE — 94640 AIRWAY INHALATION TREATMENT: CPT

## 2025-01-31 PROCEDURE — 99285 EMERGENCY DEPT VISIT HI MDM: CPT

## 2025-01-31 PROCEDURE — 87634 RSV DNA/RNA AMP PROBE: CPT

## 2025-01-31 PROCEDURE — 94664 DEMO&/EVAL PT USE INHALER: CPT

## 2025-01-31 PROCEDURE — 85025 COMPLETE CBC W/AUTO DIFF WBC: CPT

## 2025-01-31 PROCEDURE — 87502 INFLUENZA DNA AMP PROBE: CPT

## 2025-01-31 PROCEDURE — 6370000000 HC RX 637 (ALT 250 FOR IP)

## 2025-01-31 PROCEDURE — 85379 FIBRIN DEGRADATION QUANT: CPT

## 2025-01-31 PROCEDURE — 71045 X-RAY EXAM CHEST 1 VIEW: CPT

## 2025-01-31 PROCEDURE — 6360000002 HC RX W HCPCS: Performed by: STUDENT IN AN ORGANIZED HEALTH CARE EDUCATION/TRAINING PROGRAM

## 2025-01-31 PROCEDURE — 96374 THER/PROPH/DIAG INJ IV PUSH: CPT

## 2025-01-31 RX ORDER — SODIUM CHLORIDE 9 MG/ML
INJECTION, SOLUTION INTRAVENOUS PRN
Status: DISCONTINUED | OUTPATIENT
Start: 2025-01-31 | End: 2025-02-06 | Stop reason: HOSPADM

## 2025-01-31 RX ORDER — IPRATROPIUM BROMIDE AND ALBUTEROL SULFATE 2.5; .5 MG/3ML; MG/3ML
1 SOLUTION RESPIRATORY (INHALATION) ONCE
Status: COMPLETED | OUTPATIENT
Start: 2025-01-31 | End: 2025-01-31

## 2025-01-31 RX ORDER — LANOLIN ALCOHOL/MO/W.PET/CERES
100 CREAM (GRAM) TOPICAL DAILY
Status: DISCONTINUED | OUTPATIENT
Start: 2025-02-01 | End: 2025-02-06 | Stop reason: HOSPADM

## 2025-01-31 RX ORDER — NICOTINE 21 MG/24HR
1 PATCH, TRANSDERMAL 24 HOURS TRANSDERMAL DAILY
Status: DISCONTINUED | OUTPATIENT
Start: 2025-02-01 | End: 2025-02-06 | Stop reason: HOSPADM

## 2025-01-31 RX ORDER — IPRATROPIUM BROMIDE AND ALBUTEROL SULFATE 2.5; .5 MG/3ML; MG/3ML
1 SOLUTION RESPIRATORY (INHALATION) EVERY 4 HOURS PRN
Status: DISCONTINUED | OUTPATIENT
Start: 2025-01-31 | End: 2025-02-06 | Stop reason: HOSPADM

## 2025-01-31 RX ORDER — FOLIC ACID 1 MG/1
1 TABLET ORAL DAILY
Status: DISCONTINUED | OUTPATIENT
Start: 2025-02-01 | End: 2025-02-06 | Stop reason: HOSPADM

## 2025-01-31 RX ORDER — POTASSIUM CHLORIDE 1500 MG/1
40 TABLET, EXTENDED RELEASE ORAL PRN
Status: DISCONTINUED | OUTPATIENT
Start: 2025-01-31 | End: 2025-02-06 | Stop reason: HOSPADM

## 2025-01-31 RX ORDER — FINASTERIDE 5 MG/1
5 TABLET, FILM COATED ORAL DAILY
Status: DISCONTINUED | OUTPATIENT
Start: 2025-02-01 | End: 2025-02-06 | Stop reason: HOSPADM

## 2025-01-31 RX ORDER — ARFORMOTEROL TARTRATE 15 UG/2ML
15 SOLUTION RESPIRATORY (INHALATION)
Status: DISCONTINUED | OUTPATIENT
Start: 2025-01-31 | End: 2025-02-01

## 2025-01-31 RX ORDER — ATORVASTATIN CALCIUM 40 MG/1
40 TABLET, FILM COATED ORAL NIGHTLY
Status: DISCONTINUED | OUTPATIENT
Start: 2025-01-31 | End: 2025-02-06 | Stop reason: HOSPADM

## 2025-01-31 RX ORDER — NITROGLYCERIN 0.4 MG/1
0.4 TABLET SUBLINGUAL EVERY 5 MIN PRN
Status: DISCONTINUED | OUTPATIENT
Start: 2025-01-31 | End: 2025-02-06 | Stop reason: HOSPADM

## 2025-01-31 RX ORDER — POTASSIUM CHLORIDE 7.45 MG/ML
10 INJECTION INTRAVENOUS PRN
Status: DISCONTINUED | OUTPATIENT
Start: 2025-01-31 | End: 2025-02-06 | Stop reason: HOSPADM

## 2025-01-31 RX ORDER — METOPROLOL SUCCINATE 25 MG/1
25 TABLET, EXTENDED RELEASE ORAL DAILY
Status: DISCONTINUED | OUTPATIENT
Start: 2025-02-01 | End: 2025-02-04

## 2025-01-31 RX ORDER — ENOXAPARIN SODIUM 100 MG/ML
40 INJECTION SUBCUTANEOUS DAILY
Status: DISCONTINUED | OUTPATIENT
Start: 2025-02-01 | End: 2025-02-06 | Stop reason: HOSPADM

## 2025-01-31 RX ORDER — SODIUM CHLORIDE 0.9 % (FLUSH) 0.9 %
10 SYRINGE (ML) INJECTION PRN
Status: DISCONTINUED | OUTPATIENT
Start: 2025-01-31 | End: 2025-02-06 | Stop reason: HOSPADM

## 2025-01-31 RX ORDER — BUDESONIDE 0.25 MG/2ML
0.25 INHALANT ORAL
Status: DISCONTINUED | OUTPATIENT
Start: 2025-01-31 | End: 2025-02-01

## 2025-01-31 RX ORDER — SODIUM CHLORIDE 0.9 % (FLUSH) 0.9 %
10 SYRINGE (ML) INJECTION EVERY 12 HOURS SCHEDULED
Status: DISCONTINUED | OUTPATIENT
Start: 2025-01-31 | End: 2025-02-06 | Stop reason: HOSPADM

## 2025-01-31 RX ORDER — TORSEMIDE 10 MG/1
10 TABLET ORAL DAILY
Status: DISCONTINUED | OUTPATIENT
Start: 2025-02-01 | End: 2025-02-06 | Stop reason: HOSPADM

## 2025-01-31 RX ORDER — ASPIRIN 81 MG/1
81 TABLET, CHEWABLE ORAL DAILY
Status: DISCONTINUED | OUTPATIENT
Start: 2025-02-01 | End: 2025-02-06 | Stop reason: HOSPADM

## 2025-01-31 RX ORDER — METHYLPREDNISOLONE SODIUM SUCCINATE 125 MG/2ML
125 INJECTION INTRAMUSCULAR; INTRAVENOUS ONCE
Status: COMPLETED | OUTPATIENT
Start: 2025-01-31 | End: 2025-01-31

## 2025-01-31 RX ORDER — MAGNESIUM SULFATE IN WATER 40 MG/ML
2000 INJECTION, SOLUTION INTRAVENOUS ONCE
Status: DISCONTINUED | OUTPATIENT
Start: 2025-01-31 | End: 2025-02-01 | Stop reason: CLARIF

## 2025-01-31 RX ORDER — CLOPIDOGREL BISULFATE 75 MG/1
75 TABLET ORAL DAILY
Status: DISCONTINUED | OUTPATIENT
Start: 2025-02-01 | End: 2025-02-06 | Stop reason: HOSPADM

## 2025-01-31 RX ORDER — METHYLPREDNISOLONE SODIUM SUCCINATE 125 MG/2ML
125 INJECTION INTRAMUSCULAR; INTRAVENOUS DAILY
Status: DISCONTINUED | OUTPATIENT
Start: 2025-02-01 | End: 2025-02-01

## 2025-01-31 RX ORDER — ALBUTEROL SULFATE 0.83 MG/ML
2.5 SOLUTION RESPIRATORY (INHALATION) EVERY 4 HOURS PRN
Status: DISCONTINUED | OUTPATIENT
Start: 2025-01-31 | End: 2025-02-06 | Stop reason: HOSPADM

## 2025-01-31 RX ORDER — PANTOPRAZOLE SODIUM 40 MG/1
40 TABLET, DELAYED RELEASE ORAL
Status: DISCONTINUED | OUTPATIENT
Start: 2025-02-01 | End: 2025-02-06 | Stop reason: HOSPADM

## 2025-01-31 RX ORDER — SENNOSIDES A AND B 8.6 MG/1
1 TABLET, FILM COATED ORAL DAILY PRN
Status: DISCONTINUED | OUTPATIENT
Start: 2025-01-31 | End: 2025-02-06 | Stop reason: HOSPADM

## 2025-01-31 RX ORDER — RANOLAZINE 500 MG/1
500 TABLET, EXTENDED RELEASE ORAL 2 TIMES DAILY
Status: DISCONTINUED | OUTPATIENT
Start: 2025-01-31 | End: 2025-02-06 | Stop reason: HOSPADM

## 2025-01-31 RX ADMIN — IPRATROPIUM BROMIDE AND ALBUTEROL SULFATE 1 DOSE: .5; 2.5 SOLUTION RESPIRATORY (INHALATION) at 19:31

## 2025-01-31 RX ADMIN — IPRATROPIUM BROMIDE AND ALBUTEROL SULFATE 1 DOSE: .5; 2.5 SOLUTION RESPIRATORY (INHALATION) at 22:03

## 2025-01-31 RX ADMIN — BUDESONIDE 250 MCG: 0.25 SUSPENSION RESPIRATORY (INHALATION) at 22:03

## 2025-01-31 RX ADMIN — ARFORMOTEROL TARTRATE 15 MCG: 15 SOLUTION RESPIRATORY (INHALATION) at 22:03

## 2025-01-31 RX ADMIN — METHYLPREDNISOLONE SODIUM SUCCINATE 125 MG: 125 INJECTION INTRAMUSCULAR; INTRAVENOUS at 19:30

## 2025-01-31 ASSESSMENT — PAIN - FUNCTIONAL ASSESSMENT: PAIN_FUNCTIONAL_ASSESSMENT: NONE - DENIES PAIN

## 2025-01-31 ASSESSMENT — LIFESTYLE VARIABLES
HOW MANY STANDARD DRINKS CONTAINING ALCOHOL DO YOU HAVE ON A TYPICAL DAY: PATIENT DOES NOT DRINK
HOW OFTEN DO YOU HAVE A DRINK CONTAINING ALCOHOL: NEVER

## 2025-02-01 LAB
ALBUMIN SERPL-MCNC: 4.4 G/DL (ref 3.5–5.2)
ALP SERPL-CCNC: 77 U/L (ref 40–129)
ALT SERPL-CCNC: 17 U/L (ref 0–40)
ANION GAP SERPL CALCULATED.3IONS-SCNC: 14 MMOL/L (ref 7–16)
AST SERPL-CCNC: 20 U/L (ref 0–39)
B PARAP IS1001 DNA NPH QL NAA+NON-PROBE: NOT DETECTED
B PERT DNA SPEC QL NAA+PROBE: NOT DETECTED
BASOPHILS # BLD: 0.01 K/UL (ref 0–0.2)
BASOPHILS NFR BLD: 0 % (ref 0–2)
BILIRUB SERPL-MCNC: 0.2 MG/DL (ref 0–1.2)
BUN SERPL-MCNC: 33 MG/DL (ref 6–23)
C PNEUM DNA NPH QL NAA+NON-PROBE: NOT DETECTED
CALCIUM SERPL-MCNC: 9.6 MG/DL (ref 8.6–10.2)
CHLORIDE SERPL-SCNC: 101 MMOL/L (ref 98–107)
CO2 SERPL-SCNC: 19 MMOL/L (ref 22–29)
CREAT SERPL-MCNC: 1.7 MG/DL (ref 0.7–1.2)
EOSINOPHIL # BLD: 0 K/UL (ref 0.05–0.5)
EOSINOPHILS RELATIVE PERCENT: 0 % (ref 0–6)
ERYTHROCYTE [DISTWIDTH] IN BLOOD BY AUTOMATED COUNT: 13.3 % (ref 11.5–15)
FLUAV RNA NPH QL NAA+NON-PROBE: NOT DETECTED
FLUBV RNA NPH QL NAA+NON-PROBE: NOT DETECTED
GFR, ESTIMATED: 46 ML/MIN/1.73M2
GLUCOSE SERPL-MCNC: 144 MG/DL (ref 74–99)
HADV DNA NPH QL NAA+NON-PROBE: NOT DETECTED
HCOV 229E RNA NPH QL NAA+NON-PROBE: NOT DETECTED
HCOV HKU1 RNA NPH QL NAA+NON-PROBE: NOT DETECTED
HCOV NL63 RNA NPH QL NAA+NON-PROBE: NOT DETECTED
HCOV OC43 RNA NPH QL NAA+NON-PROBE: DETECTED
HCT VFR BLD AUTO: 48.3 % (ref 37–54)
HGB BLD-MCNC: 16.6 G/DL (ref 12.5–16.5)
HMPV RNA NPH QL NAA+NON-PROBE: NOT DETECTED
HPIV1 RNA NPH QL NAA+NON-PROBE: NOT DETECTED
HPIV2 RNA NPH QL NAA+NON-PROBE: NOT DETECTED
HPIV3 RNA NPH QL NAA+NON-PROBE: NOT DETECTED
HPIV4 RNA NPH QL NAA+NON-PROBE: NOT DETECTED
IMM GRANULOCYTES # BLD AUTO: 0.09 K/UL (ref 0–0.58)
IMM GRANULOCYTES NFR BLD: 1 % (ref 0–5)
LYMPHOCYTES NFR BLD: 1.01 K/UL (ref 1.5–4)
LYMPHOCYTES RELATIVE PERCENT: 9 % (ref 20–42)
M PNEUMO DNA NPH QL NAA+NON-PROBE: NOT DETECTED
MCH RBC QN AUTO: 31.5 PG (ref 26–35)
MCHC RBC AUTO-ENTMCNC: 34.4 G/DL (ref 32–34.5)
MCV RBC AUTO: 91.7 FL (ref 80–99.9)
MONOCYTES NFR BLD: 0.11 K/UL (ref 0.1–0.95)
MONOCYTES NFR BLD: 1 % (ref 2–12)
NEUTROPHILS NFR BLD: 89 % (ref 43–80)
NEUTS SEG NFR BLD: 9.47 K/UL (ref 1.8–7.3)
PLATELET # BLD AUTO: 256 K/UL (ref 130–450)
PMV BLD AUTO: 9.2 FL (ref 7–12)
POTASSIUM SERPL-SCNC: 4.6 MMOL/L (ref 3.5–5)
PROT SERPL-MCNC: 9.5 G/DL (ref 6.4–8.3)
RBC # BLD AUTO: 5.27 M/UL (ref 3.8–5.8)
RSV RNA NPH QL NAA+NON-PROBE: NOT DETECTED
RV+EV RNA NPH QL NAA+NON-PROBE: NOT DETECTED
SARS-COV-2 RNA NPH QL NAA+NON-PROBE: NOT DETECTED
SODIUM SERPL-SCNC: 134 MMOL/L (ref 132–146)
SPECIMEN DESCRIPTION: ABNORMAL
WBC OTHER # BLD: 10.7 K/UL (ref 4.5–11.5)

## 2025-02-01 PROCEDURE — 80053 COMPREHEN METABOLIC PANEL: CPT

## 2025-02-01 PROCEDURE — 2500000003 HC RX 250 WO HCPCS

## 2025-02-01 PROCEDURE — 6370000000 HC RX 637 (ALT 250 FOR IP)

## 2025-02-01 PROCEDURE — G0378 HOSPITAL OBSERVATION PER HR: HCPCS

## 2025-02-01 PROCEDURE — 6370000000 HC RX 637 (ALT 250 FOR IP): Performed by: INTERNAL MEDICINE

## 2025-02-01 PROCEDURE — 2700000000 HC OXYGEN THERAPY PER DAY

## 2025-02-01 PROCEDURE — 94640 AIRWAY INHALATION TREATMENT: CPT

## 2025-02-01 PROCEDURE — 6360000002 HC RX W HCPCS: Performed by: INTERNAL MEDICINE

## 2025-02-01 PROCEDURE — 85025 COMPLETE CBC W/AUTO DIFF WBC: CPT

## 2025-02-01 PROCEDURE — 6360000002 HC RX W HCPCS

## 2025-02-01 RX ORDER — CLONIDINE HYDROCHLORIDE 0.2 MG/1
0.2 TABLET ORAL 2 TIMES DAILY
COMMUNITY

## 2025-02-01 RX ORDER — GUAIFENESIN/DEXTROMETHORPHAN 100-10MG/5
10 SYRUP ORAL EVERY 4 HOURS PRN
Status: DISCONTINUED | OUTPATIENT
Start: 2025-02-01 | End: 2025-02-06 | Stop reason: HOSPADM

## 2025-02-01 RX ORDER — SPIRONOLACTONE 25 MG/1
25 TABLET ORAL DAILY
COMMUNITY

## 2025-02-01 RX ORDER — MAGNESIUM SULFATE IN WATER 40 MG/ML
2000 INJECTION, SOLUTION INTRAVENOUS ONCE
Status: COMPLETED | OUTPATIENT
Start: 2025-02-01 | End: 2025-02-01

## 2025-02-01 RX ORDER — MAGNESIUM SULFATE IN WATER 40 MG/ML
2000 INJECTION, SOLUTION INTRAVENOUS ONCE
Status: DISCONTINUED | OUTPATIENT
Start: 2025-02-01 | End: 2025-02-01 | Stop reason: CLARIF

## 2025-02-01 RX ORDER — ARFORMOTEROL TARTRATE 15 UG/2ML
15 SOLUTION RESPIRATORY (INHALATION)
Status: DISCONTINUED | OUTPATIENT
Start: 2025-02-01 | End: 2025-02-06 | Stop reason: HOSPADM

## 2025-02-01 RX ORDER — METHYLPREDNISOLONE SODIUM SUCCINATE 125 MG/2ML
60 INJECTION INTRAMUSCULAR; INTRAVENOUS DAILY
Status: DISCONTINUED | OUTPATIENT
Start: 2025-02-02 | End: 2025-02-03

## 2025-02-01 RX ORDER — AMLODIPINE BESYLATE 10 MG/1
10 TABLET ORAL DAILY
COMMUNITY

## 2025-02-01 RX ORDER — BUDESONIDE 0.25 MG/2ML
0.25 INHALANT ORAL
Status: DISCONTINUED | OUTPATIENT
Start: 2025-02-01 | End: 2025-02-06 | Stop reason: HOSPADM

## 2025-02-01 RX ADMIN — ATORVASTATIN CALCIUM 40 MG: 40 TABLET, FILM COATED ORAL at 21:49

## 2025-02-01 RX ADMIN — ARFORMOTEROL TARTRATE 15 MCG: 15 SOLUTION RESPIRATORY (INHALATION) at 07:43

## 2025-02-01 RX ADMIN — ASPIRIN 81 MG CHEWABLE TABLET 81 MG: 81 TABLET CHEWABLE at 09:53

## 2025-02-01 RX ADMIN — SODIUM CHLORIDE, PRESERVATIVE FREE 10 ML: 5 INJECTION INTRAVENOUS at 10:07

## 2025-02-01 RX ADMIN — GUAIFENESIN SYRUP AND DEXTROMETHORPHAN 10 ML: 100; 10 SYRUP ORAL at 21:50

## 2025-02-01 RX ADMIN — SODIUM CHLORIDE, PRESERVATIVE FREE 10 ML: 5 INJECTION INTRAVENOUS at 21:50

## 2025-02-01 RX ADMIN — FINASTERIDE 5 MG: 5 TABLET, FILM COATED ORAL at 09:53

## 2025-02-01 RX ADMIN — METHYLPREDNISOLONE SODIUM SUCCINATE 125 MG: 125 INJECTION INTRAMUSCULAR; INTRAVENOUS at 09:52

## 2025-02-01 RX ADMIN — RANOLAZINE 500 MG: 500 TABLET, FILM COATED, EXTENDED RELEASE ORAL at 21:49

## 2025-02-01 RX ADMIN — BUDESONIDE 250 MCG: 0.25 SUSPENSION RESPIRATORY (INHALATION) at 07:43

## 2025-02-01 RX ADMIN — SODIUM CHLORIDE, PRESERVATIVE FREE 10 ML: 5 INJECTION INTRAVENOUS at 00:21

## 2025-02-01 RX ADMIN — RANOLAZINE 500 MG: 500 TABLET, FILM COATED, EXTENDED RELEASE ORAL at 00:41

## 2025-02-01 RX ADMIN — ENOXAPARIN SODIUM 40 MG: 100 INJECTION SUBCUTANEOUS at 09:52

## 2025-02-01 RX ADMIN — SENNOSIDES 8.6 MG: 8.6 TABLET, COATED ORAL at 21:49

## 2025-02-01 RX ADMIN — CLOPIDOGREL BISULFATE 75 MG: 75 TABLET ORAL at 09:53

## 2025-02-01 RX ADMIN — GUAIFENESIN SYRUP AND DEXTROMETHORPHAN 10 ML: 100; 10 SYRUP ORAL at 11:06

## 2025-02-01 RX ADMIN — PANTOPRAZOLE SODIUM 40 MG: 40 TABLET, DELAYED RELEASE ORAL at 05:50

## 2025-02-01 RX ADMIN — IPRATROPIUM BROMIDE AND ALBUTEROL SULFATE 1 DOSE: .5; 2.5 SOLUTION RESPIRATORY (INHALATION) at 20:39

## 2025-02-01 RX ADMIN — Medication 100 MG: at 09:53

## 2025-02-01 RX ADMIN — RANOLAZINE 500 MG: 500 TABLET, FILM COATED, EXTENDED RELEASE ORAL at 09:52

## 2025-02-01 RX ADMIN — FOLIC ACID 1 MG: 1 TABLET ORAL at 09:53

## 2025-02-01 RX ADMIN — BUDESONIDE 250 MCG: 0.25 SUSPENSION RESPIRATORY (INHALATION) at 20:39

## 2025-02-01 RX ADMIN — MAGNESIUM SULFATE HEPTAHYDRATE 2000 MG: 40 INJECTION, SOLUTION INTRAVENOUS at 01:05

## 2025-02-01 RX ADMIN — IPRATROPIUM BROMIDE AND ALBUTEROL SULFATE 1 DOSE: .5; 2.5 SOLUTION RESPIRATORY (INHALATION) at 01:27

## 2025-02-01 RX ADMIN — ATORVASTATIN CALCIUM 40 MG: 40 TABLET, FILM COATED ORAL at 00:21

## 2025-02-01 RX ADMIN — IPRATROPIUM BROMIDE 0.5 MG: 0.5 SOLUTION RESPIRATORY (INHALATION) at 12:26

## 2025-02-01 RX ADMIN — METOPROLOL SUCCINATE 25 MG: 25 TABLET, FILM COATED, EXTENDED RELEASE ORAL at 09:53

## 2025-02-01 RX ADMIN — GUAIFENESIN SYRUP AND DEXTROMETHORPHAN 10 ML: 100; 10 SYRUP ORAL at 17:17

## 2025-02-01 RX ADMIN — ARFORMOTEROL TARTRATE 15 MCG: 15 SOLUTION RESPIRATORY (INHALATION) at 20:39

## 2025-02-01 RX ADMIN — IPRATROPIUM BROMIDE 0.5 MG: 0.5 SOLUTION RESPIRATORY (INHALATION) at 07:43

## 2025-02-01 NOTE — CONSULTS
[facility]  [address], [address]  [place] [address]    Pulmonary Consultation      Reason for Consult:  sob    Requesting Physician:  Pedro Alvarez III, DO    CHIEF COMPLAINT:  sob    History Obtained From:  patient    HISTORY OF PRESENT ILLNESS:                The patient is a 59 y.o. male with significant past medical history of COPD who presents with increased sob and cough for the past 3-4 days, gradually getting worse, large amounts of sputum production, no help with neb treatments. Still smoking, claims did cocaine once a few days ago and hadn't done any for a year prior to that. Currently reclining in bed on RA, feels some better with breathing and decreased sputum production since here.    Past Medical History:        Diagnosis Date    Alcohol abuse     CHF (congestive heart failure) ([facility])     COPD (chronic obstructive pulmonary disease) ([facility])     History of cocaine use     Hyperlipidemia     Hypertension     Marijuana use     quit November 2017     MI (myocardial infarction) ([facility])     alberto        Past Surgical History:        Procedure Laterality Date    BRONCHOSCOPY N/A 8/3/2021    BRONCHOSCOPY DIAGNOSTIC OR CELL WASH ONLY performed by Max Saha MD at [facility] ENDOSCOPY    CARDIAC CATHETERIZATION  11/03/2020    DR Peterson    CERVICAL FUSION  11/18/15    cervical laminectomy & fusion c4-c6, with rods & screws    POLYSOMNOGRAPHY  01/29/2018    AHI=29.8    WRIST SURGERY         Current Medications:    Current Facility-Administered Medications: budesonide (PULMICORT) nebulizer suspension 250 mcg, 0.25 mg, Nebulization, BID RT **AND** arformoterol tartrate (BROVANA) nebulizer solution 15 mcg, 15 mcg, Nebulization, BID RT **AND** ipratropium (ATROVENT) 0.02 % nebulizer solution 0.5 mg, 0.5 mg, Nebulization, Q6H WA RT  guaiFENesin-dextromethorphan (ROBITUSSIN DM) 100-10 MG/5ML syrup 10 mL, 10 mL, Oral, Q4H PRN  clopidogrel (PLAVIX)

## 2025-02-01 NOTE — H&P
Internal Medicine History & Physical     Chief Complaint: Cold Symptoms  Reason for Admission: COPD exacerbation  Primary Care Physician: Pedro Alvarez III, DO  Code status: Full code    History of Present Illness  Reynold is a 59 y.o. year old male who  has a past medical history of Alcohol abuse, CHF (congestive heart failure) ([facility]), COPD (chronic obstructive pulmonary disease) ([facility]), History of cocaine use, Hyperlipidemia, Hypertension, Marijuana use, MI (myocardial infarction) ([facility]), and alberto..     The patient presented to the ER yesterday for onset of shortness of breath.  Patient with known history of COPD and states that he has been slowly increasing his shortness of breath with cough.  He just had such shortness of breath and came into the emergency room.  In the ER found to be hypoxic and admission was done for COPD exacerbation.  This morning the patient states that he feels just tired.  Ongoing cough with shortness of breath.      Therapy in ED-   Medications   clopidogrel (PLAVIX) tablet 75 mg (has no administration in time range)   sacubitril-valsartan (ENTRESTO) 49-51 MG per tablet 1 tablet ( Oral Automatically Held 2/6/25 2100)   albuterol (PROVENTIL) (2.5 MG/3ML) 0.083% nebulizer solution 2.5 mg (has no administration in time range)   aspirin chewable tablet 81 mg (has no administration in time range)   atorvastatin (LIPITOR) tablet 40 mg (40 mg Oral Given 2/1/25 0021)   finasteride (PROSCAR) tablet 5 mg (has no administration in time range)   folic acid (FOLVITE) tablet 1 mg (has no administration in time range)   metoprolol succinate (TOPROL XL) extended release tablet 25 mg (has no administration in time range)   nicotine (NICODERM CQ) 21 MG/24HR 1 patch (has no administration in time range)   nitroGLYCERIN (NITROSTAT) SL tablet 0.4 mg (has no administration in time range)   pantoprazole (PROTONIX) tablet 40 mg (40 mg Oral Given 2/1/25 0550)   ranolazine (RANEXA) extended release tablet 500 mg

## 2025-02-01 NOTE — ED PROVIDER NOTES
Department of Emergency Medicine   ED  Provider Note  Admit Date/RoomTime: 1/31/2025  6:15 PM  ED Room: 01/01              [facility]     Reynold Holloway is a 59 y.o. male with a PMHx significant for  COPD, CHF, HLD, CKD, HTN, INOCENCIA  who presents for evaluation of shortness of breath, chest tightness, beginning prior to arrival.  The complaint has been persistent, moderate in severity, and worsened by nothing.   The patient states that he has been feeling unwell for the last few days, notes cough productive of green/yellow mucus and sputum.  Does note low-grade temperatures at home as well.  Does have longstanding history of COPD, uses nebulized breathing treatments at home, despite the breathing treatment usage states that he still feels like he cannot breathe.     Review of Systems   Constitutional:  Positive for fatigue and fever. Negative for chills.   HENT:  Positive for congestion. Negative for ear pain, sinus pressure and sore throat.    Eyes:  Negative for pain, discharge and redness.   Respiratory:  Positive for cough and shortness of breath. Negative for wheezing.    Cardiovascular:  Negative for chest pain.   Gastrointestinal:  Negative for abdominal pain, diarrhea, nausea and vomiting.   Genitourinary:  Negative for dysuria and frequency.   Musculoskeletal:  Negative for arthralgias and back pain.   Skin:  Negative for rash and wound.   Neurological:  Negative for weakness and headaches.   Hematological:  Negative for adenopathy.   All other systems reviewed and are negative.       Physical Exam  Vitals and nursing note reviewed.   Constitutional:       General: He is not in acute distress.     Appearance: Normal appearance. He is well-developed. He is not ill-appearing.   HENT:      Head: Normocephalic and atraumatic.      Right Ear: External ear normal.      Left Ear: External ear normal.   Eyes:      General:         Right eye: No discharge.         Left eye: No discharge.      Extraocular Movements:

## 2025-02-01 NOTE — PLAN OF CARE
Problem: Chronic Conditions and Co-morbidities  Goal: Patient's chronic conditions and co-morbidity symptoms are monitored and maintained or improved  2/1/2025 1504 by Nancy Lilly RN  Outcome: Progressing  2/1/2025 1504 by Nancy Lilly RN  Outcome: Progressing  2/1/2025 1205 by Sudhakar Irwin  Outcome: Progressing     Problem: Safety - Adult  Goal: Free from fall injury  2/1/2025 1504 by Nancy Lilly RN  Outcome: Progressing  2/1/2025 1504 by Nancy Lilly RN  Outcome: Progressing  2/1/2025 1205 by Sudhakar Irwin  Outcome: Progressing     Problem: Respiratory - Adult  Goal: Achieves optimal ventilation and oxygenation  Reactivated

## 2025-02-01 NOTE — CARE COORDINATION
Internal Medicine On-call Care Coordination Note    I was called by the ED physician because they recommended admission for this patient and we cover their PCP.  The history as I understand it after discussion with the ED physician is as follows:    The patient presented with shortness of breath, fatigue, fever  In the ED they found CXR negative, however with leukocytosis, respiratory panel pending. Given breathing treatments & IV Solumedrol. Decision made for admission.    I placed admission orders.  Including:    IV Solumedrol  Pulm consult  General admission orders, med rec completed    Dr. Perry or his coverage will see the patient tomorrow for H&P.    Electronically signed by TONIA Sr CNP on 1/31/2025 at 9:38 PM

## 2025-02-02 LAB
ALBUMIN SERPL-MCNC: 4.1 G/DL (ref 3.5–5.2)
ALP SERPL-CCNC: 85 U/L (ref 40–129)
ALT SERPL-CCNC: 11 U/L (ref 0–40)
ANION GAP SERPL CALCULATED.3IONS-SCNC: 15 MMOL/L (ref 7–16)
AST SERPL-CCNC: 14 U/L (ref 0–39)
BASOPHILS # BLD: 0.03 K/UL (ref 0–0.2)
BASOPHILS NFR BLD: 0 % (ref 0–2)
BILIRUB SERPL-MCNC: 0.2 MG/DL (ref 0–1.2)
BUN SERPL-MCNC: 44 MG/DL (ref 6–23)
CALCIUM SERPL-MCNC: 9.3 MG/DL (ref 8.6–10.2)
CHLORIDE SERPL-SCNC: 111 MMOL/L (ref 98–107)
CO2 SERPL-SCNC: 15 MMOL/L (ref 22–29)
CREAT SERPL-MCNC: 1.9 MG/DL (ref 0.7–1.2)
EOSINOPHIL # BLD: 0 K/UL (ref 0.05–0.5)
EOSINOPHILS RELATIVE PERCENT: 0 % (ref 0–6)
ERYTHROCYTE [DISTWIDTH] IN BLOOD BY AUTOMATED COUNT: 13.4 % (ref 11.5–15)
GFR, ESTIMATED: 41 ML/MIN/1.73M2
GLUCOSE SERPL-MCNC: 135 MG/DL (ref 74–99)
HCT VFR BLD AUTO: 44.3 % (ref 37–54)
HGB BLD-MCNC: 14.9 G/DL (ref 12.5–16.5)
IMM GRANULOCYTES # BLD AUTO: 0.16 K/UL (ref 0–0.58)
IMM GRANULOCYTES NFR BLD: 1 % (ref 0–5)
LYMPHOCYTES NFR BLD: 2.36 K/UL (ref 1.5–4)
LYMPHOCYTES RELATIVE PERCENT: 12 % (ref 20–42)
MCH RBC QN AUTO: 31.2 PG (ref 26–35)
MCHC RBC AUTO-ENTMCNC: 33.6 G/DL (ref 32–34.5)
MCV RBC AUTO: 92.7 FL (ref 80–99.9)
MONOCYTES NFR BLD: 1.46 K/UL (ref 0.1–0.95)
MONOCYTES NFR BLD: 7 % (ref 2–12)
NEUTROPHILS NFR BLD: 80 % (ref 43–80)
NEUTS SEG NFR BLD: 15.82 K/UL (ref 1.8–7.3)
PLATELET # BLD AUTO: 279 K/UL (ref 130–450)
PMV BLD AUTO: 9.5 FL (ref 7–12)
POTASSIUM SERPL-SCNC: 4.6 MMOL/L (ref 3.5–5)
PROT SERPL-MCNC: 8.7 G/DL (ref 6.4–8.3)
RBC # BLD AUTO: 4.78 M/UL (ref 3.8–5.8)
SODIUM SERPL-SCNC: 141 MMOL/L (ref 132–146)
WBC OTHER # BLD: 19.8 K/UL (ref 4.5–11.5)

## 2025-02-02 PROCEDURE — G0378 HOSPITAL OBSERVATION PER HR: HCPCS

## 2025-02-02 PROCEDURE — 6360000002 HC RX W HCPCS

## 2025-02-02 PROCEDURE — 80053 COMPREHEN METABOLIC PANEL: CPT

## 2025-02-02 PROCEDURE — 85025 COMPLETE CBC W/AUTO DIFF WBC: CPT

## 2025-02-02 PROCEDURE — 94640 AIRWAY INHALATION TREATMENT: CPT

## 2025-02-02 PROCEDURE — 6370000000 HC RX 637 (ALT 250 FOR IP): Performed by: NURSE PRACTITIONER

## 2025-02-02 PROCEDURE — 6370000000 HC RX 637 (ALT 250 FOR IP)

## 2025-02-02 PROCEDURE — 2580000003 HC RX 258: Performed by: INTERNAL MEDICINE

## 2025-02-02 PROCEDURE — 2500000003 HC RX 250 WO HCPCS

## 2025-02-02 PROCEDURE — 6360000002 HC RX W HCPCS: Performed by: INTERNAL MEDICINE

## 2025-02-02 PROCEDURE — 6370000000 HC RX 637 (ALT 250 FOR IP): Performed by: INTERNAL MEDICINE

## 2025-02-02 RX ORDER — ACETAMINOPHEN 325 MG/1
650 TABLET ORAL EVERY 4 HOURS PRN
Status: DISCONTINUED | OUTPATIENT
Start: 2025-02-02 | End: 2025-02-06 | Stop reason: HOSPADM

## 2025-02-02 RX ORDER — BISACODYL 10 MG
10 SUPPOSITORY, RECTAL RECTAL PRN
Status: DISCONTINUED | OUTPATIENT
Start: 2025-02-02 | End: 2025-02-06 | Stop reason: HOSPADM

## 2025-02-02 RX ORDER — SODIUM CHLORIDE 9 MG/ML
INJECTION, SOLUTION INTRAVENOUS CONTINUOUS
Status: DISCONTINUED | OUTPATIENT
Start: 2025-02-02 | End: 2025-02-03

## 2025-02-02 RX ORDER — BENZONATATE 100 MG/1
100 CAPSULE ORAL 3 TIMES DAILY PRN
Status: DISCONTINUED | OUTPATIENT
Start: 2025-02-02 | End: 2025-02-04

## 2025-02-02 RX ORDER — LACTULOSE 10 G/15ML
20 SOLUTION ORAL 3 TIMES DAILY PRN
Status: DISCONTINUED | OUTPATIENT
Start: 2025-02-02 | End: 2025-02-06 | Stop reason: HOSPADM

## 2025-02-02 RX ADMIN — SENNOSIDES 8.6 MG: 8.6 TABLET, COATED ORAL at 08:24

## 2025-02-02 RX ADMIN — BUDESONIDE 250 MCG: 0.25 SUSPENSION RESPIRATORY (INHALATION) at 08:19

## 2025-02-02 RX ADMIN — SODIUM CHLORIDE, PRESERVATIVE FREE 10 ML: 5 INJECTION INTRAVENOUS at 08:21

## 2025-02-02 RX ADMIN — GUAIFENESIN SYRUP AND DEXTROMETHORPHAN 10 ML: 100; 10 SYRUP ORAL at 12:58

## 2025-02-02 RX ADMIN — SODIUM CHLORIDE: 9 INJECTION, SOLUTION INTRAVENOUS at 21:20

## 2025-02-02 RX ADMIN — FINASTERIDE 5 MG: 5 TABLET, FILM COATED ORAL at 08:18

## 2025-02-02 RX ADMIN — ASPIRIN 81 MG CHEWABLE TABLET 81 MG: 81 TABLET CHEWABLE at 08:18

## 2025-02-02 RX ADMIN — ATORVASTATIN CALCIUM 40 MG: 40 TABLET, FILM COATED ORAL at 21:18

## 2025-02-02 RX ADMIN — GUAIFENESIN SYRUP AND DEXTROMETHORPHAN 10 ML: 100; 10 SYRUP ORAL at 08:18

## 2025-02-02 RX ADMIN — CLOPIDOGREL BISULFATE 75 MG: 75 TABLET ORAL at 08:18

## 2025-02-02 RX ADMIN — IPRATROPIUM BROMIDE 0.5 MG: 0.5 SOLUTION RESPIRATORY (INHALATION) at 20:56

## 2025-02-02 RX ADMIN — FOLIC ACID 1 MG: 1 TABLET ORAL at 08:18

## 2025-02-02 RX ADMIN — ACETAMINOPHEN 650 MG: 325 TABLET ORAL at 02:40

## 2025-02-02 RX ADMIN — ARFORMOTEROL TARTRATE 15 MCG: 15 SOLUTION RESPIRATORY (INHALATION) at 20:56

## 2025-02-02 RX ADMIN — GUAIFENESIN SYRUP AND DEXTROMETHORPHAN 10 ML: 100; 10 SYRUP ORAL at 04:34

## 2025-02-02 RX ADMIN — PANTOPRAZOLE SODIUM 40 MG: 40 TABLET, DELAYED RELEASE ORAL at 06:30

## 2025-02-02 RX ADMIN — RANOLAZINE 500 MG: 500 TABLET, FILM COATED, EXTENDED RELEASE ORAL at 21:18

## 2025-02-02 RX ADMIN — BENZONATATE 100 MG: 100 CAPSULE ORAL at 08:18

## 2025-02-02 RX ADMIN — ARFORMOTEROL TARTRATE 15 MCG: 15 SOLUTION RESPIRATORY (INHALATION) at 08:20

## 2025-02-02 RX ADMIN — METOPROLOL SUCCINATE 25 MG: 25 TABLET, FILM COATED, EXTENDED RELEASE ORAL at 08:18

## 2025-02-02 RX ADMIN — RANOLAZINE 500 MG: 500 TABLET, FILM COATED, EXTENDED RELEASE ORAL at 08:18

## 2025-02-02 RX ADMIN — Medication 100 MG: at 08:18

## 2025-02-02 RX ADMIN — LACTULOSE 20 G: 20 SOLUTION ORAL at 16:15

## 2025-02-02 RX ADMIN — BUDESONIDE 250 MCG: 0.25 SUSPENSION RESPIRATORY (INHALATION) at 20:56

## 2025-02-02 RX ADMIN — IPRATROPIUM BROMIDE AND ALBUTEROL SULFATE 1 DOSE: .5; 2.5 SOLUTION RESPIRATORY (INHALATION) at 02:02

## 2025-02-02 RX ADMIN — BENZOCAINE AND MENTHOL 1 LOZENGE: 15; 3.6 LOZENGE ORAL at 18:43

## 2025-02-02 RX ADMIN — ENOXAPARIN SODIUM 40 MG: 100 INJECTION SUBCUTANEOUS at 08:20

## 2025-02-02 RX ADMIN — METHYLPREDNISOLONE SODIUM SUCCINATE 60 MG: 125 INJECTION INTRAMUSCULAR; INTRAVENOUS at 08:20

## 2025-02-02 RX ADMIN — IPRATROPIUM BROMIDE 0.5 MG: 0.5 SOLUTION RESPIRATORY (INHALATION) at 08:20

## 2025-02-02 RX ADMIN — SODIUM CHLORIDE: 9 INJECTION, SOLUTION INTRAVENOUS at 10:18

## 2025-02-02 ASSESSMENT — PAIN - FUNCTIONAL ASSESSMENT: PAIN_FUNCTIONAL_ASSESSMENT: ACTIVITIES ARE NOT PREVENTED

## 2025-02-02 ASSESSMENT — PAIN DESCRIPTION - LOCATION: LOCATION: HEAD

## 2025-02-02 ASSESSMENT — PAIN SCALES - GENERAL
PAINLEVEL_OUTOF10: 5
PAINLEVEL_OUTOF10: 0

## 2025-02-02 ASSESSMENT — PAIN SCALES - WONG BAKER: WONGBAKER_NUMERICALRESPONSE: NO HURT

## 2025-02-02 ASSESSMENT — PAIN DESCRIPTION - DESCRIPTORS: DESCRIPTORS: ACHING

## 2025-02-02 NOTE — PLAN OF CARE
Problem: Chronic Conditions and Co-morbidities  Goal: Patient's chronic conditions and co-morbidity symptoms are monitored and maintained or improved  2/2/2025 0447 by Sofie Jeff RN  Outcome: Progressing     Problem: Safety - Adult  Goal: Free from fall injury  2/2/2025 0447 by Sofie Jeff, RN  Outcome: Progressing     Problem: Respiratory - Adult  Goal: Achieves optimal ventilation and oxygenation  2/2/2025 0447 by Sofie Jeff, RN  Outcome: Progressing

## 2025-02-03 PROBLEM — B34.2 CORONAVIRUS INFECTION: Status: ACTIVE | Noted: 2025-02-03

## 2025-02-03 LAB
ALBUMIN SERPL-MCNC: 3.7 G/DL (ref 3.5–5.2)
ALP SERPL-CCNC: 77 U/L (ref 40–129)
ALT SERPL-CCNC: 10 U/L (ref 0–40)
ANION GAP SERPL CALCULATED.3IONS-SCNC: 9 MMOL/L (ref 7–16)
AST SERPL-CCNC: 11 U/L (ref 0–39)
BASOPHILS # BLD: 0.02 K/UL (ref 0–0.2)
BASOPHILS NFR BLD: 0 % (ref 0–2)
BILIRUB SERPL-MCNC: 0.2 MG/DL (ref 0–1.2)
BUN SERPL-MCNC: 37 MG/DL (ref 6–23)
CALCIUM SERPL-MCNC: 8.8 MG/DL (ref 8.6–10.2)
CHLORIDE SERPL-SCNC: 107 MMOL/L (ref 98–107)
CO2 SERPL-SCNC: 19 MMOL/L (ref 22–29)
CREAT SERPL-MCNC: 1.4 MG/DL (ref 0.7–1.2)
EOSINOPHIL # BLD: 0 K/UL (ref 0.05–0.5)
EOSINOPHILS RELATIVE PERCENT: 0 % (ref 0–6)
ERYTHROCYTE [DISTWIDTH] IN BLOOD BY AUTOMATED COUNT: 13.3 % (ref 11.5–15)
GFR, ESTIMATED: 58 ML/MIN/1.73M2
GLUCOSE SERPL-MCNC: 111 MG/DL (ref 74–99)
HCT VFR BLD AUTO: 42.6 % (ref 37–54)
HGB BLD-MCNC: 14.4 G/DL (ref 12.5–16.5)
IMM GRANULOCYTES # BLD AUTO: 0.28 K/UL (ref 0–0.58)
IMM GRANULOCYTES NFR BLD: 1 % (ref 0–5)
LYMPHOCYTES NFR BLD: 2.52 K/UL (ref 1.5–4)
LYMPHOCYTES RELATIVE PERCENT: 12 % (ref 20–42)
MCH RBC QN AUTO: 31.6 PG (ref 26–35)
MCHC RBC AUTO-ENTMCNC: 33.8 G/DL (ref 32–34.5)
MCV RBC AUTO: 93.4 FL (ref 80–99.9)
MONOCYTES NFR BLD: 1.5 K/UL (ref 0.1–0.95)
MONOCYTES NFR BLD: 7 % (ref 2–12)
NEUTROPHILS NFR BLD: 79 % (ref 43–80)
NEUTS SEG NFR BLD: 16.04 K/UL (ref 1.8–7.3)
PLATELET # BLD AUTO: 254 K/UL (ref 130–450)
PMV BLD AUTO: 9.2 FL (ref 7–12)
POTASSIUM SERPL-SCNC: 4.3 MMOL/L (ref 3.5–5)
PROT SERPL-MCNC: 7.8 G/DL (ref 6.4–8.3)
RBC # BLD AUTO: 4.56 M/UL (ref 3.8–5.8)
SODIUM SERPL-SCNC: 135 MMOL/L (ref 132–146)
WBC OTHER # BLD: 20.4 K/UL (ref 4.5–11.5)

## 2025-02-03 PROCEDURE — 6370000000 HC RX 637 (ALT 250 FOR IP): Performed by: INTERNAL MEDICINE

## 2025-02-03 PROCEDURE — 6370000000 HC RX 637 (ALT 250 FOR IP)

## 2025-02-03 PROCEDURE — 36415 COLL VENOUS BLD VENIPUNCTURE: CPT

## 2025-02-03 PROCEDURE — 6360000002 HC RX W HCPCS

## 2025-02-03 PROCEDURE — 6370000000 HC RX 637 (ALT 250 FOR IP): Performed by: NURSE PRACTITIONER

## 2025-02-03 PROCEDURE — 6360000002 HC RX W HCPCS: Performed by: NURSE PRACTITIONER

## 2025-02-03 PROCEDURE — 80053 COMPREHEN METABOLIC PANEL: CPT

## 2025-02-03 PROCEDURE — 2500000003 HC RX 250 WO HCPCS

## 2025-02-03 PROCEDURE — 6360000002 HC RX W HCPCS: Performed by: INTERNAL MEDICINE

## 2025-02-03 PROCEDURE — 1200000000 HC SEMI PRIVATE

## 2025-02-03 PROCEDURE — 85025 COMPLETE CBC W/AUTO DIFF WBC: CPT

## 2025-02-03 PROCEDURE — 94640 AIRWAY INHALATION TREATMENT: CPT

## 2025-02-03 RX ORDER — GUAIFENESIN 400 MG/1
400 TABLET ORAL 3 TIMES DAILY
Status: DISCONTINUED | OUTPATIENT
Start: 2025-02-03 | End: 2025-02-06 | Stop reason: HOSPADM

## 2025-02-03 RX ORDER — METHYLPREDNISOLONE SODIUM SUCCINATE 40 MG/ML
40 INJECTION INTRAMUSCULAR; INTRAVENOUS EVERY 12 HOURS
Status: COMPLETED | OUTPATIENT
Start: 2025-02-03 | End: 2025-02-06

## 2025-02-03 RX ORDER — WATER 10 ML/10ML
INJECTION INTRAMUSCULAR; INTRAVENOUS; SUBCUTANEOUS
Status: COMPLETED
Start: 2025-02-03 | End: 2025-02-03

## 2025-02-03 RX ORDER — HYDROCODONE BITARTRATE AND ACETAMINOPHEN 5; 325 MG/1; MG/1
1 TABLET ORAL ONCE
Status: COMPLETED | OUTPATIENT
Start: 2025-02-03 | End: 2025-02-03

## 2025-02-03 RX ORDER — LIDOCAINE 4 G/G
1 PATCH TOPICAL DAILY
Status: DISCONTINUED | OUTPATIENT
Start: 2025-02-03 | End: 2025-02-06 | Stop reason: HOSPADM

## 2025-02-03 RX ADMIN — SODIUM CHLORIDE, PRESERVATIVE FREE 10 ML: 5 INJECTION INTRAVENOUS at 20:41

## 2025-02-03 RX ADMIN — METOPROLOL SUCCINATE 25 MG: 25 TABLET, FILM COATED, EXTENDED RELEASE ORAL at 08:28

## 2025-02-03 RX ADMIN — ENOXAPARIN SODIUM 40 MG: 100 INJECTION SUBCUTANEOUS at 08:27

## 2025-02-03 RX ADMIN — RANOLAZINE 500 MG: 500 TABLET, FILM COATED, EXTENDED RELEASE ORAL at 08:28

## 2025-02-03 RX ADMIN — SACUBITRIL AND VALSARTAN 1 TABLET: 49; 51 TABLET, FILM COATED ORAL at 08:36

## 2025-02-03 RX ADMIN — METHYLPREDNISOLONE SODIUM SUCCINATE 40 MG: 40 INJECTION INTRAMUSCULAR; INTRAVENOUS at 08:28

## 2025-02-03 RX ADMIN — BENZONATATE 100 MG: 100 CAPSULE ORAL at 08:28

## 2025-02-03 RX ADMIN — BUDESONIDE 250 MCG: 0.25 SUSPENSION RESPIRATORY (INHALATION) at 21:18

## 2025-02-03 RX ADMIN — GUAIFENESIN SYRUP AND DEXTROMETHORPHAN 10 ML: 100; 10 SYRUP ORAL at 20:40

## 2025-02-03 RX ADMIN — ARFORMOTEROL TARTRATE 15 MCG: 15 SOLUTION RESPIRATORY (INHALATION) at 21:18

## 2025-02-03 RX ADMIN — GUAIFENESIN 400 MG: 400 TABLET ORAL at 14:47

## 2025-02-03 RX ADMIN — SACUBITRIL AND VALSARTAN 1 TABLET: 49; 51 TABLET, FILM COATED ORAL at 20:41

## 2025-02-03 RX ADMIN — GUAIFENESIN SYRUP AND DEXTROMETHORPHAN 10 ML: 100; 10 SYRUP ORAL at 16:35

## 2025-02-03 RX ADMIN — CLOPIDOGREL BISULFATE 75 MG: 75 TABLET ORAL at 08:28

## 2025-02-03 RX ADMIN — GUAIFENESIN SYRUP AND DEXTROMETHORPHAN 10 ML: 100; 10 SYRUP ORAL at 02:18

## 2025-02-03 RX ADMIN — BENZOCAINE AND MENTHOL 1 LOZENGE: 15; 3.6 LOZENGE ORAL at 20:41

## 2025-02-03 RX ADMIN — SODIUM CHLORIDE, PRESERVATIVE FREE 10 ML: 5 INJECTION INTRAVENOUS at 08:30

## 2025-02-03 RX ADMIN — FOLIC ACID 1 MG: 1 TABLET ORAL at 08:28

## 2025-02-03 RX ADMIN — ATORVASTATIN CALCIUM 40 MG: 40 TABLET, FILM COATED ORAL at 20:40

## 2025-02-03 RX ADMIN — IPRATROPIUM BROMIDE 0.5 MG: 0.5 SOLUTION RESPIRATORY (INHALATION) at 08:19

## 2025-02-03 RX ADMIN — ASPIRIN 81 MG CHEWABLE TABLET 81 MG: 81 TABLET CHEWABLE at 08:28

## 2025-02-03 RX ADMIN — IPRATROPIUM BROMIDE 0.5 MG: 0.5 SOLUTION RESPIRATORY (INHALATION) at 12:31

## 2025-02-03 RX ADMIN — WATER 10 ML: 1 INJECTION INTRAMUSCULAR; INTRAVENOUS; SUBCUTANEOUS at 08:27

## 2025-02-03 RX ADMIN — IPRATROPIUM BROMIDE 0.5 MG: 0.5 SOLUTION RESPIRATORY (INHALATION) at 21:18

## 2025-02-03 RX ADMIN — WATER 10 ML: 1 INJECTION INTRAMUSCULAR; INTRAVENOUS; SUBCUTANEOUS at 18:44

## 2025-02-03 RX ADMIN — GUAIFENESIN 400 MG: 400 TABLET ORAL at 08:28

## 2025-02-03 RX ADMIN — ACETAMINOPHEN 650 MG: 325 TABLET ORAL at 14:47

## 2025-02-03 RX ADMIN — PANTOPRAZOLE SODIUM 40 MG: 40 TABLET, DELAYED RELEASE ORAL at 05:56

## 2025-02-03 RX ADMIN — GUAIFENESIN SYRUP AND DEXTROMETHORPHAN 10 ML: 100; 10 SYRUP ORAL at 13:20

## 2025-02-03 RX ADMIN — BENZONATATE 100 MG: 100 CAPSULE ORAL at 14:47

## 2025-02-03 RX ADMIN — FINASTERIDE 5 MG: 5 TABLET, FILM COATED ORAL at 08:28

## 2025-02-03 RX ADMIN — ARFORMOTEROL TARTRATE 15 MCG: 15 SOLUTION RESPIRATORY (INHALATION) at 08:19

## 2025-02-03 RX ADMIN — Medication 100 MG: at 08:28

## 2025-02-03 RX ADMIN — METHYLPREDNISOLONE SODIUM SUCCINATE 40 MG: 40 INJECTION INTRAMUSCULAR; INTRAVENOUS at 18:44

## 2025-02-03 RX ADMIN — BENZOCAINE AND MENTHOL 1 LOZENGE: 15; 3.6 LOZENGE ORAL at 02:18

## 2025-02-03 RX ADMIN — HYDROCODONE BITARTRATE AND ACETAMINOPHEN 1 TABLET: 5; 325 TABLET ORAL at 18:44

## 2025-02-03 RX ADMIN — BUDESONIDE 250 MCG: 0.25 SUSPENSION RESPIRATORY (INHALATION) at 08:19

## 2025-02-03 RX ADMIN — RANOLAZINE 500 MG: 500 TABLET, FILM COATED, EXTENDED RELEASE ORAL at 20:41

## 2025-02-03 ASSESSMENT — PAIN DESCRIPTION - LOCATION
LOCATION: BACK
LOCATION: BACK

## 2025-02-03 ASSESSMENT — PAIN DESCRIPTION - ORIENTATION: ORIENTATION: RIGHT

## 2025-02-03 ASSESSMENT — PAIN DESCRIPTION - DESCRIPTORS
DESCRIPTORS: ACHING;SHARP
DESCRIPTORS: SORE;SHARP

## 2025-02-03 ASSESSMENT — PAIN SCALES - GENERAL
PAINLEVEL_OUTOF10: 8
PAINLEVEL_OUTOF10: 7

## 2025-02-03 ASSESSMENT — PAIN - FUNCTIONAL ASSESSMENT: PAIN_FUNCTIONAL_ASSESSMENT: ACTIVITIES ARE NOT PREVENTED

## 2025-02-03 NOTE — ACP (ADVANCE CARE PLANNING)
Advance Care Planning   Healthcare Decision Maker:    Primary Decision Maker: Bryanna Holloway - Child - [phone]    Click here to complete Healthcare Decision Makers including selection of the Healthcare Decision Maker Relationship (ie \"Primary\").

## 2025-02-03 NOTE — PATIENT CARE CONFERENCE
[facility] Quality Flow/Interdisciplinary Rounds Progress Note        Quality Flow Rounds held on February 3, 2025    Disciplines Attending:  Bedside Nurse, , , and Nursing Unit Leadership    Reynold Holloway was admitted on 1/31/2025  6:15 PM    Anticipated Discharge Date:       Disposition:    Erick Score:  Erick Scale Score: 20    [facility] RISK OF UNPLANNED READMISSION 2.0             0 Total Score        Discussed patient goal for the day, patient clinical progression, and barriers to discharge.  The following Goal(s) of the Day/Commitment(s) have been identified:  Labs - Report Results      Indira Felix RN  February 3, 2025

## 2025-02-03 NOTE — PLAN OF CARE
Problem: Chronic Conditions and Co-morbidities  Goal: Patient's chronic conditions and co-morbidity symptoms are monitored and maintained or improved  2/3/2025 0059 by Sofie Jeff RN  Outcome: Progressing     Problem: Safety - Adult  Goal: Free from fall injury  2/3/2025 0059 by Sofie Jeff, RN  Outcome: Progressing     Problem: Respiratory - Adult  Goal: Achieves optimal ventilation and oxygenation  2/3/2025 0059 by Sofie Jeff, RN  Outcome: Progressing

## 2025-02-03 NOTE — CARE COORDINATION
Social Work discharge planning  Pt reports being independent with adls and ambulation. He  does have a nebulizer at home.   Pt's PCP was listed as Dr Alvarez. SW spoke to his office, who stated pt is NOT active in their practice. SW called Dr Chun's office [phone], and spoke to Mirna. She advised pt was there last February 2022.  Pt said Dr Chun doesn't take his [employer] insurance. Pt said he wants to start seeing Dr Alvarez. Spoke to Pt Access liaison, who advised Dr Alvarez is not on the list as taking new patients.   Pt asked for either Dr Paul or Dr Chicas.  Pt Access liaison Susan advised those 2 Drs are not accepting new patients at this time.  Pt will call himself, as family members go there.  Electronically signed by GEOVANY Thompson on 2/3/2025 at 4:10 PM

## 2025-02-04 LAB
BASOPHILS # BLD: 0.12 K/UL (ref 0–0.2)
BASOPHILS NFR BLD: 1 % (ref 0–2)
EOSINOPHIL # BLD: 0 K/UL (ref 0.05–0.5)
EOSINOPHILS RELATIVE PERCENT: 0 % (ref 0–6)
ERYTHROCYTE [DISTWIDTH] IN BLOOD BY AUTOMATED COUNT: 13.2 % (ref 11.5–15)
HCT VFR BLD AUTO: 49.2 % (ref 37–54)
HGB BLD-MCNC: 16.7 G/DL (ref 12.5–16.5)
IMM GRANULOCYTES # BLD AUTO: 0.9 K/UL (ref 0–0.58)
IMM GRANULOCYTES NFR BLD: 4 % (ref 0–5)
LYMPHOCYTES NFR BLD: 2.21 K/UL (ref 1.5–4)
LYMPHOCYTES RELATIVE PERCENT: 10 % (ref 20–42)
MCH RBC QN AUTO: 31.5 PG (ref 26–35)
MCHC RBC AUTO-ENTMCNC: 33.9 G/DL (ref 32–34.5)
MCV RBC AUTO: 92.8 FL (ref 80–99.9)
MONOCYTES NFR BLD: 0.62 K/UL (ref 0.1–0.95)
MONOCYTES NFR BLD: 3 % (ref 2–12)
NEUTROPHILS NFR BLD: 82 % (ref 43–80)
NEUTS SEG NFR BLD: 17.47 K/UL (ref 1.8–7.3)
PLATELET # BLD AUTO: 316 K/UL (ref 130–450)
PMV BLD AUTO: 9.4 FL (ref 7–12)
RBC # BLD AUTO: 5.3 M/UL (ref 3.8–5.8)
WBC OTHER # BLD: 21.3 K/UL (ref 4.5–11.5)

## 2025-02-04 PROCEDURE — 6370000000 HC RX 637 (ALT 250 FOR IP)

## 2025-02-04 PROCEDURE — 1200000000 HC SEMI PRIVATE

## 2025-02-04 PROCEDURE — 2500000003 HC RX 250 WO HCPCS

## 2025-02-04 PROCEDURE — 36415 COLL VENOUS BLD VENIPUNCTURE: CPT

## 2025-02-04 PROCEDURE — 6360000002 HC RX W HCPCS

## 2025-02-04 PROCEDURE — 94640 AIRWAY INHALATION TREATMENT: CPT

## 2025-02-04 PROCEDURE — 94664 DEMO&/EVAL PT USE INHALER: CPT

## 2025-02-04 PROCEDURE — 85025 COMPLETE CBC W/AUTO DIFF WBC: CPT

## 2025-02-04 PROCEDURE — 6370000000 HC RX 637 (ALT 250 FOR IP): Performed by: INTERNAL MEDICINE

## 2025-02-04 PROCEDURE — 6370000000 HC RX 637 (ALT 250 FOR IP): Performed by: NURSE PRACTITIONER

## 2025-02-04 PROCEDURE — 6360000002 HC RX W HCPCS: Performed by: INTERNAL MEDICINE

## 2025-02-04 PROCEDURE — 6360000002 HC RX W HCPCS: Performed by: NURSE PRACTITIONER

## 2025-02-04 PROCEDURE — 94669 MECHANICAL CHEST WALL OSCILL: CPT

## 2025-02-04 RX ORDER — PREDNISONE 10 MG/1
TABLET ORAL
Qty: 30 TABLET | Refills: 0 | Status: SHIPPED | OUTPATIENT
Start: 2025-02-04 | End: 2025-02-06

## 2025-02-04 RX ORDER — METOPROLOL SUCCINATE 50 MG/1
50 TABLET, EXTENDED RELEASE ORAL DAILY
Qty: 30 TABLET | Refills: 0 | Status: SHIPPED | OUTPATIENT
Start: 2025-02-05 | End: 2025-02-06

## 2025-02-04 RX ORDER — BENZONATATE 100 MG/1
200 CAPSULE ORAL 3 TIMES DAILY
Status: DISCONTINUED | OUTPATIENT
Start: 2025-02-04 | End: 2025-02-06 | Stop reason: HOSPADM

## 2025-02-04 RX ORDER — METOPROLOL SUCCINATE 50 MG/1
50 TABLET, EXTENDED RELEASE ORAL DAILY
Status: DISCONTINUED | OUTPATIENT
Start: 2025-02-04 | End: 2025-02-06 | Stop reason: HOSPADM

## 2025-02-04 RX ORDER — GUAIFENESIN 400 MG/1
400 TABLET ORAL 3 TIMES DAILY
Qty: 21 TABLET | Refills: 0 | Status: SHIPPED | OUTPATIENT
Start: 2025-02-04 | End: 2025-02-06

## 2025-02-04 RX ORDER — HYDROCODONE BITARTRATE AND ACETAMINOPHEN 5; 325 MG/1; MG/1
1 TABLET ORAL EVERY 6 HOURS PRN
Qty: 12 TABLET | Refills: 0 | Status: SHIPPED | OUTPATIENT
Start: 2025-02-04 | End: 2025-02-06

## 2025-02-04 RX ORDER — HYDROCODONE BITARTRATE AND HOMATROPINE METHYLBROMIDE ORAL SOLUTION 5; 1.5 MG/5ML; MG/5ML
5 LIQUID ORAL EVERY 4 HOURS PRN
Status: DISCONTINUED | OUTPATIENT
Start: 2025-02-04 | End: 2025-02-06 | Stop reason: HOSPADM

## 2025-02-04 RX ORDER — HYDROCODONE BITARTRATE AND ACETAMINOPHEN 5; 325 MG/1; MG/1
1 TABLET ORAL EVERY 6 HOURS PRN
Status: DISCONTINUED | OUTPATIENT
Start: 2025-02-04 | End: 2025-02-06 | Stop reason: HOSPADM

## 2025-02-04 RX ORDER — CLONIDINE HYDROCHLORIDE 0.1 MG/1
0.2 TABLET ORAL 2 TIMES DAILY
Status: DISCONTINUED | OUTPATIENT
Start: 2025-02-04 | End: 2025-02-06 | Stop reason: HOSPADM

## 2025-02-04 RX ORDER — BENZONATATE 200 MG/1
200 CAPSULE ORAL 3 TIMES DAILY
Qty: 21 CAPSULE | Refills: 0 | Status: SHIPPED | OUTPATIENT
Start: 2025-02-04 | End: 2025-02-06

## 2025-02-04 RX ORDER — GUAIFENESIN/DEXTROMETHORPHAN 100-10MG/5
10 SYRUP ORAL EVERY 4 HOURS PRN
Qty: 120 ML | Refills: 0 | Status: SHIPPED | OUTPATIENT
Start: 2025-02-04 | End: 2025-02-06

## 2025-02-04 RX ORDER — IPRATROPIUM BROMIDE AND ALBUTEROL SULFATE 2.5; .5 MG/3ML; MG/3ML
1 SOLUTION RESPIRATORY (INHALATION)
Status: DISCONTINUED | OUTPATIENT
Start: 2025-02-04 | End: 2025-02-06 | Stop reason: HOSPADM

## 2025-02-04 RX ORDER — WATER 10 ML/10ML
INJECTION INTRAMUSCULAR; INTRAVENOUS; SUBCUTANEOUS
Status: COMPLETED
Start: 2025-02-04 | End: 2025-02-04

## 2025-02-04 RX ORDER — AMLODIPINE BESYLATE 10 MG/1
10 TABLET ORAL DAILY
Status: DISCONTINUED | OUTPATIENT
Start: 2025-02-04 | End: 2025-02-06 | Stop reason: HOSPADM

## 2025-02-04 RX ADMIN — BUDESONIDE 250 MCG: 0.25 SUSPENSION RESPIRATORY (INHALATION) at 18:11

## 2025-02-04 RX ADMIN — WATER 10 ML: 1 INJECTION INTRAMUSCULAR; INTRAVENOUS; SUBCUTANEOUS at 18:42

## 2025-02-04 RX ADMIN — AMLODIPINE BESYLATE 10 MG: 10 TABLET ORAL at 11:05

## 2025-02-04 RX ADMIN — ATORVASTATIN CALCIUM 40 MG: 40 TABLET, FILM COATED ORAL at 21:04

## 2025-02-04 RX ADMIN — BENZOCAINE AND MENTHOL 1 LOZENGE: 15; 3.6 LOZENGE ORAL at 21:05

## 2025-02-04 RX ADMIN — SACUBITRIL AND VALSARTAN 1 TABLET: 49; 51 TABLET, FILM COATED ORAL at 21:05

## 2025-02-04 RX ADMIN — ENOXAPARIN SODIUM 40 MG: 100 INJECTION SUBCUTANEOUS at 09:16

## 2025-02-04 RX ADMIN — HYDROCODONE BITARTRATE AND HOMATROPINE METHYLBROMIDE 5 ML: 5; 1.5 SOLUTION ORAL at 11:05

## 2025-02-04 RX ADMIN — METOPROLOL SUCCINATE 50 MG: 50 TABLET, EXTENDED RELEASE ORAL at 09:16

## 2025-02-04 RX ADMIN — GUAIFENESIN SYRUP AND DEXTROMETHORPHAN 10 ML: 100; 10 SYRUP ORAL at 06:09

## 2025-02-04 RX ADMIN — CLOPIDOGREL BISULFATE 75 MG: 75 TABLET ORAL at 09:16

## 2025-02-04 RX ADMIN — SODIUM CHLORIDE, PRESERVATIVE FREE 10 ML: 5 INJECTION INTRAVENOUS at 21:07

## 2025-02-04 RX ADMIN — CLONIDINE HYDROCHLORIDE 0.2 MG: 0.1 TABLET ORAL at 11:05

## 2025-02-04 RX ADMIN — ASPIRIN 81 MG CHEWABLE TABLET 81 MG: 81 TABLET CHEWABLE at 09:16

## 2025-02-04 RX ADMIN — Medication 100 MG: at 09:15

## 2025-02-04 RX ADMIN — GUAIFENESIN 400 MG: 400 TABLET ORAL at 21:05

## 2025-02-04 RX ADMIN — GUAIFENESIN SYRUP AND DEXTROMETHORPHAN 10 ML: 100; 10 SYRUP ORAL at 00:59

## 2025-02-04 RX ADMIN — BENZONATATE 200 MG: 100 CAPSULE ORAL at 21:05

## 2025-02-04 RX ADMIN — BENZONATATE 200 MG: 100 CAPSULE ORAL at 15:38

## 2025-02-04 RX ADMIN — ARFORMOTEROL TARTRATE 15 MCG: 15 SOLUTION RESPIRATORY (INHALATION) at 18:11

## 2025-02-04 RX ADMIN — SODIUM CHLORIDE, PRESERVATIVE FREE 10 ML: 5 INJECTION INTRAVENOUS at 09:16

## 2025-02-04 RX ADMIN — CLONIDINE HYDROCHLORIDE 0.2 MG: 0.1 TABLET ORAL at 21:05

## 2025-02-04 RX ADMIN — PANTOPRAZOLE SODIUM 40 MG: 40 TABLET, DELAYED RELEASE ORAL at 06:09

## 2025-02-04 RX ADMIN — BENZONATATE 200 MG: 100 CAPSULE ORAL at 09:16

## 2025-02-04 RX ADMIN — GUAIFENESIN 400 MG: 400 TABLET ORAL at 15:38

## 2025-02-04 RX ADMIN — LACTULOSE 20 G: 20 SOLUTION ORAL at 17:24

## 2025-02-04 RX ADMIN — ACETAMINOPHEN 650 MG: 325 TABLET ORAL at 06:09

## 2025-02-04 RX ADMIN — IPRATROPIUM BROMIDE 0.5 MG: 0.5 SOLUTION RESPIRATORY (INHALATION) at 08:21

## 2025-02-04 RX ADMIN — BENZOCAINE AND MENTHOL 1 LOZENGE: 15; 3.6 LOZENGE ORAL at 06:09

## 2025-02-04 RX ADMIN — FOLIC ACID 1 MG: 1 TABLET ORAL at 09:16

## 2025-02-04 RX ADMIN — SENNOSIDES 8.6 MG: 8.6 TABLET, COATED ORAL at 21:05

## 2025-02-04 RX ADMIN — METHYLPREDNISOLONE SODIUM SUCCINATE 40 MG: 40 INJECTION INTRAMUSCULAR; INTRAVENOUS at 18:42

## 2025-02-04 RX ADMIN — IPRATROPIUM BROMIDE AND ALBUTEROL SULFATE 1 DOSE: 2.5; .5 SOLUTION RESPIRATORY (INHALATION) at 18:12

## 2025-02-04 RX ADMIN — METHYLPREDNISOLONE SODIUM SUCCINATE 40 MG: 40 INJECTION INTRAMUSCULAR; INTRAVENOUS at 06:09

## 2025-02-04 RX ADMIN — IPRATROPIUM BROMIDE AND ALBUTEROL SULFATE 1 DOSE: 2.5; .5 SOLUTION RESPIRATORY (INHALATION) at 15:36

## 2025-02-04 RX ADMIN — LACTULOSE 20 G: 20 SOLUTION ORAL at 11:05

## 2025-02-04 RX ADMIN — ARFORMOTEROL TARTRATE 15 MCG: 15 SOLUTION RESPIRATORY (INHALATION) at 08:21

## 2025-02-04 RX ADMIN — FINASTERIDE 5 MG: 5 TABLET, FILM COATED ORAL at 09:16

## 2025-02-04 RX ADMIN — BENZONATATE 100 MG: 100 CAPSULE ORAL at 00:59

## 2025-02-04 RX ADMIN — SACUBITRIL AND VALSARTAN 1 TABLET: 49; 51 TABLET, FILM COATED ORAL at 09:15

## 2025-02-04 RX ADMIN — GUAIFENESIN 400 MG: 400 TABLET ORAL at 09:15

## 2025-02-04 RX ADMIN — HYDROCODONE BITARTRATE AND ACETAMINOPHEN 1 TABLET: 5; 325 TABLET ORAL at 09:15

## 2025-02-04 RX ADMIN — RANOLAZINE 500 MG: 500 TABLET, FILM COATED, EXTENDED RELEASE ORAL at 21:04

## 2025-02-04 RX ADMIN — RANOLAZINE 500 MG: 500 TABLET, FILM COATED, EXTENDED RELEASE ORAL at 09:15

## 2025-02-04 RX ADMIN — BUDESONIDE 250 MCG: 0.25 SUSPENSION RESPIRATORY (INHALATION) at 08:21

## 2025-02-04 NOTE — PATIENT CARE CONFERENCE
[facility] Quality Flow/Interdisciplinary Rounds Progress Note        Quality Flow Rounds held on February 4, 2025    Disciplines Attending:  Bedside Nurse, , , and Nursing Unit Leadership    Reynold Holloway was admitted on 1/31/2025  6:15 PM    Anticipated Discharge Date:       Disposition:    Erick Score:  Erick Scale Score: 21    BS RISK OF UNPLANNED READMISSION 2.0             13.7 Total Score        Discussed patient goal for the day, patient clinical progression, and barriers to discharge.  The following Goal(s) of the Day/Commitment(s) have been identified:  Labs - Report Results      Indira Felix RN  February 4, 2025         no

## 2025-02-05 ENCOUNTER — ANESTHESIA EVENT (OUTPATIENT)
Dept: ENDOSCOPY | Age: 60
DRG: 191 | End: 2025-02-05
Payer: COMMERCIAL

## 2025-02-05 ENCOUNTER — ANESTHESIA (OUTPATIENT)
Dept: ENDOSCOPY | Age: 60
DRG: 191 | End: 2025-02-05
Payer: COMMERCIAL

## 2025-02-05 LAB
BASOPHILS # BLD: 0 K/UL (ref 0–0.2)
BASOPHILS NFR BLD: 0 % (ref 0–2)
EOSINOPHIL # BLD: 0 K/UL (ref 0.05–0.5)
EOSINOPHILS RELATIVE PERCENT: 0 % (ref 0–6)
ERYTHROCYTE [DISTWIDTH] IN BLOOD BY AUTOMATED COUNT: 13.3 % (ref 11.5–15)
HCT VFR BLD AUTO: 45.3 % (ref 37–54)
HGB BLD-MCNC: 14.9 G/DL (ref 12.5–16.5)
LYMPHOCYTES NFR BLD: 1.73 K/UL (ref 1.5–4)
LYMPHOCYTES RELATIVE PERCENT: 8 % (ref 20–42)
MCH RBC QN AUTO: 30.8 PG (ref 26–35)
MCHC RBC AUTO-ENTMCNC: 32.9 G/DL (ref 32–34.5)
MCV RBC AUTO: 93.8 FL (ref 80–99.9)
MONOCYTES NFR BLD: 1.73 K/UL (ref 0.1–0.95)
MONOCYTES NFR BLD: 8 % (ref 2–12)
NEUTROPHILS NFR BLD: 84 % (ref 43–80)
NEUTS SEG NFR BLD: 18.64 K/UL (ref 1.8–7.3)
PLATELET # BLD AUTO: 312 K/UL (ref 130–450)
PMV BLD AUTO: 9.8 FL (ref 7–12)
RBC # BLD AUTO: 4.83 M/UL (ref 3.8–5.8)
RBC # BLD: NORMAL 10*6/UL
WBC OTHER # BLD: 22.1 K/UL (ref 4.5–11.5)

## 2025-02-05 PROCEDURE — 94640 AIRWAY INHALATION TREATMENT: CPT

## 2025-02-05 PROCEDURE — 6370000000 HC RX 637 (ALT 250 FOR IP): Performed by: INTERNAL MEDICINE

## 2025-02-05 PROCEDURE — 6360000002 HC RX W HCPCS: Performed by: INTERNAL MEDICINE

## 2025-02-05 PROCEDURE — 87102 FUNGUS ISOLATION CULTURE: CPT

## 2025-02-05 PROCEDURE — 87305 ASPERGILLUS AG IA: CPT

## 2025-02-05 PROCEDURE — 2709999900 HC NON-CHARGEABLE SUPPLY: Performed by: INTERNAL MEDICINE

## 2025-02-05 PROCEDURE — 2500000003 HC RX 250 WO HCPCS

## 2025-02-05 PROCEDURE — 94669 MECHANICAL CHEST WALL OSCILL: CPT

## 2025-02-05 PROCEDURE — 6370000000 HC RX 637 (ALT 250 FOR IP)

## 2025-02-05 PROCEDURE — 87077 CULTURE AEROBIC IDENTIFY: CPT

## 2025-02-05 PROCEDURE — 36415 COLL VENOUS BLD VENIPUNCTURE: CPT

## 2025-02-05 PROCEDURE — 1200000000 HC SEMI PRIVATE

## 2025-02-05 PROCEDURE — 87205 SMEAR GRAM STAIN: CPT

## 2025-02-05 PROCEDURE — 2580000003 HC RX 258

## 2025-02-05 PROCEDURE — 6370000000 HC RX 637 (ALT 250 FOR IP): Performed by: NURSE PRACTITIONER

## 2025-02-05 PROCEDURE — 2500000003 HC RX 250 WO HCPCS: Performed by: INTERNAL MEDICINE

## 2025-02-05 PROCEDURE — 7100000010 HC PHASE II RECOVERY - FIRST 15 MIN: Performed by: INTERNAL MEDICINE

## 2025-02-05 PROCEDURE — 3700000000 HC ANESTHESIA ATTENDED CARE: Performed by: INTERNAL MEDICINE

## 2025-02-05 PROCEDURE — 3609027000 HC BRONCHOSCOPY: Performed by: INTERNAL MEDICINE

## 2025-02-05 PROCEDURE — 0B978ZZ DRAINAGE OF LEFT MAIN BRONCHUS, VIA NATURAL OR ARTIFICIAL OPENING ENDOSCOPIC: ICD-10-PCS | Performed by: INTERNAL MEDICINE

## 2025-02-05 PROCEDURE — 6360000002 HC RX W HCPCS: Performed by: NURSE PRACTITIONER

## 2025-02-05 PROCEDURE — 2700000000 HC OXYGEN THERAPY PER DAY

## 2025-02-05 PROCEDURE — 6360000002 HC RX W HCPCS

## 2025-02-05 PROCEDURE — 87106 FUNGI IDENTIFICATION YEAST: CPT

## 2025-02-05 PROCEDURE — 87070 CULTURE OTHR SPECIMN AEROBIC: CPT

## 2025-02-05 PROCEDURE — 7100000011 HC PHASE II RECOVERY - ADDTL 15 MIN: Performed by: INTERNAL MEDICINE

## 2025-02-05 PROCEDURE — 3700000001 HC ADD 15 MINUTES (ANESTHESIA): Performed by: INTERNAL MEDICINE

## 2025-02-05 PROCEDURE — 85025 COMPLETE CBC W/AUTO DIFF WBC: CPT

## 2025-02-05 RX ORDER — LIDOCAINE HYDROCHLORIDE 20 MG/ML
INJECTION, SOLUTION INFILTRATION; PERINEURAL PRN
Status: DISCONTINUED | OUTPATIENT
Start: 2025-02-05 | End: 2025-02-05 | Stop reason: ALTCHOICE

## 2025-02-05 RX ORDER — LIDOCAINE HYDROCHLORIDE 20 MG/ML
INJECTION, SOLUTION INFILTRATION; PERINEURAL
Status: DISCONTINUED | OUTPATIENT
Start: 2025-02-05 | End: 2025-02-05 | Stop reason: SDUPTHER

## 2025-02-05 RX ORDER — GLYCOPYRROLATE 0.2 MG/ML
INJECTION INTRAMUSCULAR; INTRAVENOUS
Status: DISCONTINUED | OUTPATIENT
Start: 2025-02-05 | End: 2025-02-05 | Stop reason: SDUPTHER

## 2025-02-05 RX ORDER — SODIUM CHLORIDE 9 MG/ML
INJECTION, SOLUTION INTRAVENOUS
Status: DISCONTINUED | OUTPATIENT
Start: 2025-02-05 | End: 2025-02-05 | Stop reason: SDUPTHER

## 2025-02-05 RX ORDER — PROPOFOL 10 MG/ML
INJECTION, EMULSION INTRAVENOUS
Status: DISCONTINUED | OUTPATIENT
Start: 2025-02-05 | End: 2025-02-05 | Stop reason: SDUPTHER

## 2025-02-05 RX ADMIN — BUDESONIDE 250 MCG: 0.25 SUSPENSION RESPIRATORY (INHALATION) at 09:48

## 2025-02-05 RX ADMIN — GUAIFENESIN 400 MG: 400 TABLET ORAL at 15:32

## 2025-02-05 RX ADMIN — LIDOCAINE HYDROCHLORIDE 100 MG: 20 INJECTION, SOLUTION INFILTRATION; PERINEURAL at 13:26

## 2025-02-05 RX ADMIN — BENZONATATE 200 MG: 100 CAPSULE ORAL at 19:58

## 2025-02-05 RX ADMIN — ARFORMOTEROL TARTRATE 15 MCG: 15 SOLUTION RESPIRATORY (INHALATION) at 09:48

## 2025-02-05 RX ADMIN — ATORVASTATIN CALCIUM 40 MG: 40 TABLET, FILM COATED ORAL at 19:58

## 2025-02-05 RX ADMIN — BUDESONIDE 250 MCG: 0.25 SUSPENSION RESPIRATORY (INHALATION) at 21:42

## 2025-02-05 RX ADMIN — GUAIFENESIN 400 MG: 400 TABLET ORAL at 19:58

## 2025-02-05 RX ADMIN — RANOLAZINE 500 MG: 500 TABLET, FILM COATED, EXTENDED RELEASE ORAL at 19:59

## 2025-02-05 RX ADMIN — IPRATROPIUM BROMIDE AND ALBUTEROL SULFATE 1 DOSE: 2.5; .5 SOLUTION RESPIRATORY (INHALATION) at 17:03

## 2025-02-05 RX ADMIN — IPRATROPIUM BROMIDE AND ALBUTEROL SULFATE 1 DOSE: 2.5; .5 SOLUTION RESPIRATORY (INHALATION) at 21:42

## 2025-02-05 RX ADMIN — LACTULOSE 20 G: 20 SOLUTION ORAL at 20:07

## 2025-02-05 RX ADMIN — METOPROLOL SUCCINATE 50 MG: 50 TABLET, EXTENDED RELEASE ORAL at 08:52

## 2025-02-05 RX ADMIN — SODIUM CHLORIDE, PRESERVATIVE FREE 10 ML: 5 INJECTION INTRAVENOUS at 17:33

## 2025-02-05 RX ADMIN — BENZONATATE 200 MG: 100 CAPSULE ORAL at 15:32

## 2025-02-05 RX ADMIN — CLONIDINE HYDROCHLORIDE 0.2 MG: 0.1 TABLET ORAL at 08:52

## 2025-02-05 RX ADMIN — SODIUM CHLORIDE, PRESERVATIVE FREE 10 ML: 5 INJECTION INTRAVENOUS at 19:59

## 2025-02-05 RX ADMIN — METHYLPREDNISOLONE SODIUM SUCCINATE 40 MG: 40 INJECTION INTRAMUSCULAR; INTRAVENOUS at 17:33

## 2025-02-05 RX ADMIN — SODIUM CHLORIDE: 9 INJECTION, SOLUTION INTRAVENOUS at 13:15

## 2025-02-05 RX ADMIN — SENNOSIDES 8.6 MG: 8.6 TABLET, COATED ORAL at 17:38

## 2025-02-05 RX ADMIN — SODIUM CHLORIDE, PRESERVATIVE FREE 10 ML: 5 INJECTION INTRAVENOUS at 08:53

## 2025-02-05 RX ADMIN — CLONIDINE HYDROCHLORIDE 0.2 MG: 0.1 TABLET ORAL at 19:59

## 2025-02-05 RX ADMIN — METHYLPREDNISOLONE SODIUM SUCCINATE 40 MG: 40 INJECTION INTRAMUSCULAR; INTRAVENOUS at 05:53

## 2025-02-05 RX ADMIN — GLYCOPYRROLATE 0.2 MG: 0.2 INJECTION, SOLUTION INTRAMUSCULAR; INTRAVENOUS at 13:24

## 2025-02-05 RX ADMIN — AMLODIPINE BESYLATE 10 MG: 10 TABLET ORAL at 08:52

## 2025-02-05 RX ADMIN — SACUBITRIL AND VALSARTAN 1 TABLET: 49; 51 TABLET, FILM COATED ORAL at 19:58

## 2025-02-05 RX ADMIN — ARFORMOTEROL TARTRATE 15 MCG: 15 SOLUTION RESPIRATORY (INHALATION) at 21:42

## 2025-02-05 RX ADMIN — PROPOFOL 220 MG: 10 INJECTION, EMULSION INTRAVENOUS at 13:26

## 2025-02-05 RX ADMIN — IPRATROPIUM BROMIDE AND ALBUTEROL SULFATE 1 DOSE: 2.5; .5 SOLUTION RESPIRATORY (INHALATION) at 09:48

## 2025-02-05 RX ADMIN — HYDROCODONE BITARTRATE AND ACETAMINOPHEN 1 TABLET: 5; 325 TABLET ORAL at 19:08

## 2025-02-05 ASSESSMENT — PAIN SCALES - WONG BAKER: WONGBAKER_NUMERICALRESPONSE: HURTS A LITTLE BIT

## 2025-02-05 ASSESSMENT — PAIN SCALES - GENERAL
PAINLEVEL_OUTOF10: 5
PAINLEVEL_OUTOF10: 6

## 2025-02-05 ASSESSMENT — LIFESTYLE VARIABLES: SMOKING_STATUS: 1

## 2025-02-05 ASSESSMENT — PAIN DESCRIPTION - LOCATION: LOCATION: BACK

## 2025-02-05 ASSESSMENT — PAIN DESCRIPTION - DESCRIPTORS: DESCRIPTORS: ACHING;DISCOMFORT

## 2025-02-05 ASSESSMENT — PAIN - FUNCTIONAL ASSESSMENT: PAIN_FUNCTIONAL_ASSESSMENT: ACTIVITIES ARE NOT PREVENTED

## 2025-02-05 ASSESSMENT — ENCOUNTER SYMPTOMS: SHORTNESS OF BREATH: 1

## 2025-02-05 ASSESSMENT — PAIN DESCRIPTION - ORIENTATION: ORIENTATION: RIGHT

## 2025-02-05 NOTE — ANESTHESIA POSTPROCEDURE EVALUATION
Department of Anesthesiology  Postprocedure Note    Patient: Reynold Holloway  MRN: [id number]  YOB: 1965  Date of evaluation: 2/5/2025    Procedure Summary       Date: 02/05/25 Room / Location: [place] [address] / [facility]    Anesthesia Start: 1324 Anesthesia Stop: 1340    Procedure: BRONCHOSCOPY WITH WASHING Diagnosis:       Chronic obstructive pulmonary disease, unspecified COPD type (HCC)      Pneumonia due to infectious organism, unspecified laterality, unspecified part of lung      (Chronic obstructive pulmonary disease, unspecified COPD type (HCC) [J44.9])      (Pneumonia due to infectious organism, unspecified laterality, unspecified part of lung [J18.9])    Surgeons: Steve Zhang MD Responsible Provider: Love Ash DO    Anesthesia Type: MAC ASA Status: 4            Anesthesia Type: No value filed.    Joe Phase I:      Joe Phase II:      Anesthesia Post Evaluation    Patient location during evaluation: PACU  Patient participation: complete - patient participated  Level of consciousness: awake and alert  Nausea & Vomiting: no vomiting and no nausea  Cardiovascular status: hemodynamically stable  Respiratory status: acceptable and spontaneous ventilation  Hydration status: stable  Pain management: adequate    No notable events documented.

## 2025-02-05 NOTE — ANESTHESIA PRE PROCEDURE
Department of Anesthesiology  Preprocedure Note       Name:  Reynold Holloway   Age:  59 y.o.  :  1965                                          MRN:  [id number]         Date:  2025      Surgeon: Surgeon(s):  Steve Zhang MD    Procedure: Procedure(s):  BRONCHOSCOPY WITH WASHING    Medications prior to admission:   Prior to Admission medications    Medication Sig Start Date End Date Taking? Authorizing Provider   metoprolol succinate (TOPROL XL) 50 MG extended release tablet Take 1 tablet by mouth daily 25  Yes Chuyita Uribe APRN - CNP   benzonatate (TESSALON) 200 MG capsule Take 1 capsule by mouth in the morning, at noon, and at bedtime for 7 days 25 Yes Chuyita Uribe APRN - CNP   guaiFENesin 400 MG tablet Take 1 tablet by mouth in the morning, at noon, and at bedtime for 7 days 25 Yes Chuyita Uribe APRN - CNP   guaiFENesin-dextromethorphan (ROBITUSSIN DM) 100-10 MG/5ML syrup Take 10 mLs by mouth every 4 hours as needed for Cough 25 Yes Chuyita Uribe APRN - CNP   predniSONE (DELTASONE) 10 MG tablet 4 tabs x 3 days, then 3 tabs x 3 days, then 2 tabs x 3 days, then 1 tab x 3 days. 25  Yes Chuyita Uribe APRN - CNP   HYDROcodone-acetaminophen (NORCO) 5-325 MG per tablet Take 1 tablet by mouth every 6 hours as needed for Pain for up to 3 days. Max Daily Amount: 4 tablets 25 Yes Chuyita Uribe APRN - CNP   amLODIPine (NORVASC) 10 MG tablet Take 1 tablet by mouth daily   Yes ProviderFransisca MD   cloNIDine (CATAPRES) 0.2 MG tablet Take 1 tablet by mouth 2 times daily   Yes ProviderFransisca MD   spironolactone (ALDACTONE) 25 MG tablet Take 1 tablet by mouth daily   Yes Fransisca Mccall MD   torsemide (DEMADEX) 10 MG tablet Take 1 tablet by mouth daily   Yes Fransisca Mccall MD   ranolazine (RANEXA) 500 MG extended release tablet Take 1 tablet by mouth 2 times daily 3/2/23  Yes Joel Chun, DO   pantoprazole (PROTONIX)

## 2025-02-06 VITALS
HEART RATE: 82 BPM | TEMPERATURE: 98 F | WEIGHT: 211.64 LBS | HEIGHT: 67 IN | OXYGEN SATURATION: 96 % | RESPIRATION RATE: 18 BRPM | DIASTOLIC BLOOD PRESSURE: 70 MMHG | BODY MASS INDEX: 33.22 KG/M2 | SYSTOLIC BLOOD PRESSURE: 136 MMHG

## 2025-02-06 LAB
ALBUMIN SERPL-MCNC: 3.5 G/DL (ref 3.5–5.2)
ALP SERPL-CCNC: 71 U/L (ref 40–129)
ALT SERPL-CCNC: 13 U/L (ref 0–40)
ANION GAP SERPL CALCULATED.3IONS-SCNC: 14 MMOL/L (ref 7–16)
AST SERPL-CCNC: 14 U/L (ref 0–39)
BASOPHILS # BLD: 0 K/UL (ref 0–0.2)
BASOPHILS NFR BLD: 0 % (ref 0–2)
BILIRUB SERPL-MCNC: 0.2 MG/DL (ref 0–1.2)
BUN SERPL-MCNC: 35 MG/DL (ref 6–23)
CALCIUM SERPL-MCNC: 8.6 MG/DL (ref 8.6–10.2)
CHLORIDE SERPL-SCNC: 99 MMOL/L (ref 98–107)
CO2 SERPL-SCNC: 20 MMOL/L (ref 22–29)
CREAT SERPL-MCNC: 1.4 MG/DL (ref 0.7–1.2)
EKG ATRIAL RATE: 81 BPM
EKG P AXIS: 19 DEGREES
EKG P-R INTERVAL: 126 MS
EKG Q-T INTERVAL: 390 MS
EKG QRS DURATION: 94 MS
EKG QTC CALCULATION (BAZETT): 453 MS
EKG T AXIS: 32 DEGREES
EKG VENTRICULAR RATE: 81 BPM
EOSINOPHIL # BLD: 0 K/UL (ref 0.05–0.5)
EOSINOPHILS RELATIVE PERCENT: 0 % (ref 0–6)
ERYTHROCYTE [DISTWIDTH] IN BLOOD BY AUTOMATED COUNT: 13.3 % (ref 11.5–15)
GFR, ESTIMATED: 59 ML/MIN/1.73M2
GLUCOSE SERPL-MCNC: 118 MG/DL (ref 74–99)
HCT VFR BLD AUTO: 43.1 % (ref 37–54)
HGB BLD-MCNC: 14.5 G/DL (ref 12.5–16.5)
LYMPHOCYTES NFR BLD: 3.8 K/UL (ref 1.5–4)
LYMPHOCYTES RELATIVE PERCENT: 13 % (ref 20–42)
MCH RBC QN AUTO: 31.2 PG (ref 26–35)
MCHC RBC AUTO-ENTMCNC: 33.6 G/DL (ref 32–34.5)
MCV RBC AUTO: 92.7 FL (ref 80–99.9)
METAMYELOCYTES ABSOLUTE COUNT: 0.29 K/UL (ref 0–0.12)
METAMYELOCYTES: 1 % (ref 0–1)
MONOCYTES NFR BLD: 1.75 K/UL (ref 0.1–0.95)
MONOCYTES NFR BLD: 6 % (ref 2–12)
MYELOCYTES ABSOLUTE COUNT: 0.88 K/UL
MYELOCYTES: 3 %
NEUTROPHILS NFR BLD: 77 % (ref 43–80)
NEUTS SEG NFR BLD: 22.48 K/UL (ref 1.8–7.3)
PLATELET # BLD AUTO: 314 K/UL (ref 130–450)
PLATELET ESTIMATE: ABNORMAL
PMV BLD AUTO: 9.5 FL (ref 7–12)
POTASSIUM SERPL-SCNC: 5 MMOL/L (ref 3.5–5)
PROT SERPL-MCNC: 7.2 G/DL (ref 6.4–8.3)
RBC # BLD AUTO: 4.65 M/UL (ref 3.8–5.8)
RBC # BLD: ABNORMAL 10*6/UL
SODIUM SERPL-SCNC: 133 MMOL/L (ref 132–146)
WBC # BLD: ABNORMAL 10*3/UL
WBC OTHER # BLD: 29.2 K/UL (ref 4.5–11.5)

## 2025-02-06 PROCEDURE — 36415 COLL VENOUS BLD VENIPUNCTURE: CPT

## 2025-02-06 PROCEDURE — 94669 MECHANICAL CHEST WALL OSCILL: CPT

## 2025-02-06 PROCEDURE — 6370000000 HC RX 637 (ALT 250 FOR IP): Performed by: INTERNAL MEDICINE

## 2025-02-06 PROCEDURE — 94640 AIRWAY INHALATION TREATMENT: CPT

## 2025-02-06 PROCEDURE — 6370000000 HC RX 637 (ALT 250 FOR IP): Performed by: NURSE PRACTITIONER

## 2025-02-06 PROCEDURE — 6360000002 HC RX W HCPCS: Performed by: INTERNAL MEDICINE

## 2025-02-06 PROCEDURE — 6360000002 HC RX W HCPCS: Performed by: NURSE PRACTITIONER

## 2025-02-06 PROCEDURE — 80053 COMPREHEN METABOLIC PANEL: CPT

## 2025-02-06 PROCEDURE — 85025 COMPLETE CBC W/AUTO DIFF WBC: CPT

## 2025-02-06 PROCEDURE — 2500000003 HC RX 250 WO HCPCS: Performed by: INTERNAL MEDICINE

## 2025-02-06 RX ORDER — NYSTATIN 100000 [USP'U]/ML
5 SUSPENSION ORAL 4 TIMES DAILY
Qty: 473 ML | Refills: 0 | Status: SHIPPED | OUTPATIENT
Start: 2025-02-06

## 2025-02-06 RX ORDER — METOPROLOL SUCCINATE 50 MG/1
50 TABLET, EXTENDED RELEASE ORAL DAILY
Qty: 30 TABLET | Refills: 0 | Status: SHIPPED | OUTPATIENT
Start: 2025-02-06

## 2025-02-06 RX ORDER — NYSTATIN 100000 [USP'U]/ML
5 SUSPENSION ORAL 4 TIMES DAILY
Status: DISCONTINUED | OUTPATIENT
Start: 2025-02-06 | End: 2025-02-06 | Stop reason: HOSPADM

## 2025-02-06 RX ORDER — PREDNISONE 10 MG/1
TABLET ORAL
Qty: 30 TABLET | Refills: 0 | Status: SHIPPED | OUTPATIENT
Start: 2025-02-06

## 2025-02-06 RX ORDER — HYDROCODONE BITARTRATE AND ACETAMINOPHEN 5; 325 MG/1; MG/1
1 TABLET ORAL EVERY 6 HOURS PRN
Qty: 12 TABLET | Refills: 0 | Status: SHIPPED | OUTPATIENT
Start: 2025-02-06 | End: 2025-02-09

## 2025-02-06 RX ORDER — GUAIFENESIN 400 MG/1
400 TABLET ORAL 3 TIMES DAILY
Qty: 21 TABLET | Refills: 0 | Status: SHIPPED | OUTPATIENT
Start: 2025-02-06 | End: 2025-02-13

## 2025-02-06 RX ORDER — NYSTATIN 100000 [USP'U]/ML
5 SUSPENSION ORAL 4 TIMES DAILY
Qty: 473 ML | Refills: 0 | Status: SHIPPED | OUTPATIENT
Start: 2025-02-06 | End: 2025-02-06

## 2025-02-06 RX ORDER — GUAIFENESIN/DEXTROMETHORPHAN 100-10MG/5
10 SYRUP ORAL EVERY 4 HOURS PRN
Qty: 120 ML | Refills: 0 | Status: SHIPPED | OUTPATIENT
Start: 2025-02-06 | End: 2025-02-16

## 2025-02-06 RX ORDER — BENZONATATE 200 MG/1
200 CAPSULE ORAL 3 TIMES DAILY
Qty: 21 CAPSULE | Refills: 0 | Status: SHIPPED | OUTPATIENT
Start: 2025-02-06 | End: 2025-02-13

## 2025-02-06 RX ADMIN — Medication 100 MG: at 10:01

## 2025-02-06 RX ADMIN — GUAIFENESIN 400 MG: 400 TABLET ORAL at 10:01

## 2025-02-06 RX ADMIN — CLONIDINE HYDROCHLORIDE 0.2 MG: 0.1 TABLET ORAL at 10:01

## 2025-02-06 RX ADMIN — NYSTATIN 500000 UNITS: 100000 SUSPENSION ORAL at 17:00

## 2025-02-06 RX ADMIN — NYSTATIN 500000 UNITS: 100000 SUSPENSION ORAL at 10:01

## 2025-02-06 RX ADMIN — TORSEMIDE 10 MG: 10 TABLET ORAL at 10:00

## 2025-02-06 RX ADMIN — HYDROCODONE BITARTRATE AND ACETAMINOPHEN 1 TABLET: 5; 325 TABLET ORAL at 04:39

## 2025-02-06 RX ADMIN — AMOXICILLIN AND CLAVULANATE POTASSIUM 1 TABLET: 875; 125 TABLET, FILM COATED ORAL at 10:09

## 2025-02-06 RX ADMIN — ARFORMOTEROL TARTRATE 15 MCG: 15 SOLUTION RESPIRATORY (INHALATION) at 08:36

## 2025-02-06 RX ADMIN — BENZONATATE 200 MG: 100 CAPSULE ORAL at 13:32

## 2025-02-06 RX ADMIN — NYSTATIN 500000 UNITS: 100000 SUSPENSION ORAL at 00:50

## 2025-02-06 RX ADMIN — SODIUM CHLORIDE, PRESERVATIVE FREE 10 ML: 5 INJECTION INTRAVENOUS at 10:01

## 2025-02-06 RX ADMIN — METHYLPREDNISOLONE SODIUM SUCCINATE 40 MG: 40 INJECTION INTRAMUSCULAR; INTRAVENOUS at 05:49

## 2025-02-06 RX ADMIN — METOPROLOL SUCCINATE 50 MG: 50 TABLET, EXTENDED RELEASE ORAL at 10:01

## 2025-02-06 RX ADMIN — NYSTATIN 500000 UNITS: 100000 SUSPENSION ORAL at 13:32

## 2025-02-06 RX ADMIN — FINASTERIDE 5 MG: 5 TABLET, FILM COATED ORAL at 10:00

## 2025-02-06 RX ADMIN — HYDROCODONE BITARTRATE AND ACETAMINOPHEN 1 TABLET: 5; 325 TABLET ORAL at 13:32

## 2025-02-06 RX ADMIN — AMLODIPINE BESYLATE 10 MG: 10 TABLET ORAL at 10:01

## 2025-02-06 RX ADMIN — BENZONATATE 200 MG: 100 CAPSULE ORAL at 10:00

## 2025-02-06 RX ADMIN — ENOXAPARIN SODIUM 40 MG: 100 INJECTION SUBCUTANEOUS at 10:00

## 2025-02-06 RX ADMIN — METHYLPREDNISOLONE SODIUM SUCCINATE 40 MG: 40 INJECTION INTRAMUSCULAR; INTRAVENOUS at 18:14

## 2025-02-06 RX ADMIN — FOLIC ACID 1 MG: 1 TABLET ORAL at 10:01

## 2025-02-06 RX ADMIN — IPRATROPIUM BROMIDE AND ALBUTEROL SULFATE 1 DOSE: 2.5; .5 SOLUTION RESPIRATORY (INHALATION) at 12:23

## 2025-02-06 RX ADMIN — CLOPIDOGREL BISULFATE 75 MG: 75 TABLET ORAL at 10:01

## 2025-02-06 RX ADMIN — ASPIRIN 81 MG CHEWABLE TABLET 81 MG: 81 TABLET CHEWABLE at 10:00

## 2025-02-06 RX ADMIN — IPRATROPIUM BROMIDE AND ALBUTEROL SULFATE 1 DOSE: 2.5; .5 SOLUTION RESPIRATORY (INHALATION) at 08:36

## 2025-02-06 RX ADMIN — PANTOPRAZOLE SODIUM 40 MG: 40 TABLET, DELAYED RELEASE ORAL at 05:49

## 2025-02-06 RX ADMIN — BUDESONIDE 250 MCG: 0.25 SUSPENSION RESPIRATORY (INHALATION) at 08:36

## 2025-02-06 RX ADMIN — GUAIFENESIN 400 MG: 400 TABLET ORAL at 13:32

## 2025-02-06 RX ADMIN — SACUBITRIL AND VALSARTAN 1 TABLET: 49; 51 TABLET, FILM COATED ORAL at 10:00

## 2025-02-06 RX ADMIN — RANOLAZINE 500 MG: 500 TABLET, FILM COATED, EXTENDED RELEASE ORAL at 10:01

## 2025-02-06 RX ADMIN — MAGNESIUM HYDROXIDE 30 ML: 400 SUSPENSION ORAL at 04:39

## 2025-02-06 RX ADMIN — IPRATROPIUM BROMIDE AND ALBUTEROL SULFATE 1 DOSE: 2.5; .5 SOLUTION RESPIRATORY (INHALATION) at 16:27

## 2025-02-06 ASSESSMENT — PAIN DESCRIPTION - LOCATION
LOCATION: BACK
LOCATION: BACK

## 2025-02-06 ASSESSMENT — PAIN DESCRIPTION - FREQUENCY: FREQUENCY: CONTINUOUS

## 2025-02-06 ASSESSMENT — PAIN DESCRIPTION - DESCRIPTORS
DESCRIPTORS: ACHING;DISCOMFORT;SORE
DESCRIPTORS: ACHING

## 2025-02-06 ASSESSMENT — PAIN DESCRIPTION - PAIN TYPE: TYPE: CHRONIC PAIN

## 2025-02-06 ASSESSMENT — PAIN DESCRIPTION - ORIENTATION
ORIENTATION: LOWER
ORIENTATION: LOWER

## 2025-02-06 ASSESSMENT — PAIN DESCRIPTION - ONSET: ONSET: ON-GOING

## 2025-02-06 ASSESSMENT — PAIN SCALES - GENERAL
PAINLEVEL_OUTOF10: 6
PAINLEVEL_OUTOF10: 6

## 2025-02-06 NOTE — DISCHARGE SUMMARY
Internal Medicine Discharge Summary    NAME: Reynold Holloway :  1965  MRN:  [id number] PCP:No primary care provider on file.    ADMITTED: 2025   DISCHARGED: 2025  6:27 PM    ADMITTING PHYSICIAN: Duke Perry DO    PCP: No primary care provider on file.    CONSULTANT(S):   IP CONSULT TO HOSPITALIST  IP CONSULT TO PULMONOLOGY     ADMITTING DIAGNOSIS:   Acute upper respiratory infection [J06.9]  COPD exacerbation (HCC) [J44.1]     Please see H&P for further details    DISCHARGE DIAGNOSES:   Active Hospital Problems    Diagnosis     Coronavirus infection [B34.2]      Priority: Medium    Chronic combined systolic and diastolic congestive heart failure (HCC) [I50.42]      Priority: Medium    Lacunar stroke (HCC) [I63.81]     Stage 3a chronic kidney disease (CKD) (HCC) [N18.31]     COPD exacerbation (HCC) [J44.1]     ALBERTO on CPAP [G47.33]     Essential hypertension [I10]     Hyperlipidemia [E78.5]        BRIEF HISTORY OF PRESENT ILLNESS: Reynold Holloway is a 59 y.o. male patient of No primary care provider on file. who  has a past medical history of Alcohol abuse, CHF (congestive heart failure) (HCC), COPD (chronic obstructive pulmonary disease) (HCC), History of cocaine use, Hyperlipidemia, Hypertension, Marijuana use, MI (myocardial infarction) (HCC), and alberto. who originally had concerns including Cold Symptoms. at presentation on 2025, and was found to have Acute upper respiratory infection [J06.9]  COPD exacerbation (HCC) [J44.1] after workup.    Please see H&P for further details.    HOSPITAL COURSE:   The patient presented to the hospital with the chief complaint of Cold Symptoms  . The patient was admitted to the hospital.     Acute COPD Exacerbation in the setting of Coronavirus OC  CXR noted  Viral respiratory panel noted  Continue bronchodilators  Continue steroids -- taper as per Pulmonary  Tessalon/Guaifenesin TID scheduled  S/p bronchoscopy  -- noted increased white secretions;

## 2025-02-06 NOTE — PLAN OF CARE
Problem: Chronic Conditions and Co-morbidities  Goal: Patient's chronic conditions and co-morbidity symptoms are monitored and maintained or improved  2/6/2025 1126 by Jyotsna Judd RN  Outcome: Progressing     Problem: Safety - Adult  Goal: Free from fall injury  2/6/2025 1126 by Jyotsna Judd RN  Outcome: Progressing     Problem: Respiratory - Adult  Goal: Achieves optimal ventilation and oxygenation  2/6/2025 1126 by Jyotsna Judd RN  Outcome: Progressing     Problem: Pain  Goal: Verbalizes/displays adequate comfort level or baseline comfort level  Outcome: Progressing     Problem: Discharge Planning  Goal: Discharge to home or other facility with appropriate resources  Outcome: Progressing

## 2025-02-06 NOTE — PLAN OF CARE
Problem: Chronic Conditions and Co-morbidities  Goal: Patient's chronic conditions and co-morbidity symptoms are monitored and maintained or improved  2/6/2025 0053 by Enzo Barrett RN  Outcome: Progressing  2/5/2025 1120 by Maria Luisa Braswell RN  Outcome: Progressing     Problem: Safety - Adult  Goal: Free from fall injury  2/6/2025 0053 by Enzo Barrett RN  Outcome: Progressing  2/5/2025 1120 by Maria Luisa Braswell RN  Outcome: Progressing     Problem: Respiratory - Adult  Goal: Achieves optimal ventilation and oxygenation  2/6/2025 0053 by Enzo Barrett RN  Outcome: Progressing  2/5/2025 1120 by Maria Luisa Braswell RN  Outcome: Progressing

## 2025-02-06 NOTE — PATIENT CARE CONFERENCE
[facility] Quality Flow/Interdisciplinary Rounds Progress Note        Quality Flow Rounds held on February 6, 2025    Disciplines Attending:  Bedside Nurse, , , and Nursing Unit Leadership    Reynold Holloway was admitted on 1/31/2025  6:15 PM    Anticipated Discharge Date:       Disposition:    Erick Score:  Erick Scale Score: 22    BS RISK OF UNPLANNED READMISSION 2.0             10.6 Total Score        Discussed patient goal for the day, patient clinical progression, and barriers to discharge.  The following Goal(s) of the Day/Commitment(s) have been identified:  Discharge - Obtain Order and Diagnostics - Report Results      Zuleyka Greenberg RN  February 6, 2025

## 2025-02-07 NOTE — PROGRESS NOTES
Pulmonary Progress Note    Admit Date: 2025  Hospital day                               PCP: No primary care provider on file.    Chief Complaint (s):  Patient Active Problem List   Diagnosis    CTS (carpal tunnel syndrome)    Cervical arthritis    Essential hypertension    Hyperlipidemia    INOCENCIA on CPAP    Community acquired bacterial pneumonia    CAP (community acquired pneumonia) due to Chlamydia species    Acute respiratory failure with hypoxia    COPD exacerbation (HCC)    Precordial chest pain    Unstable angina (HCC)    Respiratory distress    Shortness of breath    Chronic combined systolic and diastolic congestive heart failure (HCC)    Congestive heart failure (HCC)    Primary hypertension    Lacunar stroke (HCC)    Stage 3a chronic kidney disease (CKD) (HCC)    Stroke-like symptoms    Cocaine abuse (HCC)    Coronavirus infection       Subjective:  Patient is seen on room air asleep on couch. Looks comfortable.       Vitals:  VITALS:  BP (!) 138/96   Pulse 68   Temp 97.7 °F (36.5 °C) (Oral)   Resp 16   Ht 1.702 m (5' 7\")   Wt 96 kg (211 lb 10.3 oz)   SpO2 94%   BMI 33.15 kg/m²     24HR INTAKE/OUTPUT:      Intake/Output Summary (Last 24 hours) at 2025 1140  Last data filed at 2025 1230  Gross per 24 hour   Intake 480 ml   Output --   Net 480 ml       24HR PULSE OXIMETRY RANGE:    SpO2  Av.3 %  Min: 94 %  Max: 97 %    Medications:  IV:   sodium chloride         Scheduled Meds:   benzonatate  200 mg Oral TID    metoprolol succinate  50 mg Oral Daily    amLODIPine  10 mg Oral Daily    cloNIDine  0.2 mg Oral BID    ipratropium 0.5 mg-albuterol 2.5 mg  1 Dose Inhalation Q4H WA RT    methylPREDNISolone  40 mg IntraVENous Q12H    guaiFENesin  400 mg Oral TID    lidocaine  1 patch TransDERmal Daily    budesonide  0.25 mg Nebulization BID RT    And    arformoterol tartrate  15 mcg Nebulization BID RT    clopidogrel  75 mg Oral Daily    sacubitril-valsartan  1 tablet Oral BID    
         Pulmonary Progress Note    Admit Date: 2025  Hospital day                               PCP: No primary care provider on file.    Chief Complaint (s):  Patient Active Problem List   Diagnosis    CTS (carpal tunnel syndrome)    Cervical arthritis    Essential hypertension    Hyperlipidemia    INOCENCIA on CPAP    Community acquired bacterial pneumonia    CAP (community acquired pneumonia) due to Chlamydia species    Acute respiratory failure with hypoxia    COPD exacerbation (HCC)    Precordial chest pain    Unstable angina (HCC)    Respiratory distress    Shortness of breath    Chronic combined systolic and diastolic congestive heart failure (HCC)    Congestive heart failure (HCC)    Primary hypertension    Lacunar stroke (HCC)    Stage 3a chronic kidney disease (CKD) (HCC)    Stroke-like symptoms    Cocaine abuse (HCC)    Coronavirus infection       Subjective:  Patient is seen on room air. He is bronchospastic. Cough is productive of tan, gray, black secretions. He feels exhausted from the cough.       Vitals:  VITALS:  BP (!) 140/98   Pulse 88   Temp 97.5 °F (36.4 °C) (Oral)   Resp 16   Ht 1.702 m (5' 7\")   Wt 96 kg (211 lb 10.3 oz)   SpO2 94%   BMI 33.15 kg/m²     24HR INTAKE/OUTPUT:    No intake or output data in the 24 hours ending 25 1200    24HR PULSE OXIMETRY RANGE:    SpO2  Av.7 %  Min: 94 %  Max: 96 %    Medications:  IV:   sodium chloride         Scheduled Meds:   benzonatate  200 mg Oral TID    metoprolol succinate  50 mg Oral Daily    amLODIPine  10 mg Oral Daily    cloNIDine  0.2 mg Oral BID    methylPREDNISolone  40 mg IntraVENous Q12H    guaiFENesin  400 mg Oral TID    lidocaine  1 patch TransDERmal Daily    budesonide  0.25 mg Nebulization BID RT    And    arformoterol tartrate  15 mcg Nebulization BID RT    And    ipratropium  0.5 mg Nebulization Q6H WA RT    clopidogrel  75 mg Oral Daily    sacubitril-valsartan  1 tablet Oral BID    aspirin  81 mg Oral Daily    
  [facility]  [address], [address]  [place] [address]      Pulmonary Progress Note      SUBJECTIVE:  claims feeling slightly better with breathing, still with cough with minimal sputum production, on RA, ambulating some and tolerating better but still sob.    OBJECTIVE    Medications    Continuous Infusions:   sodium chloride 100 mL/hr at 25 1018    sodium chloride         Scheduled Meds:   budesonide  0.25 mg Nebulization BID RT    And    arformoterol tartrate  15 mcg Nebulization BID RT    And    ipratropium  0.5 mg Nebulization Q6H WA RT    methylPREDNISolone  60 mg IntraVENous Daily    clopidogrel  75 mg Oral Daily    [Held by provider] sacubitril-valsartan  1 tablet Oral BID    aspirin  81 mg Oral Daily    atorvastatin  40 mg Oral Nightly    finasteride  5 mg Oral Daily    folic acid  1 mg Oral Daily    metoprolol succinate  25 mg Oral Daily    nicotine  1 patch TransDERmal Daily    pantoprazole  40 mg Oral QAM AC    ranolazine  500 mg Oral BID    thiamine  100 mg Oral Daily    [Held by provider] torsemide  10 mg Oral Daily    sodium chloride flush  10 mL IntraVENous 2 times per day    enoxaparin  40 mg SubCUTAneous Daily       PRN Meds:acetaminophen, benzonatate, guaiFENesin-dextromethorphan, albuterol, nitroGLYCERIN, sodium chloride flush, sodium chloride, potassium chloride **OR** potassium alternative oral replacement **OR** potassium chloride, senna, ipratropium 0.5 mg-albuterol 2.5 mg    Physical    VITALS:  /72   Pulse 86   Temp 98.2 °F (36.8 °C) (Oral)   Resp 18   Ht 1.702 m (5' 7\")   Wt 94.4 kg (208 lb 3.2 oz)   SpO2 94%   BMI 32.61 kg/m²     24HR INTAKE/OUTPUT:      Intake/Output Summary (Last 24 hours) at 2025 1529  Last data filed at 2025 1452  Gross per 24 hour   Intake 480 ml   Output --   Net 480 ml       24HR PULSE OXIMETRY RANGE:    SpO2  Av.3 %  Min: 92 %  Max: 94 %    General appearance: 
  [facility]  [address], [address]  [place] [address]      Pulmonary Progress Note      SUBJECTIVE:  claims feeling slightly better with breathing, still with cough with min-mod sputum production, on RA sitting up in chair, didn't get started on EZPAP yet.    OBJECTIVE    Medications    Continuous Infusions:   sodium chloride         Scheduled Meds:   methylPREDNISolone  40 mg IntraVENous Q12H    guaiFENesin  400 mg Oral TID    budesonide  0.25 mg Nebulization BID RT    And    arformoterol tartrate  15 mcg Nebulization BID RT    And    ipratropium  0.5 mg Nebulization Q6H WA RT    clopidogrel  75 mg Oral Daily    sacubitril-valsartan  1 tablet Oral BID    aspirin  81 mg Oral Daily    atorvastatin  40 mg Oral Nightly    finasteride  5 mg Oral Daily    folic acid  1 mg Oral Daily    metoprolol succinate  25 mg Oral Daily    nicotine  1 patch TransDERmal Daily    pantoprazole  40 mg Oral QAM AC    ranolazine  500 mg Oral BID    thiamine  100 mg Oral Daily    [Held by provider] torsemide  10 mg Oral Daily    sodium chloride flush  10 mL IntraVENous 2 times per day    enoxaparin  40 mg SubCUTAneous Daily       PRN Meds:acetaminophen, benzonatate, lactulose, bisacodyl, benzocaine-menthol, guaiFENesin-dextromethorphan, albuterol, nitroGLYCERIN, sodium chloride flush, sodium chloride, potassium chloride **OR** potassium alternative oral replacement **OR** potassium chloride, senna, ipratropium 0.5 mg-albuterol 2.5 mg    Physical    VITALS:  BP (!) 150/100   Pulse 78   Temp 97.9 °F (36.6 °C) (Oral)   Resp 18   Ht 1.702 m (5' 7\")   Wt 94.4 kg (208 lb 3.2 oz)   SpO2 92%   BMI 32.61 kg/m²     24HR INTAKE/OUTPUT:      Intake/Output Summary (Last 24 hours) at 2/3/2025 1314  Last data filed at 2025 1452  Gross per 24 hour   Intake 240 ml   Output --   Net 240 ml       24HR PULSE OXIMETRY RANGE:    SpO2  Av.5 %  Min: 92 %  Max: 93 %    General 
4 Eyes Skin Assessment     NAME:  Reynold Holloway  YOB: 1965  MEDICAL RECORD NUMBER:  [id number]    The patient is being assessed for  Admission    I agree that at least one RN has performed a thorough Head to Toe Skin Assessment on the patient. ALL assessment sites listed below have been assessed.      Areas assessed by both nurses:    Head, Face, Ears, Shoulders, Back, Chest, Arms, Elbows, Hands, Sacrum. Buttock, Coccyx, Ischium, Legs. Feet and Heels, and Under Medical Devices         Does the Patient have a Wound? No noted wound(s)       Erick Prevention initiated by RN: No  Wound Care Orders initiated by RN: No    Pressure Injury (Stage 3,4, Unstageable, DTI, NWPT, and Complex wounds) if present, place Wound referral order by RN under : No    New Ostomies, if present place, Ostomy referral order under : No     Nurse 1 eSignature: Electronically signed by Enzo Barrett RN on 2/1/25 at 4:32 AM EST    **SHARE this note so that the co-signing nurse can place an eSignature**    Nurse 2 eSignature: Electronically signed by Chuyita Magdaleno RN on 2/1/25 at 4:55 AM EST    
CLINICAL PHARMACY NOTE: MEDS TO BEDS    Total # of Prescriptions Filled: 8   The following medications were delivered to the patient:  Norco 5/325  Prednisone 10mg tabs  Guaifenesin 400mg tabs  Robitussin  Augmentin 875mg tabs  Metoprolol er succ 50mg tabs  Benzonatate 200mg caps  Nystatin [id number]/ml syrup    Additional Documentation:  To pt  
Clinical manager notified of bronch for tomorrow  
Internal Medicine Progress Note    Patient's name: Reynold Holloway  : 1965  Chief complaints (on day of admission): Cold Symptoms  Admission date: 2025  Date of service: 2/3/2025   Room: [address] SURG  Primary care physician: Pedro Alvarez III, DO  Reason for visit: Follow-up for COPD exacerbation    Subjective  Reynold is seen lying in bed awake and alert, in no distress.  He reports that he does feel little bit better although still not back to himself.  He continues to report that cough, bringing up some green sputum.  He reports feeling fevered and somewhat chilled but no reported fevers documented.  He denies any nausea or vomiting.  No other issues or concerns from nursing.    Review of Systems  Full 10 point review of systems negative unless mentioned above.    Hospital Medications  Current Facility-Administered Medications   Medication Dose Route Frequency Provider Last Rate Last Admin    methylPREDNISolone sodium succ (SOLU-MEDROL) injection 40 mg  40 mg IntraVENous Q12H Chuyita Uribe APRN - CNP   40 mg at 25 0828    guaiFENesin tablet 400 mg  400 mg Oral TID Chuyita Uribe APRN - CNP   400 mg at 25 0828    acetaminophen (TYLENOL) tablet 650 mg  650 mg Oral Q4H PRN Paz Su APRN - CNP   650 mg at 25 0240    benzonatate (TESSALON) capsule 100 mg  100 mg Oral TID PRN Paz Su APRN - CNP   100 mg at 25 0828    lactulose (CHRONULAC) 10 GM/15ML solution 20 g  20 g Oral TID PRN Simi Haile MD   20 g at 25 1615    bisacodyl (DULCOLAX) suppository 10 mg  10 mg Rectal PRN Simi Haile MD        benzocaine-menthol (CEPACOL SORE THROAT) lozenge 1 lozenge  1 lozenge Oral Q2H PRN Simi Haile MD   1 lozenge at 25 0218    budesonide (PULMICORT) nebulizer suspension 250 mcg  0.25 mg Nebulization BID RT Duke Perry DO   250 mcg at 25 0819    And    arformoterol tartrate (BROVANA) nebulizer solution 15 mcg  15 mcg 
Internal Medicine Progress Note    Patient's name: Reynold Holloway  : 1965  Chief complaints (on day of admission): Cold Symptoms  Admission date: 2025  Date of service: 2025   Room: [address]  Primary care physician: No primary care provider on file.  Reason for visit: Follow-up for COPD exacerbation    Subjective  Reynold is seen lying in bed awake and alert, seems tired but more comfortable today.  He reports that he still has a cough although not bringing up as much sputum.  He reports that his throat is very sore, asking for a mouth swab since he is n.p.o.  He denies any fever or chills.  Denies any nausea or vomiting.  Plan is for bronchoscopy this afternoon.  No other issues or concerns from nursing.    Review of Systems  Full 10 point review of systems negative unless mentioned above.    Hospital Medications  Current Facility-Administered Medications   Medication Dose Route Frequency Provider Last Rate Last Admin    benzonatate (TESSALON) capsule 200 mg  200 mg Oral TID Chuyita Uribe APRN - CNP   200 mg at 25 210    HYDROcodone-acetaminophen (NORCO) 5-325 MG per tablet 1 tablet  1 tablet Oral Q6H PRN Chuyita Uribe APRN - CNP   1 tablet at 25 0915    metoprolol succinate (TOPROL XL) extended release tablet 50 mg  50 mg Oral Daily Chuyita Uribe APRN - CNP   50 mg at 25 0916    HYDROcodone homatropine (HYCODAN) 5-1.5 MG/5ML solution 5 mL  5 mL Oral Q4H PRN Duke Perry DO   5 mL at 25 1105    amLODIPine (NORVASC) tablet 10 mg  10 mg Oral Daily Chuyita Uribe APRN - CNP   10 mg at 25 1105    cloNIDine (CATAPRES) tablet 0.2 mg  0.2 mg Oral BID Chuyita Uribe APRN - CNP   0.2 mg at 25    ipratropium 0.5 mg-albuterol 2.5 mg (DUONEB) nebulizer solution 1 Dose  1 Dose Inhalation Q4H WA RT Shannan Rao APRN - NP   1 Dose at 25    methylPREDNISolone sodium succ (SOLU-MEDROL) injection 40 mg  40 mg IntraVENous Q12H Rony 
Internal Medicine Progress Note    Patient's name: Reynold Holloway  : 1965  Chief complaints (on day of admission): Cold Symptoms  Admission date: 2025  Date of service: 2025   Room: [address]  Primary care physician: Pedro Alvarez III, DO  Reason for visit: Follow-up for COPD exacerbation    Subjective  Reynold was seen and examined sitting up in bed eating breakfast. He tells me he is feeling crappy. He states his cough is really bothering him and he sometimes feels like he cannot breathe. Will add tessalon. Patient was seen on room air and in NAD upon my exam.     Review of Systems  There are no new complaints of chest pain, shortness of breath, abdominal pain, nausea, vomiting, diarrhea, constipation.    Hospital Medications  Current Facility-Administered Medications   Medication Dose Route Frequency Provider Last Rate Last Admin    acetaminophen (TYLENOL) tablet 650 mg  650 mg Oral Q4H PRN Paz Su APRN - CNP   650 mg at 25 0240    budesonide (PULMICORT) nebulizer suspension 250 mcg  0.25 mg Nebulization BID RT Duke Perry, DO   250 mcg at 25    And    arformoterol tartrate (BROVANA) nebulizer solution 15 mcg  15 mcg Nebulization BID RT Duke Perry, DO   15 mcg at 25    And    ipratropium (ATROVENT) 0.02 % nebulizer solution 0.5 mg  0.5 mg Nebulization Q6H WA RT Duke Perry, DO   0.5 mg at 25 1226    guaiFENesin-dextromethorphan (ROBITUSSIN DM) 100-10 MG/5ML syrup 10 mL  10 mL Oral Q4H PRN Simi Haile MD   10 mL at 25 0434    methylPREDNISolone sodium succ (SOLU-MEDROL) injection 60 mg  60 mg IntraVENous Daily Steve Zhang MD        clopidogrel (PLAVIX) tablet 75 mg  75 mg Oral Daily Indira Grimm APRN - CNP   75 mg at 25 0953    [Held by provider] sacubitril-valsartan (ENTRESTO) 49-51 MG per tablet 1 tablet  1 tablet Oral BID Selway, Indira, APRN - CNP        albuterol (PROVENTIL) (2.5 MG/3ML) 0.083% nebulizer 
Patient NPO unitl 1530 post bronch  
Pulmonology consult called out to answering service. Awaiting call back from covering pulmonologist Dr. Wayne. Electronically signed by Enzo Barrett RN on 2/1/2025 at 5:58 AM    
Spiritual Health History and Assessment/Progress Note  [facility]    Attempted Encounter,  ,  ,      Name: Reynold Holloway MRN: [id number]    Age: 59 y.o.     Sex: male   Language: English   Roman Catholic: [place] [employer]   COPD exacerbation (HCC)     Date: 2/1/2025                           Spiritual Assessment began in [address] MED SURG        Referral/Consult From: Rounding   Encounter Overview/Reason: Attempted Encounter  Service Provided For: Patient    [place], Belief, Meaning:   Patient unable to assess at this time  Family/Friends No family/friends present      Importance and Influence:  Patient unable to assess at this time  Family/Friends No family/friends present    Community:  Patient Unable to assess. Patient was sleeping.   prayed a silent prayer for the patient and left devotional material and information about  services.  Family/Friends No family/friends present    Assessment and Plan of Care:     Patient Interventions include: Unable to assess. Patient was sleeping.   prayed a silent prayer for the patient and left devotional material and information about  services.  Family/Friends Interventions include: No family/friends present    Patient Plan of Care: Spiritual Care available upon further referral  Family/Friends Plan of Care: No family/friends present    Electronically signed by Chaplain Edwige on 2/1/2025 at 1:57 PM   
discharge today or tomorrow if remains stable      Time at the bedside, reviewing labs and radiographs, reviewing notes and consultations, discussing with staff and family was more than 50 minutes.      Thanks for letting us see this patient in consultation.  Please contact us with any questions. Office [phone] or after hours through [employer] [phone].    
study.    Assessment   Active Hospital Problems    Diagnosis     Coronavirus infection [B34.2]      Priority: Medium    Chronic combined systolic and diastolic congestive heart failure (HCC) [I50.42]      Priority: Medium    Lacunar stroke ([facility]) [I63.81]     Stage 3a chronic kidney disease (CKD) ([facility]) [N18.31]     COPD exacerbation ([facility]) [J44.1]     INOCENCIA on CPAP [G47.33]     Essential hypertension [I10]     Hyperlipidemia [E78.5]          Plan  Acute COPD Exacerbation in the setting of Coronavirus OC  CXR noted  Viral respiratory panel noted  Continue bronchodilators  Continue steroids -- taper as per Pulmonary  Tessalon/Guaifenesin TID scheduled  S/p bronchoscopy 2/5 -- noted increased white secretions; cultures sent and pending  Encourage IS and increased activity  Pulmonary consult appreciated     Leukocytosis  Likely related to steroids  Remains afebrile at this time  Continue to monitor WBC for now -- still trending up this AM  Await cultures from bronchoscopy -- will discuss with Pulmonary if need to consider adding antibiotics     Oral Thrush  Continue Nystatin S&S    Constipation  Ongoing issue  Bowel regimen added 2/5  Will give MOM x 1 this AM    Chronic Kidney Disease Stage 3a  Baseline serum creatinine around 1.5-1.7  Serum creatinine peaked at 1.9  S/p IV fluids with improvement  Entresto and Demadex have been resumed as renal function back to baseline  Monitor BMP     Chronic Systolic Congestive Heart Failure  Most recent echocardiogram noted to have improved EF to 52%  Continue Toprol XL, Entresto and Demadex  Monitor edema and I&O     Essential Hypertension  Continue current medications, Toprol XL adjusted slightly 2/4  Monitor BP trends and adjust as indicated    Hyperlipidemia  Continue statin    Questionable sleep apnea  States he had a sleep study but never received machine  Outpatient evaluation upon discharge    Back Pain  Likely related to coughing  Continue Norco prn for now -- reports 
unless indicated in my editing of the note. I have reviewed and edited the note above based on my findings during my history, exam, and decision making on the same day of service.     My additional thoughts:   He was seen and examined in his room sitting up in a chair  Breath sounds are improved but still with rhonchi  He is still on room air  He coughs so much he hurt his back  Pulmonology considering bronchoscopy according to the patient  Continue bronchodilators and steroids  Increased cough medication    Electronically signed by Duke Perry DO on 2/4/2025 at 9:43 AM    I can be reached through [employer]

## 2025-02-08 LAB
GALACTOMANNAN AG BAL QL: NEGATIVE
GALACTOMANNAN AG SPEC IA-ACNC: 0.13

## 2025-02-09 LAB
MICROORGANISM SPEC CULT: ABNORMAL
MICROORGANISM SPEC CULT: NORMAL
MICROORGANISM/AGENT SPEC: ABNORMAL
MICROORGANISM/AGENT SPEC: NORMAL
SERVICE CMNT-IMP: ABNORMAL
SERVICE CMNT-IMP: NORMAL
SPECIMEN DESCRIPTION: ABNORMAL
SPECIMEN DESCRIPTION: NORMAL

## 2025-02-12 ENCOUNTER — APPOINTMENT (OUTPATIENT)
Dept: GENERAL RADIOLOGY | Age: 60
End: 2025-02-12
Payer: COMMERCIAL

## 2025-02-12 ENCOUNTER — APPOINTMENT (OUTPATIENT)
Dept: CT IMAGING | Age: 60
End: 2025-02-12
Payer: COMMERCIAL

## 2025-02-12 ENCOUNTER — HOSPITAL ENCOUNTER (EMERGENCY)
Age: 60
Discharge: HOME | End: 2025-02-12
Attending: EMERGENCY MEDICINE
Payer: COMMERCIAL

## 2025-02-12 VITALS
WEIGHT: 205 LBS | DIASTOLIC BLOOD PRESSURE: 97 MMHG | HEART RATE: 97 BPM | HEIGHT: 67 IN | BODY MASS INDEX: 32.18 KG/M2 | SYSTOLIC BLOOD PRESSURE: 127 MMHG | RESPIRATION RATE: 22 BRPM | TEMPERATURE: 99 F | OXYGEN SATURATION: 95 %

## 2025-02-12 DIAGNOSIS — J44.1 ACUTE EXACERBATION OF CHRONIC OBSTRUCTIVE PULMONARY DISEASE (COPD) (HCC): Primary | ICD-10-CM

## 2025-02-12 LAB
ALBUMIN SERPL-MCNC: 3.4 G/DL (ref 3.5–5.2)
ALP SERPL-CCNC: 76 U/L (ref 40–129)
ALT SERPL-CCNC: 26 U/L (ref 0–40)
ANION GAP SERPL CALCULATED.3IONS-SCNC: 14 MMOL/L (ref 7–16)
AST SERPL-CCNC: 16 U/L (ref 0–39)
B PARAP IS1001 DNA NPH QL NAA+NON-PROBE: NOT DETECTED
B PERT DNA SPEC QL NAA+PROBE: NOT DETECTED
BASOPHILS # BLD: 0.05 K/UL (ref 0–0.2)
BASOPHILS NFR BLD: 0 % (ref 0–2)
BILIRUB SERPL-MCNC: 0.6 MG/DL (ref 0–1.2)
BILIRUB UR QL STRIP: NEGATIVE
BNP SERPL-MCNC: 2492 PG/ML (ref 0–125)
BUN SERPL-MCNC: 32 MG/DL (ref 6–23)
C PNEUM DNA NPH QL NAA+NON-PROBE: NOT DETECTED
CALCIUM SERPL-MCNC: 8.7 MG/DL (ref 8.6–10.2)
CHLORIDE SERPL-SCNC: 97 MMOL/L (ref 98–107)
CLARITY UR: CLEAR
CO2 SERPL-SCNC: 21 MMOL/L (ref 22–29)
COLOR UR: YELLOW
CREAT SERPL-MCNC: 1.5 MG/DL (ref 0.7–1.2)
EKG ATRIAL RATE: 108 BPM
EKG P AXIS: 32 DEGREES
EKG P-R INTERVAL: 116 MS
EKG Q-T INTERVAL: 344 MS
EKG QRS DURATION: 78 MS
EKG QTC CALCULATION (BAZETT): 460 MS
EKG R AXIS: -5 DEGREES
EKG T AXIS: 101 DEGREES
EKG VENTRICULAR RATE: 108 BPM
EOSINOPHIL # BLD: 0.15 K/UL (ref 0.05–0.5)
EOSINOPHILS RELATIVE PERCENT: 1 % (ref 0–6)
ERYTHROCYTE [DISTWIDTH] IN BLOOD BY AUTOMATED COUNT: 13.9 % (ref 11.5–15)
FLUAV RNA NPH QL NAA+NON-PROBE: NOT DETECTED
FLUBV RNA NPH QL NAA+NON-PROBE: NOT DETECTED
GFR, ESTIMATED: 53 ML/MIN/1.73M2
GLUCOSE SERPL-MCNC: 112 MG/DL (ref 74–99)
GLUCOSE UR STRIP-MCNC: NEGATIVE MG/DL
HADV DNA NPH QL NAA+NON-PROBE: NOT DETECTED
HCOV 229E RNA NPH QL NAA+NON-PROBE: NOT DETECTED
HCOV HKU1 RNA NPH QL NAA+NON-PROBE: NOT DETECTED
HCOV NL63 RNA NPH QL NAA+NON-PROBE: NOT DETECTED
HCOV OC43 RNA NPH QL NAA+NON-PROBE: NOT DETECTED
HCT VFR BLD AUTO: 44.3 % (ref 37–54)
HGB BLD-MCNC: 15.1 G/DL (ref 12.5–16.5)
HGB UR QL STRIP.AUTO: NEGATIVE
HMPV RNA NPH QL NAA+NON-PROBE: NOT DETECTED
HPIV1 RNA NPH QL NAA+NON-PROBE: NOT DETECTED
HPIV2 RNA NPH QL NAA+NON-PROBE: NOT DETECTED
HPIV3 RNA NPH QL NAA+NON-PROBE: NOT DETECTED
HPIV4 RNA NPH QL NAA+NON-PROBE: NOT DETECTED
IMM GRANULOCYTES # BLD AUTO: 0.84 K/UL (ref 0–0.58)
IMM GRANULOCYTES NFR BLD: 3 % (ref 0–5)
KETONES UR STRIP-MCNC: NEGATIVE MG/DL
LACTATE BLDV-SCNC: 1.6 MMOL/L (ref 0.5–1.9)
LEUKOCYTE ESTERASE UR QL STRIP: NEGATIVE
LYMPHOCYTES NFR BLD: 4.04 K/UL (ref 1.5–4)
LYMPHOCYTES RELATIVE PERCENT: 16 % (ref 20–42)
M PNEUMO DNA NPH QL NAA+NON-PROBE: NOT DETECTED
MCH RBC QN AUTO: 31.7 PG (ref 26–35)
MCHC RBC AUTO-ENTMCNC: 34.1 G/DL (ref 32–34.5)
MCV RBC AUTO: 92.9 FL (ref 80–99.9)
MICROORGANISM SPEC CULT: ABNORMAL
MICROORGANISM/AGENT SPEC: ABNORMAL
MONOCYTES NFR BLD: 1.98 K/UL (ref 0.1–0.95)
MONOCYTES NFR BLD: 8 % (ref 2–12)
NEUTROPHILS NFR BLD: 72 % (ref 43–80)
NEUTS SEG NFR BLD: 18.08 K/UL (ref 1.8–7.3)
NITRITE UR QL STRIP: NEGATIVE
PH UR STRIP: 7.5 [PH] (ref 5–8)
PLATELET # BLD AUTO: 272 K/UL (ref 130–450)
PMV BLD AUTO: 9.4 FL (ref 7–12)
POTASSIUM SERPL-SCNC: 4.7 MMOL/L (ref 3.5–5)
PROT SERPL-MCNC: 7.1 G/DL (ref 6.4–8.3)
PROT UR STRIP-MCNC: ABNORMAL MG/DL
RBC # BLD AUTO: 4.77 M/UL (ref 3.8–5.8)
RBC # BLD: NORMAL 10*6/UL
RBC #/AREA URNS HPF: ABNORMAL /HPF
RSV RNA NPH QL NAA+NON-PROBE: NOT DETECTED
RV+EV RNA NPH QL NAA+NON-PROBE: NOT DETECTED
SARS-COV-2 RNA NPH QL NAA+NON-PROBE: NOT DETECTED
SERVICE CMNT-IMP: ABNORMAL
SODIUM SERPL-SCNC: 132 MMOL/L (ref 132–146)
SP GR UR STRIP: 1.01 (ref 1–1.03)
SPECIMEN DESCRIPTION: ABNORMAL
SPECIMEN DESCRIPTION: NORMAL
TROPONIN I SERPL HS-MCNC: 24 NG/L (ref 0–11)
TROPONIN I SERPL HS-MCNC: 29 NG/L (ref 0–11)
UROBILINOGEN UR STRIP-ACNC: 2 EU/DL (ref 0–1)
WBC #/AREA URNS HPF: ABNORMAL /HPF
WBC OTHER # BLD: 25.1 K/UL (ref 4.5–11.5)

## 2025-02-12 PROCEDURE — 84484 ASSAY OF TROPONIN QUANT: CPT

## 2025-02-12 PROCEDURE — 99285 EMERGENCY DEPT VISIT HI MDM: CPT

## 2025-02-12 PROCEDURE — 94664 DEMO&/EVAL PT USE INHALER: CPT

## 2025-02-12 PROCEDURE — 71275 CT ANGIOGRAPHY CHEST: CPT

## 2025-02-12 PROCEDURE — 93005 ELECTROCARDIOGRAM TRACING: CPT | Performed by: EMERGENCY MEDICINE

## 2025-02-12 PROCEDURE — 6370000000 HC RX 637 (ALT 250 FOR IP)

## 2025-02-12 PROCEDURE — 93010 ELECTROCARDIOGRAM REPORT: CPT | Performed by: INTERNAL MEDICINE

## 2025-02-12 PROCEDURE — 85025 COMPLETE CBC W/AUTO DIFF WBC: CPT

## 2025-02-12 PROCEDURE — 2580000003 HC RX 258

## 2025-02-12 PROCEDURE — 87040 BLOOD CULTURE FOR BACTERIA: CPT

## 2025-02-12 PROCEDURE — 0202U NFCT DS 22 TRGT SARS-COV-2: CPT

## 2025-02-12 PROCEDURE — 6360000004 HC RX CONTRAST MEDICATION: Performed by: RADIOLOGY

## 2025-02-12 PROCEDURE — 96374 THER/PROPH/DIAG INJ IV PUSH: CPT

## 2025-02-12 PROCEDURE — 96375 TX/PRO/DX INJ NEW DRUG ADDON: CPT

## 2025-02-12 PROCEDURE — 83880 ASSAY OF NATRIURETIC PEPTIDE: CPT

## 2025-02-12 PROCEDURE — 80053 COMPREHEN METABOLIC PANEL: CPT

## 2025-02-12 PROCEDURE — 94640 AIRWAY INHALATION TREATMENT: CPT

## 2025-02-12 PROCEDURE — 83605 ASSAY OF LACTIC ACID: CPT

## 2025-02-12 PROCEDURE — 6360000002 HC RX W HCPCS

## 2025-02-12 PROCEDURE — 2500000003 HC RX 250 WO HCPCS

## 2025-02-12 PROCEDURE — 81001 URINALYSIS AUTO W/SCOPE: CPT

## 2025-02-12 PROCEDURE — 71046 X-RAY EXAM CHEST 2 VIEWS: CPT

## 2025-02-12 RX ORDER — IPRATROPIUM BROMIDE AND ALBUTEROL SULFATE 2.5; .5 MG/3ML; MG/3ML
3 SOLUTION RESPIRATORY (INHALATION) ONCE
Status: COMPLETED | OUTPATIENT
Start: 2025-02-12 | End: 2025-02-12

## 2025-02-12 RX ORDER — IOPAMIDOL 755 MG/ML
75 INJECTION, SOLUTION INTRAVASCULAR
Status: COMPLETED | OUTPATIENT
Start: 2025-02-12 | End: 2025-02-12

## 2025-02-12 RX ORDER — SODIUM CHLORIDE 9 MG/ML
INJECTION, SOLUTION INTRAVENOUS PRN
Status: DISCONTINUED | OUTPATIENT
Start: 2025-02-12 | End: 2025-02-12 | Stop reason: HOSPADM

## 2025-02-12 RX ORDER — DOXYCYCLINE HYCLATE 100 MG
100 TABLET ORAL 2 TIMES DAILY
Qty: 14 TABLET | Refills: 0 | Status: SHIPPED | OUTPATIENT
Start: 2025-02-12 | End: 2025-02-19

## 2025-02-12 RX ORDER — ACETAMINOPHEN 500 MG
1000 TABLET ORAL ONCE
Status: COMPLETED | OUTPATIENT
Start: 2025-02-12 | End: 2025-02-12

## 2025-02-12 RX ORDER — 0.9 % SODIUM CHLORIDE 0.9 %
30 INTRAVENOUS SOLUTION INTRAVENOUS ONCE
Status: COMPLETED | OUTPATIENT
Start: 2025-02-12 | End: 2025-02-12

## 2025-02-12 RX ORDER — METHYLPREDNISOLONE SODIUM SUCCINATE 125 MG/2ML
125 INJECTION INTRAMUSCULAR; INTRAVENOUS ONCE
Status: COMPLETED | OUTPATIENT
Start: 2025-02-12 | End: 2025-02-12

## 2025-02-12 RX ORDER — SODIUM CHLORIDE 0.9 % (FLUSH) 0.9 %
5-40 SYRINGE (ML) INJECTION EVERY 12 HOURS SCHEDULED
Status: DISCONTINUED | OUTPATIENT
Start: 2025-02-12 | End: 2025-02-12 | Stop reason: HOSPADM

## 2025-02-12 RX ORDER — SODIUM CHLORIDE 0.9 % (FLUSH) 0.9 %
5-40 SYRINGE (ML) INJECTION PRN
Status: DISCONTINUED | OUTPATIENT
Start: 2025-02-12 | End: 2025-02-12 | Stop reason: HOSPADM

## 2025-02-12 RX ADMIN — METHYLPREDNISOLONE SODIUM SUCCINATE 125 MG: 125 INJECTION INTRAMUSCULAR; INTRAVENOUS at 09:19

## 2025-02-12 RX ADMIN — SODIUM CHLORIDE, PRESERVATIVE FREE 10 ML: 5 INJECTION INTRAVENOUS at 09:19

## 2025-02-12 RX ADMIN — SODIUM CHLORIDE 1983 ML: 9 INJECTION, SOLUTION INTRAVENOUS at 09:19

## 2025-02-12 RX ADMIN — ACETAMINOPHEN 1000 MG: 500 TABLET ORAL at 09:18

## 2025-02-12 RX ADMIN — WATER 2000 MG: 1 INJECTION INTRAMUSCULAR; INTRAVENOUS; SUBCUTANEOUS at 10:21

## 2025-02-12 RX ADMIN — IPRATROPIUM BROMIDE AND ALBUTEROL SULFATE 3 DOSE: 2.5; .5 SOLUTION RESPIRATORY (INHALATION) at 09:38

## 2025-02-12 RX ADMIN — IOPAMIDOL 75 ML: 755 INJECTION, SOLUTION INTRAVENOUS at 11:09

## 2025-02-12 ASSESSMENT — LIFESTYLE VARIABLES
HOW OFTEN DO YOU HAVE A DRINK CONTAINING ALCOHOL: NEVER
HOW MANY STANDARD DRINKS CONTAINING ALCOHOL DO YOU HAVE ON A TYPICAL DAY: PATIENT DOES NOT DRINK

## 2025-02-12 ASSESSMENT — PAIN SCALES - GENERAL
PAINLEVEL_OUTOF10: 10
PAINLEVEL_OUTOF10: 10

## 2025-02-12 NOTE — DISCHARGE INSTRUCTIONS
Use the inhalers/nebulizers every 4 hours for the next 2 days and then wean down as tolerated.  Continue taking your steroid taper as it is prescribed.  Take Tylenol and Motrin for symptom relief.  We encourage good hydration.

## 2025-02-12 NOTE — ED NOTES
INITIAL CONTACT/ PT ALERT AND ORIENTED TIMES 4. SKIN WARM AND DRY.RESPIRATIONS EVEN AND UNLABORED. DISCHARGE INSTRUCTIONS GIVEN AND PT VERBALIZED UNDERSTANDING OF THE INFORMATION OF ALL PAGES OF THE AFTER VISIT SUMMARY HAS BEEN REVIEWED WITH PATIENT AND FAMILY. PT GIVEN OPPORTUNITY TO ASK QUESTIONS REGARDING THERE INFORMATION. PT VERBALIZED UNDERSTANDING THEY SHOULD DISPOSE OF THE [place] SAFELY AT HOME TO PROTECT THERE HEALTH INFORMATION. THE COMPLETE COPY WAS PROVIDED TO PATIENT OR CAREGIVER. GAIT STEADY DEIDRA AND NO DISTRESS NOTED.

## 2025-02-12 NOTE — ED PROVIDER NOTES
[facility] EMERGENCY DEPARTMENT  EMERGENCY DEPARTMENT ENCOUNTER        Pt Name: Reynold Holloway  MRN: [id number]  Birthdate 1965  Date of evaluation: 2/12/2025  Provider: Sophia English MD  Attending Provider: No att. providers found  PCP: Joel Chun DO  Note Started: 8:32 AM EST 2/12/25    CHIEF COMPLAINT       Chief Complaint   Patient presents with    Shortness of Breath     shortness of breath, bronchoscopy last wednesday, no BM since, \"my head is pounding, my muscles are killing me, and my kidneys are on fire.\"    Pharyngitis     \"My face and throat are on fire\"    Dysuria     \"It feels like I'm pissing fire\"       HISTORY OF PRESENT ILLNESS: 1 or more Elements   History From: The patient        Reynold Holloway is a 59 y.o. male with a past medical history of coronary artery disease, COPD, congestive heart failure, chronic kidney disease, polysubstance abuse, hypertension, hyperlipidemia presenting with shortness of breath, sore throat, dysuria.  Patient states that he has been having difficulty breathing.  He states that he was recently admitted for COPD and a viral illness.  He had a bronchoscopy performed during that time.  He was discharged on antibiotics.  He took all of his antibiotics.  He still having persistent shortness of breath and difficulty breathing.  He still feels ill.  He states that he has not moved his bowels since bronchoscopy.  He is still passing gas.  No nausea or vomiting.  He states that he is having dark-colored urine that is uncomfortable when he urinates.  Subjective fevers at home.  No chest pain.  No recent travel.  Denies any history of blood clots in the arms or legs.  No anticoagulation use.    Nursing Notes were all reviewed and agreed with or any disagreements were addressed in the HPI.      REVIEW OF EXTERNAL NOTE :       Discharge summary on 2/7/2025 reviewed.  Patient was admitted for COPD exacerbation in setting of coronavirus.

## 2025-02-16 LAB
MICROORGANISM SPEC CULT: NORMAL
MICROORGANISM SPEC CULT: NORMAL
SERVICE CMNT-IMP: NORMAL
SERVICE CMNT-IMP: NORMAL
SPECIMEN DESCRIPTION: NORMAL
SPECIMEN DESCRIPTION: NORMAL

## 2025-07-30 NOTE — CARE COORDINATION
Isa 45 Transitions Initial Follow Up Call    Call within 2 business days of discharge: Yes    Patient: Shayy Steen Patient : 1965   MRN: <[id number]>  Reason for Admission: COPD  Discharge Date: 21 RARS: Readmission Risk Score: 14      Last Discharge [facility]       Complaint Diagnosis Description Type Department Provider    21 Shortness of Breath COPD with acute exacerbation ([facility] [address] . .. ED to Hosp-Admission (Discharged) (ADMITTED) [facility] Dorian Melara DO; Wily Beltran Eggl. .. Care Transitions 24 Hour Call    Do you have all of your prescriptions and are they filled?: Yes  Care Transitions Interventions       Transitions of Care Initial Call    Challenges to be reviewed by the provider   Additional needs identified to be addressed with provider: No  none             Method of communication with provider : none    Was this a readmission? No    Care Transition Nurse (CTN) contacted the patient by telephone to perform post hospital discharge assessment. Verified name and  with patient as identifiers. Provided introduction to self, and explanation of the CTN role. CTN reviewed discharge instructions, medical action plan and red flags with patient who verbalized understanding. Patient given an opportunity to ask questions and does not have any further questions or concerns at this time. Were discharge instructions available to patient? Yes. Reviewed appropriate site of care based on symptoms and resources available to patient including: PCP and When to call 911. The patient agrees to contact the PCP office for questions related to their healthcare. Medication reconciliation was performed with patient, who verbalizes understanding of administration of home medications. Advised obtaining a 90-day supply of all daily and as-needed medications.      Covid Risk Education     Educated patient about risk for severe COVID-19 due to risk factors according to [facility] guidelines. CTN reviewed discharge instructions, medical action plan and red flag symptoms with the patient who verbalized understanding. Discussed COVID vaccination status: Yes. Education provided on COVID-19 vaccination as appropriate. Discussed exposure protocols and quarantine with CDC Guidelines. Patient was given an opportunity to verbalize any questions and concerns and agrees to contact CTN or health care provider for questions related to their healthcare. Reviewed and educated patient on any new and changed medications related to discharge diagnosis. Was patient discharged with a pulse oximeter? Yes Discussed and confirmed pulse oximeter discharge instructions and when to notify provider or seek emergency care. CTN provided contact information. Plan for follow-up call in 3-5 days based on severity of symptoms and risk factors. States he's doing ok today. Reports he rearranged his house. States his SOB is at baseline for him. Pt measured O2 sat while on the phone and states it was 96%. States he has an occasional productive cough with green sputum. Denies fevers, chills, or other symptoms. States he doesn't have most of his medications. States the medications he does have are prednisone, amlodipine and clonidine. Placed call to [facility] and confirmed pt has refills for spironolactone and finasteride to be filled today. [facility] has refill of entresto - script to be transferred to [facility] on patient request.     Pt agreeable to  spironolactone, finasteride and entresto at [facility]. States he's out of sublingual nitro tablets. Denies CP. Pt is also out of refills for metoprolol, nicotine, and trelegy inhaler. States he does have an albuterol inhaler and his duoneb nebulizer solution at home. Pt has follow up with PCP on Friday. PCP office closed for the day; note routed to Dr. Norman Wilkes that pt is out of the above medications.  Pt knows when to call the doctor and denies other questions or Statement Selected

## (undated) DEVICE — 1870+ HEALTH CARE PART RESP. 120/CASE: Brand: AURA™

## (undated) DEVICE — MASK RESP UNIV N95 4 PANEL HD STRP INDIVIDUALLY WRP LF

## (undated) DEVICE — SURGICAL PROCEDURE PACK BRONCH

## (undated) DEVICE — SOLUTION IRRIG 500ML 0.9% SOD CHLO USP POUR PLAS BTL

## (undated) DEVICE — SOLUTION IV IRRIG 500ML 0.9% SODIUM CHL 2F7123

## (undated) DEVICE — Device